# Patient Record
Sex: MALE | Race: WHITE | NOT HISPANIC OR LATINO | Employment: OTHER | URBAN - METROPOLITAN AREA
[De-identification: names, ages, dates, MRNs, and addresses within clinical notes are randomized per-mention and may not be internally consistent; named-entity substitution may affect disease eponyms.]

---

## 2017-01-24 ENCOUNTER — ALLSCRIPTS OFFICE VISIT (OUTPATIENT)
Dept: OTHER | Facility: OTHER | Age: 72
End: 2017-01-24

## 2017-08-21 ENCOUNTER — APPOINTMENT (INPATIENT)
Dept: RADIOLOGY | Facility: HOSPITAL | Age: 72
DRG: 310 | End: 2017-08-21
Payer: MEDICARE

## 2017-08-21 ENCOUNTER — HOSPITAL ENCOUNTER (INPATIENT)
Facility: HOSPITAL | Age: 72
LOS: 3 days | Discharge: HOME | DRG: 310 | End: 2017-08-24
Attending: EMERGENCY MEDICINE | Admitting: INTERNAL MEDICINE
Payer: MEDICARE

## 2017-08-21 ENCOUNTER — GENERIC CONVERSION - ENCOUNTER (OUTPATIENT)
Dept: OTHER | Facility: OTHER | Age: 72
End: 2017-08-21

## 2017-08-21 ENCOUNTER — APPOINTMENT (EMERGENCY)
Dept: RADIOLOGY | Facility: HOSPITAL | Age: 72
DRG: 310 | End: 2017-08-21
Payer: MEDICARE

## 2017-08-21 DIAGNOSIS — R07.9 CHEST PAIN: ICD-10-CM

## 2017-08-21 DIAGNOSIS — I48.91 ATRIAL FIBRILLATION WITH RAPID VENTRICULAR RESPONSE (HCC): Primary | ICD-10-CM

## 2017-08-21 LAB
ALBUMIN SERPL BCP-MCNC: 3.5 G/DL (ref 3.5–5)
ALP SERPL-CCNC: 79 U/L (ref 46–116)
ALT SERPL W P-5'-P-CCNC: 17 U/L (ref 12–78)
ANION GAP SERPL CALCULATED.3IONS-SCNC: 5 MMOL/L (ref 4–13)
AST SERPL W P-5'-P-CCNC: 14 U/L (ref 5–45)
ATRIAL RATE: 127 BPM
BASOPHILS # BLD AUTO: 0.03 THOUSANDS/ΜL (ref 0–0.1)
BASOPHILS NFR BLD AUTO: 1 % (ref 0–1)
BILIRUB SERPL-MCNC: 0.75 MG/DL (ref 0.2–1)
BUN SERPL-MCNC: 11 MG/DL (ref 5–25)
CALCIUM SERPL-MCNC: 8.8 MG/DL (ref 8.3–10.1)
CHLORIDE SERPL-SCNC: 111 MMOL/L (ref 100–108)
CO2 SERPL-SCNC: 27 MMOL/L (ref 21–32)
CREAT SERPL-MCNC: 0.74 MG/DL (ref 0.6–1.3)
EOSINOPHIL # BLD AUTO: 0.19 THOUSAND/ΜL (ref 0–0.61)
EOSINOPHIL NFR BLD AUTO: 4 % (ref 0–6)
ERYTHROCYTE [DISTWIDTH] IN BLOOD BY AUTOMATED COUNT: 13.4 % (ref 11.6–15.1)
GFR SERPL CREATININE-BSD FRML MDRD: 92 ML/MIN/1.73SQ M
GLUCOSE SERPL-MCNC: 101 MG/DL (ref 65–140)
HCT VFR BLD AUTO: 46.6 % (ref 36.5–49.3)
HGB BLD-MCNC: 16.7 G/DL (ref 12–17)
LYMPHOCYTES # BLD AUTO: 1.76 THOUSANDS/ΜL (ref 0.6–4.47)
LYMPHOCYTES NFR BLD AUTO: 34 % (ref 14–44)
MCH RBC QN AUTO: 33.2 PG (ref 26.8–34.3)
MCHC RBC AUTO-ENTMCNC: 35.8 G/DL (ref 31.4–37.4)
MCV RBC AUTO: 93 FL (ref 82–98)
MONOCYTES # BLD AUTO: 0.51 THOUSAND/ΜL (ref 0.17–1.22)
MONOCYTES NFR BLD AUTO: 10 % (ref 4–12)
NEUTROPHILS # BLD AUTO: 2.66 THOUSANDS/ΜL (ref 1.85–7.62)
NEUTS SEG NFR BLD AUTO: 51 % (ref 43–75)
NRBC BLD AUTO-RTO: 0 /100 WBCS
PLATELET # BLD AUTO: 183 THOUSANDS/UL (ref 149–390)
PMV BLD AUTO: 10 FL (ref 8.9–12.7)
POTASSIUM SERPL-SCNC: 3.8 MMOL/L (ref 3.5–5.3)
PROT SERPL-MCNC: 6.7 G/DL (ref 6.4–8.2)
QRS AXIS: -25 DEGREES
QRSD INTERVAL: 96 MS
QT INTERVAL: 278 MS
QTC INTERVAL: 389 MS
RBC # BLD AUTO: 5.03 MILLION/UL (ref 3.88–5.62)
SODIUM SERPL-SCNC: 143 MMOL/L (ref 136–145)
SPECIMEN SOURCE: NORMAL
T WAVE AXIS: 17 DEGREES
TROPONIN I BLD-MCNC: 0.01 NG/ML (ref 0–0.08)
VENTRICULAR RATE: 118 BPM
WBC # BLD AUTO: 5.16 THOUSAND/UL (ref 4.31–10.16)

## 2017-08-21 PROCEDURE — 99285 EMERGENCY DEPT VISIT HI MDM: CPT

## 2017-08-21 PROCEDURE — 84484 ASSAY OF TROPONIN QUANT: CPT

## 2017-08-21 PROCEDURE — 80053 COMPREHEN METABOLIC PANEL: CPT | Performed by: EMERGENCY MEDICINE

## 2017-08-21 PROCEDURE — 96374 THER/PROPH/DIAG INJ IV PUSH: CPT

## 2017-08-21 PROCEDURE — 93005 ELECTROCARDIOGRAM TRACING: CPT | Performed by: EMERGENCY MEDICINE

## 2017-08-21 PROCEDURE — 85025 COMPLETE CBC W/AUTO DIFF WBC: CPT | Performed by: EMERGENCY MEDICINE

## 2017-08-21 PROCEDURE — 36415 COLL VENOUS BLD VENIPUNCTURE: CPT | Performed by: EMERGENCY MEDICINE

## 2017-08-21 PROCEDURE — 76770 US EXAM ABDO BACK WALL COMP: CPT

## 2017-08-21 PROCEDURE — 71275 CT ANGIOGRAPHY CHEST: CPT

## 2017-08-21 PROCEDURE — 71020 HB CHEST X-RAY 2VW FRONTAL&LATL: CPT

## 2017-08-21 RX ORDER — METOPROLOL TARTRATE 50 MG/1
50 TABLET, FILM COATED ORAL EVERY 12 HOURS SCHEDULED
Status: DISCONTINUED | OUTPATIENT
Start: 2017-08-21 | End: 2017-08-24

## 2017-08-21 RX ORDER — METOPROLOL TARTRATE 5 MG/5ML
5 INJECTION INTRAVENOUS ONCE
Status: COMPLETED | OUTPATIENT
Start: 2017-08-21 | End: 2017-08-21

## 2017-08-21 RX ORDER — ACETAMINOPHEN 325 MG/1
650 TABLET ORAL EVERY 6 HOURS PRN
Status: DISCONTINUED | OUTPATIENT
Start: 2017-08-21 | End: 2017-08-24 | Stop reason: HOSPADM

## 2017-08-21 RX ORDER — ONDANSETRON 2 MG/ML
4 INJECTION INTRAMUSCULAR; INTRAVENOUS EVERY 6 HOURS PRN
Status: DISCONTINUED | OUTPATIENT
Start: 2017-08-21 | End: 2017-08-22

## 2017-08-21 RX ORDER — B-COMPLEX WITH VITAMIN C
1 TABLET ORAL
Status: DISCONTINUED | OUTPATIENT
Start: 2017-08-22 | End: 2017-08-24 | Stop reason: HOSPADM

## 2017-08-21 RX ORDER — CHOLECALCIFEROL (VITAMIN D3) 125 MCG
100 CAPSULE ORAL DAILY
Status: DISCONTINUED | OUTPATIENT
Start: 2017-08-22 | End: 2017-08-24 | Stop reason: HOSPADM

## 2017-08-21 RX ORDER — ASPIRIN 81 MG/1
81 TABLET, CHEWABLE ORAL DAILY
Status: DISCONTINUED | OUTPATIENT
Start: 2017-08-22 | End: 2017-08-24 | Stop reason: HOSPADM

## 2017-08-21 RX ORDER — METOPROLOL TARTRATE 5 MG/5ML
2.5 INJECTION INTRAVENOUS EVERY 6 HOURS PRN
Status: DISCONTINUED | OUTPATIENT
Start: 2017-08-21 | End: 2017-08-24 | Stop reason: HOSPADM

## 2017-08-21 RX ORDER — METOPROLOL SUCCINATE 25 MG/1
25 TABLET, EXTENDED RELEASE ORAL DAILY
Status: DISCONTINUED | OUTPATIENT
Start: 2017-08-22 | End: 2017-08-22

## 2017-08-21 RX ORDER — NIACIN 100 MG
50 TABLET ORAL
Status: DISCONTINUED | OUTPATIENT
Start: 2017-08-22 | End: 2017-08-24 | Stop reason: HOSPADM

## 2017-08-21 RX ADMIN — SODIUM CHLORIDE 1000 ML: 0.9 INJECTION, SOLUTION INTRAVENOUS at 10:41

## 2017-08-21 RX ADMIN — APIXABAN 5 MG: 5 TABLET, FILM COATED ORAL at 21:12

## 2017-08-21 RX ADMIN — APIXABAN 5 MG: 5 TABLET, FILM COATED ORAL at 14:28

## 2017-08-21 RX ADMIN — METOPROLOL TARTRATE 5 MG: 5 INJECTION INTRAVENOUS at 08:40

## 2017-08-21 RX ADMIN — METOPROLOL TARTRATE 50 MG: 50 TABLET ORAL at 21:12

## 2017-08-21 RX ADMIN — IOHEXOL 85 ML: 350 INJECTION, SOLUTION INTRAVENOUS at 10:35

## 2017-08-22 ENCOUNTER — GENERIC CONVERSION - ENCOUNTER (OUTPATIENT)
Dept: OTHER | Facility: OTHER | Age: 72
End: 2017-08-22

## 2017-08-22 ENCOUNTER — ANESTHESIA EVENT (INPATIENT)
Dept: NON INVASIVE DIAGNOSTICS | Facility: HOSPITAL | Age: 72
DRG: 310 | End: 2017-08-22
Payer: MEDICARE

## 2017-08-22 LAB
ATRIAL RATE: 79 BPM
P AXIS: 48 DEGREES
PR INTERVAL: 176 MS
QRS AXIS: -26 DEGREES
QRSD INTERVAL: 104 MS
QT INTERVAL: 376 MS
QTC INTERVAL: 431 MS
T WAVE AXIS: 17 DEGREES
TSH SERPL DL<=0.05 MIU/L-ACNC: 1.61 UIU/ML (ref 0.36–3.74)
VENTRICULAR RATE: 79 BPM

## 2017-08-22 PROCEDURE — 93005 ELECTROCARDIOGRAM TRACING: CPT

## 2017-08-22 PROCEDURE — 92960 CARDIOVERSION ELECTRIC EXT: CPT | Performed by: INTERNAL MEDICINE

## 2017-08-22 PROCEDURE — 93312 ECHO TRANSESOPHAGEAL: CPT

## 2017-08-22 PROCEDURE — 93005 ELECTROCARDIOGRAM TRACING: CPT | Performed by: PHYSICIAN ASSISTANT

## 2017-08-22 PROCEDURE — 5A2204Z RESTORATION OF CARDIAC RHYTHM, SINGLE: ICD-10-PCS | Performed by: INTERNAL MEDICINE

## 2017-08-22 PROCEDURE — 84443 ASSAY THYROID STIM HORMONE: CPT | Performed by: INTERNAL MEDICINE

## 2017-08-22 RX ORDER — PROPOFOL 10 MG/ML
INJECTION, EMULSION INTRAVENOUS CONTINUOUS PRN
Status: DISCONTINUED | OUTPATIENT
Start: 2017-08-22 | End: 2017-08-22 | Stop reason: SURG

## 2017-08-22 RX ORDER — SODIUM CHLORIDE 9 MG/ML
INJECTION, SOLUTION INTRAVENOUS CONTINUOUS PRN
Status: DISCONTINUED | OUTPATIENT
Start: 2017-08-22 | End: 2017-08-22 | Stop reason: SURG

## 2017-08-22 RX ORDER — POTASSIUM CHLORIDE 20 MEQ/1
40 TABLET, EXTENDED RELEASE ORAL ONCE
Status: COMPLETED | OUTPATIENT
Start: 2017-08-22 | End: 2017-08-22

## 2017-08-22 RX ORDER — PROPOFOL 10 MG/ML
INJECTION, EMULSION INTRAVENOUS AS NEEDED
Status: DISCONTINUED | OUTPATIENT
Start: 2017-08-22 | End: 2017-08-22 | Stop reason: SURG

## 2017-08-22 RX ORDER — DOFETILIDE 0.5 MG/1
500 CAPSULE ORAL EVERY 12 HOURS SCHEDULED
Status: DISCONTINUED | OUTPATIENT
Start: 2017-08-22 | End: 2017-08-24 | Stop reason: HOSPADM

## 2017-08-22 RX ADMIN — APIXABAN 5 MG: 5 TABLET, FILM COATED ORAL at 09:23

## 2017-08-22 RX ADMIN — METOPROLOL TARTRATE 50 MG: 50 TABLET ORAL at 21:22

## 2017-08-22 RX ADMIN — DOFETILIDE 500 MCG: 0.5 CAPSULE ORAL at 18:38

## 2017-08-22 RX ADMIN — Medication 100 MG: at 09:23

## 2017-08-22 RX ADMIN — APIXABAN 5 MG: 5 TABLET, FILM COATED ORAL at 21:22

## 2017-08-22 RX ADMIN — METOPROLOL SUCCINATE 25 MG: 25 TABLET, EXTENDED RELEASE ORAL at 09:23

## 2017-08-22 RX ADMIN — Medication 50 MG: at 09:22

## 2017-08-22 RX ADMIN — ASPIRIN 81 MG 81 MG: 81 TABLET ORAL at 09:23

## 2017-08-22 RX ADMIN — SODIUM CHLORIDE: 0.9 INJECTION, SOLUTION INTRAVENOUS at 13:01

## 2017-08-22 RX ADMIN — POTASSIUM CHLORIDE 40 MEQ: 1500 TABLET, EXTENDED RELEASE ORAL at 09:23

## 2017-08-22 RX ADMIN — CALCIUM CARBONATE 500 MG (1,250 MG)-VITAMIN D3 200 UNIT TABLET 1 TABLET: at 09:23

## 2017-08-22 RX ADMIN — PROPOFOL 75 MCG/KG/MIN: 10 INJECTION, EMULSION INTRAVENOUS at 13:15

## 2017-08-22 RX ADMIN — PROPOFOL 50 MG: 10 INJECTION, EMULSION INTRAVENOUS at 13:14

## 2017-08-23 LAB
ANION GAP SERPL CALCULATED.3IONS-SCNC: 6 MMOL/L (ref 4–13)
ATRIAL RATE: 60 BPM
ATRIAL RATE: 68 BPM
BUN SERPL-MCNC: 14 MG/DL (ref 5–25)
CALCIUM SERPL-MCNC: 9.1 MG/DL (ref 8.3–10.1)
CHLORIDE SERPL-SCNC: 109 MMOL/L (ref 100–108)
CO2 SERPL-SCNC: 28 MMOL/L (ref 21–32)
CREAT SERPL-MCNC: 0.82 MG/DL (ref 0.6–1.3)
GFR SERPL CREATININE-BSD FRML MDRD: 88 ML/MIN/1.73SQ M
GLUCOSE SERPL-MCNC: 96 MG/DL (ref 65–140)
MAGNESIUM SERPL-MCNC: 2.4 MG/DL (ref 1.6–2.6)
P AXIS: 36 DEGREES
P AXIS: 62 DEGREES
POTASSIUM SERPL-SCNC: 4.5 MMOL/L (ref 3.5–5.3)
PR INTERVAL: 168 MS
PR INTERVAL: 178 MS
QRS AXIS: -1 DEGREES
QRS AXIS: -19 DEGREES
QRSD INTERVAL: 112 MS
QRSD INTERVAL: 94 MS
QT INTERVAL: 384 MS
QT INTERVAL: 422 MS
QTC INTERVAL: 408 MS
QTC INTERVAL: 422 MS
SODIUM SERPL-SCNC: 143 MMOL/L (ref 136–145)
T WAVE AXIS: 2 DEGREES
T WAVE AXIS: 33 DEGREES
VENTRICULAR RATE: 60 BPM
VENTRICULAR RATE: 68 BPM

## 2017-08-23 PROCEDURE — 93005 ELECTROCARDIOGRAM TRACING: CPT | Performed by: PHYSICIAN ASSISTANT

## 2017-08-23 PROCEDURE — 80048 BASIC METABOLIC PNL TOTAL CA: CPT | Performed by: PHYSICIAN ASSISTANT

## 2017-08-23 PROCEDURE — 83735 ASSAY OF MAGNESIUM: CPT | Performed by: PHYSICIAN ASSISTANT

## 2017-08-23 RX ORDER — ATORVASTATIN CALCIUM 10 MG/1
10 TABLET, FILM COATED ORAL
Status: CANCELLED | OUTPATIENT
Start: 2017-08-23

## 2017-08-23 RX ADMIN — Medication 50 MG: at 08:41

## 2017-08-23 RX ADMIN — CALCIUM CARBONATE 500 MG (1,250 MG)-VITAMIN D3 200 UNIT TABLET 1 TABLET: at 08:41

## 2017-08-23 RX ADMIN — Medication 100 MG: at 08:41

## 2017-08-23 RX ADMIN — ASPIRIN 81 MG 81 MG: 81 TABLET ORAL at 08:41

## 2017-08-23 RX ADMIN — METOPROLOL TARTRATE 50 MG: 50 TABLET ORAL at 08:41

## 2017-08-23 RX ADMIN — APIXABAN 5 MG: 5 TABLET, FILM COATED ORAL at 21:21

## 2017-08-23 RX ADMIN — DOFETILIDE 500 MCG: 0.5 CAPSULE ORAL at 06:34

## 2017-08-23 RX ADMIN — METOPROLOL TARTRATE 50 MG: 50 TABLET ORAL at 21:21

## 2017-08-23 RX ADMIN — DOFETILIDE 500 MCG: 0.5 CAPSULE ORAL at 18:33

## 2017-08-23 RX ADMIN — APIXABAN 5 MG: 5 TABLET, FILM COATED ORAL at 08:41

## 2017-08-24 ENCOUNTER — GENERIC CONVERSION - ENCOUNTER (OUTPATIENT)
Dept: OTHER | Facility: OTHER | Age: 72
End: 2017-08-24

## 2017-08-24 VITALS
HEART RATE: 56 BPM | SYSTOLIC BLOOD PRESSURE: 113 MMHG | HEIGHT: 78 IN | RESPIRATION RATE: 18 BRPM | OXYGEN SATURATION: 96 % | TEMPERATURE: 98.3 F | DIASTOLIC BLOOD PRESSURE: 69 MMHG | WEIGHT: 252.65 LBS | BODY MASS INDEX: 29.23 KG/M2

## 2017-08-24 LAB
ATRIAL RATE: 53 BPM
ATRIAL RATE: 54 BPM
P AXIS: 50 DEGREES
P AXIS: 59 DEGREES
PR INTERVAL: 164 MS
PR INTERVAL: 178 MS
QRS AXIS: -25 DEGREES
QRS AXIS: -8 DEGREES
QRSD INTERVAL: 102 MS
QRSD INTERVAL: 120 MS
QT INTERVAL: 434 MS
QT INTERVAL: 468 MS
QTC INTERVAL: 407 MS
QTC INTERVAL: 443 MS
T WAVE AXIS: 27 DEGREES
T WAVE AXIS: 9 DEGREES
VENTRICULAR RATE: 53 BPM
VENTRICULAR RATE: 54 BPM

## 2017-08-24 PROCEDURE — 93005 ELECTROCARDIOGRAM TRACING: CPT | Performed by: PHYSICIAN ASSISTANT

## 2017-08-24 RX ORDER — METOPROLOL SUCCINATE 25 MG/1
25 TABLET, EXTENDED RELEASE ORAL DAILY
Status: DISCONTINUED | OUTPATIENT
Start: 2017-08-25 | End: 2017-08-24 | Stop reason: HOSPADM

## 2017-08-24 RX ORDER — DOFETILIDE 0.5 MG/1
500 CAPSULE ORAL EVERY 12 HOURS SCHEDULED
Qty: 60 CAPSULE | Refills: 0
Start: 2017-08-24 | End: 2018-02-23

## 2017-08-24 RX ADMIN — Medication 50 MG: at 06:40

## 2017-08-24 RX ADMIN — Medication 100 MG: at 09:00

## 2017-08-24 RX ADMIN — CALCIUM CARBONATE 500 MG (1,250 MG)-VITAMIN D3 200 UNIT TABLET 1 TABLET: at 06:40

## 2017-08-24 RX ADMIN — DOFETILIDE 500 MCG: 0.5 CAPSULE ORAL at 06:43

## 2017-08-24 RX ADMIN — APIXABAN 5 MG: 5 TABLET, FILM COATED ORAL at 09:01

## 2017-08-24 RX ADMIN — METOPROLOL TARTRATE 50 MG: 50 TABLET ORAL at 09:00

## 2017-08-24 RX ADMIN — ASPIRIN 81 MG 81 MG: 81 TABLET ORAL at 09:00

## 2017-08-31 ENCOUNTER — ALLSCRIPTS OFFICE VISIT (OUTPATIENT)
Dept: OTHER | Facility: OTHER | Age: 72
End: 2017-08-31

## 2017-10-31 ENCOUNTER — ALLSCRIPTS OFFICE VISIT (OUTPATIENT)
Dept: OTHER | Facility: OTHER | Age: 72
End: 2017-10-31

## 2017-10-31 ENCOUNTER — GENERIC CONVERSION - ENCOUNTER (OUTPATIENT)
Dept: OTHER | Facility: OTHER | Age: 72
End: 2017-10-31

## 2017-12-06 ENCOUNTER — ALLSCRIPTS OFFICE VISIT (OUTPATIENT)
Dept: OTHER | Facility: OTHER | Age: 72
End: 2017-12-06

## 2017-12-06 ENCOUNTER — TRANSCRIBE ORDERS (OUTPATIENT)
Dept: ADMINISTRATIVE | Facility: HOSPITAL | Age: 72
End: 2017-12-06

## 2017-12-06 DIAGNOSIS — I48.91 ATRIAL FIBRILLATION (HCC): ICD-10-CM

## 2017-12-06 DIAGNOSIS — I48.91 ATRIAL FIBRILLATION, UNSPECIFIED TYPE (HCC): Primary | ICD-10-CM

## 2017-12-07 ENCOUNTER — GENERIC CONVERSION - ENCOUNTER (OUTPATIENT)
Dept: OTHER | Facility: OTHER | Age: 72
End: 2017-12-07

## 2017-12-07 NOTE — PROGRESS NOTES
Assessment  Assessed    1  Afib (427 31) (I48 91)   2  Hypertension (401 9) (I10)    Plan  Afib    · EKG/ECG- POC; Status:Complete;   Done: 24VIM0755   Perform: In Office; 214 6488; Last Updated By:Ibis Camarena; 12/6/2017 9:54:30 AM;Ordered;Ordered By:Vero Valdes;    Discussion/Summary  Cardiology Discussion Summary Free Text Note Form [facility]:   66 yo maleParoxysmal afib (one longer episded 4 months requiring DCCV in August, 5 episodes lifelong that had required hospitalization but broke spontaneously)  On Tikoysn and actually very well controlled on Tikosyn, when stopped tikosyn last time he ended up back in afib  He believes strongly in holistic medication and is very weary of taking medications and is uncomfortable with risks of lifelong Tikosyn and therefore is requesting afib ablation to stop Tikosyn and anticoagualtion  He has TAUDL3Qbeb=4 (age, HTN) so relatively low risk, and HTN seems to very well controlled w low dose metoprolol  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  4 5cm aneusysm of aorta  He is well controlled on antiarrhythmics, I discussed the pros and cons of just continuing medication vs ablation  I was very clear that afib ablation is not a cure for afib, and I would estimate up to 40% chance of recurrence based on trial data, and he may likely still need medications for afib if he recurs  I also explained risks of ablation inlcuding 4% complication rate which includes bleeding, life threatening damage to esophagus, damage to heart, stroke, vascular injury  also explaiend he would need Tikosyn for at least the month post ablation to treat common post op afib, and would need anticoagualtion for 3 months post ablation   We did discuss the approximately 0 5% chance of proarrhythmia of Tikosyn and that it is associated w sudden death and I would estimate risk of death from ablation vs risk of death of Tikosyn to be similar, ablation is approximately as 1/1000 mortality in trial data  completely understands this and wishes to proceed with ablation and understands the realistic possibility he would still needs meds or repeat ablation if the ablation doesn't work  Since his [id number] is 1-2 if no afib were to recur he would be low risk for stroke and could have option of stopping anticoagualtion 3 months post ablation  get CT Angio preop  RANDALL, cryoablation planned  Chief Complaint  Chief Complaint Free Text Note Form: F/U appt to discuss A fib ablation  No complaints of chest pain, palpitations, SOB or swelling in LE  History of Present Illness  Cardiology HPI Free Text Note Form [facility]: 68 yo maleParoxysmal afib (one longer episded 4 months requiring DCCV in August, 5 episodes lifelong that had required hospitalization but broke spontaneously)  On Tikoysn and actually very well controlled on Tikosyn, when stopped tikosyn last time he ended up back in afib  He believes strongly in holistic medication and is very weary of taking medications and is uncomfortable with risks of lifelong Tikosyn and therefore is requesting afib ablation to stop Tikosyn and anticoagualtion  He has DTSXO6Fmig=7 (age, HTN) so relatively low risk, and HTN seems to very well controlled w low dose metoprolol  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  Review of Systems  ROS Reviewed:   ROS reviewed  Active Problems  Problems    1  Afib (427 31) (I48 91)   2  Cardiac disease (429 9) (I51 9)   3  Hypertension (401 9) (I10)   4  Mitral valve insufficiency, unspecified etiology (424 0) (I34 0)    Past Medical History  Problems    1  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)  Active Problems And Past Medical History Reviewed: The active problems and past medical history were reviewed and updated today  Surgical History  Problems    1  History of Hernia Repair   2  History of Knee Replacement   3  History of Surgery For Aneurysm   4   History of Tonsillectomy   5  History of Total Knee Replacement Left  Surgical History Reviewed: The surgical history was reviewed and updated today  Family History  Mother    1  Family history of    2  Family history of pancreatic cancer (V16 0) (Z80 0)  Father    3  Family history of    4  Family history of Heart problem  Brother    5  Family history of   Paternal Grandmother    10  Family history of    7  Family history of myocardial infarction (V17 3) (Z82 49)  Family History Reviewed: The family history was reviewed and updated today  Social History  Problems    · Never smoker  Social History Reviewed: The social history was reviewed and updated today  Current Meds   1  Aleve CAPS; TAKE 2 CAPSULE Every 6 hours PRN; Therapy: (Recorded:00Vwg0681) to Recorded   2  Chelated Magnesium TABS; Take 1 tablet daily; Therapy: (Recorded:2017) to Recorded   3  CoQ-10 100 MG Oral Capsule; take 1 capsule daily; Therapy: (Recorded:64Pur3545) to Recorded   4  Eliquis 5 MG Oral Tablet; TAKE 1 TABLET Twice daily for blood thinning  Requested for: 68NEO2418; Last Rx:2017 Ordered   5  Flax + DHA Oral Capsule; 1000mg daily; Therapy: (Recorded:96Scq4799) to Recorded   6  Green Tea TABS; Green Tea Phytosome  250mg 2-3 tablets daily; Therapy: (Recorded:32Lga4787) to Recorded   7  Liver CAPS; Liver Defatted Bovine  500mg daily; Therapy: (Recorded:75Vkj7846) to Recorded   8  Magnesium Citrate 125 MG Oral Capsule; take 1-3 tablets daily; Therapy: (Recorded:46Wnc2176) to Recorded   9  Metoprolol Succinate ER 25 MG Oral Tablet Extended Release 24 Hour; TAKE 1 TABLET DAILY; Therapy: (Recorded:12Cfs1617) to Recorded   10  Niacin-50 50 MG Oral Tablet; TAKE 1 TABLET DAILY AS DIRECTED; Therapy: (Recorded:75Vat4270) to Recorded   11  Pancreatin 325 MG Oral Tablet; TAKE 3 TABLET Daily; Therapy: (Recorded:91Flk1785) to Recorded   12  Potassium TABS; Potassium Compound   150mg 2 teaspoon daily; Therapy: (Recorded:71Jgp9324) to Recorded   13  Probiotic Daily CAPS; take 1 capsule daily; Therapy: (Recorded:57Kud7357) to Recorded   14  Thyroid TABS; Thyrosol  3 tablets weekly; Therapy: (Recorded:61Mbc0448) to Recorded   15  Tikosyn 500 MCG Oral Capsule; TAKE 1 CAPSULE TWICE DAILY; Last Rx:30Tap0599  Ordered   16  Vitamin C 500 MG Oral Capsule; TAKE 1 CAPSULE Daily PRN; Therapy: (Recorded:66Wom7092) to Recorded   17  Vitamin D3 2000 UNIT Oral Capsule; take 1 capsule daily; Therapy: (Recorded:46Tyb3872) to Recorded  Medication List Reviewed: The medication list was reviewed and updated today  Allergies  Medication    1  OxyCODONE HCl TABS    Vitals  Vital Signs    Recorded: 51HRI3782 09:52AM   Heart Rate 61   Systolic 536, RUE, Sitting   Diastolic 64, RUE, Sitting   BP CUFF SIZE Large   Height 6 ft 6 in   Weight 251 lb    BMI Calculated 29 01   BSA Calculated 2 49       Physical Exam   Constitutional - General appearance: No acute distress, well appearing and well nourished  Eyes - Conjunctiva and Sclera examination: Conjunctiva pink, sclera anicteric  Neck - Normal, no JVD   Pulmonary - Respiratory effort: No signs of respiratory distress  -- Auscultation of lungs: Clear to auscultation  Cardiovascular - Auscultation of heart: Normal rate and rhythm, normal S1 and S2, no murmurs  -- Pedal pulses: Normal, 2+ bilaterally  -- Examination of extremities for edema and/or varicosities: Normal    Abdomen - Soft  Musculoskeletal - Gait and station: Normal gait  Skin - Skin: Normal without rashes  Skin is warm and well perfused  Neurologic - Speech normal  No focal deficits  Psychiatric - Orientation to person, place, and time: Normal       Results/Data  Diagnostic Studies Reviewed Cardio: I personally reviewed the recording/images in the office today  My interpretation follows  Echocardiogram/RANDALL: mild MR, normal EF, 4 5cm aneurysm  ECG Report:  Today NSR normal QT interval      Signatures Electronically signed by : SHELLI Claros ; Dec  6 2017 10:44AM EST                       (Author)

## 2017-12-11 ENCOUNTER — HOSPITAL ENCOUNTER (OUTPATIENT)
Dept: RADIOLOGY | Facility: HOSPITAL | Age: 72
Discharge: HOME | End: 2017-12-11
Attending: INTERNAL MEDICINE
Payer: MEDICARE

## 2017-12-11 DIAGNOSIS — I48.91 ATRIAL FIBRILLATION (HCC): ICD-10-CM

## 2017-12-11 PROCEDURE — 75572 CT HRT W/3D IMAGE: CPT

## 2017-12-11 RX ADMIN — IODIXANOL 120 ML: 320 INJECTION, SOLUTION INTRAVASCULAR at 14:39

## 2018-01-11 NOTE — PROCEDURES
Procedures by Christo Tinoco MD at 8/15/2016 11:48 AM      Author:  Christo Tinoco MD Service:  Cardiology Author Type:  Fellow    Filed:  8/15/2016 11:54 AM Date of Service:  8/15/2016 11:48 AM Status:  Attested    :  Christo Tinoco MD (Fellow)  Cosigner:  Ifeoma Castaneda MD at 9/2/2016  3:10 PM      Procedures:       1  RANDALL ANESTHESIA [[id number] (Custom)]              Attestation signed by Ifeoma Castaneda MD at 9/2/2016  3:10 PM                  Teaching Physician Statement  I performed history and exam of patient  I discussed with the Resident  I agree with the resident's documented findings and plan of care  PRE-OP DIAGNOSIS:  Paroxysmal Atrial fibrillation    PROCEDURE: RANDALL/CV    CONSENT: The nature of the procedure, risks and alternatives were discussed with the patient who gave informed consent  :  Anish Gonzales MD    FELLOW: Christo Tinoco MD    ANESTHESIA:  Conscious sedation provided by anesthesiology  20% Benzocaine spray x 1 and propofol 250 mg IV  IMPRESSION: Difficult intubation, successful after 3 attempts  RANDALL probe would not advance in the esophagus past beyond 35cm  JENNY appendage was not completely imaged  Procedure was aborted  Recommend GI evaluation/clearance with  EGD  Cardioversion was not attempted  COMPLICATIONS:  None  Disposition: Cath lab recovery area                 Received Noble Velásquez MD  Sep  2 2016  3:10PM [facility] Standard Time

## 2018-01-12 DIAGNOSIS — I48.91 ATRIAL FIBRILLATION (HCC): ICD-10-CM

## 2018-01-14 VITALS
SYSTOLIC BLOOD PRESSURE: 132 MMHG | WEIGHT: 252.25 LBS | DIASTOLIC BLOOD PRESSURE: 84 MMHG | HEIGHT: 78 IN | BODY MASS INDEX: 29.19 KG/M2 | HEART RATE: 70 BPM

## 2018-01-15 NOTE — PROGRESS NOTES
Chief Complaint  Patient came in today for nurse visit, EKG as per Memo Bustos, Patient on Tikosyn 500mg Bid, patient had no complains  As per Dr Jackman Eye everything looks good and no changes were made in his medical regimen  Active Problems    1  Afib (427 31) (I48 91)   2  Cardiac disease (429 9) (I51 9)   3  Hypertension (401 9) (I10)    Current Meds   1  Eliquis 5 MG Oral Tablet; Take 1 tablet twice daily; Last Rx:07Sep2016 Ordered   2  Iodine (Kelp) TABS; 12 5mg daily; Therapy: (Recorded:24Jan2017) to Recorded   3  Metoprolol Succinate ER 25 MG Oral Tablet Extended Release 24 Hour; TAKE 1 TABLET   DAILY; Therapy: (Antoni Francois) to Recorded   4  Potassium Chloride 20 MEQ TBCR; TAKE 1 TABLET DAILY; Therapy: (Antoni Francois) to Recorded   5  Tikosyn 500 MCG Oral Capsule; TAKE 1 CAPSULE TWICE DAILY; Last Rx:07Sep2016   Ordered   6  Tums CHEW;   Therapy: (Recorded:26Aug2016) to Recorded    Allergies    1  No Known Drug Allergies    Vitals  Signs    Heart Rate: 60, Apical  Pulse Quality: Normal, Apical  Systolic: 998, LUE, Sitting  Diastolic: 70, LUE, Sitting  Height: 6 ft 6 in    Plan  Afib    · EKG/ECG- POC; Status:Complete;   Done: 69Jar1023    Discussion/Summary    On dofetilide 500 mcg bid  QTc 420 ms        Future Appointments    Date/Time Provider Specialty Site   10/31/2017 09:40 AM DO Lindsey Tapia [facility]     Signatures   Electronically signed by : Mel Charles, ; Aug 31 2017  9:57AM EST                       (Author)    Electronically signed by : Niels Barry [facility]; Sep  1 2017  4:04PM EST                       (Acknowledgement)    Electronically signed by : SHELLI Marin ; Sep  2 2017  3:53PM EST                       (Author)

## 2018-01-16 NOTE — PROGRESS NOTES
Chief Complaint  Pt here for a EKG per Dr Giselle Cadet  Pt was started on tikosyn 500mg BID while in the hospital  Pt has no cardiac complaints today and is feeling well  Dr Joan Rausch reviewed todays EKG  Pt will be returning to see Dr Willian Bamberger in the office on 9/21 for follow up  Active Problems    1  Afib (427 31) (I48 91)    Current Meds   1  Eliquis 5 MG Oral Tablet; Take 1 tablet twice daily; Therapy: (Recorded:58Itl1293) to Recorded   2  Metoprolol Succinate ER 25 MG Oral Tablet Extended Release 24 Hour; TAKE 1 TABLET   DAILY; Therapy: (Nayeli Captadn) to Recorded   3  Potassium Chloride 20 MEQ TBCR; TAKE 1 TABLET DAILY; Therapy: (Nayeli Capuchin) to Recorded   4  Tikosyn 500 MCG Oral Capsule; TAKE 1 CAPSULE TWICE DAILY; Therapy: (Nayeli Capuchin) to Recorded   5  Tums CHEW;   Therapy: (Recorded:87Pbt9594) to Recorded    Allergies    1   No Known Drug Allergies    Vitals  Signs    Systolic: 415, LUE, Sitting  Diastolic: 70, LUE, Sitting  Heart Rate: 55  Height: 6 ft 6 in  Weight: 241 lb 1 oz  BMI Calculated: 27 86  BSA Calculated: 2 44    Plan  Afib    · EKG/ECG- POC; Status:Complete;   Done: 63Nml5125    Future Appointments    Date/Time Provider Specialty Site   09/21/2016 03:40 PM Grace Mosquera DO Cardiology [facility]     Signatures   Electronically signed by : Alma Flaherty, ; Aug 26 2016 11:15AM EST                       (Author)    Electronically signed by : Becky Aviles DO; Sep 11 2016  8:11PM EST                       (Author)

## 2018-01-16 NOTE — PROCEDURES
Procedures by Jean Pierre Senior MD at 8/16/2016 12:13 PM      Author:  Jean Pierre Senior MD Service:  Cardiology Author Type:  Fellow    Filed:  8/16/2016 12:18 PM Date of Service:  8/16/2016 12:13 PM Status:  Attested    :  Jean Pierre Senior MD (Fellow)  Cosigner:  Aditya Hamilton MD at 8/16/2016 12:58 PM      Pre-procedure Diagnoses:       1  Atrial fibrillation [I48 91]                Procedures:       1  RANDALL ANESTHESIA [[id number] (Custom)]              Attestation signed by Aditya Hamilton MD at 8/16/2016 12:58 PM                  Teaching Physician Statement  I was present for the entire procedure and agree with the resident's findings except that the TR appears moderate  PRE-OP DIAGNOSIS:  Paroxysmal Atrial fibrillation    PROCEDURE:  RANDALL/CV    CONSENT: The nature of the procedure, risks and alternatives were discussed with the patient who gave informed consent  : Aditya Hamilton MD    FELLOW: Jean Pierre Senior MD    ANESTHESIA:  Conscious sedation provided by anesthesiology  ANTI-COAGULATION: Eliquis 5mg BID - last dose 8/16 9am    Electrical Pad Placement: Anteroposteriorly interscapular region and upper sternum  IMPRESSION: Arrie Rosi revealed no LA/JENNY thrombus  Mild MR, TR, AI was noted  Please see report for full details  Successful cardioversion following a single  synchronized biphasic shock at 150 J  COMPLICATIONS:  None  Post Procedure Findings: Sinus rhythm documented by 12 lead ECG  Disposition: Cath lab recovery area      Further management per admitting team            Received Genevie Denver MD  Aug 16 2016 12:56PM [facility] Standard Time

## 2018-01-22 VITALS — DIASTOLIC BLOOD PRESSURE: 70 MMHG | HEART RATE: 60 BPM | HEIGHT: 78 IN | SYSTOLIC BLOOD PRESSURE: 112 MMHG

## 2018-01-22 VITALS
BODY MASS INDEX: 28.93 KG/M2 | HEIGHT: 78 IN | HEART RATE: 58 BPM | SYSTOLIC BLOOD PRESSURE: 118 MMHG | DIASTOLIC BLOOD PRESSURE: 74 MMHG | WEIGHT: 250 LBS

## 2018-01-23 ENCOUNTER — TELEPHONE (OUTPATIENT)
Dept: SURGERY | Facility: HOSPITAL | Age: 73
End: 2018-01-23

## 2018-01-23 VITALS
WEIGHT: 251 LBS | DIASTOLIC BLOOD PRESSURE: 64 MMHG | HEIGHT: 78 IN | BODY MASS INDEX: 29.04 KG/M2 | SYSTOLIC BLOOD PRESSURE: 118 MMHG | HEART RATE: 61 BPM

## 2018-01-23 RX ORDER — PANTOPRAZOLE SODIUM 40 MG/1
40 INJECTION, POWDER, FOR SOLUTION INTRAVENOUS ONCE
Status: CANCELLED | OUTPATIENT
Start: 2018-01-23 | End: 2018-01-23

## 2018-01-23 NOTE — RESULT NOTES
Verified Results  CT CARDIAC W PULMONARY VEIN MAPPING [id number] 01:26PM Kath Martin Order Number: [id number]    - Patient Instructions: To schedule this appointment, please contact Central Scheduling at [phone]  Test Name Result Flag Reference   CT CARDIAC W PULMONARY VEIN MAPPING (Report)     CARDIAC CT ANGIOGRAPHY - PULMONARY VEIN MAPPING     INDICATION: Atrial fibrillation  COMPARISON: Chest CTA 8/21/2017  TECHNIQUE: Noncontrast axial localizing images of the heart were obtained  Thin-section cardiac gated CT angiography of the heart and coronary vasculature was performed  3D reformatted images and volume rendering were performed on an independent    workstation  Reformatted images were created in multiple planes  Radiation dose length product (DLP) for this visit: 1400 68 mGy-cm   This examination, like all CT scans performed in the [facility], was performed utilizing techniques to minimize radiation dose exposure, including the use of    iterative reconstruction and automated exposure control  IV Contrast: 120 mL of iodixanol (VISIPAQUE)        FINDINGS:      Pulmonary vein anatomy is typical and four pulmonary veins are identified  The right superior pulmonary vein ostium measures approximately 27 x 22 mm  The right inferior pulmonary vein ostium measures approximately 18 x 13 mm  The superior left pulmonary vein ostium measures approximately 25 x 17 mm  The left inferior pulmonary vein ostium measures approximately 22 x 16 mm  3D pulmonary vein mapping reconstructions were performed, and the anatomy is best depicted on series 350 image 1  The heart is normal in size  There is no evidence of pericardial effusion  Coronary artery calcifications noted  There is mild aneurysmal enlargement of the ascending aorta, measuring 4 4 x 4 4 cm, unchanged since the prior study  The visualized lung fields are clear   Visualized osseous structures appear within normal limits for the patient's age  Small hepatic cysts, and partially imaged right renal cyst again identified in the visualized upper abdomen  IMPRESSION:     Pulmonary venous anatomy as described above  Mild 4 4 cm ascending aortic aneurysm         Workstation performed: [id number]     Signed by:   Jay Tong MD   12/13/17

## 2018-01-24 ENCOUNTER — ANESTHESIA (OUTPATIENT)
Dept: NON INVASIVE DIAGNOSTICS | Facility: HOSPITAL | Age: 73
End: 2018-01-24
Payer: MEDICARE

## 2018-01-24 ENCOUNTER — ANESTHESIA EVENT (OUTPATIENT)
Dept: NON INVASIVE DIAGNOSTICS | Facility: HOSPITAL | Age: 73
End: 2018-01-24
Payer: MEDICARE

## 2018-01-24 ENCOUNTER — HOSPITAL ENCOUNTER (OUTPATIENT)
Dept: NON INVASIVE DIAGNOSTICS | Facility: HOSPITAL | Age: 73
Discharge: HOME | End: 2018-01-25
Attending: INTERNAL MEDICINE | Admitting: INTERNAL MEDICINE
Payer: MEDICARE

## 2018-01-24 ENCOUNTER — APPOINTMENT (OUTPATIENT)
Dept: NON INVASIVE DIAGNOSTICS | Facility: HOSPITAL | Age: 73
End: 2018-01-24
Attending: INTERNAL MEDICINE
Payer: MEDICARE

## 2018-01-24 DIAGNOSIS — I48.0 PAROXYSMAL A-FIB (HCC): Primary | ICD-10-CM

## 2018-01-24 DIAGNOSIS — I48.91 A-FIB (HCC): ICD-10-CM

## 2018-01-24 LAB
ANION GAP SERPL CALCULATED.3IONS-SCNC: 5 MMOL/L (ref 4–13)
ATRIAL RATE: 81 BPM
ATRIAL RATE: 84 BPM
BASOPHILS # BLD AUTO: 0.04 THOUSANDS/ΜL (ref 0–0.1)
BASOPHILS NFR BLD AUTO: 1 % (ref 0–1)
BUN SERPL-MCNC: 14 MG/DL (ref 5–25)
CALCIUM SERPL-MCNC: 9.6 MG/DL (ref 8.3–10.1)
CHLORIDE SERPL-SCNC: 108 MMOL/L (ref 100–108)
CO2 SERPL-SCNC: 28 MMOL/L (ref 21–32)
CREAT SERPL-MCNC: 0.69 MG/DL (ref 0.6–1.3)
EOSINOPHIL # BLD AUTO: 0.3 THOUSAND/ΜL (ref 0–0.61)
EOSINOPHIL NFR BLD AUTO: 6 % (ref 0–6)
ERYTHROCYTE [DISTWIDTH] IN BLOOD BY AUTOMATED COUNT: 13.3 % (ref 11.6–15.1)
GFR SERPL CREATININE-BSD FRML MDRD: 94 ML/MIN/1.73SQ M
GLUCOSE P FAST SERPL-MCNC: 97 MG/DL (ref 65–99)
GLUCOSE SERPL-MCNC: 97 MG/DL (ref 65–140)
HCT VFR BLD AUTO: 46.7 % (ref 36.5–49.3)
HGB BLD-MCNC: 16.9 G/DL (ref 12–17)
LYMPHOCYTES # BLD AUTO: 2.08 THOUSANDS/ΜL (ref 0.6–4.47)
LYMPHOCYTES NFR BLD AUTO: 42 % (ref 14–44)
MAGNESIUM SERPL-MCNC: 2.3 MG/DL (ref 1.6–2.6)
MCH RBC QN AUTO: 33.2 PG (ref 26.8–34.3)
MCHC RBC AUTO-ENTMCNC: 36.2 G/DL (ref 31.4–37.4)
MCV RBC AUTO: 92 FL (ref 82–98)
MONOCYTES # BLD AUTO: 0.55 THOUSAND/ΜL (ref 0.17–1.22)
MONOCYTES NFR BLD AUTO: 11 % (ref 4–12)
NEUTROPHILS # BLD AUTO: 1.98 THOUSANDS/ΜL (ref 1.85–7.62)
NEUTS SEG NFR BLD AUTO: 40 % (ref 43–75)
NRBC BLD AUTO-RTO: 0 /100 WBCS
P AXIS: 79 DEGREES
P AXIS: 80 DEGREES
PLATELET # BLD AUTO: 166 THOUSANDS/UL (ref 149–390)
PMV BLD AUTO: 9.6 FL (ref 8.9–12.7)
POTASSIUM SERPL-SCNC: 4 MMOL/L (ref 3.5–5.3)
PR INTERVAL: 192 MS
PR INTERVAL: 192 MS
QRS AXIS: -28 DEGREES
QRS AXIS: -33 DEGREES
QRSD INTERVAL: 104 MS
QRSD INTERVAL: 104 MS
QT INTERVAL: 404 MS
QT INTERVAL: 417 MS
QTC INTERVAL: 478 MS
QTC INTERVAL: 485 MS
RBC # BLD AUTO: 5.09 MILLION/UL (ref 3.88–5.62)
SODIUM SERPL-SCNC: 141 MMOL/L (ref 136–145)
T WAVE AXIS: 11 DEGREES
T WAVE AXIS: 13 DEGREES
VENTRICULAR RATE: 81 BPM
VENTRICULAR RATE: 84 BPM
WBC # BLD AUTO: 4.96 THOUSAND/UL (ref 4.31–10.16)

## 2018-01-24 PROCEDURE — 83735 ASSAY OF MAGNESIUM: CPT | Performed by: PHYSICIAN ASSISTANT

## 2018-01-24 PROCEDURE — C1730 CATH, EP, 19 OR FEW ELECT: HCPCS | Performed by: INTERNAL MEDICINE

## 2018-01-24 PROCEDURE — 93613 INTRACARDIAC EPHYS 3D MAPG: CPT | Performed by: INTERNAL MEDICINE

## 2018-01-24 PROCEDURE — C1733 CATH, EP, OTHR THAN COOL-TIP: HCPCS | Performed by: INTERNAL MEDICINE

## 2018-01-24 PROCEDURE — 93657 TX L/R ATRIAL FIB ADDL: CPT | Performed by: INTERNAL MEDICINE

## 2018-01-24 PROCEDURE — C2630 CATH, EP, COOL-TIP: HCPCS | Performed by: INTERNAL MEDICINE

## 2018-01-24 PROCEDURE — C1726 CATH, BAL DIL, NON-VASCULAR: HCPCS | Performed by: INTERNAL MEDICINE

## 2018-01-24 PROCEDURE — C1893 INTRO/SHEATH, FIXED,NON-PEEL: HCPCS | Performed by: INTERNAL MEDICINE

## 2018-01-24 PROCEDURE — 93623 PRGRMD STIMJ&PACG IV RX NFS: CPT | Performed by: INTERNAL MEDICINE

## 2018-01-24 PROCEDURE — 93655 ICAR CATH ABLTJ DSCRT ARRHYT: CPT | Performed by: INTERNAL MEDICINE

## 2018-01-24 PROCEDURE — 93662 INTRACARDIAC ECG (ICE): CPT | Performed by: INTERNAL MEDICINE

## 2018-01-24 PROCEDURE — 93320 DOPPLER ECHO COMPLETE: CPT | Performed by: INTERNAL MEDICINE

## 2018-01-24 PROCEDURE — C1894 INTRO/SHEATH, NON-LASER: HCPCS | Performed by: INTERNAL MEDICINE

## 2018-01-24 PROCEDURE — 85347 COAGULATION TIME ACTIVATED: CPT

## 2018-01-24 PROCEDURE — 93656 COMPRE EP EVAL ABLTJ ATR FIB: CPT | Performed by: INTERNAL MEDICINE

## 2018-01-24 PROCEDURE — 93312 ECHO TRANSESOPHAGEAL: CPT | Performed by: INTERNAL MEDICINE

## 2018-01-24 PROCEDURE — C1769 GUIDE WIRE: HCPCS | Performed by: INTERNAL MEDICINE

## 2018-01-24 PROCEDURE — 93010 ELECTROCARDIOGRAM REPORT: CPT | Performed by: INTERNAL MEDICINE

## 2018-01-24 PROCEDURE — 85025 COMPLETE CBC W/AUTO DIFF WBC: CPT | Performed by: PHYSICIAN ASSISTANT

## 2018-01-24 PROCEDURE — 93325 DOPPLER ECHO COLOR FLOW MAPG: CPT | Performed by: INTERNAL MEDICINE

## 2018-01-24 PROCEDURE — 80048 BASIC METABOLIC PNL TOTAL CA: CPT | Performed by: PHYSICIAN ASSISTANT

## 2018-01-24 PROCEDURE — C9113 INJ PANTOPRAZOLE SODIUM, VIA: HCPCS | Performed by: PHYSICIAN ASSISTANT

## 2018-01-24 PROCEDURE — 93312 ECHO TRANSESOPHAGEAL: CPT

## 2018-01-24 PROCEDURE — 93005 ELECTROCARDIOGRAM TRACING: CPT

## 2018-01-24 PROCEDURE — C1759 CATH, INTRA ECHOCARDIOGRAPHY: HCPCS | Performed by: INTERNAL MEDICINE

## 2018-01-24 RX ORDER — ECHINACEA 400 MG
1 CAPSULE ORAL DAILY
COMMUNITY

## 2018-01-24 RX ORDER — FAMOTIDINE 20 MG
2000 TABLET ORAL DAILY
COMMUNITY

## 2018-01-24 RX ORDER — ESMOLOL HYDROCHLORIDE 10 MG/ML
INJECTION INTRAVENOUS AS NEEDED
Status: DISCONTINUED | OUTPATIENT
Start: 2018-01-24 | End: 2018-01-24 | Stop reason: SURG

## 2018-01-24 RX ORDER — METOPROLOL SUCCINATE 25 MG/1
25 TABLET, EXTENDED RELEASE ORAL DAILY
Status: DISCONTINUED | OUTPATIENT
Start: 2018-01-24 | End: 2018-01-25 | Stop reason: HOSPADM

## 2018-01-24 RX ORDER — METOCLOPRAMIDE HYDROCHLORIDE 5 MG/ML
10 INJECTION INTRAMUSCULAR; INTRAVENOUS ONCE AS NEEDED
Status: DISCONTINUED | OUTPATIENT
Start: 2018-01-24 | End: 2018-01-24 | Stop reason: HOSPADM

## 2018-01-24 RX ORDER — ONDANSETRON 2 MG/ML
4 INJECTION INTRAMUSCULAR; INTRAVENOUS ONCE AS NEEDED
Status: DISCONTINUED | OUTPATIENT
Start: 2018-01-24 | End: 2018-01-24 | Stop reason: HOSPADM

## 2018-01-24 RX ORDER — HEPARIN SODIUM 10000 [USP'U]/100ML
INJECTION, SOLUTION INTRAVENOUS
Status: COMPLETED | OUTPATIENT
Start: 2018-01-24 | End: 2018-01-24

## 2018-01-24 RX ORDER — DIPHENHYDRAMINE HYDROCHLORIDE 50 MG/ML
12.5 INJECTION INTRAMUSCULAR; INTRAVENOUS ONCE AS NEEDED
Status: DISCONTINUED | OUTPATIENT
Start: 2018-01-24 | End: 2018-01-24 | Stop reason: HOSPADM

## 2018-01-24 RX ORDER — EPHEDRINE SULFATE 50 MG/ML
INJECTION, SOLUTION INTRAVENOUS AS NEEDED
Status: DISCONTINUED | OUTPATIENT
Start: 2018-01-24 | End: 2018-01-24 | Stop reason: SURG

## 2018-01-24 RX ORDER — ASCORBIC ACID 125 MG
1 TABLET,CHEWABLE ORAL DAILY
COMMUNITY

## 2018-01-24 RX ORDER — MAGNESIUM AMINO ACID CHELATE 100 MG
1 TABLET ORAL DAILY
COMMUNITY
End: 2018-06-29

## 2018-01-24 RX ORDER — LIDOCAINE HYDROCHLORIDE 10 MG/ML
INJECTION, SOLUTION INFILTRATION; PERINEURAL AS NEEDED
Status: DISCONTINUED | OUTPATIENT
Start: 2018-01-24 | End: 2018-01-24 | Stop reason: SURG

## 2018-01-24 RX ORDER — PANTOPRAZOLE SODIUM 40 MG/1
40 TABLET, DELAYED RELEASE ORAL
Status: DISCONTINUED | OUTPATIENT
Start: 2018-01-25 | End: 2018-01-25 | Stop reason: HOSPADM

## 2018-01-24 RX ORDER — PANTOPRAZOLE SODIUM 40 MG/1
40 INJECTION, POWDER, FOR SOLUTION INTRAVENOUS ONCE
Status: COMPLETED | OUTPATIENT
Start: 2018-01-24 | End: 2018-01-24

## 2018-01-24 RX ORDER — FENTANYL CITRATE/PF 50 MCG/ML
25 SYRINGE (ML) INJECTION
Status: DISCONTINUED | OUTPATIENT
Start: 2018-01-24 | End: 2018-01-24 | Stop reason: HOSPADM

## 2018-01-24 RX ORDER — ASPIRIN 81 MG/1
81 TABLET, CHEWABLE ORAL DAILY
Status: DISCONTINUED | OUTPATIENT
Start: 2018-01-24 | End: 2018-01-25 | Stop reason: HOSPADM

## 2018-01-24 RX ORDER — ROCURONIUM BROMIDE 10 MG/ML
INJECTION, SOLUTION INTRAVENOUS AS NEEDED
Status: DISCONTINUED | OUTPATIENT
Start: 2018-01-24 | End: 2018-01-24 | Stop reason: SURG

## 2018-01-24 RX ORDER — PROTAMINE SULFATE 10 MG/ML
INJECTION, SOLUTION INTRAVENOUS AS NEEDED
Status: DISCONTINUED | OUTPATIENT
Start: 2018-01-24 | End: 2018-01-24 | Stop reason: SURG

## 2018-01-24 RX ORDER — ACETAMINOPHEN 325 MG/1
650 TABLET ORAL EVERY 4 HOURS PRN
Status: DISCONTINUED | OUTPATIENT
Start: 2018-01-24 | End: 2018-01-25 | Stop reason: HOSPADM

## 2018-01-24 RX ORDER — DOFETILIDE 0.25 MG/1
500 CAPSULE ORAL EVERY 12 HOURS SCHEDULED
Status: DISCONTINUED | OUTPATIENT
Start: 2018-01-24 | End: 2018-01-25 | Stop reason: HOSPADM

## 2018-01-24 RX ORDER — PROPOFOL 10 MG/ML
INJECTION, EMULSION INTRAVENOUS AS NEEDED
Status: DISCONTINUED | OUTPATIENT
Start: 2018-01-24 | End: 2018-01-24 | Stop reason: SURG

## 2018-01-24 RX ORDER — CLINDAMYCIN HYDROCHLORIDE 300 MG/1
600 CAPSULE ORAL ONCE
COMMUNITY
End: 2020-08-21 | Stop reason: HOSPADM

## 2018-01-24 RX ORDER — NIACIN 100 MG
50 TABLET ORAL
Status: DISCONTINUED | OUTPATIENT
Start: 2018-01-25 | End: 2018-01-25 | Stop reason: HOSPADM

## 2018-01-24 RX ORDER — HEPARIN SODIUM 1000 [USP'U]/ML
INJECTION, SOLUTION INTRAVENOUS; SUBCUTANEOUS CODE/TRAUMA/SEDATION MEDICATION
Status: COMPLETED | OUTPATIENT
Start: 2018-01-24 | End: 2018-01-24

## 2018-01-24 RX ORDER — SODIUM CHLORIDE 9 MG/ML
INJECTION, SOLUTION INTRAVENOUS CONTINUOUS PRN
Status: DISCONTINUED | OUTPATIENT
Start: 2018-01-24 | End: 2018-01-24 | Stop reason: SURG

## 2018-01-24 RX ORDER — ONDANSETRON 2 MG/ML
INJECTION INTRAMUSCULAR; INTRAVENOUS AS NEEDED
Status: DISCONTINUED | OUTPATIENT
Start: 2018-01-24 | End: 2018-01-24 | Stop reason: SURG

## 2018-01-24 RX ORDER — PANCREATIN/LIPASE/PROTEASE/AMY 325 MG
1 TABLET ORAL DAILY
COMMUNITY

## 2018-01-24 RX ORDER — ASCORBIC ACID 500 MG
500 TABLET ORAL DAILY
COMMUNITY

## 2018-01-24 RX ORDER — SODIUM CHLORIDE 9 MG/ML
100 INJECTION, SOLUTION INTRAVENOUS CONTINUOUS
Status: DISCONTINUED | OUTPATIENT
Start: 2018-01-24 | End: 2018-01-25 | Stop reason: HOSPADM

## 2018-01-24 RX ADMIN — APIXABAN 5 MG: 5 TABLET, FILM COATED ORAL at 18:34

## 2018-01-24 RX ADMIN — ROCURONIUM BROMIDE 30 MG: 10 INJECTION INTRAVENOUS at 08:15

## 2018-01-24 RX ADMIN — DEXAMETHASONE SODIUM PHOSPHATE 10 MG: 10 INJECTION INTRAMUSCULAR; INTRAVENOUS at 08:22

## 2018-01-24 RX ADMIN — METOPROLOL SUCCINATE 25 MG: 25 TABLET, EXTENDED RELEASE ORAL at 15:11

## 2018-01-24 RX ADMIN — HEPARIN SODIUM 8000 UNITS: 1000 INJECTION INTRAVENOUS; SUBCUTANEOUS at 09:24

## 2018-01-24 RX ADMIN — PHENYLEPHRINE HYDROCHLORIDE 20 MCG/MIN: 10 INJECTION INTRAVENOUS at 09:09

## 2018-01-24 RX ADMIN — ISOPROTERENOL HYDROCHLORIDE 5 MCG/MIN: 0.2 INJECTION, SOLUTION INTRAMUSCULAR; INTRAVENOUS at 11:05

## 2018-01-24 RX ADMIN — PANTOPRAZOLE SODIUM 40 MG: 40 INJECTION, POWDER, FOR SOLUTION INTRAVENOUS at 08:29

## 2018-01-24 RX ADMIN — ONDANSETRON 4 MG: 2 INJECTION INTRAMUSCULAR; INTRAVENOUS at 12:14

## 2018-01-24 RX ADMIN — SODIUM CHLORIDE: 0.9 INJECTION, SOLUTION INTRAVENOUS at 08:08

## 2018-01-24 RX ADMIN — DOFETILIDE 500 MCG: 0.25 CAPSULE ORAL at 21:20

## 2018-01-24 RX ADMIN — EPHEDRINE SULFATE 10 MG: 50 INJECTION, SOLUTION INTRAMUSCULAR; INTRAVENOUS; SUBCUTANEOUS at 09:05

## 2018-01-24 RX ADMIN — PROPOFOL 200 MG: 10 INJECTION, EMULSION INTRAVENOUS at 08:15

## 2018-01-24 RX ADMIN — ESMOLOL HYDROCHLORIDE 10 MG: 100 INJECTION, SOLUTION INTRAVENOUS at 08:21

## 2018-01-24 RX ADMIN — ASPIRIN 81 MG 81 MG: 81 TABLET ORAL at 15:11

## 2018-01-24 RX ADMIN — IOHEXOL 40 ML: 350 INJECTION, SOLUTION INTRAVENOUS at 12:18

## 2018-01-24 RX ADMIN — EPHEDRINE SULFATE 10 MG: 50 INJECTION, SOLUTION INTRAMUSCULAR; INTRAVENOUS; SUBCUTANEOUS at 08:55

## 2018-01-24 RX ADMIN — PROTAMINE SULFATE 40 MG: 10 INJECTION, SOLUTION INTRAVENOUS at 12:17

## 2018-01-24 RX ADMIN — LIDOCAINE HYDROCHLORIDE 50 MG: 10 INJECTION, SOLUTION INFILTRATION; PERINEURAL at 08:15

## 2018-01-24 RX ADMIN — HEPARIN SODIUM 10000 UNITS: 1000 INJECTION INTRAVENOUS; SUBCUTANEOUS at 09:08

## 2018-01-24 RX ADMIN — HEPARIN SODIUM 2000 UNITS: 1000 INJECTION INTRAVENOUS; SUBCUTANEOUS at 09:50

## 2018-01-24 RX ADMIN — EPHEDRINE SULFATE 5 MG: 50 INJECTION, SOLUTION INTRAMUSCULAR; INTRAVENOUS; SUBCUTANEOUS at 08:53

## 2018-01-24 RX ADMIN — EPHEDRINE SULFATE 5 MG: 50 INJECTION, SOLUTION INTRAMUSCULAR; INTRAVENOUS; SUBCUTANEOUS at 11:07

## 2018-01-24 RX ADMIN — CEFAZOLIN SODIUM 2000 MG: 1 SOLUTION INTRAVENOUS at 08:43

## 2018-01-24 RX ADMIN — HEPARIN SODIUM 3000 UNITS/HR: 10000 INJECTION, SOLUTION INTRAVENOUS at 09:08

## 2018-01-24 NOTE — PROGRESS NOTES
Bilateral groins clean dry and intact, no evidence of bleeding   Adequate circulation to bilateral lower extremities

## 2018-01-24 NOTE — ANESTHESIA POSTPROCEDURE EVALUATION
Post-Op Assessment Note      CV Status:  Stable    Mental Status:  Awake    Hydration Status:  Stable    PONV Controlled:  None    Airway Patency:  Patent    Post Op Vitals Reviewed: Yes          Staff: Anesthesiologist, CRNA           BP      Temp      Pulse     Resp      SpO2

## 2018-01-24 NOTE — ANESTHESIA PREPROCEDURE EVALUATION
Review of Systems/Medical History  Patient summary reviewed        Cardiovascular  Negative cardio ROS Hypertension , CAD, , Dysrhythmias, atrial fibrillation, DVT  Comment: 4 5 cm ascending aortic aneursym    LEFT VENTRICLE:  Systolic function was normal  Ejection fraction was estimated to be 65 %  There were no regional wall motion abnormalities  Wall thickness was mildly increased      LEFT ATRIUM:  The atrium was mildly dilated      MITRAL VALVE:  There was mild regurgitation      AORTIC VALVE:  There was mild regurgitation      TRICUSPID VALVE:  There was mild regurgitation  ,  Pulmonary  Negative pulmonary ROS        GI/Hepatic  Negative GI/hepatic ROS          Negative  ROS        Endo/Other  Negative endo/other ROS      GYN  Negative gynecology ROS          Hematology  Negative hematology ROS      Musculoskeletal  Negative musculoskeletal ROS        Neurology  Negative neurology ROS      Psychology   Negative psychology ROS              Physical Exam    Airway    Mallampati score: II  TM Distance: >3 FB  Neck ROM: full     Dental       Cardiovascular  Comment: Negative ROS, Cardiovascular exam normal    Pulmonary  Pulmonary exam normal     Other Findings        Anesthesia Plan  ASA Score- 3     Anesthesia Type- general with ASA Monitors  Additional Monitors: arterial line  Airway Plan: ETT  Plan Factors-    Induction- intravenous  Postoperative Plan-     Informed Consent- Anesthetic plan and risks discussed with patient  I personally reviewed this patient with the CRNA  Discussed and agreed on the Anesthesia Plan with the CRNA  Lamar Shields

## 2018-01-24 NOTE — ANESTHESIA PROCEDURE NOTES
Arterial Line Insertion  Date/Time: 1/24/2018 8:20 AM  Performed by: Jenelle Hightower  Authorized by: Kirby Norman   Consent: Written consent obtained  Risks and benefits: risks, benefits and alternatives were discussed  Consent given by: patient and spouse  Patient understanding: patient states understanding of the procedure being performed  Patient consent: the patient's understanding of the procedure matches consent given  Procedure consent: procedure consent matches procedure scheduled  Relevant documents: relevant documents present and verified  Test results: test results available and properly labeled  Site marked: the operative site was marked  Imaging studies: imaging studies available  Required items: required blood products, implants, devices, and special equipment available  Patient identity confirmed: verbally with patient, arm band, provided demographic data and hospital-assigned identification number  Time out: Immediately prior to procedure a "time out" was called to verify the correct patient, procedure, equipment, support staff and site/side marked as required  Preparation: Patient was prepped and draped in the usual sterile fashion  Indications comments: MUTI ACT'S, PSR  Orientation:  RightLocation: radial artery    Sedation:  Patient sedated: GETA    Peter's test normal: yes  Needle gauge: 20  Seldinger technique: Seldinger technique used  Number of attempts: 1  Post-procedure: dressing applied  Post-procedure CNS: normal  Patient tolerance: Patient tolerated the procedure well with no immediate complications

## 2018-01-24 NOTE — H&P
Electrophysiology-Cardiology (EP)   Venita Liu  67 y o  male MRN: [id number]  Unit/Bed#: [facility] Encounter: [id number]        IMPRESSION:  1  Atrial fibrillation recurrent paroxysmal but has required dccv  On tikosyn  2  Antiocoagulation on eliquis [id number] 2, last dose last night        PLAN:  1  Afib ablation      INFORMED CONSENT: Risks, benefits, and alternatives to atrial fibrillation ablation with possibly a combination of cryoballoon and radiofrequency ablation, and associated procedures such as transesophageal echocardiogram and atrial flutter ablation discussed  Alternative treatment with medical management has been tried and/or discussed  Usual pre and post operative expectations were discussed  Estimated complication rate is <0%  Atrial fibrillation ablation was explained to be a treatment that reduces burden of atrial fibrillation but is not a cure and medications and repeat ablations are often required  Although most patients derive improvement in symptoms and burden in atrial fibrillation, there are some patients that have not improved following this procedure  Blood thinners will not be discontinued immediately after ablation and may be required long term regardless of result  The patient understood risks include but not limited to death, esophageal injury, phrenic nerve injury (diaphragm), stroke, bleeding and vascular complication, bleeding around the heart and open heart surgery  Referring Physian: Silvia Hodge MD    Chief Complain/Reason for Referal: Laverne Magana  is a 67 y o  Patient Active Problem List    Diagnosis Date Noted    A-fib [facility])      Priority: Low    Acute DVT (deep venous thrombosis) ([facility] ) 08/15/2016     Priority: Low    Hypertension      Priority: Low    Paroxysmal a-fib (HCC) 08/13/2016     Priority: Low    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[facility] ) 08/13/2016     Priority: Low       No afib since he saw me in December   Feels well overall      Past Medical History:   Diagnosis Date    Cardiac disease     Hypertension     PAF (paroxysmal atrial fibrillation) (HCC)     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HCC)     BILATERALLY       Prescriptions Prior to Admission   Medication    ascorbic acid (VITAMIN C) 500 mg tablet    Chelated Magnesium 100 MG TABS    clindamycin (CLEOCIN) 300 MG capsule    Flaxseed, Linseed, (FLAXSEED OIL) 1000 MG CAPS    Green Tea 250 MG CAPS    Liver Extract 500 MG CAPS    Magnesium Citrate 125 MG CAPS    Naproxen Sodium (ALEVE PO)    Pancreatin 325 MG TABS    Probiotic Product (PROBIOTIC-10 PO)    Vitamin D, Cholecalciferol, 1000 units CAPS    apixaban (ELIQUIS) 5 mg    aspirin 81 MG tablet    calcium carbonate-vitamin D (OSCAL-D) 500 mg-200 units per tablet    co-enzyme Q-10 30 MG capsule    dofetilide (TIKOSYN) 500 mcg capsule    metoprolol succinate (TOPROL-XL) 25 mg 24 hr tablet    niacin 50 mg tablet       Scheduled Meds:  pantoprazole 40 mg Intravenous Once     Continuous Infusions:   PRN Meds:  Allergies   Allergen Reactions    Oxycodone GI Intolerance     nausea     I reviewed the Home Medication list in the chart  No family history on file  Social History     Social History    Marital status: /Civil Union     Spouse name: N/A    Number of children: N/A    Years of education: N/A     Occupational History    Not on file  Social History Main Topics    Smoking status: Never Smoker    Smokeless tobacco: Not on file    Alcohol use No    Drug use: No    Sexual activity: Not on file     Other Topics Concern    Not on file     Social History Narrative    No narrative on file       Review of Systems -12 Point ROS reviewed and are negative or noted in chart except for Pertinent Positives Pertaining to Cardiovascular and Respiratory in HPI above  There were no vitals filed for this visit  There were no vitals filed for this visit  No intake or output data in the 24 hours ending 18 0828      GEN: No acute distress, Alert and oriented, well appearing  HEENT:Head, neck, ears, oral pharynx: Mucus membranes moist, oral pharynx clear, nares clear  External ears normal  EYES: Pupils equal, sclera anicteric, midline, normal conjuctiva  NECK: No JVD, supple, no obvious masses or thryomegaly or goiter  CARDIOVASCULAR: RRR, LUNGS: no wheezingABDOMEN: Soft, nondistended, nontender, without obvious organomegaly or ascites  EXTREMITIES/VASCULAR: No edema  Radial pulses intact, pedal pulses difficult to palpate, warm an well perfused  PSYCH: Normal Affect, no overt suicidal ideation, linear speech pattern without evidence of psychosis  NEURO: Grossly intact, moving all extremiteis equal, face symmetric, alert and responsive, no obvious focal defecits  HEME: No bleeding, bruising, petechia, purpura  SKIN: No significant rashes, warm, no diaphoresis or pallor  Lab Results:     CBC with diff:       Invalid input(s):  RBC, TOTALCELLSCOUNTED, SEGS%, GRANS%, LYMPHS%, EOS%, BASO%, ABNEUT, ABGRANS, ABLYMPHS, ABMOMOS, ABEOS, ABBASO      CMP:        BMP:      BNP:   Results Reviewed     None        No results for input(s): BNP in the last 72 hours               Magnesium:       Coags:       TSH:       Lipid Profile:         Cardiac testing:   Results for orders placed during the hospital encounter of 16   Echo complete with contrast if indicated    Narrative [facility] [address]  [address]  [phone]    Transthoracic Echocardiogram  2D, M-mode, Doppler, and Color Doppler    Study date:  13-Aug-2016    Patient: Alonzo Rivera  MR number: [id number]  Account number: [de-identified]  : 1945  Age: 66 years  Gender: Male  Status: Inpatient  Location: Bedside  Height: 78 in  Weight: 239 6 lb  BP: 101/ 70 mmHg    Indications: Afib    Diagnoses: I48 0 - Atrial fibrillation    Sonographer:  Prudencio Sun  Primary Physician:  Jose Reardon MD  Referring Physician:  Justin Gill MD  Group:  [id number]  Interpreting Physician:  Abdullahi Caceres MD    SUMMARY    LEFT VENTRICLE:  Systolic function was normal  Ejection fraction was estimated to be 65 %  There were no regional wall motion abnormalities  Wall thickness was mildly increased  LEFT ATRIUM:  The atrium was mildly dilated  MITRAL VALVE:  There was mild regurgitation  AORTIC VALVE:  There was mild regurgitation  TRICUSPID VALVE:  There was mild regurgitation  HISTORY: PRIOR HISTORY: HTN; PAF; Cardiac disease    PROCEDURE: The procedure was performed at the bedside  This was a routine  study  The transthoracic approach was used  The study included complete 2D  imaging, M-mode, complete spectral Doppler, and color Doppler  Echocardiographic views were limited due to decreased penetration and lung  interference  This was a technically difficult study  LEFT VENTRICLE: Size was normal  Systolic function was normal  Ejection  fraction was estimated to be 65 %  There were no regional wall motion  abnormalities  Wall thickness was mildly increased  DOPPLER: Transmitral flow  pattern: atrial fibrillation  RIGHT VENTRICLE: The size was normal  Systolic function was normal  Wall  thickness was normal     LEFT ATRIUM: The atrium was mildly dilated  RIGHT ATRIUM: Size was normal     MITRAL VALVE: Valve structure was normal  There was mild diffuse thickening  There was normal leaflet separation  DOPPLER: The transmitral velocity was  within the normal range  There was no evidence for stenosis  There was mild  regurgitation  AORTIC VALVE: The valve was trileaflet  Leaflets exhibited normal thickness and  normal cuspal separation  DOPPLER: Transaortic velocity was within the normal  range  There was no evidence for stenosis  There was mild regurgitation  TRICUSPID VALVE: The valve structure was normal  There was normal leaflet  separation   DOPPLER: The transtricuspid velocity was within the normal range  There was no evidence for stenosis  There was mild regurgitation  The tricuspid  jet envelope definition was inadequate for estimation of RV systolic pressure  PULMONIC VALVE: Leaflets exhibited normal thickness, no calcification, and  normal cuspal separation  DOPPLER: The transpulmonic velocity was within the  normal range  There was no significant regurgitation  PERICARDIUM: There was no pericardial effusion  AORTA: The root exhibited normal size  SYSTEMIC VEINS: IVC: The inferior vena cava was normal in size   Respirophasic  changes were normal     SYSTEM MEASUREMENT TABLES    2D  %FS: 25 38 %  Ao Diam: 3 87 cm  EDV(Teich): 85 26 ml  EF Biplane: 64 97 %  EF(Teich): 50 32 %  ESV(Teich): 42 36 ml  IVSd: 1 28 cm  LA Area: 28 49 cm2  LA Diam: 4 32 cm  LVEDV MOD A2C: 121 77 ml  LVEDV MOD A4C: 135 79 ml  LVEDV MOD BP: 133 03 ml  LVEF MOD A2C: 63 03 %  LVEF MOD A4C: 71 67 %  LVESV MOD A2C: 45 02 ml  LVESV MOD A4C: 38 47 ml  LVESV MOD BP: 46 59 ml  LVIDd: 4 35 cm  LVIDs: 3 24 cm  LVLd A2C: 10 13 cm  LVLd A4C: 9 2 cm  LVLs A2C: 8 15 cm  LVLs A4C: 6 16 cm  LVPWd: 1 31 cm  RA Area: 17 33 cm2  RVIDd: 2 91 cm  SV MOD A2C: 76 75 ml  SV MOD A4C: 97 31 ml  SV(Teich): 42 9 ml    CW  RAP: 10 mmHg  TR Vmax: 2 67 m/s  TR maxP 46 mmHg    MM  TAPSE: 2 59 cm    PW  RVSP: 38 46 mmHg    [facility] Accredited Echocardiography Laboratory    Prepared and electronically signed by    Danielle Acosta MD  Signed 13-Aug-2016 16:47:33       Results for orders placed during the hospital encounter of 17   RANDALL    N[facility]ative [facility] [address] [place] [id number] [phone]    Transesophageal Echocardiogram  2D, Doppler, and Color Doppler    Study date:  22-Aug-2017    Patient: Bee Sagastume  MR number: [id number]  Account number: [de-identified]  : 1945  Age: 67 years  Gender: Male  Status: Inpatient  Location: Cath lab  Height: 78 in  Weight: 282 lb  BP: 135/ 78 mmHg    Indications: Atrial Flutter    Diagnoses: I48 1 - Atrial flutter    Sonographer:  KRISTIN Louie  Interpreting Physician:  Evangelina Carter MD  Primary Physician:  Jeane Mcclelland DO  Referring Physician:  Dr David Ann:  [facility]    SUMMARY    LEFT VENTRICLE:  Systolic function was normal  Ejection fraction was estimated to be 55 %  Although no diagnostic regional wall motion abnormality was identified, this possibility cannot be completely excluded on the basis of this study  LEFT ATRIAL APPENDAGE:  No thrombus was identified  MITRAL VALVE:  There was mild to moderate regurgitation  AORTIC VALVE:  There was mild regurgitation  AORTA:  The root exhibited dilatation up to 4 5 cm  HISTORY: PRIOR HISTORY: AFIB, Hypertension, DVT    PROCEDURE: The procedure was performed in the catheterization laboratory  This was a routine study  The risks and alternatives of the procedure were explained to the patient and informed consent was obtained  The transesophageal approach  was used  The study included complete 2D imaging, limited spectral Doppler, and color Doppler  The heart rate was 96 bpm, at the start of the study  An adult omniplane probe was inserted by the attending cardiologist  Intubated with ease  Two intubation attempt(s)  There was no blood detected on the probe  Image quality was adequate  There were no complications during the procedure  MEDICATIONS: Anesthesia administered by anesthesia team     LEFT VENTRICLE: Size was normal  Systolic function was normal  Ejection fraction was estimated to be 55 %  Although no diagnostic regional wall motion abnormality was identified, this possibility cannot be completely excluded on the basis  of this study  DOPPLER: Normal sinus rhythm was absent      RIGHT VENTRICLE: The size was normal  Systolic function was normal  Not well visualized  LEFT ATRIUM: The atrium was dilated  No thrombus was identified  APPENDAGE: The appendage was dilated  No thrombus was identified  RIGHT ATRIUM: The atrium was dilated  MITRAL VALVE: Valve structure was normal  There was normal leaflet separation  There was no echocardiographic evidence of vegetation  DOPPLER: There was mild to moderate regurgitation  AORTIC VALVE: The valve was trileaflet  Leaflets exhibited mildly increased thickness, mild calcification, and sclerosis  DOPPLER: There was no evidence for stenosis  There was mild regurgitation  TRICUSPID VALVE: The valve structure was normal  There was normal leaflet separation  There was no echocardiographic evidence of vegetation  DOPPLER: There was mild regurgitation  Estimated peak PA pressure was 20 mmHg  PULMONIC VALVE: Leaflets exhibited normal thickness, no calcification, and normal cuspal separation  There was no echocardiographic evidence of vegetation  DOPPLER: There was mild regurgitation  PERICARDIUM: There was no pericardial effusion  The pericardium was normal in appearance  AORTA: The root exhibited dilatation up to 4 5 cm  There was no atheroma  There was no evidence for dissection  [address] Accredited Echocardiography Laboratory    Prepared and electronically signed by    Ela Bishop MD  Signed [id number] 14:57:49       No results found for this or any previous visit  No results found for this or any previous visit

## 2018-01-24 NOTE — PROGRESS NOTES
01/24/18 1520   Spiritual Beliefs/Perceptions   Concept of God Accepting   Coping Responses   Patient Coping Accepting;Open/discussion   Family Coping Accepting;Open/discussion   Plan of Care   Comments Cultivated a relationship of care and support, encouraged focus on present, explored hope with pt /family, explored relational needs and resources, explored spiritual needs and resources, facilitated story telling, listened empathically, pt /family expressed gratitude, pt /family expressed humor, pt /family expressed ultimate hope, family verbally processed emotions      Assessment Completed by: Unit visit

## 2018-01-25 VITALS
RESPIRATION RATE: 16 BRPM | OXYGEN SATURATION: 98 % | HEART RATE: 65 BPM | WEIGHT: 240 LBS | BODY MASS INDEX: 27.77 KG/M2 | TEMPERATURE: 97.9 F | SYSTOLIC BLOOD PRESSURE: 118 MMHG | DIASTOLIC BLOOD PRESSURE: 72 MMHG | HEIGHT: 78 IN

## 2018-01-25 LAB
ANION GAP SERPL CALCULATED.3IONS-SCNC: 6 MMOL/L (ref 4–13)
BUN SERPL-MCNC: 20 MG/DL (ref 5–25)
CALCIUM SERPL-MCNC: 9 MG/DL (ref 8.3–10.1)
CHLORIDE SERPL-SCNC: 108 MMOL/L (ref 100–108)
CO2 SERPL-SCNC: 29 MMOL/L (ref 21–32)
CREAT SERPL-MCNC: 0.78 MG/DL (ref 0.6–1.3)
ERYTHROCYTE [DISTWIDTH] IN BLOOD BY AUTOMATED COUNT: 13.6 % (ref 11.6–15.1)
GFR SERPL CREATININE-BSD FRML MDRD: 90 ML/MIN/1.73SQ M
GLUCOSE SERPL-MCNC: 140 MG/DL (ref 65–140)
HCT VFR BLD AUTO: 42.4 % (ref 36.5–49.3)
HGB BLD-MCNC: 14.8 G/DL (ref 12–17)
KCT BLD-ACNC: 212 SEC (ref 89–137)
KCT BLD-ACNC: 242 SEC (ref 89–137)
KCT BLD-ACNC: 303 SEC (ref 89–137)
KCT BLD-ACNC: 310 SEC (ref 89–137)
KCT BLD-ACNC: 316 SEC (ref 89–137)
KCT BLD-ACNC: 335 SEC (ref 89–137)
MCH RBC QN AUTO: 32.8 PG (ref 26.8–34.3)
MCHC RBC AUTO-ENTMCNC: 34.9 G/DL (ref 31.4–37.4)
MCV RBC AUTO: 94 FL (ref 82–98)
PLATELET # BLD AUTO: 169 THOUSANDS/UL (ref 149–390)
PMV BLD AUTO: 10.1 FL (ref 8.9–12.7)
POTASSIUM SERPL-SCNC: 4.5 MMOL/L (ref 3.5–5.3)
RBC # BLD AUTO: 4.51 MILLION/UL (ref 3.88–5.62)
SODIUM SERPL-SCNC: 143 MMOL/L (ref 136–145)
SPECIMEN SOURCE: ABNORMAL
WBC # BLD AUTO: 9.15 THOUSAND/UL (ref 4.31–10.16)

## 2018-01-25 PROCEDURE — 99217 PR OBSERVATION CARE DISCHARGE MANAGEMENT: CPT | Performed by: INTERNAL MEDICINE

## 2018-01-25 PROCEDURE — 85027 COMPLETE CBC AUTOMATED: CPT | Performed by: PHYSICIAN ASSISTANT

## 2018-01-25 PROCEDURE — 80048 BASIC METABOLIC PNL TOTAL CA: CPT | Performed by: PHYSICIAN ASSISTANT

## 2018-01-25 RX ORDER — PANTOPRAZOLE SODIUM 40 MG/1
40 TABLET, DELAYED RELEASE ORAL
Qty: 30 TABLET | Refills: 1 | Status: SHIPPED | OUTPATIENT
Start: 2018-01-26 | End: 2018-05-15 | Stop reason: ALTCHOICE

## 2018-01-25 RX ADMIN — PANTOPRAZOLE SODIUM 40 MG: 40 TABLET, DELAYED RELEASE ORAL at 05:44

## 2018-01-25 RX ADMIN — APIXABAN 5 MG: 5 TABLET, FILM COATED ORAL at 08:04

## 2018-01-25 RX ADMIN — Medication 50 MG: at 10:26

## 2018-01-25 RX ADMIN — DOFETILIDE 500 MCG: 0.25 CAPSULE ORAL at 08:03

## 2018-01-25 RX ADMIN — ASPIRIN 81 MG 81 MG: 81 TABLET ORAL at 08:04

## 2018-01-25 RX ADMIN — METOPROLOL SUCCINATE 25 MG: 25 TABLET, EXTENDED RELEASE ORAL at 08:04

## 2018-01-25 NOTE — CASE MANAGEMENT
Initial Clinical Review  SCHEDULED OUTPATIENT CARDIAC PROCEDURE / EPS + ATRIAL FIB ABLATION 18     Age/Sex: 67 y o  male        IMPRESSION / PLAN PER H+P -  IMPRESSION:  1  Atrial fibrillation recurrent paroxysmal but has required dccv  On tikosyn  2  Antiocoagulation on eliquis [id number] 2, last dose last night      PLAN:  1  Afib ablation    [facility]  [facility]  [address]      PT NAME: Pascual Jon  MRN: [id number]  : 1945     PERFORMING/ATTENDING PHY: Danika Mancera      REFERRING PHY: JACKI Brown  DATE OF PROCEDURE: 18     PREOPERATIVE DIAGNOSIS:  Atrial fibrillation-paroxysmal      PROCEDURES PERFORMED   1  Atrial fibrillation ablation using Cryoablation  2  Cavotricuspid Isthmus Right atrial flutter ablation  3  SVT ablation- AVNRT (Different mechanism arrhythmia)  4  3-D map with NAVX   5  Intracardiac Echocardiography ICE  6  Left atrial pacing and recording  7  EP testing w drug infusion Isoprterenol          ANESTHESIA General anesthesia          POSTOPERATIVE DIAGNOSIS:  Same as preop      Successful ablation of atrial fibrillation with Bidirectional Block in 4 pulmonary veins   Successful cryoablation of atrial fibrillation with all 4 pulmonary veins with bidirectional block achieved  Hemostasis was achieved with manual pressure  Successful Atrial Flutter ablation with bidirectional block achieved        The patient tolerated the procedure well and had no neurological deficits upon awaking  Intracardiac ICE confirmed no effusion at the end of the case          PLAN   Postoperative monitoring ovrnight  Resume oral anticoagulation in 4 hours  Follow-up in 2 weeks, adjustment of rhythm control medications   Recommend ant-acid treatment for one month               Admission Orders: Date/Time/Statement:     18 AT 1147 OUTPATIENT NO CHARGE BED    01/24/18 1145  Outpatient No Charge Bed Once     Transfer Service: Cardiology       Question: Admitting Physician Answer: Kirsty Mcmahon    01/24/18 1147       Vital Signs: BP 97/56 (BP Location: Right arm)   Pulse 56   Temp (!) 97 4 °F (36 3 °C) (Oral)   Resp 19   SpO2 97%     Diet:        Diet Orders            Start     Ordered    01/24/18 1145  Diet Cardiac; Cardiac TLC 2 3 GM NA  Diet effective now     Question Answer Comment   Diet Type Cardiac    Cardiac Cardiac TLC 2 3 GM NA    Liquid Modifier Thin Liquid    RD to adjust diet per protocol? Yes        01/24/18 1147        Continuous IV Infusions:  sodium chloride 100 mL/hr       Mobility: Post Procedure Restrictions    DVT Prophylaxis: ASA ;  Eliquis; SCD    Scheduled Meds:  apixaban 5 mg Oral BID   aspirin 81 mg Oral Daily   dofetilide 500 mcg Oral Q12H VANESSA   metoprolol succinate 25 mg Oral Daily   niacin 50 mg Oral Daily With Breakfast   pantoprazole 40 mg Oral Early Morning       PRN Meds:     acetaminophen

## 2018-01-25 NOTE — DISCHARGE INSTRUCTIONS
Please continue all medications as previously instructed, and also take protonix 40 mg daily for one month following your ablation  No heavy lifting or strenuous activity for one week  No soaking in a bath tub/hot tub/swimming pool for one week or until groin heals  If you notice ongoing bleeding, swelling, or firm lumps in groin near ablation incision, please contact Dr Rosita Aponte office - [phone]

## 2018-01-25 NOTE — DISCHARGE SUMMARY
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  4  Prior deep vein thrombosis in the popliteal vein and peroneal veins  5  Preserved LV systolic function with EF 65% per RANDALL prior to ablation  6  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    Procedures Performed:   Orders Placed This Encounter   Procedures    Cardiac eps/afib ablation       HPI: Please refer to the initial history and physical as well as procedure notes for full details  Briefly, Onelia Tinoco  is a 67y o  year old male with a history of symptomatic paroxysmal atrial fibrillation, hypertension, prior DVT, and preserved LV systolic function who presented for atrial fibrillation ablation  He was seen by Dr Nelson Robertson as an outpatient and it was decided that he would undergo EP study and catheter ablation of his arrhythmia  He presented this hospital admission to undergo this procedure  Hospital Course: He presented at his baseline state of health  After the procedure was explained in detail and consent was obtained, he underwent RANDALL which showed no evidence of left atrial or appendage thrombus  He then underwent cryoablation of atrial fibrillation with pulmonary vein isolation and typical flutter ablation  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, 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  He tolerated the procedure well without complications  He was then monitored overnight for further observation      There are no acute issues or events reported overnight  The following morning he denied all complaints including chest pain or pressure (no symptoms with deep inspiration or lying flat), shortness of breath, dizziness, lightheadedness, or palpitations  He ambulated without significant pain or limitations  He had no issues with Khan removal, and has successfully voided  His vital signs were reviewed, and labs are stable  Groin access sites were soft with no evidence of ongoing bleeding or hematoma  Telemetry showed normal sinus rhythm, occasional isolated PACs and PVCs however no sustained arrhythmias  Physical exam on the day of discharge is as follows:  GEN: NAD, alert and oriented, well appearing  SKIN: dry without significant lesions or rashes  HEENT: NCAT, PERRL, EOMs intact  NECK: No JVD appreciated  CARDIOVASCULAR: largely RRR with infrequent ectopy noted, normal S1, S2 without murmurs, rubs, or gallops appreciated  LUNGS: Clear to auscultation bilaterally without wheezes, rhonchi, or rales  ABDOMEN: Soft, nontender, nondistended  EXTREMITIES/VASCULAR: perfused without clubbing, cyanosis, or edema b/l  PSYCH: Normal mood and affect  NEURO: CN ll-Xll grossly intact    He is stable for discharge at this time with questions answered  He will continue his prior medical regimen, including Tikosyn and Eliquis  No changes were made, however we did ask him to take Protonix 40 mg daily for 1 month following his procedure  He was instructed not to stop any other medications unless instructed by Dr Chadwick Alvarado  LABS:  Results for Josiah Kay (MRN [id number]) as of 1/25/2018 15:43   Ref   Range 1/24/2018 10:47 1/24/2018 12:10 1/24/2018 12:57 1/24/2018 13:13 1/25/2018 05:29   Sodium Latest Ref Range: 136 - 145 mmol/L     143   Potassium Latest Ref Range: 3 5 - 5 3 mmol/L     4 5   Chloride Latest Ref Range: 100 - 108 mmol/L     108   CO2 Latest Ref Range: 21 - 32 mmol/L     29   Anion Gap Latest Ref Range: 4 - 13 mmol/L     6   BUN Latest Ref Range: 5 - 25 mg/dL     20   Creatinine Latest Ref Range: 0 60 - 1 30 mg/dL     0 78   Glucose Latest Ref Range: 65 - 140 mg/dL     140   Calcium Latest Ref Range: 8 3 - 10 1 mg/dL     9 0   eGFR Latest Units: ml/min/1 73sq m     90   WBC Latest Ref Range: 4 31 - 10 16 Thousand/uL     9 15   RBC Latest Ref Range: 3 88 - 5 62 Million/uL     4 51   Hemoglobin Latest Ref Range: 12 0 - 17 0 g/dL     14 8   Hematocrit Latest Ref Range: 36 5 - 49 3 %     42 4   MCV Latest Ref Range: 82 - 98 fL     94   MCH Latest Ref Range: 26 8 - 34 3 pg     32 8   MCHC Latest Ref Range: 31 4 - 37 4 g/dL     34 9   RDW Latest Ref Range: 11 6 - 15 1 %     13 6   Platelets Latest Ref Range: 149 - 390 Thousands/uL     169   MPV Latest Ref Range: 8 9 - 12 7 fL     28 5         Complications: None    Discharge Diagnosis: same    Condition at Discharge: good     Discharge instructions/Information to patient and family:   See after visit summary for information provided to patient and family  Provisions for Follow-Up Care:  See after visit summary for information related to follow-up care and any pertinent home health orders  Disposition: Home    Planned Readmission: No    Discharge Statement   I spent 35 minutes minutes discharging the patient  This time was spent on the day of discharge  I had direct contact with the patient on the day of discharge  Additional documentation is required if more than 30 minutes were spent on discharge  Discharge Medications:  See after visit summary for reconciled discharge medications provided to patient and family

## 2018-02-23 ENCOUNTER — OFFICE VISIT (OUTPATIENT)
Dept: CARDIOLOGY CLINIC | Facility: CLINIC | Age: 73
End: 2018-02-23
Payer: MEDICARE

## 2018-02-23 VITALS
SYSTOLIC BLOOD PRESSURE: 122 MMHG | HEART RATE: 64 BPM | DIASTOLIC BLOOD PRESSURE: 86 MMHG | HEIGHT: 78 IN | WEIGHT: 250 LBS | BODY MASS INDEX: 28.93 KG/M2

## 2018-02-23 DIAGNOSIS — I48.0 PAROXYSMAL ATRIAL FIBRILLATION (HCC): Primary | ICD-10-CM

## 2018-02-23 PROCEDURE — 99213 OFFICE O/P EST LOW 20 MIN: CPT | Performed by: INTERNAL MEDICINE

## 2018-02-23 PROCEDURE — 93000 ELECTROCARDIOGRAM COMPLETE: CPT | Performed by: INTERNAL MEDICINE

## 2018-02-23 NOTE — LETTER
February 23, 2018     Ramon Ashraf DO  [address] [address]    Patient: Nirmala Deleon  YOB: 1945   Date of Visit: 2/23/2018       Dear Dr Keyonna Ram: Thank you for referring Missy Hankins to me for evaluation  Below are my notes for this consultation  If you have questions, please do not hesitate to call me  I look forward to following your patient along with you  Sincerely,        Lorri Zavala MD        CC: No Recipients  Lorri Zavala MD  2/23/2018 11:37 AM  Sign at close encounter  [id number]    Follow-up  Today's Date: 02/23/18        Patient name: Nirmala Deleon  YOB: 1945  Sex: male         Chief Complaint: F/u        ASSESSMENT:  Problem List Items Addressed This Visit     A-fib [facility]) - Primary    Relevant Orders    POCT ECG        1  Symptomatic paroxysmal atrial fibrillation s/p cryoablation of atrial fibrillation with pulmonary vein isolation as well as typical atrial flutter ablation on 1/24/2018              A )  On Tikosyn antiarrhythmic therapy              B )  CHAD2 VASc = 2, on Eliquis              C )  Several prior cardioversions  2  Hypertension well controlled  3  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  4  Prior deep vein thrombosis in the popliteal vein and peroneal veins  5  Preserved LV systolic function with EF 65% per RANDALL prior to ablation  6  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 same time as afib ablation  Symtpoms o fSVT since childhood        PLAN:    1  D/c tikosyn since NSR since ablation and prefers to avoid meds  2  Cont eliquis for 2 months more    Follow up in: 3 months    Orders Placed This Encounter   Procedures    POCT ECG     There are no discontinued medications    HPI/Subjective:   1  Symptomatic paroxysmal atrial fibrillation s/p cryoablation of atrial fibrillation with pulmonary vein isolation as well as typical atrial flutter ablation on 1/24/2018              A )  On Tikosyn antiarrhythmic therapy              B )  CHAD2 VASc = 2, on Eliquis              C )  Several prior cardioversions  2  Hypertension well controlled  3  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  4  Prior deep vein thrombosis in the popliteal vein and peroneal veins  5  Preserved LV systolic function with EF 65% per RANDALL prior to ablation  6  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 same time as afib ablation  Symtpoms o fSVT since childhood    Feels great, no afib/palpitatins  He was wiped out after ablation about 2 weeks w fatigue but resolved      Please note HPI is listed by problem with with update following it, it is copied again in the assessment above and reflects medical decision making as well  Complete 12 point ROS reviewed and otherwise non pertinent or negative except as per HPI pertinent positives in Cardiovascular and Respiratory emphasized  Please see paper chart for outpatient clinic patients where the patient completed the 12 point ROS survey  Past Medical History:   Diagnosis Date    Cardiac disease     Hypertension     PAF (paroxysmal atrial fibrillation) (HCC)     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HCC)     BILATERALLY       Allergies   Allergen Reactions    Oxycodone GI Intolerance     nausea  nausea     I reviewed the Home Medication list and Allergies in the chart  Scheduled Meds:  Current Outpatient Prescriptions   Medication Sig Dispense Refill    apixaban (ELIQUIS) 5 mg Take 1 tablet by mouth 2 (two) times a day 60 tablet 0    ascorbic acid (VITAMIN C) 500 mg tablet Take 500 mg by mouth daily      aspirin 81 MG tablet Take 81 mg by mouth daily        calcium carbonate-vitamin D (OSCAL-D) 500 mg-200 units per tablet Take 1 tablet by mouth daily with breakfast       Chelated Magnesium 100 MG TABS Take 1 tablet by mouth daily      clindamycin (CLEOCIN) 300 MG capsule Take 600 mg by mouth once      co-enzyme Q-10 30 MG capsule Take 200 mg by mouth daily        dofetilide (TIKOSYN) 500 mcg capsule Take 1 capsule by mouth every 12 (twelve) hours 60 capsule 0    Flaxseed, Linseed, (FLAXSEED OIL) 1000 MG CAPS Take 1 capsule by mouth daily      Green Tea 250 MG CAPS Take 1 capsule by mouth daily      Liver Extract 500 MG CAPS Take 1 capsule by mouth daily      Magnesium Citrate 125 MG CAPS Take 1 capsule by mouth daily      metoprolol succinate (TOPROL-XL) 25 mg 24 hr tablet Take 25 mg by mouth   Naproxen Sodium (ALEVE PO) Take 1 tablet by mouth 2 (two) times a day as needed      niacin 50 mg tablet Take 50 mg by mouth daily with breakfast       Pancreatin 325 MG TABS Take 1 tablet by mouth daily      pantoprazole (PROTONIX) 40 mg tablet Take 1 tablet by mouth daily in the early morning 30 tablet 1    Probiotic Product (PROBIOTIC-10 PO) Take 1 capsule by mouth daily      Vitamin D, Cholecalciferol, 1000 units CAPS Take 2,000 Units by mouth daily       No current facility-administered medications for this visit  PRN Meds:  No family history on file  Social History     Social History    Marital status: /Civil Union     Spouse name: N/A    Number of children: N/A    Years of education: N/A     Occupational History    Not on file       Social History Main Topics    Smoking status: Never Smoker    Smokeless tobacco: Never Used    Alcohol use No    Drug use: No    Sexual activity: Not on file     Other Topics Concern    Not on file     Social History Narrative    No narrative on file         OBJECTIVE:    /86 (BP Location: Right arm, Patient Position: Sitting, Cuff Size: Large)   Pulse 64   Ht 6' 6" (1 981 m)   Wt 113 kg (250 lb)   BMI 28 89 kg/m²    Vitals:    02/23/18 1106   Weight: 113 kg (250 lb)     GEN: No acute distress, Alert and oriented, well appearing  HEENT:Head, neck, ears, oral pharynx: Mucus membranes moist, oral pharynx clear, nares clear  External ears normal  EYES: Pupils equal, sclera anicteric, midline, normal conjuctiva  NECK: No JVD, supple, no obvious masses or thryomegaly or goiter  CARDIOVASCULAR:  RRR, No murmur, rub, gallops S1,S2  LUNGS: Clear To auscultation bilaterally, normal effort, no rales, rhonchi, crackles  ABDOMEN:  nondistended,  without obvious organomegaly or ascites  EXTREMITIES/VASCULAR:  No edema  Radial pulses intact, pedal pulses difficult to palpate, warm an well perfused  PSYCH: Normal Affect, no overt suicidal ideation, linear speech pattern without evidence of psychosis  NEURO: Grossly intact, moving all extremiteis equal, face symmetric, alert and responsive, no obvious focal defecits  GAIT:  Ambulates normally without difficulty  HEME: No bleeding, bruising, petechia, purpura  SKIN: No significant rashes, warm, no diaphoresis or pallor       Lab Results:     @RESULTRCNT(1M])@    CBC with diff:     CMP:    TSH:       Lipid Profile:          Cardiac testing:   Results for orders placed during the hospital encounter of 16   Echo complete with contrast if indicated    Narrative [facility] [address]  [facility] [address] [facility]  [phone]    Transthoracic Echocardiogram  2D, M-mode, Doppler, and Color Doppler    Study date:  13-Aug-2016    Patient: Nancy Good  MR number: [id number]  Account number: [de-identified]  : 1945  Age: 66 years  Gender: Male  Status: Inpatient  Location: Bedside  Height: 78 in  Weight: 239 6 lb  BP: 101/ 70 mmHg    Indications: Afib    Diagnoses: I48 0 - Atrial fibrillation    Sonographer:  Gabriel Blanco  Primary Physician:  Varsha Black MD  Referring Physician:  Varsha Black MD  Group:  [facility]  Interpreting Physician:  Christina Neves MD    SUMMARY    LEFT VENTRICLE:  Systolic function was normal  Ejection fraction was estimated to be 65 %  There were no regional wall motion abnormalities  Wall thickness was mildly increased  LEFT ATRIUM:  The atrium was mildly dilated  MITRAL VALVE:  There was mild regurgitation  AORTIC VALVE:  There was mild regurgitation  TRICUSPID VALVE:  There was mild regurgitation  HISTORY: PRIOR HISTORY: HTN; PAF; Cardiac disease    PROCEDURE: The procedure was performed at the bedside  This was a routine  study  The transthoracic approach was used  The study included complete 2D  imaging, M-mode, complete spectral Doppler, and color Doppler  Echocardiographic views were limited due to decreased penetration and lung  interference  This was a technically difficult study  LEFT VENTRICLE: Size was normal  Systolic function was normal  Ejection  fraction was estimated to be 65 %  There were no regional wall motion  abnormalities  Wall thickness was mildly increased  DOPPLER: Transmitral flow  pattern: atrial fibrillation  RIGHT VENTRICLE: The size was normal  Systolic function was normal  Wall  thickness was normal     LEFT ATRIUM: The atrium was mildly dilated  RIGHT ATRIUM: Size was normal     MITRAL VALVE: Valve structure was normal  There was mild diffuse thickening  There was normal leaflet separation  DOPPLER: The transmitral velocity was  within the normal range  There was no evidence for stenosis  There was mild  regurgitation  AORTIC VALVE: The valve was trileaflet  Leaflets exhibited normal thickness and  normal cuspal separation  DOPPLER: Transaortic velocity was within the normal  range  There was no evidence for stenosis  There was mild regurgitation  TRICUSPID VALVE: The valve structure was normal  There was normal leaflet  separation  DOPPLER: The transtricuspid velocity was within the normal range  There was no evidence for stenosis  There was mild regurgitation   The tricuspid  jet envelope definition was inadequate for estimation of RV systolic pressure  PULMONIC VALVE: Leaflets exhibited normal thickness, no calcification, and  normal cuspal separation  DOPPLER: The transpulmonic velocity was within the  normal range  There was no significant regurgitation  PERICARDIUM: There was no pericardial effusion  AORTA: The root exhibited normal size  SYSTEMIC VEINS: IVC: The inferior vena cava was normal in size   Respirophasic  changes were normal     SYSTEM MEASUREMENT TABLES    2D  %FS: 25 38 %  Ao Diam: 3 87 cm  EDV(Teich): 85 26 ml  EF Biplane: 64 97 %  EF(Teich): 50 32 %  ESV(Teich): 42 36 ml  IVSd: 1 28 cm  LA Area: 28 49 cm2  LA Diam: 4 32 cm  LVEDV MOD A2C: 121 77 ml  LVEDV MOD A4C: 135 79 ml  LVEDV MOD BP: 133 03 ml  LVEF MOD A2C: 63 03 %  LVEF MOD A4C: 71 67 %  LVESV MOD A2C: 45 02 ml  LVESV MOD A4C: 38 47 ml  LVESV MOD BP: 46 59 ml  LVIDd: 4 35 cm  LVIDs: 3 24 cm  LVLd A2C: 10 13 cm  LVLd A4C: 9 2 cm  LVLs A2C: 8 15 cm  LVLs A4C: 6 16 cm  LVPWd: 1 31 cm  RA Area: 17 33 cm2  RVIDd: 2 91 cm  SV MOD A2C: 76 75 ml  SV MOD A4C: 97 31 ml  SV(Teich): 42 9 ml    CW  RAP: 10 mmHg  TR Vmax: 2 67 m/s  TR maxP 46 mmHg    MM  TAPSE: 2 59 cm    PW  RVSP: 38 46 mmHg    [facility]    Prepared and electronically signed by    Libby Hernandez MD  Signed 13-Aug-2016 16:47:33       Results for orders placed during the hospital encounter of 18   RANDALL    Narrative [address]  [address], [address] [facility]  [phone]    Transesophageal Echocardiogram  Limited 2D, Doppler, and Color Doppler    Study date:  2018    Patient: Shamar Conteh  MR number: [id number]  Account number: [de-identified]  : 1945  Age: 67 years  Gender: Male  Status: Outpatient  Location: Cath lab  Height: 78 in  Weight: 282 lb  BP: 157/ 59 mmHg    Indications: Pre-EP Procedure    Diagnoses: 427 31 - ATRIAL FIBRILLATION    Sonographer:  Slade Lowry RN, RCS  Interpreting Physician:  Hermilo Hammonds MD  Primary Physician:  Jer Peterson DO  Referring Physician:  Hermilo Hammonds MD  Group:  [facility]    SUMMARY    LEFT VENTRICLE:  Systolic function was normal  Ejection fraction was estimated to be 60 %  LEFT ATRIUM:  The atrium was mildly dilated  ATRIAL SEPTUM:  No defect or patent foramen ovale was identified  MITRAL VALVE:  There was mild regurgitation  AORTIC VALVE:  There was mild regurgitation  TRICUSPID VALVE:  There was mild regurgitation  PULMONIC VALVE:  There was mild regurgitation  HISTORY: PRIOR HISTORY: Hypertension, Atrial Fibrillation, DVT    PROCEDURE: The procedure was performed in the catheterization laboratory  This was a routine study  The risks and alternatives of the procedure were explained to the patient and informed consent was obtained  The transesophageal approach  was used  The study included limited 2D imaging, complete spectral Doppler, and color Doppler  The heart rate was 54 bpm, at the start of the study  Intubated with ease  One intubation attempt(s)  There was no blood detected on the probe  Image quality was excellent  MEDICATIONS: General anesthesia administered by anesthesia team     LEFT VENTRICLE: Size was normal  Systolic function was normal  Ejection fraction was estimated to be 60 %  LEFT ATRIUM: The atrium was mildly dilated  There was no spontaneous echo contrast ("smoke")  APPENDAGE: The size was normal  No thrombus was identified  DOPPLER: The function was normal (normal emptying velocity)  ATRIAL SEPTUM: No defect or patent foramen ovale was identified  RIGHT ATRIUM: Size was normal  No thrombus was identified  MITRAL VALVE: Valve structure was normal  There was normal leaflet separation  There was no echocardiographic evidence of vegetation  DOPPLER: There was mild regurgitation  AORTIC VALVE: The valve was trileaflet   Leaflets exhibited normal thickness and normal cuspal separation  There was no echocardiographic evidence of vegetation  DOPPLER: There was mild regurgitation  TRICUSPID VALVE: The valve structure was normal  There was normal leaflet separation  There was no echocardiographic evidence of vegetation  DOPPLER: There was mild regurgitation  PULMONIC VALVE: DOPPLER: There was mild regurgitation  PERICARDIUM: There was no pericardial effusion  The pericardium was normal in appearance  AORTA: The root exhibited normal size  There was no atheroma  There was no evidence for dissection  There was no evidence for aneurysm  [address] Accredited Echocardiography Laboratory    Prepared and electronically signed by    Cristopher Torres MD  Signed 24-Jan-2018 16:50:23       No results found for this or any previous visit  No results found for this or any previous visit          Current EKG and Rhythm Strip:NSR 55bpm normal QT

## 2018-02-23 NOTE — PROGRESS NOTES
HEART AND VASCULAR  CARDIAC [address]    Follow-up  Today's Date: 02/23/18        Patient name: Jelani Wells  YOB: 1945  Sex: male         Chief Complaint: F/u        ASSESSMENT:  Problem List Items Addressed This Visit     [facility]) - Primary    Relevant Orders    POCT ECG        1  Symptomatic paroxysmal atrial fibrillation s/p cryoablation of atrial fibrillation with pulmonary vein isolation as well as typical atrial flutter ablation on 1/24/2018              A )  On Tikosyn antiarrhythmic therapy              B )  CHAD2 VASc = 2, on Eliquis              C )  Several prior cardioversions  2  Hypertension well controlled  3  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  4  Prior deep vein thrombosis in the popliteal vein and peroneal veins  5  Preserved LV systolic function with EF 65% per RANDALL prior to ablation  6  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 same time as afib ablation  Symtpoms o fSVT since childhood        PLAN:    1  D/c tikosyn since NSR since ablation and prefers to avoid meds  2  Cont eliquis for 2 months more    Follow up in: 3 months    Orders Placed This Encounter   Procedures    POCT ECG     There are no discontinued medications    HPI/Subjective:   1  Symptomatic paroxysmal atrial fibrillation s/p cryoablation of atrial fibrillation with pulmonary vein isolation as well as typical atrial flutter ablation on 1/24/2018              A )  On Tikosyn antiarrhythmic therapy              B )  CHAD2 VASc = 2, on Eliquis              C )  Several prior cardioversions  2  Hypertension well controlled  3  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  4  Prior deep vein thrombosis in the popliteal vein and peroneal veins  5  Preserved LV systolic function with EF 65% per RANDALL prior to ablation  6   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 same time as afib ablation  Symtpoms o fSVT since childhood    Feels great, no afib/palpitatins  He was wiped out after ablation about 2 weeks w fatigue but resolved      Please note HPI is listed by problem with with update following it, it is copied again in the assessment above and reflects medical decision making as well  Complete 12 point ROS reviewed and otherwise non pertinent or negative except as per HPI pertinent positives in Cardiovascular and Respiratory emphasized  Please see paper chart for outpatient clinic patients where the patient completed the 12 point ROS survey  Past Medical History:   Diagnosis Date    Cardiac disease     Hypertension     PAF (paroxysmal atrial fibrillation) (HCC)     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HCC)     BILATERALLY       Allergies   Allergen Reactions    Oxycodone GI Intolerance     nausea  nausea     I reviewed the Home Medication list and Allergies in the chart  Scheduled Meds:  Current Outpatient Prescriptions   Medication Sig Dispense Refill    apixaban (ELIQUIS) 5 mg Take 1 tablet by mouth 2 (two) times a day 60 tablet 0    ascorbic acid (VITAMIN C) 500 mg tablet Take 500 mg by mouth daily      aspirin 81 MG tablet Take 81 mg by mouth daily        calcium carbonate-vitamin D (OSCAL-D) 500 mg-200 units per tablet Take 1 tablet by mouth daily with breakfast       Chelated Magnesium 100 MG TABS Take 1 tablet by mouth daily      clindamycin (CLEOCIN) 300 MG capsule Take 600 mg by mouth once      co-enzyme Q-10 30 MG capsule Take 200 mg by mouth daily        dofetilide (TIKOSYN) 500 mcg capsule Take 1 capsule by mouth every 12 (twelve) hours 60 capsule 0    Flaxseed, Linseed, (FLAXSEED OIL) 1000 MG CAPS Take 1 capsule by mouth daily      Green Tea 250 MG CAPS Take 1 capsule by mouth daily      Liver Extract 500 MG CAPS Take 1 capsule by mouth daily      Magnesium Citrate 125 MG CAPS Take 1 capsule by mouth daily      metoprolol succinate (TOPROL-XL) 25 mg 24 hr tablet Take 25 mg by mouth   Naproxen Sodium (ALEVE PO) Take 1 tablet by mouth 2 (two) times a day as needed      niacin 50 mg tablet Take 50 mg by mouth daily with breakfast       Pancreatin 325 MG TABS Take 1 tablet by mouth daily      pantoprazole (PROTONIX) 40 mg tablet Take 1 tablet by mouth daily in the early morning 30 tablet 1    Probiotic Product (PROBIOTIC-10 PO) Take 1 capsule by mouth daily      Vitamin D, Cholecalciferol, 1000 units CAPS Take 2,000 Units by mouth daily       No current facility-administered medications for this visit  PRN Meds:  No family history on file  Social History     Social History    Marital status: /Civil Union     Spouse name: N/A    Number of children: N/A    Years of education: N/A     Occupational History    Not on file  Social History Main Topics    Smoking status: Never Smoker    Smokeless tobacco: Never Used    Alcohol use No    Drug use: No    Sexual activity: Not on file     Other Topics Concern    Not on file     Social History Narrative    No narrative on file         OBJECTIVE:    /86 (BP Location: Right arm, Patient Position: Sitting, Cuff Size: Large)   Pulse 64   Ht 6' 6" (1 981 m)   Wt 113 kg (250 lb)   BMI 28 89 kg/m²   Vitals:    02/23/18 1106   Weight: 113 kg (250 lb)     GEN: No acute distress, Alert and oriented, well appearing  HEENT:Head, neck, ears, oral pharynx: Mucus membranes moist, oral pharynx clear, nares clear  External ears normal  EYES: Pupils equal, sclera anicteric, midline, normal conjuctiva  NECK: No JVD, supple, no obvious masses or thryomegaly or goiter  CARDIOVASCULAR:  RRR, No murmur, rub, gallops S1,S2  LUNGS: Clear To auscultation bilaterally, normal effort, no rales, rhonchi, crackles  ABDOMEN:  nondistended,  without obvious organomegaly or ascites  EXTREMITIES/VASCULAR:  No edema   Radial pulses intact, pedal pulses difficult to palpate, warm an well perfused  PSYCH: Normal Affect, no overt suicidal ideation, linear speech pattern without evidence of psychosis  NEURO: Grossly intact, moving all extremiteis equal, face symmetric, alert and responsive, no obvious focal defecits  GAIT:  Ambulates normally without difficulty  HEME: No bleeding, bruising, petechia, purpura  SKIN: No significant rashes, warm, no diaphoresis or pallor  Lab Results:     @RESULTRCNT(1M])@    CBC with diff:     CMP:    TSH:       Lipid Profile:          Cardiac testing:   Results for orders placed during the hospital encounter of 16   Echo complete with contrast if indicated    Narrative [address]  [facility] [place], [address] [facility]  [phone]    Transthoracic Echocardiogram  2D, M-mode, Doppler, and Color Doppler    Study date:  13-Aug-2016    Patient: Shubham Savage  MR number: [id number]  Account number: [de-identified]  : 1945  Age: 66 years  Gender: Male  Status: Inpatient  Location: Bedside  Height: 78 in  Weight: 239 6 lb  BP: 101/ 70 mmHg    Indications: Afib    Diagnoses: I48 0 - Atrial fibrillation    Sonographer:  Lena Franco  Primary Physician:  Yonatan Herrera MD  Referring Physician:  Yonatan Herrera MD  Group:  [facility]  Interpreting Physician:  Colton Hogan MD    SUMMARY    LEFT VENTRICLE:  Systolic function was normal  Ejection fraction was estimated to be 65 %  There were no regional wall motion abnormalities  Wall thickness was mildly increased  LEFT ATRIUM:  The atrium was mildly dilated  MITRAL VALVE:  There was mild regurgitation  AORTIC VALVE:  There was mild regurgitation  TRICUSPID VALVE:  There was mild regurgitation  HISTORY: PRIOR HISTORY: HTN; PAF; Cardiac disease    PROCEDURE: The procedure was performed at the bedside  This was a routine  study  The transthoracic approach was used   The study included complete 2D  imaging, M-mode, complete spectral Doppler, and color Doppler  Echocardiographic views were limited due to decreased penetration and lung  interference  This was a technically difficult study  LEFT VENTRICLE: Size was normal  Systolic function was normal  Ejection  fraction was estimated to be 65 %  There were no regional wall motion  abnormalities  Wall thickness was mildly increased  DOPPLER: Transmitral flow  pattern: atrial fibrillation  RIGHT VENTRICLE: The size was normal  Systolic function was normal  Wall  thickness was normal     LEFT ATRIUM: The atrium was mildly dilated  RIGHT ATRIUM: Size was normal     MITRAL VALVE: Valve structure was normal  There was mild diffuse thickening  There was normal leaflet separation  DOPPLER: The transmitral velocity was  within the normal range  There was no evidence for stenosis  There was mild  regurgitation  AORTIC VALVE: The valve was trileaflet  Leaflets exhibited normal thickness and  normal cuspal separation  DOPPLER: Transaortic velocity was within the normal  range  There was no evidence for stenosis  There was mild regurgitation  TRICUSPID VALVE: The valve structure was normal  There was normal leaflet  separation  DOPPLER: The transtricuspid velocity was within the normal range  There was no evidence for stenosis  There was mild regurgitation  The tricuspid  jet envelope definition was inadequate for estimation of RV systolic pressure  PULMONIC VALVE: Leaflets exhibited normal thickness, no calcification, and  normal cuspal separation  DOPPLER: The transpulmonic velocity was within the  normal range  There was no significant regurgitation  PERICARDIUM: There was no pericardial effusion  AORTA: The root exhibited normal size  SYSTEMIC VEINS: IVC: The inferior vena cava was normal in size   Respirophasic  changes were normal     SYSTEM MEASUREMENT TABLES    2D  %FS: 25 38 %  Ao Diam: 3 87 cm  EDV(Teich): 85 26 ml  EF Biplane: 64 97 %  EF(Teich): 50 32 %  ESV(Teich): 42 36 ml  IVSd: 1 28 cm  LA Area: 28 49 cm2  LA Diam: 4 32 cm  LVEDV MOD A2C: 121 77 ml  LVEDV MOD A4C: 135 79 ml  LVEDV MOD BP: 133 03 ml  LVEF MOD A2C: 63 03 %  LVEF MOD A4C: 71 67 %  LVESV MOD A2C: 45 02 ml  LVESV MOD A4C: 38 47 ml  LVESV MOD BP: 46 59 ml  LVIDd: 4 35 cm  LVIDs: 3 24 cm  LVLd A2C: 10 13 cm  LVLd A4C: 9 2 cm  LVLs A2C: 8 15 cm  LVLs A4C: 6 16 cm  LVPWd: 1 31 cm  RA Area: 17 33 cm2  RVIDd: 2 91 cm  SV MOD A2C: 76 75 ml  SV MOD A4C: 97 31 ml  SV(Teich): 42 9 ml    CW  RAP: 10 mmHg  TR Vmax: 2 67 m/s  TR maxP 46 mmHg    MM  TAPSE: 2 59 cm    PW  RVSP: 38 46 mmHg    [facility]    Prepared and electronically signed by    Kisha Garzon MD  Signed 13-Aug-2016 16:47:33       Results for orders placed during the hospital encounter of 18   RANDALL    Narrative [address]  [facility] [place], [address] [facility]  [phone]    Transesophageal Echocardiogram  Limited 2D, Doppler, and Color Doppler    Study date:  2018    Patient: Joanne Wagner  MR number: [id number]  Account number: [de-identified]  : 1945  Age: 67 years  Gender: Male  Status: Outpatient  Location: Cath lab  Height: 78 in  Weight: 282 lb  BP: 157/ 59 mmHg    Indications: Pre-EP Procedure    Diagnoses: 427 31 - ATRIAL FIBRILLATION    Sonographer:  Cori Anthony RN, RCS  Interpreting Physician:  Cristian Little MD  Primary Physician:  Marlon Pereira DO  Referring Physician:  Cristian Little MD  Group:  [facility]    SUMMARY    LEFT VENTRICLE:  Systolic function was normal  Ejection fraction was estimated to be 60 %  LEFT ATRIUM:  The atrium was mildly dilated  ATRIAL SEPTUM:  No defect or patent foramen ovale was identified  MITRAL VALVE:  There was mild regurgitation  AORTIC VALVE:  There was mild regurgitation  TRICUSPID VALVE:  There was mild regurgitation      PULMONIC VALVE:  There was mild regurgitation  HISTORY: PRIOR HISTORY: Hypertension, Atrial Fibrillation, DVT    PROCEDURE: The procedure was performed in the catheterization laboratory  This was a routine study  The risks and alternatives of the procedure were explained to the patient and informed consent was obtained  The transesophageal approach  was used  The study included limited 2D imaging, complete spectral Doppler, and color Doppler  The heart rate was 54 bpm, at the start of the study  Intubated with ease  One intubation attempt(s)  There was no blood detected on the probe  Image quality was excellent  MEDICATIONS: General anesthesia administered by anesthesia team     LEFT VENTRICLE: Size was normal  Systolic function was normal  Ejection fraction was estimated to be 60 %  LEFT ATRIUM: The atrium was mildly dilated  There was no spontaneous echo contrast ("smoke")  APPENDAGE: The size was normal  No thrombus was identified  DOPPLER: The function was normal (normal emptying velocity)  ATRIAL SEPTUM: No defect or patent foramen ovale was identified  RIGHT ATRIUM: Size was normal  No thrombus was identified  MITRAL VALVE: Valve structure was normal  There was normal leaflet separation  There was no echocardiographic evidence of vegetation  DOPPLER: There was mild regurgitation  AORTIC VALVE: The valve was trileaflet  Leaflets exhibited normal thickness and normal cuspal separation  There was no echocardiographic evidence of vegetation  DOPPLER: There was mild regurgitation  TRICUSPID VALVE: The valve structure was normal  There was normal leaflet separation  There was no echocardiographic evidence of vegetation  DOPPLER: There was mild regurgitation  PULMONIC VALVE: DOPPLER: There was mild regurgitation  PERICARDIUM: There was no pericardial effusion  The pericardium was normal in appearance  AORTA: The root exhibited normal size  There was no atheroma  There was no evidence for dissection   There was no evidence for aneurysm  [address] Accredited Echocardiography Laboratory    Prepared and electronically signed by    Jonathon Brand MD  Signed 24-Jan-2018 16:50:23       No results found for this or any previous visit  No results found for this or any previous visit          Current EKG and Rhythm Strip:NSR 55bpm normal QT

## 2018-04-10 ENCOUNTER — TELEPHONE (OUTPATIENT)
Dept: CARDIOLOGY CLINIC | Facility: CLINIC | Age: 73
End: 2018-04-10

## 2018-05-15 ENCOUNTER — OFFICE VISIT (OUTPATIENT)
Dept: CARDIOLOGY CLINIC | Facility: CLINIC | Age: 73
End: 2018-05-15
Payer: MEDICARE

## 2018-05-15 VITALS
WEIGHT: 252.2 LBS | HEIGHT: 78 IN | HEART RATE: 66 BPM | BODY MASS INDEX: 29.18 KG/M2 | DIASTOLIC BLOOD PRESSURE: 72 MMHG | SYSTOLIC BLOOD PRESSURE: 132 MMHG

## 2018-05-15 DIAGNOSIS — I48.0 PAF (PAROXYSMAL ATRIAL FIBRILLATION) (HCC): Primary | ICD-10-CM

## 2018-05-15 PROCEDURE — 93000 ELECTROCARDIOGRAM COMPLETE: CPT | Performed by: INTERNAL MEDICINE

## 2018-05-15 PROCEDURE — 99213 OFFICE O/P EST LOW 20 MIN: CPT | Performed by: INTERNAL MEDICINE

## 2018-05-15 NOTE — PROGRESS NOTES
[facility] [address]     Outpatient Follow-up  Today's Date: 05/15/18        Patient name: Tara Ferguson  YOB: 1945  Sex: male         Chief Complaint:  f/u afib      ASSESSMENT:  Problem List Items Addressed This Visit     None      Visit Diagnoses     PAF (paroxysmal atrial fibrillation) ([facility] [address] )    -  Primary          1  Symptomatic paroxysmal atrial fibrillation s/p cryoablation of atrial fibrillation with pulmonary vein isolation as well as typical atrial flutter ablation on 1/24/2018  We had stopped Tikosyn  He had 2 hours of afib back in April 10 and we said ok to restart tikosyn since had had been stable on it w 2 prior admissions to initiate w qt and tele monitoring  HE converted to afib after first dose not sure if related to tikosyn or coincidence since he had afib ablation may have become paroxysmal instead of persistent (never converted w tikosyn before)  He wanted to avoid staying on tiksoyn so we said ok to stop it in 7 days  [id number] and off eliquis, he had that only for 7 days after last 2 hour afib episode                 2  Hypertension well controlled  3  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  4  Prior deep vein thrombosis in the popliteal vein and peroneal veins  5  Preserved LV systolic function with EF 65% per RANDALL prior to ablation  6  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 same time as afib ablation  Symtpoms of SVT since childhood         PLAN:  1  Will continue to monitor off antiarrhythmics  2  I explained tikosyn not traditionally used as "pill in pocket" prn med and off guidelines for that  That being said if he goes back to afib it is reasonalbe to restart it but he should wait a few hours, see if afib just stops on its own  I did explain proarrhythmia risks of tikosyn including sudden death         Follow up in: 12 months    No orders of the defined types were placed in this encounter  Medications Discontinued During This Encounter   Medication Reason    apixaban (ELIQUIS) 5 mg Discontinued by another clinician    pantoprazole (PROTONIX) 40 mg tablet Discontinued by another clinician             HPI/Subjective:   1  Symptomatic paroxysmal atrial fibrillation s/p cryoablation of atrial fibrillation with pulmonary vein isolation as well as typical atrial flutter ablation on 1/24/2018  We had stopped Tikosyn  He had 2 hours of afib back in April 10 and we said ok to restart tikosyn since had had been stable on it w 2 prior admissions to initiate w qt and tele monitoring  HE converted to afib after first dose not sure if related to tikosyn or coincidence since he had afib ablation may have become paroxysmal instead of persistent (never converted w tikosyn before)  He wanted to avoid staying on tiksoyn so we said ok to stop it in 7 days  [id number] and off eliquis, he had that only for 7 days after last 2 hour afib episode                 2  Hypertension well controlled  3  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  4  Prior deep vein thrombosis in the popliteal vein and peroneal veins  5  Preserved LV systolic function with EF 65% per RANDALL prior to ablation  6  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 same time as afib ablation  Symtpoms of SVT since childhood     Please note HPI is listed by problem with with update following it, it is copied again in the assessment above and reflects medical decision making as well  Complete 12 point ROS reviewed and otherwise non pertinent or negative except as per HPI pertinent positives in Cardiovascular and Respiratory emphasized  Please see paper chart for outpatient clinic patients where the patient completed the 12 point ROS survey             Past Medical History:   Diagnosis Date    Cardiac disease     Hypertension     PAF [facility]paroxysmal atrial fibrillation) ([facility] [address] )     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HCC)     BILATERALLY       Allergies   Allergen Reactions    Oxycodone GI Intolerance     nausea  nausea     I reviewed the Home Medication list and Allergies in the chart  Scheduled Meds:  Current Outpatient Prescriptions   Medication Sig Dispense Refill    ascorbic acid (VITAMIN C) 500 mg tablet Take 500 mg by mouth daily      aspirin 81 MG tablet Take 81 mg by mouth daily   calcium carbonate-vitamin D (OSCAL-D) 500 mg-200 units per tablet Take 1 tablet by mouth daily with breakfast       Chelated Magnesium 100 MG TABS Take 1 tablet by mouth daily      co-enzyme Q-10 30 MG capsule Take 200 mg by mouth daily        Flaxseed, Linseed, (FLAXSEED OIL) 1000 MG CAPS Take 1 capsule by mouth daily      Green Tea 250 MG CAPS Take 1 capsule by mouth daily      Liver Extract 500 MG CAPS Take 1 capsule by mouth daily      Magnesium Citrate 125 MG CAPS Take 1 capsule by mouth daily      metoprolol succinate (TOPROL-XL) 25 mg 24 hr tablet Take 25 mg by mouth   Naproxen Sodium (ALEVE PO) Take 1 tablet by mouth 2 (two) times a day as needed      niacin 50 mg tablet Take 50 mg by mouth daily with breakfast       Pancreatin 325 MG TABS Take 1 tablet by mouth daily      Probiotic Product (PROBIOTIC-10 PO) Take 1 capsule by mouth daily      Vitamin D, Cholecalciferol, 1000 units CAPS Take 2,000 Units by mouth daily      clindamycin (CLEOCIN) 300 MG capsule Take 600 mg by mouth once       No current facility-administered medications for this visit  PRN Meds:  History reviewed  No pertinent family history  Social History     Social History    Marital status: /Civil Union     Spouse name: N/A    Number of children: N/A    Years of education: N/A     Occupational History    Not on file       Social History Main Topics    Smoking status: Never Smoker    Smokeless tobacco: Never Used    Alcohol use No    Drug use: No    Sexual activity: Not on file     Other Topics Concern    Not on file     Social History Narrative    No narrative on file         OBJECTIVE:    /72 (BP Location: Right arm, Patient Position: Sitting, Cuff Size: Large)   Pulse 66   Ht 6' 6" (1 981 m)   Wt 114 kg (252 lb 3 2 oz)   BMI 29 14 kg/m²   Vitals:    05/15/18 1054   Weight: 114 kg (252 lb 3 2 oz)     GEN: No acute distress, Alert and oriented, well appearing  HEENT:Head, neck, ears, oral pharynx: Mucus membranes moist, oral pharynx clear, nares clear  External ears normal  EYES: Pupils equal, sclera anicteric, midline, normal conjuctiva  NECK: No JVD, supple, no obvious masses or thryomegaly or goiter  CARDIOVASCULAR:  RRR, No murmur, rub, gallops S1,S2  LUNGS: Clear To auscultation bilaterally, normal effort, no rales, rhonchi, crackles  ABDOMEN:  nondistended,  without obvious organomegaly or ascites  EXTREMITIES/VASCULAR:  No edema  Radial pulses intact, pedal pulses difficult to palpate, warm an well perfused  PSYCH: Normal Affect, no overt suicidal ideation, linear speech pattern without evidence of psychosis  NEURO: Grossly intact, moving all extremiteis equal, face symmetric, alert and responsive, no obvious focal defecits  GAIT:  Ambulates normally without difficulty  HEME: No bleeding, bruising, petechia, purpura  SKIN: No significant rashes, warm, no diaphoresis or pallor       Lab Results:       LABS:      Chemistry        Component Value Date/Time     01/25/2018 0529    K 4 5 01/25/2018 0529     01/25/2018 0529    CO2 29 01/25/2018 0529    BUN 20 01/25/2018 0529    CREATININE 0 78 01/25/2018 0529        Component Value Date/Time    CALCIUM 9 0 01/25/2018 0529    ALKPHOS 79 08/21/2017 0805    AST 14 08/21/2017 0805    ALT 17 08/21/2017 0805    BILITOT 0 75 08/21/2017 0805            No results found for: CHOL  No results found for: HDL  No results found for: LDLCALC  No results found for: TRIG  No components found for: CHOLHDL    IMAGING: No results found  Cardiac testing:   Results for orders placed during the hospital encounter of 16   Echo complete with contrast if indicated    Narrative [address]  [facility], [address] [facility]  [phone]    Transthoracic Echocardiogram  2D, M-mode, Doppler, and Color Doppler    Study date:  13-Aug-2016    Patient: Aundrea Duque  MR number: [id number]  Account number: [de-identified]  : 1945  Age: 66 years  Gender: Male  Status: Inpatient  Location: Bedside  Height: 78 in  Weight: 239 6 lb  BP: 101/ 70 mmHg    Indications: Afib    Diagnoses: I48 0 - Atrial fibrillation    Sonographer:  Yessi Santana  Primary Physician:  Berlin Mascorro MD  Referring Physician:  Berlin Mascorro MD  Group:  [facility] [id number]  Interpreting Physician:  Dick Herrera MD    SUMMARY    LEFT VENTRICLE:  Systolic function was normal  Ejection fraction was estimated to be 65 %  There were no regional wall motion abnormalities  Wall thickness was mildly increased  LEFT ATRIUM:  The atrium was mildly dilated  MITRAL VALVE:  There was mild regurgitation  AORTIC VALVE:  There was mild regurgitation  TRICUSPID VALVE:  There was mild regurgitation  HISTORY: PRIOR HISTORY: HTN; PAF; Cardiac disease    PROCEDURE: The procedure was performed at the bedside  This was a routine  study  The transthoracic approach was used  The study included complete 2D  imaging, M-mode, complete spectral Doppler, and color Doppler  Echocardiographic views were limited due to decreased penetration and lung  interference  This was a technically difficult study  LEFT VENTRICLE: Size was normal  Systolic function was normal  Ejection  fraction was estimated to be 65 %  There were no regional wall motion  abnormalities  Wall thickness was mildly increased  DOPPLER: Transmitral flow  pattern: atrial fibrillation      RIGHT VENTRICLE: The size was normal  Systolic function was normal  Wall  thickness was normal     LEFT ATRIUM: The atrium was mildly dilated  RIGHT ATRIUM: Size was normal     MITRAL VALVE: Valve structure was normal  There was mild diffuse thickening  There was normal leaflet separation  DOPPLER: The transmitral velocity was  within the normal range  There was no evidence for stenosis  There was mild  regurgitation  AORTIC VALVE: The valve was trileaflet  Leaflets exhibited normal thickness and  normal cuspal separation  DOPPLER: Transaortic velocity was within the normal  range  There was no evidence for stenosis  There was mild regurgitation  TRICUSPID VALVE: The valve structure was normal  There was normal leaflet  separation  DOPPLER: The transtricuspid velocity was within the normal range  There was no evidence for stenosis  There was mild regurgitation  The tricuspid  jet envelope definition was inadequate for estimation of RV systolic pressure  PULMONIC VALVE: Leaflets exhibited normal thickness, no calcification, and  normal cuspal separation  DOPPLER: The transpulmonic velocity was within the  normal range  There was no significant regurgitation  PERICARDIUM: There was no pericardial effusion  AORTA: The root exhibited normal size  SYSTEMIC VEINS: IVC: The inferior vena cava was normal in size   Respirophasic  changes were normal     SYSTEM MEASUREMENT TABLES    2D  %FS: 25 38 %  Ao Diam: 3 87 cm  EDV(Teich): 85 26 ml  EF Biplane: 64 97 %  EF(Teich): 50 32 %  ESV(Teich): 42 36 ml  IVSd: 1 28 cm  LA Area: 28 49 cm2  LA Diam: 4 32 cm  LVEDV MOD A2C: 121 77 ml  LVEDV MOD A4C: 135 79 ml  LVEDV MOD BP: 133 03 ml  LVEF MOD A2C: 63 03 %  LVEF MOD A4C: 71 67 %  LVESV MOD A2C: 45 02 ml  LVESV MOD A4C: 38 47 ml  LVESV MOD BP: 46 59 ml  LVIDd: 4 35 cm  LVIDs: 3 24 cm  LVLd A2C: 10 13 cm  LVLd A4C: 9 2 cm  LVLs A2C: 8 15 cm  LVLs A4C: 6 16 cm  LVPWd: 1 31 cm  RA Area: 17 33 cm2  RVIDd: 2 91 cm  SV MOD A2C: 76 75 ml  SV MOD A4C: 97 31 ml  SV(Teich): 42 9 ml    CW  RAP: 10 mmHg  TR Vmax: 2 67 m/s  TR maxP 46 mmHg    MM  TAPSE: 2 59 cm    PW  RVSP: 38 46 mmHg    [facility] Accredited [facility]ch[facility] Laboratory    Prepared and electronically signed by    Dg Meeks MD  Signed 13-Aug-2016 16:47:33       Results for orders placed during the hospital encounter of 18   RANDALL    Narrative [address]  [facility], [address] [facility]  [phone]    Transesophageal Echocardiogram  Limited 2D, Doppler, and Color Doppler    Study date:  2018    Patient: Jim Benitez  MR number: [id number]  Account number: [de-identified]  : 1945  Age: 67 years  Gender: Male  Status: Outpatient  Location: Cath lab  Height: 78 in  Weight: 282 lb  BP: 157/ 59 mmHg    Indications: Pre-EP Procedure    Diagnoses: 427 31 - ATRIAL FIBRILLATION    Sonographer:  Kathi Paiz RN, RCS  Interpreting Physician:  Anitra Ye MD  Primary Physician:  Mini Ly DO  Referring Physician:  Anitra Ye MD  Group:  [facility]    SUMMARY    LEFT VENTRICLE:  Systolic function was normal  Ejection fraction was estimated to be 60 %  LEFT ATRIUM:  The atrium was mildly dilated  ATRIAL SEPTUM:  No defect or patent foramen ovale was identified  MITRAL VALVE:  There was mild regurgitation  AORTIC VALVE:  There was mild regurgitation  TRICUSPID VALVE:  There was mild regurgitation  PULMONIC VALVE:  There was mild regurgitation  HISTORY: PRIOR HISTORY: Hypertension, Atrial Fibrillation, DVT    PROCEDURE: The procedure was performed in the catheterization laboratory  This was a routine study  The risks and alternatives of the procedure were explained to the patient and informed consent was obtained  The transesophageal approach  was used  The study included limited 2D imaging, complete spectral Doppler, and color Doppler   The heart rate was 54 bpm, at the start of the study  Intubated with ease  One intubation attempt(s)  There was no blood detected on the probe  Image quality was excellent  MEDICATIONS: General anesthesia administered by anesthesia team     LEFT VENTRICLE: Size was normal  Systolic function was normal  Ejection fraction was estimated to be 60 %  LEFT ATRIUM: The atrium was mildly dilated  There was no spontaneous echo contrast ("smoke")  APPENDAGE: The size was normal  No thrombus was identified  DOPPLER: The function was normal (normal emptying velocity)  ATRIAL SEPTUM: No defect or patent foramen ovale was identified  RIGHT ATRIUM: Size was normal  No thrombus was identified  MITRAL VALVE: Valve structure was normal  There was normal leaflet separation  There was no echocardiographic evidence of vegetation  DOPPLER: There was mild regurgitation  AORTIC VALVE: The valve was trileaflet  Leaflets exhibited normal thickness and normal cuspal separation  There was no echocardiographic evidence of vegetation  DOPPLER: There was mild regurgitation  TRICUSPID VALVE: The valve structure was normal  There was normal leaflet separation  There was no echocardiographic evidence of vegetation  DOPPLER: There was mild regurgitation  PULMONIC VALVE: DOPPLER: There was mild regurgitation  PERICARDIUM: There was no pericardial effusion  The pericardium was normal in appearance  AORTA: The root exhibited normal size  There was no atheroma  There was no evidence for dissection  There was no evidence for aneurysm      [address] Accredited Echocardiography Laboratory    Prepared and electronically signed by    Norleen Crigler, MD  Signed 24-Jan-2018 16:50:23                 I reviewed and interpreted the following LABS/EKG/TELE/IMAGING and below is summary of my interpretation (if data available):    Current EKG and Rhythm Strip:NSR normal EKG

## 2018-06-29 ENCOUNTER — OFFICE VISIT (OUTPATIENT)
Dept: CARDIOLOGY CLINIC | Facility: CLINIC | Age: 73
End: 2018-06-29
Payer: MEDICARE

## 2018-06-29 VITALS
BODY MASS INDEX: 28.81 KG/M2 | WEIGHT: 249 LBS | SYSTOLIC BLOOD PRESSURE: 112 MMHG | DIASTOLIC BLOOD PRESSURE: 68 MMHG | HEIGHT: 78 IN | HEART RATE: 70 BPM

## 2018-06-29 DIAGNOSIS — I48.0 PAROXYSMAL ATRIAL FIBRILLATION (HCC): Primary | ICD-10-CM

## 2018-06-29 DIAGNOSIS — I72.4 BILATERAL POPLITEAL ARTERY ANEURYSM (HCC): ICD-10-CM

## 2018-06-29 DIAGNOSIS — I10 BENIGN ESSENTIAL HYPERTENSION: ICD-10-CM

## 2018-06-29 PROBLEM — I34.0 MITRAL VALVE INSUFFICIENCY: Status: ACTIVE | Noted: 2017-10-31

## 2018-06-29 PROBLEM — E78.2 MIXED HYPERLIPIDEMIA: Status: ACTIVE | Noted: 2018-06-29

## 2018-06-29 PROCEDURE — 93000 ELECTROCARDIOGRAM COMPLETE: CPT | Performed by: INTERNAL MEDICINE

## 2018-06-29 PROCEDURE — 99214 OFFICE O/P EST MOD 30 MIN: CPT | Performed by: INTERNAL MEDICINE

## 2018-06-29 NOTE — PROGRESS NOTES
Cardiology Follow Up    Dorcas Matos  1945  [id number]  [address] [facility]  [address] [place] [address] [address]    1  Paroxysmal atrial fibrillation (HCC)  POCT ECG    Lipid panel   2  Benign essential hypertension  Lipid panel   3  Bilateral popliteal artery aneurysm (HCC)  Lipid panel       Discussion/Summary:Overall he has been doing well from a cardiac standpoint  He recently had an AFib ablation and has been doing quite well  It 1 brief recurrence but this was only for a couple of hours  Overall he has been doing well  Blood pressures been well controlled  He is due for a fasting lipid profile  He has been told that he has elevated cholesterol in the past but has been hesitant to take medications  He tries to follow a more homeopathic approach  He does have a history of bilateral popliteal artery aneurysm repair and has been followed by vascular surgery  He also has a 4 4 cm ascending aortic aneurysm seen on CT scan which appears stable over the last year  He will be due for a CT of the chest at our next visit  Interval History:   18-year-old gentleman with a history of paroxysmal atrial fibrillation, hypertension, dyslipidemia, bilateral popliteal artery aneurysm status post repair  History of acute DVT  Overall he has been doing well  He underwent AFib ablation which was successful  He has had only 2 hours of atrial fibrillation since then  Functional capacity is quite good with no limitations  He denies any chest pain, shortness of breath, lightheadedness, dizziness, or syncope  He has been quite functional and exercises on a regular basis  He follows a strict diet      Problem List     PAF (paroxysmal atrial fibrillation) (HCC)    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HCC) (Chronic)    Hypertension    Acute DVT (deep venous thrombosis) (HCC)    A-fib (HCC)    Benign essential hypertension    Overview Signed 6/29/2018  8:02 AM by Monique Wilson DO     Overview:   dx 2002         Mitral valve insufficiency    Bilateral popliteal artery aneurysm (HCC)    Paroxysmal atrial fibrillation (HCC)    Mixed hyperlipidemia        Past Medical History:   Diagnosis Date    Cardiac disease     Hypertension     PAF (paroxysmal atrial fibrillation) (HCC)     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HCC)     BILATERALLY     Social History     Social History    Marital status: /Civil Union     Spouse name: N/A    Number of children: N/A    Years of education: N/A     Occupational History    Not on file  Social History Main Topics    Smoking status: Never Smoker    Smokeless tobacco: Never Used    Alcohol use No    Drug use: No    Sexual activity: Not on file     Other Topics Concern    Not on file     Social History Narrative    No narrative on file      No family history on file  Past Surgical History:   Procedure Laterality Date    ESOPHAGOGASTRODUODENOSCOPY N/A 8/16/2016    Procedure: ESOPHAGOGASTRODUODENOSCOPY (EGD); Surgeon: Gianna Price MD;  Location: [facility]; Service:     OTHER SURGICAL HISTORY      VASCULAR SURGERY Right     POPLITEAL BYPASS DUE TO ANEURYSM       Current Outpatient Prescriptions:     ascorbic acid (VITAMIN C) 500 mg tablet, Take 500 mg by mouth daily, Disp: , Rfl:     aspirin 81 MG tablet, Take 81 mg by mouth daily  , Disp: , Rfl:     calcium carbonate-vitamin D (OSCAL-D) 500 mg-200 units per tablet, Take 1 tablet by mouth daily with breakfast , Disp: , Rfl:     clindamycin (CLEOCIN) 300 MG capsule, Take 600 mg by mouth once, Disp: , Rfl:     Coenzyme Q10 200 MG capsule, Take 200 mg by mouth daily  , Disp: , Rfl:     Flaxseed, Linseed, (FLAXSEED OIL) 1000 MG CAPS, Take 1 capsule by mouth daily, Disp: , Rfl:     Green Tea 250 MG CAPS, Take 1 capsule by mouth daily, Disp: , Rfl:     Liver Extract 500 MG CAPS, Take 1 capsule by mouth daily, Disp: , Rfl:     Magnesium Citrate 125 MG CAPS, Take 1 capsule by mouth daily, Disp: , Rfl:     metoprolol succinate (TOPROL-XL) 25 mg 24 hr tablet, Take 25 mg by mouth , Disp: , Rfl:     Naproxen Sodium (ALEVE PO), Take 1 tablet by mouth 2 (two) times a day as needed, Disp: , Rfl:     niacin 50 mg tablet, Take 50 mg by mouth daily with breakfast , Disp: , Rfl:     Pancreatin 325 MG TABS, Take 1 tablet by mouth daily, Disp: , Rfl:     Probiotic Product (PROBIOTIC-10 PO), Take 1 capsule by mouth daily, Disp: , Rfl:     Vitamin D, Cholecalciferol, 1000 units CAPS, Take 2,000 Units by mouth daily, Disp: , Rfl:   Allergies   Allergen Reactions    Oxycodone GI Intolerance     nausea  nausea       Labs:     Chemistry        Component Value Date/Time     01/25/2018 0529    K 4 5 01/25/2018 0529     01/25/2018 0529    CO2 29 01/25/2018 0529    BUN 20 01/25/2018 0529    CREATININE 0 78 01/25/2018 0529        Component Value Date/Time    CALCIUM 9 0 01/25/2018 0529    ALKPHOS 79 08/21/2017 0805    AST 14 08/21/2017 0805    ALT 17 08/21/2017 0805    BILITOT 0 75 08/21/2017 0805            No results found for: CHOL  No results found for: HDL  No results found for: LDLCALC  No results found for: TRIG  No components found for: CHOLHDL    Imaging: No results found  ECG:    Normal sinus rhythm PVC      ROS    Vitals:    06/29/18 0812   BP: 112/68   Pulse: 70     Vitals:    06/29/18 0812   Weight: 113 kg (249 lb)     Height: 6' 6" (198 1 cm)   Body mass index is 28 77 kg/m²  Physical Exam:  Vital signs reviewed    General appearance:  Appears stated age, alert, well appearing and in no distress  HEENT:  PERRLA, EOMI, no scleral icterus, no conjunctival pallor  NECK:  Supple, No elevated JVP, no thyromegaly, no carotid bruits  HEART:  Regular rate and rhythm, normal S1/S2, no S3/S4, no murmur or rub  LUNGS:  Clear to auscultation bilaterally, no wheezes rales or rhonchi  ABDOMEN:  Soft, non-tender, positive bowel sounds, no rebound or guarding, no organomegaly   EXTREMITIES:  No edema, normal range of motion  VASCULAR:  Normal pedal pulses, good pulse volume   SKIN: No lesions or rashes on exposed skin  NEURO:  CN II-XII intact, no focal deficits

## 2019-02-21 ENCOUNTER — TELEPHONE (OUTPATIENT)
Dept: CARDIOLOGY CLINIC | Facility: CLINIC | Age: 74
End: 2019-02-21

## 2019-02-21 NOTE — TELEPHONE ENCOUNTER
Pt c/o chest pressure, SOB with exertion such as walking up the steps for the past week  Denies any CP  Stated symptoms new--"something feels different"   Denies any palpitations, fluttering  /85, 65  Pt asking for a sooner appt, (nothing avail), ED or EKG visit     Please advise

## 2019-02-25 ENCOUNTER — OFFICE VISIT (OUTPATIENT)
Dept: CARDIOLOGY CLINIC | Facility: CLINIC | Age: 74
End: 2019-02-25
Payer: MEDICARE

## 2019-02-25 VITALS
HEIGHT: 78 IN | SYSTOLIC BLOOD PRESSURE: 116 MMHG | DIASTOLIC BLOOD PRESSURE: 72 MMHG | HEART RATE: 84 BPM | BODY MASS INDEX: 29.56 KG/M2 | WEIGHT: 255.5 LBS

## 2019-02-25 DIAGNOSIS — I10 BENIGN ESSENTIAL HYPERTENSION: ICD-10-CM

## 2019-02-25 DIAGNOSIS — I48.0 PAROXYSMAL ATRIAL FIBRILLATION (HCC): ICD-10-CM

## 2019-02-25 DIAGNOSIS — R07.89 CHEST TIGHTNESS: Primary | ICD-10-CM

## 2019-02-25 PROCEDURE — 99215 OFFICE O/P EST HI 40 MIN: CPT | Performed by: NURSE PRACTITIONER

## 2019-02-25 PROCEDURE — 93000 ELECTROCARDIOGRAM COMPLETE: CPT | Performed by: NURSE PRACTITIONER

## 2019-03-01 ENCOUNTER — HOSPITAL ENCOUNTER (OUTPATIENT)
Dept: NON INVASIVE DIAGNOSTICS | Facility: CLINIC | Age: 74
Discharge: HOME | End: 2019-03-01
Payer: MEDICARE

## 2019-03-01 DIAGNOSIS — R07.89 CHEST TIGHTNESS: ICD-10-CM

## 2019-03-01 PROCEDURE — 78452 HT MUSCLE IMAGE SPECT MULT: CPT | Performed by: INTERNAL MEDICINE

## 2019-03-01 PROCEDURE — 93018 CV STRESS TEST I&R ONLY: CPT | Performed by: INTERNAL MEDICINE

## 2019-03-01 PROCEDURE — 93016 CV STRESS TEST SUPVJ ONLY: CPT | Performed by: INTERNAL MEDICINE

## 2019-03-01 PROCEDURE — 93017 CV STRESS TEST TRACING ONLY: CPT

## 2019-03-01 PROCEDURE — 78452 HT MUSCLE IMAGE SPECT MULT: CPT

## 2019-03-01 PROCEDURE — A9502 TC99M TETROFOSMIN: HCPCS

## 2019-03-01 RX ADMIN — REGADENOSON 0.4 MG: 0.08 INJECTION, SOLUTION INTRAVENOUS at 13:15

## 2019-03-04 ENCOUNTER — TELEPHONE (OUTPATIENT)
Dept: CARDIOLOGY CLINIC | Facility: CLINIC | Age: 74
End: 2019-03-04

## 2019-03-04 LAB
ARRHY DURING EX: NORMAL
CHEST PAIN STATEMENT: NORMAL
MAX DIASTOLIC BP: 90 MMHG
MAX HEART RATE: 111 BPM
MAX PREDICTED HEART RATE: 145 BPM
MAX. SYSTOLIC BP: 140 MMHG
PROTOCOL NAME: NORMAL
REASON FOR TERMINATION: NORMAL
TARGET HR FORMULA: NORMAL
TEST INDICATION: NORMAL
TIME IN EXERCISE PHASE: NORMAL

## 2019-04-11 ENCOUNTER — OFFICE VISIT (OUTPATIENT)
Dept: CARDIOLOGY CLINIC | Facility: CLINIC | Age: 74
End: 2019-04-11
Payer: MEDICARE

## 2019-04-11 VITALS
HEART RATE: 70 BPM | SYSTOLIC BLOOD PRESSURE: 114 MMHG | BODY MASS INDEX: 29.27 KG/M2 | HEIGHT: 78 IN | WEIGHT: 253 LBS | DIASTOLIC BLOOD PRESSURE: 66 MMHG

## 2019-04-11 DIAGNOSIS — I10 BENIGN ESSENTIAL HYPERTENSION: ICD-10-CM

## 2019-04-11 DIAGNOSIS — E78.2 MIXED HYPERLIPIDEMIA: ICD-10-CM

## 2019-04-11 DIAGNOSIS — I72.4 POPLITEAL ANEURYSM (HCC): Chronic | ICD-10-CM

## 2019-04-11 DIAGNOSIS — I48.0 PAROXYSMAL ATRIAL FIBRILLATION (HCC): Primary | ICD-10-CM

## 2019-04-11 DIAGNOSIS — I71.2 ANEURYSM OF ASCENDING AORTA (HCC): ICD-10-CM

## 2019-04-11 PROBLEM — I71.21 ANEURYSM OF ASCENDING AORTA: Status: ACTIVE | Noted: 2019-04-11

## 2019-04-11 PROCEDURE — 93000 ELECTROCARDIOGRAM COMPLETE: CPT | Performed by: INTERNAL MEDICINE

## 2019-04-11 PROCEDURE — 99214 OFFICE O/P EST MOD 30 MIN: CPT | Performed by: INTERNAL MEDICINE

## 2019-05-14 ENCOUNTER — TRANSCRIBE ORDERS (OUTPATIENT)
Dept: RADIOLOGY | Facility: HOSPITAL | Age: 74
End: 2019-05-14

## 2019-05-14 ENCOUNTER — HOSPITAL ENCOUNTER (OUTPATIENT)
Dept: RADIOLOGY | Facility: HOSPITAL | Age: 74
Discharge: HOME | End: 2019-05-14
Attending: INTERNAL MEDICINE
Payer: MEDICARE

## 2019-05-14 DIAGNOSIS — I71.2 ANEURYSM OF ASCENDING AORTA (HCC): ICD-10-CM

## 2019-05-14 PROCEDURE — 71250 CT THORAX DX C-: CPT

## 2019-05-16 NOTE — PROGRESS NOTES
Heart Failure Office Visit   Jelani Wells  75 y o  male MRN: [id number]    HPI  Mr Vamsi Torrez is a 10year old male with a known past medical history of   1/24/18 sp cryoablation of atrial fibrillation with pulmonary vein isolation as well at typical atrial flutter ablation with a brief re occurrence  Bilateral popliteal artery aneurysm repair   4 4 cm ascending aortic aneurysm   DVT  Patient Active Problem List   Diagnosis    PAF ([facility]oxysmal atrial fibrillation) ([facility] )    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[facility] )    Hypertension    Acute DVT (deep venous thrombosis) ([facility] )    A-fib ([facility] )    Benign essential hypertension    Mitral valve insufficiency    Bilateral popliteal artery aneurysm (HCC)    Paroxysmal atrial fibrillation ([place] [address] )    Mixed hyperlipidemia       Mr Ildefonso Alvarez presents to our office today due to complaints of left mid sternal to left breast chest pressure occurring 5 times last week  North cannot re call what he was doing at the time of some of his Chest pressure events  When he did experienced  CP it was a  4-5/10, non radiating, lasting a couple of minutes, associated with some dyspnea  Patrick recalls one evening experiencing Chest pressure with lying down in bed  Tevin aVng feels may have been related to GERD  He had eaten pizza that evening  He admits to dyspnea with walking up stairs  Tevin Vang denies lightheadedness or dizziness  Review of Systems   Cardiovascular: Positive for chest pain and dyspnea on exertion  Respiratory: Positive for shortness of breath  Musculoskeletal: Positive for arthritis  Right ankle         Objective:   Vitals: RUE sitting 110/76  Vitals:    02/25/19 0848   BP: 116/72   BP Location: Right arm   Patient Position: Sitting   Cuff Size: Large   Pulse: 84   Weight: 116 kg (255 lb 8 oz)   Height: 6' 6" (1 981 m)     Body mass index is 29 53 kg/m²      Wt Readings from Last 3 Encounters:   06/29/18 113 kg (249 lb)   05/15/18 114 kg (252 lb 3 2 oz) Problem: Physical Therapy Goal  Goal: Physical Therapy Goal  Goals to be met by: 2019     Patient will increase functional independence with mobility by performin. Supine to sit with Modified [place]  2. Sit to supine with Modified [place]  3. Sit to stand transfer with Modified [place]  4. Bed to chair transfer with Supervision using LRAD.  5. Gait  x 150 feet with Stand-by Assistance using LRAD without significant LOB and management O2.    6. Ascend/descend 1 stair with right handrail Stand-by Assistance in order to enter home.     Outcome: Ongoing (interventions implemented as appropriate)  Goals set.       02/23/18 113 kg (250 lb)         Physical Exam:  GEN: Venita Liu  appears well, alert and oriented x 3, pleasant and cooperative   HEENT: pupils equal, round, and reactive to light; extraocular muscles intact  NECK: supple, no carotid bruits   HEART: regular rhythm, normal S1 and S2,ectopic beats, no murmurs, clicks, gallops or rubs, JVP is  flat  LUNGS: clear to auscultation bilaterally; no wheezes, rales, or rhonchi   ABDOMEN: normal bowel sounds, soft, no tenderness, no distention  EXTREMITIES: peripheral pulses normal; no clubbing, cyanosis, trace federico LE edema  NEURO: no focal findings   SKIN: normal without suspicious lesions on exposed skin      Current Outpatient Medications:     ascorbic acid (VITAMIN C) 500 mg tablet, Take 500 mg by mouth daily, Disp: , Rfl:     aspirin 81 MG tablet, Take 81 mg by mouth daily  , Disp: , Rfl:     calcium carbonate-vitamin D (OSCAL-D) 500 mg-200 units per tablet, Take 1 tablet by mouth daily with breakfast , Disp: , Rfl:     clindamycin (CLEOCIN) 300 MG capsule, Take 600 mg by mouth once, Disp: , Rfl:     Coenzyme Q10 200 MG capsule, Take 200 mg by mouth daily  , Disp: , Rfl:     Flaxseed, Linseed, (FLAXSEED OIL) 1000 MG CAPS, Take 1 capsule by mouth daily, Disp: , Rfl:     Green Tea 250 MG CAPS, Take 1 capsule by mouth daily, Disp: , Rfl:     Liver Extract 500 MG CAPS, Take 1 capsule by mouth daily, Disp: , Rfl:     Magnesium Citrate 125 MG CAPS, Take 1 capsule by mouth daily, Disp: , Rfl:     metoprolol succinate (TOPROL-XL) 25 mg 24 hr tablet, Take 25 mg by mouth , Disp: , Rfl:     Naproxen Sodium (ALEVE PO), Take 1 tablet by mouth 2 (two) times a day as needed, Disp: , Rfl:     niacin 50 mg tablet, Take 50 mg by mouth daily with breakfast , Disp: , Rfl:     Pancreatin 325 MG TABS, Take 1 tablet by mouth daily, Disp: , Rfl:     Probiotic Product (PROBIOTIC-10 PO), Take 1 capsule by mouth daily, Disp: , Rfl:     Vitamin D, Cholecalciferol, 1000 units CAPS, Take 2,000 Units by mouth daily, Disp: , Rfl:       Labs & Results:                    Invalid input(s): LABALBU        EKG personally reviewed by DHEERAJ Ramos  Assessment/Plan:   1  Chest pain- BP and HR controlled, RX nuclear stress test evaluate for ischemia  Tevin Vang is unable to run on a treadmill due to right ankle arthritis and pain, continue on ASA 81mg daily, Metoprolol succinate 25mg daily, niacin 50mg daily  2  Paroxysmal atrial fibrillation 1/24/18 sp cryoablation of atrial fibrillation with pulmonary vein isolation as well at typical atrial flutter ablation with a brief re occurrence, continue on Metoprolol succinate 25mg daily   3  HTN- controlled on Metoprolol succinate 25mg daily , EKG NSR with PAC's  4  Hx of Bilateral popliteal artery aneurysm repair   5  4 4 cm ascending aortic aneurysm- Dr Bowen Lugo will order follow up CTA with next office visit    6   Hx of DVT  Follow up with Dr Bowen Lugo on 4/11/19      Terrance Ramso  2/25/2019  8:39 AM

## 2019-09-10 ENCOUNTER — OFFICE VISIT (OUTPATIENT)
Dept: CARDIOLOGY CLINIC | Facility: CLINIC | Age: 74
End: 2019-09-10
Payer: MEDICARE

## 2019-09-10 VITALS
BODY MASS INDEX: 27.98 KG/M2 | SYSTOLIC BLOOD PRESSURE: 112 MMHG | HEART RATE: 67 BPM | DIASTOLIC BLOOD PRESSURE: 74 MMHG | WEIGHT: 241.8 LBS | HEIGHT: 78 IN

## 2019-09-10 DIAGNOSIS — I48.0 PAF (PAROXYSMAL ATRIAL FIBRILLATION) (HCC): Primary | ICD-10-CM

## 2019-09-10 PROCEDURE — 99214 OFFICE O/P EST MOD 30 MIN: CPT | Performed by: PHYSICIAN ASSISTANT

## 2019-09-10 PROCEDURE — 93000 ELECTROCARDIOGRAM COMPLETE: CPT | Performed by: PHYSICIAN ASSISTANT

## 2019-09-10 NOTE — PROGRESS NOTES
Electrophysiology Office Note    Terrance Reveal  1945  [id number]  HEART & VASCULAR [facility]  [address]        Assessment/Plan     1  PAF (paroxysmal atrial fibrillation) ([facility])  POCT ECG     1  Paroxysmal atrial fibrillation, symptomatic    * patient presents today under direction of Dr Briana Byrne for routine 1 year follow up s/p cryo PVI and CTI RFA in 2018    * this AM patient is in NSR with a completely normal ECG    * he has had no symptoms of recurrent atrial fibrillation with extremely low risk factors outside of age for recurrence  He has no sleep apnea, no T2DM, no HTN, does not smoke cigarettes and never consumes alcohol    * last EF WNL    * did have stress test in 2019 if afib recurs likely can utilize flecainide for PRN or daily use    * no longer on ASA due to low risk of CVD and not on lopressor due to issues with symptomatic hypotension     At this time due to stable status of his rhythm patient would like to follow regularly with Dr Suad Bethea and EP on a PRN basis which is acceptable         Cardiac Testing:     ECHO:   Results for orders placed during the hospital encounter of 16   Echo complete with contrast if indicated    Narrative [facility]  [facility], [address] [facility]  [phone]    Transthoracic Echocardiogram  2D, M-mode, Doppler, and Color Doppler    Study date:  13-Aug-2016    Patient: Jerel Hanks  MR number: [id number]  Account number: [de-identified]  : 1945  Age: 66 years  Gender: Male  Status: Inpatient  Location: Bedside  Height: 78 in  Weight: 239 6 lb  BP: 101/ 70 mmHg    Indications: Afib    Diagnoses: I48 0 - Atrial fibrillation    Sonographer:  Farideh Matias  Primary Physician:  Idania Knutson MD  Referring Physician:  Idaina Knutson MD  [facility]  [facility]  Interpreting Physician:  Kayce Ramirez MD    SUMMARY    LEFT VENTRICLE:  Systolic function was normal  Ejection fraction was estimated to be 65 %  There were no regional wall motion abnormalities  Wall thickness was mildly increased  LEFT ATRIUM:  The atrium was mildly dilated  MITRAL VALVE:  There was mild regurgitation  AORTIC VALVE:  There was mild regurgitation  TRICUSPID VALVE:  There was mild regurgitation  HISTORY: PRIOR HISTORY: HTN; PAF; Cardiac disease    PROCEDURE: The procedure was performed at the bedside  This was a routine  study  The transthoracic approach was used  The study included complete 2D  imaging, M-mode, complete spectral Doppler, and color Doppler  Echocardiographic views were limited due to decreased penetration and lung  interference  This was a technically difficult study  LEFT VENTRICLE: Size was normal  Systolic function was normal  Ejection  fraction was estimated to be 65 %  There were no regional wall motion  abnormalities  Wall thickness was mildly increased  DOPPLER: Transmitral flow  pattern: atrial fibrillation  RIGHT VENTRICLE: The size was normal  Systolic function was normal  Wall  thickness was normal     LEFT ATRIUM: The atrium was mildly dilated  RIGHT ATRIUM: Size was normal     MITRAL VALVE: Valve structure was normal  There was mild diffuse thickening  There was normal leaflet separation  DOPPLER: The transmitral velocity was  within the normal range  There was no evidence for stenosis  There was mild  regurgitation  AORTIC VALVE: The valve was trileaflet  Leaflets exhibited normal thickness and  normal cuspal separation  DOPPLER: Transaortic velocity was within the normal  range  There was no evidence for stenosis  There was mild regurgitation  TRICUSPID VALVE: The valve structure was normal  There was normal leaflet  separation  DOPPLER: The transtricuspid velocity was within the normal range  There was no evidence for stenosis  There was mild regurgitation   The tricuspid  jet envelope definition was inadequate for estimation of RV systolic pressure  PULMONIC VALVE: Leaflets exhibited normal thickness, no calcification, and  normal cuspal separation  DOPPLER: The transpulmonic velocity was within the  normal range  There was no significant regurgitation  PERICARDIUM: There was no pericardial effusion  AORTA: The root exhibited normal size  SYSTEMIC VEINS: IVC: The inferior vena cava was normal in size  Respirophasic  changes were normal     SYSTEM MEASUREMENT TABLES    2D  %FS: 25 38 %  Ao Diam: 3 87 cm  EDV(Teich): 85 26 ml  EF Biplane: 64 97 %  EF(Teich): 50 32 %  ESV(Teich): 42 36 ml  IVSd: 1 28 cm  LA Area: 28 49 cm2  LA Diam: 4 32 cm  LVEDV MOD A2C: 121 77 ml  LVEDV MOD A4C: 135 79 ml  LVEDV MOD BP: 133 03 ml  LVEF MOD A2C: 63 03 %  LVEF MOD A4C: 71 67 %  LVESV MOD A2C: 45 02 ml  LVESV MOD A4C: 38 47 ml  LVESV MOD BP: 46 59 ml  LVIDd: 4 35 cm  LVIDs: 3 24 cm  LVLd A2C: 10 13 cm  LVLd A4C: 9 2 cm  LVLs A2C: 8 15 cm  LVLs A4C: 6 16 cm  LVPWd: 1 31 cm  RA Area: 17 33 cm2  RVIDd: 2 91 cm  SV MOD A2C: 76 75 ml  SV MOD A4C: 97 31 ml  SV(Teich): 42 9 ml    CW  RAP: 10 mmHg  TR Vmax: 2 67 m/s  TR maxP 46 mmHg    MM  TAPSE: 2 59 cm    PW  RVSP: 38 46 mmHg    [facility]    Prepared and electronically signed by    Effie Duane, MD  Signed 13-Aug-2016 16:47:33         CATH/STRESS TEST:   SUMMARY:  -  Stress results: There was no chest pain during stress  -  ECG conclusions: The stress ECG was negative for ischemia and normal   -  Perfusion imaging: There were no ischemic perfusion defects   -  Gated SPECT: The calculated left ventricular ejection fraction was 62 %  There was no left ventricular regional abnormality      IMPRESSIONS: Normal study after pharmacologic vasodilation without reproduction of symptoms  Myocardial perfusion imaging was normal at rest and with stress      EKG (9-10-19):   NSR  Normal QRS Normal QTc         History of Present Illness     HPI/INTERVAL HISTORY: Adalberto Licea  is a 75 y o  male with a history of paroxysmal atrial fibrillation s/p cryo PVI w/ CTIA RFA (1-2018) by Dr Betsy Morales who presents to EP office today under direction of Dr Betsy Morales for 1 year routine follow up for afib management  9-10-19:  Since his last visit patient has been doing extremely well without any concerns or complaints  No palpitations whatsoever  He is active by playing golf regularly with plans to golf today  On the golf course he walks the fairway and does not use a cart  He is able to walk without any LH, dizziness, TINSLEY or anginal chest discomfort  He is no longer taking ASA or lopressor; lopressor stopped due to symptomatic hypotension  Review of Systems  ROS as noted above, otherwise 12 point review of systems was performed and is negative         Historical Information   Social History     Socioeconomic History    Marital status: /Civil Union     Spouse name: Not on file    Number of children: Not on file    Years of education: Not on file    Highest education level: Not on file   Occupational History    Not on file   Social Needs    Financial resource strain: Not on file    Food insecurity:     Worry: Not on file     Inability: Not on file    Transportation needs:     Medical: Not on file     Non-medical: Not on file   Tobacco Use    Smoking status: Never Smoker    Smokeless tobacco: Never Used   Substance and Sexual Activity    Alcohol use: No    Drug use: No    Sexual activity: Not on file   Lifestyle    Physical activity:     Days per week: Not on file     Minutes per session: Not on file    Stress: Not on file   Relationships    Social connections:     Talks on phone: Not on file     Gets together: Not on file     Attends [employer] service: Not on file     Active member of club or organization: Not on file     Attends meetings of clubs or organizations: Not on file Relationship status: Not on file    Intimate partner violence:     Fear of current or ex partner: Not on file     Emotionally abused: Not on file     Physically abused: Not on file     Forced sexual activity: Not on file   Other Topics Concern    Not on file   Social History Narrative    Not on file     Past Medical History:   Diagnosis Date    Cardiac disease     Hypertension     [facility]     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     BILATERALLY     Past Surgical History:   Procedure Laterality Date    ESOPHAGOGASTRODUODENOSCOPY N/A 8/16/2016    Procedure: ESOPHAGOGASTRODUODENOSCOPY (EGD); Surgeon: Melanie Welch MD;  Location: [facility]; Service:     OTHER SURGICAL HISTORY      VASCULAR SURGERY Right     POPLITEAL BYPASS DUE TO ANEURYSM     Social History     Substance and Sexual Activity   Alcohol Use No     Social History     Substance and Sexual Activity   Drug Use No     Social History     Tobacco Use   Smoking Status Never Smoker   Smokeless Tobacco Never Used     No family history on file      Meds/Allergies       Current Outpatient Medications:     ascorbic acid (VITAMIN C) 500 mg tablet, Take 500 mg by mouth daily, Disp: , Rfl:     Coenzyme Q10 200 MG capsule, Take 200 mg by mouth daily  , Disp: , Rfl:     Flaxseed, Linseed, (FLAXSEED OIL) 1000 MG CAPS, Take 1 capsule by mouth daily, Disp: , Rfl:     Green Tea 250 MG CAPS, Take 1 capsule by mouth daily, Disp: , Rfl:     Liver Extract 500 MG CAPS, Take 1 capsule by mouth daily, Disp: , Rfl:     Magnesium Citrate 125 MG CAPS, Take 1 capsule by mouth daily, Disp: , Rfl:     Naproxen Sodium (ALEVE PO), Take 1 tablet by mouth 2 (two) times a day as needed, Disp: , Rfl:     niacin 50 mg tablet, Take 50 mg by mouth daily with breakfast , Disp: , Rfl:     Pancreatin 325 MG TABS, Take 1 tablet by mouth daily, Disp: , Rfl:     POTASSIUM ACETATE IV, Take by mouth, Disp: , Rfl:     Probiotic Product (PROBIOTIC-10 PO), Take 1 capsule by mouth daily, Disp: , Rfl:     Vitamin D, Cholecalciferol, 1000 units CAPS, Take 2,000 Units by mouth daily, Disp: , Rfl:     clindamycin (CLEOCIN) 300 MG capsule, Take 600 mg by mouth once, Disp: , Rfl:     Allergies   Allergen Reactions    Oxycodone GI Intolerance     nausea  nausea       Objective   Vitals: Blood pressure 112/74, pulse 67, height 6' 6" (1 981 m), weight 110 kg (241 lb 12 8 oz)  Physical Exam   Constitutional: He is oriented to person, place, and time  He appears well-developed and well-nourished  HENT:   Head: Normocephalic and atraumatic  Eyes: Pupils are equal, round, and reactive to light  EOM are normal    Neck: Normal range of motion  Neck supple  Cardiovascular: Normal rate and regular rhythm  Pulmonary/Chest: Effort normal and breath sounds normal    Abdominal: Soft  Bowel sounds are normal    Musculoskeletal: Normal range of motion  Neurological: He is alert and oriented to person, place, and time  Skin: Skin is warm and dry  Psychiatric: He has a normal mood and affect  Labs:not applicable    Imaging: I have personally reviewed pertinent reports

## 2020-08-15 ENCOUNTER — APPOINTMENT (EMERGENCY)
Dept: RADIOLOGY | Facility: HOSPITAL | Age: 75
DRG: 854 | End: 2020-08-15
Payer: MEDICARE

## 2020-08-15 ENCOUNTER — HOSPITAL ENCOUNTER (INPATIENT)
Facility: HOSPITAL | Age: 75
LOS: 5 days | Discharge: HOME HEALTH | DRG: 854 | End: 2020-08-21
Attending: EMERGENCY MEDICINE | Admitting: INTERNAL MEDICINE
Payer: MEDICARE

## 2020-08-15 DIAGNOSIS — L84 CALLUS OF FOOT: ICD-10-CM

## 2020-08-15 DIAGNOSIS — B95.5 STREPTOCOCCAL BACTEREMIA: ICD-10-CM

## 2020-08-15 DIAGNOSIS — R11.2 NAUSEA AND VOMITING: ICD-10-CM

## 2020-08-15 DIAGNOSIS — A41.9 SEPSIS (HCC): Primary | ICD-10-CM

## 2020-08-15 DIAGNOSIS — R78.81 STREPTOCOCCAL BACTEREMIA: ICD-10-CM

## 2020-08-15 DIAGNOSIS — R09.02 HYPOXIA: ICD-10-CM

## 2020-08-15 DIAGNOSIS — R78.81 GRAM-POSITIVE BACTEREMIA: ICD-10-CM

## 2020-08-15 PROBLEM — R65.10 SIRS (SYSTEMIC INFLAMMATORY RESPONSE SYNDROME) (HCC): Status: ACTIVE | Noted: 2020-08-15

## 2020-08-15 LAB
ALBUMIN SERPL BCP-MCNC: 4 G/DL (ref 3.5–5)
ALP SERPL-CCNC: 76 U/L (ref 46–116)
ALT SERPL W P-5'-P-CCNC: 23 U/L (ref 12–78)
ANION GAP SERPL CALCULATED.3IONS-SCNC: 6 MMOL/L (ref 4–13)
APTT PPP: 23 SECONDS (ref 23–37)
AST SERPL W P-5'-P-CCNC: 28 U/L (ref 5–45)
ATRIAL RATE: 103 BPM
BASOPHILS # BLD MANUAL: 0.11 THOUSAND/UL (ref 0–0.1)
BASOPHILS NFR MAR MANUAL: 1 % (ref 0–1)
BILIRUB SERPL-MCNC: 1.43 MG/DL (ref 0.2–1)
BILIRUB UR QL STRIP: ABNORMAL
BUN SERPL-MCNC: 20 MG/DL (ref 5–25)
CALCIUM SERPL-MCNC: 8.6 MG/DL (ref 8.3–10.1)
CHLORIDE SERPL-SCNC: 110 MMOL/L (ref 100–108)
CLARITY UR: CLEAR
CO2 SERPL-SCNC: 26 MMOL/L (ref 21–32)
COLOR UR: ABNORMAL
CREAT SERPL-MCNC: 0.72 MG/DL (ref 0.6–1.3)
EOSINOPHIL # BLD MANUAL: 0 THOUSAND/UL (ref 0–0.4)
EOSINOPHIL NFR BLD MANUAL: 0 % (ref 0–6)
ERYTHROCYTE [DISTWIDTH] IN BLOOD BY AUTOMATED COUNT: 13.2 % (ref 11.6–15.1)
GFR SERPL CREATININE-BSD FRML MDRD: 91 ML/MIN/1.73SQ M
GLUCOSE SERPL-MCNC: 114 MG/DL (ref 65–140)
GLUCOSE UR STRIP-MCNC: NEGATIVE MG/DL
HCT VFR BLD AUTO: 46.1 % (ref 36.5–49.3)
HGB BLD-MCNC: 16.1 G/DL (ref 12–17)
HGB UR QL STRIP.AUTO: NEGATIVE
INR PPP: 1.09 (ref 0.84–1.19)
KETONES UR STRIP-MCNC: ABNORMAL MG/DL
LACTATE SERPL-SCNC: 1.7 MMOL/L (ref 0.5–2)
LEUKOCYTE ESTERASE UR QL STRIP: NEGATIVE
LIPASE SERPL-CCNC: 201 U/L (ref 73–393)
LYMPHOCYTES # BLD AUTO: 0.11 THOUSAND/UL (ref 0.6–4.47)
LYMPHOCYTES # BLD AUTO: 1 % (ref 14–44)
MCH RBC QN AUTO: 32.7 PG (ref 26.8–34.3)
MCHC RBC AUTO-ENTMCNC: 34.9 G/DL (ref 31.4–37.4)
MCV RBC AUTO: 94 FL (ref 82–98)
METAMYELOCYTES NFR BLD MANUAL: 2 % (ref 0–1)
MONOCYTES # BLD AUTO: 0.55 THOUSAND/UL (ref 0–1.22)
MONOCYTES NFR BLD: 5 % (ref 4–12)
NEUTROPHILS # BLD MANUAL: 9.79 THOUSAND/UL (ref 1.85–7.62)
NEUTS BAND NFR BLD MANUAL: 10 % (ref 0–8)
NEUTS SEG NFR BLD AUTO: 79 % (ref 43–75)
NITRITE UR QL STRIP: NEGATIVE
NRBC BLD AUTO-RTO: 0 /100 WBCS
OVALOCYTES BLD QL SMEAR: PRESENT
P AXIS: 84 DEGREES
PH UR STRIP.AUTO: 6 [PH]
PLATELET # BLD AUTO: 166 THOUSANDS/UL (ref 149–390)
PLATELET BLD QL SMEAR: ADEQUATE
PMV BLD AUTO: 9.9 FL (ref 8.9–12.7)
POIKILOCYTOSIS BLD QL SMEAR: PRESENT
POTASSIUM SERPL-SCNC: 3.2 MMOL/L (ref 3.5–5.3)
PR INTERVAL: 184 MS
PROCALCITONIN SERPL-MCNC: 0.55 NG/ML
PROT SERPL-MCNC: 6.9 G/DL (ref 6.4–8.2)
PROT UR STRIP-MCNC: NEGATIVE MG/DL
PROTHROMBIN TIME: 14.1 SECONDS (ref 11.6–14.5)
QRS AXIS: -15 DEGREES
QRSD INTERVAL: 102 MS
QT INTERVAL: 310 MS
QTC INTERVAL: 406 MS
RBC # BLD AUTO: 4.93 MILLION/UL (ref 3.88–5.62)
RBC MORPH BLD: PRESENT
SARS-COV-2 RNA RESP QL NAA+PROBE: NEGATIVE
SODIUM SERPL-SCNC: 142 MMOL/L (ref 136–145)
SP GR UR STRIP.AUTO: 1.03 (ref 1–1.03)
T WAVE AXIS: 22 DEGREES
TROPONIN I SERPL-MCNC: <0.02 NG/ML
UROBILINOGEN UR QL STRIP.AUTO: 1 E.U./DL
VARIANT LYMPHS # BLD AUTO: 2 %
VENTRICULAR RATE: 103 BPM
WBC # BLD AUTO: 11 THOUSAND/UL (ref 4.31–10.16)

## 2020-08-15 PROCEDURE — U0003 INFECTIOUS AGENT DETECTION BY NUCLEIC ACID (DNA OR RNA); SEVERE ACUTE RESPIRATORY SYNDROME CORONAVIRUS 2 (SARS-COV-2) (CORONAVIRUS DISEASE [COVID-19]), AMPLIFIED PROBE TECHNIQUE, MAKING USE OF HIGH THROUGHPUT TECHNOLOGIES AS DESCRIBED BY CMS-2020-01-R: HCPCS | Performed by: EMERGENCY MEDICINE

## 2020-08-15 PROCEDURE — 84145 PROCALCITONIN (PCT): CPT | Performed by: EMERGENCY MEDICINE

## 2020-08-15 PROCEDURE — 99220 PR INITIAL OBSERVATION CARE/DAY 70 MINUTES: CPT | Performed by: INTERNAL MEDICINE

## 2020-08-15 PROCEDURE — 93010 ELECTROCARDIOGRAM REPORT: CPT | Performed by: INTERNAL MEDICINE

## 2020-08-15 PROCEDURE — 71275 CT ANGIOGRAPHY CHEST: CPT

## 2020-08-15 PROCEDURE — 99285 EMERGENCY DEPT VISIT HI MDM: CPT | Performed by: EMERGENCY MEDICINE

## 2020-08-15 PROCEDURE — 36415 COLL VENOUS BLD VENIPUNCTURE: CPT | Performed by: EMERGENCY MEDICINE

## 2020-08-15 PROCEDURE — G1004 CDSM NDSC: HCPCS

## 2020-08-15 PROCEDURE — 85007 BL SMEAR W/DIFF WBC COUNT: CPT | Performed by: EMERGENCY MEDICINE

## 2020-08-15 PROCEDURE — 96375 TX/PRO/DX INJ NEW DRUG ADDON: CPT

## 2020-08-15 PROCEDURE — 85730 THROMBOPLASTIN TIME PARTIAL: CPT | Performed by: EMERGENCY MEDICINE

## 2020-08-15 PROCEDURE — 85027 COMPLETE CBC AUTOMATED: CPT | Performed by: EMERGENCY MEDICINE

## 2020-08-15 PROCEDURE — 84484 ASSAY OF TROPONIN QUANT: CPT | Performed by: EMERGENCY MEDICINE

## 2020-08-15 PROCEDURE — 71045 X-RAY EXAM CHEST 1 VIEW: CPT

## 2020-08-15 PROCEDURE — 87181 SC STD AGAR DILUTION PER AGT: CPT | Performed by: EMERGENCY MEDICINE

## 2020-08-15 PROCEDURE — 96361 HYDRATE IV INFUSION ADD-ON: CPT

## 2020-08-15 PROCEDURE — 93005 ELECTROCARDIOGRAM TRACING: CPT

## 2020-08-15 PROCEDURE — 87147 CULTURE TYPE IMMUNOLOGIC: CPT | Performed by: EMERGENCY MEDICINE

## 2020-08-15 PROCEDURE — 83690 ASSAY OF LIPASE: CPT | Performed by: EMERGENCY MEDICINE

## 2020-08-15 PROCEDURE — 83605 ASSAY OF LACTIC ACID: CPT | Performed by: EMERGENCY MEDICINE

## 2020-08-15 PROCEDURE — 74177 CT ABD & PELVIS W/CONTRAST: CPT

## 2020-08-15 PROCEDURE — 99285 EMERGENCY DEPT VISIT HI MDM: CPT

## 2020-08-15 PROCEDURE — 81003 URINALYSIS AUTO W/O SCOPE: CPT | Performed by: EMERGENCY MEDICINE

## 2020-08-15 PROCEDURE — 80053 COMPREHEN METABOLIC PANEL: CPT | Performed by: EMERGENCY MEDICINE

## 2020-08-15 PROCEDURE — 87040 BLOOD CULTURE FOR BACTERIA: CPT | Performed by: EMERGENCY MEDICINE

## 2020-08-15 PROCEDURE — 85610 PROTHROMBIN TIME: CPT | Performed by: EMERGENCY MEDICINE

## 2020-08-15 PROCEDURE — 96365 THER/PROPH/DIAG IV INF INIT: CPT

## 2020-08-15 RX ORDER — ACETAMINOPHEN 325 MG/1
975 TABLET ORAL ONCE
Status: COMPLETED | OUTPATIENT
Start: 2020-08-15 | End: 2020-08-15

## 2020-08-15 RX ORDER — ONDANSETRON 2 MG/ML
4 INJECTION INTRAMUSCULAR; INTRAVENOUS ONCE
Status: DISCONTINUED | OUTPATIENT
Start: 2020-08-15 | End: 2020-08-15

## 2020-08-15 RX ORDER — POTASSIUM CHLORIDE 20 MEQ/1
40 TABLET, EXTENDED RELEASE ORAL ONCE
Status: COMPLETED | OUTPATIENT
Start: 2020-08-15 | End: 2020-08-15

## 2020-08-15 RX ORDER — CHOLECALCIFEROL (VITAMIN D3) 125 MCG
200 CAPSULE ORAL DAILY
Status: DISCONTINUED | OUTPATIENT
Start: 2020-08-16 | End: 2020-08-21 | Stop reason: HOSPADM

## 2020-08-15 RX ORDER — ONDANSETRON 2 MG/ML
4 INJECTION INTRAMUSCULAR; INTRAVENOUS EVERY 6 HOURS PRN
Status: DISCONTINUED | OUTPATIENT
Start: 2020-08-15 | End: 2020-08-21 | Stop reason: HOSPADM

## 2020-08-15 RX ORDER — ONDANSETRON 2 MG/ML
INJECTION INTRAMUSCULAR; INTRAVENOUS
Status: DISPENSED
Start: 2020-08-15 | End: 2020-08-16

## 2020-08-15 RX ORDER — ONDANSETRON 2 MG/ML
4 INJECTION INTRAMUSCULAR; INTRAVENOUS ONCE
Status: COMPLETED | OUTPATIENT
Start: 2020-08-15 | End: 2020-08-15

## 2020-08-15 RX ORDER — ACETAMINOPHEN 325 MG/1
650 TABLET ORAL EVERY 4 HOURS PRN
Status: DISCONTINUED | OUTPATIENT
Start: 2020-08-15 | End: 2020-08-21 | Stop reason: HOSPADM

## 2020-08-15 RX ORDER — ASCORBIC ACID 500 MG
500 TABLET ORAL DAILY
Status: DISCONTINUED | OUTPATIENT
Start: 2020-08-16 | End: 2020-08-21 | Stop reason: HOSPADM

## 2020-08-15 RX ORDER — MELATONIN
2000 DAILY
Status: DISCONTINUED | OUTPATIENT
Start: 2020-08-16 | End: 2020-08-21 | Stop reason: HOSPADM

## 2020-08-15 RX ORDER — SODIUM CHLORIDE, SODIUM GLUCONATE, SODIUM ACETATE, POTASSIUM CHLORIDE, MAGNESIUM CHLORIDE, SODIUM PHOSPHATE, DIBASIC, AND POTASSIUM PHOSPHATE .53; .5; .37; .037; .03; .012; .00082 G/100ML; G/100ML; G/100ML; G/100ML; G/100ML; G/100ML; G/100ML
100 INJECTION, SOLUTION INTRAVENOUS CONTINUOUS
Status: DISCONTINUED | OUTPATIENT
Start: 2020-08-15 | End: 2020-08-16

## 2020-08-15 RX ORDER — NIACIN 100 MG
50 TABLET ORAL
Status: DISCONTINUED | OUTPATIENT
Start: 2020-08-16 | End: 2020-08-21 | Stop reason: HOSPADM

## 2020-08-15 RX ADMIN — SODIUM CHLORIDE 1000 ML: 0.9 INJECTION, SOLUTION INTRAVENOUS at 20:23

## 2020-08-15 RX ADMIN — ONDANSETRON 4 MG: 2 INJECTION INTRAMUSCULAR; INTRAVENOUS at 17:29

## 2020-08-15 RX ADMIN — SODIUM CHLORIDE 1000 ML: 0.9 INJECTION, SOLUTION INTRAVENOUS at 16:58

## 2020-08-15 RX ADMIN — CEFTRIAXONE SODIUM 1000 MG: 10 INJECTION, POWDER, FOR SOLUTION INTRAVENOUS at 19:14

## 2020-08-15 RX ADMIN — SODIUM CHLORIDE, SODIUM GLUCONATE, SODIUM ACETATE, POTASSIUM CHLORIDE, MAGNESIUM CHLORIDE, SODIUM PHOSPHATE, DIBASIC, AND POTASSIUM PHOSPHATE 100 ML/HR: .53; .5; .37; .037; .03; .012; .00082 INJECTION, SOLUTION INTRAVENOUS at 22:28

## 2020-08-15 RX ADMIN — IOHEXOL 100 ML: 350 INJECTION, SOLUTION INTRAVENOUS at 19:25

## 2020-08-15 RX ADMIN — ACETAMINOPHEN 975 MG: 325 TABLET, FILM COATED ORAL at 16:59

## 2020-08-15 RX ADMIN — POTASSIUM CHLORIDE 40 MEQ: 1500 TABLET, EXTENDED RELEASE ORAL at 21:33

## 2020-08-15 NOTE — ED ATTENDING ATTESTATION
8/15/2020  I, Amarilis Nam MD, saw and evaluated the patient  I have discussed the patient with the resident/non-physician practitioner and agree with the resident's/non-physician practitioner's findings, Plan of Care, and MDM as documented in the resident's/non-physician practitioner's note, except where noted  All available labs and Radiology studies were reviewed  I was present for key portions of any procedure(s) performed by the resident/non-physician practitioner and I was immediately available to provide assistance  At this point I agree with the current assessment done in the Emergency Department  I have conducted an independent evaluation of this patient a history and physical is as follows:    ED Course         Critical Care Time  Procedures     66 yo male when he went to eat lunch today had nausea and multiple episodes of vomiting  No cp, no sob, no abdominal pain, no diarrhea  No fever, no cough, no sick contacts  pmh afib  Vss, febrile, tachy, lungs cta, rrr, abdomen soft nontender  Septic workup, lipase, ekg, trop, covid swab, urine  Ivf, tylenol

## 2020-08-15 NOTE — ED PROVIDER NOTES
History  Chief Complaint   Patient presents with    Vomiting     Pt  reports sudden onset of nausea and vomiting today  Reports only one episode of vomiting  Pt is a 75yo M who presents for nausea and vomiting  Patient states that he was feeling completely fine this morning but when he went to eat lunch he had a sudden onset of nausea and 5 bouts of nonbloody vomiting  Patient still feeling nauseous upon arrival but has not vomited  Denies any previous similar episodes  Patient reports no change in his diet  Patient reports no other complaints  Denies any recent illness including fevers, chills, shortness of breath  Denying any abdominal pain or changes in bladder or bowel habits  Denies any recent medication changes  Prior to Admission Medications   Prescriptions Last Dose Informant Patient Reported? Taking?    Coenzyme Q10 200 MG capsule 8/15/2020 at Unknown time Self Yes Yes   Sig: Take 200 mg by mouth daily     Flaxseed, Linseed, (FLAXSEED OIL) 1000 MG CAPS Not Taking at Unknown time Self Yes No   Sig: Take 1 capsule by mouth daily   Green Tea 250 MG CAPS 8/15/2020 at Unknown time Self Yes Yes   Sig: Take 1 capsule by mouth daily   Liver Extract 500 MG CAPS 8/15/2020 at Unknown time Self Yes Yes   Sig: Take 1 capsule by mouth daily   Magnesium Citrate 125 MG CAPS 8/15/2020 at Unknown time Self Yes Yes   Sig: Take 1 capsule by mouth daily   Naproxen Sodium (ALEVE PO) Past Month at Unknown time Self Yes Yes   Sig: Take 1 tablet by mouth 2 (two) times a day as needed   POTASSIUM ACETATE IV 8/15/2020 at Unknown time Self Yes Yes   Sig: Take by mouth   Pancreatin 325 MG TABS 8/15/2020 at Unknown time Self Yes Yes   Sig: Take 1 tablet by mouth daily   Probiotic Product (PROBIOTIC-10 PO) 8/15/2020 at Unknown time Self Yes Yes   Sig: Take 1 capsule by mouth daily   Vitamin D, Cholecalciferol, 1000 units CAPS 8/15/2020 at Unknown time Self Yes Yes   Sig: Take 2,000 Units by mouth daily   ascorbic acid (VITAMIN C) 500 mg tablet 8/15/2020 at Unknown time Self Yes Yes   Sig: Take 500 mg by mouth daily   clindamycin (CLEOCIN) 300 MG capsule Unknown at Unknown time Self Yes No   Sig: Take 600 mg by mouth once   niacin 50 mg tablet 8/15/2020 at Unknown time Self Yes Yes   Sig: Take 50 mg by mouth daily with breakfast       Facility-Administered Medications: None       Past Medical History:   Diagnosis Date    Cardiac disease     Hypertension     [facility]     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       Past Surgical History:   Procedure Laterality Date    ESOPHAGOGASTRODUODENOSCOPY N/A 8/16/2016    Procedure: ESOPHAGOGASTRODUODENOSCOPY (EGD); Surgeon: Lubna Monroe MD;  Location: [facility]; Service:     OTHER SURGICAL HISTORY      VASCULAR SURGERY Right     POPLITEAL BYPASS DUE TO ANEURYSM       History reviewed  No pertinent family history  I have reviewed and agree with the history as documented  E-Cigarette/Vaping    E-Cigarette Use Never User      E-Cigarette/Vaping Substances     Social History     Tobacco Use    Smoking status: Never Smoker    Smokeless tobacco: Never Used   Substance Use Topics    Alcohol use: Not Currently    Drug use: No        Review of Systems   Constitutional: Negative for activity change, chills, fatigue and fever  HENT: Negative for congestion, ear discharge, ear pain, sinus pressure, sinus pain and sore throat  Eyes: Negative for pain and redness  Respiratory: Negative for cough, chest tightness, shortness of breath and wheezing  Cardiovascular: Negative for chest pain, palpitations and leg swelling  Gastrointestinal: Positive for nausea and vomiting (non-bloody)  Negative for abdominal distention, abdominal pain, blood in stool, constipation and diarrhea  Genitourinary: Negative for dysuria, frequency and urgency  Musculoskeletal: Negative for arthralgias, back pain, gait problem, neck pain and neck stiffness  Skin: Negative for color change, pallor, rash and wound  Neurological: Negative for dizziness, syncope, weakness, numbness and headaches  Psychiatric/Behavioral: Negative for agitation, behavioral problems and confusion  The patient is not nervous/anxious  Physical Exam  ED Triage Vitals   Temperature Pulse Respirations Blood Pressure SpO2   08/15/20 1628 08/15/20 1628 08/15/20 1628 08/15/20 1628 08/15/20 1628   (!) 103 °F (39 4 °C) 102 20 131/65 91 %      Temp Source Heart Rate Source Patient Position - Orthostatic VS BP Location FiO2 (%)   08/15/20 1628 08/15/20 1628 08/15/20 1628 08/15/20 1628 --   Oral Monitor Lying Right arm       Pain Score       08/15/20 1659       Med Not Given for Pain - for MAR use only             Orthostatic Vital Signs  Vitals:    08/15/20 1900 08/15/20 2015 08/15/20 2100 08/15/20 2228   BP: 97/59 93/54 (!) 88/50 90/60   Pulse: 96 92 83 75   Patient Position - Orthostatic VS: Lying Lying Lying Lying       Physical Exam  Vitals signs reviewed  Constitutional:       General: He is not in acute distress  Appearance: He is well-developed  He is not diaphoretic  Comments: Pt appears mildly uncomfortable without looking toxic  HENT:      Head: Normocephalic and atraumatic  Right Ear: External ear normal       Left Ear: External ear normal       Nose: Nose normal       Mouth/Throat:      Mouth: Mucous membranes are moist    Eyes:      Extraocular Movements: Extraocular movements intact  Conjunctiva/sclera: Conjunctivae normal       Pupils: Pupils are equal, round, and reactive to light  Neck:      Musculoskeletal: Normal range of motion and neck supple  Cardiovascular:      Rate and Rhythm: Tachycardia present  Rhythm irregular  Pulses: Normal pulses  Heart sounds: Normal heart sounds  No murmur  Pulmonary:      Effort: Pulmonary effort is normal  No respiratory distress  Breath sounds: Normal breath sounds  No stridor  No wheezing  Abdominal:      General: Bowel sounds are normal  There is no distension  Palpations: Abdomen is soft  There is no mass  Tenderness: There is no abdominal tenderness  There is no guarding  Musculoskeletal: Normal range of motion  Right lower leg: No edema  Left lower leg: No edema  Skin:     General: Skin is warm and dry  Capillary Refill: Capillary refill takes less than 2 seconds  Coloration: Skin is not pale  Findings: No erythema or rash  Comments: Redness on inside of L left; Non-painful; Non-swollen   Neurological:      Mental Status: He is alert and oriented to person, place, and time  Sensory: No sensory deficit  Psychiatric:         Mood and Affect: Mood normal          Behavior: Behavior normal          Thought Content:  Thought content normal          Judgment: Judgment normal          ED Medications  Medications   ascorbic acid (VITAMIN C) tablet 500 mg (has no administration in time range)   co-enzyme Q-10 capsule 200 mg (has no administration in time range)   niacin tablet 50 mg (has no administration in time range)   cholecalciferol (VITAMIN D3) tablet 2,000 Units (has no administration in time range)   ondansetron (ZOFRAN) injection 4 mg (has no administration in time range)   enoxaparin (LOVENOX) subcutaneous injection 40 mg (has no administration in time range)   acetaminophen (TYLENOL) tablet 650 mg (has no administration in time range)   multi-electrolyte (ISOLYTE-S PH 7 4 equivalent) IV solution (100 mL/hr Intravenous New Bag 8/15/20 2228)   acetaminophen (TYLENOL) tablet 975 mg (975 mg Oral Given 8/15/20 1659)   sodium chloride 0 9 % bolus 1,000 mL (0 mL Intravenous Stopped 8/15/20 1914)   ondansetron (ZOFRAN) injection 4 mg (4 mg Intravenous Given 8/15/20 1729)   ceftriaxone (ROCEPHIN) 1 g/50 mL in dextrose IVPB (0 mg Intravenous Stopped 8/1944)   iohexol (OMNIPAQUE) 350 MG/ML injection (MULTI-DOSE) 100 mL (100 mL Intravenous Given 8/15/20 1925)   sodium chloride 0 9 % bolus 1,000 mL (0 mL Intravenous Stopped 8/15/20 2227)   potassium chloride (K-DUR,KLOR-CON) CR tablet 40 mEq (40 mEq Oral Given 8/15/20 2133)       Diagnostic Studies  Results Reviewed     Procedure Component Value Units Date/Time    UA w Reflex to Microscopic w Reflex to Culture [[id number]]  (Abnormal) Collected:  08/15/20 1946    Lab Status:  Final result Specimen:  Urine, Clean Catch Updated:  08/15/20 2010     Color, UA Dk Yellow     Clarity, UA Clear     Specific Gravity, UA 1 026     pH, UA 6 0     Leukocytes, UA Negative     Nitrite, UA Negative     Protein, UA Negative mg/dl      Glucose, UA Negative mg/dl      Ketones, UA Trace mg/dl      Urobilinogen, UA 1 0 E U /dl      Bilirubin, UA Interference- unable to analyze     Blood, UA Negative    Blood culture #1 [[id number]] Collected:  08/15/20 1705    Lab Status:  Preliminary result Specimen:  Blood from Arm, Right Updated:  08/15/20 2001     Blood Culture Received in Microbiology Lab  Culture in Progress  Blood culture #2 [[id number]] Collected:  08/15/20 1700    Lab Status:  Preliminary result Specimen:  Blood from Arm, Left Updated:  08/15/20 2001     Blood Culture Received in Microbiology Lab  Culture in Progress  Lipase [[id number]]  (Normal) Collected:  08/15/20 1700    Lab Status:  Final result Specimen:  Blood from Arm, Left Updated:  08/15/20 1848     Lipase 201 u/L     Novel Coronavirus (Covid-19),PCR [facility] [[id number]]  (Normal) Collected:  08/15/20 1718    Lab Status:  Final result Specimen:  Nares from Nose Updated:  08/15/20 1836     SARS-CoV-2 Negative    Narrative: The specimen collection materials, transport medium, and/or testing methodology utilized in the production of these test results have been proven to be reliable in a limited validation with an abbreviated program under the Emergency Utilization Authorization provided by the FDA    Testing reported as "Presumptive positive" will be confirmed with secondary testing with a reference laboratory to ensure result accuracy  Clinical caution and judgement should be used with the interpretation of these results with consideration of the clinical impression and other laboratory testing  Testing reported as "Positive" or "Negative" has been proven to be accurate according to standard laboratory validation requirements  All testing is performed with control materials showing appropriate reactivity at standard intervals  CBC and differential [[id number]]  (Abnormal) Collected:  08/15/20 1700    Lab Status:  Final result Specimen:  Blood from Arm, Left Updated:  08/15/20 1747     WBC 11 00 Thousand/uL      RBC 4 93 Million/uL      Hemoglobin 16 1 g/dL      Hematocrit 46 1 %      MCV 94 fL      MCH 32 7 pg      MCHC 34 9 g/dL      RDW 13 2 %      MPV 9 9 fL      Platelets 778 Thousands/uL      nRBC 0 /100 WBCs     Narrative: This is an appended report  These results have been appended to a previously verified report  Procalcitonin [[id number]]  (Abnormal) Collected:  08/15/20 1700    Lab Status:  Final result Specimen:  Blood from Arm, Left Updated:  08/15/20 1743     Procalcitonin 0 55 ng/ml     Procalcitonin Reflex [[id number]]     Lab Status:  No result Specimen:  Blood     Lactic acid [[id number]]  (Normal) Collected:  08/15/20 1700    Lab Status:  Final result Specimen:  Blood from Arm, Left Updated:  08/15/20 1732     LACTIC ACID 1 7 mmol/L     Narrative:       Result may be elevated if tourniquet was used during collection      Comprehensive metabolic panel [[id number]]  (Abnormal) Collected:  08/15/20 1700    Lab Status:  Final result Specimen:  Blood from Arm, Left Updated:  08/15/20 1731     Sodium 142 mmol/L      Potassium 3 2 mmol/L      Chloride 110 mmol/L      CO2 26 mmol/L      ANION GAP 6 mmol/L      BUN 20 mg/dL      Creatinine 0 72 mg/dL      Glucose 114 mg/dL      Calcium 8 6 mg/dL      AST 28 U/L      ALT 23 U/L      Alkaline Phosphatase 76 U/L      Total Protein 6 9 g/dL      Albumin 4 0 g/dL      Total Bilirubin 1 43 mg/dL      eGFR 91 ml/min/1 73sq m     Narrative:       [place] guidelines for Chronic Kidney Disease (CKD):     Stage 1 with normal or high GFR (GFR > 90 mL/min/1 73 square meters)    Stage 2 Mild CKD (GFR = 60-89 mL/min/1 73 square meters)    Stage 3A Moderate CKD (GFR = 45-59 mL/min/1 73 square meters)    Stage 3B Moderate CKD (GFR = 30-44 mL/min/1 73 square meters)    Stage 4 Severe CKD (GFR = 15-29 mL/min/1 73 square meters)    Stage 5 End Stage CKD (GFR <15 mL/min/1 73 square meters)  Note: GFR calculation is accurate only with a steady state creatinine    Troponin I [[id number]]  (Normal) Collected:  08/15/20 1700    Lab Status:  Final result Specimen:  Blood from Arm, Left Updated:  08/15/20 1730     Troponin I <0 02 ng/mL     Protime-INR [[id number]]  (Normal) Collected:  08/15/20 1700    Lab Status:  Final result Specimen:  Blood from Arm, Left Updated:  08/15/20 1723     Protime 14 1 seconds      INR 1 09    APTT [[id number]]  (Normal) Collected:  08/15/20 1700    Lab Status:  Final result Specimen:  Blood from Arm, Left Updated:  08/15/20 1723     PTT 23 seconds                  PE Study with CT Abdomen and Pelvis with contrast   Final Result by Roseanne Miranda MD (08/15 1953)      No evidence for aortic dissection  Stable aneurysmal dilatation of the ascending thoracic aorta measuring up to 4 6 cm the level of the main pulmonary artery  No focal airspace consolidation identified  Bibasilar dependent atelectasis is noted  Colonic diverticulosis without evidence for acute diverticulitis                 Workstation performed: [id number]         XR chest 1 view   Final Result by Darrel Velarde MD (08/15 1934)      No acute abnormality in the chest             Workstation performed: [id number]               Procedures  Procedures      ED Course  ED Course as of Aug 15 2240 Sat Aug 15, 2020   1648 Procedure Note: EKG  Date/Time: 08/15/20 4:49 PM   Interpreted by: May Barrientos MD  Indications / Diagnosis: Tachycardia  ECG reviewed by me, the ED Physician: yes   The EKG demonstrates:  Rhythm: normal sinus with occasional PACs  Intervals: normal intervals generally  Axis: normal axis  QRS/Blocks: normal QRS  ST Changes: No acute ST Changes, no STD/QASIM        ECG 12 lead   1719 Mild leukocytosis, not within SIRS criteria  Awaiting further work-up  WBC(!): 11 00   1732 Lactate <2 0  No need for repeat lactate  Lactic acid   1733 Trop WNL making cardiac cause less likely at this time  Troponin I   1733 Reviewed and without actionable derangement  Comprehensive metabolic panel(!)   1709 Procal elevated  Procalcitonin(!)   1823 Bandemia present  Rocephin started  Bands Relative(!): 10   1837 COVID negative  Novel Coronavirus (Covid-19),PCR [facility]   1852 Lipase WNL making pancreatitis unlikely as cause of vomiting  Lipase   1900 Discussed results with pt and reassessed  Pt states that his nausea has resolved and he has no current complaints  Discussed plan for CT based on increased oxygen requirements and pt is amenable  1937 No acute abnormality in the chest  Awaiting CT  XR chest 1 view   2005 CT shows no indication of source of infection  Plan to still admit pt for increased oxygen requirement  Pt amenable to plan  PE Study with CT Abdomen and Pelvis with contrast   2010 UA not indicative of infection  UA w Reflex to Microscopic w Reflex to Culture(!)   2028 Second liter of fluids ordered  Will continue to monitor  Blood Pressure: 93/54       US AUDIT      Most Recent Value   Initial Alcohol Screen: US AUDIT-C    1  How often do you have a drink containing alcohol?  0 Filed at: 08/15/2020 1621   2  How many drinks containing alcohol do you have on a typical day you are drinking? 0 Filed at: 08/15/2020 1621   3a  Male UNDER 65:  How often do you have five or more drinks on one occasion? 0 Filed at: 08/15/2020 1621   3b  FEMALE Any Age, or MALE 65+: How often do you have 4 or more drinks on one occassion? 0 Filed at: 08/15/2020 1621   Audit-C Score  0 Filed at: 08/15/2020 1621              Identification of Seniors at Risk      Most Recent Value   (ISAR) Identification of Seniors at Risk   Before the illness or injury that brought you to the Emergency, did you need someone to help you on a regular basis? 0 Filed at: 08/15/2020 1622   In the last 24 hours, have you needed more help than usual?  0 Filed at: 08/15/2020 1622   Have you been hospitalized for one or more nights during the past 6 months? 0 Filed at: 08/15/2020 1622   In general, do you see well?  0 Filed at: 08/15/2020 1622   In general, do you have serious problems with your memory? 0 Filed at: 08/15/2020 1622   Do you take more than three different medications every day?  0 Filed at: 08/15/2020 1622   ISAR Score  0 Filed at: 08/15/2020 1622          YOBANI/DAST-10      Most Recent Value   How many times in the past year have you    Used an illegal drug or used a prescription medication for non-medical reasons? Never Filed at: 08/15/2020 1621              Initial Sepsis Screening     Row Name 08/15/20 1824                Is the patient's history suggestive of a new or worsening infection? (!) Yes (Proceed)  -KW        Suspected source of infection  suspect infection, source unknown  -KW        Are two or more of the following signs & symptoms of infection both present and new to the patient? (!) Yes (Proceed)  -KW        Indicate SIRS criteria  Hyperthemia > 38 3C (100 9F); Tachycardia > 90 bpm  -KW        If the answer is yes to both questions, suspicion of sepsis is present          If severe sepsis is present AND tissue hypoperfusion perists in the hour after fluid resuscitation or lactate > 4, the patient meets criteria for SEPTIC SHOCK          Are any of the following organ dysfunction criteria present within 6 hours of suspected infection and SIRS criteria that are NOT considered to be chronic conditions? No  -KW        Organ dysfunction          Date of presentation of severe sepsis          Time of presentation of severe sepsis          Tissue hypoperfusion persists in the hour after crystalloid fluid administration, evidenced, by either:          Was hypotension present within one hour of the conclusion of crystalloid fluid administration?         Date of presentation of septic shock          Time of presentation of septic shock            User Key  (r) = Recorded By, (t) = Taken By, (c) = Cosigned By    [address] Name Provider Idalmis Cardoso MD Resident                      MDM  Number of Diagnoses or Management Options  Hypoxia:   Nausea and vomiting:   Sepsis [facility]):   Diagnosis management comments: Pt is a 73yo M who presents with nausea and vomiting  Exam pertinent for fever, tachycardia, new oxygen requirement  Differential diagnosis to include but not limited to gastroenteritis, pancreatitis, ACS, PE, sepsis  Pt began requiring supplemental oxygen to maintain sats  Rocephin was started  ACS less likely at this time due to negative troponin and no ST changes on EKG  Pancreatitis less likely following a normal lipase and no epigastric pain  Patient initially met SIRS criteria with his tachycardia and fever, but shows no sign of end-organ damage that would indicate severe sepsis  Mildly elevated white count on his bandemia present likely indicating some sort of infection  Source not found on labs or chest x-ray, so CT ordered  CT shows bibasilar atelectasis without evidence of source of infection  Stable aneurysmal dilation also noted  UA shows no indication of infection  Pt still qualifying for sepsis criteria with unknown source  Plan to admit pt for sepsis and hypoxia  Pt discussed with [facility] and they have accepted him for obs   They are abelardo to not add further antibiotic coverage at this point because we do not have a definitive source  Amount and/or Complexity of Data Reviewed  Clinical lab tests: ordered and reviewed  Tests in the radiology section of CPT®: ordered and reviewed  Discussion of test results with the performing providers: yes          Disposition  Final diagnoses:   Sepsis ([facility] )   Hypoxia   Nausea and vomiting     Time reflects when diagnosis was documented in both MDM as applicable and the Disposition within this note     Time User Action Codes Description Comment    8/15/2020  8:13 PM Yariel Ore Add [A41 9] Sepsis ([facility] [address] )     8/15/2020  8:13 PM Yariel Ore Add [R09 02] Hypoxia     8/15/2020  8:13 PM Yariel Ore Add [R11 2] Nausea and vomiting       ED Disposition     ED Disposition Condition Date/Time Comment    Admit Stable Sat Aug 15, 2020  8:26 PM Case was discussed with [facility] and the patient's admission status was agreed to be Admission Status: observation status to the service of Dr Sandi Jean          Follow-up Information    None         Current Discharge Medication List      CONTINUE these medications which have NOT CHANGED    Details   ascorbic acid (VITAMIN C) 500 mg tablet Take 500 mg by mouth daily      Coenzyme Q10 200 MG capsule Take 200 mg by mouth daily        Green Tea 250 MG CAPS Take 1 capsule by mouth daily      Liver Extract 500 MG CAPS Take 1 capsule by mouth daily      Magnesium Citrate 125 MG CAPS Take 1 capsule by mouth daily      Naproxen Sodium (ALEVE PO) Take 1 tablet by mouth 2 (two) times a day as needed      niacin 50 mg tablet Take 50 mg by mouth daily with breakfast       Pancreatin 325 MG TABS Take 1 tablet by mouth daily      POTASSIUM ACETATE IV Take by mouth      Probiotic Product (PROBIOTIC-10 PO) Take 1 capsule by mouth daily      Vitamin D, Cholecalciferol, 1000 units CAPS Take 2,000 Units by mouth daily      clindamycin (CLEOCIN) 300 MG capsule Take 600 mg by mouth once Flaxseed, Linseed, (FLAXSEED OIL) 1000 MG CAPS Take 1 capsule by mouth daily           No discharge procedures on file  PDMP Review     None           ED Provider  Attending physically available and evaluated Rui Madrigal    I managed the patient along with the ED Attending      Electronically Signed by         Francheska Guillermo MD  08/15/20 8638

## 2020-08-16 ENCOUNTER — APPOINTMENT (INPATIENT)
Dept: RADIOLOGY | Facility: HOSPITAL | Age: 75
DRG: 854 | End: 2020-08-16
Payer: MEDICARE

## 2020-08-16 ENCOUNTER — APPOINTMENT (OUTPATIENT)
Dept: NON INVASIVE DIAGNOSTICS | Facility: HOSPITAL | Age: 75
DRG: 854 | End: 2020-08-16
Payer: MEDICARE

## 2020-08-16 PROBLEM — L84 CALLUS OF FOOT: Status: ACTIVE | Noted: 2020-08-16

## 2020-08-16 PROBLEM — A41.9 SEPSIS (HCC): Status: ACTIVE | Noted: 2020-08-15

## 2020-08-16 LAB
ALBUMIN SERPL BCP-MCNC: 3.4 G/DL (ref 3.5–5)
ALP SERPL-CCNC: 50 U/L (ref 46–116)
ALT SERPL W P-5'-P-CCNC: 21 U/L (ref 12–78)
ANION GAP SERPL CALCULATED.3IONS-SCNC: 4 MMOL/L (ref 4–13)
AST SERPL W P-5'-P-CCNC: 21 U/L (ref 5–45)
BILIRUB SERPL-MCNC: 1.17 MG/DL (ref 0.2–1)
BUN SERPL-MCNC: 24 MG/DL (ref 5–25)
CALCIUM SERPL-MCNC: 8.5 MG/DL (ref 8.3–10.1)
CHLORIDE SERPL-SCNC: 108 MMOL/L (ref 100–108)
CO2 SERPL-SCNC: 29 MMOL/L (ref 21–32)
CREAT SERPL-MCNC: 1.11 MG/DL (ref 0.6–1.3)
ERYTHROCYTE [DISTWIDTH] IN BLOOD BY AUTOMATED COUNT: 13.3 % (ref 11.6–15.1)
GFR SERPL CREATININE-BSD FRML MDRD: 64 ML/MIN/1.73SQ M
GLUCOSE SERPL-MCNC: 149 MG/DL (ref 65–140)
HCT VFR BLD AUTO: 42.6 % (ref 36.5–49.3)
HGB BLD-MCNC: 14.1 G/DL (ref 12–17)
LACTATE SERPL-SCNC: 2 MMOL/L (ref 0.5–2)
LACTATE SERPL-SCNC: 2.1 MMOL/L (ref 0.5–2)
LACTATE SERPL-SCNC: 2.5 MMOL/L (ref 0.5–2)
MCH RBC QN AUTO: 32.4 PG (ref 26.8–34.3)
MCHC RBC AUTO-ENTMCNC: 33.1 G/DL (ref 31.4–37.4)
MCV RBC AUTO: 98 FL (ref 82–98)
PLATELET # BLD AUTO: 146 THOUSANDS/UL (ref 149–390)
PMV BLD AUTO: 10 FL (ref 8.9–12.7)
POTASSIUM SERPL-SCNC: 4.8 MMOL/L (ref 3.5–5.3)
PROCALCITONIN SERPL-MCNC: 14.46 NG/ML
PROCALCITONIN SERPL-MCNC: 15.82 NG/ML
PROT SERPL-MCNC: 6 G/DL (ref 6.4–8.2)
RBC # BLD AUTO: 4.35 MILLION/UL (ref 3.88–5.62)
SODIUM SERPL-SCNC: 141 MMOL/L (ref 136–145)
WBC # BLD AUTO: 20.85 THOUSAND/UL (ref 4.31–10.16)

## 2020-08-16 PROCEDURE — 83605 ASSAY OF LACTIC ACID: CPT | Performed by: PHYSICIAN ASSISTANT

## 2020-08-16 PROCEDURE — 80053 COMPREHEN METABOLIC PANEL: CPT | Performed by: PHYSICIAN ASSISTANT

## 2020-08-16 PROCEDURE — 11042 DBRDMT SUBQ TIS 1ST 20SQCM/<: CPT | Performed by: PODIATRIST

## 2020-08-16 PROCEDURE — 0JBR0ZZ EXCISION OF LEFT FOOT SUBCUTANEOUS TISSUE AND FASCIA, OPEN APPROACH: ICD-10-PCS | Performed by: PODIATRIST

## 2020-08-16 PROCEDURE — 93306 TTE W/DOPPLER COMPLETE: CPT | Performed by: INTERNAL MEDICINE

## 2020-08-16 PROCEDURE — 85027 COMPLETE CBC AUTOMATED: CPT | Performed by: PHYSICIAN ASSISTANT

## 2020-08-16 PROCEDURE — 93306 TTE W/DOPPLER COMPLETE: CPT

## 2020-08-16 PROCEDURE — 83605 ASSAY OF LACTIC ACID: CPT | Performed by: NURSE PRACTITIONER

## 2020-08-16 PROCEDURE — 99232 SBSQ HOSP IP/OBS MODERATE 35: CPT | Performed by: NURSE PRACTITIONER

## 2020-08-16 PROCEDURE — 84145 PROCALCITONIN (PCT): CPT | Performed by: PHYSICIAN ASSISTANT

## 2020-08-16 PROCEDURE — 73630 X-RAY EXAM OF FOOT: CPT

## 2020-08-16 PROCEDURE — 99222 1ST HOSP IP/OBS MODERATE 55: CPT | Performed by: PODIATRIST

## 2020-08-16 PROCEDURE — 99223 1ST HOSP IP/OBS HIGH 75: CPT | Performed by: INTERNAL MEDICINE

## 2020-08-16 RX ORDER — SODIUM CHLORIDE, SODIUM GLUCONATE, SODIUM ACETATE, POTASSIUM CHLORIDE, MAGNESIUM CHLORIDE, SODIUM PHOSPHATE, DIBASIC, AND POTASSIUM PHOSPHATE .53; .5; .37; .037; .03; .012; .00082 G/100ML; G/100ML; G/100ML; G/100ML; G/100ML; G/100ML; G/100ML
1000 INJECTION, SOLUTION INTRAVENOUS ONCE
Status: COMPLETED | OUTPATIENT
Start: 2020-08-16 | End: 2020-08-16

## 2020-08-16 RX ORDER — ALBUMIN (HUMAN) 12.5 G/50ML
12.5 SOLUTION INTRAVENOUS ONCE
Status: COMPLETED | OUTPATIENT
Start: 2020-08-16 | End: 2020-08-16

## 2020-08-16 RX ORDER — SODIUM CHLORIDE, SODIUM GLUCONATE, SODIUM ACETATE, POTASSIUM CHLORIDE, MAGNESIUM CHLORIDE, SODIUM PHOSPHATE, DIBASIC, AND POTASSIUM PHOSPHATE .53; .5; .37; .037; .03; .012; .00082 G/100ML; G/100ML; G/100ML; G/100ML; G/100ML; G/100ML; G/100ML
250 INJECTION, SOLUTION INTRAVENOUS CONTINUOUS
Status: DISCONTINUED | OUTPATIENT
Start: 2020-08-16 | End: 2020-08-16

## 2020-08-16 RX ORDER — CEFAZOLIN SODIUM 2 G/50ML
2000 SOLUTION INTRAVENOUS EVERY 8 HOURS
Status: DISCONTINUED | OUTPATIENT
Start: 2020-08-16 | End: 2020-08-21

## 2020-08-16 RX ORDER — SODIUM CHLORIDE, SODIUM GLUCONATE, SODIUM ACETATE, POTASSIUM CHLORIDE, MAGNESIUM CHLORIDE, SODIUM PHOSPHATE, DIBASIC, AND POTASSIUM PHOSPHATE .53; .5; .37; .037; .03; .012; .00082 G/100ML; G/100ML; G/100ML; G/100ML; G/100ML; G/100ML; G/100ML
125 INJECTION, SOLUTION INTRAVENOUS CONTINUOUS
Status: DISPENSED | OUTPATIENT
Start: 2020-08-16 | End: 2020-08-16

## 2020-08-16 RX ORDER — ALBUMIN, HUMAN INJ 5% 5 %
12.5 SOLUTION INTRAVENOUS ONCE
Status: COMPLETED | OUTPATIENT
Start: 2020-08-16 | End: 2020-08-16

## 2020-08-16 RX ADMIN — VANCOMYCIN HYDROCHLORIDE 1750 MG: 10 INJECTION, POWDER, LYOPHILIZED, FOR SOLUTION INTRAVENOUS at 22:33

## 2020-08-16 RX ADMIN — VANCOMYCIN HYDROCHLORIDE 2000 MG: 10 INJECTION, POWDER, LYOPHILIZED, FOR SOLUTION INTRAVENOUS at 11:54

## 2020-08-16 RX ADMIN — SODIUM CHLORIDE, SODIUM GLUCONATE, SODIUM ACETATE, POTASSIUM CHLORIDE, MAGNESIUM CHLORIDE, SODIUM PHOSPHATE, DIBASIC, AND POTASSIUM PHOSPHATE 125 ML/HR: .53; .5; .37; .037; .03; .012; .00082 INJECTION, SOLUTION INTRAVENOUS at 06:36

## 2020-08-16 RX ADMIN — Medication 200 MG: at 08:46

## 2020-08-16 RX ADMIN — OXYCODONE HYDROCHLORIDE AND ACETAMINOPHEN 500 MG: 500 TABLET ORAL at 08:46

## 2020-08-16 RX ADMIN — CEFAZOLIN SODIUM 2000 MG: 2 SOLUTION INTRAVENOUS at 08:47

## 2020-08-16 RX ADMIN — CEFAZOLIN SODIUM 2000 MG: 2 SOLUTION INTRAVENOUS at 16:44

## 2020-08-16 RX ADMIN — ONDANSETRON 4 MG: 2 INJECTION INTRAMUSCULAR; INTRAVENOUS at 05:27

## 2020-08-16 RX ADMIN — ALBUMIN (HUMAN) 12.5 G: 12.5 INJECTION, SOLUTION INTRAVENOUS at 03:12

## 2020-08-16 RX ADMIN — Medication 2000 UNITS: at 08:46

## 2020-08-16 RX ADMIN — ALBUMIN (HUMAN) 12.5 G: 0.25 INJECTION, SOLUTION INTRAVENOUS at 01:55

## 2020-08-16 RX ADMIN — ENOXAPARIN SODIUM 40 MG: 40 INJECTION SUBCUTANEOUS at 08:46

## 2020-08-16 RX ADMIN — SODIUM CHLORIDE, SODIUM GLUCONATE, SODIUM ACETATE, POTASSIUM CHLORIDE, MAGNESIUM CHLORIDE, SODIUM PHOSPHATE, DIBASIC, AND POTASSIUM PHOSPHATE 1000 ML: .53; .5; .37; .037; .03; .012; .00082 INJECTION, SOLUTION INTRAVENOUS at 04:23

## 2020-08-16 NOTE — PLAN OF CARE
Problem: Potential for Falls  Goal: Patient will remain free of falls  Description: INTERVENTIONS:  - Assess patient frequently for physical needs  -  Identify cognitive and physical deficits and behaviors that affect risk of falls    -  Lamar fall precautions as indicated by assessment   - Educate patient/family on patient safety including physical limitations  - Instruct patient to call for assistance with activity based on assessment  - Modify environment to reduce risk of injury  - Consider OT/PT consult to assist with strengthening/mobility  Outcome: Progressing

## 2020-08-16 NOTE — ASSESSMENT & PLAN NOTE
· Patient has been "shaving" on his own  Non-tender, but does become tender when on his feet for long periods of time  Small scabbed area noted but obvious s/s infection     · Podiatry consult

## 2020-08-16 NOTE — PROGRESS NOTES
[address] [facility]    Progress Note - Neto Olivera 1945, 67 y o  male MRN: [id number]    Unit/Bed#: [facility] 805-01 Encounter: [id number]    Primary Care Provider: Eder Tolentino DO   Date and time admitted to hospital: 8/15/2020  4:19 PM    * Sepsis [facility])  Assessment & Plan  · Patient presenting with sudden-onset nausea/vomiting, with fever, tachycardia, leukocytosis and lactic acidosis POA  · CTA chest/abdomen/pelvis negative for infectious etiology, UA negative, COVID-19 PCR negative  · BC 1 of 2 positive for gram positive cocci in chains, unclear source at this time  Now on IV Ancef  · Does have slight LLE erythema/warmth but intermittent over several months as well as left plantar callus however without clinical signs of infection  Patient has been "shaving" this on his own  · Echo pending  · Procalcitonin markedly elevated  · Was hypotensive however BP now improved  · ID consult pending  · Trend temps/WBC count  Callus of foot  Assessment & Plan  · Patient has been "shaving" on his own  Non-tender, but does become tender when on his feet for long periods of time  Small scabbed area noted but obvious s/s infection  · Podiatry consult    PAF (paroxysmal atrial fibrillation) ([facility])  Assessment & Plan  · In sinus rhythm not on rate controlling medications or anticoagulation  · S/p cryo PVI and CTI RFA 01/2018    Aneurysm of ascending aorta ([facility])  Assessment & Plan  · CT imaging demonstrating stable size of ascending thoracic aorta up to 4 6 cm  · Outpatient follow-up as indicated    Pharmacologic: Enoxaparin (Lovenox)  Mechanical VTE Prophylaxis in Place: Yes    Patient Centered Rounds: I have performed bedside rounds with nursing staff today  Discussions with Specialists or Other Care Team Provider: nursing  Podiatry and ID made aware of consults  Education and Discussions with Family / Patient: patient  Refused family update       Time Spent for Care: 30 minutes  More than 50% of total time spent on counseling and coordination of care as described above  Current Length of Stay: 0 day(s)    Current Patient Status: Inpatient   Certification Statement: The patient, admitted on an observation basis, will now require > 2 midnight hospital stay due to sepsis, IV abx, ID evaluation, localization of source     Discharge Plan / Estimated Discharge Date: Not medically stable  Code Status: Level 1 - Full Code      Subjective:   Patient offers no acute complaints  He does note some left lower extremity erythema which has been on and off over the past several months  He also has a left plantar callus which he has been shaving on his own  Not painful  No drainage  He does complain of some mild nausea however no further vomiting  No subjective fevers or rigors as prior  Objective:     Vitals:   Temp (24hrs), Av 3 °F (37 4 °C), Min:97 6 °F (36 4 °C), Max:103 °F (39 4 °C)    Temp:  [97 6 °F (36 4 °C)-103 °F (39 4 °C)] 98 5 °F (36 9 °C)  HR:  [] 73  Resp:  [16-22] 16  BP: ()/(42-65) 96/52  SpO2:  [91 %-100 %] 97 %  Body mass index is 27 97 kg/m²  Input and Output Summary (last 24 hours): Intake/Output Summary (Last 24 hours) at 2020 0954  Last data filed at 2020 0900  Gross per 24 hour   Intake 2768  33 ml   Output 375 ml   Net 2393 33 ml       Physical Exam:     Physical Exam  Vitals signs and nursing note reviewed  HENT:      Head: Normocephalic  Eyes:      Conjunctiva/sclera: Conjunctivae normal    Neck:      Musculoskeletal: Normal range of motion  No muscular tenderness  Cardiovascular:      Rate and Rhythm: Normal rate  Heart sounds: No murmur  Pulmonary:      Breath sounds: Normal breath sounds  Abdominal:      General: Bowel sounds are normal  There is no distension  Tenderness: There is no abdominal tenderness  There is no guarding  Musculoskeletal: Normal range of motion           General: No swelling  Skin:     General: Skin is warm  Findings: Erythema (mild LLE, warm to touch) present  Comments: Left plantar callus with no clinical signs of infection   Neurological:      Mental Status: He is alert and oriented to person, place, and time  Mental status is at baseline  Psychiatric:         Mood and Affect: Mood normal          Additional Data:     Labs:    Results from last 7 days   Lab Units 08/16/20  0309 08/15/20  1700   WBC Thousand/uL 20 85* 11 00*   HEMOGLOBIN g/dL 14 1 16 1   HEMATOCRIT % 42 6 46 1   PLATELETS Thousands/uL 146* 166   LYMPHO PCT %  --  1*   MONO PCT %  --  5   EOS PCT %  --  0     Results from last 7 days   Lab Units 08/16/20  0309   POTASSIUM mmol/L 4 8   CHLORIDE mmol/L 108   CO2 mmol/L 29   BUN mg/dL 24   CREATININE mg/dL 1 11   CALCIUM mg/dL 8 5   ALK PHOS U/L 50   ALT U/L 21   AST U/L 21     Results from last 7 days   Lab Units 08/15/20  1700   INR  1 09         Recent Cultures (last 7 days):     Results from last 7 days   Lab Units 08/15/20  1705 08/15/20  1700   BLOOD CULTURE  Received in Microbiology Lab  Culture in Progress    --    GRAM STAIN RESULT   --  Gram positive cocci in chains*       Last 24 Hours Medication List:   Current Facility-Administered Medications   Medication Dose Route Frequency Provider Last Rate    acetaminophen  650 mg Oral Q4H PRN Hoy Cramer, DO      ascorbic acid  500 mg Oral Daily Hoy Cramer, DO      cefazolin  2,000 mg Intravenous Q8H Hoy Cramer, DO 2,000 mg (08/16/20 0847)    cholecalciferol  2,000 Units Oral Daily Hoy Cramer, DO      co-enzyme Q-10  200 mg Oral Daily Hoy Cramer, DO      enoxaparin  40 mg Subcutaneous Daily Hoy Cramer, DO      niacin  50 mg Oral Daily With Breakfast Hoy Cramer, DO      ondansetron  4 mg Intravenous Q6H PRN Hoy Cramer, DO          Today, Patient Was Seen By: DHEERAJ Gordon    ** Please Note: Dragon 360 Dictation voice to text software may have been used in the creation of this document   **

## 2020-08-16 NOTE — QUICK NOTE
Noted to be hypotensive overnight  Initially transient but later was maintaining 32Z systolic  Given 2L NS in ED  Given 300cc albumin for BP and later additional 1L IVF  Blood work redrawn with lactate slightly elevated at 2 4, improved to 2 1 following bolus  Continued concern for sepsis without localization with worsening leukocytosis  Pt continued to be mildly symptomatic and appear well throughout the night  Will hold off on additional abx  1 positive BC gram positive cocci  Await further culture results

## 2020-08-16 NOTE — CONSULTS
Consultation - Infectious Disease   Nadeem Malaveet  67 y o  male MRN: [id number]  Unit/Bed#: [facility] 805-01 Encounter: [id number]      IMPRESSION & RECOMMENDATIONS:   1  Sepsis-POA  Fever and tachycardia  Appears to be secondary to Gram-positive bacteremia from a possible soft tissue source  Suspect streptococcal although enterococcal is possible  No other clear source appreciated  All the patient's blood pressure has remained soft it has remained relatively stable in the normal range and without other evidence of hemodynamic instability  He seems to be tolerating the antibiotics without difficulty  -continue cefazolin for now at current dose  -will add vancomycin at 1250 mg IV q 12 hours until rule out more resistant gram-positive organisms  -consult pharmacy for vancomycin trough management  -follow up identification of the organism and sensitivities and adjust antibiotics as needed  -recheck CBC with diff, CMP, procalcitonin  -supportive care    2  Gram-positive bacteremia-appears to be streptococcal based upon the morphology although enterococcal is possible  Possibly secondary to a left lower extremity cellulitis although the exam findings are quite modest   Consideration for the possibility of another source  Consideration for the possibility of endocarditis or endovascular infection although this is less likely   -antibiotics as above  -follow up sensitivities and adjust antibiotics as needed  -recheck blood cultures x2 sets tomorrow a m  To confirm clearance of the bacteremia  -follow-up echocardiogram  -additional workup as needed    3  Possible left lower extremity cellulitis-in the setting of the patient having a callus on the left foot that he has been debriding himself    This would most likely be a streptococcal infection although the physical exam findings are quite modest  -antibiotics as above for now  -elevation of the leg with addition edema control measures as needed  -serial exams    4  Ascending aortic aneurysm-stable radiographically    Have discussed the above management plan in detail with the primary service    Extensive review of the medical records in epic including review of the notes, radiographs, and laboratory results     HISTORY OF PRESENT ILLNESS:  Reason for Consult:  Sepsis, Gram-positive bacteremia  HPI: Morteza Jackson  is a 67y o  year old male with peripheral arterial disease, thoracic aortic aneurysm, and coronary artery disease admitted to [facility] in [place] with the sudden onset of shaking chills nausea and vomiting who I am asked to assist with antibiotic management  Patient had been feeling reasonably well until yesterday when he had abrupt onset of the a for mention symptoms  Because of the symptoms he was brought to the ER for further evaluation  Emergency department he was found to have a high fever, had blood cultures obtained, was given a dose of ceftriaxone  He underwent CT of the chest abdomen pelvis that was nondiagnostic for any source  He was noted on exam to have some faint erythema involving the left lower extremity  Of the patient's blood pressure has been a bit soft but he has remained otherwise stable  He denies any headache or stiff neck, denies any sore throat or rhinorrhea or nasal congestion, denies any cough or shortness of breath, denies any chest pain or abdominal pain, denies any dysuria or hematuria, denies any other rash or skin lesions, denies any other joint or muscle pains  Of note the patient has had a callus involving his left plantar foot that he has been debriding on his own  Patient denies any ill contacts  He has undergone extensive dental work in the last year but none since February  He denies any other recent medical procedures  REVIEW OF SYSTEMS:  A complete review of systems is negative other than that noted in the HPI      PAST MEDICAL HISTORY:  Past Medical History:   Diagnosis Date  Cardiac disease     Hypertension     PAF (paroxysmal atrial fibrillation) (HCC)     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HCC)     BILATERALLY     Past Surgical History:   Procedure Laterality Date    ESOPHAGOGASTRODUODENOSCOPY N/A 2016    Procedure: ESOPHAGOGASTRODUODENOSCOPY (EGD); Surgeon: Isaías Tirado MD;  Location:  GI LAB; Service:     OTHER SURGICAL HISTORY      VASCULAR SURGERY Right     POPLITEAL BYPASS DUE TO ANEURYSM       FAMILY HISTORY:  Non-contributory    SOCIAL HISTORY:  Social History   Social History     Substance and Sexual Activity   Alcohol Use Not Currently     Social History     Substance and Sexual Activity   Drug Use No     Social History     Tobacco Use   Smoking Status Never Smoker   Smokeless Tobacco Never Used       ALLERGIES:  Allergies   Allergen Reactions    Oxycodone GI Intolerance     nausea  nausea       MEDICATIONS:  All current active medications have been reviewed  Antibiotics:  Cefazolin 1, antibiotics 2    PHYSICAL EXAM:  Temp:  [97 6 °F (36 4 °C)-103 °F (39 4 °C)] 98 5 °F (36 9 °C)  HR:  [] 73  Resp:  [16-22] 16  BP: ()/(42-65) 96/52  SpO2:  [91 %-100 %] 97 %  Temp (24hrs), Av 3 °F (37 4 °C), Min:97 6 °F (36 4 °C), Max:103 °F (39 4 °C)  Current: Temperature: 98 5 °F (36 9 °C)    Intake/Output Summary (Last 24 hours) at 2020 1045  Last data filed at 2020 0900  Gross per 24 hour   Intake 2768  33 ml   Output 375 ml   Net 2393 33 ml       General Appearance:  Appearing well, nontoxic, and in no distress   Head:  Normocephalic, without obvious abnormality, atraumatic   Eyes:  Conjunctiva pink and sclera anicteric, both eyes   Nose: Nares normal, mucosa normal, no drainage   Throat: Oropharynx moist without lesions   Neck: Supple, symmetrical, no adenopathy, no tenderness/mass/nodules   Back:   Symmetric, no curvature, ROM normal, no CVA tenderness   Lungs:   Clear to auscultation bilaterally, respirations unlabored   Chest Wall:  No tenderness or deformity   Heart:  RRR; no murmur, rub or gallop   Abdomen:   Soft, non-tender, non-distended, positive bowel sounds    Extremities: No cyanosis, clubbing  Left calf with warmth and faint erythema  Left plantar 1st metatarsophalangeal region with callus   Skin: No rashes or lesions  No draining wounds noted  Lymph nodes: Cervical, supraclavicular nodes normal   Neurologic: Alert and oriented times 3, extremity strength 5/5 and symmetric       LABS, IMAGING, & OTHER STUDIES:  Lab Results:  I have personally reviewed pertinent labs  Results from last 7 days   Lab Units 08/16/20  0309 08/15/20  1700   WBC Thousand/uL 20 85* 11 00*   HEMOGLOBIN g/dL 14 1 16 1   PLATELETS Thousands/uL 146* 166     Results from last 7 days   Lab Units 08/16/20  0309 08/15/20  1700   SODIUM mmol/L 141 142   POTASSIUM mmol/L 4 8 3 2*   CHLORIDE mmol/L 108 110*   CO2 mmol/L 29 26   BUN mg/dL 24 20   CREATININE mg/dL 1 11 0 72   EGFR ml/min/1 73sq m 64 91   CALCIUM mg/dL 8 5 8 6   AST U/L 21 28   ALT U/L 21 23   ALK PHOS U/L 50 76     Results from last 7 days   Lab Units 08/15/20  1705 08/15/20  1700   BLOOD CULTURE  Received in Microbiology Lab  Culture in Progress  --    GRAM STAIN RESULT   --  Gram positive cocci in chains*     Results from last 7 days   Lab Units 08/16/20  0746 08/16/20  0554 08/15/20  1700   PROCALCITONIN ng/ml 15 82* 14 46* 0 55*                   Imaging Studies:     CT chest abdomen pelvis-stable ascending thoracic aneurysm    No focal airspace consolidation    Images personally reviewed by me in PACS

## 2020-08-16 NOTE — ASSESSMENT & PLAN NOTE
· Patient noted apparent hypoxia in ED requiring 2L NC  · Etiology uncertain at this time    CTA PE study significant only for bilateral dependent atelectasis, patient not in acute respiratory distress  · Provide supplemental oxygen as needed to maintain SaO2 greater than 88%

## 2020-08-16 NOTE — UTILIZATION REVIEW
Initial Clinical Review    OBS 8/15 @ 2026 UPGRADED TO INPATIENT 8/1 @  0938 FOR CONTINUED TX OF SEPSIS WITH NEED FOR CONTINUE IV ABX, ID CONSULT,  REQUIRING >2MN STAY    08/16/20 8856    Inpatient Admission  Once      Transfer Service: General Medicine       Question  Answer  Comment    Admitting Physician  Kimmy Wells     Level of Care  Med Surg     Estimated length of stay  More than 2 Midnights     Certification  I certify that inpatient services are medically necessary for this patient for a duration of greater than two midnights  See H&P and MD Progress Notes for additional information about the patient's course of treatment  08/16/20 6515     ED Arrival Information     Expected Arrival Acuity Means of Arrival Escorted By Service Admission Type    - 8/15/2020 16:15 Urgent Ambulance [facility]) Hospitalist Urgent    Arrival Complaint    fever/vomiting         Chief Complaint   Patient presents with    Vomiting     Pt  reports sudden onset of nausea and vomiting today  Reports only one episode of vomiting  Assessment/Plan:  67 y o  male who has a PMHx significant for atrial fibrillation s/p RFA ablation 1/2018 not currently on rate controlling medications or anticoagulation presents to ED after development sudden-onset nausea/vomiting ~1100H which patient states occurred while eating lunch and reported least 5 episodes of emesis along with associated chills  He denied any associated abdominal pain, diarrhea hematemesis, or bilious vomiting associated with this, however persistence of vomiting was noted by family prompting call to EMS  Subsequently was found febrile on route to hospital and confirmed during ED evaluation to temperature 103° F and also noted to be tachycardic  Was provided IV fluids and Zofran with improvement in nausea, however noted to be mildly hypoxic in ED requiring 2L NC    Subsequent ED sepsis workup was unrevealing for infectious etiology, with negative CTA PE study save for dependent bibasilar atelectasis, negative CT abdomen/pelvis, UA not consistent with UTI, and COVID-19 PCR negative  SIRS (systemic inflammatory response syndrome) ([facility])  Assessment & Plan  · Patient presenting with sudden-onset nausea/vomiting, with fever and tachycardia on presentation  No clear etiology of infection discovered at this time:  CTA PE with abdomen/pelvis negative for infectious etiology, UA negative, COVID-19 PCR negative  Elevated procalcitonin is noted  · Fortunately patient hemodynamically stable and nontoxic appearing at this time  · Patient received 1 dose ceftriaxone in ED, will defer further antibiotics unless patient should destabilize or source becomes clear  · Follow up results of blood cultures x2  · Trend WBC, temperature curve, hemodynamics     Hypoxia  Assessment & Plan  · Patient noted apparent hypoxia in ED requiring 2L NC  · Etiology uncertain at this time  CTA PE study significant only for bilateral dependent atelectasis, patient not in acute respiratory distress  · Provide supplemental oxygen as needed to maintain SaO2 greater than 88%    Anticipated Length of Stay:  Patient will be admitted on an Observation basis with an anticipated length of stay of  Less than 2 midnights  Justification for Hospital Stay: Please see detailed plans noted above  8/16 -- Sepsis ([facility] [address] )  Assessment & Plan  · Patient presenting with sudden-onset nausea/vomiting, with fever, tachycardia, leukocytosis and lactic acidosis POA  · CTA chest/abdomen/pelvis negative for infectious etiology, UA negative, COVID-19 PCR negative  · BC 1 of 2 positive for gram positive cocci in chains, unclear source at this time  Now on IV Ancef  · Does have slight LLE erythema/warmth but intermittent over several months as well as left plantar callus however without clinical signs of infection  Patient has been "shaving" this on his own     · Echo pending  · Procalcitonin markedly elevated  · Was hypotensive however BP now improved  · ID consult pending  · Trend temps/WBC count        Callus of foot  Assessment & Plan  · Patient has been "shaving" on his own  Non-tender, but does become tender when on his feet for long periods of time  Small scabbed area noted but obvious s/s infection     Podiatry consult    Certification Statement: The patient, admitted on an observation basis, will now require > 2 midnight hospital stay due to sepsis, IV abx, ID evaluation, localization of source        ED Triage Vitals   Temperature Pulse Respirations Blood Pressure SpO2   08/15/20 1628 08/15/20 1628 08/15/20 1628 08/15/20 1628 08/15/20 1628   (!) 103 °F (39 4 °C) 102 20 131/65 91 %      Temp Source Heart Rate Source Patient Position - Orthostatic VS BP Location FiO2 (%)   08/15/20 1628 08/15/20 1628 08/15/20 1628 08/15/20 1628 --   Oral Monitor Lying Right arm       Pain Score       08/15/20 1659       Med Not Given for Pain - for MAR use only          Wt Readings from Last 1 Encounters:   08/15/20 110 kg (242 lb)     Additional Vital Signs:   Date/Time   Temp   Pulse   Resp   BP   MAP (mmHg)   SpO2   Calculated FIO2 (%) - Nasal Cannula   Nasal Cannula O2 Flow Rate (L/min)   O2 Device   Patient Position - Orthostatic VS    08/16/20 0700   98 5 °F (36 9 °C)   73   16   96/52      97 %   24   1 L/min   Nasal cannula       08/16/20 05:22:54   97 6 °F (36 4 °C)   82   18   106/60   75   100 %   24   1 L/min   Nasal cannula       08/16/20 0434            92/62                  Lying    08/16/20 04:26:13   98 1 °F (36 7 °C)                               08/16/20 0250            86/52Abnormal                    Lying    08/16/20 02:49:26   98 7 °F (37 1 °C)   80   18   76/42Abnormal     53   99 %                08/16/20 0124      78      86/52Abnormal        99 %   24   1 L/min   Nasal cannula   Lying    08/16/20 0049      72      88/52Abnormal        96 %   24   1 L/min   Nasal cannula Lying    08/15/20 2332      75      102/58      100 %   24   1 L/min   Nasal cannula   Lying    08/15/20 2228      75      90/60      95 %   28   2 L/min   Nasal cannula   Lying    08/15/20 2100   99 8 °F (37 7 °C)   83   20   88/50Abnormal        95 %   28   2 L/min   Nasal cannula   Lying    08/15/20 2015      92   20   93/54   68   94 %         None (Room air)   Lying    08/15/20 1900      96   22   97/59   72   96 %         None (Room air)   Lying    08/15/20 1830      96   22   114/61   81   100 %         None (Room air)   Lying    08/15/20 1800      93      114/60      100 %            Lying    08/15/20 1628   103 °F (39 4 °C)Abnormal     102   20   131/65   89   91 %         None (Room air)   Lying        Pertinent Labs/Diagnostic Test Results:   CXR 8/15 -- No acute abnormality in the chest    CT PE study, a/p 8/15 -- No evidence for aortic dissection   Stable aneurysmal dilatation of the ascending thoracic aorta measuring up to 4 6 cm the level of the main pulmonary artery  No focal airspace consolidation identified   Bibasilar dependent atelectasis is noted  Colonic diverticulosis without evidence for acute diverticulitis       Results from last 7 days   Lab Units 08/15/20  1718   SARS-COV-2  Negative     Results from last 7 days   Lab Units 08/16/20  0309 08/15/20  1700   WBC Thousand/uL 20 85* 11 00*   HEMOGLOBIN g/dL 14 1 16 1   HEMATOCRIT % 42 6 46 1   PLATELETS Thousands/uL 146* 166   BANDS PCT %  --  10*     Results from last 7 days   Lab Units 08/16/20  0309 08/15/20  1700   SODIUM mmol/L 141 142   POTASSIUM mmol/L 4 8 3 2*   CHLORIDE mmol/L 108 110*   CO2 mmol/L 29 26   ANION GAP mmol/L 4 6   BUN mg/dL 24 20   CREATININE mg/dL 1 11 0 72   EGFR ml/min/1 73sq m 64 91   CALCIUM mg/dL 8 5 8 6     Results from last 7 days   Lab Units 08/16/20  0309 08/15/20  1700   AST U/L 21 28   ALT U/L 21 23   ALK PHOS U/L 50 76   TOTAL PROTEIN g/dL 6 0* 6 9   ALBUMIN g/dL 3 4* 4 0   TOTAL BILIRUBIN mg/dL 1 17* 1 43*     Results from last 7 days   Lab Units 08/16/20  0309 08/15/20  1700   GLUCOSE RANDOM mg/dL 149* 114     Results from last 7 days   Lab Units 08/15/20  1700   TROPONIN I ng/mL <0 02     Results from last 7 days   Lab Units 08/15/20  1700   PROTIME seconds 14 1   INR  1 09   PTT seconds 23     Results from last 7 days   Lab Units 08/16/20  0746 08/16/20  0554 08/15/20  1700   PROCALCITONIN ng/ml 15 82* 14 46* 0 55*     Results from last 7 days   Lab Units 08/16/20  0554 08/16/20  0309 08/15/20  1700   LACTIC ACID mmol/L 2 1* 2 5* 1 7     Results from last 7 days   Lab Units 08/15/20  1700   LIPASE u/L 201     Results from last 7 days   Lab Units 08/15/20  1946   CLARITY UA  Clear   COLOR UA  Dk Yellow   SPEC GRAV UA  1 026   PH UA  6 0   GLUCOSE UA mg/dl Negative   KETONES UA mg/dl Trace*   BLOOD UA  Negative   PROTEIN UA mg/dl Negative   NITRITE UA  Negative   BILIRUBIN UA  Interference- unable to analyze*   UROBILINOGEN UA E U /dl 1 0   LEUKOCYTES UA  Negative     Results from last 7 days   Lab Units 08/15/20  1705 08/15/20  1700   BLOOD CULTURE  Received in Microbiology Lab  Culture in Progress    --    GRAM STAIN RESULT   --  Gram positive cocci in chains*     ED Treatment:   Medication Administration from 08/15/2020 1615 to 08/15/2020 2047       Date/Time Order Dose Route Action     08/15/2020 1659 acetaminophen (TYLENOL) tablet 975 mg 975 mg Oral Given     08/15/2020 1658 sodium chloride 0 9 % bolus 1,000 mL 1,000 mL Intravenous New Bag     08/15/2020 1729 ondansetron (ZOFRAN) injection 4 mg 4 mg Intravenous Given     08/15/2020 1914 ceftriaxone (ROCEPHIN) 1 g/50 mL in dextrose IVPB 1,000 mg Intravenous New Bag     08/15/2020 2023 sodium chloride 0 9 % bolus 1,000 mL 1,000 mL Intravenous New Bag     Past Medical History:   Diagnosis Date    Cardiac disease     Hypertension     PAF (paroxysmal atrial fibrillation) ([facility] )     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     Present on Admission:   PAF (paroxysmal atrial fibrillation) (HCC)   SIRS (systemic inflammatory response syndrome) (HCC)   Hypoxia   Aneurysm of ascending aorta (HCC)      Admitting Diagnosis: Nausea and vomiting [R11 2]  Hypoxia [R09 02]  Fever [R50 9]  Sepsis ([address] ) [A41 9]  Age/Sex: 67 y o  male  Admission Orders:  Scheduled Medications:  ascorbic acid, 500 mg, Oral, Daily  cefazolin, 2,000 mg, Intravenous, Q8H  cholecalciferol, 2,000 Units, Oral, Daily  co-enzyme Q-10, 200 mg, Oral, Daily  enoxaparin, 40 mg, Subcutaneous, Daily  niacin, 50 mg, Oral, Daily With Breakfast      multi-electrolyte (ISOLYTE-S PH 7 4 equivalent) IV solution    Rate: 100 mL/hr Dose: 100 mL/hr  Freq: Continuous Route: IV  Indications of Use: IV Hydration  Last Dose: Stopped (08/16/20 0421)  Start: 08/15/20 2130 End: 08/16/20 0411       multi-electrolyte (PLASMALYTE-A/ISOLYTE-S PH 7 4) IV solution    Rate: 125 mL/hr Dose: 125 mL/hr  Freq: Continuous Route: IV  Last Dose: 125 mL/hr (08/16/20 0636)  Start: 08/16/20 0645 End: 08/16/20 0835            PRN Meds:  acetaminophen, 650 mg, Oral, Q4H PRN  ondansetron, 4 mg, Intravenous, Q6H PRN 8/16 x1        IP CONSULT TO INFECTIOUS DISEASES    Network Utilization Review Department  [email or web address]  ATTENTION: Please call with any questions or concerns to [phone] and carefully listen to the prompts so that you are directed to the right person  All voicemails are confidential   Kate Crawford all requests for admission clinical reviews, approved or denied determinations and any other requests to dedicated fax number below belonging to the campus where the patient is receiving treatment   List of dedicated fax numbers for the Facilities:  [address] [place] [address] DENIALS (Administrative/Medical Necessity) [phone]   [address] (Maternity/NICU/Pediatrics) [phone]   Cathlyn Raring [phone]   Skelton Delay [phone]     Mark Vaughan [phone]   Yolanda Bhagat [facility] [address] [facility] [phone]   [facility]  [phone]   [address] [facility]  [phone]   [address] [place] [address] [address] [facility] [phone]

## 2020-08-16 NOTE — H&P
H&P- Melanie Mera  1945, 67 y o  male MRN: [id number]    Unit/Bed#: [facility] 805-01 Encounter: [id number]    Primary Care Provider: Milton Blue DO   Date and time admitted to hospital: 8/15/2020  4:19 PM        * SIRS (systemic inflammatory response syndrome) ([facility] [address] )  Assessment & Plan  · Patient presenting with sudden-onset nausea/vomiting, with fever and tachycardia on presentation  No clear etiology of infection discovered at this time:  CTA PE with abdomen/pelvis negative for infectious etiology, UA negative, COVID-19 PCR negative  Elevated procalcitonin is noted  · Fortunately patient hemodynamically stable and nontoxic appearing at this time  · Patient received 1 dose ceftriaxone in ED, will defer further antibiotics unless patient should destabilize or source becomes clear  · Follow up results of blood cultures x2  · Trend WBC, temperature curve, hemodynamics    Hypoxia  Assessment & Plan  · Patient noted apparent hypoxia in ED requiring 2L NC  · Etiology uncertain at this time  CTA PE study significant only for bilateral dependent atelectasis, patient not in acute respiratory distress  · Provide supplemental oxygen as needed to maintain SaO2 greater than 88%    Aneurysm of ascending aorta (HCC)  Assessment & Plan  · CT imaging demonstrating stable size of ascending thoracic aorta up to 4 6 cm  · Outpatient follow-up as indicated    PAF (paroxysmal atrial fibrillation) (HCC)  Assessment & Plan  · In sinus rhythm not on rate controlling medications or anticoagulation  · S/p cryo PVI and CTI RFA 01/2018      VTE Prophylaxis: Enoxaparin (Lovenox)  / sequential compression device   Code Status: Level 1 - Full Code per patient  POLST: POLST form is not discussed and not completed at this time     Discussion with family: offered however patient declines at this time    Anticipated Length of Stay:  Patient will be admitted on an Observation basis with an anticipated length of stay of  Less than 2 midnights  Justification for Hospital Stay: Please see detailed plans noted above  Chief Complaint:     Nausea/vomiting, chills  History of Present Illness:  Mini Mccullough  is a 67 y o  male who has a past medical history significant for atrial fibrillation s/p RFA ablation 1/2018 not currently on rate controlling medications or anticoagulation  Presented after development sudden-onset nausea/vomiting approximately 1100H which patient states occurred while eating lunch and reported least 5 episodes of emesis along with associated chills  He denied any associated abdominal pain, diarrhea hematemesis, or bilious vomiting associated with this, however persistence of vomiting was noted by family prompting call to EMS  Subsequently was found febrile on route to hospital and confirmed during ED evaluation to temperature 103° F and also noted to be tachycardic  Was provided IV fluids and Zofran with improvement in nausea, however noted to be mildly hypoxic in ED requiring 2L NC  Subsequent ED sepsis workup was unrevealing for infectious etiology, with negative CTA PE study save for dependent bibasilar atelectasis, negative CT abdomen/pelvis, UA not consistent with UTI, and COVID-19 PCR negative  At the time of my evaluation, patient lying in bed in no acute distress, and continues to endorse improvement in nausea  Did not know of presence of fevers until ED arrival   He denies any known sick contacts, travel outside the area, or unusual foods (did report having strawberry pancakes at [employer] apparently)  Additionally denies any history of underlying lung disease or smoking history  Denies overt shortness of breath, cough/wheezing, abdominal pain/diarrhea, dizziness/lightheadedness, headache, or dysuria      Review of Systems:    Constitutional:  Denies fever but endorsed chills  Eyes:  Denies change in visual acuity   HENT:  Denies nasal congestion or sore throat   Respiratory:  Denies cough or shortness of breath   Cardiovascular:  Denies chest pain or edema   GI:  Denies abdominal pain, bloody stools or diarrhea but endorsed nausea and vomiting  :  Denies dysuria   Musculoskeletal:  Denies back pain or joint pain   Integument:  Denies rash   Neurologic:  Denies headache, focal weakness or sensory changes   Endocrine:  Denies polyuria or polydipsia   Lymphatic:  Denies swollen glands   Psychiatric:  Denies depression or anxiety     Past Medical and Surgical History:   Past Medical History:   Diagnosis Date    Cardiac disease     Hypertension     PAF (paroxysmal atrial fibrillation) ([facility] [address] )     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HCC)     BILATERALLY     Past Surgical History:   Procedure Laterality Date    ESOPHAGOGASTRODUODENOSCOPY N/A 8/16/2016    Procedure: ESOPHAGOGASTRODUODENOSCOPY (EGD); Surgeon: Jose C Rosenberg MD;  Location: [facility]; Service:     OTHER SURGICAL HISTORY      VASCULAR SURGERY Right     POPLITEAL BYPASS DUE TO ANEURYSM       Meds/Allergies:  Medications Prior to Admission   Medication    ascorbic acid (VITAMIN C) 500 mg tablet    Coenzyme Q10 200 MG capsule    Green Tea 250 MG CAPS    Liver Extract 500 MG CAPS    Magnesium Citrate 125 MG CAPS    Naproxen Sodium (ALEVE PO)    niacin 50 mg tablet    Pancreatin 325 MG TABS    POTASSIUM ACETATE IV    Probiotic Product (PROBIOTIC-10 PO)    Vitamin D, Cholecalciferol, 1000 units CAPS    clindamycin (CLEOCIN) 300 MG capsule    Flaxseed, Linseed, (FLAXSEED OIL) 1000 MG CAPS       Allergies:    Allergies   Allergen Reactions    Oxycodone GI Intolerance     nausea  nausea     History:  Marital Status: /Civil Union     Substance Use History:   Social History     Substance and Sexual Activity   Alcohol Use No     Social History     Tobacco Use   Smoking Status Never Smoker   Smokeless Tobacco Never Used     Social History     Substance and Sexual Activity   Drug Use No       Family History:  Non-contributory  Physical Exam: Vitals:   Blood Pressure: 93/54 (08/15/20 2015)  Pulse: 92 (08/15/20 2015)  Temperature: (!) 103 °F (39 4 °C) (08/15/20 1628)  Temp Source: Oral (08/15/20 1628)  Respirations: 20 (08/15/20 2015)  Height: 6' 6" (198 1 cm) (08/15/20 1628)  Weight - Scale: 110 kg (242 lb) (08/15/20 1628)  SpO2: 94 % (08/15/20 2015)    Constitutional:  Well developed, well nourished, no acute distress, non-toxic appearance   Eyes:  PERRL, conjunctiva normal   HENT:  Atraumatic, external ears normal, nose normal, oropharynx moist, no pharyngeal exudates  Neck- normal range of motion, no tenderness, supple   Respiratory:  No respiratory distress, normal breath sounds, no rales, no wheezing   Cardiovascular:  Normal rate, normal rhythm, no murmurs, no gallops, no rubs   GI:  Soft, nondistended, normal bowel sounds, nontender, no organomegaly, no mass, no rebound, no guarding   :  No costovertebral angle tenderness   Musculoskeletal:  No edema, no tenderness, no deformities  Back- no tenderness  Integument:  Well hydrated, bilateral TKR surgical scars and multiple surgical scars on legs   Lymphatic:  No lymphadenopathy noted   Neurologic:  Alert &awake, communicative, CN 2-12 normal, normal motor function, normal sensory function, no focal deficits noted   Psychiatric:  Speech and behavior appropriate       Lab Results: I have personally reviewed pertinent reports        Results from last 7 days   Lab Units 08/15/20  1700   WBC Thousand/uL 11 00*   HEMOGLOBIN g/dL 16 1   HEMATOCRIT % 46 1   PLATELETS Thousands/uL 166   LYMPHO PCT % 1*   MONO PCT % 5   EOS PCT % 0     Results from last 7 days   Lab Units 08/15/20  1700   POTASSIUM mmol/L 3 2*   CHLORIDE mmol/L 110*   CO2 mmol/L 26   BUN mg/dL 20   CREATININE mg/dL 0 72   CALCIUM mg/dL 8 6   ALK PHOS U/L 76   ALT U/L 23   AST U/L 28     Results from last 7 days   Lab Units 08/15/20  1700   INR  1 09       EKG: Sinus tachycardia with PSVCs    Imaging: I have personally reviewed pertinent reports  Xr Chest 1 View    Result Date: 8/15/2020  Narrative: CHEST INDICATION:   Possible sepsis  COMPARISON:  None EXAM PERFORMED/VIEWS:  XR CHEST 1 VIEW FINDINGS: Cardiomediastinal silhouette appears unremarkable  The lungs are clear  No pneumothorax or pleural effusion  Osseous structures appear within normal limits for patient age  Impression: No acute abnormality in the chest  Workstation performed: [id number]     Pe Study With Ct Abdomen And Pelvis With Contrast    Result Date: 8/15/2020  Narrative: CT PULMONARY ANGIOGRAM OF THE CHEST AND CT ABDOMEN AND PELVIS WITH INTRAVENOUS CONTRAST INDICATION:   Sepsis; Source unknown  COMPARISON:  5/14/2019 TECHNIQUE:  CT examination of the chest, abdomen and pelvis was performed  Thin section CT angiographic technique was used in the chest in order to evaluate for pulmonary embolus and coronal 3D MIP postprocessing was performed on the acquisition scanner  Axial, sagittal, and coronal 2D reformatted images were created from the source data and submitted for interpretation  Radiation dose length product (DLP) for this visit:  1620 17 mGy-cm   This examination, like all CT scans performed in the [place], was performed utilizing techniques to minimize radiation dose exposure, including the use of iterative reconstruction and automated exposure control  IV Contrast:  100 mL of iohexol (OMNIPAQUE) Enteric Contrast:  Enteric contrast was not administered  FINDINGS: CHEST PULMONARY ARTERIAL TREE:  No pulmonary embolus is seen  LUNGS:  Lungs are clear  There is no tracheal or endobronchial lesion  PLEURA:  Unremarkable  HEART/AORTA:  The ascending thoracic aorta measures up to 4 6 cm at the level of the main pulmonary artery, stable  Heart is normal in size  There is no pericardial effusion  MEDIASTINUM AND ALLISON:  Unremarkable  CHEST WALL AND LOWER NECK:   Unremarkable   ABDOMEN LIVER/BILIARY TREE:  There are one or more hepatic simple cyst(s) present  No CT evidence of suspicious solid hepatic mass  Normal hepatic contours  No biliary dilatation  GALLBLADDER:  Suggestion of trace cholelithiasis  SPLEEN:  Unremarkable  PANCREAS:  Unremarkable  ADRENAL GLANDS:  Unremarkable  KIDNEYS/URETERS:  One or more simple renal cyst(s) is noted  Otherwise unremarkable kidneys  No hydronephrosis  STOMACH AND BOWEL:  There is colonic diverticulosis without evidence of acute diverticulitis  APPENDIX:  No findings to suggest appendicitis  ABDOMINOPELVIC CAVITY:  No ascites  No pneumoperitoneum  No lymphadenopathy  VESSELS:  Atherosclerotic changes are present  No evidence of aneurysm  PELVIS REPRODUCTIVE ORGANS:  The prostate is enlarged  URINARY BLADDER:  Unremarkable  ABDOMINAL WALL/INGUINAL REGIONS:  Tiny fat-containing focal hernia  OSSEOUS STRUCTURES:  There are degenerative changes of the spine  Impression: No evidence for aortic dissection  Stable aneurysmal dilatation of the ascending thoracic aorta measuring up to 4 6 cm the level of the main pulmonary artery  No focal airspace consolidation identified  Bibasilar dependent atelectasis is noted  Colonic diverticulosis without evidence for acute diverticulitis  Workstation performed: [id number]         ** Please Note: Dragon 360 Dictation voice to text software was used in the creation of this document   **

## 2020-08-16 NOTE — ASSESSMENT & PLAN NOTE
· Patient presenting with sudden-onset nausea/vomiting, with fever and tachycardia on presentation  No clear etiology of infection discovered at this time:  CTA PE with abdomen/pelvis negative for infectious etiology, UA negative, COVID-19 PCR negative    Elevated procalcitonin is noted  · Fortunately patient hemodynamically stable and nontoxic appearing at this time  · Patient received 1 dose ceftriaxone in ED, will defer further antibiotics unless patient should destabilize or source becomes clear  · Follow up results of blood cultures x2  · Trend WBC, temperature curve, hemodynamics

## 2020-08-16 NOTE — ASSESSMENT & PLAN NOTE
· Patient presenting with sudden-onset nausea/vomiting, with fever, tachycardia, leukocytosis and lactic acidosis POA  · CTA chest/abdomen/pelvis negative for infectious etiology, UA negative, COVID-19 PCR negative  · BC 1 of 2 positive for gram positive cocci in chains, unclear source at this time  Now on IV Ancef  · Does have slight LLE erythema/warmth but intermittent over several months as well as left plantar callus however without clinical signs of infection  Patient has been "shaving" this on his own  · Echo pending  · Procalcitonin markedly elevated  · Was hypotensive however BP now improved  · ID consult pending  · Trend temps/WBC count

## 2020-08-16 NOTE — ASSESSMENT & PLAN NOTE
· In sinus rhythm not on rate controlling medications or anticoagulation  · S/p cryo PVI and CTI RFA 01/2018

## 2020-08-16 NOTE — PLAN OF CARE
Problem: Potential for Falls  Goal: Patient will remain free of falls  Description: INTERVENTIONS:  - Assess patient frequently for physical needs  -  Identify cognitive and physical deficits and behaviors that affect risk of falls    -  [place] fall precautions as indicated by assessment   - Educate patient/family on patient safety including physical limitations  - Instruct patient to call for assistance with activity based on assessment  - Modify environment to reduce risk of injury  - Consider OT/PT consult to assist with strengthening/mobility  Outcome: Progressing

## 2020-08-16 NOTE — ASSESSMENT & PLAN NOTE
· In sinus rhythm not on rate controlling medications or anticoagulation  · S/p cryo PVI and CTI RFA 01/2018 Statement Selected

## 2020-08-16 NOTE — NURSING NOTE
Second bolus completed  BP is 90/60 manually  Pt asymptomatic and resting  Notified Bell IGNACIO  Will recheck BP in a hour

## 2020-08-16 NOTE — NURSING NOTE
At [facility], BP 88/52 manually  Pt denies SOB, CP, lightheadedness  Pt has not voided  Notified Bell IGNACIO  Ordered albumin  At 0130, BP recheck after albumin was 86/52 manually  Notified Bell IGNACIO  Ordered labs and another albumin  At 0250,BP recheck after albumin is still 86/52, pt still asymptomatic  Notified Bell IGNACIO  Who came to see pt  1L bolus of isolyte ordered to run over 1hr   Will recheck lactic and procal after bolus finishes

## 2020-08-16 NOTE — PROGRESS NOTES
Vancomycin Assessment    Alissa Marshall  is a 67 y o  male who is currently ordered vancomycin 1750 mg q12h for bacteremia  Relevant clinical data and objective history reviewed:  Creatinine   Date Value Ref Range Status   08/16/2020 1 11 0 60 - 1 30 mg/dL Final     Comment:     Standardized to IDMS reference method   08/15/2020 0 72 0 60 - 1 30 mg/dL Final     Comment:     Standardized to IDMS reference method   01/25/2018 0 78 0 60 - 1 30 mg/dL Final     Comment:     Standardized to IDMS reference method     BP 96/52   Pulse 73   Temp 98 5 °F (36 9 °C) (Oral)   Resp 16   Ht 6' 6" (1 981 m)   Wt 110 kg (242 lb)   SpO2 97%   BMI 27 97 kg/m²   I/O last 3 completed shifts: In: 2588 3 [I V :1588 3; IV Piggyback:1000]  Out: 375 [Urine:375]  Lab Results   Component Value Date/Time    BUN 24 08/16/2020 03:09 AM    WBC 20 85 (H) 08/16/2020 03:09 AM    HGB 14 1 08/16/2020 03:09 AM    HCT 42 6 08/16/2020 03:09 AM    MCV 98 08/16/2020 03:09 AM     (L) 08/16/2020 03:09 AM     Temp Readings from Last 3 Encounters:   08/16/20 98 5 °F (36 9 °C) (Oral)   01/25/18 97 9 °F (36 6 °C) (Oral)   08/24/17 98 3 °F (36 8 °C) (Oral)     Vancomycin Days of Therapy: 1    Assessment/Plan  The patient is currently on vancomycin utilizing scheduled dosing based on actual body weight  Baseline risks associated with therapy include: advanced age  The patient is currently ordered 1750 mg q12h and after clinical evaluation will be changed to 2000 mg once, 1750 mg q12h  Pharmacy will also follow closely for s/sx of nephrotoxicity, infusion reactions, and appropriateness of therapy  BMP and CBC will be ordered per protocol  Plan for trough as patient approaches steady state, prior to the 5th  dose at approximately 1100 on 8/18  Due to infection severity, will target a trough of 15-20 (appropriate for most indications)   Pharmacy will continue to follow the patients culture results and clinical progress daily      Philomena Brownlee Savana Gresham

## 2020-08-16 NOTE — ASSESSMENT & PLAN NOTE
· CT imaging demonstrating stable size of ascending thoracic aorta up to 4 6 cm  · Outpatient follow-up as indicated

## 2020-08-17 PROBLEM — R78.81 STREPTOCOCCAL BACTEREMIA: Status: ACTIVE | Noted: 2020-08-17

## 2020-08-17 PROBLEM — B95.5 STREPTOCOCCAL BACTEREMIA: Status: ACTIVE | Noted: 2020-08-17

## 2020-08-17 LAB
ALBUMIN SERPL BCP-MCNC: 3.1 G/DL (ref 3.5–5)
ALP SERPL-CCNC: 61 U/L (ref 46–116)
ALT SERPL W P-5'-P-CCNC: 29 U/L (ref 12–78)
ANION GAP SERPL CALCULATED.3IONS-SCNC: 3 MMOL/L (ref 4–13)
AST SERPL W P-5'-P-CCNC: 37 U/L (ref 5–45)
BASOPHILS # BLD AUTO: 0.05 THOUSANDS/ΜL (ref 0–0.1)
BASOPHILS NFR BLD AUTO: 0 % (ref 0–1)
BILIRUB SERPL-MCNC: 0.78 MG/DL (ref 0.2–1)
BUN SERPL-MCNC: 20 MG/DL (ref 5–25)
CALCIUM SERPL-MCNC: 8.5 MG/DL (ref 8.3–10.1)
CHLORIDE SERPL-SCNC: 108 MMOL/L (ref 100–108)
CO2 SERPL-SCNC: 29 MMOL/L (ref 21–32)
CREAT SERPL-MCNC: 0.74 MG/DL (ref 0.6–1.3)
EOSINOPHIL # BLD AUTO: 0.06 THOUSAND/ΜL (ref 0–0.61)
EOSINOPHIL NFR BLD AUTO: 0 % (ref 0–6)
ERYTHROCYTE [DISTWIDTH] IN BLOOD BY AUTOMATED COUNT: 13.6 % (ref 11.6–15.1)
GFR SERPL CREATININE-BSD FRML MDRD: 90 ML/MIN/1.73SQ M
GLUCOSE SERPL-MCNC: 97 MG/DL (ref 65–140)
HCT VFR BLD AUTO: 39.8 % (ref 36.5–49.3)
HGB BLD-MCNC: 13.4 G/DL (ref 12–17)
IMM GRANULOCYTES # BLD AUTO: 0.27 THOUSAND/UL (ref 0–0.2)
IMM GRANULOCYTES NFR BLD AUTO: 2 % (ref 0–2)
LYMPHOCYTES # BLD AUTO: 0.83 THOUSANDS/ΜL (ref 0.6–4.47)
LYMPHOCYTES NFR BLD AUTO: 6 % (ref 14–44)
MCH RBC QN AUTO: 32.4 PG (ref 26.8–34.3)
MCHC RBC AUTO-ENTMCNC: 33.7 G/DL (ref 31.4–37.4)
MCV RBC AUTO: 96 FL (ref 82–98)
MONOCYTES # BLD AUTO: 0.49 THOUSAND/ΜL (ref 0.17–1.22)
MONOCYTES NFR BLD AUTO: 4 % (ref 4–12)
NEUTROPHILS # BLD AUTO: 11.97 THOUSANDS/ΜL (ref 1.85–7.62)
NEUTS SEG NFR BLD AUTO: 88 % (ref 43–75)
NRBC BLD AUTO-RTO: 0 /100 WBCS
PLATELET # BLD AUTO: 132 THOUSANDS/UL (ref 149–390)
PMV BLD AUTO: 10.4 FL (ref 8.9–12.7)
POTASSIUM SERPL-SCNC: 3.7 MMOL/L (ref 3.5–5.3)
PROCALCITONIN SERPL-MCNC: 9.1 NG/ML
PROT SERPL-MCNC: 5.8 G/DL (ref 6.4–8.2)
RBC # BLD AUTO: 4.13 MILLION/UL (ref 3.88–5.62)
SODIUM SERPL-SCNC: 140 MMOL/L (ref 136–145)
WBC # BLD AUTO: 13.67 THOUSAND/UL (ref 4.31–10.16)

## 2020-08-17 PROCEDURE — 99232 SBSQ HOSP IP/OBS MODERATE 35: CPT | Performed by: INTERNAL MEDICINE

## 2020-08-17 PROCEDURE — 99233 SBSQ HOSP IP/OBS HIGH 50: CPT | Performed by: INTERNAL MEDICINE

## 2020-08-17 PROCEDURE — 80053 COMPREHEN METABOLIC PANEL: CPT | Performed by: INTERNAL MEDICINE

## 2020-08-17 PROCEDURE — 85025 COMPLETE CBC W/AUTO DIFF WBC: CPT | Performed by: NURSE PRACTITIONER

## 2020-08-17 PROCEDURE — 84145 PROCALCITONIN (PCT): CPT | Performed by: NURSE PRACTITIONER

## 2020-08-17 PROCEDURE — 87040 BLOOD CULTURE FOR BACTERIA: CPT | Performed by: INTERNAL MEDICINE

## 2020-08-17 RX ADMIN — CEFAZOLIN SODIUM 2000 MG: 2 SOLUTION INTRAVENOUS at 08:37

## 2020-08-17 RX ADMIN — CEFAZOLIN SODIUM 2000 MG: 2 SOLUTION INTRAVENOUS at 16:34

## 2020-08-17 RX ADMIN — Medication 2000 UNITS: at 08:40

## 2020-08-17 RX ADMIN — OXYCODONE HYDROCHLORIDE AND ACETAMINOPHEN 500 MG: 500 TABLET ORAL at 08:40

## 2020-08-17 RX ADMIN — Medication 50 MG: at 08:35

## 2020-08-17 RX ADMIN — CEFAZOLIN SODIUM 2000 MG: 2 SOLUTION INTRAVENOUS at 01:32

## 2020-08-17 RX ADMIN — Medication 200 MG: at 08:40

## 2020-08-17 RX ADMIN — ENOXAPARIN SODIUM 40 MG: 40 INJECTION SUBCUTANEOUS at 08:40

## 2020-08-17 RX ADMIN — CEFAZOLIN SODIUM 2000 MG: 2 SOLUTION INTRAVENOUS at 23:45

## 2020-08-17 NOTE — PROGRESS NOTES
Progress Note - Sagar Roldan 1945, 67 y o  male MRN: [id number]    [facility] Encounter: [id number]    Primary Care Provider: Kathy Foreman DO   Date and time admitted to hospital: 8/15/2020  4:19 PM        * Sepsis [facility])  Assessment & Plan  · Sepsis present on admission improving  · Monitor      Streptococcal bacteremia  Assessment & Plan  Beta-hemolytic group C streptococcal bacteremia  Likely due to skin source from left foot ulceration  Lymphadenopathy noted  IV cefazolin 2 g q 8 hours  2D echo without vegetations  Follow-up on blood cultures  Infectious Disease following  Discussed with infectious disease    Callus of foot  Assessment & Plan  · Patient has been "shaving" on his own  Non-tender, but does become tender when on his feet for long periods of time  Small scabbed area noted but obvious s/s infection  · Podiatry input noted  · Wound care    Aneurysm of ascending aorta (HCC)  Assessment & Plan  · CT imaging demonstrating stable size of ascending thoracic aorta up to 4 6 cm  · Outpatient follow-up as indicated    PAF (paroxysmal atrial fibrillation) (HCC)  Assessment & Plan  · In sinus rhythm not on rate controlling medications or anticoagulation  · S/p cryo PVI and CTI RFA 01/2018        VTE Pharmacologic Prophylaxis:   Pharmacologic: Enoxaparin (Lovenox)  Mechanical VTE Prophylaxis in Place: Yes    Patient Centered Rounds: I have performed bedside rounds with nursing staff today  Discussions with Specialists or Other Care Team Provider:  Infectious disease Dr Mary Lei and Discussions with Family / Patient:  Discussed with the patient    Time Spent for Care: 30 minutes  More than 50% of total time spent on counseling and coordination of care as described above      Current Length of Stay: 1 day(s)    Current Patient Status: Inpatient   Certification Statement: The patient will continue to require additional inpatient hospital stay due to As outlined    Discharge Plan:  Awaiting clinical and symptomatic improvement    Code Status: Level 1 - Full Code      Subjective:     Reports left groin tender lymphadenopathy  Encouraged out of bed into a chair  History chart labs medications reviewed      Objective:     Vitals:   Temp (24hrs), Av 2 °F (36 8 °C), Min:98 1 °F (36 7 °C), Max:98 3 °F (36 8 °C)    Temp:  [98 1 °F (36 7 °C)-98 3 °F (36 8 °C)] 98 1 °F (36 7 °C)  HR:  [65-80] 80  Resp:  [18] 18  BP: ()/(60-77) 124/77  SpO2:  [87 %-98 %] 87 %  Body mass index is 27 97 kg/m²  Input and Output Summary (last 24 hours): Intake/Output Summary (Last 24 hours) at 2020 1550  Last data filed at 2020 1300  Gross per 24 hour   Intake 240 ml   Output 1650 ml   Net -1410 ml       Physical Exam:     Physical Exam    Comfortably sitting up in bed  Neck supple  Lungs clear to auscultation  Heart sounds S1-S2 noted  Abdomen soft nontender  Awake alert obeys simple commands  Left foot toe deformities, plantar ulceration noted  Pulses noted  No rash    Additional Data:     Labs:    Results from last 7 days   Lab Units 20  0451  08/15/20  1700   WBC Thousand/uL 13 67*   < > 11 00*   HEMOGLOBIN g/dL 13 4   < > 16 1   HEMATOCRIT % 39 8   < > 46 1   PLATELETS Thousands/uL 132*   < > 166   BANDS PCT %  --   --  10*   NEUTROS PCT % 88*  --   --    LYMPHS PCT % 6*  --   --    LYMPHO PCT %  --   --  1*   MONOS PCT % 4  --   --    MONO PCT %  --   --  5   EOS PCT % 0  --  0    < > = values in this interval not displayed       Results from last 7 days   Lab Units 20  0451   SODIUM mmol/L 140   POTASSIUM mmol/L 3 7   CHLORIDE mmol/L 108   CO2 mmol/L 29   BUN mg/dL 20   CREATININE mg/dL 0 74   ANION GAP mmol/L 3*   CALCIUM mg/dL 8 5   ALBUMIN g/dL 3 1*   TOTAL BILIRUBIN mg/dL 0 78   ALK PHOS U/L 61   ALT U/L 29   AST U/L 37   GLUCOSE RANDOM mg/dL 97     Results from last 7 days   Lab Units 08/15/20  1700   INR  1 09             Results from last 7 days   Lab Units 08/17/20  0451 08/16/20  0947 08/16/20  0746 08/16/20  0554 08/16/20  0309 08/15/20  1700   LACTIC ACID mmol/L  --  2 0  --  2 1* 2 5* 1 7   PROCALCITONIN ng/ml 9 10*  --  15 82* 14 46*  --  0 55*           * I Have Reviewed All Lab Data Listed Above  * Additional Pertinent Lab Tests Reviewed: All Labs Within Last 24 Hours Reviewed    Imaging:    Imaging Reports Reviewed Today Include:  Imaging studies noted  Imaging Personally Reviewed by Myself Includes:      Recent Cultures (last 7 days):     Results from last 7 days   Lab Units 08/17/20  0450 08/15/20  1705 08/15/20  1700   BLOOD CULTURE  Received in Microbiology Lab  Culture in Progress  Received in Microbiology Lab  Culture in Progress  No Growth at 24 hrs  Beta Hemolytic Streptococcus Group C*   GRAM STAIN RESULT   --   --  Gram positive cocci in chains*       Last 24 Hours Medication List:   Current Facility-Administered Medications   Medication Dose Route Frequency Provider Last Rate    acetaminophen  650 mg Oral Q4H PRN Love Spoon, DO      ascorbic acid  500 mg Oral Daily Love Spoon, DO      cefazolin  2,000 mg Intravenous Q8H Love Spoon, DO 2,000 mg (08/17/20 0837)    cholecalciferol  2,000 Units Oral Daily Love Spoon, DO      co-enzyme Q-10  200 mg Oral Daily Love Spoon, DO      enoxaparin  40 mg Subcutaneous Daily Love Spoon, DO      niacin  50 mg Oral Daily With Breakfast Love Spoon, DO      ondansetron  4 mg Intravenous Q6H PRN Love Spoon, DO          Today, Patient Was Seen By: Ricky Hernadez MD    ** Please Note: Dictation voice to text software may have been used in the creation of this document   **

## 2020-08-17 NOTE — ASSESSMENT & PLAN NOTE
Beta-hemolytic group C streptococcal bacteremia  Likely due to skin source from left foot ulceration  Lymphadenopathy noted  IV cefazolin 2 g q 8 hours  2D echo without vegetations  Follow-up on blood cultures  Infectious Disease following  Discussed with infectious disease

## 2020-08-17 NOTE — PLAN OF CARE
Problem: Potential for Falls  Goal: Patient will remain free of falls  Description: INTERVENTIONS:  - Assess patient frequently for physical needs  -  Identify cognitive and physical deficits and behaviors that affect risk of falls    -  [place] fall precautions as indicated by assessment   - Educate patient/family on patient safety including physical limitations  - Instruct patient to call for assistance with activity based on assessment  - Modify environment to reduce risk of injury  - Consider OT/PT consult to assist with strengthening/mobility  Outcome: Progressing

## 2020-08-17 NOTE — ASSESSMENT & PLAN NOTE
· Patient has been "shaving" on his own  Non-tender, but does become tender when on his feet for long periods of time  Small scabbed area noted but obvious s/s infection     · Podiatry input noted  · Wound care

## 2020-08-17 NOTE — CONSULTS
Podiatry - Consultation    Patient Information:   Neto Olivera  67 y o  male MRN: [id number]  [facility] Encounter: [id number]  PCP: Eder Tolentino DO  Date of Admission:  8/15/2020  Date of Consultation: 08/17/20  Requesting Physician: Shade Limon MD      ASSESSMENT:    Neto Olivera  is a 67 y o  male with:    1  Left plantar wound  2  Left lower extremity celulites  3  Hypertension    PLAN:    · Pre ulcerative lesion was debrided  After debridement the wound probed 0 5 cm  It did not probe to bone  No purulence was noted  · X-ray was ordered  · Local wound care with Dermagran and DSD  · Rest of Medical care per primary team   · Will discuss this plan with my attending and update as needed  Wound debridement note: After verbal consent was obtained, wound located at left submetatarsal 2/3 was excisionally debrided with 15 blade of all nonviable, devitalized hyperkeratotic to depth of subcutaneous tissue  Post debridement wound measurements 2x2x0 5cm, with appearance of wound  <5sq cm debrided  SUBJECTIVE:    History of Present Illness:    Neto Olivera  is a 67 y o  male who is originally admitted 8/15/2020 due to sudden onset of nausea and vomiting with fever and tachycardia  Patient has a past medical history of hypertension and cardiac disease  We are consulted for hyperkeratotic with lesion on the plantar left foot  Patient states that 3 weeks ago he cut his foot while debriding his callus under his left foot  A week later he noticed that his foot and specially his ankle was swollen  Yesterday he noticed that his leg was bright red and and hot to touch  Patient state that since he started  taking the antibiotics prescribed by primary team the redness on his left lower extremity has decreased significantly  Pts wife was in the room and also states that the redness is much better  Patient reports to be feeling better today        Review of Systems:    Constitutional: Negative  HENT: Negative  Eyes: Negative  Respiratory: Negative  Cardiovascular: Negative  Gastrointestinal: Negative  Musculoskeletal:  Digit contractions at the level of MTPJ bilaterally  Skin:left plantar wound  Neurological:  Decreased sensation  Psych: Negative  Past Medical and Surgical History:     Past Medical History:   Diagnosis Date    Cardiac disease     Hypertension     PAF (paroxysmal atrial fibrillation) ([address] )     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HCC)     BILATERALLY       Past Surgical History:   Procedure Laterality Date    ESOPHAGOGASTRODUODENOSCOPY N/A 8/16/2016    Procedure: ESOPHAGOGASTRODUODENOSCOPY (EGD); Surgeon: Dick Fay MD;  Location: [facility]; Service:     OTHER SURGICAL HISTORY      VASCULAR SURGERY Right     POPLITEAL BYPASS DUE TO ANEURYSM       Meds/Allergies:    Medications Prior to Admission   Medication    ascorbic acid (VITAMIN C) 500 mg tablet    Coenzyme Q10 200 MG capsule    Green Tea 250 MG CAPS    Liver Extract 500 MG CAPS    Magnesium Citrate 125 MG CAPS    Naproxen Sodium (ALEVE PO)    niacin 50 mg tablet    Pancreatin 325 MG TABS    POTASSIUM ACETATE IV    Probiotic Product (PROBIOTIC-10 PO)    Vitamin D, Cholecalciferol, 1000 units CAPS    clindamycin (CLEOCIN) 300 MG capsule    Flaxseed, Linseed, (FLAXSEED OIL) 1000 MG CAPS       Allergies   Allergen Reactions    Oxycodone GI Intolerance     nausea  nausea       Social History:     Marital Status: /Civil Union    Substance Use History:   Social History     Substance and Sexual Activity   Alcohol Use Not Currently     Social History     Tobacco Use   Smoking Status Never Smoker   Smokeless Tobacco Never Used     Social History     Substance and Sexual Activity   Drug Use No       Family History:    History reviewed  No pertinent family history        OBJECTIVE:    Vitals:   Blood Pressure: 99/60 (08/16/20 2200)  Pulse: 71 (08/16/20 2200)  Temperature: 98 3 °F (36 8 °C) (08/16/20 2200)  Temp Source: Oral (08/16/20 0700)  Respirations: 18 (08/16/20 2200)  Height: 6' 6" (198 1 cm) (08/15/20 1628)  Weight - Scale: 110 kg (242 lb) (08/15/20 1628)  SpO2: 93 % (08/16/20 2200)    Physical Exam:    General Appearance: Alert, cooperative, no distress  HEENT: Head normocephalic, atraumatic, without obvious abnormality  Heart: Normal rate and rhythm  Lungs: Non-labored breathing  No respiratory distress  Abdomen: Without distension  Psychiatric: AAOx3  Lower Extremity:  Vascular:   Right DP and PT pulses are present  Left DP and PT pulses are present  CRT < 3 seconds at the digits  +1/4 edema noted at left lower extremities  Pedal hair is absent  Skin temperature is mildly warm to touch on left lower leg     Musculoskeletal:  MMT is 4/5 in all muscle compartments bilaterally  ROM at the 1st MPJ and ankle joint are decreased bilaterally with the leg extended  Pain on palpation of wound after debridement as wound was being examined for drainage  Digital contractions noted L>R LE    Dermatological:   Lower extremity wound(s) as noted below:    Wound 1 located submet 2/3, measures 2 cm x 2 cm x 0 5 cm  with  undermining  Wound bed appears pink/red and fibrotic with no, light exudate  Deepest tissue noted Fat/adipose  Malodor is not noticed  Wound edge appears Attached  Bianka-wound skin appears intact and calloused  Probe to bone is,  negative   Signs of infection are not present at this time  Neurological:  Gross sensation is intact  diminished  Protective sensation is absent  Patient Denies numbness and/or paresthesias          Additional data:     Lab Results: I have personally reviewed pertinent labs including:    Results from last 7 days   Lab Units 08/16/20  0309 08/15/20  1700   WBC Thousand/uL 20 85* 11 00*   HEMOGLOBIN g/dL 14 1 16 1   HEMATOCRIT % 42 6 46 1   PLATELETS Thousands/uL 146* 166   LYMPHO PCT %  --  1*   MONO PCT %  --  5   EOS PCT %  --  0 Results from last 7 days   Lab Units 08/16/20  0309   POTASSIUM mmol/L 4 8   CHLORIDE mmol/L 108   CO2 mmol/L 29   BUN mg/dL 24   CREATININE mg/dL 1 11   CALCIUM mg/dL 8 5   ALK PHOS U/L 50   ALT U/L 21   AST U/L 21     Results from last 7 days   Lab Units 08/15/20  1700   INR  1 09       Cultures: I have personally reviewed pertinent cultures including:    Results from last 7 days   Lab Units 08/15/20  1705 08/15/20  1700   BLOOD CULTURE  No Growth at 24 hrs   --    GRAM STAIN RESULT   --  Gram positive cocci in chains*           Imaging: I have personally reviewed pertinent reports in PACS  EKG, Pathology, and Other Studies: I have personally reviewed pertinent reports  ** Please Note: Portions of the record may have been created with voice recognition software  Occasional wrong word or "sound a like" substitutions may have occurred due to the inherent limitations of voice recognition software  Read the chart carefully and recognize, using context, where substitutions have occurred   **

## 2020-08-17 NOTE — PROGRESS NOTES
Progress Note - Infectious Disease   Nirmala Deleon  67 y o  male MRN: [id number]  Unit/Bed#: [facility] 805-01 Encounter: [id number]      Impression/Plan:  1  Sepsis-POA  Fever and tachycardia  Appears to be streptococcal soft tissue infection with secondary bacteremia  No other clear source appreciated  Patient has clinically improved and seems to be tolerating the antibiotics without difficulty  No resistant organisms isolated  -continue cefazolin for now at current dose  -discontinue vancomycin  -recheck CBC with diff, CMP, procalcitonin   -supportive care     2  Group C Streptococcus bacteremia-Secondary to a left lower extremity cellulitis/lymphadenitis  No other clear source appreciated  Doubt endocarditis or endovascular infection  Transthoracic echocardiogram without valvular vegetation reported     -antibiotics as above  -follow up sensitivities and adjust antibiotics as needed  -recheck blood cultures x2 sets to confirm clearance of the bacteremia  -additional workup as needed     3  Left lower extremity cellulitis/lymphadenitis-in the setting of the patient having a callus on the left foot that he has been debriding himself  This appears to be the source of the patient's bacteremia  He has had some progression of pain and erythema as expected  -antibiotics as above for now  -elevation of the leg with addition edema control measures as needed  -serial exams  -local wound care  -podiatry follow-up     4  Ascending aortic aneurysm-stable radiographically  Discussed the above management plan with the primary service    Antibiotics:  Vancomycin 2  Cefazolin 2  Antibiotics 3    Subjective:  Patient has no fever, chills, sweats; no nausea, vomiting, diarrhea; no cough, shortness of breath; he has developed some left groin pain  No other new symptoms  Overall he feels better      Objective:  Vitals:  Temp:  [98 2 °F (36 8 °C)-98 8 °F (37 1 °C)] 98 2 °F (36 8 °C)  HR:  [65-79] 65  Resp:  [18-20] 18  BP: ()/(60-61) 99/60  SpO2:  [92 %-98 %] 98 %  Temp (24hrs), Av 4 °F (36 9 °C), Min:98 2 °F (36 8 °C), Max:98 8 °F (37 1 °C)  Current: Temperature: 98 2 °F (36 8 °C)    Physical Exam:   General Appearance:  Alert, interactive, nontoxic, no acute distress  Throat: Oropharynx moist without lesions  Lungs:   Clear to auscultation bilaterally; no wheezes, rhonchi or rales; respirations unlabored   Heart:  RRR; no murmur, rub or gallop   Abdomen:   Soft, non-tender, non-distended, positive bowel sounds  Extremities: No clubbing, cyanosis  Left calf with increased erythema  Left groin erythema and palpable lymphadenopathy  Left foot with ulceration without drainage   Skin: No new rashes or lesions  No draining wounds noted  Labs, Imaging, & Other studies:   All pertinent labs and imaging studies were personally reviewed  Results from last 7 days   Lab Units 20  0451 20  0309 08/15/20  1700   WBC Thousand/uL 13 67* 20 85* 11 00*   HEMOGLOBIN g/dL 13 4 14 1 16 1   PLATELETS Thousands/uL 132* 146* 166     Results from last 7 days   Lab Units 20  0451 20  0309 08/15/20  1700   SODIUM mmol/L 140 141 142   POTASSIUM mmol/L 3 7 4 8 3 2*   CHLORIDE mmol/L 108 108 110*   CO2 mmol/L 29 29 26   BUN mg/dL 20 24 20   CREATININE mg/dL 0 74 1 11 0 72   EGFR ml/min/1 73sq m 90 64 91   CALCIUM mg/dL 8 5 8 5 8 6   AST U/L 37 21 28   ALT U/L 29 21 23   ALK PHOS U/L 61 50 76     Results from last 7 days   Lab Units 08/15/20  1705 08/15/20  1700   BLOOD CULTURE  No Growth at 24 hrs   Beta Hemolytic Streptococcus Group C*   GRAM STAIN RESULT   --  Gram positive cocci in chains*     Results from last 7 days   Lab Units 20  0451 20  0746 20  0554 08/15/20  1700   PROCALCITONIN ng/ml 9 10* 15 82* 14 46* 0 55*        x-ray left foot-no osteomyelitis    Images personally reviewed by me in PACS

## 2020-08-17 NOTE — CONSULTS
Vancomycin IV Pharmacy-to-Dose Consultation    Vega Choi  is a 67 y o  male who was receiving Vancomycin IV with management by the Pharmacy Consult service for treatment of bacteremia  The patient's Vancomycin therapy has been discontinued  Thank you for allowing us to take part in this patient's care  Pharmacy will sign-off now; please call or re-consult if there are any questions          Elda Ji PharmD  Pharmacist

## 2020-08-18 LAB — PROCALCITONIN SERPL-MCNC: 4.63 NG/ML

## 2020-08-18 PROCEDURE — 99232 SBSQ HOSP IP/OBS MODERATE 35: CPT | Performed by: INTERNAL MEDICINE

## 2020-08-18 PROCEDURE — 84145 PROCALCITONIN (PCT): CPT | Performed by: NURSE PRACTITIONER

## 2020-08-18 RX ADMIN — CEFAZOLIN SODIUM 2000 MG: 2 SOLUTION INTRAVENOUS at 23:40

## 2020-08-18 RX ADMIN — Medication 200 MG: at 09:28

## 2020-08-18 RX ADMIN — Medication 50 MG: at 09:28

## 2020-08-18 RX ADMIN — Medication 2000 UNITS: at 09:28

## 2020-08-18 RX ADMIN — CEFAZOLIN SODIUM 2000 MG: 2 SOLUTION INTRAVENOUS at 16:25

## 2020-08-18 RX ADMIN — CEFAZOLIN SODIUM 2000 MG: 2 SOLUTION INTRAVENOUS at 09:27

## 2020-08-18 RX ADMIN — ENOXAPARIN SODIUM 40 MG: 40 INJECTION SUBCUTANEOUS at 09:28

## 2020-08-18 RX ADMIN — OXYCODONE HYDROCHLORIDE AND ACETAMINOPHEN 500 MG: 500 TABLET ORAL at 09:28

## 2020-08-18 NOTE — PROGRESS NOTES
Progress Note - Infectious Disease   Dennys Krishna  67 y o  male MRN: [id number]  Unit/Bed#: [facility] 805-01 Encounter: [id number]      Impression/Plan:  1  Sepsis-POA   Fever and tachycardia   Appears to be streptococcal soft tissue infection with secondary bacteremia   No other clear source appreciated  Patient has clinically improved and seems to be tolerating the antibiotics without difficulty  No resistant organisms isolated  Overall improving   -continue cefazolin for now at current dose  -if follow-up blood cultures remain negative over the next 48 hours, trip anticipate transition to oral antibiotics  -recheck CBC with diff, CMP, procalcitonin   -supportive care     2  Group C Streptococcus bacteremia-Secondary to a left lower extremity cellulitis/lymphadenitis  No other clear source appreciated  Doubt endocarditis or endovascular infection  Transthoracic echocardiogram without valvular vegetation reported  Follow-up blood cultures negative thus far  -antibiotics as above  -follow up sensitivities and adjust antibiotics as needed  -up repeat blood cultures  -additional workup as needed     3  Left lower extremity cellulitis/lymphadenitis-in the setting of the patient having a callus on the left foot that he has been debriding himself  This appears to be the source of the patient's bacteremia  He has had initially had some progression of pain and erythema as expected  Now improving  -antibiotics as above for now  -elevation of the leg with addition edema control measures as needed  -serial exams  -local wound care  -podiatry follow-up     4  Ascending aortic aneurysm-stable radiographically  Discussed the above management plan in detail with the primary service    Antibiotics:  Cefazolin 3  Antibiotics 4  Subjective:  Patient has no fever, chills, sweats; no nausea, vomiting, diarrhea; no cough, shortness of breath; decreased left leg pain  No new symptoms      Objective:  Vitals:  Temp: [98 1 °F (36 7 °C)-99 5 °F (37 5 °C)] 99 2 °F (37 3 °C)  HR:  [64-90] 64  Resp:  [16-18] 16  BP: (110-128)/(63-77) 110/63  SpO2:  [87 %-95 %] 95 %  Temp (24hrs), Av °F (37 2 °C), Min:98 1 °F (36 7 °C), Max:99 5 °F (37 5 °C)  Current: Temperature: 99 2 °F (37 3 °C)    Physical Exam:   General Appearance:  Alert, interactive, nontoxic, no acute distress  Throat: Oropharynx moist without lesions  Lungs:   Clear to auscultation bilaterally; no wheezes, rhonchi or rales; respirations unlabored   Heart:  RRR; no murmur, rub or gallop   Abdomen:   Soft, non-tender, non-distended, positive bowel sounds  Extremities: No clubbing, cyanosis  Left leg with decreased edema and erythema  Skin: No new rashes or lesions  No draining wounds noted  Labs, Imaging, & Other studies:   All pertinent labs and imaging studies were personally reviewed  Results from last 7 days   Lab Units 20  0451 20  0309 08/15/20  1700   WBC Thousand/uL 13 67* 20 85* 11 00*   HEMOGLOBIN g/dL 13 4 14 1 16 1   PLATELETS Thousands/uL 132* 146* 166     Results from last 7 days   Lab Units 20  0451 20  0309 08/15/20  1700   SODIUM mmol/L 140 141 142   POTASSIUM mmol/L 3 7 4 8 3 2*   CHLORIDE mmol/L 108 108 110*   CO2 mmol/L 29 29 26   BUN mg/dL 20 24 20   CREATININE mg/dL 0 74 1 11 0 72   EGFR ml/min/1 73sq m 90 64 91   CALCIUM mg/dL 8 5 8 5 8 6   AST U/L 37 21 28   ALT U/L 29 21 23   ALK PHOS U/L 61 50 76     Results from last 7 days   Lab Units 20  0450 08/15/20  1705 08/15/20  1700   BLOOD CULTURE  Received in Microbiology Lab  Culture in Progress  Received in Microbiology Lab  Culture in Progress  No Growth at 48 hrs   Beta Hemolytic Streptococcus Group C*   GRAM STAIN RESULT   --   --  Gram positive cocci in chains*     Results from last 7 days   Lab Units 20  0600 20  0451 20  0746 20  0554 08/15/20  1700   PROCALCITONIN ng/ml 4 63* 9 10* 15 82* 14 46* 0 55*

## 2020-08-18 NOTE — PROGRESS NOTES
Cultivated a relationship of care and support  Pt declined  support       08/18/20 1000   Clinical Encounter Type   Visited With Patient   Routine Visit Introduction

## 2020-08-18 NOTE — PROGRESS NOTES
Progress Note - Chantal Dash 1945, 67 y o  male MRN: [id number]    Unit/Bed#: [facility] 805-01 Encounter: [id number]    Primary Care Provider: Agatha Holden DO   Date and time admitted to hospital: 8/15/2020  4:19 PM        * Sepsis [facility])  Assessment & Plan  · Sepsis present on admission improving  · Monitor      Streptococcal bacteremia  Assessment & Plan  Beta-hemolytic group C streptococcal bacteremia  Likely due to skin source from left foot ulceration  Lymphadenopathy noted  IV cefazolin 2 g q 8 hours  2D echo without vegetations  Follow-up on blood cultures  Infectious Disease following  Discussed with infectious disease    Callus of foot  Assessment & Plan  · Patient has been "shaving" on his own  Non-tender, but does become tender when on his feet for long periods of time  Small scabbed area noted but obvious s/s infection  · Podiatry input noted  · Wound care    Aneurysm of ascending aorta (HCC)  Assessment & Plan  · CT imaging demonstrating stable size of ascending thoracic aorta up to 4 6 cm  · Outpatient follow-up as indicated    PAF (paroxysmal atrial fibrillation) (HCC)  Assessment & Plan  · In sinus rhythm not on rate controlling medications or anticoagulation  · S/p cryo PVI and CTI RFA 01/2018        VTE Pharmacologic Prophylaxis:   Pharmacologic: Enoxaparin (Lovenox)  Mechanical VTE Prophylaxis in Place: Yes    Patient Centered Rounds: I have performed bedside rounds with nursing staff today  Discussions with Specialists or Other Care Team Provider:  Discussed with Dr Beverly Jones    Education and Discussions with Family / Patient:  Discussed with the patient, reports he will keep his family informed    Time Spent for Care: 30 minutes  More than 50% of total time spent on counseling and coordination of care as described above      Current Length of Stay: 2 day(s)    Current Patient Status: Inpatient   Certification Statement: The patient will continue to require additional inpatient hospital stay due to As outlined    Discharge Plan:  Awaiting clinical and symptomatic improvement    Code Status: Level 1 - Full Code      Subjective:     Reports feeling okay  Encouraged out of bed and ambulation as able  Encouraged out of bed into chair      Objective:     Vitals:   Temp (24hrs), Av °F (37 2 °C), Min:98 1 °F (36 7 °C), Max:99 5 °F (37 5 °C)    Temp:  [98 1 °F (36 7 °C)-99 5 °F (37 5 °C)] 99 2 °F (37 3 °C)  HR:  [64-90] 64  Resp:  [16-18] 16  BP: (110-128)/(63-77) 110/63  SpO2:  [87 %-95 %] 95 %  Body mass index is 27 97 kg/m²  Input and Output Summary (last 24 hours): Intake/Output Summary (Last 24 hours) at 2020 1332  Last data filed at 2020 0900  Gross per 24 hour   Intake 720 ml   Output 750 ml   Net -30 ml       Physical Exam:     Physical Exam    Comfortably sitting up in bed  Neck supple  Lungs diminished breath sounds bases no additional sounds  Heart sounds S1-S2 noted no murmurs appreciable  Abdomen soft nontender  Awake obeys simple commands  Pulses noted  Left foot toe deformities, ulceration noted  No rash      Additional Data:     Labs:    Results from last 7 days   Lab Units 20  0451  08/15/20  1700   WBC Thousand/uL 13 67*   < > 11 00*   HEMOGLOBIN g/dL 13 4   < > 16 1   HEMATOCRIT % 39 8   < > 46 1   PLATELETS Thousands/uL 132*   < > 166   BANDS PCT %  --   --  10*   NEUTROS PCT % 88*  --   --    LYMPHS PCT % 6*  --   --    LYMPHO PCT %  --   --  1*   MONOS PCT % 4  --   --    MONO PCT %  --   --  5   EOS PCT % 0  --  0    < > = values in this interval not displayed       Results from last 7 days   Lab Units 20  0451   SODIUM mmol/L 140   POTASSIUM mmol/L 3 7   CHLORIDE mmol/L 108   CO2 mmol/L 29   BUN mg/dL 20   CREATININE mg/dL 0 74   ANION GAP mmol/L 3*   CALCIUM mg/dL 8 5   ALBUMIN g/dL 3 1*   TOTAL BILIRUBIN mg/dL 0 78   ALK PHOS U/L 61   ALT U/L 29   AST U/L 37   GLUCOSE RANDOM mg/dL 97     Results from last 7 days   Lab Units 08/15/20  1700   INR  1 09             Results from last 7 days   Lab Units 08/18/20  0600 08/17/20  0451 08/16/20  0947 08/16/20  0746 08/16/20  0554 08/16/20  0309 08/15/20  1700   LACTIC ACID mmol/L  --   --  2 0  --  2 1* 2 5* 1 7   PROCALCITONIN ng/ml 4 63* 9 10*  --  15 82* 14 46*  --  0 55*           * I Have Reviewed All Lab Data Listed Above  * Additional Pertinent Lab Tests Reviewed: All Labs Within Last 24 Hours Reviewed    Imaging:    Imaging Reports Reviewed Today Include:   Imaging Personally Reviewed by Myself Includes:     Recent Cultures (last 7 days):     Results from last 7 days   Lab Units 08/17/20  0450 08/15/20  1705 08/15/20  1700   BLOOD CULTURE  No Growth at 24 hrs  No Growth at 24 hrs  No Growth at 48 hrs  Beta Hemolytic Streptococcus Group C*   GRAM STAIN RESULT   --   --  Gram positive cocci in chains*       Last 24 Hours Medication List:   Current Facility-Administered Medications   Medication Dose Route Frequency Provider Last Rate    acetaminophen  650 mg Oral Q4H PRN Raul Luke, DO      ascorbic acid  500 mg Oral Daily Raul Luke, DO      cefazolin  2,000 mg Intravenous Q8H Raul Luke, DO 2,000 mg (08/18/20 0927)    cholecalciferol  2,000 Units Oral Daily Raul Luke, DO      co-enzyme Q-10  200 mg Oral Daily Raul Luke, DO      enoxaparin  40 mg Subcutaneous Daily Raul Luke, DO      niacin  50 mg Oral Daily With Breakfast Raul Luke, DO      ondansetron  4 mg Intravenous Q6H PRN Raul Luke, DO          Today, Patient Was Seen By: Arlyn Koyanagi, MD    ** Please Note: Dictation voice to text software may have been used in the creation of this document   **

## 2020-08-18 NOTE — SOCIAL WORK
LOS3  Not a bundle  Green readmission score  Met with patient at bedside to discuss CM role and dcp  Pt lives with disabled wife in 2 story home with first floor bed/bath  Pt independent pta  Ambulates independently  Drives  DME: walker & cane, built in shower chair and multiple grab bars  Pt has had homecare service in past-thinks from [address] [place] [address] Ave  Discussed need for VN f/u for wound care instruction  Offered foc and pt would like to use [facility]  Referral placed in [facility]  No prior inpt rehab  PCP: Dr Sharif Nam is [facility]  Denies h /o mental health and drug/ETOH treatment in past  Daughter, Pema Forman is primary contact and [phone]  Has LW  CM reviewed d/c planning process including the following: identifying help at home, patient preference for d/c planning needs, Discharge Lounge, [facility] Meds to Bed program, availability of treatment team to discuss questions or concerns patient and/or family may have regarding understanding medications and recognizing signs and symptoms once discharged  CM also encouraged patient to follow up with all recommended appointments after discharge  Patient advised of importance for patient and family to participate in managing patients medical well being

## 2020-08-19 LAB
BACTERIA BLD CULT: ABNORMAL
BASOPHILS # BLD AUTO: 0.05 THOUSANDS/ΜL (ref 0–0.1)
BASOPHILS NFR BLD AUTO: 1 % (ref 0–1)
EOSINOPHIL # BLD AUTO: 0.06 THOUSAND/ΜL (ref 0–0.61)
EOSINOPHIL NFR BLD AUTO: 1 % (ref 0–6)
ERYTHROCYTE [DISTWIDTH] IN BLOOD BY AUTOMATED COUNT: 13.2 % (ref 11.6–15.1)
GRAM STN SPEC: ABNORMAL
HCT VFR BLD AUTO: 39.6 % (ref 36.5–49.3)
HGB BLD-MCNC: 13.8 G/DL (ref 12–17)
IMM GRANULOCYTES # BLD AUTO: 0.04 THOUSAND/UL (ref 0–0.2)
IMM GRANULOCYTES NFR BLD AUTO: 1 % (ref 0–2)
LYMPHOCYTES # BLD AUTO: 1.34 THOUSANDS/ΜL (ref 0.6–4.47)
LYMPHOCYTES NFR BLD AUTO: 16 % (ref 14–44)
MCH RBC QN AUTO: 32.7 PG (ref 26.8–34.3)
MCHC RBC AUTO-ENTMCNC: 34.8 G/DL (ref 31.4–37.4)
MCV RBC AUTO: 94 FL (ref 82–98)
MONOCYTES # BLD AUTO: 0.65 THOUSAND/ΜL (ref 0.17–1.22)
MONOCYTES NFR BLD AUTO: 8 % (ref 4–12)
NEUTROPHILS # BLD AUTO: 6.15 THOUSANDS/ΜL (ref 1.85–7.62)
NEUTS SEG NFR BLD AUTO: 73 % (ref 43–75)
NRBC BLD AUTO-RTO: 0 /100 WBCS
PLATELET # BLD AUTO: 142 THOUSANDS/UL (ref 149–390)
PMV BLD AUTO: 10.8 FL (ref 8.9–12.7)
PROCALCITONIN SERPL-MCNC: 2.05 NG/ML
RBC # BLD AUTO: 4.22 MILLION/UL (ref 3.88–5.62)
WBC # BLD AUTO: 8.29 THOUSAND/UL (ref 4.31–10.16)

## 2020-08-19 PROCEDURE — 99232 SBSQ HOSP IP/OBS MODERATE 35: CPT | Performed by: INTERNAL MEDICINE

## 2020-08-19 PROCEDURE — 85025 COMPLETE CBC W/AUTO DIFF WBC: CPT | Performed by: INTERNAL MEDICINE

## 2020-08-19 PROCEDURE — 84145 PROCALCITONIN (PCT): CPT | Performed by: INTERNAL MEDICINE

## 2020-08-19 RX ADMIN — Medication 2000 UNITS: at 08:10

## 2020-08-19 RX ADMIN — Medication 50 MG: at 08:11

## 2020-08-19 RX ADMIN — CEFAZOLIN SODIUM 2000 MG: 2 SOLUTION INTRAVENOUS at 16:53

## 2020-08-19 RX ADMIN — CEFAZOLIN SODIUM 2000 MG: 2 SOLUTION INTRAVENOUS at 08:11

## 2020-08-19 RX ADMIN — OXYCODONE HYDROCHLORIDE AND ACETAMINOPHEN 500 MG: 500 TABLET ORAL at 08:10

## 2020-08-19 RX ADMIN — Medication 200 MG: at 08:10

## 2020-08-19 RX ADMIN — ENOXAPARIN SODIUM 40 MG: 40 INJECTION SUBCUTANEOUS at 08:10

## 2020-08-19 NOTE — PLAN OF CARE
Problem: Potential for Falls  Goal: Patient will remain free of falls  Description: INTERVENTIONS:  - Assess patient frequently for physical needs  -  Identify cognitive and physical deficits and behaviors that affect risk of falls    -  [place] fall precautions as indicated by assessment   - Educate patient/family on patient safety including physical limitations  - Instruct patient to call for assistance with activity based on assessment  - Modify environment to reduce risk of injury  - Consider OT/PT consult to assist with strengthening/mobility  Outcome: Progressing

## 2020-08-19 NOTE — PROGRESS NOTES
Progress Note - Infectious Disease   Vega Choi  67 y o  male MRN: [id number]  Unit/Bed#: [facility] 805-01 Encounter: [id number]      Impression/Plan:  1  Sepsis-POA   Fever and tachycardia   Appears to be streptococcal soft tissue infection with secondary bacteremia   No other clear source appreciated   Patient has clinically improved and seems to be tolerating the antibiotics without difficulty   No resistant organisms isolated  Overall improving   -continue cefazolin for now at current dose  -possibly transition to oral antibiotics late tomorrow or on Friday once blood cultures negative times 72 hours  -plan 14 days total treatment  -recheck CBC with diff, CMP, procalcitonin   -supportive care     2  Group C Streptococcus bacteremia-Secondary to a left lower extremity cellulitis/lymphadenitis   No other clear source appreciated   Doubt endocarditis or endovascular infection   Transthoracic echocardiogram without valvular vegetation reported  Follow-up blood cultures negative thus far  -antibiotics as above  -up repeat blood cultures  -additional workup as needed     3  Left lower extremity cellulitis/lymphadenitis-in the setting of the patient having a callus on the left foot that he has been debriding himself   This appears to be the source of the patient's bacteremia  [facility] has had initially had some progression of pain and erythema as expected  Continues to improve  -antibiotics as above for now  -elevation of the leg with addition edema control measures as needed  -serial exams  -local wound care  -podiatry follow-up     4  Ascending aortic aneurysm-stable radiographically  Discussed the above management plan with the primary service    Antibiotics:  Cefazolin 4  Antibiotics 5     Subjective:  Patient has no fever, chills, sweats; no nausea, vomiting, diarrhea; no cough, shortness of breath; no pain  No new symptoms  He is feeling better overall      Objective:  Vitals:  Temp:  [98 5 °F (36 9 °C)-99 1 °F (37 3 °C)] 98 5 °F (36 9 °C)  HR:  [70-72] 72  Resp:  [18-19] 18  BP: (113-133)/(62-83) 132/62  SpO2:  [96 %-98 %] 98 %  Temp (24hrs), Av 7 °F (37 1 °C), Min:98 5 °F (36 9 °C), Max:99 1 °F (37 3 °C)  Current: Temperature: 98 5 °F (36 9 °C)    Physical Exam:   General Appearance:  Alert, interactive, nontoxic, no acute distress  Throat: Oropharynx moist without lesions  Lungs:   Clear to auscultation bilaterally; no wheezes, rhonchi or rales; respirations unlabored   Heart:  RRR; no murmur, rub or gallop   Abdomen:   Soft, non-tender, non-distended, positive bowel sounds  Extremities: No clubbing, cyanosis  Decreased left leg edema and erythema  Skin: No new rashes or lesions  No draining wounds noted  Labs, Imaging, & Other studies:   All pertinent labs and imaging studies were personally reviewed  Results from last 7 days   Lab Units 20  0452 20  0451 20  0309   WBC Thousand/uL 8 29 13 67* 20 85*   HEMOGLOBIN g/dL 13 8 13 4 14 1   PLATELETS Thousands/uL 142* 132* 146*     Results from last 7 days   Lab Units 20  0451 20  0309 08/15/20  1700   SODIUM mmol/L 140 141 142   POTASSIUM mmol/L 3 7 4 8 3 2*   CHLORIDE mmol/L 108 108 110*   CO2 mmol/L 29 29 26   BUN mg/dL 20 24 20   CREATININE mg/dL 0 74 1 11 0 72   EGFR ml/min/1 73sq m 90 64 91   CALCIUM mg/dL 8 5 8 5 8 6   AST U/L 37 21 28   ALT U/L 29 21 23   ALK PHOS U/L 61 50 76     Results from last 7 days   Lab Units 20  0450 08/15/20  1705 08/15/20  1700   BLOOD CULTURE  No Growth at 24 hrs  No Growth at 24 hrs  No Growth at 72 hrs   Beta Hemolytic Streptococcus Group C*   GRAM STAIN RESULT   --   --  Gram positive cocci in chains*     Results from last 7 days   Lab Units 20  0452 20  0600 20  0451 20  0746 20  0554 08/15/20  1700   PROCALCITONIN ng/ml 2 05* 4 63* 9 10* 15 82* 14 46* 0 55*

## 2020-08-19 NOTE — PROGRESS NOTES
Progress Note - Edith Damian 1945, 67 y o  male MRN: [id number]    Unit/Bed#: [facility] 805-01 Encounter: [id number]    Primary Care Provider: Amita Harry DO   Date and time admitted to hospital: 8/15/2020  4:19 PM        * Sepsis [facility])  Assessment & Plan  · Sepsis present on admission improving  · Monitor      Streptococcal bacteremia  Assessment & Plan  Beta-hemolytic group C streptococcal bacteremia  Likely due to skin source from left foot ulceration  Lymphadenopathy noted  IV cefazolin 2 g q 8 hours  2D echo without vegetations  Follow-up on blood cultures  Infectious Disease following  Discussed with infectious disease    Callus of foot  Assessment & Plan  · Patient has been "shaving" on his own  Non-tender, but does become tender when on his feet for long periods of time  Small scabbed area noted but obvious s/s infection  · Podiatry input noted  · Wound care    Aneurysm of ascending aorta (HCC)  Assessment & Plan  · CT imaging demonstrating stable size of ascending thoracic aorta up to 4 6 cm  · Outpatient follow-up as indicated    PAF (paroxysmal atrial fibrillation) (HCC)  Assessment & Plan  · In sinus rhythm not on rate controlling medications or anticoagulation  · S/p cryo PVI and CTI RFA 01/2018        VTE Pharmacologic Prophylaxis:   Pharmacologic: Enoxaparin (Lovenox)  Mechanical VTE Prophylaxis in Place: Yes    Patient Centered Rounds: I have performed bedside rounds with nursing staff today  Discussions with Specialists or Other Care Team Provider:  Infectious disease Dr Gabe Diamond and Discussions with Family / Patient:  Discussed with the patient, reports he will keep his family updated    Time Spent for Care: 30 minutes  More than 50% of total time spent on counseling and coordination of care as described above      Current Length of Stay: 3 day(s)    Current Patient Status: Inpatient   Certification Statement: The patient will continue to require additional inpatient hospital stay due to As outlined    Discharge Plan:  Awaiting clinical and symptomatic improvement    Code Status: Level 1 - Full Code      Subjective:     Comfortably in bed  Reports feeling okay  Encouraged out of bed into a chair and ambulation as able    Objective:     Vitals:   Temp (24hrs), Av 7 °F (37 1 °C), Min:98 5 °F (36 9 °C), Max:99 1 °F (37 3 °C)    Temp:  [98 5 °F (36 9 °C)-99 1 °F (37 3 °C)] 98 5 °F (36 9 °C)  HR:  [70-72] 72  Resp:  [18-19] 18  BP: (113-133)/(62-83) 132/62  SpO2:  [96 %-98 %] 98 %  Body mass index is 27 97 kg/m²  Input and Output Summary (last 24 hours): Intake/Output Summary (Last 24 hours) at 2020 1310  Last data filed at 2020 0800  Gross per 24 hour   Intake 240 ml   Output 700 ml   Net -460 ml       Physical Exam:     Physical Exam    Comfortably in bed  Neck supple  Lungs diminished breath sounds bases no additional sounds  Heart sounds S1-S2 noted no murmurs appreciable  Abdomen soft nontender  Awake alert obeys simple commands  Pulses noted  No rash  No pedal edema  Left foot bandage noted    Additional Data:     Labs:    Results from last 7 days   Lab Units 20  0452  08/15/20  1700   WBC Thousand/uL 8 29   < > 11 00*   HEMOGLOBIN g/dL 13 8   < > 16 1   HEMATOCRIT % 39 6   < > 46 1   PLATELETS Thousands/uL 142*   < > 166   BANDS PCT %  --   --  10*   NEUTROS PCT % 73   < >  --    LYMPHS PCT % 16   < >  --    LYMPHO PCT %  --   --  1*   MONOS PCT % 8   < >  --    MONO PCT %  --   --  5   EOS PCT % 1   < > 0    < > = values in this interval not displayed       Results from last 7 days   Lab Units 20  0451   SODIUM mmol/L 140   POTASSIUM mmol/L 3 7   CHLORIDE mmol/L 108   CO2 mmol/L 29   BUN mg/dL 20   CREATININE mg/dL 0 74   ANION GAP mmol/L 3*   CALCIUM mg/dL 8 5   ALBUMIN g/dL 3 1*   TOTAL BILIRUBIN mg/dL 0 78   ALK PHOS U/L 61   ALT U/L 29   AST U/L 37   GLUCOSE RANDOM mg/dL 97     Results from last 7 days   Lab Units 08/15/20  1700   INR  1 09             Results from last 7 days   Lab Units 08/19/20  0452 08/18/20  0600 08/17/20  0451 08/16/20  0947 08/16/20  0746 08/16/20  0554 08/16/20  0309 08/15/20  1700   LACTIC ACID mmol/L  --   --   --  2 0  --  2 1* 2 5* 1 7   PROCALCITONIN ng/ml 2 05* 4 63* 9 10*  --  15 82* 14 46*  --  0 55*           * I Have Reviewed All Lab Data Listed Above  * Additional Pertinent Lab Tests Reviewed: All Labs Within Last 24 Hours Reviewed    Imaging:    Imaging Reports Reviewed Today Include:   Imaging Personally Reviewed by Myself Includes:     Recent Cultures (last 7 days):     Results from last 7 days   Lab Units 08/17/20  0450 08/15/20  1705 08/15/20  1700   BLOOD CULTURE  No Growth at 48 hrs  No Growth at 48 hrs  No Growth at 72 hrs  Beta Hemolytic Streptococcus Group C*   GRAM STAIN RESULT   --   --  Gram positive cocci in chains*       Last 24 Hours Medication List:   Current Facility-Administered Medications   Medication Dose Route Frequency Provider Last Rate    acetaminophen  650 mg Oral Q4H PRN Patricia Walls, DO      ascorbic acid  500 mg Oral Daily Patricia Walls DO      cefazolin  2,000 mg Intravenous Q8H Patricia Walls, DO 2,000 mg (08/19/20 0811)    cholecalciferol  2,000 Units Oral Daily Patricia Walls, DO      co-enzyme Q-10  200 mg Oral Daily Patricia Walls, DO      enoxaparin  40 mg Subcutaneous Daily Patricia Walls,       niacin  50 mg Oral Daily With Breakfast Patricia Walls, DO      ondansetron  4 mg Intravenous Q6H PRN Patricia Walls DO          Today, Patient Was Seen By: Alexandra Shah MD    ** Please Note: Dictation voice to text software may have been used in the creation of this document   **

## 2020-08-20 LAB
BACTERIA BLD CULT: NORMAL
PROCALCITONIN SERPL-MCNC: 1.07 NG/ML

## 2020-08-20 PROCEDURE — 99232 SBSQ HOSP IP/OBS MODERATE 35: CPT | Performed by: INTERNAL MEDICINE

## 2020-08-20 PROCEDURE — 84145 PROCALCITONIN (PCT): CPT | Performed by: INTERNAL MEDICINE

## 2020-08-20 RX ADMIN — CEFAZOLIN SODIUM 2000 MG: 2 SOLUTION INTRAVENOUS at 00:02

## 2020-08-20 RX ADMIN — CEFAZOLIN SODIUM 2000 MG: 2 SOLUTION INTRAVENOUS at 23:35

## 2020-08-20 RX ADMIN — CEFAZOLIN SODIUM 2000 MG: 2 SOLUTION INTRAVENOUS at 17:23

## 2020-08-20 RX ADMIN — Medication 200 MG: at 08:43

## 2020-08-20 RX ADMIN — ENOXAPARIN SODIUM 40 MG: 40 INJECTION SUBCUTANEOUS at 08:44

## 2020-08-20 RX ADMIN — OXYCODONE HYDROCHLORIDE AND ACETAMINOPHEN 500 MG: 500 TABLET ORAL at 08:44

## 2020-08-20 RX ADMIN — Medication 2000 UNITS: at 08:44

## 2020-08-20 RX ADMIN — CEFAZOLIN SODIUM 2000 MG: 2 SOLUTION INTRAVENOUS at 08:43

## 2020-08-20 RX ADMIN — Medication 50 MG: at 08:43

## 2020-08-20 NOTE — PLAN OF CARE
Problem: Potential for Falls  Goal: Patient will remain free of falls  Description: INTERVENTIONS:  - Assess patient frequently for physical needs  -  Identify cognitive and physical deficits and behaviors that affect risk of falls    -  [place] fall precautions as indicated by assessment   - Educate patient/family on patient safety including physical limitations  - Instruct patient to call for assistance with activity based on assessment  - Modify environment to reduce risk of injury  - Consider OT/PT consult to assist with strengthening/mobility  Outcome: Progressing

## 2020-08-20 NOTE — PROGRESS NOTES
Progress Note - Infectious Disease   Arie Tabor  67 y o  male MRN: [id number]  Unit/Bed#: [facility] 805-01 Encounter: [id number]      Impression/Plan:  1  Sepsis-POA   Fever and tachycardia   Appears to be streptococcal soft tissue infection with secondary bacteremia   No other clear source appreciated   Patient has clinically improved and seems to be tolerating the antibiotics without difficulty   No resistant organisms isolated  Overall improving   -continue cefazolin for now at current dose  -if follow-up blood cultures remain negative tomorrow a m  Will transition to Keflex with a plan to continue the Keflex through 8/29/2020 to complete 14 days total treatment  -recheck CBC with diff, and BMP  -supportive care     2  Group C Streptococcus bacteremia-Secondary to a left lower extremity cellulitis/lymphadenitis   No other clear source appreciated   Doubt endocarditis or endovascular infection   Transthoracic echocardiogram without valvular vegetation reported  Follow-up blood cultures negative thus far  -antibiotics as above  -follow-up repeat blood cultures  -additional workup as needed     3  Left lower extremity cellulitis/lymphadenitis-in the setting of the patient having a callus on the left foot that he has been debriding himself   This appears to be the source of the patient's bacteremia  Elfego Juan has had initially had some progression of pain and erythema as expected  Continues to improve  -antibiotics as above for now  -elevation of the leg with addition edema control measures as needed  -serial exams  -local wound care  -podiatry follow-up     4  Ascending aortic aneurysm-stable radiographically  Discussed the above management plan with the primary service    Antibiotics:  Cefazolin 4  Antibiotics 5     Subjective:  Patient has no fever, chills, sweats; no nausea, vomiting, diarrhea; no cough, shortness of breath; no pain  No new symptoms    He is in good spirits and anxious to get home     Objective:  Vitals:  Temp:  [98 7 °F (37 1 °C)-99 7 °F (37 6 °C)] 99 °F (37 2 °C)  Resp:  [18] 18  BP: (114-131)/(71-78) 116/71  Temp (24hrs), Av 1 °F (37 3 °C), Min:98 7 °F (37 1 °C), Max:99 7 °F (37 6 °C)  Current: Temperature: 99 °F (37 2 °C)    Physical Exam:   General Appearance:  Alert, interactive, nontoxic, no acute distress  Throat: Oropharynx moist without lesions  Lungs:   Clear to auscultation bilaterally; no wheezes, rhonchi or rales; respirations unlabored   Heart:  RRR; no murmur, rub or gallop   Abdomen:   Soft, non-tender, non-distended, positive bowel sounds  Extremities: No clubbing, cyanosis  Decreased left leg edema and erythema   Skin: No new rashes or lesions  No draining wounds noted  Labs, Imaging, & Other studies:   All pertinent labs and imaging studies were personally reviewed  Results from last 7 days   Lab Units 20  04520  0451 20  0309   WBC Thousand/uL 8 29 13 67* 20 85*   HEMOGLOBIN g/dL 13 8 13 4 14 1   PLATELETS Thousands/uL 142* 132* 146*     Results from last 7 days   Lab Units 20  0451 20  0309 08/15/20  1700   SODIUM mmol/L 140 141 142   POTASSIUM mmol/L 3 7 4 8 3 2*   CHLORIDE mmol/L 108 108 110*   CO2 mmol/L 29 29 26   BUN mg/dL 20 24 20   CREATININE mg/dL 0 74 1 11 0 72   EGFR ml/min/1 73sq m 90 64 91   CALCIUM mg/dL 8 5 8 5 8 6   AST U/L 37 21 28   ALT U/L 29 21 23   ALK PHOS U/L 61 50 76     Results from last 7 days   Lab Units 20  0450 08/15/20  1705 08/15/20  1700   BLOOD CULTURE  No Growth at 48 hrs  No Growth at 48 hrs  No Growth After 4 Days   Beta Hemolytic Streptococcus Group C*   GRAM STAIN RESULT   --   --  Gram positive cocci in chains*     Results from last 7 days   Lab Units 20  0454 20  0452 20  0600 20  0451 20  0746 20  0554 08/15/20  1700   PROCALCITONIN ng/ml 1 07* 2 05* 4 63* 9 10* 15 82* 14 46* 0 55*

## 2020-08-20 NOTE — PROGRESS NOTES
Progress Note - Kenny Pelaez 1945, 67 y o  male MRN: [id number]    Unit/Bed#: [facility] 805-01 Encounter: [id number]    Primary Care Provider: Codi Ceron DO   Date and time admitted to hospital: 8/15/2020  4:19 PM        * Sepsis [facility])  Assessment & Plan  · Sepsis present on admission improving  · Monitor    Streptococcal bacteremia  Assessment & Plan  Beta-hemolytic group C streptococcal bacteremia  Likely due to skin source from left foot ulceration  Lymphadenopathy noted  IV cefazolin 2 g q 8 hours  2D echo without vegetations  Follow-up on blood cultures  Infectious Disease following  Discussed with infectious disease    Callus of foot  Assessment & Plan  · Patient has been "shaving" on his own  Non-tender, but does become tender when on his feet for long periods of time  Small scabbed area noted but obvious s/s infection  · Podiatry input noted  · Wound care    Aneurysm of ascending aorta (HCC)  Assessment & Plan  · CT imaging demonstrating stable size of ascending thoracic aorta up to 4 6 cm  · Outpatient follow-up as indicated    PAF (paroxysmal atrial fibrillation) (HCC)  Assessment & Plan  · In sinus rhythm not on rate controlling medications or anticoagulation  · S/p cryo PVI and CTI RFA 01/2018        VTE Pharmacologic Prophylaxis:   Pharmacologic: Enoxaparin (Lovenox)  Mechanical VTE Prophylaxis in Place: Yes    Patient Centered Rounds: I have performed bedside rounds with nursing staff today  Discussions with Specialists or Other Care Team Provider:  Infectious Disease Dr Mary Dodge and Discussions with Family / Patient:  Discussed the patient, reports he will keep his family updated    Time Spent for Care: 30 minutes  More than 50% of total time spent on counseling and coordination of care as described above      Current Length of Stay: 4 day(s)    Current Patient Status: Inpatient   Certification Statement: The patient will continue to require additional inpatient hospital stay due to As outlined    Discharge Plan:  Awaiting clinical symptomatic improvement, awaiting blood cultures to be negative Infectious Disease input noted possible discharge tomorrow    Code Status: Level 1 - Full Code      Subjective:     Comfortably in bed  Reports feeling better  Encouraged out of bed into a chair and ambulation as able      Objective:     Vitals:   Temp (24hrs), Av 7 °F (37 1 °C), Min:98 4 °F (36 9 °C), Max:99 °F (37 2 °C)    Temp:  [98 4 °F (36 9 °C)-99 °F (37 2 °C)] 98 4 °F (36 9 °C)  HR:  [78] 78  Resp:  [18] 18  BP: (114-120)/(70-71) 120/70  SpO2:  [95 %] 95 %  Body mass index is 27 97 kg/m²  Input and Output Summary (last 24 hours): Intake/Output Summary (Last 24 hours) at 2020 1534  Last data filed at 2020 1529  Gross per 24 hour   Intake 240 ml   Output 1975 ml   Net -1735 ml       Physical Exam:     Physical Exam    Comfortably in bed  Neck supple  Lungs diminished breath sounds bases no additional sounds  Heart sounds S1-S2 noted  Abdomen soft nontender  Awake alert obeys simple commands  Pulses noted  Rash  Left foot bandage noted      Additional Data:     Labs:    Results from last 7 days   Lab Units 20  0452  08/15/20  1700   WBC Thousand/uL 8 29   < > 11 00*   HEMOGLOBIN g/dL 13 8   < > 16 1   HEMATOCRIT % 39 6   < > 46 1   PLATELETS Thousands/uL 142*   < > 166   BANDS PCT %  --   --  10*   NEUTROS PCT % 73   < >  --    LYMPHS PCT % 16   < >  --    LYMPHO PCT %  --   --  1*   MONOS PCT % 8   < >  --    MONO PCT %  --   --  5   EOS PCT % 1   < > 0    < > = values in this interval not displayed       Results from last 7 days   Lab Units 20  0451   SODIUM mmol/L 140   POTASSIUM mmol/L 3 7   CHLORIDE mmol/L 108   CO2 mmol/L 29   BUN mg/dL 20   CREATININE mg/dL 0 74   ANION GAP mmol/L 3*   CALCIUM mg/dL 8 5   ALBUMIN g/dL 3 1*   TOTAL BILIRUBIN mg/dL 0 78   ALK PHOS U/L 61   ALT U/L 29   AST U/L 37   GLUCOSE RANDOM mg/dL 97 Results from last 7 days   Lab Units 08/15/20  1700   INR  1 09             Results from last 7 days   Lab Units 08/20/20  0454 08/19/20  0452 08/18/20  0600 08/17/20  0451 08/16/20  0947 08/16/20  0746 08/16/20  0554 08/16/20  0309 08/15/20  1700   LACTIC ACID mmol/L  --   --   --   --  2 0  --  2 1* 2 5* 1 7   PROCALCITONIN ng/ml 1 07* 2 05* 4 63* 9 10*  --  15 82* 14 46*  --  0 55*           * I Have Reviewed All Lab Data Listed Above  * Additional Pertinent Lab Tests Reviewed: All Labs Within Last 24 Hours Reviewed    Imaging:    Imaging Reports Reviewed Today Include:   Imaging Personally Reviewed by Myself Includes:      Recent Cultures (last 7 days):     Results from last 7 days   Lab Units 08/17/20  0450 08/15/20  1705 08/15/20  1700   BLOOD CULTURE  No Growth at 72 hrs  No Growth at 72 hrs  No Growth After 4 Days  Beta Hemolytic Streptococcus Group C*   GRAM STAIN RESULT   --   --  Gram positive cocci in chains*       Last 24 Hours Medication List:   Current Facility-Administered Medications   Medication Dose Route Frequency Provider Last Rate    acetaminophen  650 mg Oral Q4H PRN Dayanara Mace, DO      ascorbic acid  500 mg Oral Daily Dayanara Mace, DO      cefazolin  2,000 mg Intravenous Q8H Dayanara Mace, DO 2,000 mg (08/20/20 0843)    cholecalciferol  2,000 Units Oral Daily Dayanara Mace, DO      co-enzyme Q-10  200 mg Oral Daily Dayanara Mace, DO      enoxaparin  40 mg Subcutaneous Daily Dayanara Mace, DO      niacin  50 mg Oral Daily With Breakfast Dayanara Mace, DO      ondansetron  4 mg Intravenous Q6H PRN Dayanara Mace, DO          Today, Patient Was Seen By: Fly Kunz MD    ** Please Note: Dictation voice to text software may have been used in the creation of this document   **

## 2020-08-21 VITALS
SYSTOLIC BLOOD PRESSURE: 134 MMHG | HEART RATE: 80 BPM | BODY MASS INDEX: 28 KG/M2 | TEMPERATURE: 97.3 F | RESPIRATION RATE: 18 BRPM | HEIGHT: 78 IN | DIASTOLIC BLOOD PRESSURE: 76 MMHG | WEIGHT: 242 LBS | OXYGEN SATURATION: 96 %

## 2020-08-21 PROCEDURE — 99239 HOSP IP/OBS DSCHRG MGMT >30: CPT | Performed by: INTERNAL MEDICINE

## 2020-08-21 PROCEDURE — NC001 PR NO CHARGE: Performed by: INTERNAL MEDICINE

## 2020-08-21 PROCEDURE — 99232 SBSQ HOSP IP/OBS MODERATE 35: CPT | Performed by: INTERNAL MEDICINE

## 2020-08-21 PROCEDURE — 99232 SBSQ HOSP IP/OBS MODERATE 35: CPT | Performed by: PODIATRIST

## 2020-08-21 RX ORDER — CEPHALEXIN 500 MG/1
500 CAPSULE ORAL EVERY 6 HOURS SCHEDULED
Status: DISCONTINUED | OUTPATIENT
Start: 2020-08-21 | End: 2020-08-21 | Stop reason: HOSPADM

## 2020-08-21 RX ORDER — CEPHALEXIN 500 MG/1
500 CAPSULE ORAL EVERY 6 HOURS SCHEDULED
Qty: 32 CAPSULE | Refills: 0 | Status: SHIPPED | OUTPATIENT
Start: 2020-08-21 | End: 2020-08-29

## 2020-08-21 RX ADMIN — CEFAZOLIN SODIUM 2000 MG: 2 SOLUTION INTRAVENOUS at 08:12

## 2020-08-21 RX ADMIN — Medication 2000 UNITS: at 08:12

## 2020-08-21 RX ADMIN — Medication 50 MG: at 08:08

## 2020-08-21 RX ADMIN — OXYCODONE HYDROCHLORIDE AND ACETAMINOPHEN 500 MG: 500 TABLET ORAL at 08:12

## 2020-08-21 RX ADMIN — ENOXAPARIN SODIUM 40 MG: 40 INJECTION SUBCUTANEOUS at 08:12

## 2020-08-21 RX ADMIN — Medication 200 MG: at 08:12

## 2020-08-21 NOTE — PROGRESS NOTES
Podiatry - Progress Note  Patient: Jose Hernandez  67 y o  male   MRN: [id number]  PCP: Luis Felipe Ram DO  Unit/Bed#: [facility] 805-01 Encounter: [id number]  Date Of Visit: 20    ASSESSMENT:    Jose Hernandez  is a 67 y o  male with:    1  Left plantar wound 2/2 foot deformity - POA  2  Left lower extremity cellulitis/lymphadenitis  3  Hypertension      PLAN:    · Continue local wound care, no podiatric intervention needed at this time  Nursing instructions placed for q o d  dressing changes  · Left foot x-ray reviewed:  Negative for any acute soft tissue or bony pathology  · Keflex upon d/c for lower extremity cellulitis per ID  · Rest of medical care per primary team        SUBJECTIVE:     The patient was seen, evaluated, and assessed at bedside today  The patient was awake, alert, and in no acute distress  No acute events overnight  The patient reports no pedal complaints at this time  Patient denies N/V/F/chills/SOB/CP  OBJECTIVE:     Vitals:   /76   Pulse 85   Temp (!) 97 3 °F (36 3 °C)   Resp 18   Ht 6' 6" (1 981 m)   Wt 110 kg (242 lb)   SpO2 96%   BMI 27 97 kg/m²     Temp (24hrs), Av 1 °F (36 7 °C), Min:97 3 °F (36 3 °C), Max:98 7 °F (37 1 °C)      Physical Exam:     General:  Alert, cooperative, and in no distress  Lower extremity exam:  Cardiovascular status at baseline  Neurological status at baseline  Musculoskeletal status at baseline  No calf tenderness noted       Wound #:  1  Location:  Sub met 2/3  Length 2 cm: Width 2 cm: Depth 0 5 cm:   Deepest Tissue Noted in Base:  Subcutaneous  Probe to Bone: No  Peripheral Skin Description: Attached  Granulation: 90% Fibrotic Tissue: 10% Necrotic Tissue: 0%   Drainage Amount: None  Signs of Infection: No    Additional Data:     Labs:    Results from last 7 days   Lab Units 20  0452   WBC Thousand/uL 8 29   HEMOGLOBIN g/dL 13 8   HEMATOCRIT % 39 6   PLATELETS Thousands/uL 142*   NEUTROS PCT % 73   LYMPHS PCT % 16   MONOS PCT % 8   EOS PCT % 1     Results from last 7 days   Lab Units 08/17/20  0451   POTASSIUM mmol/L 3 7   CHLORIDE mmol/L 108   CO2 mmol/L 29   BUN mg/dL 20   CREATININE mg/dL 0 74   CALCIUM mg/dL 8 5   ALK PHOS U/L 61   ALT U/L 29   AST U/L 37     Results from last 7 days   Lab Units 08/15/20  1700   INR  1 09       * I Have Reviewed All Lab Data Listed Above  Recent Cultures (last 7 days):     Results from last 7 days   Lab Units 08/17/20  0450 08/15/20  1705 08/15/20  1700   BLOOD CULTURE  No Growth at 72 hrs  No Growth at 72 hrs  No Growth After 5 Days  Beta Hemolytic Streptococcus Group C*   GRAM STAIN RESULT   --   --  Gram positive cocci in chains*           Imaging: I have personally reviewed pertinent films in PACS  EKG, Pathology, and Other Studies: I have personally reviewed pertinent reports  ** Please Note: Portions of the record may have been created with voice recognition software  Occasional wrong word or "sound a like" substitutions may have occurred due to the inherent limitations of voice recognition software  Read the chart carefully and recognize, using context, where substitutions have occurred   **

## 2020-08-21 NOTE — ASSESSMENT & PLAN NOTE
Beta-hemolytic group C streptococcal bacteremia  Likely due to skin source from left foot ulceration  Lymphadenopathy noted  IV cefazolin 2 g q 8 hours  2D echo without vegetations    Repeat blood cultures negative  Discussed with infectious disease transition to p o   Keflex through 08/29/2020 to complete 14 days antibiotics

## 2020-08-21 NOTE — DISCHARGE SUMMARY
Primary Care Provider: Carla Nickerson DO   Date and time admitted to hospital: 8/15/2020  4:19 PM           * Sepsis ([facility] [address] )  Assessment & Plan  · Sepsis present on admission  · Improved     Streptococcal bacteremia  Assessment & Plan  Beta-hemolytic group C streptococcal bacteremia  Likely due to skin source from left foot ulceration  Lymphadenopathy noted  IV cefazolin 2 g q 8 hours  2D echo without vegetations     Repeat blood cultures negative  Discussed with infectious disease transition to p o  Keflex through 08/29/2020 to complete 14 days antibiotics        Callus of foot  Assessment & Plan  · Patient has been "shaving" on his own  Non-tender, but does become tender when on his feet for long periods of time  Small scabbed area noted but obvious s/s infection  · Podiatry input noted  · Wound care     Aneurysm of ascending aorta (HCC)  Assessment & Plan  · CT imaging demonstrating stable size of ascending thoracic aorta up to 4 6 cm  · Outpatient follow-up as indicated     PAF (paroxysmal atrial fibrillation) ([facility])  Assessment & Plan  · In sinus rhythm not on rate controlling medications or anticoagulation  · S/p cryo PVI and CTI RFA 01/2018       Discharge Summary - [facility] Medicine     Patient Information: Hai Briggs  67 y o  male MRN: [id number]  Unit/Bed#: [facility] 805-01 Encounter: [id number]     Discharging Physician / Practitioner: Bernie Stover MD  PCP: Carla Nickerson DO  Admission Date: 8/15/2020  Discharge Date: 08/21/20     Disposition:      Home     Reason for Admission:  Nausea vomiting chills     Discharge Diagnoses:      Principal Problem:    Sepsis ([facility] [address] )  Active Problems:    Streptococcal bacteremia    PAF (paroxysmal atrial fibrillation) ([facility] )    Aneurysm of ascending aorta ([facility] )    Hypoxia    Callus of foot  Resolved Problems:    * No resolved hospital problems   *       Consultations During Hospital Stay:  · Infectious disease  · Podiatry     Procedures Performed:      · Chest x-ray no active lung disease  · CT - no evidence of aortic dissection stable aneurysmal dilatation ascending thoracic aorta, no focal airspace consolidation, bibasilar atelectasis, colonic diverticulosis  · Left foot x-ray no evidence of osteomyelitis  · 2D echo EF 55% no regional wall motion abnormality        Hospital Course:      Aubrey Deluca  is a 67 y o  male patient who originally presented to the hospital on 8/15/2020 due to nausea vomiting chills  Patient was noted to have sepsis he had a left foot ulcer, he was noted to have bacteremia beta-hemolytic group C streptococcal he received IV antibiotics he was seen in consultation Infectious Disease  2D echo negative for vegetations  Repeat blood cultures negative at 72 hours has been transitioned to p o  Keflex to complete total 14 days of antibiotics through 08/29/2020  He is hemodynamically stable symptomatically improved and is deemed ready for discharge today  Kindly review the chart for details      Condition at Discharge: fair      Discharge Day Visit / Exam:      Subjective:       Sitting up in bed  Reports feeling better  Agreeable to discharge        Vitals: Blood Pressure: 134/76 (08/21/20 0818)  Pulse: 80 (08/21/20 0818)  Temperature: (!) 97 3 °F (36 3 °C) (08/21/20 0818)  Temp Source: Oral (08/19/20 0700)  Respirations: 18 (08/20/20 2218)  Height: 6' 6" (198 1 cm) (08/15/20 1628)  Weight - Scale: 110 kg (242 lb) (08/15/20 1628)  SpO2: 96 % (08/21/20 0818)  Exam:   Physical Exam     Comfortably sitting up in bed  Neck supple  Lungs diminished breath sounds bases no additional sounds  Heart sounds S1-S2 noted no murmurs appreciable  Abdomen soft nontender  Awake obeys simple commands  Pulses noted  No rash  Left foot bandage noted     Discharge instructions/Information to patient and family:   See after visit summary for information provided to patient and family     Discharge plan discussed with infectious disease Dr Alfonso Franklin  Discharge plan discussed the patient, reports he will keep his family updated  Outpatient follow-up with primary care physician podiatry     Provisions for Follow-Up Care:  See after visit summary for information related to follow-up care and any pertinent home health orders        Planned Readmission: no     Discharge Statement:  I spent 45 minutes discharging the patient  This time was spent on the day of discharge  I had direct contact with the patient on the day of discharge  Greater than 50% of the total time was spent examining patient, answering all patient questions, arranging and discussing plan of care with patient as well as directly providing post-discharge instructions    Additional time then spent on discharge activities      Discharge Medications:  See after visit summary for reconciled discharge medications provided to patient and family        ** Please Note: This note has been constructed using a voice recognition system **

## 2020-08-21 NOTE — PROGRESS NOTES
Progress Note - Infectious Disease   Verena Choi  67 y o  male MRN: [id number]  Unit/Bed#: [facility] 805-01 Encounter: [id number]      Impression/Plan:  1  Sepsis-POACoralie Pandy and tachycardia   Appears to be streptococcal soft tissue infection with secondary bacteremia   No other clear source appreciated   Patient has clinically improved and seems to be tolerating the antibiotics without difficulty   No resistant organisms isolated   Overall improving   -discontinue cefazolin  -begin Keflex 500 mg p o  Q i d  through 8/29/2020 to complete 14 days total treatment  -supportive care     2  Group C Streptococcus bacteremia-Secondary to a left lower extremity cellulitis/lymphadenitis   No other clear source appreciated   Doubt endocarditis or endovascular infection   Transthoracic echocardiogram without valvular vegetation reported  The bacteremia has cleared  -antibiotics as above  -follow-up repeat blood cultures  -additional workup as needed     3  Left lower extremity cellulitis/lymphadenitis-in the setting of the patient having a callus on the left foot that he has been debriding himself   This appears to be the source of the patient's bacteremia  Richard Oliver has had initially had some progression of pain and erythema as expected  Continues to improve  -antibiotics as above for now  -elevation of the leg with addition edema control measures as needed  -serial exams  -local wound care  -podiatry follow-up     4  Ascending aortic aneurysm-stable radiographically  Discussed the above management plan in detail with the primary service    Okay to discharge from infectious disease standpoint    Antibiotics:  Cefazolin 5  Antibiotics 6    Subjective:  Patient has no fever, chills, sweats; no nausea, vomiting, diarrhea; no cough, shortness of breath; no pain  No new symptoms  He is feeling much better overall      Objective:  Vitals:  Temp:  [97 3 °F (36 3 °C)-98 7 °F (37 1 °C)] 97 3 °F (36 3 °C)  HR:  [78-85] 85  Resp: [18] 18  BP: (119-134)/(70-76) 134/76  SpO2:  [95 %-96 %] 96 %  Temp (24hrs), Av 1 °F (36 7 °C), Min:97 3 °F (36 3 °C), Max:98 7 °F (37 1 °C)  Current: Temperature: (!) 97 3 °F (36 3 °C)    Physical Exam:   General Appearance:  Alert, interactive, nontoxic, no acute distress  Throat: Oropharynx moist without lesions  Lungs:   Clear to auscultation bilaterally; no wheezes, rhonchi or rales; respirations unlabored   Heart:  RRR; no murmur, rub or gallop   Abdomen:   Soft, non-tender, non-distended, positive bowel sounds  Extremities: No clubbing, cyanosis  Left leg erythema essentially resolved   Skin: No new rashes or lesions  No draining wounds noted  Labs, Imaging, & Other studies:   All pertinent labs and imaging studies were personally reviewed  Results from last 7 days   Lab Units 20  0452 20  0451 20  0309   WBC Thousand/uL 8 29 13 67* 20 85*   HEMOGLOBIN g/dL 13 8 13 4 14 1   PLATELETS Thousands/uL 142* 132* 146*     Results from last 7 days   Lab Units 20  0451 20  0309 08/15/20  1700   SODIUM mmol/L 140 141 142   POTASSIUM mmol/L 3 7 4 8 3 2*   CHLORIDE mmol/L 108 108 110*   CO2 mmol/L 29 29 26   BUN mg/dL 20 24 20   CREATININE mg/dL 0 74 1 11 0 72   EGFR ml/min/1 73sq m 90 64 91   CALCIUM mg/dL 8 5 8 5 8 6   AST U/L 37 21 28   ALT U/L 29 21 23   ALK PHOS U/L 61 50 76     Results from last 7 days   Lab Units 20  0450 08/15/20  1705 08/15/20  1700   BLOOD CULTURE  No Growth at 72 hrs  No Growth at 72 hrs  No Growth After 5 Days   Beta Hemolytic Streptococcus Group C*   GRAM STAIN RESULT   --   --  Gram positive cocci in chains*     Results from last 7 days   Lab Units 20  0454 20  0452 20  0600 20  0451 20  0746 20  0554 08/15/20  1700   PROCALCITONIN ng/ml 1 07* 2 05* 4 63* 9 10* 15 82* 14 46* 0 55*

## 2020-08-21 NOTE — PROGRESS NOTES
Progress Note - Sallie Palmer 1945, 67 y o  male MRN: [id number]    Unit/Bed#: [facility] 805-01 Encounter: [id number]    Primary Care Provider: Jenifer Metzger DO   Date and time admitted to hospital: 8/15/2020  4:19 PM        * Sepsis [facility])  Assessment & Plan  · Sepsis present on admission  · Improved    Streptococcal bacteremia  Assessment & Plan  Beta-hemolytic group C streptococcal bacteremia  Likely due to skin source from left foot ulceration  Lymphadenopathy noted  IV cefazolin 2 g q 8 hours  2D echo without vegetations    Repeat blood cultures negative  Discussed with infectious disease transition to p o  Keflex through 08/29/2020 to complete 14 days antibiotics      Callus of foot  Assessment & Plan  · Patient has been "shaving" on his own  Non-tender, but does become tender when on his feet for long periods of time  Small scabbed area noted but obvious s/s infection     · Podiatry input noted  · Wound care    Aneurysm of ascending aorta (HCC)  Assessment & Plan  · CT imaging demonstrating stable size of ascending thoracic aorta up to 4 6 cm  · Outpatient follow-up as indicated    PAF (paroxysmal atrial fibrillation) (HCC)  Assessment & Plan  · In sinus rhythm not on rate controlling medications or anticoagulation  · S/p cryo PVI and CTI RFA 01/2018      Discharge Summary - [facility]    Patient Information: Sallie Palmer  67 y o  male MRN: [id number]  Unit/Bed#: [facility] 805-01 Encounter: [id number]    Discharging Physician / Practitioner: Ilda Bowles MD  PCP: Jenifer Metzger DO  Admission Date: 8/15/2020  Discharge Date: 08/21/20    Disposition:     Home    Reason for Admission:  Nausea vomiting chills    Discharge Diagnoses:     Principal Problem:    Sepsis ([address] )  Active Problems:    Streptococcal bacteremia    PAF (paroxysmal atrial fibrillation) ([facility] [address] )    Aneurysm of ascending aorta (HCC)    Hypoxia    Callus of foot  Resolved Problems:    * No resolved hospital problems  *      Consultations During Hospital Stay:  · Infectious disease  · Podiatry    Procedures Performed:     · Chest x-ray no active lung disease  · CT - no evidence of aortic dissection stable aneurysmal dilatation ascending thoracic aorta, no focal airspace consolidation, bibasilar atelectasis, colonic diverticulosis  · Left foot x-ray no evidence of osteomyelitis  · 2D echo EF 55% no regional wall motion abnormality      Hospital Course:     Kalyn Lopez  is a 67 y o  male patient who originally presented to the hospital on 8/15/2020 due to nausea vomiting chills  Patient was noted to have sepsis he had a left foot ulcer, he was noted to have bacteremia beta-hemolytic group C streptococcal he received IV antibiotics he was seen in consultation Infectious Disease  2D echo negative for vegetations  Repeat blood cultures negative at 72 hours has been transitioned to p o  Keflex to complete total 14 days of antibiotics through 08/29/2020  He is hemodynamically stable symptomatically improved and is deemed ready for discharge today  Kindly review the chart for details      Condition at Discharge: fair     Discharge Day Visit / Exam:     Subjective:      Sitting up in bed  Reports feeling better  Agreeable to discharge      Vitals: Blood Pressure: 134/76 (08/21/20 0818)  Pulse: 80 (08/21/20 0818)  Temperature: (!) 97 3 °F (36 3 °C) (08/21/20 0818)  Temp Source: Oral (08/19/20 0700)  Respirations: 18 (08/20/20 2218)  Height: 6' 6" (198 1 cm) (08/15/20 1628)  Weight - Scale: 110 kg (242 lb) (08/15/20 1628)  SpO2: 96 % (08/21/20 0818)  Exam:   Physical Exam    Comfortably sitting up in bed  Neck supple  Lungs diminished breath sounds bases no additional sounds  Heart sounds S1-S2 noted no murmurs appreciable  Abdomen soft nontender  Awake obeys simple commands  Pulses noted  No rash  Left foot bandage noted    Discharge instructions/Information to patient and family:   See after visit summary for information provided to patient and family  Discharge plan discussed with infectious disease Dr Christiana Ferrer  Discharge plan discussed the patient, reports he will keep his family updated  Outpatient follow-up with primary care physician podiatry    Provisions for Follow-Up Care:  See after visit summary for information related to follow-up care and any pertinent home health orders  Planned Readmission: no     Discharge Statement:  I spent 45 minutes discharging the patient  This time was spent on the day of discharge  I had direct contact with the patient on the day of discharge  Greater than 50% of the total time was spent examining patient, answering all patient questions, arranging and discussing plan of care with patient as well as directly providing post-discharge instructions  Additional time then spent on discharge activities  Discharge Medications:  See after visit summary for reconciled discharge medications provided to patient and family        ** Please Note: This note has been constructed using a voice recognition system **

## 2020-08-21 NOTE — DISCHARGE INSTRUCTIONS
Discharge Instructions - Podiatry    Weight Bearing Status: Weight bearing as tolerated                     Pain: Continue analgesics as directed    Follow-up appointment instructions: Please make an appointment within one week of discharge with Dr Austin Menezes  Contact sooner if any increase in pain, or signs of infection occur    Wound Care: Please apply Inna De Leon  Then cover with Gauze and secure with Kerlix and tape  Please change dressing every Monday, Wednesday, and Friday

## 2020-08-22 LAB
BACTERIA BLD CULT: NORMAL
BACTERIA BLD CULT: NORMAL

## 2020-08-23 ENCOUNTER — TELEPHONE (OUTPATIENT)
Dept: OTHER | Facility: HOSPITAL | Age: 75
End: 2020-08-23

## 2020-08-23 NOTE — TELEPHONE ENCOUNTER
I spoke with the patient over the phone today  Patient complaining of headache and is concerned that it secondary to Keflex use  Patient denies other associated symptoms such as dizziness, weakness, unsteadiness  Denies fever or chills  I encouraged patient to discuss his headache with his primary care doctor and to follow his PCP recommendations regarding pain medications that he can use  I asked patient to go to the ER if the headache is severe or if he has any dizziness, weakness, unsteadiness associated with a headache  One informed that alternative for the current antibiotic exist and antibiotic can be switched to an alternative regimen, patient declines, and reports that the headache is mild, and he prefers not to try other antibiotic at this time    Patient agrees with above-mentioned recommendation regarding discussing his headache with PCP and going to ER if the headache gets worse

## 2021-02-22 ENCOUNTER — APPOINTMENT (EMERGENCY)
Dept: RADIOLOGY | Facility: HOSPITAL | Age: 76
DRG: 310 | End: 2021-02-22
Payer: MEDICARE

## 2021-02-22 ENCOUNTER — HOSPITAL ENCOUNTER (INPATIENT)
Facility: HOSPITAL | Age: 76
LOS: 1 days | Discharge: HOME | DRG: 310 | End: 2021-02-23
Attending: EMERGENCY MEDICINE | Admitting: INTERNAL MEDICINE
Payer: MEDICARE

## 2021-02-22 DIAGNOSIS — R07.89 CHEST PRESSURE: Primary | ICD-10-CM

## 2021-02-22 DIAGNOSIS — I71.2 ANEURYSM OF ASCENDING AORTA (HCC): ICD-10-CM

## 2021-02-22 DIAGNOSIS — I48.91 ATRIAL FIBRILLATION WITH RVR (HCC): ICD-10-CM

## 2021-02-22 DIAGNOSIS — I48.91 ATRIAL FIBRILLATION (HCC): ICD-10-CM

## 2021-02-22 PROBLEM — D75.1 ERYTHROCYTOSIS: Status: ACTIVE | Noted: 2021-02-22

## 2021-02-22 PROBLEM — R07.9 CHEST PAIN: Status: ACTIVE | Noted: 2021-02-22

## 2021-02-22 PROBLEM — I27.20 PULMONARY HYPERTENSION (HCC): Status: ACTIVE | Noted: 2021-02-22

## 2021-02-22 LAB
ALBUMIN SERPL BCP-MCNC: 4.1 G/DL (ref 3.5–5)
ALP SERPL-CCNC: 84 U/L (ref 46–116)
ALT SERPL W P-5'-P-CCNC: 21 U/L (ref 12–78)
ANION GAP SERPL CALCULATED.3IONS-SCNC: 3 MMOL/L (ref 4–13)
AST SERPL W P-5'-P-CCNC: 16 U/L (ref 5–45)
ATRIAL RATE: 241 BPM
BASOPHILS # BLD AUTO: 0.05 THOUSANDS/ΜL (ref 0–0.1)
BASOPHILS NFR BLD AUTO: 1 % (ref 0–1)
BILIRUB SERPL-MCNC: 1.19 MG/DL (ref 0.2–1)
BUN SERPL-MCNC: 14 MG/DL (ref 5–25)
CALCIUM SERPL-MCNC: 9.8 MG/DL (ref 8.3–10.1)
CHLORIDE SERPL-SCNC: 110 MMOL/L (ref 100–108)
CO2 SERPL-SCNC: 30 MMOL/L (ref 21–32)
CREAT SERPL-MCNC: 0.8 MG/DL (ref 0.6–1.3)
D DIMER PPP FEU-MCNC: 1.3 UG/ML FEU
EOSINOPHIL # BLD AUTO: 0.18 THOUSAND/ΜL (ref 0–0.61)
EOSINOPHIL NFR BLD AUTO: 3 % (ref 0–6)
ERYTHROCYTE [DISTWIDTH] IN BLOOD BY AUTOMATED COUNT: 13.3 % (ref 11.6–15.1)
GFR SERPL CREATININE-BSD FRML MDRD: 86 ML/MIN/1.73SQ M
GLUCOSE SERPL-MCNC: 86 MG/DL (ref 65–140)
HCT VFR BLD AUTO: 51.4 % (ref 36.5–49.3)
HGB BLD-MCNC: 17.4 G/DL (ref 12–17)
IMM GRANULOCYTES # BLD AUTO: 0.02 THOUSAND/UL (ref 0–0.2)
IMM GRANULOCYTES NFR BLD AUTO: 0 % (ref 0–2)
LYMPHOCYTES # BLD AUTO: 1.91 THOUSANDS/ΜL (ref 0.6–4.47)
LYMPHOCYTES NFR BLD AUTO: 28 % (ref 14–44)
MCH RBC QN AUTO: 32.2 PG (ref 26.8–34.3)
MCHC RBC AUTO-ENTMCNC: 33.9 G/DL (ref 31.4–37.4)
MCV RBC AUTO: 95 FL (ref 82–98)
MONOCYTES # BLD AUTO: 0.57 THOUSAND/ΜL (ref 0.17–1.22)
MONOCYTES NFR BLD AUTO: 8 % (ref 4–12)
NEUTROPHILS # BLD AUTO: 4.12 THOUSANDS/ΜL (ref 1.85–7.62)
NEUTS SEG NFR BLD AUTO: 60 % (ref 43–75)
NRBC BLD AUTO-RTO: 0 /100 WBCS
NT-PROBNP SERPL-MCNC: 452 PG/ML
PLATELET # BLD AUTO: 178 THOUSANDS/UL (ref 149–390)
PLATELET # BLD AUTO: 179 THOUSANDS/UL (ref 149–390)
PMV BLD AUTO: 9.6 FL (ref 8.9–12.7)
PMV BLD AUTO: 9.9 FL (ref 8.9–12.7)
POTASSIUM SERPL-SCNC: 4.2 MMOL/L (ref 3.5–5.3)
PROT SERPL-MCNC: 7.2 G/DL (ref 6.4–8.2)
QRS AXIS: -31 DEGREES
QRSD INTERVAL: 90 MS
QT INTERVAL: 282 MS
QTC INTERVAL: 405 MS
RBC # BLD AUTO: 5.41 MILLION/UL (ref 3.88–5.62)
SODIUM SERPL-SCNC: 143 MMOL/L (ref 136–145)
T WAVE AXIS: 46 DEGREES
TROPONIN I SERPL-MCNC: 0.04 NG/ML
TROPONIN I SERPL-MCNC: <0.02 NG/ML
TSH SERPL DL<=0.05 MIU/L-ACNC: 1.11 UIU/ML (ref 0.36–3.74)
VENTRICULAR RATE: 124 BPM
WBC # BLD AUTO: 6.85 THOUSAND/UL (ref 4.31–10.16)

## 2021-02-22 PROCEDURE — 99285 EMERGENCY DEPT VISIT HI MDM: CPT | Performed by: EMERGENCY MEDICINE

## 2021-02-22 PROCEDURE — 96374 THER/PROPH/DIAG INJ IV PUSH: CPT

## 2021-02-22 PROCEDURE — G1004 CDSM NDSC: HCPCS

## 2021-02-22 PROCEDURE — 84484 ASSAY OF TROPONIN QUANT: CPT | Performed by: EMERGENCY MEDICINE

## 2021-02-22 PROCEDURE — 83880 ASSAY OF NATRIURETIC PEPTIDE: CPT | Performed by: INTERNAL MEDICINE

## 2021-02-22 PROCEDURE — 80053 COMPREHEN METABOLIC PANEL: CPT | Performed by: EMERGENCY MEDICINE

## 2021-02-22 PROCEDURE — 71046 X-RAY EXAM CHEST 2 VIEWS: CPT

## 2021-02-22 PROCEDURE — 99285 EMERGENCY DEPT VISIT HI MDM: CPT

## 2021-02-22 PROCEDURE — 1123F ACP DISCUSS/DSCN MKR DOCD: CPT | Performed by: EMERGENCY MEDICINE

## 2021-02-22 PROCEDURE — 93010 ELECTROCARDIOGRAM REPORT: CPT | Performed by: INTERNAL MEDICINE

## 2021-02-22 PROCEDURE — 85025 COMPLETE CBC W/AUTO DIFF WBC: CPT | Performed by: EMERGENCY MEDICINE

## 2021-02-22 PROCEDURE — 85379 FIBRIN DEGRADATION QUANT: CPT | Performed by: EMERGENCY MEDICINE

## 2021-02-22 PROCEDURE — 93005 ELECTROCARDIOGRAM TRACING: CPT

## 2021-02-22 PROCEDURE — 36415 COLL VENOUS BLD VENIPUNCTURE: CPT | Performed by: EMERGENCY MEDICINE

## 2021-02-22 PROCEDURE — 99220 PR INITIAL OBSERVATION CARE/DAY 70 MINUTES: CPT | Performed by: INTERNAL MEDICINE

## 2021-02-22 PROCEDURE — 84484 ASSAY OF TROPONIN QUANT: CPT | Performed by: INTERNAL MEDICINE

## 2021-02-22 PROCEDURE — 85049 AUTOMATED PLATELET COUNT: CPT | Performed by: INTERNAL MEDICINE

## 2021-02-22 PROCEDURE — 71275 CT ANGIOGRAPHY CHEST: CPT

## 2021-02-22 PROCEDURE — 84443 ASSAY THYROID STIM HORMONE: CPT | Performed by: INTERNAL MEDICINE

## 2021-02-22 RX ORDER — DOCUSATE SODIUM 100 MG/1
100 CAPSULE, LIQUID FILLED ORAL 2 TIMES DAILY PRN
Status: DISCONTINUED | OUTPATIENT
Start: 2021-02-22 | End: 2021-02-23 | Stop reason: HOSPADM

## 2021-02-22 RX ORDER — SENNOSIDES 8.6 MG
1 TABLET ORAL DAILY PRN
Status: DISCONTINUED | OUTPATIENT
Start: 2021-02-22 | End: 2021-02-23

## 2021-02-22 RX ORDER — METOPROLOL TARTRATE 5 MG/5ML
5 INJECTION INTRAVENOUS ONCE
Status: COMPLETED | OUTPATIENT
Start: 2021-02-22 | End: 2021-02-22

## 2021-02-22 RX ORDER — ONDANSETRON 2 MG/ML
4 INJECTION INTRAMUSCULAR; INTRAVENOUS EVERY 6 HOURS PRN
Status: DISCONTINUED | OUTPATIENT
Start: 2021-02-22 | End: 2021-02-23 | Stop reason: HOSPADM

## 2021-02-22 RX ADMIN — IOHEXOL 85 ML: 350 INJECTION, SOLUTION INTRAVENOUS at 15:56

## 2021-02-22 RX ADMIN — METOROPROLOL TARTRATE 5 MG: 5 INJECTION, SOLUTION INTRAVENOUS at 12:15

## 2021-02-22 NOTE — ASSESSMENT & PLAN NOTE
· Unclear etiology the patient describes substernal chest pressure which is concerning for cardiac etiology  · Presented with AFib with RVR that responded to Lopressor  · Currently rate controlled and not on any home rate controlling medications  · D-dimer was elevated but CT does not show any PE and lungs are clear  · Initial troponins negative and EKG shows AFib  · Last echocardiogram was on August 16, 2020 with an ejection fraction of 55%, moderately dilated right ventricle and moderate tricuspid regurg  Plan:  Trend another troponin  Check NT proBNP  Consult cardiology appreciate recommendation  Monitor electrolytes  Patient reports taking an herbal approach and does not take medications daily    He will take medications if it is for short duration

## 2021-02-22 NOTE — ASSESSMENT & PLAN NOTE
· Hb of 17 4 and previously elevated in the past as well  · Hemoconcentrated vs DARIO/nocturnal hypoxia vs vascular shunt although his O2 sat is 95-99% on room air vs PCV  Plan:  - Continue to monitor  - Outpatient PSG and EPO level

## 2021-02-22 NOTE — ED PROVIDER NOTES
History  Chief Complaint   Patient presents with    Chest Pain     pt states tightness started this morning  took 324mg of aspirin but still having tightness pt states has a history of A-fib     HPI  66 yo male with PMH HTN, CAD, ascending aortic anuerysm, Afib s[facility]p [facility] presents to the ED with concern for chest tightness and pressure onset at approximately 0700 this morning  Pt reports he was already awake and was getting ready for his day when discomfort began  It has since been constant  Took 324mg aspirin at home without relief  Pt does not take aspirin every day  Denies SOB, nausea, vomiting, lightheadedness, dizziness, abdominal pain, numbness, weakness, fever, cough, congestion  Pt has never had these symptoms before  Pt appears to be in Afib with rates varying from 110s-140s, but he denies feeling palpitations at this time  Reports prior to his ablation he was able to tell when he was in Afib but he has not felt palpitations since then  Pt does not take any medications, only supplements  He does follow with cardiology  Hx bilateral popliteal artery aneurysms that required vascular intervention several years ago, he was told by one of his doctors that he possibly had a clot in one of them  Denies leg pain/swelling, recent surgery, recent travel  Pt's daughter notes he was also admitted in August 2020 for cellulitis of the left lower extremity that developed into sepsis  Pt has been doing well since discharge  Prior to Admission Medications   Prescriptions Last Dose Informant Patient Reported? Taking?    COLLAGEN PO 2/22/2021 at Unknown time  Yes Yes   Sig: Take 1 tablet by mouth   Coenzyme Q10 200 MG capsule 2/22/2021 at Unknown time Self Yes Yes   Sig: Take 200 mg by mouth daily     Flaxseed, Linseed, (FLAXSEED OIL) 1000 MG CAPS More than a month at Unknown time Self Yes No   Sig: Take 1 capsule by mouth daily   Green Tea 250 MG CAPS 2/22/2021 at Unknown time Self Yes Yes   Sig: Take 1 capsule by mouth daily   Liver Extract 500 MG CAPS 2/22/2021 at Unknown time Self Yes Yes   Sig: Take 1 capsule by mouth daily   Magnesium Citrate 125 MG CAPS 2/22/2021 at Unknown time Self Yes Yes   Sig: Take 1 capsule by mouth daily   Pancreatin 325 MG TABS 2/22/2021 at Unknown time Self Yes Yes   Sig: Take 1 tablet by mouth daily   Probiotic Product (PROBIOTIC-10 PO) 2/22/2021 at Unknown time Self Yes Yes   Sig: Take 1 capsule by mouth daily   Vitamin D, Cholecalciferol, 1000 units CAPS 2/22/2021 at Unknown time Self Yes Yes   Sig: Take 2,000 Units by mouth daily   ascorbic acid (VITAMIN C) 500 mg tablet 2/22/2021 at Unknown time Self Yes Yes   Sig: Take 500 mg by mouth daily   niacin 50 mg tablet 2/22/2021 at Unknown time Self Yes Yes   Sig: Take 50 mg by mouth daily with breakfast       Facility-Administered Medications: None       Past Medical History:   Diagnosis Date    Cardiac disease     Hypertension     PAF (paroxysmal atrial fibrillation) (HCC)     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HCC)     BILATERALLY       Past Surgical History:   Procedure Laterality Date    ESOPHAGOGASTRODUODENOSCOPY N/A 8/16/2016    Procedure: ESOPHAGOGASTRODUODENOSCOPY (EGD); Surgeon: Kris Lopez MD;  Location: [facility]; Service:     OTHER SURGICAL HISTORY      VASCULAR SURGERY Right     POPLITEAL BYPASS DUE TO ANEURYSM       History reviewed  No pertinent family history  I have reviewed and agree with the history as documented  E-Cigarette/Vaping    E-Cigarette Use Never User      E-Cigarette/Vaping Substances     Social History     Tobacco Use    Smoking status: Never Smoker    Smokeless tobacco: Never Used   Substance Use Topics    Alcohol use: Not Currently    Drug use: No        Review of Systems   Constitutional: Negative for chills and fever  HENT: Negative for congestion, rhinorrhea and sore throat  Respiratory: Positive for chest tightness  Negative for cough and shortness of breath      Cardiovascular: Positive for chest pain  Negative for palpitations  Gastrointestinal: Negative for abdominal pain, nausea and vomiting  Genitourinary: Negative for dysuria and hematuria  Musculoskeletal: Negative for arthralgias and myalgias  Skin: Negative for rash  Neurological: Negative for dizziness, weakness, light-headedness, numbness and headaches  All other systems reviewed and are negative  Physical Exam  ED Triage Vitals   Temperature Pulse Respirations Blood Pressure SpO2   02/22/21 1111 02/22/21 1111 02/22/21 1111 02/22/21 1111 02/22/21 1111   98 3 °F (36 8 °C) (!) 117 20 119/83 99 %      Temp Source Heart Rate Source Patient Position - Orthostatic VS BP Location FiO2 (%)   02/22/21 1108 02/22/21 1111 02/22/21 1111 02/22/21 1111 --   Oral Monitor Lying Right arm       Pain Score       02/22/21 1111       No Pain             Orthostatic Vital Signs  Vitals:    02/22/21 1218 02/22/21 1300 02/22/21 1400 02/22/21 1500   BP: 100/51 100/80 99/55 102/57   Pulse: 82 80 89 78   Patient Position - Orthostatic VS: Lying Lying  Lying       Physical Exam  Constitutional:       General: He is not in acute distress  Appearance: He is well-developed  He is not diaphoretic  HENT:      Head: Normocephalic and atraumatic  Right Ear: External ear normal       Left Ear: External ear normal    Eyes:      Conjunctiva/sclera: Conjunctivae normal       Pupils: Pupils are equal, round, and reactive to light  Neck:      Musculoskeletal: Normal range of motion and neck supple  Cardiovascular:      Rate and Rhythm: Tachycardia present  Rhythm regularly irregular  Heart sounds: Normal heart sounds  No murmur  No friction rub  No gallop  Pulmonary:      Effort: Pulmonary effort is normal  No respiratory distress  Breath sounds: Normal breath sounds  No wheezing, rhonchi or rales  Abdominal:      General: Bowel sounds are normal  There is no distension  Palpations: Abdomen is soft  Tenderness:  There is no abdominal tenderness  Musculoskeletal: Normal range of motion  Right lower leg: He exhibits no tenderness and no swelling  Left lower leg: He exhibits no tenderness and no swelling  Lymphadenopathy:      Cervical: No cervical adenopathy  Skin:     General: Skin is warm and dry  Neurological:      Mental Status: He is alert and oriented to person, place, and time  Cranial Nerves: No cranial nerve deficit  Sensory: No sensory deficit           ED Medications  Medications   metoprolol (LOPRESSOR) injection 5 mg (5 mg Intravenous Given 2/22/21 1215)   iohexol (OMNIPAQUE) 350 MG/ML injection (SINGLE-DOSE) 85 mL (85 mL Intravenous Given 2/22/21 1556)       Diagnostic Studies  Results Reviewed     Procedure Component Value Units Date/Time    D-dimer, quantitative [[id number]]  (Abnormal) Collected: 02/22/21 1123    Lab Status: Final result Specimen: Blood from Arm, Left Updated: 02/22/21 1253     D-Dimer, Quant 1 30 ug/ml FEU     Comprehensive metabolic panel [[id number]]  (Abnormal) Collected: 02/22/21 1123    Lab Status: Final result Specimen: Blood from Arm, Left Updated: 02/22/21 1206     Sodium 143 mmol/L      Potassium 4 2 mmol/L      Chloride 110 mmol/L      CO2 30 mmol/L      ANION GAP 3 mmol/L      BUN 14 mg/dL      Creatinine 0 80 mg/dL      Glucose 86 mg/dL      Calcium 9 8 mg/dL      AST 16 U/L      ALT 21 U/L      Alkaline Phosphatase 84 U/L      Total Protein 7 2 g/dL      Albumin 4 1 g/dL      Total Bilirubin 1 19 mg/dL      eGFR 86 ml/min/1 73sq m     Narrative:      [place] guidelines for Chronic Kidney Disease (CKD):     Stage 1 with normal or high GFR (GFR > 90 mL/min/1 73 square meters)    Stage 2 Mild CKD (GFR = 60-89 mL/min/1 73 square meters)    Stage 3A Moderate CKD (GFR = 45-59 mL/min/1 73 square meters)    Stage 3B Moderate CKD (GFR = 30-44 mL/min/1 73 square meters)    Stage 4 Severe CKD (GFR = 15-29 mL/min/1 73 square meters)    Stage 5 End Stage CKD (GFR <15 mL/min/1 73 square meters)  Note: GFR calculation is accurate only with a steady state creatinine    Troponin I [[id number]]  (Normal) Collected: 02/22/21 1123    Lab Status: Final result Specimen: Blood from Arm, Left Updated: 02/22/21 1158     Troponin I <0 02 ng/mL     CBC and differential [[id number]]  (Abnormal) Collected: 02/22/21 1123    Lab Status: Final result Specimen: Blood from Arm, Left Updated: 02/22/21 1139     WBC 6 85 Thousand/uL      RBC 5 41 Million/uL      Hemoglobin 17 4 g/dL      Hematocrit 51 4 %      MCV 95 fL      MCH 32 2 pg      MCHC 33 9 g/dL      RDW 13 3 %      MPV 9 6 fL      Platelets 082 Thousands/uL      nRBC 0 /100 WBCs      Neutrophils Relative 60 %      Immat GRANS % 0 %      Lymphocytes Relative 28 %      Monocytes Relative 8 %      Eosinophils Relative 3 %      Basophils Relative 1 %      Neutrophils Absolute 4 12 Thousands/µL      Immature Grans Absolute 0 02 Thousand/uL      Lymphocytes Absolute 1 91 Thousands/µL      Monocytes Absolute 0 57 Thousand/µL      Eosinophils Absolute 0 18 Thousand/µL      Basophils Absolute 0 05 Thousands/µL                  CTA ED chest PE Study   Final Result by Juan Antonio Short MD (02/22 1637)      1  No evidence of pulmonary embolus or other acute abnormality in the chest    2   4 9 cm ascending aortic aneurysm, measuring 4 5 cm on a CT from 8/15/2020  Workstation performed: [id number]         XR chest 2 views   ED Interpretation by Lana Hernandez MD (02/22 1228)   No focal consolidation, effusion, or pneumothorax      Final Result by Nanette Armenta MD (02/22 1300)      No acute cardiopulmonary disease  Workstation performed: [id number]               Procedures  Procedures      ED Course  ED Course as of Feb 22 1737   Mon Feb 22, 2021   1115 EKG Afib rate 124, left axis deviation, No significant ST elevations or depressions  Nonspecific T wave inversion aVL, V1, V2      Not significantly changed from previous 8/15/20      1241 Improved after lopressor   Pulse: 82   1310 Will obtain PE study   D-Dimer, Quant(!): 1 30   1639 No PE   CTA ED chest PE Study   1705 Admitted to [address] Score      Most Recent Value   Heart Score Risk Calculator   History  1 Filed at: 02/22/2021 1446   ECG  1 Filed at: 02/22/2021 1446   Age  2 Filed at: 02/22/2021 1446   Risk Factors  2 Filed at: 02/22/2021 1446   Troponin  0 Filed at: 02/22/2021 1446   HEART Score  6 Filed at: 02/22/2021 1446              PERC Rule for PE      Most Recent Value   PERC Rule for PE   Age >=50  1 Filed at: 02/22/2021 1446   HR >=100  1 Filed at: 02/22/2021 1446   O2 Sat on room air < 95%  0 Filed at: 02/22/2021 1446   History of PE or DVT  1 Filed at: 02/22/2021 1446   Recent trauma or surgery  0 Filed at: 02/22/2021 1446   Hemoptysis  0 Filed at: 02/22/2021 1446   Exogenous estrogen  0 Filed at: 02/22/2021 1446   Unilateral leg swelling  0 Filed at: 02/22/2021 1446   PERC Rule for PE Results  3 Filed at: 02/22/2021 80                        MDM  68 yo male with PMH HTN, CAD, ascending aortic anuerysm, Afib s/p ablation in 2019 not on AC, possible DVT, presenting with chest pressure and tightness, found to be in atrial fibrillation with rapid rate  Will obtain CBC, CMP, Troponin, D-Dimer, EKG, CXR to evaluate for leukocytosis, anemia, electrolyte abnormality, renal dysfunction, hepatic dysfunction, acute cardiac abnormality, pulmonary embolism, pneumonia, other abnormality  Given significant cardiac hx and Afib RVR not on rate control pt will likely require admission  Will give 5mg lopressor and reassess      Disposition  Final diagnoses:   Chest pressure   Atrial fibrillation ([facility] [address] )     Time reflects when diagnosis was documented in both MDM as applicable and the Disposition within this note     Time User Action Codes Description Comment    2/22/2021  5:37 PM Jackelin Baird Add [R07 89] Chest pressure 2/22/2021  5:37 PM Yokasta Tovar Add [I48 91] Atrial fibrillation [facility])       ED Disposition     ED Disposition Condition Date/Time Comment    Admit Stable Mon Feb 22, 2021  5:37 PM Case was discussed with Dr Cassia Garcia and the patient's admission status was agreed to be Admission Status: observation status to the service of Dr Cassia Garcia   Follow-up Information    None         Patient's Medications   Discharge Prescriptions    No medications on file     No discharge procedures on file  PDMP Review     None           ED Provider  Attending physically available and evaluated Joseph Cruz I managed the patient along with the ED Attending      Electronically Signed by         Zohreh Charles MD  02/22/21 8515

## 2021-02-22 NOTE — ASSESSMENT & PLAN NOTE
· PASP of 51mmHg indicating severe pulm HTN  · Has moderate tricuspid regurg and moderately dilated RV  · Does not have significant LV HF and has an EF of 55%  · May have undiagnosed DARIO or CTEPH given hx of DVT  · Ddimer is 1 30 but CTA is negative for PE  Plan:  - Recommend outpatient PSG and V/Q scan

## 2021-02-22 NOTE — ASSESSMENT & PLAN NOTE
· LLE popliteal DVT in 08/14/2016   Coincidentally noted arterial popliteal aneurysms at that time  · Unclear if patient was on anticoagulation at that time  · CTA negative for PE

## 2021-02-22 NOTE — ASSESSMENT & PLAN NOTE
· Chronic and currently measures 4 9 cm however the CT from August of 2020 mention is the aneurysm measuring at 4 5 cm  · The to be monitored closely given the growth rate

## 2021-02-22 NOTE — ED ATTENDING ATTESTATION
2/22/2021  ICatalina MD, saw and evaluated the patient  I have discussed the patient with the resident/non-physician practitioner and agree with the resident's/non-physician practitioner's findings, Plan of Care, and MDM as documented in the resident's/non-physician practitioner's note, except where noted  All available labs and Radiology studies were reviewed  I was present for key portions of any procedure(s) performed by the resident/non-physician practitioner and I was immediately available to provide assistance  At this point I agree with the current assessment done in the Emergency Department  I have conducted an independent evaluation of this patient a history and physical is as follows: This is evaluation of a 58-year-old male with past medical history noted will for atrial fibrillation status post ablation 2 years ago who presents to the emergency department complaining of chest pain described as pressure across his anterior chest shortly after awakening this morning  He denies any associated shortness of breath nausea vomiting or diaphoresis  No lightheadedness or dizziness  No abdominal or back pain  No associated numbness weakness or tingling  On arrival to the emergency department patient was found to be in rapid atrial fibrillation with heart rate oscillating between 120s to 140s  Patient states that he did not realize that he was again in atrial fibrillation  He states previously when in AFib he could feel the palpitations and denies feeling any palpitations at this time or this morning  Denies any recent illnesses  No falls or trauma  No fevers chills cough  On exam patient is currently nontoxic  Vital signs are currently stable after receiving metoprolol  Heart rate in the 80s to 90s  Chest pressure currently resolved  Patient did take aspirin today  On exam patient nontoxic  Vital signs currently stable    HEENT is normocephalic and atraumatic with moist mucous membranes clear sclera chin  Neck supple no JVD  Heart irregular but not tachycardic at this time  No appreciable murmurs  Lungs clear  Abdomen soft positive bowel sounds  No rebound no guarding or abdomen  No lower extremity edema or calf tenderness to palpation  Patient with missing 2nd toe on left foot from previous surgery no subsequent issues skin is clean dry and intact  Intact distal pulses  Capillary refill less than 2 seconds  Awake alert oriented appropriate  Assessment and plan palpitations and chest pain  Patient found to be in atrial fibrillation  Will do cardiac evaluation treat accordingly and admit  ED Course   Pt given 5 mg lopressor with improvement in HR         Critical Care Time  Procedures

## 2021-02-22 NOTE — H&P
H&P- Hamida Bird  1945, 67 y o  male MRN: [id number]    Unit/Bed#: ED 24 Encounter: [id number]    Primary Care Provider: Ratna Adams DO   Date and time admitted to hospital: 2/22/2021 11:04 AM        * Chest pain  Assessment & Plan  · Unclear etiology the patient describes substernal chest pressure which is concerning for cardiac etiology  · Presented with AFib with RVR that responded to Lopressor  · Currently rate controlled and not on any home rate controlling medications  · D-dimer was elevated but CT does not show any PE and lungs are clear  · Initial troponins negative and EKG shows AFib  · Last echocardiogram was on August 16, 2020 with an ejection fraction of 55%, moderately dilated right ventricle and moderate tricuspid regurg  Plan:  Trend another troponin  Check NT proBNP  Consult cardiology appreciate recommendation  Monitor electrolytes  Patient reports taking an herbal approach and does not take medications daily  He will take medications if it is for short duration    PAF (paroxysmal atrial fibrillation) ([facility] )  Assessment & Plan  · Status post ablation 2 years ago  · Presented with substernal chest pressure was found to be in AFib with RVR and responded to metoprolol  · Currently rate controlled  · Not on any anticoagulation  Plan:  Monitor on telemetry  Consult cardiology appreciate recommendation  Monitor electrolytes    Aneurysm of ascending aorta (HCC)  Assessment & Plan  · Chronic and currently measures 4 9 cm however the CT from August of 2020 mention is the aneurysm measuring at 4 5 cm  · The to be monitored closely given the growth rate    Popliteal aneurysm [facility])  Assessment & Plan  · 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    Acute DVT (deep venous thrombosis) ([facility] )  Assessment & Plan  · LLE popliteal DVT in 08/14/2016   Coincidentally noted arterial popliteal aneurysms at that time  · Unclear if patient was on anticoagulation at that time  · CTA negative for PE    Pulmonary hypertension (HCC)  Assessment & Plan  · PASP of 51mmHg indicating severe pulm HTN  · Has moderate tricuspid regurg and moderately dilated RV  · Does not have significant LV HF and has an EF of 55%  · May have undiagnosed DARIO or CTEPH given hx of DVT  · Ddimer is 1 30 but CTA is negative for PE  Plan:  - Recommend outpatient PSG and V/Q scan    Erythrocytosis  Assessment & Plan  · Hb of 17 4 and previously elevated in the past as well  · Hemoconcentrated vs DARIO/nocturnal hypoxia vs vascular shunt although his O2 sat is 95-99% on room air vs PCV  Plan:  - Continue to monitor  - Outpatient PSG and EPO level      VTE Prophylaxis: Enoxaparin (Lovenox)  / sequential compression device   Code Status: Full code  POLST: POLST is not applicable to this patient  Discussion with family: offered but patient declined  Anticipated Length of Stay:  Patient will be admitted on an Observation basis   Total Time for Visit, including Counseling / Coordination of Care: 45 minutes  Greater than 50% of this total time spent on direct patient counseling and coordination of care  Chief Complaint:   Chest pressure    History of Present Illness:    Elise Rosas  is a 67 y o  male with a PMH of afib s/p ablation who presents with complaint of chest pressure  The patient reports that today he woke up in the morning and after getting up to get breakfast he felt substernal chest pressure/heaviness  He denies SOB, cough, palpitations  The sensation did not radiate and there was no numbness or tingling  He took aspirin at that time and then presented to the hospital for further evaluation  Upon my discussion with him, the sensation had resolved  On arrival however he was in afib w/ RVR and was given metoprolol which rate controlled him but he was still in afib  He is not on any blood thinners and does not take any medications and only takes natural herbal supplements   He is a lifelong nonsmoker, no drugs or alcohol use  Review of Systems:    Review of Systems   Constitutional: Negative for chills and fever  HENT: Negative for congestion, postnasal drip and rhinorrhea  Eyes: Negative for itching  Respiratory: Negative for cough, shortness of breath, wheezing and stridor  Cardiovascular: Positive for chest pain  Negative for palpitations and leg swelling  Substernal chest pressure   Gastrointestinal: Negative for abdominal distention, abdominal pain, nausea and vomiting  Genitourinary: Negative for dysuria and flank pain  Musculoskeletal: Negative for arthralgias and myalgias  Skin: Negative for color change  Neurological: Negative for dizziness, light-headedness and headaches  Psychiatric/Behavioral: Negative  Past Medical and Surgical History:     Past Medical History:   Diagnosis Date    Cardiac disease     Hypertension     PAF [facility]paroxysmal [facility] fibrillation) ([facility] )     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HCC)     BILATERALLY       Past Surgical History:   Procedure Laterality Date    ESOPHAGOGASTRODUODENOSCOPY N/A 8/16/2016    Procedure: ESOPHAGOGASTRODUODENOSCOPY (EGD); Surgeon: Vidal Sanchez MD;  Location: [facility]; Service:     OTHER SURGICAL HISTORY      VASCULAR SURGERY Right     POPLITEAL BYPASS DUE TO ANEURYSM       Meds/Allergies:    Prior to Admission medications    Medication Sig Start Date End Date Taking?  Authorizing Provider   ascorbic acid (VITAMIN C) 500 mg tablet Take 500 mg by mouth daily   Yes Historical Provider, MD   Coenzyme Q10 200 MG capsule Take 200 mg by mouth daily     Yes Historical Provider, MD   COLLAGEN PO Take 1 tablet by mouth   Yes Historical Provider, MD [place] Tea 250 MG CAPS Take 1 capsule by mouth daily   Yes Historical Provider, MD   Liver Extract 500 MG CAPS Take 1 capsule by mouth daily   Yes Historical Provider, MD   Magnesium Citrate 125 MG CAPS Take 1 capsule by mouth daily   Yes Historical Provider, MD   niacin 50 mg tablet Take 50 mg by mouth daily with breakfast    Yes Historical Provider, MD   Pancreatin 325 MG TABS Take 1 tablet by mouth daily   Yes Historical Provider, MD   Probiotic Product (PROBIOTIC-10 PO) Take 1 capsule by mouth daily   Yes Historical Provider, MD   Vitamin D, Cholecalciferol, 1000 units CAPS Take 2,000 Units by mouth daily   Yes Historical Provider, MD   Flaxseed, Linseed, (FLAXSEED OIL) 1000 MG CAPS Take 1 capsule by mouth daily    Historical Provider, MD     I have reviewed home medications with patient personally  Allergies: Allergies   Allergen Reactions    Oxycodone GI Intolerance     nausea  nausea       Social History:     Marital Status: /Civil Union   Occupation: works in construction  Patient Pre-hospital Living Situation: with wife and daughter  Patient Pre-hospital Level of Mobility: independent  Patient Pre-hospital Diet Restrictions: none  Substance Use History:   Social History     Substance and Sexual Activity   Alcohol Use Not Currently     Social History     Tobacco Use   Smoking Status Never Smoker   Smokeless Tobacco Never Used     Social History     Substance and Sexual Activity   Drug Use No       Family History:    non-contributory    Physical Exam:     Vitals:   Blood Pressure: 102/57 (02/22/21 1500)  Pulse: 78 (02/22/21 1500)  Temperature: 98 3 °F (36 8 °C) (02/22/21 1111)  Temp Source: Oral (02/22/21 1111)  Respirations: 12 (02/22/21 1500)  Height: 6' 6" (198 1 cm) (02/22/21 1111)  Weight - Scale: 109 kg (240 lb) (02/22/21 1111)  SpO2: 95 % (02/22/21 1500)    Physical Exam  Constitutional:       General: He is not in acute distress  Appearance: He is not diaphoretic  HENT:      Head: Normocephalic and atraumatic  Nose: Nose normal       Mouth/Throat:      Pharynx: No oropharyngeal exudate  Eyes:      General: No scleral icterus  Conjunctiva/sclera: Conjunctivae normal       Pupils: Pupils are equal, round, and reactive to light     Neck: Musculoskeletal: Normal range of motion and neck supple  Thyroid: No thyromegaly  Vascular: No JVD  Trachea: No tracheal deviation  Cardiovascular:      Rate and Rhythm: Normal rate  Rhythm irregular  Heart sounds: Normal heart sounds  No murmur  No friction rub  No gallop  Pulmonary:      Effort: Pulmonary effort is normal  No respiratory distress  Breath sounds: Normal breath sounds  No stridor  No wheezing or rales  Abdominal:      General: Bowel sounds are normal  There is no distension  Palpations: Abdomen is soft  Tenderness: There is no abdominal tenderness  There is no guarding or rebound  Musculoskeletal: Normal range of motion  General: No deformity  Lymphadenopathy:      Cervical: No cervical adenopathy  Skin:     General: Skin is warm  Findings: No erythema or rash  Neurological:      Mental Status: He is alert and oriented to person, place, and time  Cranial Nerves: No cranial nerve deficit  Sensory: No sensory deficit  Additional Data:     Lab Results: I have personally reviewed pertinent reports  Results from last 7 days   Lab Units 02/22/21  1123   WBC Thousand/uL 6 85   HEMOGLOBIN g/dL 17 4*   HEMATOCRIT % 51 4*   PLATELETS Thousands/uL 179   NEUTROS PCT % 60   LYMPHS PCT % 28   MONOS PCT % 8   EOS PCT % 3     Results from last 7 days   Lab Units 02/22/21  1123   SODIUM mmol/L 143   POTASSIUM mmol/L 4 2   CHLORIDE mmol/L 110*   CO2 mmol/L 30   BUN mg/dL 14   CREATININE mg/dL 0 80   ANION GAP mmol/L 3*   CALCIUM mg/dL 9 8   ALBUMIN g/dL 4 1   TOTAL BILIRUBIN mg/dL 1 19*   ALK PHOS U/L 84   ALT U/L 21   AST U/L 16   GLUCOSE RANDOM mg/dL 86                       Imaging: I have personally reviewed pertinent reports  and I have personally reviewed pertinent films in PACS    CTA ED chest PE Study   Final Result by Harpreet Almanzar MD (02/22 0127)      1    No evidence of pulmonary embolus or other acute abnormality in the chest    2   4 9 cm ascending aortic aneurysm, measuring 4 5 cm on a CT from 8/15/2020  Workstation performed: [id number]         XR chest 2 views   ED Interpretation by Liborio Puga MD (02/22 1228)   No focal consolidation, effusion, or pneumothorax      Final Result by Anali Richardson MD (02/22 1300)      No acute cardiopulmonary disease  Workstation performed: [id number]             EKG, Pathology, and Other Studies Reviewed on Admission:   · EKG: afib    Allscripts / Epic Records Reviewed: Yes     ** Please Note: This note has been constructed using a voice recognition system   **    Whit Hartley MD

## 2021-02-22 NOTE — ASSESSMENT & PLAN NOTE
· Status post ablation 2 years ago  · Presented with substernal chest pressure was found to be in AFib with RVR and responded to metoprolol  · Currently rate controlled  · Not on any anticoagulation  Plan:  Monitor on telemetry  Consult cardiology appreciate recommendation  Monitor electrolytes

## 2021-02-23 ENCOUNTER — APPOINTMENT (INPATIENT)
Dept: NON INVASIVE DIAGNOSTICS | Facility: HOSPITAL | Age: 76
DRG: 310 | End: 2021-02-23
Payer: MEDICARE

## 2021-02-23 VITALS
HEART RATE: 78 BPM | BODY MASS INDEX: 28 KG/M2 | DIASTOLIC BLOOD PRESSURE: 82 MMHG | HEIGHT: 78 IN | SYSTOLIC BLOOD PRESSURE: 122 MMHG | TEMPERATURE: 98 F | OXYGEN SATURATION: 94 % | RESPIRATION RATE: 18 BRPM | WEIGHT: 242 LBS

## 2021-02-23 PROBLEM — R07.9 CHEST PAIN: Status: RESOLVED | Noted: 2021-02-22 | Resolved: 2021-02-23

## 2021-02-23 LAB
ALBUMIN SERPL BCP-MCNC: 3.5 G/DL (ref 3.5–5)
ALP SERPL-CCNC: 64 U/L (ref 46–116)
ALT SERPL W P-5'-P-CCNC: 18 U/L (ref 12–78)
ANION GAP SERPL CALCULATED.3IONS-SCNC: 5 MMOL/L (ref 4–13)
AST SERPL W P-5'-P-CCNC: 14 U/L (ref 5–45)
BASOPHILS # BLD AUTO: 0.06 THOUSANDS/ΜL (ref 0–0.1)
BASOPHILS NFR BLD AUTO: 1 % (ref 0–1)
BILIRUB SERPL-MCNC: 1.18 MG/DL (ref 0.2–1)
BUN SERPL-MCNC: 13 MG/DL (ref 5–25)
CALCIUM SERPL-MCNC: 9.3 MG/DL (ref 8.3–10.1)
CHLORIDE SERPL-SCNC: 112 MMOL/L (ref 100–108)
CO2 SERPL-SCNC: 28 MMOL/L (ref 21–32)
CREAT SERPL-MCNC: 0.68 MG/DL (ref 0.6–1.3)
EOSINOPHIL # BLD AUTO: 0.25 THOUSAND/ΜL (ref 0–0.61)
EOSINOPHIL NFR BLD AUTO: 4 % (ref 0–6)
ERYTHROCYTE [DISTWIDTH] IN BLOOD BY AUTOMATED COUNT: 13.3 % (ref 11.6–15.1)
GFR SERPL CREATININE-BSD FRML MDRD: 92 ML/MIN/1.73SQ M
GLUCOSE P FAST SERPL-MCNC: 84 MG/DL (ref 65–99)
GLUCOSE SERPL-MCNC: 84 MG/DL (ref 65–140)
HCT VFR BLD AUTO: 48.6 % (ref 36.5–49.3)
HGB BLD-MCNC: 16.5 G/DL (ref 12–17)
IMM GRANULOCYTES # BLD AUTO: 0.01 THOUSAND/UL (ref 0–0.2)
IMM GRANULOCYTES NFR BLD AUTO: 0 % (ref 0–2)
LYMPHOCYTES # BLD AUTO: 2.82 THOUSANDS/ΜL (ref 0.6–4.47)
LYMPHOCYTES NFR BLD AUTO: 48 % (ref 14–44)
MCH RBC QN AUTO: 31.9 PG (ref 26.8–34.3)
MCHC RBC AUTO-ENTMCNC: 34 G/DL (ref 31.4–37.4)
MCV RBC AUTO: 94 FL (ref 82–98)
MONOCYTES # BLD AUTO: 0.64 THOUSAND/ΜL (ref 0.17–1.22)
MONOCYTES NFR BLD AUTO: 11 % (ref 4–12)
NEUTROPHILS # BLD AUTO: 2.16 THOUSANDS/ΜL (ref 1.85–7.62)
NEUTS SEG NFR BLD AUTO: 36 % (ref 43–75)
NRBC BLD AUTO-RTO: 0 /100 WBCS
PLATELET # BLD AUTO: 169 THOUSANDS/UL (ref 149–390)
PMV BLD AUTO: 9.9 FL (ref 8.9–12.7)
POTASSIUM SERPL-SCNC: 3.9 MMOL/L (ref 3.5–5.3)
PROT SERPL-MCNC: 6.2 G/DL (ref 6.4–8.2)
RBC # BLD AUTO: 5.18 MILLION/UL (ref 3.88–5.62)
SODIUM SERPL-SCNC: 145 MMOL/L (ref 136–145)
TROPONIN I SERPL-MCNC: 0.04 NG/ML
TROPONIN I SERPL-MCNC: 0.05 NG/ML
WBC # BLD AUTO: 5.94 THOUSAND/UL (ref 4.31–10.16)

## 2021-02-23 PROCEDURE — 99239 HOSP IP/OBS DSCHRG MGMT >30: CPT | Performed by: PHYSICIAN ASSISTANT

## 2021-02-23 PROCEDURE — 84484 ASSAY OF TROPONIN QUANT: CPT | Performed by: INTERNAL MEDICINE

## 2021-02-23 PROCEDURE — 93306 TTE W/DOPPLER COMPLETE: CPT

## 2021-02-23 PROCEDURE — 85025 COMPLETE CBC W/AUTO DIFF WBC: CPT | Performed by: INTERNAL MEDICINE

## 2021-02-23 PROCEDURE — 99223 1ST HOSP IP/OBS HIGH 75: CPT | Performed by: INTERNAL MEDICINE

## 2021-02-23 PROCEDURE — 80053 COMPREHEN METABOLIC PANEL: CPT | Performed by: INTERNAL MEDICINE

## 2021-02-23 PROCEDURE — 93306 TTE W/DOPPLER COMPLETE: CPT | Performed by: INTERNAL MEDICINE

## 2021-02-23 RX ORDER — SENNOSIDES 8.6 MG
1 TABLET ORAL DAILY PRN
Status: DISCONTINUED | OUTPATIENT
Start: 2021-02-23 | End: 2021-02-23 | Stop reason: HOSPADM

## 2021-02-23 RX ORDER — SENNOSIDES 8.6 MG
1 TABLET ORAL DAILY PRN
Status: DISCONTINUED | OUTPATIENT
Start: 2021-02-23 | End: 2021-02-23

## 2021-02-23 RX ADMIN — METOPROLOL TARTRATE 25 MG: 25 TABLET, FILM COATED ORAL at 12:26

## 2021-02-23 RX ADMIN — APIXABAN 5 MG: 5 TABLET, FILM COATED ORAL at 17:07

## 2021-02-23 RX ADMIN — ENOXAPARIN SODIUM 40 MG: 40 INJECTION SUBCUTANEOUS at 08:07

## 2021-02-23 RX ADMIN — APIXABAN 5 MG: 5 TABLET, FILM COATED ORAL at 12:26

## 2021-02-23 NOTE — CONSULTS
Consultation - Cardiology Team One  Gilbert Conklin  67 y o  male MRN: [id number]  Unit/Bed#: VW8 215-01 Encounter: [id number]    Inpatient consult to Cardiology  Consult performed by: DHEERAJ Erazo  Consult ordered by: Darleen Srivastava MD      Physician Requesting Consult: Nick Pavon MD  Reason for Consult / Principal Problem: Chest pain and AAA      Assessment/ Plan    1  Chest pain in the setting of #2   Troponin x 3 negative   ECG reviewed showing atrial fibrillation with RVR     2  Atrial fibrillation with RVR  Hx of PAF s/p cryoablation of atrial fibrillation with pulmonary vein isolation and typical atrial flutter ablation on 1/24/2018  Not on [address]   [id number] VASc score: 2  Not on AV monica blocking agents   Will start metoprolol 25 mg PO BID  Consider RANDALL/CV tomorrow if remains in atrial fibrillation  Start eliquis 5 mg PO BID   NPO after MN     3  Ascending aortic aneurysm   CTA of chest showed: 4 9 cm ascending aortic aneurysm, measuring 4 5 cm on a CT from 8/15/2020    History of Present Illness   HPI: Gilbert Conklin  is a 67y o  year old male who has paroxysmal atrial fibrillation s/p cryoablation with pulmonary vein isolation and typical atrial flutter ablation on 1/24/2018, hypertension, hyperlipidemia, DVT hx and 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  He follows with cardiologist Dr Angela Hanson  He presents to [facility] 2/22/2021 with complaint of chest pain  ECG showed atrial fibrillation with RVR  He reports yesterday he woke up in his normal state of health and shortly after waking up he started having chest pain described as pressure without radiation  He notes mild SOB with chest pain  No diaphoresis, palpitations or nausea  He ate breakfast and later took his wife to eye doctor appointment  When he came home, chest pain persisted and he came to the ER  As noted above, ECG showed atrial fibrillation with RVR   He was given metoprolol 5 mg IV x 1 dose and HR improved to 70-90s  He reports chest pain has resolved since receiving metoprolol and no further episodes of chest pain  He is active at home and continues with work as a   He denies exertional chest pain or SOB  No recent illness  No fever or chills  In the past, he notes he would know exactly when is was in atrial fibrillation but this time is different because he is currently in atrial fibrillation and asymptomatic with rates controlled  He does not take [address], AV monica blockers or antiarrhythmics at home  Echocardiogram 8/16/2020 showed EF 55%, no regional wall motion abnormalities, RV moderately dilated, LA and RA dilated, mild MR, mild to moderate AI and moderate TR  Nuclear stress test 3/1/2019 showed normal study after pharmacologic vasodilation without reproduction of symptoms  Myocardial perfusion imaging was normal at rest and with stress  EKG reviewed personally:  Atrial fibrillation   Ventricular rate 124 bpm  QRSD interval 90 ms  QT interval 282 ms  QTc interval 405 ms     Telemetry reviewed personally:   Atrial fibrillation HR 70-90s with episodes of RVR when ambulating     Review of Systems   Constitution: Negative for chills, decreased appetite, fever and malaise/fatigue  HENT: Negative for congestion  Cardiovascular: Negative for chest pain, dyspnea on exertion, leg swelling, orthopnea and palpitations  Respiratory: Negative for cough and shortness of breath  Musculoskeletal: Negative for falls  Gastrointestinal: Negative for bloating, nausea and vomiting  Neurological: Negative for dizziness and light-headedness  Psychiatric/Behavioral: Negative for altered mental status  All other systems reviewed and are negative      Historical Information   Past Medical History:   Diagnosis Date    Cardiac disease     Hypertension     [facility] [address] [facility]     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     BILATERALLY     Past Surgical History:   Procedure Laterality Date    ESOPHAGOGASTRODUODENOSCOPY N/A 8/16/2016    Procedure: ESOPHAGOGASTRODUODENOSCOPY (EGD); Surgeon: Sylvie Ramos MD;  Location: [facility]; Service:     OTHER SURGICAL HISTORY      VASCULAR SURGERY Right     POPLITEAL BYPASS DUE TO ANEURYSM     Social History     Substance and Sexual Activity   Alcohol Use Not Currently     Social History     Substance and Sexual Activity   Drug Use No     Social History     Tobacco Use   Smoking Status Never Smoker   Smokeless Tobacco Never Used     Family History: History reviewed  No pertinent family history  Meds/Allergies   all current active meds have been reviewed and current meds:   Current Facility-Administered Medications   Medication Dose Route Frequency    docusate sodium (COLACE) capsule 100 mg  100 mg Oral BID PRN    enoxaparin (LOVENOX) subcutaneous injection 40 mg  40 mg Subcutaneous Daily    ondansetron (ZOFRAN) injection 4 mg  4 mg Intravenous Q6H PRN    senna (SENOKOT) tablet 8 6 mg  1 tablet Oral Daily PRN          Allergies   Allergen Reactions    Oxycodone GI Intolerance     nausea  nausea       Objective   Vitals: Blood pressure 114/73, pulse 73, temperature 97 9 °F (36 6 °C), resp  rate 18, height 6' 6" (1 981 m), weight 110 kg (242 lb), SpO2 96 %  ,     Body mass index is 27 97 kg/m²  ,     Systolic (70RDB), FWD:488 , Min:99 , KRK:056     Diastolic (30ECK), FLN:46, Min:51, Max:83            Intake/Output Summary (Last 24 hours) at 2/23/2021 0828  Last data filed at 2/23/2021 0700  Gross per 24 hour   Intake --   Output 1150 ml   Net -1150 ml     Weight (last 2 days)     Date/Time   Weight    02/23/21 0551   110 (242)    02/22/21 2012   110 (242)    02/22/21 19:41:45   110 (242)    02/22/21 1111   109 (240)            Invasive Devices     Peripheral Intravenous Line            Peripheral IV 02/22/21 Left Antecubital less than 1 day                  Physical Exam  Constitutional:       General: He is not in acute distress    HENT: Head: Normocephalic  Mouth/Throat:      Mouth: Mucous membranes are moist    Neck:      Musculoskeletal: Neck supple  Cardiovascular:      Rate and Rhythm: Normal rate  Rhythm irregular  Pulses: Normal pulses  Pulmonary:      Effort: Pulmonary effort is normal  No respiratory distress  Breath sounds: Normal breath sounds  Abdominal:      General: Bowel sounds are normal       Palpations: Abdomen is soft  Musculoskeletal: Normal range of motion  General: No swelling  Skin:     General: Skin is warm and dry  Capillary Refill: Capillary refill takes less than 2 seconds  Neurological:      General: No focal deficit present  Mental Status: He is alert and oriented to person, place, and time     Psychiatric:         Mood and Affect: Mood normal            LABORATORY RESULTS:  Results from last 7 days   Lab Units 02/23/21  0430 02/22/21 2348 02/22/21 2029   TROPONIN I ng/mL 0 04 0 05* 0 04     CBC with diff:   Results from last 7 days   Lab Units 02/23/21 0431 02/22/21 2029 02/22/21  1123   WBC Thousand/uL 5 94  --  6 85   HEMOGLOBIN g/dL 16 5  --  17 4*   HEMATOCRIT % 48 6  --  51 4*   MCV fL 94  --  95   PLATELETS Thousands/uL 169 178 179   MCH pg 31 9  --  32 2   MCHC g/dL 34 0  --  33 9   RDW % 13 3  --  13 3   MPV fL 9 9 9 9 9 6   NRBC AUTO /100 WBCs 0  --  0       CMP:  Results from last 7 days   Lab Units 02/23/21  0430 02/22/21  1123   POTASSIUM mmol/L 3 9 4 2   CHLORIDE mmol/L 112* 110*   CO2 mmol/L 28 30   BUN mg/dL 13 14   CREATININE mg/dL 0 68 0 80   CALCIUM mg/dL 9 3 9 8   AST U/L 14 16   ALT U/L 18 21   ALK PHOS U/L 64 84   EGFR ml/min/1 73sq m 92 86       BMP:  Results from last 7 days   Lab Units 02/23/21  0430 02/22/21  1123   POTASSIUM mmol/L 3 9 4 2   CHLORIDE mmol/L 112* 110*   CO2 mmol/L 28 30   BUN mg/dL 13 14   CREATININE mg/dL 0 68 0 80   CALCIUM mg/dL 9 3 9 8          Lab Results   Component Value Date    NTBNP 452 (H) 02/22/2021        Results from last 7 days   Lab Units 21   TSH 3RD GENERATON uIU/mL 1 110           Lipid Profile:   No results found for: CHOL  No results found for: HDL  No results found for: LDLCALC  No results found for: TRIG      Cardiac testing:   Results for orders placed during the hospital encounter of 08/15/20   Echo complete with contrast if indicated    Narrative [address]  [address]  [phone]    Transthoracic Echocardiogram  2D, M-mode, Doppler, and Color Doppler    Study date:  16-Aug-2020    Patient: Micky Hennessy  MR number: [id number]  Account number: [de-identified]  : 1945  Age: 67 years  Gender: Male  Status: Inpatient  Location: Bedside  Height: 78 in  Weight: 241 6 lb  BP: 96/ 52 mmHg    Indications: Bacteremia    Diagnoses: R78 81 - Bacteremia    Sonographer:  KRISTIN Cortez  Primary Physician:  Jeremías Devine DO  Referring Physician:  Jerry Brizuela DO  Group:  [facility]  Interpreting Physician:  Vivi Go DO    SUMMARY    LEFT VENTRICLE:  Systolic function was normal  Ejection fraction was estimated to be 55 %  There were no regional wall motion abnormalities  RIGHT VENTRICLE:  The ventricle was moderately dilated  Systolic function was normal     LEFT ATRIUM:  The atrium was mildly to moderately dilated  RIGHT ATRIUM:  The atrium was mildly dilated  MITRAL VALVE:  There was mild regurgitation  AORTIC VALVE:  There was mild to moderate regurgitation  TRICUSPID VALVE:  There was moderate regurgitation  IVC, HEPATIC VEINS:  The inferior vena cava was dilated, with poor inspiratory collapse, consistent with elevated right atrial pressure  HISTORY: PRIOR HISTORY: Sepsis,Ascending Aorta Aneurysm, PAF,HTN,R,HLD,DVT,AFIB    PROCEDURE: The procedure was performed at the bedside  This was a routine study  The transthoracic approach was used   The study included complete 2D imaging, M-mode, complete spectral Doppler, and color Doppler  Images were obtained from  the parasternal, apical, subcostal, and suprasternal notch acoustic windows  Image quality was adequate  LEFT VENTRICLE: Size was normal  Systolic function was normal  Ejection fraction was estimated to be 55 %  There were no regional wall motion abnormalities  Wall thickness was normal  DOPPLER: Left ventricular diastolic function parameters  were normal for the patient's age  RIGHT VENTRICLE: The ventricle was moderately dilated  Systolic function was normal  Wall thickness was normal     LEFT ATRIUM: The atrium was mildly to moderately dilated  RIGHT ATRIUM: The atrium was mildly dilated  MITRAL VALVE: Valve structure was normal  There was normal leaflet separation  DOPPLER: The transmitral velocity was within the normal range  There was no evidence for stenosis  There was mild regurgitation  AORTIC VALVE: The valve was trileaflet  Leaflets exhibited normal thickness and normal cuspal separation  DOPPLER: Transaortic velocity was within the normal range  There was no evidence for stenosis  There was mild to moderate  regurgitation  TRICUSPID VALVE: The valve structure was normal  There was normal leaflet separation  DOPPLER: The transtricuspid velocity was within the normal range  There was no evidence for stenosis  There was moderate regurgitation  Estimated peak PA  pressure was 51 mmHg  PULMONIC VALVE: Leaflets exhibited normal thickness, no calcification, and normal cuspal separation  DOPPLER: The transpulmonic velocity was within the normal range  There was no regurgitation  PERICARDIUM: There was no pericardial effusion  The pericardium was normal in appearance  AORTA: The root exhibited normal size  SYSTEMIC VEINS: IVC: The inferior vena cava was dilated, with poor inspiratory collapse, consistent with elevated right atrial pressure      SYSTEM MEASUREMENT TABLES    2D  %FS: 29 98 %  Ao Diam: 3 74 cm  EDV(Teich): 108 32 ml  EF(Teich): 57 09 %  ESV(Teich): 46 48 ml  IVSd: 1 09 cm  LA Area: 23 08 cm2  LA Diam: 4 33 cm  LAAs A2C: 27 3 cm2  LAAs A4C: 22 53 cm2  LAESV A-L A2C: 108 18 ml  LAESV A-L A4C: 70 49 ml  LAESV Index (A-L): 36 45 ml/m2  LAESV MOD A2C: 105 94 ml  LAESV MOD A4C: 64 07 ml  LAESV(A-L): 89 3 ml  LALs A2C: 5 85 cm  LALs A4C: 6 11 cm  LVIDd: 4 82 cm  LVIDs: 3 37 cm  LVPWd: 1 08 cm  RA Area: 28 7 cm2  RVIDd: 4 89 cm  SV(Teich): 61 84 ml    CW  AR Dec [place]: 1 14 m/s2  AR Dec Time: 2591 29 ms  AR PHT: 751 47 ms  AR Vmax: 2 96 m/s  AR maxP 95 mmHg  AV Vmax: 1 14 m/s  AV maxP 2 mmHg  RAP: 5 mmHg  TR Vmax: 3 03 m/s  TR maxP 67 mmHg    MM  TAPSE: 2 65 cm    PW  E': 0 1 m/s  E/E': 6 74  LVOT Vmax: 0 77 m/s  LVOT maxP 34 mmHg  Lateral E': 0 13 m/s  MV A Dur: 119 95 ms  MV A Luis A: 0 22 m/s  MV Dec [place]: 5 11 m/s2  MV DecT: 136 88 ms  MV E Luis A: 0 7 m/s  MV E/A Ratio: 3 17  MV PHT: 39 7 ms  MVA By PHT: 5 54 cm2  RVSP: 41 67 mmHg    [employer] Accredited Echocardiography Laboratory    Prepared and electronically signed by    Queenie Polk DO  Signed 16-Aug-2020 14:12:41       Results for orders placed during the hospital encounter of 18   RANDALL    Narrative [address] [place] [address], [facility] [address] [address] [phone]    Transesophageal Echocardiogram  Limited 2D, Doppler, and Color Doppler    Study date:  2018    Patient: Eboni Mathew  MR number: [id number]  Account number: [de-identified]  : 1945  Age: 67 years  Gender: Male  Status: Outpatient  Location: Cath lab  Height: 78 in  Weight: 282 lb  BP: 157/ 59 mmHg    Indications: Pre-EP Procedure    Diagnoses: 427 31 - ATRIAL FIBRILLATION    Sonographer:  Xochilt Fairchild RN, RCS  Interpreting Physician:  Alexsander Giraldo MD  Primary Physician:  Ratna Adams DO  Referring Physician:  Alexsander Giraldo MD  Group:  [facility]    SUMMARY    LEFT VENTRICLE:  Systolic function was normal  Ejection fraction was estimated to be 60 %  LEFT ATRIUM:  The atrium was mildly dilated  ATRIAL SEPTUM:  No defect or patent foramen ovale was identified  MITRAL VALVE:  There was mild regurgitation  AORTIC VALVE:  There was mild regurgitation  TRICUSPID VALVE:  There was mild regurgitation  PULMONIC VALVE:  There was mild regurgitation  HISTORY: PRIOR HISTORY: Hypertension, Atrial Fibrillation, DVT    PROCEDURE: The procedure was performed in the catheterization laboratory  This was a routine study  The risks and alternatives of the procedure were explained to the patient and informed consent was obtained  The transesophageal approach  was used  The study included limited 2D imaging, complete spectral Doppler, and color Doppler  The heart rate was 54 bpm, at the start of the study  Intubated with ease  One intubation attempt(s)  There was no blood detected on the probe  Image quality was excellent  MEDICATIONS: General anesthesia administered by anesthesia team     LEFT VENTRICLE: Size was normal  Systolic function was normal  Ejection fraction was estimated to be 60 %  LEFT ATRIUM: The atrium was mildly dilated  There was no spontaneous echo contrast ("smoke")  APPENDAGE: The size was normal  No thrombus was identified  DOPPLER: The function was normal (normal emptying velocity)  ATRIAL SEPTUM: No defect or patent foramen ovale was identified  RIGHT ATRIUM: Size was normal  No thrombus was identified  MITRAL VALVE: Valve structure was normal  There was normal leaflet separation  There was no echocardiographic evidence of vegetation  DOPPLER: There was mild regurgitation  AORTIC VALVE: The valve was trileaflet  Leaflets exhibited normal thickness and normal cuspal separation  There was no echocardiographic evidence of vegetation  DOPPLER: There was mild regurgitation      TRICUSPID VALVE: The valve structure was normal  There was normal leaflet separation  There was no echocardiographic evidence of vegetation  DOPPLER: There was mild regurgitation  PULMONIC VALVE: DOPPLER: There was mild regurgitation  PERICARDIUM: There was no pericardial effusion  The pericardium was normal in appearance  AORTA: The root exhibited normal size  There was no atheroma  There was no evidence for dissection  There was no evidence for aneurysm  [address] Accredited Echocardiography Laboratory    Prepared and electronically signed by    Andrew Ron MD  Signed 24-Jan-2018 16:50:23       No procedure found  No results found for this or any previous visit  Imaging:   Xr Chest 2 Views    Result Date: 2/22/2021  Narrative: CHEST INDICATION:   chest tightness  COMPARISON:  August 15, 2020 EXAM PERFORMED/VIEWS:  XR CHEST PA & LATERAL  The frontal view was performed utilizing dual energy radiographic technique  FINDINGS: Cardiomediastinal silhouette appears unremarkable  The lungs are clear  No pneumothorax or pleural effusion  Osseous structures appear within normal limits for patient age  Impression: No acute cardiopulmonary disease  Workstation performed: [id number]     [facility] Chest Pe Study    Result Date: 2/22/2021  Narrative: CTA - CHEST WITH IV CONTRAST - PULMONARY ANGIOGRAM INDICATION:   positive dimer chest pain tachycardia  COMPARISON: CTA of the chest from August 15, 2020 TECHNIQUE: CTA examination of the chest was performed using angiographic technique according to a protocol specifically tailored to evaluate for pulmonary embolism  Axial, sagittal, and coronal 2D reformatted images were created from the source data and  submitted for interpretation  In addition, coronal 3D MIP postprocessing was performed on the acquisition scanner  Radiation dose length product (DLP) for this visit:  620 9 mGy-cm     This examination, like all CT scans performed in the [facility], was performed utilizing techniques to minimize radiation dose exposure, including the use of iterative reconstruction and automated exposure control  IV Contrast:  85 mL of iohexol (OMNIPAQUE)  FINDINGS: PULMONARY ARTERIAL TREE:  No pulmonary embolus is seen  LUNGS:  Lungs are clear  There is no tracheal or endobronchial lesion  PLEURA:  Unremarkable  HEART/GREAT VESSELS:  4 9 cm ascending aortic aneurysm, measuring 4 5 cm (by my measurements) on the CT from 8/15/2020  MEDIASTINUM AND ALLISON: No lymphadenopathy or mass  Small hiatal hernia  Esophagus unremarkable  Trachea and main stem bronchi normal  CHEST WALL AND LOWER NECK:   Unremarkable  VISUALIZED STRUCTURES IN THE UPPER ABDOMEN:  Several liver cysts  Upper pole right renal cyst   Otherwise unremarkable  OSSEOUS STRUCTURES:  No acute fracture or destructive osseous lesion  Impression: 1  No evidence of pulmonary embolus or other acute abnormality in the chest  2   4 9 cm ascending aortic aneurysm, measuring 4 5 cm on a CT from 8/15/2020  Workstation performed: [id number]       Thank you for allowing us to participate in this patient's care  This pt will follow up with Dr Tricia Pandey once discharged  Counseling / Coordination of Care  Total floor / unit time spent today 45 minutes  Greater than 50% of total time was spent with the patient and / or family counseling and / or coordination of care  A description of the counseling / coordination of care: Review of history, current assessment, development of a plan  Code Status: Level 1 - Full Code    ** Please Note: Dragon 360 Dictation voice to text software may have been used in the creation of this document   **

## 2021-02-23 NOTE — ASSESSMENT & PLAN NOTE
· Status post cryoablation 2 years ago  · Presented with substernal chest pressure was found to be in AFib with RVR  · Seen by cardiology  Started on metoprolol 25 mg BID and Eliquis 5 mg BID  · Anticipate RANDALL/CV as an out-patient

## 2021-02-23 NOTE — ASSESSMENT & PLAN NOTE
· Suspect secondary to rapid afib  · D-dimer was elevated but CT does not show any PE and lungs are clear  · Initial troponins negative and EKG shows AFib  · Last echocardiogram was on August 16, 2020 with an ejection fraction of 55%, moderately dilated right ventricle and moderate tricuspid regurg

## 2021-02-23 NOTE — DISCHARGE SUMMARY
Discharge- Jimmy Ma 1945, 67 y o  male MRN: [id number]  Unit/Bed#: CW2 215-01 Encounter: [id number]  Primary Care Provider: Margaret Rome,    Date and time admitted to hospital: 2/22/2021 11:04 AM    * Atrial fibrillation with RVR ([address] )  Assessment & Plan  · Status post cryoablation 2 years ago  · Presented with substernal chest pressure was found to be in AFib with RVR  · Seen by cardiology  Started on metoprolol 25 mg BID and Eliquis 5 mg BID  · Anticipate RANDALL/CV as an out-patient  Erythrocytosis  Assessment & Plan  · Hb of 17 4 and previously elevated in the past as well  · Hemoconcentrated vs DARIO/nocturnal hypoxia vs vascular shunt although his O2 sat is 95-99% on room air vs PCV  Plan:  - Continue to monitor  - Outpatient PSG and EPO level    Pulmonary hypertension (HCC)  Assessment & Plan  · PASP of 51mmHg indicating severe pulm HTN  · Has moderate tricuspid regurg and moderately dilated RV  · Does not have significant LV HF and has an EF of 55%  · May have undiagnosed DARIO or CTEPH given hx of DVT  · Ddimer is 1 30 but CTA is negative for PE  Plan:  - Recommend outpatient PSG and V/Q scan    Aneurysm of ascending aorta (HCC)  Assessment & Plan  · Chronic and currently measures 4 9 cm however the CT from August of 2020 mention is the aneurysm measuring at 4 5 cm  · The to be monitored closely given the growth rate    History of DVT (deep vein thrombosis)  Assessment & Plan  · LLE popliteal DVT in 08/14/2016  Coincidentally noted arterial popliteal aneurysms at that time  · Unclear if patient was on anticoagulation at that time  · CTA negative for PE    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    Chest pain-resolved as of 2/23/2021  Assessment & Plan  · Suspect secondary to rapid afib    · D-dimer was elevated but CT does not show any PE and lungs are clear  · Initial troponins negative and EKG shows AFib  · Last echocardiogram was on August 16, 2020 with an ejection fraction of 55%, moderately dilated right ventricle and moderate tricuspid regurg  · Repeat ECHO (02/23/2021): Stable from previous with EF 60%  Moderate MR, mild-moderate AR    Discharging Physician / Practitioner: Misbah Alonso PA-C  PCP: Clarence Cook DO  Admission Date:   Admission Orders (From admission, onward)     Ordered        02/23/21 1017  Inpatient Admission  Once         02/22/21 1706  Place in Observation  Once                   Discharge Date: 02/23/21    Resolved Problems  Date Reviewed: 2/23/2021          Resolved    Chest pain 2/23/2021     Resolved by  Misbah Alonso PA-C        Consultations During Hospital Stay:  · Cardiology    Procedures Performed:   · Chest x-ray (02/22/2021):  No acute cardiopulmonary disease  · CTA chest (02/22/2021): No evidence of PE or acute abnormality in the chest   4 9 cm ascending aortic aneurysm measuring 4 5 cm on prior CT   · Echocardiogram (02/23/2021):  EF 60%  Moderate mitral regurgitation  Mild to moderate aortic regurgitation  Significant Findings / Test Results:   · None    Incidental Findings:   · None     Test Results Pending at Discharge (will require follow up): · None     Outpatient Tests Requested:  · Sleep study  · RANDALL/cardioversion  · V/Q scan    Complications:  None    Reason for Admission: Chest pain    Hospital Course:     Toribio Peguero  is a 67 y o  male patient who originally presented to the hospital on 2/22/2021 due to chest pressure  Patient presented to the emergency department and was found to be in rapid atrial fibrillation  He was given metoprolol but remained in atrial fibrillation  He was seen in consultation by Cardiology and was started on oral metoprolol regimen with Eliquis for anticoagulation after heparin drip was discontinued  It was determined he will undergo RANDALL with possible cardioversion as an outpatient if necessary    He will be discharged home with prescriptions for both metoprolol and Eliquis, pressure checks were obtained for both medications on are portable  He will follow up with Cardiology outpatient  Please see above list of diagnoses and related plan for additional information  Condition at Discharge: stable     Discharge Day Visit / Exam:     * Please refer to separate progress note for these details *    Discussion with Family: None    Discharge instructions/Information to patient and family:   See after visit summary for information provided to patient and family  Provisions for Follow-Up Care:  See after visit summary for information related to follow-up care and any pertinent home health orders  Disposition:     Home    For Discharges to [facility]:   · Not Applicable to this Patient - Not Applicable to this Patient    Planned Readmission: No     Discharge Statement:  I spent 45 minutes discharging the patient  This time was spent on the day of discharge  I had direct contact with the patient on the day of discharge  Greater than 50% of the total time was spent examining patient, answering all patient questions, arranging and discussing plan of care with patient as well as directly providing post-discharge instructions  Additional time then spent on discharge activities  Discharge Medications:  See after visit summary for reconciled discharge medications provided to patient and family        ** Please Note: This note has been constructed using a voice recognition system **

## 2021-02-23 NOTE — UTILIZATION REVIEW
Initial Clinical Review    WAS OBSERVATION 02/22/2021 @ 1706, CONVERTED TO INPATIENT ADMISSION 02/23/2021 @ 1017, DUE TO CONTINUED STAY REQUIRED TO CARE FOR PATIENT WITH  Caterina Stanford Remains in A Fib  Need for RANDALL / Cardioversion    Admission: Date/Time/Statement:   02/23/21 1017  Inpatient Admission Once     Question Answer Comment   Level of Care Med Surg    Estimated length of stay More than 2 Midnights    Certification I certify that inpatient services are medically necessary for this patient for a duration of greater than two midnights  See H&P and MD Progress Notes for additional information about the patient's course of treatment  ED Arrival Information     Expected Arrival Acuity Means of Arrival Escorted By Service Admission Type    - 2/22/2021 10:57 Emergent Walk-In Self General Medicine Emergency    Arrival Complaint    chest pain/tightness        Chief Complaint   Patient presents with    Chest Pain     pt states tightness started this morning  took 324mg of aspirin but still having tightness pt states has a history of A-fib     Assessment/Plan: 67year old male, presented to the ED @ [facility] from home via walk in  Admitted as Observation due to  Chest Pain  Woke this AM, ate breakfast then developed chest pain  Initial trop WNL, trend trops  ECG: A fib w RVR  Monitor on telemetry  Consult Cardio  Monitor electrolytes  VTE Prophylaxis: Enoxaparin (Lovenox)  / sequential compression device    02/23/2021  Consult Cardio:  Atrial fibrillation with RVR:  Hx of PAF s/p cryoablation of atrial fibrillation with pulmonary vein isolation and typical atrial flutter ablation on 1/24/2018  Not on [address]  [id number] VASc score: 2  Not on AV monica blocking agents  Will start metoprolol 25 mg PO BID  Consider RANDALL/CV tomorrow if remains in atrial fibrillation  Start eliquis 5 mg PO BID  NPO after MN        ED Triage Vitals   Temperature Pulse Respirations Blood Pressure SpO2   02/22/21 1111 21 1111 21 1111 21 1111 21 1111   98 3 °F (36 8 °C) (!) 117 20 119/83 99 %      Temp Source Heart Rate Source Patient Position - Orthostatic VS BP Location FiO2 (%)   21 1108 21 1111 21 1111 21 1111 --   Oral Monitor Lying Right arm       Pain Score       21 1111       No Pain          Wt Readings from Last 1 Encounters:   21 110 kg (242 lb)     Additional Vital Signs:   Date/Time  Temp  Pulse  Resp  BP  MAP (mmHg)  SpO2  O2 Device  Patient Position - Orthostatic VS   21 06:58:56  97 9 °F (36 6 °C)  73  18  114/73  87  96 %  --  --   21 03:35:03  97 6 °F (36 4 °C)  68  18  110/72  85  93 %  --  --   21 23:25:52  97 7 °F (36 5 °C)  74  18  110/72  85  96 %  --  --   21  --  --  --  --  --  --  None (Room air)  --   21 19:41:45  98 3 °F (36 8 °C)  83  --  129/53  78  95 %  --  --   21 19:39:40  98 3 °F (36 8 °C)  --  --  129/53  78  --  --  --   21 1500  --  78  12  102/57  76  95 %  None (Room air)  Lying   21 1400  --  89  16  99/55  --  95 %  --  --   21 1300  --  80  16  100/80  86  95 %  None (Room air)  Lying   21 1218  --  82  17  100/51  --  97 %  None (Room air)  Lying     2021 @ 1619  CTa chest:  1   No evidence of pulmonary embolus or other acute abnormality in the chest    2   4 9 cm ascending aortic aneurysm, measuring 4 5 cm on a CT from 8/15/2020      2021 @ 1259  Chest X:   No acute cardiopulmonary disease  2021 @ 1115  EC, A  Fib with RVR    Pertinent Labs/Diagnostic Test Results:     Results from last 7 days   Lab Units 21  0431 21  1123   WBC Thousand/uL 5 94  --  6 85   HEMOGLOBIN g/dL 16 5  --  17 4*   HEMATOCRIT % 48 6  --  51 4*   PLATELETS Thousands/uL 169 178 179   NEUTROS ABS Thousands/µL 2 16  --  4 12     Results from last 7 days   Lab Units 21  0430 21  1123   SODIUM mmol/L 145 143   POTASSIUM mmol/L 3 9 4 2   CHLORIDE mmol/L 112* 110*   CO2 mmol/L 28 30   ANION GAP mmol/L 5 3*   BUN mg/dL 13 14   CREATININE mg/dL 0 68 0 80   EGFR ml/min/1 73sq m 92 86   CALCIUM mg/dL 9 3 9 8     Results from last 7 days   Lab Units 02/23/21  0430 02/22/21  1123   AST U/L 14 16   ALT U/L 18 21   ALK PHOS U/L 64 84   TOTAL PROTEIN g/dL 6 2* 7 2   ALBUMIN g/dL 3 5 4 1   TOTAL BILIRUBIN mg/dL 1 18* 1 19*     Results from last 7 days   Lab Units 02/23/21  0430 02/22/21  1123   GLUCOSE RANDOM mg/dL 84 86     Results from last 7 days   Lab Units 02/23/21  0430 02/22/21  2348 02/22/21 2029 02/22/21  1123   TROPONIN I ng/mL 0 04 0 05* 0 04 <0 02     Results from last 7 days   Lab Units 02/22/21  1123   D-DIMER QUANTITATIVE ug/ml FEU 1 30*     Results from last 7 days   Lab Units 02/22/21 2029   TSH 3RD GENERATON uIU/mL 1 110     Results from last 7 days   Lab Units 02/22/21  1123   NT-PRO BNP pg/mL 452*     ED Treatment:   Medication Administration from 02/22/2021 1056 to 02/22/2021 1937       Date/Time Order Dose Route Action     02/22/2021 1215 metoprolol (LOPRESSOR) injection 5 mg 5 mg Intravenous Given     02/22/2021 1556 iohexol (OMNIPAQUE) 350 MG/ML injection (SINGLE-DOSE) 85 mL 85 mL Intravenous Given        Past Medical History:   Diagnosis Date    Cardiac disease     Hypertension     PAF (paroxysmal atrial fibrillation) (HCC)     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HCC)     BILATERALLY     Present on Admission:   Chest pain   PAF (paroxysmal atrial fibrillation) (HCC)   Aneurysm of ascending aorta (HCC)   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HCC)   Acute DVT (deep venous thrombosis) ([facility])      Admitting Diagnosis: Atrial fibrillation ([facility] [address] ) [I48 91]  Chest pain [R07 9]  Chest pressure [R07 89]  Aneurysm of ascending aorta (HCC) [I71 2]  Age/Sex: 67 y o  male  Admission Orders:  Scheduled Medications:  enoxaparin, 40 mg, Subcutaneous, Daily      Continuous IV Infusions:     PRN Meds:  docusate sodium, 100 mg, Oral, BID PRN  ondansetron, 4 mg, Intravenous, Q6H PRN  senna, 1 tablet, Oral, Daily PRN      Daily weight / I&O  Telemetry  Faustino SCDs  IP CONSULT TO CARDIOLOGY    Network Utilization Review Department  ATTENTION: Please call with any questions or concerns to [phone] and carefully listen to the prompts so that you are directed to the right person  All voicemails are confidential   Chrystine Can all requests for admission clinical reviews, approved or denied determinations and any other requests to dedicated fax number below belonging to the campus where the patient is receiving treatment   List of dedicated fax numbers for the Facilities:  [address] DENIALS (Administrative/Medical Necessity) [phone]   [address] (Maternity/NICU/Pediatrics) [phone]   [address] Ida N[address] (  [address] [place] [address]) [address] [place] [address] [address]in[address] [place] [address] [phone]

## 2021-02-23 NOTE — CASE MANAGEMENT
LOS: Day 1  Bundle: pt is not a bundle  Readmission risk: pt is not a 30 day readmit    CM reviewed role with pt  Pt reported his dtr, Katharina Coker has his emergency contact [phone]  Pt reported that he lives wit his wife and dtr in a 2 level home with 3 QASIM to enter; pt reported that he and his wife live on the ground floor and his dtr resides in the basement area  Pt reported that he was [employer]PT[employer] with ADLs, pt drives and works  Pt reported that he has the following DME in the home: walker, crutches, grab bars, BSC and SC  Pt confirmed hx of VNA with who he believes is [facility], Pt denied hx of STR and confirmed hx of OPPT with [facility]  PCP is Dr Donn Chavez; pharmacy of choice is [facility] in [place]  Pt denied hx of MH and substance abuse tx  Pt reported that his dtr Katharina Coker brought him in for this visit and will p/u once dc ready  OBS notice reviewed, pt is in agreement  CM reviewed d/c planning process including the following: identifying help at home, patient preference for d/c planning needs, Discharge Lounge, [facility] Meds to Bed program, availability of treatment team to discuss questions or concerns patient and/or family may have regarding understanding medications and recognizing signs and symptoms once discharged  CM also encouraged patient to follow up with all recommended appointments after discharge  Patient advised of importance for patient and family to participate in managing patients medical well being  Patient/caregiver received discharge checklist  Content reviewed  Patient/caregiver encouraged to participate in discharge plan of care prior to discharge home

## 2021-02-23 NOTE — ASSESSMENT & PLAN NOTE
· Suspect secondary to rapid afib  · D-dimer was elevated but CT does not show any PE and lungs are clear  · Initial troponins negative and EKG shows AFib  · Last echocardiogram was on August 16, 2020 with an ejection fraction of 55%, moderately dilated right ventricle and moderate tricuspid regurg  · Repeat ECHO (02/23/2021): Stable from previous with EF 60%    Moderate MR, mild-moderate AR

## 2021-02-23 NOTE — H&P (VIEW-ONLY)
Consultation - Cardiology Team One  Katheryn Kat  67 y o  male MRN: [id number]  Unit/Bed#: RI8 215-01 Encounter: [id number]    Inpatient consult to Cardiology  Consult performed by: DHEERAJ Mayo  Consult ordered by: Whit Hartley MD      Physician Requesting Consult: Maco Ochoa MD  Reason for Consult / Principal Problem: Chest pain and AAA      Assessment/ Plan    1  Chest pain in the setting of #2   Troponin x 3 negative   ECG reviewed showing atrial fibrillation with RVR     2  Atrial fibrillation with RVR  Hx of PAF s/p cryoablation of atrial fibrillation with pulmonary vein isolation and typical atrial flutter ablation on 1/24/2018  Not on [address]   [id number] VASc score: 2  Not on AV monica blocking agents   Will start metoprolol 25 mg PO BID  Consider RANDALL/CV tomorrow if remains in atrial fibrillation  Start eliquis 5 mg PO BID   NPO after MN     3  Ascending aortic aneurysm   CTA of chest showed: 4 9 cm ascending aortic aneurysm, measuring 4 5 cm on a CT from 8/15/2020    History of Present Illness   HPI: Katheryn Kat  is a 67y o  year old male who has paroxysmal atrial fibrillation s/p cryoablation with pulmonary vein isolation and typical atrial flutter ablation on 1/24/2018, hypertension, hyperlipidemia, DVT hx and 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  He follows with cardiologist Dr Tricia Pandey  He presents to [facility]/22/2021 with complaint of chest pain  ECG showed atrial fibrillation with RVR  He reports yesterday he woke up in his normal state of health and shortly after waking up he started having chest pain described as pressure without radiation  He notes mild SOB with chest pain  No diaphoresis, palpitations or nausea  He ate breakfast and later took his wife to eye doctor appointment  When he came home, chest pain persisted and he came to the ER  As noted above, ECG showed atrial fibrillation with RVR   He was given metoprolol 5 mg IV x 1 dose and HR improved to 70-90s  He reports chest pain has resolved since receiving metoprolol and no further episodes of chest pain  He is active at home and continues with work as a   He denies exertional chest pain or SOB  No recent illness  No fever or chills  In the past, he notes he would know exactly when is was in atrial fibrillation but this time is different because he is currently in atrial fibrillation and asymptomatic with rates controlled  He does not take [address], AV monica blockers or antiarrhythmics at home  Echocardiogram 8/16/2020 showed EF 55%, no regional wall motion abnormalities, RV moderately dilated, LA and RA dilated, mild MR, mild to moderate AI and moderate TR  Nuclear stress test 3/1/2019 showed normal study after pharmacologic vasodilation without reproduction of symptoms  Myocardial perfusion imaging was normal at rest and with stress  EKG reviewed personally:  Atrial fibrillation   Ventricular rate 124 bpm  QRSD interval 90 ms  QT interval 282 ms  QTc interval 405 ms     Telemetry reviewed personally:   Atrial fibrillation HR 70-90s with episodes of RVR when ambulating     Review of Systems   Constitution: Negative for chills, decreased appetite, fever and malaise/fatigue  HENT: Negative for congestion  Cardiovascular: Negative for chest pain, dyspnea on exertion, leg swelling, orthopnea and palpitations  Respiratory: Negative for cough and shortness of breath  Musculoskeletal: Negative for falls  Gastrointestinal: Negative for bloating, nausea and vomiting  Neurological: Negative for dizziness and light-headedness  Psychiatric/Behavioral: Negative for altered mental status  All other systems reviewed and are negative      Historical Information   Past Medical History:   Diagnosis Date    Cardiac disease     Hypertension     [facility] [address] [facility]     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     BILATERALLY     Past Surgical History:   Procedure Laterality Date    ESOPHAGOGASTRODUODENOSCOPY N/A 2016    Procedure: ESOPHAGOGASTRODUODENOSCOPY (EGD); Surgeon: Meera Raphael MD;  Location: [facility]; Service:     OTHER SURGICAL HISTORY      VASCULAR SURGERY Right     POPLITEAL BYPASS DUE TO ANEURYSM     Social History     Substance and Sexual Activity   Alcohol Use Not Currently     Social History     Substance and Sexual Activity   Drug Use No     Social History     Tobacco Use   Smoking Status Never Smoker   Smokeless Tobacco Never Used     Family History: History reviewed  No pertinent family history  Meds/Allergies   all current active meds have been reviewed and current meds:   Current Facility-Administered Medications   Medication Dose Route Frequency    docusate sodium (COLACE) capsule 100 mg  100 mg Oral BID PRN    enoxaparin (LOVENOX) subcutaneous injection 40 mg  40 mg Subcutaneous Daily    ondansetron (ZOFRAN) injection 4 mg  4 mg Intravenous Q6H PRN    senna (SENOKOT) tablet 8 6 mg  1 tablet Oral Daily PRN          Allergies   Allergen Reactions    Oxycodone GI Intolerance     nausea  nausea       Objective   Vitals: Blood pressure 114/73, pulse 73, temperature 97 9 °F (36 6 °C), resp  rate 18, height 6' 6" (1 981 m), weight 110 kg (242 lb), SpO2 96 %  ,     Body mass index is 27 97 kg/m²  ,     Systolic (31OWY), FX , Min:99 , KJT:786     Diastolic (71SVT), TBM:16, Min:51, Max:83            Intake/Output Summary (Last 24 hours) at 2021 0828  Last data filed at 2021 0700  Gross per 24 hour   Intake --   Output 1150 ml   Net -1150 ml     Weight (last 2 days)     Date/Time   Weight    21 0551   110 (242)    21   110 (242)    21 19:41:45   110 (242)    21 1111   109 (240)            Invasive Devices     Peripheral Intravenous Line            Peripheral IV 21 Left Antecubital less than 1 day                  Physical Exam  Constitutional:       General: He is not in acute distress    HENT: Head: Normocephalic  Mouth/Throat:      Mouth: Mucous membranes are moist    Neck:      Musculoskeletal: Neck supple  Cardiovascular:      Rate and Rhythm: Normal rate  Rhythm irregular  Pulses: Normal pulses  Pulmonary:      Effort: Pulmonary effort is normal  No respiratory distress  Breath sounds: Normal breath sounds  Abdominal:      General: Bowel sounds are normal       Palpations: Abdomen is soft  Musculoskeletal: Normal range of motion  General: No swelling  Skin:     General: Skin is warm and dry  Capillary Refill: Capillary refill takes less than 2 seconds  Neurological:      General: No focal deficit present  Mental Status: He is alert and oriented to person, place, and time     Psychiatric:         Mood and Affect: Mood normal            LABORATORY RESULTS:  Results from last 7 days   Lab Units 02/23/21  0430 02/22/21 2348 02/22/21 2029   TROPONIN I ng/mL 0 04 0 05* 0 04     CBC with diff:   Results from last 7 days   Lab Units 02/23/21 0431 02/22/21 2029 02/22/21  1123   WBC Thousand/uL 5 94  --  6 85   HEMOGLOBIN g/dL 16 5  --  17 4*   HEMATOCRIT % 48 6  --  51 4*   MCV fL 94  --  95   PLATELETS Thousands/uL 169 178 179   MCH pg 31 9  --  32 2   MCHC g/dL 34 0  --  33 9   RDW % 13 3  --  13 3   MPV fL 9 9 9 9 9 6   NRBC AUTO /100 WBCs 0  --  0       CMP:  Results from last 7 days   Lab Units 02/23/21  0430 02/22/21  1123   POTASSIUM mmol/L 3 9 4 2   CHLORIDE mmol/L 112* 110*   CO2 mmol/L 28 30   BUN mg/dL 13 14   CREATININE mg/dL 0 68 0 80   CALCIUM mg/dL 9 3 9 8   AST U/L 14 16   ALT U/L 18 21   ALK PHOS U/L 64 84   EGFR ml/min/1 73sq m 92 86       BMP:  Results from last 7 days   Lab Units 02/23/21  0430 02/22/21  1123   POTASSIUM mmol/L 3 9 4 2   CHLORIDE mmol/L 112* 110*   CO2 mmol/L 28 30   BUN mg/dL 13 14   CREATININE mg/dL 0 68 0 80   CALCIUM mg/dL 9 3 9 8          Lab Results   Component Value Date    NTBNP 452 (H) 02/22/2021        Results from last 7 days   Lab Units 21   TSH 3RD GENERATON uIU/mL 1 110           Lipid Profile:   No results found for: CHOL  No results found for: HDL  No results found for: LDLCALC  No results found for: TRIG      Cardiac testing:   Results for orders placed during the hospital encounter of 08/15/20   Echo complete with contrast if indicated    Narrative [address] [place], [address] [place] [address]  [phone]    Transthoracic Echocardiogram  2D, M-mode, Doppler, and Color Doppler    Study date:  16-Aug-2020    Patient: Zeinab Pardo  MR number: [id number]  Account number: [de-identified]  : 1945  Age: 67 years  Gender: Male  Status: Inpatient  Location: Bedside  Height: 78 in  Weight: 241 6 lb  BP: 96/ 52 mmHg    Indications: Bacteremia    Diagnoses: R78 81 - Bacteremia    Sonographer:  KRISTIN Bennett  Primary Physician:  Sajan Blandon DO  Referring Physician:  Savannah Peterson DO  Group:  [facility]  Interpreting Physician:  Gerardo Hui DO    SUMMARY    LEFT VENTRICLE:  Systolic function was normal  Ejection fraction was estimated to be 55 %  There were no regional wall motion abnormalities  RIGHT VENTRICLE:  The ventricle was moderately dilated  Systolic function was normal     LEFT ATRIUM:  The atrium was mildly to moderately dilated  RIGHT ATRIUM:  The atrium was mildly dilated  MITRAL VALVE:  There was mild regurgitation  AORTIC VALVE:  There was mild to moderate regurgitation  TRICUSPID VALVE:  There was moderate regurgitation  IVC, HEPATIC VEINS:  The inferior vena cava was dilated, with poor inspiratory collapse, consistent with elevated right atrial pressure  HISTORY: PRIOR HISTORY: Sepsis,Ascending Aorta Aneurysm, PAF,HTN,R,HLD,DVT,AFIB    PROCEDURE: The procedure was performed at the bedside  This was a routine study  The transthoracic approach was used   The study included complete 2D imaging, M-mode, complete spectral Doppler, and color Doppler  Images were obtained from  the parasternal, apical, subcostal, and suprasternal notch acoustic windows  Image quality was adequate  LEFT VENTRICLE: Size was normal  Systolic function was normal  Ejection fraction was estimated to be 55 %  There were no regional wall motion abnormalities  Wall thickness was normal  DOPPLER: Left ventricular diastolic function parameters  were normal for the patient's age  RIGHT VENTRICLE: The ventricle was moderately dilated  Systolic function was normal  Wall thickness was normal     LEFT ATRIUM: The atrium was mildly to moderately dilated  RIGHT ATRIUM: The atrium was mildly dilated  MITRAL VALVE: Valve structure was normal  There was normal leaflet separation  DOPPLER: The transmitral velocity was within the normal range  There was no evidence for stenosis  There was mild regurgitation  AORTIC VALVE: The valve was trileaflet  Leaflets exhibited normal thickness and normal cuspal separation  DOPPLER: Transaortic velocity was within the normal range  There was no evidence for stenosis  There was mild to moderate  regurgitation  TRICUSPID VALVE: The valve structure was normal  There was normal leaflet separation  DOPPLER: The transtricuspid velocity was within the normal range  There was no evidence for stenosis  There was moderate regurgitation  Estimated peak PA  pressure was 51 mmHg  PULMONIC VALVE: Leaflets exhibited normal thickness, no calcification, and normal cuspal separation  DOPPLER: The transpulmonic velocity was within the normal range  There was no regurgitation  PERICARDIUM: There was no pericardial effusion  The pericardium was normal in appearance  AORTA: The root exhibited normal size  SYSTEMIC VEINS: IVC: The inferior vena cava was dilated, with poor inspiratory collapse, consistent with elevated right atrial pressure      SYSTEM MEASUREMENT TABLES    2D  %FS: 29 98 %  Ao Diam: 3 74 cm  EDV(Teich): 108 32 ml  EF(Teich): 57 09 %  ESV(Teich): 46 48 ml  IVSd: 1 09 cm  LA Area: 23 08 cm2  LA Diam: 4 33 cm  LAAs A2C: 27 3 cm2  LAAs A4C: 22 53 cm2  LAESV A-L A2C: 108 18 ml  LAESV A-L A4C: 70 49 ml  LAESV Index (A-L): 36 45 ml/m2  LAESV MOD A2C: 105 94 ml  LAESV MOD A4C: 64 07 ml  LAESV(A-L): 89 3 ml  LALs A2C: 5 85 cm  LALs A4C: 6 11 cm  LVIDd: 4 82 cm  LVIDs: 3 37 cm  LVPWd: 1 08 cm  RA Area: 28 7 cm2  RVIDd: 4 89 cm  SV(Teich): 61 84 ml    CW  AR Dec Eddy: 1 14 m/s2  AR Dec Time: 2591 29 ms  AR PHT: 751 47 ms  AR Vmax: 2 96 m/s  AR maxP 95 mmHg  AV Vmax: 1 14 m/s  AV maxP 2 mmHg  RAP: 5 mmHg  TR Vmax: 3 03 m/s  TR maxP 67 mmHg    MM  TAPSE: 2 65 cm    PW  E': 0 1 m/s  E/E': 6 74  LVOT Vmax: 0 77 m/s  LVOT maxP 34 mmHg  Lateral E': 0 13 m/s  MV A Dur: 119 95 ms  MV A Luis A: 0 22 m/s  MV Dec Eddy: 5 11 m/s2  MV DecT: 136 88 ms  MV E Luis A: 0 7 m/s  MV E/A Ratio: 3 17  MV PHT: 39 7 ms  MVA By PHT: 5 54 cm2  RVSP: 41 67 mmHg    [employer] Accredited Echocardiography Laboratory    Prepared and electronically signed by    DO Maddi Hutchinson 16-Aug-2020 14:12:41       Results for orders placed during the hospital encounter of 18   RANDALL    Narrative [place] [address] [place] [id number]  [phone]    Transesophageal Echocardiogram  Limited 2D, Doppler, and Color Doppler    Study date:  2018    Patient: Lisa Valdovinos  MR number: [id number]  Account number: [de-identified]  : 1945  Age: 67 years  Gender: Male  Status: Outpatient  Location: Cath lab  Height: 78 in  Weight: 282 lb  BP: 157/ 59 mmHg    Indications: Pre-EP Procedure    Diagnoses: 427 31 - ATRIAL FIBRILLATION    Sonographer:  Altaf Caballero RN, RCS  Interpreting Physician:  Kimber Magana MD  Primary Physician:  Star Louis DO  Referring Physician:  Kimber Magana MD  Group:  [facility] [id number] [facility]    SUMMARY    LEFT VENTRICLE:  Systolic function was normal  Ejection fraction was estimated to be 60 %  LEFT ATRIUM:  The atrium was mildly dilated  ATRIAL SEPTUM:  No defect or patent foramen ovale was identified  MITRAL VALVE:  There was mild regurgitation  AORTIC VALVE:  There was mild regurgitation  TRICUSPID VALVE:  There was mild regurgitation  PULMONIC VALVE:  There was mild regurgitation  HISTORY: PRIOR HISTORY: Hypertension, Atrial Fibrillation, DVT    PROCEDURE: The procedure was performed in the catheterization laboratory  This was a routine study  The risks and alternatives of the procedure were explained to the patient and informed consent was obtained  The transesophageal approach  was used  The study included limited 2D imaging, complete spectral Doppler, and color Doppler  The heart rate was 54 bpm, at the start of the study  Intubated with ease  One intubation attempt(s)  There was no blood detected on the probe  Image quality was excellent  MEDICATIONS: General anesthesia administered by anesthesia team     LEFT VENTRICLE: Size was normal  Systolic function was normal  Ejection fraction was estimated to be 60 %  LEFT ATRIUM: The atrium was mildly dilated  There was no spontaneous echo contrast ("smoke")  APPENDAGE: The size was normal  No thrombus was identified  DOPPLER: The function was normal (normal emptying velocity)  ATRIAL SEPTUM: No defect or patent foramen ovale was identified  RIGHT ATRIUM: Size was normal  No thrombus was identified  MITRAL VALVE: Valve structure was normal  There was normal leaflet separation  There was no echocardiographic evidence of vegetation  DOPPLER: There was mild regurgitation  AORTIC VALVE: The valve was trileaflet  Leaflets exhibited normal thickness and normal cuspal separation  There was no echocardiographic evidence of vegetation  DOPPLER: There was mild regurgitation      TRICUSPID VALVE: The valve structure was normal  There was normal leaflet separation  There was no echocardiographic evidence of vegetation  DOPPLER: There was mild regurgitation  PULMONIC VALVE: DOPPLER: There was mild regurgitation  PERICARDIUM: There was no pericardial effusion  The pericardium was normal in appearance  AORTA: The root exhibited normal size  There was no atheroma  There was no evidence for dissection  There was no evidence for aneurysm  [address] Accredited Echocardiography Laboratory    Prepared and electronically signed by    Lelia Herr MD  Signed 24-Jan-2018 16:50:23       No procedure found  No results found for this or any previous visit  Imaging:   Xr Chest 2 Views    Result Date: 2/22/2021  Narrative: CHEST INDICATION:   chest tightness  COMPARISON:  August 15, 2020 EXAM PERFORMED/VIEWS:  XR CHEST PA & LATERAL  The frontal view was performed utilizing dual energy radiographic technique  FINDINGS: Cardiomediastinal silhouette appears unremarkable  The lungs are clear  No pneumothorax or pleural effusion  Osseous structures appear within normal limits for patient age  Impression: No acute cardiopulmonary disease  Workstation performed: [id number]     [facility] Ed Chest Pe Study    Result Date: 2/22/2021  Narrative: CTA - CHEST WITH IV CONTRAST - PULMONARY ANGIOGRAM INDICATION:   positive dimer chest pain tachycardia  COMPARISON: CTA of the chest from August 15, 2020 TECHNIQUE: CTA examination of the chest was performed using angiographic technique according to a protocol specifically tailored to evaluate for pulmonary embolism  Axial, sagittal, and coronal 2D reformatted images were created from the source data and  submitted for interpretation  In addition, coronal 3D MIP postprocessing was performed on the acquisition scanner  Radiation dose length product (DLP) for this visit:  620 9 mGy-cm     This examination, like all CT scans performed in the [facility], was performed utilizing techniques to minimize radiation dose exposure, including the use of iterative reconstruction and automated exposure control  IV Contrast:  85 mL of iohexol (OMNIPAQUE)  FINDINGS: PULMONARY ARTERIAL TREE:  No pulmonary embolus is seen  LUNGS:  Lungs are clear  There is no tracheal or endobronchial lesion  PLEURA:  Unremarkable  HEART/GREAT VESSELS:  4 9 cm ascending aortic aneurysm, measuring 4 5 cm (by my measurements) on the CT from 8/15/2020  MEDIASTINUM AND ALLISON: No lymphadenopathy or mass  Small hiatal hernia  Esophagus unremarkable  Trachea and main stem bronchi normal  CHEST WALL AND LOWER NECK:   Unremarkable  VISUALIZED STRUCTURES IN THE UPPER ABDOMEN:  Several liver cysts  Upper pole right renal cyst   Otherwise unremarkable  OSSEOUS STRUCTURES:  No acute fracture or destructive osseous lesion  Impression: 1  No evidence of pulmonary embolus or other acute abnormality in the chest  2   4 9 cm ascending aortic aneurysm, measuring 4 5 cm on a CT from 8/15/2020  Workstation performed: [id number]       Thank you for allowing us to participate in this patient's care  This pt will follow up with Dr Dyana Linton once discharged  Counseling / Coordination of Care  Total floor / unit time spent today 45 minutes  Greater than 50% of total time was spent with the patient and / or family counseling and / or coordination of care  A description of the counseling / coordination of care: Review of history, current assessment, development of a plan  Code Status: Level 1 - Full Code    ** Please Note: Dragon 360 Dictation voice to text software may have been used in the creation of this document   **

## 2021-02-23 NOTE — PROGRESS NOTES
Progress Note - Franklyn Boateng 1945, 67 y o  male MRN: [id number]  Unit/Bed#: CW2 215-01 Encounter: [id number]  Primary Care Provider: Kisha Stroud DO   Date and time admitted to hospital: 2/22/2021 11:04 AM    * Atrial fibrillation with RVR ([address] )  Assessment & Plan  · Status post cryoablation 2 years ago  · Presented with substernal chest pressure was found to be in AFib with RVR  · Seen by cardiology  Started on metoprolol 25 mg BID and Eliquis 5 mg BID  · Anticipate RANDALL/CV tomorrow if patient remains in atrial fibrillation  · Maintain potassium >4 0 and magnesium >2 0    Erythrocytosis  Assessment & Plan  · Hb of 17 4 and previously elevated in the past as well  · Hemoconcentrated vs DARIO/nocturnal hypoxia vs vascular shunt although his O2 sat is 95-99% on room air vs PCV  Plan:  - Continue to monitor  - Outpatient PSG and EPO level    Pulmonary hypertension (HCC)  Assessment & Plan  · PASP of 51mmHg indicating severe pulm HTN  · Has moderate tricuspid regurg and moderately dilated RV  · Does not have significant LV HF and has an EF of 55%  · May have undiagnosed DARIO or CTEPH given hx of DVT  · Ddimer is 1 30 but CTA is negative for PE  Plan:  - Recommend outpatient PSG and V/Q scan    Chest pain  Assessment & Plan  · Suspect secondary to rapid afib  · D-dimer was elevated but CT does not show any PE and lungs are clear  · Initial troponins negative and EKG shows AFib  · Last echocardiogram was on August 16, 2020 with an ejection fraction of 55%, moderately dilated right ventricle and moderate tricuspid regurg  Aneurysm of ascending aorta (HCC)  Assessment & Plan  · Chronic and currently measures 4 9 cm however the CT from August of 2020 mention is the aneurysm measuring at 4 5 cm  · The to be monitored closely given the growth rate    History of DVT (deep vein thrombosis)  Assessment & Plan  · LLE popliteal DVT in 08/14/2016   Coincidentally noted arterial popliteal aneurysms at that time  · Unclear if patient was on anticoagulation at that time  · CTA negative for PE    Popliteal aneurysm [facility])  Assessment & Plan  · 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      VTE Pharmacologic Prophylaxis:   Pharmacologic: Apixaban (Eliquis)  Mechanical: Mechanical VTE prophylaxis in place  Patient Centered Rounds: I have performed bedside rounds with nursing staff today  Discussions with Specialists or Other Care Team Provider: Cardiology  Education and Discussions with Family / Patient: All patient questions answered to the best of my ability  Time Spent for Care: 30 minutes  More than 50% of total time spent on counseling and coordination of care as described above  Current Length of Stay: 0 day(s)  Current Patient Status: Inpatient   Certification Statement: The patient, admitted on an observation basis, will now require > 2 midnight hospital stay due to need for RANDALL and cardioversion  Will change to IN-PATIENT    Discharge Plan: Patient is not medically stable for discharge given need for RANDALL and cardioversion tomorrow if he remains in A-fib  Code Status: Level 1 - Full Code    Subjective:   Patient reports occasional SOB especially when ambulating  Patient also noted to have periods of rapid A-fib while ambulating to/from the bathroom this AM   Currently, he denies any chest pain  Objective:   Vitals:   Temp (24hrs), Av °F (36 7 °C), Min:97 6 °F (36 4 °C), Max:98 3 °F (36 8 °C)    Temp:  [97 6 °F (36 4 °C)-98 3 °F (36 8 °C)] 98 °F (36 7 °C)  HR:  [] 85  Resp:  [12-20] 20  BP: ()/(51-84) 122/84  SpO2:  [93 %-99 %] 97 %  Body mass index is 27 97 kg/m²  Input and Output Summary (last 24 hours): Intake/Output Summary (Last 24 hours) at 2021 1023  Last data filed at 2021 0700  Gross per 24 hour   Intake --   Output 1150 ml   Net -1150 ml       Physical Exam:     Physical Exam  Vitals signs reviewed     HENT:      Head: Normocephalic and atraumatic  Eyes:      General: No scleral icterus  Cardiovascular:      Rate and Rhythm: Normal rate  Rhythm irregularly irregular  Heart sounds: No murmur  Pulmonary:      Breath sounds: Normal breath sounds  No wheezing or rales  Chest:      Chest wall: No tenderness  Abdominal:      General: Bowel sounds are normal  There is no distension  Palpations: Abdomen is soft  Tenderness: There is no abdominal tenderness  Musculoskeletal: Normal range of motion  Skin:     General: Skin is warm and dry  Findings: No rash  Additional Data:   Labs:  Results from last 7 days   Lab Units 02/23/21  0431   WBC Thousand/uL 5 94   HEMOGLOBIN g/dL 16 5   HEMATOCRIT % 48 6   PLATELETS Thousands/uL 169   NEUTROS PCT % 36*   LYMPHS PCT % 48*   MONOS PCT % 11   EOS PCT % 4     Results from last 7 days   Lab Units 02/23/21  0430   POTASSIUM mmol/L 3 9   CHLORIDE mmol/L 112*   CO2 mmol/L 28   BUN mg/dL 13   CREATININE mg/dL 0 68   CALCIUM mg/dL 9 3   ALK PHOS U/L 64   ALT U/L 18   AST U/L 14           * I Have Reviewed All Lab Data Listed Above  * Additional Pertinent Lab Tests Reviewed: All Labs Within Last 24 Hours Reviewed    Imaging:    Imaging Reports Reviewed Today Include: None new    Cultures:   Blood Culture:   Lab Results   Component Value Date    BLOODCX No Growth After 5 Days  08/17/2020    BLOODCX No Growth After 5 Days  08/17/2020    BLOODCX No Growth After 5 Days   08/15/2020    BLOODCX Beta Hemolytic Streptococcus Group C (A) 08/15/2020     Urine Culture: No results found for: URINECX  Sputum Culture: No components found for: SPUTUMCX  Wound Culture: No results found for: WOUNDCULT    Last 24 Hours Medication List:   Current Facility-Administered Medications   Medication Dose Route Frequency Provider Last Rate    apixaban  5 mg Oral BID Tiera Knee, CRNP      docusate sodium  100 mg Oral BID PRN Héctor Mendiola MD      metoprolol tartrate  25 mg Oral Q12H [facility] DHEERAJ Yin      ondansetron  4 mg Intravenous Q6H PRN Miky White MD      senna  1 tablet Oral Daily PRN Miky White MD          Today, Patient Was Seen By: Yazan Mauricio PA-C    ** Please Note: Dragon 360 Dictation voice to text software may have been used in the creation of this document   **

## 2021-02-23 NOTE — ASSESSMENT & PLAN NOTE
· Status post cryoablation 2 years ago  · Presented with substernal chest pressure was found to be in AFib with RVR  · Seen by cardiology  Started on metoprolol 25 mg BID and Eliquis 5 mg BID  · Anticipate RANDALL/CV tomorrow if patient remains in atrial fibrillation    · Maintain potassium >4 0 and magnesium >2 0

## 2021-02-23 NOTE — CASE MANAGEMENT
Cm called to [facility] pharmacy to price check pt's eliquis and metapropol medication  Pt's eliquis will cost $45/month, however, cm was able to apply a one-time 30day coupon  Coupon was applied and cost of eliquis was covered for this month only, as pt uses the medication regularly the eliquis will be $45/month, which pt was made aware of and is agreeable to that cost  A script for metapropol was also sent to [facility] pharm, the medication is $5, pt is agreeable  BRYAN Keiry made aware

## 2021-02-24 ENCOUNTER — TELEPHONE (OUTPATIENT)
Dept: CARDIOLOGY CLINIC | Facility: CLINIC | Age: 76
End: 2021-02-24

## 2021-02-24 DIAGNOSIS — I48.0 PAROXYSMAL A-FIB (HCC): Primary | ICD-10-CM

## 2021-02-24 NOTE — TELEPHONE ENCOUNTER
Patient called, back in afib since Monday when presented to ER  Not feeling well, states cannot exert himself physically at all  Has SOB, sweating  BP today 139/82 with HR 93 on the left  Last evening /84 with HR 77       Patient is on Eliquis  S/P afib ablation 2018 with Dr Romana Mueller  Patient denies any increase in caffeine, alcohol or other stimulants  Per Ever Mauricio and Dr Romana Mueller, schedule RANDALL/DCCV/Flecainide for tomorrow if possible

## 2021-02-24 NOTE — TELEPHONE ENCOUNTER
----- Message from Robson Pina MD sent at 2/23/2021  2:13 PM EST -----  Regarding: needs outpt RANDALL/DCCV  Patient of Kevin with current atrial fibrillation after ablation in 2018  Started metoprolol tartrate, Eliquis, heart rates are very well controlled, echo looked okay other than valve disease which needs to be followed, but discussed with him discharge and outpatient return for RANDALL/cardioversion to give him time to be on Eliquis, he was agreeable to that plan rather than staying in patient    I will be ordering a RANDALL/cardioversion, can you please call him to get things scheduled for next week, I am in [facility] Friday March 5th and can do it then, but if they are space other days that works for him better, that is fine too

## 2021-02-24 NOTE — TELEPHONE ENCOUNTER
PT is scheduled on 02/26/21 for a RANDALL/CV at [facility] with Dr Roxanna Zhogn  PT aware of instructions  Spoke with the pt over the phone  PT has Medicare as the primary insurance

## 2021-02-24 NOTE — PROGRESS NOTES
Patient seen on ED on 2-22-21 due to symptomatic atrial fibrillation, he was evaluated by general cardiology team  He was placed on eliquis and metoprolol tartrate and discharged home  Since returning home he has remained in rate controlled AF but feels awful he "cant move" due to symptoms  We will plan for outpatient RANDALL with cardioversion this week and as long as he does not have any atrial thrombus will plan for flecainide 100 mg b i d  Use until we can discussed long term plan going forward  Discussed with patient and attending both are okay with the plan  Will have the office call patient to discuss outpatient follow-up with me next week  Informed him to remain NPO tomorrow AM until he hears from our office

## 2021-02-26 ENCOUNTER — ANESTHESIA EVENT (OUTPATIENT)
Dept: NON INVASIVE DIAGNOSTICS | Facility: HOSPITAL | Age: 76
End: 2021-02-26
Payer: MEDICARE

## 2021-02-26 ENCOUNTER — ANESTHESIA (OUTPATIENT)
Dept: NON INVASIVE DIAGNOSTICS | Facility: HOSPITAL | Age: 76
End: 2021-02-26
Payer: MEDICARE

## 2021-02-26 ENCOUNTER — HOSPITAL ENCOUNTER (OUTPATIENT)
Dept: NON INVASIVE DIAGNOSTICS | Facility: HOSPITAL | Age: 76
Discharge: HOME | End: 2021-02-26
Attending: INTERNAL MEDICINE | Admitting: INTERNAL MEDICINE
Payer: MEDICARE

## 2021-02-26 ENCOUNTER — HOSPITAL ENCOUNTER (OUTPATIENT)
Dept: NON INVASIVE DIAGNOSTICS | Facility: HOSPITAL | Age: 76
Discharge: HOME | End: 2021-02-26
Payer: MEDICARE

## 2021-02-26 VITALS
OXYGEN SATURATION: 95 % | HEART RATE: 85 BPM | RESPIRATION RATE: 17 BRPM | HEIGHT: 78 IN | DIASTOLIC BLOOD PRESSURE: 52 MMHG | TEMPERATURE: 98.1 F | SYSTOLIC BLOOD PRESSURE: 96 MMHG | WEIGHT: 240 LBS | BODY MASS INDEX: 27.77 KG/M2

## 2021-02-26 DIAGNOSIS — I48.0 PAROXYSMAL A-FIB (HCC): ICD-10-CM

## 2021-02-26 DIAGNOSIS — I48.0 PAROXYSMAL ATRIAL FIBRILLATION (HCC): Primary | ICD-10-CM

## 2021-02-26 LAB
ATRIAL RATE: 381 BPM
ATRIAL RATE: 61 BPM
ATRIAL RATE: 66 BPM
P AXIS: 77 DEGREES
P AXIS: 80 DEGREES
PR INTERVAL: 174 MS
PR INTERVAL: 194 MS
QRS AXIS: -31 DEGREES
QRS AXIS: -34 DEGREES
QRS AXIS: -43 DEGREES
QRSD INTERVAL: 106 MS
QRSD INTERVAL: 108 MS
QRSD INTERVAL: 112 MS
QT INTERVAL: 358 MS
QT INTERVAL: 384 MS
QT INTERVAL: 386 MS
QTC INTERVAL: 388 MS
QTC INTERVAL: 402 MS
QTC INTERVAL: 410 MS
T WAVE AXIS: 11 DEGREES
T WAVE AXIS: 40 DEGREES
T WAVE AXIS: 48 DEGREES
VENTRICULAR RATE: 61 BPM
VENTRICULAR RATE: 66 BPM
VENTRICULAR RATE: 79 BPM

## 2021-02-26 PROCEDURE — 93325 DOPPLER ECHO COLOR FLOW MAPG: CPT | Performed by: INTERNAL MEDICINE

## 2021-02-26 PROCEDURE — 93005 ELECTROCARDIOGRAM TRACING: CPT

## 2021-02-26 PROCEDURE — 93320 DOPPLER ECHO COMPLETE: CPT | Performed by: INTERNAL MEDICINE

## 2021-02-26 PROCEDURE — 92960 CARDIOVERSION ELECTRIC EXT: CPT | Performed by: INTERNAL MEDICINE

## 2021-02-26 PROCEDURE — 92960 CARDIOVERSION ELECTRIC EXT: CPT

## 2021-02-26 PROCEDURE — 93312 ECHO TRANSESOPHAGEAL: CPT

## 2021-02-26 PROCEDURE — 93312 ECHO TRANSESOPHAGEAL: CPT | Performed by: INTERNAL MEDICINE

## 2021-02-26 PROCEDURE — NC001 PR NO CHARGE: Performed by: INTERNAL MEDICINE

## 2021-02-26 PROCEDURE — 93010 ELECTROCARDIOGRAM REPORT: CPT | Performed by: INTERNAL MEDICINE

## 2021-02-26 RX ORDER — ACETAMINOPHEN 325 MG/1
650 TABLET ORAL EVERY 4 HOURS PRN
Status: DISCONTINUED | OUTPATIENT
Start: 2021-02-26 | End: 2021-02-26 | Stop reason: HOSPADM

## 2021-02-26 RX ORDER — SODIUM CHLORIDE 9 MG/ML
INJECTION, SOLUTION INTRAVENOUS CONTINUOUS PRN
Status: DISCONTINUED | OUTPATIENT
Start: 2021-02-26 | End: 2021-02-26

## 2021-02-26 RX ORDER — PROPOFOL 10 MG/ML
INJECTION, EMULSION INTRAVENOUS AS NEEDED
Status: DISCONTINUED | OUTPATIENT
Start: 2021-02-26 | End: 2021-02-26

## 2021-02-26 RX ORDER — ACETAMINOPHEN 325 MG/1
650 TABLET ORAL EVERY 4 HOURS PRN
Status: DISCONTINUED | OUTPATIENT
Start: 2021-02-26 | End: 2021-02-27 | Stop reason: HOSPADM

## 2021-02-26 RX ADMIN — PROPOFOL 150 MG: 10 INJECTION, EMULSION INTRAVENOUS at 09:54

## 2021-02-26 RX ADMIN — PROPOFOL 30 MG: 10 INJECTION, EMULSION INTRAVENOUS at 10:11

## 2021-02-26 RX ADMIN — PHENYLEPHRINE HYDROCHLORIDE 200 MCG: 10 INJECTION INTRAVENOUS at 09:54

## 2021-02-26 RX ADMIN — PHENYLEPHRINE HYDROCHLORIDE 200 MCG: 10 INJECTION INTRAVENOUS at 09:59

## 2021-02-26 RX ADMIN — SODIUM CHLORIDE: 9 INJECTION, SOLUTION INTRAVENOUS at 09:43

## 2021-02-26 RX ADMIN — PHENYLEPHRINE HYDROCHLORIDE 400 MCG: 10 INJECTION INTRAVENOUS at 10:02

## 2021-02-26 NOTE — DISCHARGE INSTRUCTIONS
Moderate Sedation   WHAT YOU NEED TO KNOW:   Moderate sedation, or conscious sedation, is medicine used during procedures to help you feel relaxed and calm  You will be awake and able to follow directions without anxiety or pain  You will remember little to none of the procedure  You may feel tired, weak, or unsteady on your feet after you get sedation  You may also have trouble concentrating or short-term memory loss  These symptoms should go away in 24 hours or less  DISCHARGE INSTRUCTIONS:   Call 911 or have someone else call for any of the following:   · You have sudden trouble breathing  · You cannot be woken  Return to the emergency department if:   · You have a severe headache or dizziness  · Your heart is beating faster than usual     Contact your healthcare provider if:   · You have a fever  · You have nausea or are vomiting for more than 8 hours after the procedure  · Your skin is itchy, swollen, or you have a rash  · You have questions or concerns about your condition or care  Self-care:   · Have someone stay with you for 24 hours  This person can drive you to errands and help you do things around the house  This person can also watch for problems  · Rest and do quiet activities for 24 hours  Do not exercise, ride a bike, or play sports  Stand up slowly to prevent dizziness and falls  Take short walks around the house with another person  Slowly return to your usual activities the next day  · Do not drive or use dangerous machines or tools for 24 hours  You may injure yourself or others  Examples include a lawnmower, saw, or drill  Do not return to work for 24 hours if you use dangerous machines or tools for work  · Do not make important decisions for 24 hours  For example, do not sign important papers or invest money  · Drink liquids as directed  Liquids help flush the sedation medicine out of your body   Ask how much liquid to drink each day and which liquids are best for you  · Eat small, frequent meals to prevent nausea and vomiting  Start with clear liquids such as juice or broth  If you do not vomit after clear liquids, you can eat your usual foods  · Do not drink alcohol or take medicines that make you drowsy  This includes medicines that help you sleep and anxiety medicines  Ask your healthcare provider if it is safe for you to take pain medicine  Follow up with your healthcare provider as directed:  Write down your questions so you remember to ask them during your visits  © Copyright [address] Information is for End User's use only and may not be sold, redistributed or otherwise used for commercial purposes  All illustrations and images included in CareNotes® are the copyrighted property of A D A M , Inc  or [facility] [employer]   The above information is an  only  It is not intended as medical advice for individual conditions or treatments  Talk to your doctor, nurse or pharmacist before following any medical regimen to see if it is safe and effective for you  Transesophageal Echocardiogram   WHAT YOU NEED TO KNOW:   A transesophageal echocardiogram (RANDALL) is a procedure used to check for problems with your heart  It will also show any problems in the blood vessels near your heart  Sound waves are sent to the heart through a tube inserted into your throat  The sound waves show the structure and function of your heart through pictures on a monitor  DISCHARGE INSTRUCTIONS:   Rest:  Rest until you are fully awake  You may be sleepy for a while after your procedure  Do not eat or drink until you are able to swallow normally:  Start with soft foods, such as oatmeal, yogurt, or applesauce  Once you can eat soft foods easily, you may begin to eat solid foods  Follow up with your healthcare provider as directed:  Write down your questions so you remember to ask them during your visits    Contact your healthcare provider if:   · You have a fever or chills  · You taste blood in your mouth  · You have a severe sore throat or difficulty swallowing  · You have questions or concerns about your condition or care  Seek care immediately or call 911 if:   · You have any of the following signs of a heart attack:      ? Squeezing, pressure, or pain in your chest     ? and  any of the following:     § Discomfort or pain in your back, neck, jaw, stomach, or arm     § Shortness of breath    § Nausea or vomiting    § Lightheadedness or a sudden cold sweat      © Copyright [employer] 2018 Information is for End User's use only and may not be sold, redistributed or otherwise used for commercial purposes  All illustrations and images included in CareNotes® are the copyrighted property of [employer]  or Marcos Parikh   The above information is an  only  It is not intended as medical advice for individual conditions or treatments  Talk to your doctor, nurse or pharmacist before following any medical regimen to see if it is safe and effective for you  Cardioversion   WHAT YOU SHOULD KNOW:   Cardioversion is a procedure to correct arrhythmias, which is when your heart beats too fast or irregularly  Arrhythmias may prevent your body from getting the blood and oxygen it needs  Cardioversion delivers a shock of electricity to your heart to help it return to its normal rhythm  INSTRUCTIONS:   Medicines:   · Anticoagulants    are a type of blood thinner medicine that helps prevent clots  Clots can cause strokes, heart attacks, and death  These medicines may cause you to bleed or bruise more easily  ¨ Watch for bleeding from your gums or nose  Watch for blood in your urine and bowel movements  Use a soft washcloth and a soft toothbrush  If you shave, use an electric razor  Avoid activities that can cause bruising or bleeding      ¨ Tell your healthcare provider about all medicines you take because many medicines cannot be used with anticoagulants  Do not start or stop any medicines unless your healthcare provider tells you to  Tell your dentist and other healthcare providers that you take anticoagulants  Wear a bracelet or necklace that says you take this medicine  ¨ You will need regular blood tests so your healthcare provider can decide how much medicine you need  Take anticoagulants exactly as directed  Tell your healthcare provider right away if you forget to take the medicine, or if you take too much  ¨ If you take warfarin, some foods can change how your blood clots  Do not make major changes to your diet while you take warfarin  Warfarin works best when you eat about the same amount of vitamin K every day  Vitamin K is found in green leafy vegetables, broccoli, grapes, and other foods  Ask for more information about what to eat when you take warfarin  · Heart medicine: This medicine is given to strengthen or regulate your heartbeat  · Take your medicine as directed  Call your healthcare provider if you think your medicine is not helping or if you have side effects  Tell him if you are allergic to any medicine  Keep a list of the medicines, vitamins, and herbs you take  Include the amounts, and when and why you take them  Bring the list or the pill bottles to follow-up visits  Carry your medicine list with you in case of an emergency  Follow up with your cardiologist as directed:  Write down your questions so you remember to ask them during your visits  Contact your cardiologist if:   · You have a fever  · You have new or worsening weakness or tiredness  · You have questions or concerns about your condition or care  Return to the emergency department if:   · You feel like your heart is fluttering or jumping in your chest     · You feel lightheaded or you have fainted  · You have chest pain when you take a deep breath or cough  You may cough up blood      · You have discomfort in your chest that feels like squeezing, pressure, fullness, or pain  · You have pain or discomfort in your back, neck, jaw, stomach, or arm  · You have weakness or numbness in part of your body  · You have sudden trouble breathing  · You become confused or have difficulty speaking  · You have dizziness, a severe headache, or vision loss  © 2014 [address] is for End User's use only and may not be sold, redistributed or otherwise used for commercial purposes  All illustrations and images included in CareNotes® are the copyrighted property of [employer]  or Memo Vallejo  The above information is an  only  It is not intended as medical advice for individual conditions or treatments  Talk to your doctor, nurse or pharmacist before following any medical regimen to see if it is safe and effective for you

## 2021-02-26 NOTE — ANESTHESIA PREPROCEDURE EVALUATION
Procedure:  RANDALL    Relevant Problems   CARDIO   (+) Aneurysm of ascending aorta (HCC)   (+) Atrial fibrillation with RVR (HCC)   (+) Benign essential hypertension   (+) Hypertension   (+) Mitral valve insufficiency   (+) Mixed hyperlipidemia   (+) Paroxysmal atrial fibrillation (HCC)      NEURO/PSYCH   (+) History of DVT (deep vein thrombosis)        Physical Exam    Airway    Mallampati score: II  TM Distance: >3 FB  Neck ROM: full     Dental   No notable dental hx     Cardiovascular  Rhythm: irregular,     Pulmonary  Pulmonary exam normal     Other Findings        Anesthesia Plan  ASA Score- 3     Anesthesia Type- IV sedation with anesthesia with ASA Monitors  Additional Monitors:   Airway Plan:     Comment: Atrial fibrillation with rapid ventricular response  Left axis deviation  Low voltage QRS  Septal infarct (cited on or before 22-FEB-2021)  Abnormal ECG  When compared with ECG of 15-AUG-2020 16:27,  Atrial fibrillation has replaced Sinus rhythm  Questionable change in initial forces of Septal leads  Confirmed by Diomedes Robb ([id number]) on 2/22/2021 5:03:26 PM        Plan Factors-Exercise tolerance (METS): <4 METS  Chart reviewed  EKG reviewed  Existing labs reviewed  Patient is not a current smoker  Patient not instructed to abstain from smoking on day of procedure  Patient did not smoke on day of surgery  Induction- intravenous  Postoperative Plan-     Informed Consent- Anesthetic plan and risks discussed with patient  I personally reviewed this patient with the CRNA  Discussed and agreed on the Anesthesia Plan with the CRNA  Georgi Rueda

## 2021-02-26 NOTE — INTERVAL H&P NOTE
H&P reviewed  After examining the patient, other than what is noted below, I find no changes in the patients condition since the H&P had been written  Patient has been feeling run down, fatigued due to AF  He contacted EP office and was set up for RANDALL/DCCV for symptomatic AF  He has been on uninterrupted Eliquis since discharge from last admission  There were no vitals filed for this visit

## 2021-02-26 NOTE — PROGRESS NOTES
Per Dr Lillie Cooley flecainide will be sent to patient's OP pharmacy by Marylene Sizer, PA-C   This was dicussed with patient although it is not listed on patient's AVS

## 2021-02-26 NOTE — ANESTHESIA POSTPROCEDURE EVALUATION
Post-Op Assessment Note    CV Status:  Stable  Pain Score: 0    Pain management: adequate     Mental Status:  Alert and awake   Hydration Status:  Euvolemic   PONV Controlled:  Controlled   Airway Patency:  Patent      Post Op Vitals Reviewed: Yes      Staff: CRNA, Anesthesiologist         No complications documented      BP   94/60   Temp      Pulse  79   Resp   18   SpO2   97

## 2021-02-26 NOTE — DISCHARGE INSTRUCTIONS
Moderate Sedation   WHAT YOU NEED TO KNOW:   Moderate sedation, or conscious sedation, is medicine used during procedures to help you feel relaxed and calm  You will be awake and able to follow directions without anxiety or pain  You will remember little to none of the procedure  You may feel tired, weak, or unsteady on your feet after you get sedation  You may also have trouble concentrating or short-term memory loss  These symptoms should go away in 24 hours or less  DISCHARGE INSTRUCTIONS:   Call 911 or have someone else call for any of the following:   · You have sudden trouble breathing  · You cannot be woken  Return to the emergency department if:   · You have a severe headache or dizziness  · Your heart is beating faster than usual     Contact your healthcare provider if:   · You have a fever  · You have nausea or are vomiting for more than 8 hours after the procedure  · Your skin is itchy, swollen, or you have a rash  · You have questions or concerns about your condition or care  Self-care:   · Have someone stay with you for 24 hours  This person can drive you to errands and help you do things around the house  This person can also watch for problems  · Rest and do quiet activities for 24 hours  Do not exercise, ride a bike, or play sports  Stand up slowly to prevent dizziness and falls  Take short walks around the house with another person  Slowly return to your usual activities the next day  · Do not drive or use dangerous machines or tools for 24 hours  You may injure yourself or others  Examples include a lawnmower, saw, or drill  Do not return to work for 24 hours if you use dangerous machines or tools for work  · Do not make important decisions for 24 hours  For example, do not sign important papers or invest money  · Drink liquids as directed  Liquids help flush the sedation medicine out of your body   Ask how much liquid to drink each day and which liquids are best for you  · Eat small, frequent meals to prevent nausea and vomiting  Start with clear liquids such as juice or broth  If you do not vomit after clear liquids, you can eat your usual foods  · Do not drink alcohol or take medicines that make you drowsy  This includes medicines that help you sleep and anxiety medicines  Ask your healthcare provider if it is safe for you to take pain medicine  Follow up with your healthcare provider as directed:  Write down your questions so you remember to ask them during your visits  © Copyright [address] Information is for End User's use only and may not be sold, redistributed or otherwise used for commercial purposes  All illustrations and images included in CareNotes® are the copyrighted property of A D A M , Inc  or [facility] [employer]   The above information is an  only  It is not intended as medical advice for individual conditions or treatments  Talk to your doctor, nurse or pharmacist before following any medical regimen to see if it is safe and effective for you  Transesophageal Echocardiogram   WHAT YOU NEED TO KNOW:   A transesophageal echocardiogram (RANDALL) is a procedure used to check for problems with your heart  It will also show any problems in the blood vessels near your heart  Sound waves are sent to the heart through a tube inserted into your throat  The sound waves show the structure and function of your heart through pictures on a monitor  DISCHARGE INSTRUCTIONS:   Rest:  Rest until you are fully awake  You may be sleepy for a while after your procedure  Do not eat or drink until you are able to swallow normally:  Start with soft foods, such as oatmeal, yogurt, or applesauce  Once you can eat soft foods easily, you may begin to eat solid foods  Follow up with your healthcare provider as directed:  Write down your questions so you remember to ask them during your visits    Contact your healthcare provider if:   · You have a fever or chills  · You taste blood in your mouth  · You have a severe sore throat or difficulty swallowing  · You have questions or concerns about your condition or care  Seek care immediately or call 911 if:   · You have any of the following signs of a heart attack:      ? Squeezing, pressure, or pain in your chest     ? and  any of the following:     § Discomfort or pain in your back, neck, jaw, stomach, or arm     § Shortness of breath    § Nausea or vomiting    § Lightheadedness or a sudden cold sweat      © Copyright [employer] 2018 Information is for End User's use only and may not be sold, redistributed or otherwise used for commercial purposes  All illustrations and images included in CareNotes® are the copyrighted property of [employer]  or Marcos Parikh   The above information is an  only  It is not intended as medical advice for individual conditions or treatments  Talk to your doctor, nurse or pharmacist before following any medical regimen to see if it is safe and effective for you  Cardioversion   WHAT YOU SHOULD KNOW:   Cardioversion is a procedure to correct arrhythmias, which is when your heart beats too fast or irregularly  Arrhythmias may prevent your body from getting the blood and oxygen it needs  Cardioversion delivers a shock of electricity to your heart to help it return to its normal rhythm  INSTRUCTIONS:   Medicines:   · Anticoagulants    are a type of blood thinner medicine that helps prevent clots  Clots can cause strokes, heart attacks, and death  These medicines may cause you to bleed or bruise more easily  ¨ Watch for bleeding from your gums or nose  Watch for blood in your urine and bowel movements  Use a soft washcloth and a soft toothbrush  If you shave, use an electric razor  Avoid activities that can cause bruising or bleeding      ¨ Tell your healthcare provider about all medicines you take because many medicines cannot be used with anticoagulants  Do not start or stop any medicines unless your healthcare provider tells you to  Tell your dentist and other healthcare providers that you take anticoagulants  Wear a bracelet or necklace that says you take this medicine  ¨ You will need regular blood tests so your healthcare provider can decide how much medicine you need  Take anticoagulants exactly as directed  Tell your healthcare provider right away if you forget to take the medicine, or if you take too much  ¨ If you take warfarin, some foods can change how your blood clots  Do not make major changes to your diet while you take warfarin  Warfarin works best when you eat about the same amount of vitamin K every day  Vitamin K is found in green leafy vegetables, broccoli, grapes, and other foods  Ask for more information about what to eat when you take warfarin  · Heart medicine: This medicine is given to strengthen or regulate your heartbeat  · Take your medicine as directed  Call your healthcare provider if you think your medicine is not helping or if you have side effects  Tell him if you are allergic to any medicine  Keep a list of the medicines, vitamins, and herbs you take  Include the amounts, and when and why you take them  Bring the list or the pill bottles to follow-up visits  Carry your medicine list with you in case of an emergency  Follow up with your cardiologist as directed:  Write down your questions so you remember to ask them during your visits  Contact your cardiologist if:   · You have a fever  · You have new or worsening weakness or tiredness  · You have questions or concerns about your condition or care  Return to the emergency department if:   · You feel like your heart is fluttering or jumping in your chest     · You feel lightheaded or you have fainted  · You have chest pain when you take a deep breath or cough  You may cough up blood      · You have discomfort in your chest that feels like squeezing, pressure, fullness, or pain  · You have pain or discomfort in your back, neck, jaw, stomach, or arm  · You have weakness or numbness in part of your body  · You have sudden trouble breathing  · You become confused or have difficulty speaking  · You have dizziness, a severe headache, or vision loss  © 2014 [address] is for End User's use only and may not be sold, redistributed or otherwise used for commercial purposes  All illustrations and images included in CareNotes® are the copyrighted property of [employer]  or Memo Vallejo  The above information is an  only  It is not intended as medical advice for individual conditions or treatments  Talk to your doctor, nurse or pharmacist before following any medical regimen to see if it is safe and effective for you

## 2021-02-26 NOTE — PROCEDURES
Indication: Symptomatic atrial flutter/fibrillation   Anti-coagulation: Apixaban  Anesthesia: Conscious sedation provided by anesthesiology  See MAR  RANDALL findings:  No intracardiac thrombus  Preserved LV systolic function  Moderate AI/TR/PI  Electrical Pad Placement: Anteroposteriorly interscapular region and upper sternum  Successful cardioversion following a single synchronized biphasic shock at 250 J  Complications: None  Sinus rhythm documented by 12 lead ECG  Disposition: Cath lab recovery area  Discharge home after bed rest  EP to prescribe flecainide today  Cardiology Fellow:  Paris Gleason MD  Cardiology Attending: Nikolai Lin MD

## 2021-03-01 ENCOUNTER — PATIENT OUTREACH (OUTPATIENT)
Dept: CASE MANAGEMENT | Facility: OTHER | Age: 76
End: 2021-03-01

## 2021-03-01 NOTE — PROGRESS NOTES
I spoke with patient who reports he has had no palpitations, chest pain or shortness of breath since discharge  He is taking Lopressor and Eliquis as directed  He has a follow up appointment scheduled with cardiology office tomorrow which he will attend  He has no questions or concerns at this time  He did consent to another call

## 2021-03-02 ENCOUNTER — OFFICE VISIT (OUTPATIENT)
Dept: CARDIOLOGY CLINIC | Facility: CLINIC | Age: 76
End: 2021-03-02
Payer: MEDICARE

## 2021-03-02 VITALS
DIASTOLIC BLOOD PRESSURE: 72 MMHG | SYSTOLIC BLOOD PRESSURE: 116 MMHG | HEIGHT: 78 IN | HEART RATE: 59 BPM | BODY MASS INDEX: 29.16 KG/M2 | WEIGHT: 252 LBS

## 2021-03-02 DIAGNOSIS — I71.2 ASCENDING AORTIC ANEURYSM (HCC): Primary | ICD-10-CM

## 2021-03-02 DIAGNOSIS — I48.91 ATRIAL FIBRILLATION WITH RVR (HCC): Primary | ICD-10-CM

## 2021-03-02 PROCEDURE — 99214 OFFICE O/P EST MOD 30 MIN: CPT | Performed by: PHYSICIAN ASSISTANT

## 2021-03-02 PROCEDURE — 93000 ELECTROCARDIOGRAM COMPLETE: CPT | Performed by: PHYSICIAN ASSISTANT

## 2021-03-02 NOTE — PROGRESS NOTES
Electrophysiology Office Note    Omar Rehman  1945  [id number]  HEART & VASCULAR [place]  [facility]  [address]        Assessment/Plan     1  Atrial fibrillation with RVR (HCC)  POCT ECG     1  Paroxysmal atrial fibrillation, symptomatic    * patient presents to [facility] EP under direction of Dr Jaime Fitzgerald for post hospitalization f/u    * recently hospitalized due to recurrent symptomatic atrial fibrillation in feb 2021 requiring re addition of eliquis, metoprolol 25mg BID and RANDALL w/ DCCV, today ECG showing NSR     *labs on day of admission stable   * RANDALL showing EF of 55% w/ dilated LA    * [id number] is 4 (Age >75, HTN, PAD), agree with [facility] use, no bleeding issues or recurrent falls on eliquis    * given his recurrent AF we discussed multiple tx options, I explained his AF will likely recur as this is the nature of the disease     1  Continue eliquis and metoprolol, await for AF to recur then discuss re-do PVI w/ posterior wall isolation     2  Repeat NM stress as this was last done in 2019 and utilize flecainide 100mg BID in addition to his metoprolol, assess HR for bradycardia, if symptomatically violetta/violetta limiting adequate therapy for AF implant DC PPM     3  Repeat NM stress and utilize flecainide as PRN therapy    * after discussion with patient he does NOT want to take medications long term and would like to continue metoprolol as prescribed with plans for repeat ablation when AF occurs, discussed with dr Jaime Fitzgerald who agrees    * patient does not have any major modifiable risk factors to reduce AF burden at this time     2  Ascending aortic aneurysm   * measuring 4 9cm on recent CTA, will refer to CTS     3  Asymptomatic sinus bradycardia    * HR 59bpm     4  Hx PAD s/p repair of right popliteal aneurysm   5  Hx DVT   6  Hx of strep bacteremia from foot infx             Rhythm History:   Atrial fibrillation:   1   Started on [place] for rhythm control - 2016   2  Recurrent AF due to non compliance s/p RANDALL/DCCV and tikosyn reinitiation -   3  S/p cryo PVI by Dr Disha Garcia -   4  Tikosyn, lopressor, eliquis discontinued -    5  Recurrent AF s/p re addition of eliquis and lopressor w/ RANDALL/DCV - 2021     Atrial flutter:   1  S/p CTI RFA by Dr Disha Garcia -     SVT:   1  S/p AVNRT RFA -  by Dr Disha Garcia     VT/VF:     Device history:   Pacemaker:    Defibrillator:    BIV PPM:    BIV ICD:    ILR:      Cardiac Testing:     ECHO:   Results for orders placed during the hospital encounter of 21   Echo complete with contrast if indicated    Narrative [facility]  [address]  [phone]    Transthoracic Echocardiogram  2D, M-mode, Doppler, and Color Doppler    Study date:  2021    Patient: Magali Green  MR number: [id number]  Account number: [de-identified]  : 1945  Age: 67 years  Gender: Male  Status: Inpatient  Location: Bedside  Height: 78 in  Weight: 242 lb  BP: 122/ 84 mmHg    Indications: Atrial fibrillation  Diagnoses: I48 0 - Atrial fibrillation    Sonographer:  Madison Colbert RDCS  Primary Physician:  Claudette Bonilla DO  Referring Physician:  Lynda Conti MD  Group:  [facility]  Cardiology Fellow: Monica Dotson MD  Interpreting Physician:  Sharonda Pineda MD    SUMMARY    LEFT VENTRICLE:  Size was normal   Systolic function was normal  Ejection fraction was estimated to be 60 %  There were no regional wall motion abnormalities  Wall thickness was mildly increased  LEFT ATRIUM:  The atrium was mildly to moderately dilated  MITRAL VALVE:  There was moderate regurgitation  AORTIC VALVE:  There was mild to moderate regurgitation  TRICUSPID VALVE:  There was mild regurgitation  Pulmonary artery systolic pressure was within the normal range  HISTORY: PRIOR HISTORY: Atrial fibrillation  DVT   Aneurysm of ascending aorta  PROCEDURE: The procedure was performed at the bedside  This was a routine study  The transthoracic approach was used  The study included complete 2D imaging, M-mode, complete spectral Doppler, and color Doppler  The heart rate was 86 bpm,  at the start of the study  Images were obtained from the parasternal, apical, subcostal, and suprasternal notch acoustic windows  Image quality was adequate  LEFT VENTRICLE: Size was normal  Systolic function was normal  Ejection fraction was estimated to be 60 %  There were no regional wall motion abnormalities  Wall thickness was mildly increased  The changes were consistent with concentric  remodeling (increased wall thickness with normal wall mass)  DOPPLER: Transmitral flow pattern: atrial fibrillation  RIGHT VENTRICLE: The size was normal  Systolic function was normal     LEFT ATRIUM: The atrium was mildly to moderately dilated  RIGHT ATRIUM: The atrium was mildly dilated  MITRAL VALVE: Valve structure was normal  There was normal leaflet separation  DOPPLER: The transmitral velocity was within the normal range  There was no evidence for stenosis  There was moderate regurgitation  AORTIC VALVE: The valve was trileaflet  Leaflets exhibited mild to moderate calcification, normal cuspal separation, and sclerosis  DOPPLER: Transaortic velocity was within the normal range  There was no evidence for stenosis  There was  mild to moderate regurgitation  TRICUSPID VALVE: The valve structure was normal  There was normal leaflet separation  DOPPLER: The transtricuspid velocity was within the normal range  There was no evidence for stenosis  There was mild regurgitation  Pulmonary artery  systolic pressure was within the normal range  Estimated peak PA pressure was 27 mmHg  PULMONIC VALVE: DOPPLER: The transpulmonic velocity was within the normal range  There was mild regurgitation  PERICARDIUM: There was no pericardial effusion      AORTA: The root exhibited normal size for BSA at 38 mm  SYSTEMIC VEINS: IVC: The inferior vena cava was normal in size and course  Respirophasic changes were normal     SYSTEM MEASUREMENT TABLES    2D  %FS: 28 01 %  Ao Diam: 3 58 cm  EDV(Teich): 122 74 ml  EF(Teich): 53 93 %  ESV(Teich): 56 54 ml  IVSd: 1 25 cm  LA Area: 31 62 cm2  LA Diam: 4 26 cm  LVEDV MOD A4C: 156 26 ml  LVEF MOD A4C: 60 45 %  LVESV MOD A4C: 61 8 ml  LVIDd: 5 08 cm  LVIDs: 3 66 cm  LVLd A4C: 8 59 cm  LVLs A4C: 7 52 cm  LVPWd: 1 33 cm  RA Area: 31 07 cm2  RVIDd: 4 15 cm  SV MOD A4C: 94 47 ml  SV(Teich): 66 2 ml    CW  AR Dec [place]: 3 14 m/s2  AR Dec Time: 1574 47 ms  AR PHT: 456 6 ms  AR Vmax: 4 92 m/s  AR maxP 95 mmHg  AV Env  Ti: 251 19 ms  AV VTI: 20 76 cm  AV Vmax: 1 24 m/s  AV Vmean: 0 83 m/s  AV maxP 2 mmHg  AV meanPG: 3 14 mmHg  TR Vmax: 2 38 m/s  TR maxP 7 mmHg    MM  TAPSE: 2 34 cm    PW  LVOT Env  Ti: 251 19 ms  LVOT VTI: 14 87 cm  LVOT Vmax: 0 85 m/s  LVOT Vmean: 0 59 m/s  LVOT maxP 9 mmHg  LVOT meanP 62 mmHg  MV A Luis A: 0 65 m/s  MV Dec [place]: 4 08 m/s2  MV DecT: 171 95 ms  MV E Luis A: 0 7 m/s  MV E/A Ratio: 1 08    [employer] Accredited Echocardiography Laboratory    Prepared and electronically signed by    Sharonda Pineda MD  Signed [id number] 14:53:21         CATH/STRESS TEST ():   SUMMARY:  -  Stress results: There was no chest pain during stress  -  ECG conclusions: The stress ECG was negative for ischemia and normal   -  Perfusion imaging: There were no ischemic perfusion defects   -  Gated SPECT: The calculated left ventricular ejection fraction was 62 %  There was no left ventricular regional abnormality      IMPRESSIONS: Normal study after pharmacologic vasodilation without reproduction of symptoms  Myocardial perfusion imaging was normal at rest and with stress  EKG:   Sinus bradycardia HR 59bpm         History of Present Illness     HPI/INTERVAL HISTORY: Darwin Aldrich  is a 67 y o  male with history as above who presents to [facility] EP office today under direction of Dr Donna Celestin for post hospital follow up      3-2-21:  On 2-22 patient presented to [facility] ED due to concerns of chest discomfort and SOB  ECG's revealed atrial fibrillation with RVR  He was initiated on IV lopressor with evaluation by Cardiology team on 2-23  Patient was initiated on lopressor 25mg BID as well as eliquis as these medications have been previously discontinued due to no recurrent AF  He was discharged in rate controlled AF w/ successful RANDALL/DCCV being performed on 2-26  When he was in rate controlled AF prior to DCCV he felt "awful" and "extremely lowsy" but feels great in NSR  He would prefer to not have to take any medications if not needed  Of note during stay did have CT of chest showing ascending aortic aneurysm had increased to 4 9cm  Review of Systems  ROS as noted above, otherwise 12 point review of systems was performed and is negative         Historical Information   Social History     Socioeconomic History    Marital status: /Civil Union     Spouse name: Not on file    Number of children: Not on file    Years of education: Not on file    Highest education level: Not on file   Occupational History    Not on file   Social Needs    Financial resource strain: Not on file    Food insecurity     Worry: Not on file     Inability: Not on file    Transportation needs     Medical: Not on file     Non-medical: Not on file   Tobacco Use    Smoking status: Never Smoker    Smokeless tobacco: Never Used   Substance and Sexual Activity    Alcohol use: Not Currently    Drug use: No    Sexual activity: Not on file   Lifestyle    Physical activity     Days per week: Not on file     Minutes per session: Not on file    Stress: Not on file   Relationships    Social connections     Talks on phone: Not on file     Gets together: Not on file     Attends [employer] service: Not on file     Active member of club or organization: Not on file     Attends meetings of clubs or organizations: Not on file     Relationship status: Not on file    Intimate partner violence     Fear of current or ex partner: Not on file     Emotionally abused: Not on file     Physically abused: Not on file     Forced sexual activity: Not on file   Other Topics Concern    Not on file   Social History Narrative    Not on file     Past Medical History:   Diagnosis Date    Cardiac disease     Hypertension     [facility]     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     Past Surgical History:   Procedure Laterality Date    ESOPHAGOGASTRODUODENOSCOPY N/A 8/16/2016    Procedure: ESOPHAGOGASTRODUODENOSCOPY (EGD); Surgeon: Chris Mendez MD;  Location: [facility]; Service:     OTHER SURGICAL HISTORY      VASCULAR SURGERY Right     POPLITEAL BYPASS DUE TO ANEURYSM     Social History     Substance and Sexual Activity   Alcohol Use Not Currently     Social History     Substance and Sexual Activity   Drug Use No     Social History     Tobacco Use   Smoking Status Never Smoker   Smokeless Tobacco Never Used     History reviewed  No pertinent family history      Meds/Allergies       Current Outpatient Medications:     apixaban (ELIQUIS) 5 mg, Take 1 tablet (5 mg total) by mouth 2 (two) times a day, Disp: 60 tablet, Rfl: 0    ascorbic acid (VITAMIN C) 500 mg tablet, Take 500 mg by mouth daily, Disp: , Rfl:     Coenzyme Q10 200 MG capsule, Take 200 mg by mouth daily  , Disp: , Rfl:     COLLAGEN PO, Take 1 tablet by mouth, Disp: , Rfl:     Green Tea 250 MG CAPS, Take 1 capsule by mouth daily, Disp: , Rfl:     Liver Extract 500 MG CAPS, Take 1 capsule by mouth daily, Disp: , Rfl:     Magnesium Citrate 125 MG CAPS, Take 1 capsule by mouth daily, Disp: , Rfl:     metoprolol tartrate (LOPRESSOR) 25 mg tablet, Take 1 tablet (25 mg total) by mouth every 12 (twelve) hours, Disp: 60 tablet, Rfl: 0    niacin 50 mg tablet, Take 50 mg by mouth daily with breakfast , Disp: , Rfl:     Pancreatin 325 MG TABS, Take 1 tablet by mouth daily, Disp: , Rfl:     Probiotic Product (PROBIOTIC-10 PO), Take 1 capsule by mouth daily, Disp: , Rfl:     Vitamin D, Cholecalciferol, 1000 units CAPS, Take 2,000 Units by mouth daily, Disp: , Rfl:     Flaxseed, Linseed, (FLAXSEED OIL) 1000 MG CAPS, Take 1 capsule by mouth daily, Disp: , Rfl:     Allergies   Allergen Reactions    Oxycodone GI Intolerance     nausea  nausea       Objective   Vitals: Blood pressure 116/72, pulse 59, height 6' 6" (1 981 m), weight 114 kg (252 lb)  Physical Exam  Constitutional:       Appearance: He is well-developed  HENT:      Head: Normocephalic and atraumatic  Eyes:      Pupils: Pupils are equal, round, and reactive to light  Neck:      Musculoskeletal: Normal range of motion and neck supple  Cardiovascular:      Rate and Rhythm: Normal rate and regular rhythm  Heart sounds: Murmur present  Diastolic murmur present with a grade of 1/4  Pulmonary:      Effort: Pulmonary effort is normal       Breath sounds: Normal breath sounds  Abdominal:      General: Bowel sounds are normal       Palpations: Abdomen is soft  Musculoskeletal: Normal range of motion  Skin:     General: Skin is warm and dry  Neurological:      Mental Status: He is alert and oriented to person, place, and time  Labs:not applicable    Imaging: I have personally reviewed pertinent reports

## 2021-03-03 NOTE — PHYSICIAN ADVISOR
Current patient class: Inpatient  The patient is currently on Hospital Day: 2 at [facility]      The patient was admitted to the hospital  on 2/23/21 at 1017 for the following diagnosis:  Atrial fibrillation [facility]) [I48 91]  Chest pain [R07 9]  Chest pressure [R07 89]  Aneurysm of ascending aorta ([address] ) [I71 2]     After review of the relevant documentation, labs, vital signs and test results, this is a PROVIDER LIABLE case  In this particular case the patient was admitted to the hospital as an inpatient  The patient however failed to satisfy the 2 midnight benchmark and closer scrutiny of the case was warranted  After review of the patient presentation and relevant labs the patient was most appropriate for observation or outpatient class at the time of admission  Given that this patient has already been discharged prior to this review they become a provider liable case  Rationale is as follows: The patient is a 67 yrs   Male who presented to the ED at 2/22/2021 11:04 AM with a chief complaint of Chest Pain (pt states tightness started this morning  took 324mg of aspirin but still having tightness pt states has a history of A-fib)    Patient originally presented to the hospital on 2/22/2021 due to chest pressure  Patient presented to the emergency department and was found to be in rapid atrial fibrillation  CTA negative for PE  He was given metoprolol but remained in atrial fibrillation  He was seen in consultation by Cardiology and was started on oral metoprolol regimen with Eliquis for anticoagulation after heparin drip was discontinued    It was determined he will undergo RANDALL with possible cardioversion as an outpatient if necessary         The patients vitals on arrival were   ED Triage Vitals   Temperature Pulse Respirations Blood Pressure SpO2   02/22/21 1111 02/22/21 1111 02/22/21 1111 02/22/21 1111 02/22/21 1111   98 3 °F (36 8 °C) (!) 117 20 119/83 99 % Temp Source Heart Rate Source Patient Position - Orthostatic VS BP Location FiO2 (%)   02/22/21 1108 02/22/21 1111 02/22/21 1111 02/22/21 1111 --   Oral Monitor Lying Right arm       Pain Score       02/22/21 1111       No Pain           Past Medical History:   Diagnosis Date    Cardiac disease     Hypertension     PAF (paroxysmal atrial fibrillation) (HCC)     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HCC)     BILATERALLY     Past Surgical History:   Procedure Laterality Date    ESOPHAGOGASTRODUODENOSCOPY N/A 8/16/2016    Procedure: ESOPHAGOGASTRODUODENOSCOPY (EGD); Surgeon: Sarah Adams MD;  [facility]; Service:     OTHER SURGICAL HISTORY      VASCULAR SURGERY Right     POPLITEAL BYPASS DUE TO ANEURYSM           Consults have been placed to:   IP CONSULT TO CARDIOLOGY    Vitals:    02/23/21 0658 02/23/21 1022 02/23/21 1147 02/23/21 1510   BP: 114/73 122/84 121/83 122/82   Pulse: 73 85 89 78   Resp: 18 20 18    Temp: 97 9 °F (36 6 °C) 98 °F (36 7 °C) 98 2 °F (36 8 °C) 98 °F (36 7 °C)   TempSrc:   Oral    SpO2: 96% 97% 93% 94%   Weight:       Height:           Most recent labs:    No results for input(s): WBC, HGB, HCT, PLT, K, NA, CALCIUM, BUN, CREATININE, LIPASE, AMYLASE, INR, TROPONINI, CKTOTAL, AST, ALT, ALKPHOS, BILITOT in the last 72 hours  Scheduled Meds:  Continuous Infusions:No current facility-administered medications for this encounter  PRN Meds:      Surgical procedures (if appropriate):

## 2021-03-04 ENCOUNTER — TELEPHONE (OUTPATIENT)
Dept: CARDIOLOGY CLINIC | Facility: CLINIC | Age: 76
End: 2021-03-04

## 2021-03-04 NOTE — TELEPHONE ENCOUNTER
Patient states that he saw you this week and that you were to get back to him after speaking with Dr Fidel Toure about what the plan was  He told Aaron Nathaniel he was having more irregular heartbeats  Have you spoken to Dr Fidel Toure?

## 2021-03-04 NOTE — TELEPHONE ENCOUNTER
Called patient back, he called office today stating he has been having fatigue, SOB and feeling "out of rhythm " HR 66bpm on home BP cuff but the "irregular heart beat sign" is lighting up  Plan to bring into office for ECG tomorrow and re-discuss options of therapy

## 2021-03-05 ENCOUNTER — CLINICAL SUPPORT (OUTPATIENT)
Dept: CARDIOLOGY CLINIC | Facility: CLINIC | Age: 76
End: 2021-03-05
Payer: MEDICARE

## 2021-03-05 VITALS
WEIGHT: 248 LBS | HEIGHT: 78 IN | BODY MASS INDEX: 28.69 KG/M2 | HEART RATE: 69 BPM | SYSTOLIC BLOOD PRESSURE: 130 MMHG | DIASTOLIC BLOOD PRESSURE: 84 MMHG

## 2021-03-05 DIAGNOSIS — I48.0 PAROXYSMAL ATRIAL FIBRILLATION (HCC): Primary | ICD-10-CM

## 2021-03-05 PROCEDURE — 93000 ELECTROCARDIOGRAM COMPLETE: CPT

## 2021-03-05 NOTE — PROGRESS NOTES
Patient presented for EKG per Marylene Sizer, PA-C palpitations  Patient c/o SOB with exertion, irregular heartbeat  EKG performed which showed NSR with frequent PAC's  Currently on metoprolol tartrate 25mg bid and Eliquis 5mg bid    Naoma Shown did stop in to see patient to discuss plan

## 2021-03-22 ENCOUNTER — PATIENT OUTREACH (OUTPATIENT)
Dept: CASE MANAGEMENT | Facility: OTHER | Age: 76
End: 2021-03-22

## 2021-03-22 NOTE — PROGRESS NOTES
Patient was admitted to [facility] on 3/12  Chart reviewed  I will follow up with patient after discharge

## 2021-03-29 ENCOUNTER — PATIENT OUTREACH (OUTPATIENT)
Dept: CASE MANAGEMENT | Facility: OTHER | Age: 76
End: 2021-03-29

## 2021-03-29 NOTE — PROGRESS NOTES
Patient was discharged 3/25 after having a right popliteal aneurysm resection and drainage  He was sent home with [facility] [place]  He has a PICC for 4 weeks of antibiotic infusions  I spoke with his spouse  They received a call from [facility] during which they received patient education and medication reconciliation  She reports all arrangements are made with home health skilled nurse  I asked if she had any questions or concerns She had questions concerning patient's condition at discharge on 2/26/2021and she would like a call  I advised her I would make her request known  I gave her my contact information and she repeated it to me

## 2021-04-05 DIAGNOSIS — I48.91 ATRIAL FIBRILLATION WITH RVR (HCC): ICD-10-CM

## 2021-04-09 DIAGNOSIS — I48.91 ATRIAL FIBRILLATION WITH RVR (HCC): ICD-10-CM

## 2021-04-26 ENCOUNTER — OFFICE VISIT (OUTPATIENT)
Dept: CARDIOLOGY CLINIC | Facility: CLINIC | Age: 76
End: 2021-04-26
Payer: MEDICARE

## 2021-04-26 VITALS
BODY MASS INDEX: 28.23 KG/M2 | SYSTOLIC BLOOD PRESSURE: 124 MMHG | OXYGEN SATURATION: 97 % | WEIGHT: 244 LBS | HEIGHT: 78 IN | DIASTOLIC BLOOD PRESSURE: 76 MMHG

## 2021-04-26 DIAGNOSIS — I10 BENIGN ESSENTIAL HYPERTENSION: ICD-10-CM

## 2021-04-26 DIAGNOSIS — E78.2 MIXED HYPERLIPIDEMIA: ICD-10-CM

## 2021-04-26 DIAGNOSIS — I72.4 BILATERAL POPLITEAL ARTERY ANEURYSM (HCC): ICD-10-CM

## 2021-04-26 DIAGNOSIS — I48.91 ATRIAL FIBRILLATION WITH RVR (HCC): ICD-10-CM

## 2021-04-26 DIAGNOSIS — I71.2 ANEURYSM OF ASCENDING AORTA (HCC): Primary | ICD-10-CM

## 2021-04-26 DIAGNOSIS — I48.0 PAROXYSMAL ATRIAL FIBRILLATION (HCC): ICD-10-CM

## 2021-04-26 DIAGNOSIS — I10 ESSENTIAL HYPERTENSION: ICD-10-CM

## 2021-04-26 PROCEDURE — 93000 ELECTROCARDIOGRAM COMPLETE: CPT | Performed by: INTERNAL MEDICINE

## 2021-04-26 PROCEDURE — 99214 OFFICE O/P EST MOD 30 MIN: CPT | Performed by: INTERNAL MEDICINE

## 2021-04-26 NOTE — PROGRESS NOTES
Cardiology Follow Up    Sachin Smith  1945  [id number]  [address] [facility]  [address]    1  Aneurysm of ascending aorta ([facility] [address] )  POCT ECG    Ambulatory referral to Cardiovascular Surgery   2  Atrial fibrillation with RVR ([address] )     3  Benign essential hypertension     4  Bilateral popliteal artery aneurysm ([facility] [address] )     5  Essential hypertension     6  Paroxysmal atrial fibrillation (HCC)     7  Mixed hyperlipidemia         Discussion/Summary:   Overall he has been well from a cardiac standpoint  Unfortunately he developed a mycotic aneurysm and required surgical resection of a popliteal artery aneurysm  Presumed dental source  He is currently followed by infectious disease and has a PICC line for long-term antibiotics  Recent CT scan showed enlarging ascending aortic aneurysm now up to 4 9 cm from 4 6 last year  Blood pressure well controlled  Will make referral to CT surgery to establish care  Currently remaining in sinus rhythm continue full anticoagulation  Interval History:   70-year-old gentleman with a history of paroxysmal atrial fibrillation, hypertension, dyslipidemia, bilateral popliteal artery aneurysm status post repair  History of acute DVT  He has been doing quite well since her last office appointment  There was some atypical chest pain a couple of months ago nuclear stress test showed no ischemia  He recently was admitted to the hospital for mycotic aneurysm had surgical resection  Presumed dental source  Now on long-term antibiotics through PICC line  He had an episode of atrial fibrillation requiring cardioversion a few months ago  Denies any chest pain, shortness of breath, palpitations, lightheadedness, dizziness, or syncope    Problem List     PAF (paroxysmal atrial fibrillation) (HCC)    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HCC) (Chronic)    Hypertension    Acute DVT (deep venous thrombosis) (HCC)    A-fib ([facility] [address] )    Benign essential hypertension    Overview Signed 6/29/2018  8:02 AM by Maria Antonia George DO     Overview:   dx 2002         Mitral valve insufficiency    Bilateral popliteal artery aneurysm (HCC)    Paroxysmal atrial fibrillation (HCC)    Mixed hyperlipidemia        Past Medical History:   Diagnosis Date    Cardiac disease     Hypertension     PAF (paroxysmal atrial fibrillation) (HCC)     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HCC)     BILATERALLY     Social History     Socioeconomic History    Marital status: /Civil Union     Spouse name: Not on file    Number of children: Not on file    Years of education: Not on file    Highest education level: Not on file   Occupational History    Not on file   Social Needs    Financial resource strain: Not on file    Food insecurity     Worry: Not on file     Inability: Not on file   [employer] needs     Medical: Not on file     Non-medical: Not on file   Tobacco Use    Smoking status: Never Smoker    Smokeless tobacco: Never Used   Substance and Sexual Activity    Alcohol use: Not Currently    Drug use: No    Sexual activity: Not on file   Lifestyle    Physical activity     Days per week: Not on file     Minutes per session: Not on file    Stress: Not on file   Relationships    Social connections     Talks on phone: Not on file     Gets together: Not on file     Attends Holiness service: Not on file     Active member of club or organization: Not on file     Attends meetings of clubs or organizations: Not on file     Relationship status: Not on file    Intimate partner violence     Fear of current or ex partner: Not on file     Emotionally abused: Not on file     Physically abused: Not on file     Forced sexual activity: Not on file   Other Topics Concern    Not on file   Social History Narrative    Not on file      History reviewed  No pertinent family history    Past Surgical History:   Procedure Laterality Date    ESOPHAGOGASTRODUODENOSCOPY N/A 8/16/2016    Procedure: ESOPHAGOGASTRODUODENOSCOPY (EGD); Surgeon: Estella Malcolm MD;  [facility];   Service:     OTHER SURGICAL HISTORY      VASCULAR SURGERY Right     POPLITEAL BYPASS DUE TO ANEURYSM       Current Outpatient Medications:     apixaban (ELIQUIS) 5 mg, Take 1 tablet (5 mg total) by mouth 2 (two) times a day, Disp: 180 tablet, Rfl: 2    ascorbic acid (VITAMIN C) 500 mg tablet, Take 500 mg by mouth daily, Disp: , Rfl:     Coenzyme Q10 200 MG capsule, Take 200 mg by mouth daily  , Disp: , Rfl:     COLLAGEN PO, Take 1 tablet by mouth, Disp: , Rfl:     Green Tea 250 MG CAPS, Take 1 capsule by mouth daily, Disp: , Rfl:     Liver Extract 500 MG CAPS, Take 1 capsule by mouth daily, Disp: , Rfl:     Magnesium Citrate 125 MG CAPS, Take 1 capsule by mouth daily, Disp: , Rfl:     metoprolol tartrate (LOPRESSOR) 25 mg tablet, Take 1 tablet (25 mg total) by mouth every 12 (twelve) hours, Disp: 60 tablet, Rfl: 0    niacin 50 mg tablet, Take 50 mg by mouth daily with breakfast , Disp: , Rfl:     Pancreatin 325 MG TABS, Take 1 tablet by mouth daily, Disp: , Rfl:     Probiotic Product (PROBIOTIC-10 PO), Take 1 capsule by mouth daily, Disp: , Rfl:     Vitamin D, Cholecalciferol, 1000 units CAPS, Take 2,000 Units by mouth daily, Disp: , Rfl:     Flaxseed, Linseed, (FLAXSEED OIL) 1000 MG CAPS, Take 1 capsule by mouth daily, Disp: , Rfl:   Allergies   Allergen Reactions    Oxycodone GI Intolerance     nausea  nausea       Labs:     Chemistry        Component Value Date/Time    K 3 9 02/23/2021 0430     (H) 02/23/2021 0430    CO2 28 02/23/2021 0430    BUN 13 02/23/2021 0430    CREATININE 0 68 02/23/2021 0430        Component Value Date/Time    CALCIUM 9 3 02/23/2021 0430    ALKPHOS 64 02/23/2021 0430    AST 14 02/23/2021 0430    ALT 18 02/23/2021 0430            No results found for: CHOL  No results found for: HDL  No results found for: LDLCALC  No results found for: TRIG  No results found for: CHOLHDL    Imaging: No results found  ECG:    Normal sinus rhythm       ROS    Vitals:    04/26/21 1316   BP: 124/76   SpO2: 97%     Vitals:    04/26/21 1316   Weight: 111 kg (244 lb)     Height: 6' 6" (198 1 cm)   Body mass index is 28 2 kg/m²  Physical Exam:  Vital signs reviewed  General:  Alert and cooperative, appears stated age, no acute distress  HEENT:  PERRLA, EOMI, no scleral icterus, no conjunctival pallor  Neck:  No lymphadenopathy, no thyromegaly, no carotid bruits, no elevated JVP  Heart:  Regular rate and rhythm, normal S1/S2, no S3/S4, no murmur, rubs or gallops  PMI nondisplaced  Lungs:  Clear to auscultation bilaterally, no wheezes rales or rhonchi  Abdomen:  Soft, non-tender, positive bowel sounds, no rebound or guarding,   no organomegaly   Extremities:  Normal range of motion    No clubbing, cyanosis or edema   Vascular:  2+ pedal pulses  Skin:  No rashes or lesions on exposed skin  Neurologic:  Cranial nerves II-XII grossly intact without focal deficits  Psych:  Normal mood and affect

## 2021-05-12 ENCOUNTER — TELEPHONE (OUTPATIENT)
Dept: CARDIAC SURGERY | Facility: CLINIC | Age: 76
End: 2021-05-12

## 2021-05-14 ENCOUNTER — OFFICE VISIT (OUTPATIENT)
Dept: CARDIAC SURGERY | Facility: CLINIC | Age: 76
End: 2021-05-14
Payer: MEDICARE

## 2021-05-14 VITALS
RESPIRATION RATE: 18 BRPM | WEIGHT: 250.1 LBS | OXYGEN SATURATION: 98 % | DIASTOLIC BLOOD PRESSURE: 64 MMHG | HEIGHT: 78 IN | BODY MASS INDEX: 28.94 KG/M2 | HEART RATE: 62 BPM | SYSTOLIC BLOOD PRESSURE: 102 MMHG | TEMPERATURE: 97.7 F

## 2021-05-14 DIAGNOSIS — Z00.00 HEALTH MAINTENANCE EXAMINATION: ICD-10-CM

## 2021-05-14 DIAGNOSIS — I71.2 ANEURYSM OF ASCENDING AORTA (HCC): Primary | ICD-10-CM

## 2021-05-14 PROCEDURE — 99204 OFFICE O/P NEW MOD 45 MIN: CPT | Performed by: PHYSICIAN ASSISTANT

## 2021-05-14 RX ORDER — ASPIRIN 81 MG/1
81 TABLET, CHEWABLE ORAL DAILY
COMMUNITY
End: 2022-03-10

## 2021-05-14 NOTE — PATIENT INSTRUCTIONS
Education was provided in regards to the followin )  Maintaining good blood pressure control and taking antihypertensive medications as prescribed  2 )  No strenuous activity involving lifting more than 50lbs  3 )  Awareness of the warning symptoms of aortic dissection such as chest pain, back pain, shortness of breath, lightheadedness or dizziness and to seek immediate medical attention at the nearest ER   4 )  The importance of continued surveillance with visits in the Aortic Disease clinic and obtaining CT Scans as recommended

## 2021-05-14 NOTE — PROGRESS NOTES
Consultation - Cardiothoracic Surgery   Angeline Flynn  67 y o  male MRN: [id number]    Physician Requesting Consult: Foreign Dewitt    Reason for Consult / Principal Problem: Ascending aortic aneurysm    History of Present Illness: Angeline Flynn  is a 67y o  year old male who presents today for surgical consultation for ascending aortic aneurysm  Vazquez Lopez has a complex cardiovascular past medical history significant for bilateral popliteal mycotic aneurysms in 2016  This was treated with bilateral femoral popliteal bypasses performed [facility]   At this time echocardiogram was also performed  Dilation of the ascending aorta and aortic root were noted  He had follow-up CT imaging of the chest which did demonstrate aortic ectasias measuring approximately 4 4-4 6 cm  His current clinical course began in the preceding months  At that time he did developed bacteremia from a dental source  This led to seeding of his right popliteal bypass requiring surgical revision  He has subsequently undergone PICC catheter insertion with long-term antibiotics  These have now been discontinued  More recent CT imaging of the chest did demonstrate enlargement of his AA ascending aorta from 4 6-4 9 cm  He has not subsequently been referred to our office for surgical discussion  His most recent echocardiogram demonstrated preserved ventricular systolic function with moderate aortic insufficiency and moderate mitral regurgitation  During interview today and he does complain of some fatigability and intermittent dyspnea  He also notes bilateral lower extremity edema  He specifically denies any history of past or current angina  Portions of this history have been obtained from office visit with Dr Foreign Dewitt on April 26th of this year      Past Medical History:  Past Medical History:   Diagnosis Date    Cardiac disease     Hypertension     PAF (paroxysmal atrial fibrillation) ([facility] [address] )     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HCC)     BILATERALLY       Past Surgical History:   Past Surgical History:   Procedure Laterality Date    ESOPHAGOGASTRODUODENOSCOPY N/A 8/16/2016    Procedure: ESOPHAGOGASTRODUODENOSCOPY (EGD); Surgeon: Mikhail Martino MD;  [facility]; Service:     HERNIA REPAIR Left     groin    HERNIA REPAIR Right 1995    OTHER SURGICAL HISTORY      REPLACEMENT TOTAL KNEE Left 04/01/2013    VASCULAR SURGERY Right     POPLITEAL BYPASS DUE TO ANEURYSM       Family History:  Family History   Problem Relation Age of Onset    Pancreatic cancer Mother     Stroke Father     Heart attack Paternal Grandmother        Social History:    Social History     Substance and Sexual Activity   Alcohol Use Not Currently     Social History     Substance and Sexual Activity   Drug Use No     Social History     Tobacco Use   Smoking Status Never Smoker   Smokeless Tobacco Never Used       Marital Status: [unfilled]    Home Medications:   Prior to Admission medications    Medication Sig Start Date End Date Taking?  Authorizing Provider   apixaban (ELIQUIS) 5 mg Take 1 tablet (5 mg total) by mouth 2 (two) times a day 4/9/21  Yes Anirudh Brown DO   ascorbic acid (VITAMIN C) 500 mg tablet Take 500 mg by mouth daily   Yes Historical Provider, MD   aspirin 81 mg chewable tablet Chew 81 mg daily   Yes Historical Provider, MD   Coenzyme Q10 200 MG capsule Take 200 mg by mouth daily     Yes Historical Provider, MD   COLLAGEN PO Take 1 tablet by mouth   Yes Historical Provider, MD   Flaxseed, Linseed, (FLAXSEED OIL) 1000 MG CAPS Take 1 capsule by mouth daily   Yes Historical Provider, MD [employer] Tea 250 MG CAPS Take 1 capsule by mouth daily   Yes Historical Provider, MD   Liver Extract 500 MG CAPS Take 1 capsule by mouth daily   Yes Historical Provider, MD   Magnesium Citrate 125 MG CAPS Take 1 capsule by mouth daily   Yes Historical Provider, MD   metoprolol tartrate (LOPRESSOR) 25 mg tablet Take 1 tablet (25 mg total) by mouth every 12 (twelve) hours 2/23/21  Yes Maynor Tariq PA-C   niacin 50 mg tablet Take 50 mg by mouth daily with breakfast    Yes Historical Provider, MD   Pancreatin 325 MG TABS Take 1 tablet by mouth daily   Yes Historical Provider, MD   Probiotic Product (PROBIOTIC-10 PO) Take 1 capsule by mouth daily   Yes Historical Provider, MD   Vitamin D, Cholecalciferol, 1000 units CAPS Take 2,000 Units by mouth daily   Yes Historical Provider, MD       Allergies: Allergies   Allergen Reactions    Oxycodone GI Intolerance     nausea  nausea       Review of Systems:     Review of Systems   Constitutional: Positive for fatigue  HENT: Negative  Eyes: Negative  Respiratory: Negative for chest tightness and shortness of breath  Cardiovascular: Positive for leg swelling  Negative for chest pain  Endocrine: Negative  Genitourinary: Negative  Musculoskeletal: Negative  Skin: Negative  Neurological: Negative  Psychiatric/Behavioral: Negative  Vital Signs:     Vitals:    05/14/21 0929 05/14/21 0932   BP: 104/70 102/64   BP Location: Left arm Right arm   Patient Position: Sitting    Cuff Size: Standard    Pulse: 62    Resp: 18    Temp: 97 7 °F (36 5 °C)    TempSrc: Tympanic    SpO2: 98%    Weight: 113 kg (250 lb 1 6 oz)    Height: 6' 6" (1 981 m)        Physical Exam:     Physical Exam  Constitutional:       Appearance: Normal appearance  He is well-developed  HENT:      Head: Normocephalic and atraumatic  Eyes:      Conjunctiva/sclera: Conjunctivae normal    Neck:      Musculoskeletal: Full passive range of motion without pain and normal range of motion  Thyroid: No thyromegaly  Vascular: No carotid bruit or JVD  Trachea: No tracheal deviation  Cardiovascular:      Rate and Rhythm: Normal rate  Rhythm irregular  Pulses: Normal pulses  Carotid pulses are 2+ on the right side and 2+ on the left side         Dorsalis pedis pulses are 2+ on the right side and 2+ on the left side  Posterior tibial pulses are 2+ on the right side and 2+ on the left side  Heart sounds: S1 normal and S2 normal    Pulmonary:      Effort: No accessory muscle usage or respiratory distress  Breath sounds: No wheezing or rales  Chest:      Chest wall: No tenderness  Abdominal:      General: Bowel sounds are normal       Palpations: Abdomen is soft  Tenderness: There is no abdominal tenderness  Musculoskeletal: Normal range of motion  General: Swelling present  Right lower leg: Edema present  Left lower leg: Edema present  Skin:     General: Skin is warm and dry  Neurological:      Mental Status: He is alert and oriented to person, place, and time  Cranial Nerves: No cranial nerve deficit  Sensory: No sensory deficit  Psychiatric:         Speech: Speech normal          Behavior: Behavior normal          Lab Results:               Invalid input(s): LABGLOM      No results found for: HGBA1C  Lab Results   Component Value Date    TROPONINI 0 04 02/23/2021       Imaging Studies:     CT Chest:     1  No evidence of pulmonary embolus or other acute abnormality in the chest   2   4 9 cm ascending aortic aneurysm, measuring 4 5 cm on a CT from 8/15/2020  Echocardiogram:     Ejection fraction 55%  RV mildly dilated  Smoking the appendage  Mild-to-moderate mitral regurgitation  Moderate aortic insufficiency  Moderate tricuspid regurgitation  Aortic root measuring 4 1 cm at its greatest diameter  Ascending aorta measuring 4 4 cm at its greatest diameter  I have personally reviewed pertinent reports  and I have personally reviewed pertinent films in PACS    Assessment:  Ascending aortic aneurysm; Ongoing ascending aortic replacement workup    Plan:    Marissa Mcelroy  has an ascending aorta measuring 49 mm in size at its greatest diameter   As they are asymptomatic, with no family history follow-up monitoring is the treatment plan  Arrangements have been made for future surveillance to be completed with CTA chest in 6 Months  Ibis Burt  was comfortable with our recommendations, and their questions were answered to their satisfaction  Thank you for allowing us to participate in the care of this patient  Aortic Aneurysm Instructions were provided to the patient as follows:    1  No lifting more than 50 pounds  2  Maintain a controlled blood pressure with a goal of 120/80  3  Follow up in Aortic Clinic as recommended with radiology follow up as instructed  4  Report to the ER or call 911 immediately with the following signs / symptoms: sudden onset of back pain, chest pain or shortness of breath  The patient was referred to gastroenterology for consideration of routine colonoscopy screening of all patients aged 54-65  Gastroenterology evaluation will not be necessary prior to any open heart procedures, and can be completed following surgical recovery  SIGNATURE: Kelley Montanez PA-C  DATE: May 14, 2021  TIME: 10:00 AM    * This note was completed in part utilizing [employer] direct voice recognition software  Grammatical errors, random word insertion, spelling mistakes, and incomplete sentences may be an occasional consequence of the system secondary to software limitations, ambient noise and hardware issues  At the time of dictation, efforts were made to edit, clarify and /or correct errors  Please read the chart carefully and recognize, using context, where substitutions have occurred  If you have any questions or concerns about the context, text or information contained within the body of this dictation, please contact myself, the provider, for further clarification

## 2021-05-24 ENCOUNTER — EPISODE CHANGES (OUTPATIENT)
Dept: CASE MANAGEMENT | Facility: OTHER | Age: 76
End: 2021-05-24

## 2021-10-12 ENCOUNTER — 6 MONTH FOLLOW UP (OUTPATIENT)
Dept: URBAN - METROPOLITAN AREA CLINIC 6 | Facility: CLINIC | Age: 76
End: 2021-10-12

## 2021-10-12 DIAGNOSIS — H04.123: ICD-10-CM

## 2021-10-12 DIAGNOSIS — H43.393: ICD-10-CM

## 2021-10-12 PROCEDURE — 1036F TOBACCO NON-USER: CPT

## 2021-10-12 PROCEDURE — 92012 INTRM OPH EXAM EST PATIENT: CPT

## 2021-10-12 PROCEDURE — G8428 CUR MEDS NOT DOCUMENT: HCPCS

## 2021-10-12 ASSESSMENT — TONOMETRY
OD_IOP_MMHG: 12
OS_IOP_MMHG: 14

## 2021-10-12 ASSESSMENT — VISUAL ACUITY
OS_SC: 20/40
OD_SC: 20/25

## 2021-10-21 ENCOUNTER — TELEPHONE (OUTPATIENT)
Dept: CARDIOLOGY CLINIC | Facility: CLINIC | Age: 76
End: 2021-10-21

## 2021-11-08 ENCOUNTER — HOSPITAL ENCOUNTER (OUTPATIENT)
Dept: NON INVASIVE DIAGNOSTICS | Facility: HOSPITAL | Age: 76
Discharge: HOME | End: 2021-11-08
Payer: MEDICARE

## 2021-11-08 ENCOUNTER — HOSPITAL ENCOUNTER (OUTPATIENT)
Dept: RADIOLOGY | Facility: HOSPITAL | Age: 76
Discharge: HOME | End: 2021-11-08
Payer: MEDICARE

## 2021-11-08 VITALS
HEIGHT: 78 IN | HEART RATE: 70 BPM | BODY MASS INDEX: 28.93 KG/M2 | DIASTOLIC BLOOD PRESSURE: 64 MMHG | WEIGHT: 250 LBS | SYSTOLIC BLOOD PRESSURE: 102 MMHG

## 2021-11-08 DIAGNOSIS — I71.2 ANEURYSM OF ASCENDING AORTA (HCC): ICD-10-CM

## 2021-11-08 LAB
AORTIC ROOT: 3.8 CM
APICAL FOUR CHAMBER EJECTION FRACTION: 70 %
ASCENDING AORTA: 4.5 CM
AV REGURGITATION PRESSURE HALF TIME: 0.52 MS
E WAVE DECELERATION TIME: 160 MS
FRACTIONAL SHORTENING: 34 % (ref 28–44)
INTERVENTRICULAR SEPTUM IN DIASTOLE (PARASTERNAL SHORT AXIS VIEW): 1.5 CM
IVC: 16 MM
LEFT INTERNAL DIMENSION IN SYSTOLE: 3.8 CM (ref 2.1–4)
LEFT VENTRICULAR INTERNAL DIMENSION IN DIASTOLE: 5.8 CM (ref 11.71–17.46)
LEFT VENTRICULAR POSTERIOR WALL IN END DIASTOLE: 1 CM
LEFT VENTRICULAR STROKE VOLUME: 104 ML
MITRAL REGURGITATION PEAK VELOCITY: 5.05 M/S
MITRAL VALVE MEAN INFLOW VELOCITY: 3.94 M/S
MITRAL VALVE REGURGITANT PEAK GRADIENT: 102 MMHG
MV E'TISSUE VEL-SEP: 9 CM/S
MV PEAK A VEL: 0.26 M/S
MV PEAK E VEL: 92 CM/S
MV STENOSIS PRESSURE HALF TIME: 0 MS
RA PRESSURE ESTIMATED: 3 MMHG
RIGHT VENTRICLE ID DIMENSION: 4.6 CM
RV PSP: 55 MMHG
SL CV AV DECELERATION TIME RETROGRADE: 1783 MS
SL CV AV PEAK GRADIENT RETROGRADE: 99 MMHG
SL CV DOP CALC MV VTI RETROGRADE: 166.5 CM
SL CV LV EF: 60
SL CV MV MEAN GRADIENT RETROGRADE: 71 MMHG
SL CV PED ECHO LEFT VENTRICLE DIASTOLIC VOLUME (MOD BIPLANE) 2D: 166 ML
SL CV PED ECHO LEFT VENTRICLE SYSTOLIC VOLUME (MOD BIPLANE) 2D: 63 ML
TR MAX PG: 52 MMHG
TR PEAK VELOCITY: 3.6 M/S
TRICUSPID VALVE PEAK REGURGITATION VELOCITY: 3.53 M/S
TRICUSPID VALVE S': 0.9 CM/S
TV PEAK GRADIENT: 50 MMHG
Z-SCORE OF LEFT VENTRICULAR DIMENSION IN END SYSTOLE: -9.02

## 2021-11-08 PROCEDURE — G1004 CDSM NDSC: HCPCS

## 2021-11-08 PROCEDURE — 93306 TTE W/DOPPLER COMPLETE: CPT

## 2021-11-08 PROCEDURE — 71275 CT ANGIOGRAPHY CHEST: CPT

## 2021-11-08 PROCEDURE — 93306 TTE W/DOPPLER COMPLETE: CPT | Performed by: INTERNAL MEDICINE

## 2021-11-08 RX ADMIN — IOHEXOL 85 ML: 350 INJECTION, SOLUTION INTRAVENOUS at 09:02

## 2021-11-19 ENCOUNTER — OFFICE VISIT (OUTPATIENT)
Dept: CARDIAC SURGERY | Facility: CLINIC | Age: 76
End: 2021-11-19
Payer: MEDICARE

## 2021-11-19 VITALS
DIASTOLIC BLOOD PRESSURE: 76 MMHG | BODY MASS INDEX: 28.68 KG/M2 | WEIGHT: 247.9 LBS | SYSTOLIC BLOOD PRESSURE: 126 MMHG | RESPIRATION RATE: 18 BRPM | OXYGEN SATURATION: 100 % | TEMPERATURE: 96.4 F | HEIGHT: 78 IN | HEART RATE: 69 BPM

## 2021-11-19 DIAGNOSIS — I72.4 POPLITEAL ANEURYSM (HCC): ICD-10-CM

## 2021-11-19 DIAGNOSIS — I48.91 ATRIAL FIBRILLATION WITH RVR (HCC): ICD-10-CM

## 2021-11-19 DIAGNOSIS — Z01.810 PRE-OPERATIVE CARDIOVASCULAR EXAMINATION: ICD-10-CM

## 2021-11-19 DIAGNOSIS — I72.4 BILATERAL POPLITEAL ARTERY ANEURYSM (HCC): ICD-10-CM

## 2021-11-19 DIAGNOSIS — I71.2 ANEURYSM OF ASCENDING AORTA (HCC): Primary | ICD-10-CM

## 2021-11-19 DIAGNOSIS — I34.0 NONRHEUMATIC MITRAL VALVE REGURGITATION: ICD-10-CM

## 2021-11-19 DIAGNOSIS — I10 PRIMARY HYPERTENSION: ICD-10-CM

## 2021-11-19 PROCEDURE — 99214 OFFICE O/P EST MOD 30 MIN: CPT | Performed by: PHYSICIAN ASSISTANT

## 2021-12-08 ENCOUNTER — OFFICE VISIT (OUTPATIENT)
Dept: CARDIOLOGY CLINIC | Facility: CLINIC | Age: 76
End: 2021-12-08
Payer: MEDICARE

## 2021-12-08 VITALS
DIASTOLIC BLOOD PRESSURE: 70 MMHG | WEIGHT: 242.4 LBS | HEIGHT: 78 IN | OXYGEN SATURATION: 97 % | SYSTOLIC BLOOD PRESSURE: 108 MMHG | BODY MASS INDEX: 28.05 KG/M2 | HEART RATE: 85 BPM

## 2021-12-08 DIAGNOSIS — I48.91 ATRIAL FIBRILLATION WITH RVR (HCC): ICD-10-CM

## 2021-12-08 DIAGNOSIS — I71.2 ANEURYSM OF ASCENDING AORTA (HCC): ICD-10-CM

## 2021-12-08 DIAGNOSIS — I48.0 PAROXYSMAL ATRIAL FIBRILLATION (HCC): Primary | ICD-10-CM

## 2021-12-08 DIAGNOSIS — I27.20 PULMONARY HYPERTENSION (HCC): ICD-10-CM

## 2021-12-08 DIAGNOSIS — E78.2 MIXED HYPERLIPIDEMIA: ICD-10-CM

## 2021-12-08 DIAGNOSIS — I72.4 POPLITEAL ANEURYSM (HCC): Chronic | ICD-10-CM

## 2021-12-08 DIAGNOSIS — I10 BENIGN ESSENTIAL HYPERTENSION: ICD-10-CM

## 2021-12-08 PROCEDURE — 93000 ELECTROCARDIOGRAM COMPLETE: CPT | Performed by: INTERNAL MEDICINE

## 2021-12-08 PROCEDURE — 99214 OFFICE O/P EST MOD 30 MIN: CPT | Performed by: INTERNAL MEDICINE

## 2022-01-25 ENCOUNTER — TELEPHONE (OUTPATIENT)
Dept: CARDIOLOGY CLINIC | Facility: CLINIC | Age: 77
End: 2022-01-25

## 2022-01-25 NOTE — TELEPHONE ENCOUNTER
Called and spoke to Louis at office, and advised, also advised follow up with vascular as well  Routed message

## 2022-01-25 NOTE — TELEPHONE ENCOUNTER
Received clearance letter but are requesting how long can he hold Eliquis prior to procedure?     Please advise

## 2022-01-28 ENCOUNTER — TELEPHONE (OUTPATIENT)
Dept: CARDIOLOGY CLINIC | Facility: CLINIC | Age: 77
End: 2022-01-28

## 2022-01-28 NOTE — TELEPHONE ENCOUNTER
Received a call from [address] [place] [address] office stating pt has told them he has stopped Eliquis on his own and replaced with flaxseed oil  The surgeon was concerned that he is not taking Eliquis as directed and will not extract his teeth today  I will contact pt to confirm the report

## 2022-01-28 NOTE — TELEPHONE ENCOUNTER
I called pt at home, he stated he is still taking Eliquis as directed and only held for the extraction     Marquita Multani at [place] [address] made aware  C/b# [phone]

## 2022-02-14 DIAGNOSIS — I48.91 ATRIAL FIBRILLATION WITH RVR (HCC): ICD-10-CM

## 2022-03-10 ENCOUNTER — OFFICE VISIT (OUTPATIENT)
Dept: CARDIOLOGY CLINIC | Facility: CLINIC | Age: 77
End: 2022-03-10
Payer: MEDICARE

## 2022-03-10 VITALS
HEART RATE: 91 BPM | BODY MASS INDEX: 28.93 KG/M2 | HEIGHT: 78 IN | WEIGHT: 250 LBS | DIASTOLIC BLOOD PRESSURE: 80 MMHG | SYSTOLIC BLOOD PRESSURE: 116 MMHG

## 2022-03-10 DIAGNOSIS — I48.0 PAROXYSMAL ATRIAL FIBRILLATION (HCC): Primary | ICD-10-CM

## 2022-03-10 DIAGNOSIS — I48.91 ATRIAL FIBRILLATION WITH RVR (HCC): ICD-10-CM

## 2022-03-10 PROCEDURE — 93000 ELECTROCARDIOGRAM COMPLETE: CPT | Performed by: PHYSICIAN ASSISTANT

## 2022-03-10 PROCEDURE — 99214 OFFICE O/P EST MOD 30 MIN: CPT | Performed by: PHYSICIAN ASSISTANT

## 2022-03-10 PROCEDURE — 1123F ACP DISCUSS/DSCN MKR DOCD: CPT | Performed by: PHYSICIAN ASSISTANT

## 2022-03-10 NOTE — H&P (VIEW-ONLY)
Electrophysiology Office Note    Sagar Roldan  1945  [id number]  HEART & VASCULAR [facility]  [address] [place] [address] [address]        Assessment/Plan     Primary diagnosis:   1   early persistent atrial fibrillation, symptomatic    * patient presents to B EP office today due to concerns of being back in AF, he is a patient of Dr Pari Quinones    * today ECG showing rate controlled AF  When in AF he has TINSLEY which he has been experiencing for the past 1 5-2 weeks  His BP cuff notifies him when he is in AF  HR's at home have been between 70-90 bpm     * has not missed a dose of eliquis in last 30 days or BB    * rhythm history as below  * patient would prefer to not take any additional medications unless absolutely necessary  In 2021 we talked about repeat ablation and he is agreeable to proceed with this  I also discussed with Dr Pari Quinones who agreed  * plan for procedure on March 25th  Discussed with patient risks vs benefits, + need to stay overnight  Patient understands and is agreeable  * EF on RANDALL in 2021 was 55% with mild to mod MR and dilated LA  * no med changes today due to rate controlled status  * patient has no modifiable risk factors to modify, congratulated him on his health care efforts  Secondary diagnosis:   1  Mild to mod MR   2  Mod AR   3  Mod CT   4  Mild to mod CT   * for all the above planning for periop RANDALL no matter if in sinus or afib   5  Hx DVT   6  Hx right popliteal mycotic aneurysm    * hx partial excision of thrombosed right popliteal artery aneurysm and evacuation of intramural thrombus right pop artery aneurysm    * OR cultures of the above positive for alpha step s/p IV ceftriaxone 6 weeks   7  Hx group C strep bacteremia (2020)  8  Hx ascending aortic aneurysm following with CTS             Rhythm History:   Atrial fibrillation:   1  Started on tikosyn for rhythm control - 2016   2   Recurrent AF due to non compliance s/p RANDALL/DCCV and tikosyn reinitiation -   3  S/p cryo PVI by Dr Shane Nielsen -   4  Tikosyn, lopressor, eliquis discontinued -    5  Recurrent AF s/p re addition of eliquis and lopressor w/ RANDALL/DCV - 2021   6  Return of AF - rate controlled - second PVI planned - 3/2022     Atrial flutter:     SVT:     VT/VF:     Device history:   Pacemaker:    Defibrillator:    BIV PPM:    BIV ICD:    ILR:      Cardiac Testing:     ECHO: Results for orders placed during the hospital encounter of 21    Echo complete with contrast if indicated    Narrative  [facility]  [facility], [address] [facility]  [phone]    Transthoracic Echocardiogram  2D, M-mode, Doppler, and Color Doppler    Study date:  2021    Patient: Nancy Good  MR number: [id number]  Account number: [de-identified]  : 1945  Age: 67 years  Gender: Male  Status: Inpatient  Location: Bedside  Height: 78 in  Weight: 242 lb  BP: 122/ 84 mmHg    Indications: Atrial fibrillation  Diagnoses: I48 0 - Atrial fibrillation    Sonographer:  Chu Cain RDCS  Primary Physician:  Emerita Coyne DO  Referring Physician:  Ashlee Arreguin MD  Group:  [facility]  Cardiology Fellow: Jenna Lopez MD  Interpreting Physician:  Osvaldo Linares MD    SUMMARY    LEFT VENTRICLE:  Size was normal   Systolic function was normal  Ejection fraction was estimated to be 60 %  There were no regional wall motion abnormalities  Wall thickness was mildly increased  LEFT ATRIUM:  The atrium was mildly to moderately dilated  MITRAL VALVE:  There was moderate regurgitation  AORTIC VALVE:  There was mild to moderate regurgitation  TRICUSPID VALVE:  There was mild regurgitation  Pulmonary artery systolic pressure was within the normal range  HISTORY: PRIOR HISTORY: Atrial fibrillation  DVT  Aneurysm of ascending aorta      PROCEDURE: The procedure was performed at the bedside  This was a routine study  The transthoracic approach was used  The study included complete 2D imaging, M-mode, complete spectral Doppler, and color Doppler  The heart rate was 86 bpm,  at the start of the study  Images were obtained from the parasternal, apical, subcostal, and suprasternal notch acoustic windows  Image quality was adequate  LEFT VENTRICLE: Size was normal  Systolic function was normal  Ejection fraction was estimated to be 60 %  There were no regional wall motion abnormalities  Wall thickness was mildly increased  The changes were consistent with concentric  remodeling (increased wall thickness with normal wall mass)  DOPPLER: Transmitral flow pattern: atrial fibrillation  RIGHT VENTRICLE: The size was normal  Systolic function was normal     LEFT ATRIUM: The atrium was mildly to moderately dilated  RIGHT ATRIUM: The atrium was mildly dilated  MITRAL VALVE: Valve structure was normal  There was normal leaflet separation  DOPPLER: The transmitral velocity was within the normal range  There was no evidence for stenosis  There was moderate regurgitation  AORTIC VALVE: The valve was trileaflet  Leaflets exhibited mild to moderate calcification, normal cuspal separation, and sclerosis  DOPPLER: Transaortic velocity was within the normal range  There was no evidence for stenosis  There was  mild to moderate regurgitation  TRICUSPID VALVE: The valve structure was normal  There was normal leaflet separation  DOPPLER: The transtricuspid velocity was within the normal range  There was no evidence for stenosis  There was mild regurgitation  Pulmonary artery  systolic pressure was within the normal range  Estimated peak PA pressure was 27 mmHg  PULMONIC VALVE: DOPPLER: The transpulmonic velocity was within the normal range  There was mild regurgitation  PERICARDIUM: There was no pericardial effusion  AORTA: The root exhibited normal size for BSA at 38 mm      SYSTEMIC VEINS: IVC: The inferior vena cava was normal in size and course  Respirophasic changes were normal     SYSTEM MEASUREMENT TABLES    2D  %FS: 28 01 %  Ao Diam: 3 58 cm  EDV(Teich): 122 74 ml  EF(Teich): 53 93 %  ESV(Teich): 56 54 ml  IVSd: 1 25 cm  LA Area: 31 62 cm2  LA Diam: 4 26 cm  LVEDV MOD A4C: 156 26 ml  LVEF MOD A4C: 60 45 %  LVESV MOD A4C: 61 8 ml  LVIDd: 5 08 cm  LVIDs: 3 66 cm  LVLd A4C: 8 59 cm  LVLs A4C: 7 52 cm  LVPWd: 1 33 cm  RA Area: 31 07 cm2  RVIDd: 4 15 cm  SV MOD A4C: 94 47 ml  SV(Teich): 66 2 ml    CW  AR Dec Flagler: 3 14 m/s2  AR Dec Time: 1574 47 ms  AR PHT: 456 6 ms  AR Vmax: 4 92 m/s  AR maxP 95 mmHg  AV Env  Ti: 251 19 ms  AV VTI: 20 76 cm  AV Vmax: 1 24 m/s  AV Vmean: 0 83 m/s  AV maxP 2 mmHg  AV meanPG: 3 14 mmHg  TR Vmax: 2 38 m/s  TR maxP 7 mmHg    MM  TAPSE: 2 34 cm    PW  LVOT Env  Ti: 251 19 ms  LVOT VTI: 14 87 cm  LVOT Vmax: 0 85 m/s  LVOT Vmean: 0 59 m/s  LVOT maxP 9 mmHg  LVOT meanP 62 mmHg  MV A Luis A: 0 65 m/s  MV Dec Flagler: 4 08 m/s2  MV DecT: 171 95 ms  MV E Luis A: 0 7 m/s  MV E/A Ratio: 1 08    [facility] Accredited Echocardiography Laboratory    Prepared and electronically signed by    Casey Jordan MD  Signed [id number] 14:53:21      CATH/STRESS TEST:       EKG:   Atrial fibrillation   Normal HR         History of Present Illness     HPI/INTERVAL HISTORY: Isidro Mckeon  is a 65 y o  male with history as above who presents to B EP office today due to concerns of SOB  For the past 2-3 weeks patient has noticed TINSLEY/SOB when climbing the stairs from his basement to the 1st floor  This is abnormal for him and typical when he is in atrial fibrillation  He checked his BP cuff which informs him when he is in AF and the cuff did identify he was in AF with a normal HR  He tried to continue monitoring his symptoms in hopes his rhythm would return to normal but it has not  Due to this he sought further evaluation today   He has not missed a dose of his medications including eliquis in the past 30 days  He does not want to take any more medications if he absolutely does not need them  He is interested in pursuing a second ablation  Review of Systems  ROS as noted above, otherwise 12 point review of systems was performed and is negative  Historical Information   Social History     Socioeconomic History    Marital status: /Civil Union     Spouse name: Not on file    Number of children: Not on file    Years of education: Not on file    Highest education level: Not on file   Occupational History    Not on file   Tobacco Use    Smoking status: Never Smoker    Smokeless tobacco: Never Used   Vaping Use    Vaping Use: Never used   Substance and Sexual Activity    Alcohol use: Not Currently    Drug use: No    Sexual activity: Not on file   Other Topics Concern    Not on file   Social History Narrative    Not on file     Social Determinants of Health     Financial Resource Strain: Not on file   Food Insecurity: Not on file   Transportation Needs: Not on file   Physical Activity: Not on file   Stress: Not on file   Social Connections: Not on file   Intimate Partner Violence: Not on file   Housing Stability: Not on file     Past Medical History:   Diagnosis Date    Cardiac disease     Hypertension     PA[facility] ([facility]oxysmal [facility] fibrillation) ([facility] )     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     BILATERALLY     Past Surgical History:   Procedure Laterality Date    ESOPHAGOGASTRODUODENOSCOPY N/A 8/16/2016    Procedure: ESOPHAGOGASTRODUODENOSCOPY (EGD); Surgeon: Cristian Valentin MD;  Location: [facility];   Service:     HERNIA REPAIR Left     groin    HERNIA REPAIR Right 1995    OTHER SURGICAL HISTORY      REPLACEMENT TOTAL KNEE Left 04/01/2013    VASCULAR SURGERY Right     POPLITEAL BYPASS DUE TO ANEURYSM     Social History     Substance and Sexual Activity   Alcohol Use Not Currently     Social History     Substance and Sexual Activity   Drug Use No     Social History     Tobacco Use   Smoking Status Never Smoker   Smokeless Tobacco Never Used     Family History   Problem Relation Age of Onset    Pancreatic cancer Mother     Stroke Father     Heart attack Paternal Grandmother        Meds/Allergies       Current Outpatient Medications:     apixaban (ELIQUIS) 5 mg, Take 1 tablet (5 mg total) by mouth 2 (two) times a day, Disp: 180 tablet, Rfl: 3    ascorbic acid (VITAMIN C) 500 mg tablet, Take 500 mg by mouth daily, Disp: , Rfl:     Coenzyme Q10 200 MG capsule, Take 200 mg by mouth daily  , Disp: , Rfl:     COLLAGEN PO, Take 1 tablet by mouth, Disp: , Rfl:     Flaxseed, Linseed, (FLAXSEED OIL) 1000 MG CAPS, Take 1 capsule by mouth daily  , Disp: , Rfl:     Green Tea 250 MG CAPS, Take 1 capsule by mouth daily, Disp: , Rfl:     Liver Extract 500 MG CAPS, Take 1 capsule by mouth daily, Disp: , Rfl:     Magnesium Citrate 125 MG CAPS, Take 1 capsule by mouth daily, Disp: , Rfl:     metoprolol tartrate (LOPRESSOR) 25 mg tablet, Take 1 tablet (25 mg total) by mouth every 12 (twelve) hours, Disp: 60 tablet, Rfl: 0    Pancreatin 325 MG TABS, Take 1 tablet by mouth daily, Disp: , Rfl:     Probiotic Product (PROBIOTIC-10 PO), Take 1 capsule by mouth daily, Disp: , Rfl:     Vitamin D, Cholecalciferol, 1000 units CAPS, Take 2,000 Units by mouth daily, Disp: , Rfl:     Allergies   Allergen Reactions    Oxycodone GI Intolerance     nausea  nausea       Objective   Vitals: Blood pressure 116/80, pulse 91, height 6' 6" (1 981 m), weight 113 kg (250 lb)  Physical Exam  Constitutional:       Appearance: He is well-developed  HENT:      Head: Normocephalic and atraumatic  Eyes:      Pupils: Pupils are equal, round, and reactive to light  Cardiovascular:      Rate and Rhythm: Normal rate and regular rhythm  Pulmonary:      Effort: Pulmonary effort is normal       Breath sounds: Normal breath sounds     Abdominal:      General: Bowel sounds are normal  Palpations: Abdomen is soft  Musculoskeletal:         General: Normal range of motion  Cervical back: Normal range of motion and neck supple  Skin:     General: Skin is warm and dry  Neurological:      Mental Status: He is alert and oriented to person, place, and time  Labs:  No visits with results within 2 Month(s) from this visit  Latest known visit with results is:   Hospital Outpatient Visit on 11/08/2021   Component Date Value    TV S' 11/08/2021 0 9     MV mean gradient retrogr* 11/08/2021 71     AV peak gradient 11/08/2021 99     Mitral regurgitation pea* 11/08/2021 5 05     Mitral valve mean inflow* 11/08/2021 3 94     TV peak gradient 11/08/2021 50     Tricuspid valve peak reg* 11/08/2021 3 53     LVPWd 11/08/2021 1 00     MV E' Tissue Velocity Se* 11/08/2021 9     TR Peak Luis A 11/08/2021 3 6     IVSd 11/08/2021 5 45     LV DIASTOLIC VOLUME (MOD* 94/55/2857 166     LEFT VENTRICLE SYSTOLIC * 33/97/8743 63     Left ventricular stroke * 11/08/2021 104 00     AV Deceleration Time 11/08/2021 1,783     MV VTI RETROGRADE 11/08/2021 166 5     A4C EF 11/08/2021 70     LVIDd 11/08/2021 5 80     LVIDS 11/08/2021 3 80     FS 11/08/2021 34     Asc Ao 11/08/2021 4 5     Ao root 11/08/2021 3 80     RVID d 11/08/2021 4 6     AV regurgitation pressur* 11/08/2021 0 517     E wave deceleration time 11/08/2021 160     MV Peak E Luis A 11/08/2021 92     MV Peak A Luis A 11/08/2021 0 26     MR PG 11/08/2021 102     MV stenosis pressure 1/2* 11/08/2021 0     ZLVIDS 11/08/2021 -9 02     LV EF 11/08/2021 60     Triscuspid Valve Regurgi* 11/08/2021 52 0     Est  RA pres 11/08/2021 3 0     Right Ventricular Peak S* 11/08/2021 55 0     IVC 11/08/2021 16 0        Imaging: I have personally reviewed pertinent reports

## 2022-03-10 NOTE — PROGRESS NOTES
Electrophysiology Office Note    Aurora Ferrell  1945  [id number]  HEART & VASCULAR [facility]  [address] [place] [address] [address]        Assessment/Plan     Primary diagnosis:   1   early persistent atrial fibrillation, symptomatic    * patient presents to B EP office today due to concerns of being back in AF, he is a patient of Dr Abigail James    * today ECG showing rate controlled AF  When in AF he has TINSLEY which he has been experiencing for the past 1 5-2 weeks  His BP cuff notifies him when he is in AF  HR's at home have been between 70-90 bpm     * has not missed a dose of eliquis in last 30 days or BB    * rhythm history as below  * patient would prefer to not take any additional medications unless absolutely necessary  In 2021 we talked about repeat ablation and he is agreeable to proceed with this  I also discussed with Dr Abigail James who agreed  * plan for procedure on March 25th  Discussed with patient risks vs benefits, + need to stay overnight  Patient understands and is agreeable  * EF on RANDALL in 2021 was 55% with mild to mod MR and dilated LA  * no med changes today due to rate controlled status  * patient has no modifiable risk factors to modify, congratulated him on his health care efforts  Secondary diagnosis:   1  Mild to mod MR   2  Mod AR   3  Mod NM   4  Mild to mod NM   * for all the above planning for periop RANDALL no matter if in sinus or afib   5  Hx DVT   6  Hx right popliteal mycotic aneurysm    * hx partial excision of thrombosed right popliteal artery aneurysm and evacuation of intramural thrombus right pop artery aneurysm    * OR cultures of the above positive for alpha step s/p IV ceftriaxone 6 weeks   7  Hx group C strep bacteremia (2020)  8  Hx ascending aortic aneurysm following with CTS             Rhythm History:   Atrial fibrillation:   1  Started on tikosyn for rhythm control - 2016   2   Recurrent AF due to non compliance s/p RANDALL/DCCV and tikosyn reinitiation -   3  S/p cryo PVI by Dr Ramiro gonzalez -   4  Tikosyn, lopressor, eliquis discontinued -    5  Recurrent AF s/p re addition of eliquis and lopressor w/ RANDALL/DCV - 2021   6  Return of AF - rate controlled - second PVI planned - 3/2022     Atrial flutter:     SVT:     VT/VF:     Device history:   Pacemaker:    Defibrillator:    BIV PPM:    BIV ICD:    ILR:      Cardiac Testing:     ECHO: Results for orders placed during the hospital encounter of 21    Echo complete with contrast if indicated    Narrative  [facility]  [facility], [address] [facility]  [phone]    Transthoracic Echocardiogram  2D, M-mode, Doppler, and Color Doppler    Study date:  2021    Patient: Alonzo Rivera  MR number: [id number]  Account number: [de-identified]  : 1945  Age: 67 years  Gender: Male  Status: Inpatient  Location: Bedside  Height: 78 in  Weight: 242 lb  BP: 122/ 84 mmHg    Indications: Atrial fibrillation  Diagnoses: I48 0 - Atrial fibrillation    Sonographer:  Ger Almazan RDCS  Primary Physician:  Misael Almanzar DO  Referring Physician:  Brendon Fernandez MD  Group:  [facility]  Cardiology Fellow: Eva Bowman MD  Interpreting Physician:  Brice Thompson MD    SUMMARY    LEFT VENTRICLE:  Size was normal   Systolic function was normal  Ejection fraction was estimated to be 60 %  There were no regional wall motion abnormalities  Wall thickness was mildly increased  LEFT ATRIUM:  The atrium was mildly to moderately dilated  MITRAL VALVE:  There was moderate regurgitation  AORTIC VALVE:  There was mild to moderate regurgitation  TRICUSPID VALVE:  There was mild regurgitation  Pulmonary artery systolic pressure was within the normal range  HISTORY: PRIOR HISTORY: Atrial fibrillation  DVT  Aneurysm of ascending aorta      PROCEDURE: The procedure was performed at the bedside  This was a routine study  The transthoracic approach was used  The study included complete 2D imaging, M-mode, complete spectral Doppler, and color Doppler  The heart rate was 86 bpm,  at the start of the study  Images were obtained from the parasternal, apical, subcostal, and suprasternal notch acoustic windows  Image quality was adequate  LEFT VENTRICLE: Size was normal  Systolic function was normal  Ejection fraction was estimated to be 60 %  There were no regional wall motion abnormalities  Wall thickness was mildly increased  The changes were consistent with concentric  remodeling (increased wall thickness with normal wall mass)  DOPPLER: Transmitral flow pattern: atrial fibrillation  RIGHT VENTRICLE: The size was normal  Systolic function was normal     LEFT ATRIUM: The atrium was mildly to moderately dilated  RIGHT ATRIUM: The atrium was mildly dilated  MITRAL VALVE: Valve structure was normal  There was normal leaflet separation  DOPPLER: The transmitral velocity was within the normal range  There was no evidence for stenosis  There was moderate regurgitation  AORTIC VALVE: The valve was trileaflet  Leaflets exhibited mild to moderate calcification, normal cuspal separation, and sclerosis  DOPPLER: Transaortic velocity was within the normal range  There was no evidence for stenosis  There was  mild to moderate regurgitation  TRICUSPID VALVE: The valve structure was normal  There was normal leaflet separation  DOPPLER: The transtricuspid velocity was within the normal range  There was no evidence for stenosis  There was mild regurgitation  Pulmonary artery  systolic pressure was within the normal range  Estimated peak PA pressure was 27 mmHg  PULMONIC VALVE: DOPPLER: The transpulmonic velocity was within the normal range  There was mild regurgitation  PERICARDIUM: There was no pericardial effusion  AORTA: The root exhibited normal size for BSA at 38 mm      SYSTEMIC VEINS: IVC: The inferior vena cava was normal in size and course  Respirophasic changes were normal     SYSTEM MEASUREMENT TABLES    2D  %FS: 28 01 %  Ao Diam: 3 58 cm  EDV(Teich): 122 74 ml  EF(Teich): 53 93 %  ESV(Teich): 56 54 ml  IVSd: 1 25 cm  LA Area: 31 62 cm2  LA Diam: 4 26 cm  LVEDV MOD A4C: 156 26 ml  LVEF MOD A4C: 60 45 %  LVESV MOD A4C: 61 8 ml  LVIDd: 5 08 cm  LVIDs: 3 66 cm  LVLd A4C: 8 59 cm  LVLs A4C: 7 52 cm  LVPWd: 1 33 cm  RA Area: 31 07 cm2  RVIDd: 4 15 cm  SV MOD A4C: 94 47 ml  SV(Teich): 66 2 ml    CW  AR Dec [place]: 3 14 m/s2  AR Dec Time: 1574 47 ms  AR PHT: 456 6 ms  AR Vmax: 4 92 m/s  AR maxP 95 mmHg  AV Env  Ti: 251 19 ms  AV VTI: 20 76 cm  AV Vmax: 1 24 m/s  AV Vmean: 0 83 m/s  AV maxP 2 mmHg  AV meanPG: 3 14 mmHg  TR Vmax: 2 38 m/s  TR maxP 7 mmHg    MM  TAPSE: 2 34 cm    PW  LVOT Env  Ti: 251 19 ms  LVOT VTI: 14 87 cm  LVOT Vmax: 0 85 m/s  LVOT Vmean: 0 59 m/s  LVOT maxP 9 mmHg  LVOT meanP 62 mmHg  MV A Luis A: 0 65 m/s  MV Dec [place]: 4 08 m/s2  MV DecT: 171 95 ms  MV E Luis A: 0 7 m/s  MV E/A Ratio: 1 08    [facility] Accredited Echocardi[facility]    Prepared and electronically signed by    Janett Lopez MD  Signed [id number] 14:53:21      CATH/STRESS TEST:       EKG:   Atrial fibrillation   Normal HR         History of Present Illness     HPI/INTERVAL HISTORY: Alisha Martino  is a 65 y o  male with history as above who presents to B EP office today due to concerns of SOB  For the past 2-3 weeks patient has noticed TINSLEY/SOB when climbing the stairs from his basement to the 1st floor  This is abnormal for him and typical when he is in atrial fibrillation  He checked his BP cuff which informs him when he is in AF and the cuff did identify he was in AF with a normal HR  He tried to continue monitoring his symptoms in hopes his rhythm would return to normal but it has not  Due to this he sought further evaluation today   He has not missed a dose of his medications including eliquis in the past 30 days  He does not want to take any more medications if he absolutely does not need them  He is interested in pursuing a second ablation  Review of Systems  ROS as noted above, otherwise 12 point review of systems was performed and is negative  Historical Information   Social History     Socioeconomic History    Marital status: /Civil Union     Spouse name: Not on file    Number of children: Not on file    Years of education: Not on file    Highest education level: Not on file   Occupational History    Not on file   Tobacco Use    Smoking status: Never Smoker    Smokeless tobacco: Never Used   Vaping Use    Vaping Use: Never used   Substance and Sexual Activity    Alcohol use: Not Currently    Drug use: No    Sexual activity: Not on file   Other Topics Concern    Not on file   Social History Narrative    Not on file     Social Determinants of Health     Financial Resource Strain: Not on file   Food Insecurity: Not on file   Transportation Needs: Not on file   Physical Activity: Not on file   Stress: Not on file   Social Connections: Not on file   Intimate Partner Violence: Not on file   Housing Stability: Not on file     Past Medical History:   Diagnosis Date    Cardiac disease     Hypertension     [facility] fibrillation) [facility]     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[facility] )     BILATERALLY     Past Surgical History:   Procedure Laterality Date    ESOPHAGOGASTRODUODENOSCOPY N/A 8/16/2016    Procedure: ESOPHAGOGASTRODUODENOSCOPY (EGD); Surgeon: Simran Devlin MD;  Location: [facility];   Service:     HERNIA REPAIR Left     groin    HERNIA REPAIR Right 1995    OTHER SURGICAL HISTORY      REPLACEMENT TOTAL KNEE Left 04/01/2013    VASCULAR SURGERY Right     POPLITEAL BYPASS DUE TO ANEURYSM     Social History     Substance and Sexual Activity   Alcohol Use Not Currently     Social History     Substance and Sexual Activity   Drug Use No     Social History     Tobacco Use   Smoking Status Never Smoker   Smokeless Tobacco Never Used     Family History   Problem Relation Age of Onset    Pancreatic cancer Mother     Stroke Father     Heart attack Paternal Grandmother        Meds/Allergies       Current Outpatient Medications:     apixaban (ELIQUIS) 5 mg, Take 1 tablet (5 mg total) by mouth 2 (two) times a day, Disp: 180 tablet, Rfl: 3    ascorbic acid (VITAMIN C) 500 mg tablet, Take 500 mg by mouth daily, Disp: , Rfl:     Coenzyme Q10 200 MG capsule, Take 200 mg by mouth daily  , Disp: , Rfl:     COLLAGEN PO, Take 1 tablet by mouth, Disp: , Rfl:     Flaxseed, Linseed, (FLAXSEED OIL) 1000 MG CAPS, Take 1 capsule by mouth daily  , Disp: , Rfl:     Green Tea 250 MG CAPS, Take 1 capsule by mouth daily, Disp: , Rfl:     Liver Extract 500 MG CAPS, Take 1 capsule by mouth daily, Disp: , Rfl:     Magnesium Citrate 125 MG CAPS, Take 1 capsule by mouth daily, Disp: , Rfl:     metoprolol tartrate (LOPRESSOR) 25 mg tablet, Take 1 tablet (25 mg total) by mouth every 12 (twelve) hours, Disp: 60 tablet, Rfl: 0    Pancreatin 325 MG TABS, Take 1 tablet by mouth daily, Disp: , Rfl:     Probiotic Product (PROBIOTIC-10 PO), Take 1 capsule by mouth daily, Disp: , Rfl:     Vitamin D, Cholecalciferol, 1000 units CAPS, Take 2,000 Units by mouth daily, Disp: , Rfl:     Allergies   Allergen Reactions    Oxycodone GI Intolerance     nausea  nausea       Objective   Vitals: Blood pressure 116/80, pulse 91, height 6' 6" (1 981 m), weight 113 kg (250 lb)  Physical Exam  Constitutional:       Appearance: He is well-developed  HENT:      Head: Normocephalic and atraumatic  Eyes:      Pupils: Pupils are equal, round, and reactive to light  Cardiovascular:      Rate and Rhythm: Normal rate and regular rhythm  Pulmonary:      Effort: Pulmonary effort is normal       Breath sounds: Normal breath sounds     Abdominal:      General: Bowel sounds are normal  Palpations: Abdomen is soft  Musculoskeletal:         General: Normal range of motion  Cervical back: Normal range of motion and neck supple  Skin:     General: Skin is warm and dry  Neurological:      Mental Status: He is alert and oriented to person, place, and time  Labs:  No visits with results within 2 Month(s) from this visit  Latest known visit with results is:   Hospital Outpatient Visit on 11/08/2021   Component Date Value    TV S' 11/08/2021 0 9     MV mean gradient retrogr* 11/08/2021 71     AV peak gradient 11/08/2021 99     Mitral regurgitation pea* 11/08/2021 5 05     Mitral valve mean inflow* 11/08/2021 3 94     TV peak gradient 11/08/2021 50     Tricuspid valve peak reg* 11/08/2021 3 53     LVPWd 11/08/2021 1 00     MV E' Tissue Velocity Se* 11/08/2021 9     TR Peak Luis A 11/08/2021 3 6     IVSd 11/08/2021 3 80     LV DIASTOLIC VOLUME (MOD* 08/90/3653 166     LEFT VENTRICLE SYSTOLIC * 48/79/7869 63     Left ventricular stroke * 11/08/2021 104 00     AV Deceleration Time 11/08/2021 1,783     MV VTI RETROGRADE 11/08/2021 166 5     A4C EF 11/08/2021 70     LVIDd 11/08/2021 5 80     LVIDS 11/08/2021 3 80     FS 11/08/2021 34     Asc Ao 11/08/2021 4 5     Ao root 11/08/2021 3 80     RVID d 11/08/2021 4 6     AV regurgitation pressur* 11/08/2021 0 517     E wave deceleration time 11/08/2021 160     MV Peak E Luis A 11/08/2021 92     MV Peak A Luis A 11/08/2021 0 26     MR PG 11/08/2021 102     MV stenosis pressure 1/2* 11/08/2021 0     ZLVIDS 11/08/2021 -9 02     LV EF 11/08/2021 60     Triscuspid Valve Regurgi* 11/08/2021 52 0     Est  RA pres 11/08/2021 3 0     Right Ventricular Peak S* 11/08/2021 55 0     IVC 11/08/2021 16 0        Imaging: I have personally reviewed pertinent reports

## 2022-03-11 ENCOUNTER — PREP FOR PROCEDURE (OUTPATIENT)
Dept: CARDIOLOGY CLINIC | Facility: CLINIC | Age: 77
End: 2022-03-11

## 2022-03-11 ENCOUNTER — TELEPHONE (OUTPATIENT)
Dept: CARDIOLOGY CLINIC | Facility: CLINIC | Age: 77
End: 2022-03-11

## 2022-03-11 DIAGNOSIS — I48.0 PAROXYSMAL ATRIAL FIBRILLATION (HCC): ICD-10-CM

## 2022-03-11 DIAGNOSIS — I48.91 ATRIAL FIBRILLATION WITH RVR (HCC): Primary | ICD-10-CM

## 2022-03-11 NOTE — TELEPHONE ENCOUNTER
----- Message from Felicita Jacobo [place] sent at 3/10/2022  3:54 PM EST -----  Delonte Starr,     This is the patient for lawrence for RANDALL/AFIB ablation on March 25th, orders in

## 2022-03-11 NOTE — TELEPHONE ENCOUNTER
Patient scheduled for RANDALL/A fib ablation at [facility] on 03/25/22with dr Avani Olmos  Patient aware of general instructions, meds holds (Eliquis) and labs  Patient cover under medicare  Dr Avani Olmos can you please advise Eliquis medication holds  Can we please have procedure case place  Thank you

## 2022-03-22 ENCOUNTER — APPOINTMENT (OUTPATIENT)
Dept: LAB | Facility: HOSPITAL | Age: 77
End: 2022-03-22
Attending: THORACIC SURGERY (CARDIOTHORACIC VASCULAR SURGERY)
Payer: MEDICARE

## 2022-03-22 DIAGNOSIS — I48.91 ATRIAL FIBRILLATION WITH RVR (HCC): ICD-10-CM

## 2022-03-22 DIAGNOSIS — I71.2 ANEURYSM OF ASCENDING AORTA (HCC): ICD-10-CM

## 2022-03-22 DIAGNOSIS — I48.0 PAROXYSMAL ATRIAL FIBRILLATION (HCC): ICD-10-CM

## 2022-03-22 LAB
ALBUMIN SERPL BCP-MCNC: 4.1 G/DL (ref 3.5–5)
ALP SERPL-CCNC: 90 U/L (ref 46–116)
ALT SERPL W P-5'-P-CCNC: 30 U/L (ref 12–78)
ANION GAP SERPL CALCULATED.3IONS-SCNC: 1 MMOL/L (ref 4–13)
AST SERPL W P-5'-P-CCNC: 23 U/L (ref 5–45)
BASOPHILS # BLD AUTO: 0.07 THOUSANDS/ΜL (ref 0–0.1)
BASOPHILS NFR BLD AUTO: 1 % (ref 0–1)
BILIRUB SERPL-MCNC: 1.15 MG/DL (ref 0.2–1)
BUN SERPL-MCNC: 21 MG/DL (ref 5–25)
CALCIUM SERPL-MCNC: 9.4 MG/DL (ref 8.3–10.1)
CHLORIDE SERPL-SCNC: 111 MMOL/L (ref 100–108)
CO2 SERPL-SCNC: 28 MMOL/L (ref 21–32)
CREAT SERPL-MCNC: 0.82 MG/DL (ref 0.6–1.3)
EOSINOPHIL # BLD AUTO: 0.19 THOUSAND/ΜL (ref 0–0.61)
EOSINOPHIL NFR BLD AUTO: 3 % (ref 0–6)
ERYTHROCYTE [DISTWIDTH] IN BLOOD BY AUTOMATED COUNT: 15.4 % (ref 11.6–15.1)
GFR SERPL CREATININE-BSD FRML MDRD: 84 ML/MIN/1.73SQ M
GLUCOSE P FAST SERPL-MCNC: 98 MG/DL (ref 65–99)
HCT VFR BLD AUTO: 47.3 % (ref 36.5–49.3)
HGB BLD-MCNC: 15.4 G/DL (ref 12–17)
IMM GRANULOCYTES # BLD AUTO: 0.01 THOUSAND/UL (ref 0–0.2)
IMM GRANULOCYTES NFR BLD AUTO: 0 % (ref 0–2)
LYMPHOCYTES # BLD AUTO: 1.64 THOUSANDS/ΜL (ref 0.6–4.47)
LYMPHOCYTES NFR BLD AUTO: 23 % (ref 14–44)
MCH RBC QN AUTO: 30.6 PG (ref 26.8–34.3)
MCHC RBC AUTO-ENTMCNC: 32.6 G/DL (ref 31.4–37.4)
MCV RBC AUTO: 94 FL (ref 82–98)
MONOCYTES # BLD AUTO: 0.61 THOUSAND/ΜL (ref 0.17–1.22)
MONOCYTES NFR BLD AUTO: 9 % (ref 4–12)
NEUTROPHILS # BLD AUTO: 4.53 THOUSANDS/ΜL (ref 1.85–7.62)
NEUTS SEG NFR BLD AUTO: 64 % (ref 43–75)
NRBC BLD AUTO-RTO: 0 /100 WBCS
PLATELET # BLD AUTO: 168 THOUSANDS/UL (ref 149–390)
PMV BLD AUTO: 10.8 FL (ref 8.9–12.7)
POTASSIUM SERPL-SCNC: 4.1 MMOL/L (ref 3.5–5.3)
PROT SERPL-MCNC: 6.8 G/DL (ref 6.4–8.2)
RBC # BLD AUTO: 5.04 MILLION/UL (ref 3.88–5.62)
SODIUM SERPL-SCNC: 140 MMOL/L (ref 136–145)
WBC # BLD AUTO: 7.05 THOUSAND/UL (ref 4.31–10.16)

## 2022-03-22 PROCEDURE — 36415 COLL VENOUS BLD VENIPUNCTURE: CPT

## 2022-03-22 PROCEDURE — 80053 COMPREHEN METABOLIC PANEL: CPT

## 2022-03-22 PROCEDURE — 85025 COMPLETE CBC W/AUTO DIFF WBC: CPT

## 2022-03-25 ENCOUNTER — HOSPITAL ENCOUNTER (OUTPATIENT)
Facility: HOSPITAL | Age: 77
Setting detail: OUTPATIENT SURGERY
Discharge: HOME | End: 2022-03-26
Attending: INTERNAL MEDICINE | Admitting: INTERNAL MEDICINE
Payer: MEDICARE

## 2022-03-25 ENCOUNTER — APPOINTMENT (OUTPATIENT)
Dept: NON INVASIVE DIAGNOSTICS | Facility: HOSPITAL | Age: 77
End: 2022-03-25
Attending: INTERNAL MEDICINE
Payer: MEDICARE

## 2022-03-25 ENCOUNTER — ANESTHESIA (OUTPATIENT)
Dept: NON INVASIVE DIAGNOSTICS | Facility: HOSPITAL | Age: 77
End: 2022-03-25
Payer: MEDICARE

## 2022-03-25 ENCOUNTER — ANESTHESIA EVENT (OUTPATIENT)
Dept: NON INVASIVE DIAGNOSTICS | Facility: HOSPITAL | Age: 77
End: 2022-03-25
Payer: MEDICARE

## 2022-03-25 DIAGNOSIS — I48.91 ATRIAL FIBRILLATION WITH RVR (HCC): ICD-10-CM

## 2022-03-25 DIAGNOSIS — I48.0 PAROXYSMAL ATRIAL FIBRILLATION (HCC): ICD-10-CM

## 2022-03-25 LAB
ALBUMIN SERPL BCP-MCNC: 3.8 G/DL (ref 3.5–5)
ALP SERPL-CCNC: 86 U/L (ref 46–116)
ALT SERPL W P-5'-P-CCNC: 28 U/L (ref 12–78)
ANION GAP SERPL CALCULATED.3IONS-SCNC: 1 MMOL/L (ref 4–13)
APTT PPP: 29 SECONDS (ref 23–37)
AST SERPL W P-5'-P-CCNC: 21 U/L (ref 5–45)
BASOPHILS # BLD AUTO: 0.05 THOUSANDS/ΜL (ref 0–0.1)
BASOPHILS NFR BLD AUTO: 1 % (ref 0–1)
BILIRUB SERPL-MCNC: 1.47 MG/DL (ref 0.2–1)
BUN SERPL-MCNC: 19 MG/DL (ref 5–25)
CALCIUM SERPL-MCNC: 9.5 MG/DL (ref 8.3–10.1)
CHLORIDE SERPL-SCNC: 112 MMOL/L (ref 100–108)
CO2 SERPL-SCNC: 30 MMOL/L (ref 21–32)
CREAT SERPL-MCNC: 0.79 MG/DL (ref 0.6–1.3)
EOSINOPHIL # BLD AUTO: 0.17 THOUSAND/ΜL (ref 0–0.61)
EOSINOPHIL NFR BLD AUTO: 3 % (ref 0–6)
ERYTHROCYTE [DISTWIDTH] IN BLOOD BY AUTOMATED COUNT: 15.6 % (ref 11.6–15.1)
FLUAV RNA RESP QL NAA+PROBE: NEGATIVE
FLUBV RNA RESP QL NAA+PROBE: NEGATIVE
GFR SERPL CREATININE-BSD FRML MDRD: 86 ML/MIN/1.73SQ M
GLUCOSE P FAST SERPL-MCNC: 100 MG/DL (ref 65–99)
GLUCOSE SERPL-MCNC: 100 MG/DL (ref 65–140)
HCT VFR BLD AUTO: 49.2 % (ref 36.5–49.3)
HGB BLD-MCNC: 16.1 G/DL (ref 12–17)
IMM GRANULOCYTES # BLD AUTO: 0.01 THOUSAND/UL (ref 0–0.2)
IMM GRANULOCYTES NFR BLD AUTO: 0 % (ref 0–2)
INR PPP: 1.21 (ref 0.84–1.19)
KCT BLD-ACNC: 258 SEC (ref 89–137)
KCT BLD-ACNC: 299 SEC (ref 89–137)
KCT BLD-ACNC: 341 SEC (ref 89–137)
KCT BLD-ACNC: 348 SEC (ref 89–137)
KCT BLD-ACNC: 348 SEC (ref 89–137)
LYMPHOCYTES # BLD AUTO: 1.72 THOUSANDS/ΜL (ref 0.6–4.47)
LYMPHOCYTES NFR BLD AUTO: 29 % (ref 14–44)
MCH RBC QN AUTO: 30.9 PG (ref 26.8–34.3)
MCHC RBC AUTO-ENTMCNC: 32.7 G/DL (ref 31.4–37.4)
MCV RBC AUTO: 94 FL (ref 82–98)
MONOCYTES # BLD AUTO: 0.51 THOUSAND/ΜL (ref 0.17–1.22)
MONOCYTES NFR BLD AUTO: 9 % (ref 4–12)
NEUTROPHILS # BLD AUTO: 3.53 THOUSANDS/ΜL (ref 1.85–7.62)
NEUTS SEG NFR BLD AUTO: 58 % (ref 43–75)
NRBC BLD AUTO-RTO: 0 /100 WBCS
PLATELET # BLD AUTO: 159 THOUSANDS/UL (ref 149–390)
PMV BLD AUTO: 10.6 FL (ref 8.9–12.7)
POTASSIUM SERPL-SCNC: 4.7 MMOL/L (ref 3.5–5.3)
PROT SERPL-MCNC: 6.9 G/DL (ref 6.4–8.2)
PROTHROMBIN TIME: 14.8 SECONDS (ref 11.6–14.5)
RBC # BLD AUTO: 5.21 MILLION/UL (ref 3.88–5.62)
RSV RNA RESP QL NAA+PROBE: NEGATIVE
SARS-COV-2 RNA RESP QL NAA+PROBE: NEGATIVE
SODIUM SERPL-SCNC: 143 MMOL/L (ref 136–145)
SPECIMEN SOURCE: ABNORMAL
WBC # BLD AUTO: 5.99 THOUSAND/UL (ref 4.31–10.16)

## 2022-03-25 PROCEDURE — C1732 CATH, EP, DIAG/ABL, 3D/VECT: HCPCS | Performed by: INTERNAL MEDICINE

## 2022-03-25 PROCEDURE — C9113 INJ PANTOPRAZOLE SODIUM, VIA: HCPCS | Performed by: PHYSICIAN ASSISTANT

## 2022-03-25 PROCEDURE — C1893 INTRO/SHEATH, FIXED,NON-PEEL: HCPCS | Performed by: INTERNAL MEDICINE

## 2022-03-25 PROCEDURE — 93312 ECHO TRANSESOPHAGEAL: CPT | Performed by: INTERNAL MEDICINE

## 2022-03-25 PROCEDURE — 85347 COAGULATION TIME ACTIVATED: CPT

## 2022-03-25 PROCEDURE — 0241U HB NFCT DS VIR RESP RNA 4 TRGT: CPT | Performed by: PHYSICIAN ASSISTANT

## 2022-03-25 PROCEDURE — 93657 TX L/R ATRIAL FIB ADDL: CPT | Performed by: INTERNAL MEDICINE

## 2022-03-25 PROCEDURE — 85025 COMPLETE CBC W/AUTO DIFF WBC: CPT | Performed by: PHYSICIAN ASSISTANT

## 2022-03-25 PROCEDURE — 93325 DOPPLER ECHO COLOR FLOW MAPG: CPT | Performed by: INTERNAL MEDICINE

## 2022-03-25 PROCEDURE — C1769 GUIDE WIRE: HCPCS | Performed by: INTERNAL MEDICINE

## 2022-03-25 PROCEDURE — C1894 INTRO/SHEATH, NON-LASER: HCPCS | Performed by: INTERNAL MEDICINE

## 2022-03-25 PROCEDURE — 93312 ECHO TRANSESOPHAGEAL: CPT

## 2022-03-25 PROCEDURE — 93005 ELECTROCARDIOGRAM TRACING: CPT

## 2022-03-25 PROCEDURE — 85730 THROMBOPLASTIN TIME PARTIAL: CPT | Performed by: PHYSICIAN ASSISTANT

## 2022-03-25 PROCEDURE — 76937 US GUIDE VASCULAR ACCESS: CPT | Performed by: INTERNAL MEDICINE

## 2022-03-25 PROCEDURE — 80053 COMPREHEN METABOLIC PANEL: CPT | Performed by: INTERNAL MEDICINE

## 2022-03-25 PROCEDURE — C1730 CATH, EP, 19 OR FEW ELECT: HCPCS | Performed by: INTERNAL MEDICINE

## 2022-03-25 PROCEDURE — 85610 PROTHROMBIN TIME: CPT | Performed by: PHYSICIAN ASSISTANT

## 2022-03-25 PROCEDURE — 93321 DOPPLER ECHO F-UP/LMTD STD: CPT | Performed by: INTERNAL MEDICINE

## 2022-03-25 PROCEDURE — 93656 COMPRE EP EVAL ABLTJ ATR FIB: CPT | Performed by: INTERNAL MEDICINE

## 2022-03-25 PROCEDURE — C1733 CATH, EP, OTHR THAN COOL-TIP: HCPCS | Performed by: INTERNAL MEDICINE

## 2022-03-25 PROCEDURE — C1759 CATH, INTRA ECHOCARDIOGRAPHY: HCPCS | Performed by: INTERNAL MEDICINE

## 2022-03-25 RX ORDER — SODIUM CHLORIDE 9 MG/ML
INJECTION, SOLUTION INTRAVENOUS CONTINUOUS PRN
Status: DISCONTINUED | OUTPATIENT
Start: 2022-03-25 | End: 2022-03-25

## 2022-03-25 RX ORDER — LANOLIN ALCOHOL/MO/W.PET/CERES
3 CREAM (GRAM) TOPICAL
Status: DISCONTINUED | OUTPATIENT
Start: 2022-03-25 | End: 2022-03-26 | Stop reason: HOSPADM

## 2022-03-25 RX ORDER — HEPARIN SODIUM 1000 [USP'U]/ML
4000 INJECTION, SOLUTION INTRAVENOUS; SUBCUTANEOUS
Status: DISCONTINUED | OUTPATIENT
Start: 2022-03-25 | End: 2022-03-26 | Stop reason: HOSPADM

## 2022-03-25 RX ORDER — DOCUSATE SODIUM 100 MG/1
100 CAPSULE, LIQUID FILLED ORAL 2 TIMES DAILY
Status: DISCONTINUED | OUTPATIENT
Start: 2022-03-25 | End: 2022-03-26 | Stop reason: HOSPADM

## 2022-03-25 RX ORDER — PANTOPRAZOLE SODIUM 40 MG/1
40 INJECTION, POWDER, FOR SOLUTION INTRAVENOUS ONCE
Status: COMPLETED | OUTPATIENT
Start: 2022-03-25 | End: 2022-03-25

## 2022-03-25 RX ORDER — HEPARIN SODIUM 1000 [USP'U]/ML
4000 INJECTION, SOLUTION INTRAVENOUS; SUBCUTANEOUS ONCE
Status: DISCONTINUED | OUTPATIENT
Start: 2022-03-25 | End: 2022-03-26 | Stop reason: HOSPADM

## 2022-03-25 RX ORDER — EPHEDRINE SULFATE 50 MG/ML
INJECTION INTRAVENOUS AS NEEDED
Status: DISCONTINUED | OUTPATIENT
Start: 2022-03-25 | End: 2022-03-25

## 2022-03-25 RX ORDER — GLYCOPYRROLATE 0.2 MG/ML
INJECTION INTRAMUSCULAR; INTRAVENOUS AS NEEDED
Status: DISCONTINUED | OUTPATIENT
Start: 2022-03-25 | End: 2022-03-25

## 2022-03-25 RX ORDER — PROPOFOL 10 MG/ML
INJECTION, EMULSION INTRAVENOUS AS NEEDED
Status: DISCONTINUED | OUTPATIENT
Start: 2022-03-25 | End: 2022-03-25

## 2022-03-25 RX ORDER — ACETAMINOPHEN 325 MG/1
650 TABLET ORAL EVERY 4 HOURS PRN
Status: DISCONTINUED | OUTPATIENT
Start: 2022-03-25 | End: 2022-03-26 | Stop reason: HOSPADM

## 2022-03-25 RX ORDER — PANTOPRAZOLE SODIUM 40 MG/1
40 TABLET, DELAYED RELEASE ORAL
Status: DISCONTINUED | OUTPATIENT
Start: 2022-03-26 | End: 2022-03-26 | Stop reason: HOSPADM

## 2022-03-25 RX ORDER — ROCURONIUM BROMIDE 10 MG/ML
INJECTION, SOLUTION INTRAVENOUS AS NEEDED
Status: DISCONTINUED | OUTPATIENT
Start: 2022-03-25 | End: 2022-03-25

## 2022-03-25 RX ORDER — HEPARIN SODIUM 1000 [USP'U]/ML
INJECTION, SOLUTION INTRAVENOUS; SUBCUTANEOUS AS NEEDED
Status: DISCONTINUED | OUTPATIENT
Start: 2022-03-25 | End: 2022-03-25 | Stop reason: HOSPADM

## 2022-03-25 RX ORDER — HEPARIN SODIUM 1000 [USP'U]/ML
2000 INJECTION, SOLUTION INTRAVENOUS; SUBCUTANEOUS
Status: DISCONTINUED | OUTPATIENT
Start: 2022-03-25 | End: 2022-03-26 | Stop reason: HOSPADM

## 2022-03-25 RX ORDER — METOCLOPRAMIDE HYDROCHLORIDE 5 MG/ML
10 INJECTION INTRAMUSCULAR; INTRAVENOUS ONCE AS NEEDED
Status: DISCONTINUED | OUTPATIENT
Start: 2022-03-25 | End: 2022-03-25 | Stop reason: HOSPADM

## 2022-03-25 RX ORDER — FENTANYL CITRATE 50 UG/ML
INJECTION, SOLUTION INTRAMUSCULAR; INTRAVENOUS AS NEEDED
Status: DISCONTINUED | OUTPATIENT
Start: 2022-03-25 | End: 2022-03-25

## 2022-03-25 RX ORDER — HEPARIN SODIUM 10000 [USP'U]/100ML
3-20 INJECTION, SOLUTION INTRAVENOUS
Status: DISCONTINUED | OUTPATIENT
Start: 2022-03-25 | End: 2022-03-25

## 2022-03-25 RX ORDER — LIDOCAINE HYDROCHLORIDE 10 MG/ML
INJECTION, SOLUTION EPIDURAL; INFILTRATION; INTRACAUDAL; PERINEURAL AS NEEDED
Status: DISCONTINUED | OUTPATIENT
Start: 2022-03-25 | End: 2022-03-25

## 2022-03-25 RX ORDER — FENTANYL CITRATE/PF 50 MCG/ML
50 SYRINGE (ML) INJECTION
Status: DISCONTINUED | OUTPATIENT
Start: 2022-03-25 | End: 2022-03-25 | Stop reason: HOSPADM

## 2022-03-25 RX ORDER — HYDROMORPHONE HCL/PF 1 MG/ML
0.5 SYRINGE (ML) INJECTION
Status: DISCONTINUED | OUTPATIENT
Start: 2022-03-25 | End: 2022-03-25 | Stop reason: HOSPADM

## 2022-03-25 RX ORDER — HEPARIN SODIUM 10000 [USP'U]/100ML
INJECTION, SOLUTION INTRAVENOUS
Status: COMPLETED | OUTPATIENT
Start: 2022-03-25 | End: 2022-03-25

## 2022-03-25 RX ORDER — NEOSTIGMINE METHYLSULFATE 1 MG/ML
INJECTION INTRAVENOUS AS NEEDED
Status: DISCONTINUED | OUTPATIENT
Start: 2022-03-25 | End: 2022-03-25

## 2022-03-25 RX ORDER — PROTAMINE SULFATE 10 MG/ML
INJECTION, SOLUTION INTRAVENOUS AS NEEDED
Status: DISCONTINUED | OUTPATIENT
Start: 2022-03-25 | End: 2022-03-25

## 2022-03-25 RX ORDER — ONDANSETRON 2 MG/ML
INJECTION INTRAMUSCULAR; INTRAVENOUS AS NEEDED
Status: DISCONTINUED | OUTPATIENT
Start: 2022-03-25 | End: 2022-03-25

## 2022-03-25 RX ORDER — PANTOPRAZOLE SODIUM 40 MG/1
40 TABLET, DELAYED RELEASE ORAL
Qty: 30 TABLET | Refills: 0 | Status: CANCELLED | OUTPATIENT
Start: 2022-03-26

## 2022-03-25 RX ADMIN — EPHEDRINE SULFATE 10 MG: 50 INJECTION INTRAVENOUS at 14:07

## 2022-03-25 RX ADMIN — NEOSTIGMINE METHYLSULFATE 3 MG: 1 INJECTION INTRAVENOUS at 16:35

## 2022-03-25 RX ADMIN — Medication 2000 MG: at 14:14

## 2022-03-25 RX ADMIN — SODIUM CHLORIDE: 9 INJECTION, SOLUTION INTRAVENOUS at 12:37

## 2022-03-25 RX ADMIN — PANTOPRAZOLE SODIUM 40 MG: 40 INJECTION, POWDER, FOR SOLUTION INTRAVENOUS at 13:55

## 2022-03-25 RX ADMIN — ONDANSETRON 4 MG: 2 INJECTION INTRAMUSCULAR; INTRAVENOUS at 16:34

## 2022-03-25 RX ADMIN — METOPROLOL TARTRATE 25 MG: 25 TABLET, FILM COATED ORAL at 21:33

## 2022-03-25 RX ADMIN — FENTANYL CITRATE 50 MCG: 50 INJECTION INTRAMUSCULAR; INTRAVENOUS at 14:34

## 2022-03-25 RX ADMIN — LIDOCAINE HYDROCHLORIDE 100 MG: 10 INJECTION, SOLUTION EPIDURAL; INFILTRATION; INTRACAUDAL; PERINEURAL at 13:46

## 2022-03-25 RX ADMIN — PROPOFOL 100 MG: 10 INJECTION, EMULSION INTRAVENOUS at 13:47

## 2022-03-25 RX ADMIN — PHENYLEPHRINE HYDROCHLORIDE 50 MCG/MIN: 10 INJECTION INTRAVENOUS at 13:52

## 2022-03-25 RX ADMIN — GLYCOPYRROLATE 0.6 MG: 0.2 INJECTION, SOLUTION INTRAMUSCULAR; INTRAVENOUS at 16:35

## 2022-03-25 RX ADMIN — FENTANYL CITRATE 50 MCG: 50 INJECTION INTRAMUSCULAR; INTRAVENOUS at 13:46

## 2022-03-25 RX ADMIN — SODIUM CHLORIDE: 0.9 INJECTION, SOLUTION INTRAVENOUS at 13:48

## 2022-03-25 RX ADMIN — PROPOFOL 200 MG: 10 INJECTION, EMULSION INTRAVENOUS at 13:46

## 2022-03-25 RX ADMIN — PROTAMINE SULFATE 50 MG: 10 INJECTION, SOLUTION INTRAVENOUS at 16:31

## 2022-03-25 RX ADMIN — ROCURONIUM BROMIDE 35 MG: 50 INJECTION, SOLUTION INTRAVENOUS at 13:47

## 2022-03-25 RX ADMIN — EPHEDRINE SULFATE 10 MG: 50 INJECTION INTRAVENOUS at 14:58

## 2022-03-25 RX ADMIN — HEPARIN SODIUM 11.1 UNITS/KG/HR: 10000 INJECTION, SOLUTION INTRAVENOUS; SUBCUTANEOUS at 21:32

## 2022-03-25 NOTE — ANESTHESIA POSTPROCEDURE EVALUATION
Post-Op Assessment Note    CV Status:  Stable    Pain management: adequate     Mental Status:  Alert   Hydration Status:  Stable   PONV Controlled:  None   Airway Patency:  Patent      Post Op Vitals Reviewed: Yes      Staff: Anesthesiologist, CRNA         There were no known complications for this encounter      BP   107/67   Temp   97 1   Pulse 68   Resp 14   SpO2 97% on 6L facemask

## 2022-03-25 NOTE — INTERVAL H&P NOTE
HB is a 65year old male w/ early persistent atrial fibrillation AC w/ eliquis hx of cryo PVI, mod AR, mod UT, hx DVT, hx right popliteal micotic aneurysm, hx group C strep bacteremia, hx ascending aortic aneurysm who presents to B today for his second atrial fibrillation ablation  For further details leading to procedure please see my office note from 3-  Briefly, this is a patient who has been concerned with TINSLEY with limited exertion such as climbing the stairs  Usually stairs are not limiting for him  This TINSLEY symptom is usually how he feels in atrial fibrillation  His home BP cuff does have an AF monitor within it which did report him to be back in AF when checking at home  He sought further evaluation at the EP office where ECG's confirmed atrial fibrillation  We discussed multiple treatment options such as resuming tikosyn since had tolerated this in the past but his goals are to be on as little medications as possible  Thus, a second afib ablation with posterior wall isolation was recommended  Today he has no concerns or complaints  There were no vitals taken for this visit    Labs, covid test and ECG pending       Avani Banuelos PA-C/March 25, 2022/12:10 PM

## 2022-03-25 NOTE — ANESTHESIA PREPROCEDURE EVALUATION
Procedure:  Cardiac eps/afib ablation (N/A Chest)    Relevant Problems   CARDIO   (+) Aneurysm of ascending aorta (HCC)   (+) Atrial fibrillation with RVR (HCC)   (+) Benign essential hypertension   (+) Hypertension   (+) Mitral valve insufficiency   (+) Mixed hyperlipidemia   (+) Paroxysmal atrial fibrillation (HCC)      NEURO/PSYCH   (+) History of DVT (deep vein thrombosis)        Physical Exam    Airway    Mallampati score: I  TM Distance: >3 FB  Neck ROM: full     Dental   No notable dental hx     Cardiovascular      Pulmonary      Other Findings        Anesthesia Plan  ASA Score- 3     Anesthesia Type- general with ASA Monitors  Additional Monitors: arterial line  Airway Plan: ETT  Plan Factors-Exercise tolerance (METS): >4 METS  Chart reviewed  EKG reviewed  Existing labs reviewed  Patient summary reviewed  Patient is not a current smoker  Induction- intravenous  Postoperative Plan-     Informed Consent- Anesthetic plan and risks discussed with patient  I personally reviewed this patient with the CRNA  Discussed and agreed on the Anesthesia Plan with the CRNA  Surjit Ramirez

## 2022-03-25 NOTE — ANESTHESIA PROCEDURE NOTES
Arterial Line Insertion  Performed by: Ji Villegas CRNA  Authorized by: Tarik Bo MD   Consent: Written consent obtained  Consent given by: patient  Patient understanding: patient states understanding of the procedure being performed  Patient consent: the patient's understanding of the procedure matches consent given  Procedure consent: procedure consent matches procedure scheduled  Patient identity confirmed: arm band and hospital-assigned identification number  Time out: Immediately prior to procedure a "time out" was called to verify the correct patient, procedure, equipment, support staff and site/side marked as required    Indications: hemodynamic monitoring  Orientation:  Right  Location: radial artery  Procedure Details:  Peter's test normal: yes  Needle gauge: 20  Number of attempts: 2    Post-procedure:  Post-procedure: dressing applied  Waveform: good waveform  Post-procedure CNS: normal

## 2022-03-26 VITALS
BODY MASS INDEX: 27.77 KG/M2 | RESPIRATION RATE: 18 BRPM | HEIGHT: 78 IN | HEART RATE: 73 BPM | OXYGEN SATURATION: 92 % | SYSTOLIC BLOOD PRESSURE: 134 MMHG | WEIGHT: 240 LBS | TEMPERATURE: 97.5 F | DIASTOLIC BLOOD PRESSURE: 87 MMHG

## 2022-03-26 LAB
ANION GAP SERPL CALCULATED.3IONS-SCNC: 2 MMOL/L (ref 4–13)
APTT PPP: 47 SECONDS (ref 23–37)
ATRIAL RATE: 69 BPM
ATRIAL RATE: 69 BPM
BUN SERPL-MCNC: 18 MG/DL (ref 5–25)
CALCIUM SERPL-MCNC: 8.5 MG/DL (ref 8.3–10.1)
CHLORIDE SERPL-SCNC: 112 MMOL/L (ref 100–108)
CO2 SERPL-SCNC: 28 MMOL/L (ref 21–32)
CREAT SERPL-MCNC: 0.78 MG/DL (ref 0.6–1.3)
ERYTHROCYTE [DISTWIDTH] IN BLOOD BY AUTOMATED COUNT: 15.7 % (ref 11.6–15.1)
GFR SERPL CREATININE-BSD FRML MDRD: 86 ML/MIN/1.73SQ M
GLUCOSE SERPL-MCNC: 97 MG/DL (ref 65–140)
HCT VFR BLD AUTO: 43.1 % (ref 36.5–49.3)
HGB BLD-MCNC: 13.9 G/DL (ref 12–17)
MCH RBC QN AUTO: 31 PG (ref 26.8–34.3)
MCHC RBC AUTO-ENTMCNC: 32.3 G/DL (ref 31.4–37.4)
MCV RBC AUTO: 96 FL (ref 82–98)
P AXIS: 84 DEGREES
P AXIS: 86 DEGREES
PLATELET # BLD AUTO: 131 THOUSANDS/UL (ref 149–390)
PMV BLD AUTO: 10 FL (ref 8.9–12.7)
POTASSIUM SERPL-SCNC: 4.5 MMOL/L (ref 3.5–5.3)
PR INTERVAL: 175 MS
PR INTERVAL: 179 MS
QRS AXIS: -15 DEGREES
QRS AXIS: -20 DEGREES
QRSD INTERVAL: 108 MS
QRSD INTERVAL: 113 MS
QT INTERVAL: 425 MS
QT INTERVAL: 442 MS
QTC INTERVAL: 456 MS
QTC INTERVAL: 474 MS
RBC # BLD AUTO: 4.48 MILLION/UL (ref 3.88–5.62)
SODIUM SERPL-SCNC: 142 MMOL/L (ref 136–145)
T WAVE AXIS: 67 DEGREES
T WAVE AXIS: 69 DEGREES
VENTRICULAR RATE: 69 BPM
VENTRICULAR RATE: 69 BPM
WBC # BLD AUTO: 5.7 THOUSAND/UL (ref 4.31–10.16)

## 2022-03-26 PROCEDURE — 85730 THROMBOPLASTIN TIME PARTIAL: CPT | Performed by: INTERNAL MEDICINE

## 2022-03-26 PROCEDURE — 80048 BASIC METABOLIC PNL TOTAL CA: CPT | Performed by: INTERNAL MEDICINE

## 2022-03-26 PROCEDURE — 85027 COMPLETE CBC AUTOMATED: CPT | Performed by: INTERNAL MEDICINE

## 2022-03-26 PROCEDURE — NC001 PR NO CHARGE: Performed by: PHYSICIAN ASSISTANT

## 2022-03-26 PROCEDURE — 93010 ELECTROCARDIOGRAM REPORT: CPT | Performed by: INTERNAL MEDICINE

## 2022-03-26 PROCEDURE — 92960 CARDIOVERSION ELECTRIC EXT: CPT | Performed by: INTERNAL MEDICINE

## 2022-03-26 RX ORDER — PANTOPRAZOLE SODIUM 40 MG/1
40 TABLET, DELAYED RELEASE ORAL
Qty: 30 TABLET | Refills: 0 | Status: SHIPPED | OUTPATIENT
Start: 2022-03-27 | End: 2022-04-25

## 2022-03-26 RX ADMIN — APIXABAN 5 MG: 5 TABLET, FILM COATED ORAL at 09:05

## 2022-03-26 RX ADMIN — HEPARIN SODIUM 2000 UNITS: 1000 INJECTION INTRAVENOUS; SUBCUTANEOUS at 05:58

## 2022-03-26 RX ADMIN — PANTOPRAZOLE SODIUM 40 MG: 40 TABLET, DELAYED RELEASE ORAL at 06:04

## 2022-03-26 RX ADMIN — METOPROLOL TARTRATE 25 MG: 25 TABLET, FILM COATED ORAL at 09:05

## 2022-03-26 NOTE — PLAN OF CARE
Problem: MOBILITY - ADULT  Goal: Maintain or return to baseline ADL function  Description: INTERVENTIONS:  -  Assess patient's ability to carry out ADLs; assess patient's baseline for ADL function and identify physical deficits which impact ability to perform ADLs (bathing, care of mouth/teeth, toileting, grooming, dressing, etc )  - Assess/evaluate cause of self-care deficits   - Assess range of motion  - Assess patient's mobility; develop plan if impaired  - Assess patient's need for assistive devices and provide as appropriate  - Encourage maximum independence but intervene and supervise when necessary  - Involve family in performance of ADLs  - Assess for home care needs following discharge   - Consider OT consult to assist with ADL evaluation and planning for discharge  - Provide patient education as appropriate  Outcome: Progressing  Goal: Maintains/Returns to pre admission functional level  Description: INTERVENTIONS:  - Perform BMAT or MOVE assessment daily    - Set and communicate daily mobility goal to care team and patient/family/caregiver  - Collaborate with rehabilitation services on mobility goals if consulted  - Perform Range of Motion  times a day  - Reposition patient every  hours    - Dangle patient  times a day  - Stand patient  times a day  - Ambulate patient  times a day  - Out of bed to chair  times a day   - Out of bed for meals  times a day  - Out of bed for toileting  - Record patient progress and toleration of activity level   Outcome: Progressing     Problem: PAIN - ADULT  Goal: Verbalizes/displays adequate comfort level or baseline comfort level  Description: Interventions:  - Encourage patient to monitor pain and request assistance  - Assess pain using appropriate pain scale  - Administer analgesics based on type and severity of pain and evaluate response  - Implement non-pharmacological measures as appropriate and evaluate response  - Consider cultural and social influences on pain and pain management  - Notify physician/advanced practitioner if interventions unsuccessful or patient reports new pain  Outcome: Progressing     Problem: INFECTION - ADULT  Goal: Absence or prevention of progression during hospitalization  Description: INTERVENTIONS:  - Assess and monitor for signs and symptoms of infection  - Monitor lab/diagnostic results  - Monitor all insertion sites, i e  indwelling lines, tubes, and drains  - Monitor endotracheal if appropriate and nasal secretions for changes in amount and color  - Vesta appropriate cooling/warming therapies per order  - Administer medications as ordered  - Instruct and encourage patient and family to use good hand hygiene technique  - Identify and instruct in appropriate isolation precautions for identified infection/condition  Outcome: Progressing  Goal: Absence of fever/infection during neutropenic period  Description: INTERVENTIONS:  - Monitor WBC    Outcome: Progressing     Problem: SAFETY ADULT  Goal: Maintain or return to baseline ADL function  Description: INTERVENTIONS:  -  Assess patient's ability to carry out ADLs; assess patient's baseline for ADL function and identify physical deficits which impact ability to perform ADLs (bathing, care of mouth/teeth, toileting, grooming, dressing, etc )  - Assess/evaluate cause of self-care deficits   - Assess range of motion  - Assess patient's mobility; develop plan if impaired  - Assess patient's need for assistive devices and provide as appropriate  - Encourage maximum independence but intervene and supervise when necessary  - Involve family in performance of ADLs  - Assess for home care needs following discharge   - Consider OT consult to assist with ADL evaluation and planning for discharge  - Provide patient education as appropriate  Outcome: Progressing  Goal: Maintains/Returns to pre admission functional level  Description: INTERVENTIONS:  - Perform BMAT or MOVE assessment daily    - Set and communicate daily mobility goal to care team and patient/family/caregiver  - Collaborate with rehabilitation services on mobility goals if consulted  - Perform Range of Motion times a day  - Reposition patient every hours    - Dangle patient  times a day  - Stand patient  times a day  - Ambulate patient  times a day  - Out of bed to chair times a day   - Out of bed for meals  times a day  - Out of bed for toileting  - Record patient progress and toleration of activity level   Outcome: Progressing  Goal: Patient will remain free of falls  Description: INTERVENTIONS:  - Educate patient/family on patient safety including physical limitations  - Instruct patient to call for assistance with activity   - Consult OT/PT to assist with strengthening/mobility   - Keep Call bell within reach  - Keep bed low and locked with side rails adjusted as appropriate  - Keep care items and personal belongings within reach  - Initiate and maintain comfort rounds  - Make Fall Risk Sign visible to staff  - Offer Toileting every Hours, in advance of need  - Initiate/Maintain alarm  - Obtain necessary fall risk management equipment:   - Apply yellow socks and bracelet for high fall risk patients  - Consider moving patient to room near nurses station  Outcome: Progressing     Problem: DISCHARGE PLANNING  Goal: Discharge to home or other facility with appropriate resources  Description: INTERVENTIONS:  - Identify barriers to discharge w/patient and caregiver  - Arrange for needed discharge resources and transportation as appropriate  - Identify discharge learning needs (meds, wound care, etc )  - Arrange for interpretive services to assist at discharge as needed  - Refer to Case Management Department for coordinating discharge planning if the patient needs post-hospital services based on physician/advanced practitioner order or complex needs related to functional status, cognitive ability, or social support system  Outcome: Progressing Problem: Knowledge Deficit  Goal: Patient/family/caregiver demonstrates understanding of disease process, treatment plan, medications, and discharge instructions  Description: Complete learning assessment and assess knowledge base    Interventions:  - Provide teaching at level of understanding  - Provide teaching via preferred learning methods  Outcome: Progressing

## 2022-03-26 NOTE — DISCHARGE INSTRUCTIONS
NEW MEDICATIONS:  Please start taking Protonix 40mg once daily for 1 month in the post ablation setting  RESTRICTIONS:   No heavy lifting or strenuous activity for one week  No soaking in a bath tub/hot tub/swimming pool for one week or until groin heals  You may shower - please let soap and water run over the groins, no scrubbing, and pat the area dry  Please place band-aid on groins daily for up to five days, but you may remove sooner if no issues are noted  If you notice ongoing bleeding, swelling, or firm lumps in groin near ablation incision, please contact Dr Naun Blanco' office - [phone]

## 2022-04-23 DIAGNOSIS — I48.0 PAROXYSMAL ATRIAL FIBRILLATION (HCC): ICD-10-CM

## 2022-04-25 RX ORDER — PANTOPRAZOLE SODIUM 40 MG/1
40 TABLET, DELAYED RELEASE ORAL
Qty: 30 TABLET | Refills: 0 | Status: SHIPPED | OUTPATIENT
Start: 2022-04-25

## 2022-05-02 ENCOUNTER — OFFICE VISIT (OUTPATIENT)
Dept: CARDIOLOGY CLINIC | Facility: CLINIC | Age: 77
End: 2022-05-02
Payer: MEDICARE

## 2022-05-02 VITALS
HEIGHT: 78 IN | BODY MASS INDEX: 28.82 KG/M2 | DIASTOLIC BLOOD PRESSURE: 52 MMHG | SYSTOLIC BLOOD PRESSURE: 120 MMHG | HEART RATE: 71 BPM | WEIGHT: 249.1 LBS

## 2022-05-02 DIAGNOSIS — I48.0 PAROXYSMAL ATRIAL FIBRILLATION (HCC): Primary | ICD-10-CM

## 2022-05-02 PROCEDURE — 93000 ELECTROCARDIOGRAM COMPLETE: CPT | Performed by: PHYSICIAN ASSISTANT

## 2022-05-02 PROCEDURE — 99214 OFFICE O/P EST MOD 30 MIN: CPT | Performed by: PHYSICIAN ASSISTANT

## 2022-05-02 NOTE — PATIENT INSTRUCTIONS
1  I think your shortness of breath with activity is related to deconditioning due to the length of being in atrial fibrillation before the ablation and also not having exercised like normal for the past 6 weeks since the ablation  I want you to increase your activity level as I think this will help your symptoms  2  Beryl Castellano about your Mitral Valve being a little leaky  This can sometimes cause shortness of breath as well  I suspect he will look at your echocardiogram images of the valve and schedule you for a follow up to further assess this     3  I want you to increase your metoprolol to 25mg twice daily  If you are experiencing worsening shortness of breath with this change please call me and we can make it once a day dosing  4  I am going to call you in 1 month to check in on how you are doing, please let me know if your condition worsens before then and we will run some tests

## 2022-05-02 NOTE — PROGRESS NOTES
Electrophysiology Office Note    Sulma Valadez  1945  [id number]  HEART & VASCULAR [facility]  [address] [place] [address] [address]        Assessment/Plan     Primary diagnosis:   1   early persistent atrial fibrillation, symptomatic    * patient presents to [facility] EP office today for post afib ablation follow up  * today ECG showing NSR w/ PAC's    * his main concern today is with SOB  Feels his SOB has not improved post ablation  However, he has not been exercising as he had been doing for 7 weeks pre ablation and now 6 weeks post ablation as he was not sure if this was "good for my heart " On exam he has no signs of acute HF, heart sounds crisp/strong, BP normal  I suspect his SOB is from deconditioning, I explained to him it is OK to restart exercising  However, his RANDALL did show mod to severe MR, question if this is contributing  Will discuss with his general cardiologist     * EF is normal on periop RANDALL with MV showing mod to severe MR w/ mod LA dilation    * remains on AC w/ eliquis without any major or minor bleeding issues    * Today we discussed non cardiac modifiable AF risk factors to prevent further substrate formation    1  HTN - we recalibrated his home BP cuff to our cuff today for more accurate home BP measurements      2  T2DM - does not have     3  DARIO - does not have      4  Alcohol intake- does not consume     5  Obesity - BMI is under 30     6  Tobacco use - does not consume    * will f/u with him in 1 month via phone call to assess his symptoms  He is going to let me know if symptoms worsen before I speak to him  Secondary diagnosis:   1  Mild to severe MR - followed by Dr Demarcus Hernandez   2  Mod AR   3  Mod ND   4  Mild to mod ND              * for all the above planning for periop RANDALL no matter if in sinus or afib   5  Hx DVT   6   Hx right popliteal mycotic aneurysm               * hx partial excision of thrombosed right popliteal artery aneurysm and evacuation of intramural thrombus right pop artery aneurysm               * OR cultures of the above positive for alpha step s/p IV ceftriaxone 6 weeks   7  Hx group C strep bacteremia ()  8  Hx ascending aortic aneurysm following with CTS               Rhythm History:   Atrial fibrillation:   1  Started on tikosyn for rhythm control -    2  Recurrent AF due to non compliance s/p RANDALL/DCCV and tikosyn reinitiation -   3  S/p cryo PVI by Dr Griselda Settle -   4  Tikosyn, lopressor, eliquis discontinued -    5  Recurrent AF s/p re addition of eliquis and lopressor w/ RANDALL/DCV - 2021   6  Return of AF - rate controlled - second PVI planned - 3/2022   7  S/p left PVI w/ posterior wall isolation using cryo and RF - 3/2022    Atrial flutter:     SVT:     VT/VF:     Device history:   Pacemaker:    Defibrillator:    BIV PPM:    BIV ICD:    ILR:      Cardiac Testing:     ECHO: Results for orders placed during the hospital encounter of 21    Echo complete with contrast if indicated    Narrative  [facility]  [facility], [address] [facility]  [phone]    Transthoracic Echocardiogram  2D, M-mode, Doppler, and Color Doppler    Study date:  2021    Patient: Alexys Maria  MR number: [id number]  Account number: [de-identified]  : 1945  Age: 67 years  Gender: Male  Status: Inpatient  Location: Bedside  Height: 78 in  Weight: 242 lb  BP: 122/ 84 mmHg    Indications: Atrial fibrillation  Diagnoses: I48 0 - Atrial fibrillation    Sonographer:  Carina Rich RDCS  Primary Physician:  Nara Verdin DO  Referring Physician:  Finn Anderson MD  Group:  [facility]  Cardiology Fellow: Aldo Wadsworth MD  Interpreting Physician:  Bobby Zepeda MD    SUMMARY    LEFT VENTRICLE:  Size was normal   Systolic function was normal  Ejection fraction was estimated to be 60 %    There were no regional wall motion abnormalities  Wall thickness was mildly increased  LEFT ATRIUM:  The atrium was mildly to moderately dilated  MITRAL VALVE:  There was moderate regurgitation  AORTIC VALVE:  There was mild to moderate regurgitation  TRICUSPID VALVE:  There was mild regurgitation  Pulmonary artery systolic pressure was within the normal range  HISTORY: PRIOR HISTORY: Atrial fibrillation  DVT  Aneurysm of ascending aorta  PROCEDURE: The procedure was performed at the bedside  This was a routine study  The transthoracic approach was used  The study included complete 2D imaging, M-mode, complete spectral Doppler, and color Doppler  The heart rate was 86 bpm,  at the start of the study  Images were obtained from the parasternal, apical, subcostal, and suprasternal notch acoustic windows  Image quality was adequate  LEFT VENTRICLE: Size was normal  Systolic function was normal  Ejection fraction was estimated to be 60 %  There were no regional wall motion abnormalities  Wall thickness was mildly increased  The changes were consistent with concentric  remodeling (increased wall thickness with normal wall mass)  DOPPLER: Transmitral flow pattern: atrial fibrillation  RIGHT VENTRICLE: The size was normal  Systolic function was normal     LEFT ATRIUM: The atrium was mildly to moderately dilated  RIGHT ATRIUM: The atrium was mildly dilated  MITRAL VALVE: Valve structure was normal  There was normal leaflet separation  DOPPLER: The transmitral velocity was within the normal range  There was no evidence for stenosis  There was moderate regurgitation  AORTIC VALVE: The valve was trileaflet  Leaflets exhibited mild to moderate calcification, normal cuspal separation, and sclerosis  DOPPLER: Transaortic velocity was within the normal range  There was no evidence for stenosis  There was  mild to moderate regurgitation  TRICUSPID VALVE: The valve structure was normal  There was normal leaflet separation  DOPPLER: The transtricuspid velocity was within the normal range  There was no evidence for stenosis  There was mild regurgitation  Pulmonary artery  systolic pressure was within the normal range  Estimated peak PA pressure was 27 mmHg  PULMONIC VALVE: DOPPLER: The transpulmonic velocity was within the normal range  There was mild regurgitation  PERICARDIUM: There was no pericardial effusion  AORTA: The root exhibited normal size for BSA at 38 mm  SYSTEMIC VEINS: IVC: The inferior vena cava was normal in size and course  Respirophasic changes were normal     SYSTEM MEASUREMENT TABLES    2D  %FS: 28 01 %  Ao Diam: 3 58 cm  EDV(Teich): 122 74 ml  EF(Teich): 53 93 %  ESV(Teich): 56 54 ml  IVSd: 1 25 cm  LA Area: 31 62 cm2  LA Diam: 4 26 cm  LVEDV MOD A4C: 156 26 ml  LVEF MOD A4C: 60 45 %  LVESV MOD A4C: 61 8 ml  LVIDd: 5 08 cm  LVIDs: 3 66 cm  LVLd A4C: 8 59 cm  LVLs A4C: 7 52 cm  LVPWd: 1 33 cm  RA Area: 31 07 cm2  RVIDd: 4 15 cm  SV MOD A4C: 94 47 ml  SV(Teich): 66 2 ml    CW  AR Dec Costilla: 3 14 m/s2  AR Dec Time: 1574 47 ms  AR PHT: 456 6 ms  AR Vmax: 4 92 m/s  AR maxP 95 mmHg  AV Env  Ti: 251 19 ms  AV VTI: 20 76 cm  AV Vmax: 1 24 m/s  AV Vmean: 0 83 m/s  AV maxP 2 mmHg  AV meanPG: 3 14 mmHg  TR Vmax: 2 38 m/s  TR maxP 7 mmHg    MM  TAPSE: 2 34 cm    PW  LVOT Env  Ti: 251 19 ms  LVOT VTI: 14 87 cm  LVOT Vmax: 0 85 m/s  LVOT Vmean: 0 59 m/s  LVOT maxP 9 mmHg  LVOT meanP 62 mmHg  MV A Luis A: 0 65 m/s  MV Dec Costilla: 4 08 m/s2  MV DecT: 171 95 ms  MV E Luis A: 0 7 m/s  MV E/A Ratio: 1 08    [employer] Accredited Echocardiography Laboratory    Prepared and electronically signed by    Iza Cleary MD  Signed [id number] 14:53:21      EKG:   NSR w/ PAC's         History of Present Illness     HPI/INTERVAL HISTORY: Franky Turcios  is a 65 y o  male with history as above who presents to [facility] EP office today under direction of Dr Sreedhar De Souza for post afib ablation       Since returning home from his ablation he has been concerned with SOB  This is his main symptom when in atrial fibrillation so he has not been sure if has been in NSR or not  Prior to the ablation he did not exercise and has not been exercising post ablation as he was not sure if it was good for his heart to exercise with this symptom  He does not notice any weight gain or orthopnea  He has only been taking metoprolol QDay and did not know this should be twice daily  Review of Systems  ROS as noted above, otherwise 12 point review of systems was performed and is negative  Historical Information   Social History     Socioeconomic History    Marital status: /Civil Union     Spouse name: Not on file    Number of children: Not on file    Years of education: Not on file    Highest education level: Not on file   Occupational History    Not on file   Tobacco Use    Smoking status: Never Smoker    Smokeless tobacco: Never Used   Vaping Use    Vaping Use: Never used   Substance and Sexual Activity    Alcohol use: Not Currently    Drug use: No    Sexual activity: Not on file   Other Topics Concern    Not on file   Social History Narrative    Not on file     Social Determinants of Health     Financial Resource Strain: Not on file   Food Insecurity: Not on file   Transportation Needs: Not on file   Physical Activity: Not on file   Stress: Not on file   Social Connections: Not on file   Intimate Partner Violence: Not on file   Housing Stability: Not on file     Past Medical History:   Diagnosis Date    Cardiac disease     Hypertension     [facility] [address] [facility]     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[facility] [address] )     BILATERALLY     Past Surgical History:   Procedure Laterality Date    CARDIAC ELECTROPHYSIOLOGY PROCEDURE N/A 3/25/2022    Procedure: Cardiac eps/afib ablation;  Surgeon: Angelika Pinon MD;  [facility];   Service: Cardiology    ESOPHAGOGASTRODUODENOSCOPY N/A 8/16/2016 Procedure: ESOPHAGOGASTRODUODENOSCOPY (EGD); Surgeon: Heriberto Louie MD;  Location: [facility];   Service:     HERNIA REPAIR Left     groin    HERNIA REPAIR Right 1995    OTHER SURGICAL HISTORY      REPLACEMENT TOTAL KNEE Left 04/01/2013    VASCULAR SURGERY Right     POPLITEAL BYPASS DUE TO ANEURYSM     Social History     Substance and Sexual Activity   Alcohol Use Not Currently     Social History     Substance and Sexual Activity   Drug Use No     Social History     Tobacco Use   Smoking Status Never Smoker   Smokeless Tobacco Never Used     Family History   Problem Relation Age of Onset    Pancreatic cancer Mother     Stroke Father     Heart attack Paternal Grandmother        Meds/Allergies       Current Outpatient Medications:     apixaban (ELIQUIS) 5 mg, Take 1 tablet (5 mg total) by mouth 2 (two) times a day, Disp: 180 tablet, Rfl: 3    ascorbic acid (VITAMIN C) 500 mg tablet, Take 500 mg by mouth daily, Disp: , Rfl:     Coenzyme Q10 200 MG capsule, Take 200 mg by mouth daily  , Disp: , Rfl:     COLLAGEN PO, Take 1 tablet by mouth, Disp: , Rfl:     Flaxseed, Linseed, (FLAXSEED OIL) 1000 MG CAPS, Take 1 capsule by mouth daily  , Disp: , Rfl:     Green Tea 250 MG CAPS, Take 1 capsule by mouth daily, Disp: , Rfl:     Liver Extract 500 MG CAPS, Take 1 capsule by mouth daily, Disp: , Rfl:     Magnesium Citrate 125 MG CAPS, Take 1 capsule by mouth daily, Disp: , Rfl:     metoprolol tartrate (LOPRESSOR) 25 mg tablet, Take 1 tablet (25 mg total) by mouth every 12 (twelve) hours (Patient taking differently: Take 25 mg by mouth every 12 (twelve) hours Patient only taking 1 time a day ), Disp: 60 tablet, Rfl: 0    Pancreatin 325 MG TABS, Take 1 tablet by mouth daily, Disp: , Rfl:     pantoprazole (PROTONIX) 40 mg tablet, TAKE 1 TABLET (40 MG TOTAL) BY MOUTH DAILY IN THE EARLY MORNING, Disp: 30 tablet, Rfl: 0    Probiotic Product (PROBIOTIC-10 PO), Take 1 capsule by mouth daily, Disp: , Rfl:     Vitamin D, Cholecalciferol, 1000 units CAPS, Take 2,000 Units by mouth daily, Disp: , Rfl:     Allergies   Allergen Reactions    Oxycodone GI Intolerance     nausea  nausea       Objective   Vitals: Pulse 71, height 6' 6" (1 981 m), weight 113 kg (249 lb 1 6 oz)  Physical Exam  Constitutional:       Appearance: He is well-developed  HENT:      Head: Normocephalic and atraumatic  Eyes:      Pupils: Pupils are equal, round, and reactive to light  Neck:      Comments: EJV is prominent  No JVD or HJR  Cardiovascular:      Rate and Rhythm: Normal rate and regular rhythm  Frequent extrasystoles are present  Pulmonary:      Effort: Pulmonary effort is normal       Breath sounds: Normal breath sounds  Abdominal:      General: Bowel sounds are normal       Palpations: Abdomen is soft  Musculoskeletal:         General: Normal range of motion  Cervical back: Normal range of motion and neck supple  Right lower leg: No edema  Left lower leg: No edema  Skin:     General: Skin is warm and dry  Neurological:      Mental Status: He is alert and oriented to person, place, and time             Labs:  Admission on 03/25/2022, Discharged on 03/26/2022   Component Date Value    WBC 03/25/2022 5 99     RBC 03/25/2022 5 21     Hemoglobin 03/25/2022 16 1     Hematocrit 03/25/2022 49 2     MCV 03/25/2022 94     MCH 03/25/2022 30 9     MCHC 03/25/2022 32 7     RDW 03/25/2022 15 6*    MPV 03/25/2022 10 6     Platelets 11/10/1483 159     nRBC 03/25/2022 0     Neutrophils Relative 03/25/2022 58     Immat GRANS % 03/25/2022 0     Lymphocytes Relative 03/25/2022 29     Monocytes Relative 03/25/2022 9     Eosinophils Relative 03/25/2022 3     Basophils Relative 03/25/2022 1     Neutrophils Absolute 03/25/2022 3 53     Immature Grans Absolute 03/25/2022 0 01     Lymphocytes Absolute 03/25/2022 1 72     Monocytes Absolute 03/25/2022 0 51     Eosinophils Absolute 03/25/2022 0 17     Basophils Absolute 03/25/2022 0 05     Protime 03/25/2022 14 8*    INR 03/25/2022 1 21*    PTT 03/25/2022 29     Sodium 03/25/2022 143     Potassium 03/25/2022 4 7     Chloride 03/25/2022 112*    CO2 03/25/2022 30     ANION GAP 03/25/2022 1*    BUN 03/25/2022 19     Creatinine 03/25/2022 0 79     Glucose 03/25/2022 100     Glucose, Fasting 03/25/2022 100*    Calcium 03/25/2022 9 5     AST 03/25/2022 21     ALT 03/25/2022 28     Alkaline Phosphatase 03/25/2022 86     Total Protein 03/25/2022 6 9     Albumin 03/25/2022 3 8     Total Bilirubin 03/25/2022 1 47*    eGFR 03/25/2022 86     SARS-CoV-2 03/25/2022 Negative     INFLUENZA A PCR 03/25/2022 Negative     INFLUENZA B PCR 03/25/2022 Negative     RSV PCR 03/25/2022 Negative     Activated Clotting Time,* 03/25/2022 258*    Specimen Type 03/25/2022 VENOUS     Activated Clotting Time,* 03/25/2022 299*    Specimen Type 03/25/2022 VENOUS     Activated Clotting Time,* 03/25/2022 348*    Specimen Type 03/25/2022 VENOUS     Activated Clotting Time,* 03/25/2022 348*    Specimen Type 03/25/2022 VENOUS     Activated Clotting Time,* 03/25/2022 341*    Specimen Type 03/25/2022 VENOUS     PTT 03/26/2022 47*    Sodium 03/26/2022 142     Potassium 03/26/2022 4 5     Chloride 03/26/2022 112*    CO2 03/26/2022 28     ANION GAP 03/26/2022 2*    BUN 03/26/2022 18     Creatinine 03/26/2022 0 78     Glucose 03/26/2022 97     Calcium 03/26/2022 8 5     eGFR 03/26/2022 86     WBC 03/26/2022 5 70     RBC 03/26/2022 4 48     Hemoglobin 03/26/2022 13 9     Hematocrit 03/26/2022 43 1     MCV 03/26/2022 96     MCH 03/26/2022 31 0     MCHC 03/26/2022 32 3     RDW 03/26/2022 15 7*    Platelets 78/33/1775 131*    MPV 03/26/2022 10 0     Ventricular Rate 03/25/2022 69     Atrial Rate 03/25/2022 69     CO Interval 03/25/2022 179     QRSD Interval 03/25/2022 113     QT Interval 03/25/2022 442     QTC Interval 03/25/2022 474     P Axis 03/25/2022 86  QRS Axis 03/25/2022 -20     T Wave Axis 03/25/2022 69     Ventricular Rate 03/25/2022 69     Atrial Rate 03/25/2022 69     NJ Interval 03/25/2022 175     QRSD Interval 03/25/2022 108     QT Interval 03/25/2022 425     QTC Interval 03/25/2022 456     P Axis 03/25/2022 84     QRS Axis 03/25/2022 -15     T Wave [place] 03/25/2022 67    Appointment on 03/22/2022   Component Date Value    Sodium 03/22/2022 140     Potassium 03/22/2022 4 1     Chloride 03/22/2022 111*    CO2 03/22/2022 28     ANION GAP 03/22/2022 1*    BUN 03/22/2022 21     Creatinine 03/22/2022 0 82     Glucose, Fasting 03/22/2022 98     Calcium 03/22/2022 9 4     AST 03/22/2022 23     ALT 03/22/2022 30     Alkaline Phosphatase 03/22/2022 90     Total Protein 03/22/2022 6 8     Albumin 03/22/2022 4 1     Total Bilirubin 03/22/2022 1 15*    eGFR 03/22/2022 84     WBC 03/22/2022 7 05     RBC 03/22/2022 5 04     Hemoglobin 03/22/2022 15 4     Hematocrit 03/22/2022 47 3     MCV 03/22/2022 94     MCH 03/22/2022 30 6     MCHC 03/22/2022 32 6     RDW 03/22/2022 15 4*    MPV 03/22/2022 10 8     Platelets 51/24/4549 168     nRBC 03/22/2022 0     Neutrophils Relative 03/22/2022 64     Immat GRANS % 03/22/2022 0     Lymphocytes Relative 03/22/2022 23     Monocytes Relative 03/22/2022 9     Eosinophils Relative 03/22/2022 3     Basophils Relative 03/22/2022 1     Neutrophils Absolute 03/22/2022 4 53     Immature Grans Absolute 03/22/2022 0 01     Lymphocytes Absolute 03/22/2022 1 64     Monocytes Absolute 03/22/2022 0 61     Eosinophils Absolute 03/22/2022 0 19     Basophils Absolute 03/22/2022 0 07        Imaging: I have personally reviewed pertinent reports

## 2022-06-06 ENCOUNTER — TELEPHONE (OUTPATIENT)
Dept: CARDIOLOGY CLINIC | Facility: CLINIC | Age: 77
End: 2022-06-06

## 2022-06-06 NOTE — TELEPHONE ENCOUNTER
Called patient, he is feeling MUCH improved in the past several weeks SOB wise  No change in management  F/u as scheduled

## 2022-06-15 ENCOUNTER — OFFICE VISIT (OUTPATIENT)
Dept: CARDIOLOGY CLINIC | Facility: CLINIC | Age: 77
End: 2022-06-15
Payer: MEDICARE

## 2022-06-15 VITALS
HEART RATE: 70 BPM | HEIGHT: 78 IN | DIASTOLIC BLOOD PRESSURE: 58 MMHG | WEIGHT: 241 LBS | SYSTOLIC BLOOD PRESSURE: 104 MMHG | BODY MASS INDEX: 27.88 KG/M2 | OXYGEN SATURATION: 97 %

## 2022-06-15 DIAGNOSIS — I72.4 BILATERAL POPLITEAL ARTERY ANEURYSM (HCC): ICD-10-CM

## 2022-06-15 DIAGNOSIS — I27.20 PULMONARY HYPERTENSION (HCC): ICD-10-CM

## 2022-06-15 DIAGNOSIS — I10 BENIGN ESSENTIAL HYPERTENSION: ICD-10-CM

## 2022-06-15 DIAGNOSIS — I71.2 ANEURYSM OF ASCENDING AORTA (HCC): ICD-10-CM

## 2022-06-15 DIAGNOSIS — I10 PRIMARY HYPERTENSION: ICD-10-CM

## 2022-06-15 DIAGNOSIS — I72.4 POPLITEAL ANEURYSM (HCC): Chronic | ICD-10-CM

## 2022-06-15 DIAGNOSIS — I34.0 NONRHEUMATIC MITRAL VALVE REGURGITATION: Primary | ICD-10-CM

## 2022-06-15 DIAGNOSIS — I48.0 PAROXYSMAL ATRIAL FIBRILLATION (HCC): ICD-10-CM

## 2022-06-15 DIAGNOSIS — I48.91 ATRIAL FIBRILLATION WITH RVR (HCC): ICD-10-CM

## 2022-06-15 PROCEDURE — 99214 OFFICE O/P EST MOD 30 MIN: CPT | Performed by: INTERNAL MEDICINE

## 2022-06-15 PROCEDURE — 93000 ELECTROCARDIOGRAM COMPLETE: CPT | Performed by: INTERNAL MEDICINE

## 2022-06-15 NOTE — PROGRESS NOTES
Cardiology Follow Up    Liza Montelongo  1945  [id number]  [address] [facility]  6 [address] [place] [address] [address]    1  Nonrheumatic mitral valve regurgitation  POCT ECG    Echo follow up/limited w/ contrast if indicated   2  Aneurysm of ascending aorta (HCC)     3  Atrial fibrillation with RVR ([address] )     4  Benign essential hypertension     5  Bilateral popliteal artery aneurysm ([address] )     6  Primary hypertension     7  Paroxysmal atrial fibrillation (HCC)     8  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[address] )     9  Pulmonary hypertension ([address] )         Discussion/Summary:   He has been maintaining sinus rhythm since the last ablation  He does have some generalized fatigue but no signs decompensated heart failure  Last echocardiogram and transesophageal echocardiogram suggested moderate mitral regurgitation will plan a limited follow-up in 6 months  Blood pressure is well controlled  The patient has come off of anticoagulation on his own I counseled him against this he does not like to take many medications  He has a history of paroxysmal atrial fibrillation as well as lower extremity DVT  He understands the risks  Ascending aortic aneurysm followed by CT surgery recent dimensions 4 6 cm  Interval History:   41-year-old gentleman with a history of paroxysmal atrial fibrillation, hypertension, dyslipidemia, bilateral popliteal artery aneurysm status post repair  History of acute DVT  He has been doing quite well since her last office appointment  There was some atypical chest pain a couple of months ago nuclear stress test showed no ischemia  Overall he has been doing well since his last visit  He underwent AFib ablation with good success he is now maintaining sinus rhythm  Functionally he is doing well he has some mild generalized fatigue but he attributes this to the recent procedure    He did have some bruising and he came off of the anticoagulation he would rather take fish oil and tends to have more of a homeopathic approach to medicine  He understands the risk of doing so  Denies any exertional chest pain or discomfort  There has been no anginal sounding pain denies any lower extremity claudication  There has been no lower extremity edema, PND, orthopnea    Problem List     PAF (paroxysmal atrial fibrillation) (HCC)    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HCC) (Chronic)    Hypertension    Acute DVT (deep venous thrombosis) (HCC)    A-fib ([address] )    Benign essential hypertension    Overview Signed 6/29/2018  8:02 AM by Dean Andrade DO     Overview:   dx 2002         Mitral valve insufficiency    Bilateral popliteal artery aneurysm (HCC)    Paroxysmal atrial fibrillation (HCC)    Mixed hyperlipidemia        Past Medical History:   Diagnosis Date    Cardiac disease     Hypertension     PAF (paroxysmal atrial fibrillation) (HCC)     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HCC)     BILATERALLY     Social History     Socioeconomic History    Marital status: /Civil Union     Spouse name: Not on file    Number of children: Not on file    Years of education: Not on file    Highest education level: Not on file   Occupational History    Not on file   Tobacco Use    Smoking status: Never Smoker    Smokeless tobacco: Never Used   Vaping Use    Vaping Use: Never used   Substance and Sexual Activity    Alcohol use: Not Currently    Drug use: No    Sexual activity: Not on file   Other Topics Concern    Not on file   Social History Narrative    Not on file     Social Determinants of Health     Financial Resource Strain: Not on file   Food Insecurity: Not on file   Transportation Needs: Not on file   Physical Activity: Not on file   Stress: Not on file   Social Connections: Not on file   Intimate Partner Violence: Not on file   Housing Stability: Not on file      Family History   Problem Relation Age of Onset    Pancreatic cancer Mother    Bob Bones Stroke Father     Heart attack Paternal Grandmother      Past Surgical History:   Procedure Laterality Date    CARDIAC ELECTROPHYSIOLOGY PROCEDURE N/A 3/25/2022    Procedure: Cardiac eps/afib ablation;  Surgeon: Neema Nair MD;  [facility]; Service: Cardiology    ESOPHAGOGASTRODUODENOSCOPY N/A 8/16/2016    Procedure: ESOPHAGOGASTRODUODENOSCOPY (EGD); Surgeon: Sarah Adams MD;  [facility];   Service:     HERNIA REPAIR Left     groin    HERNIA REPAIR Right 1995    OTHER SURGICAL HISTORY      REPLACEMENT TOTAL KNEE Left 04/01/2013    VASCULAR SURGERY Right     POPLITEAL BYPASS DUE TO ANEURYSM       Current Outpatient Medications:     ascorbic acid (VITAMIN C) 500 mg tablet, Take 500 mg by mouth daily, Disp: , Rfl:     Coenzyme Q10 200 MG capsule, Take 200 mg by mouth daily  , Disp: , Rfl:     COLLAGEN PO, Take 1 tablet by mouth, Disp: , Rfl:     Flaxseed, Linseed, (FLAXSEED OIL) 1000 MG CAPS, Take 1 capsule by mouth daily  , Disp: , Rfl:     Liver Extract 500 MG CAPS, Take 1 capsule by mouth daily, Disp: , Rfl:     Magnesium Citrate 125 MG CAPS, Take 1 capsule by mouth daily, Disp: , Rfl:     metoprolol tartrate (LOPRESSOR) 25 mg tablet, Take 1 tablet (25 mg total) by mouth every 12 (twelve) hours (Patient taking differently: Take 25 mg by mouth every 12 (twelve) hours Patient only taking 1 time a day), Disp: 60 tablet, Rfl: 0    Pancreatin 325 MG TABS, Take 1 tablet by mouth daily, Disp: , Rfl:     pantoprazole (PROTONIX) 40 mg tablet, TAKE 1 TABLET (40 MG TOTAL) BY MOUTH DAILY IN THE EARLY MORNING, Disp: 30 tablet, Rfl: 0    Probiotic Product (PROBIOTIC-10 PO), Take 1 capsule by mouth daily, Disp: , Rfl:     Vitamin D, Cholecalciferol, 1000 units CAPS, Take 2,000 Units by mouth daily, Disp: , Rfl:     apixaban (ELIQUIS) 5 mg, Take 1 tablet (5 mg total) by mouth 2 (two) times a day (Patient not taking: Reported on 6/15/2022), Disp: 180 tablet, Rfl: 3    Green Tea 250 MG CAPS, Take 1 capsule by mouth daily (Patient not taking: Reported on 6/15/2022), Disp: , Rfl:   Allergies   Allergen Reactions    Oxycodone GI Intolerance     nausea  nausea       Labs:     Chemistry        Component Value Date/Time    K 4 5 03/26/2022 0355     (H) 03/26/2022 0355    CO2 28 03/26/2022 0355    BUN 18 03/26/2022 0355    CREATININE 0 78 03/26/2022 0355        Component Value Date/Time    CALCIUM 8 5 03/26/2022 0355    ALKPHOS 86 03/25/2022 1201    AST 21 03/25/2022 1201    ALT 28 03/25/2022 1201            No results found for: CHOL  No results found for: HDL  No results found for: LDLCALC  No results found for: TRIG  No results found for: CHOLHDL    Imaging: No results found  ECG:    Normal sinus rhythm       ROS    Vitals:    06/15/22 1529   BP: 104/58   Pulse: 70   SpO2: 97%     Vitals:    06/15/22 1529   Weight: 109 kg (241 lb)     Height: 6' 6" (198 1 cm)   Body mass index is 27 85 kg/m²  Physical Exam:  Vital signs reviewed  General:  Alert and cooperative, appears stated age, no acute distress  HEENT:  PERRLA, EOMI, no scleral icterus, no conjunctival pallor  Neck:  No lymphadenopathy, no thyromegaly, no carotid bruits, no elevated JVP  Heart:  Regular rate and rhythm, normal S1/S2, no S3/S4, no murmur, rubs or gallops  PMI nondisplaced  Lungs:  Clear to auscultation bilaterally, no wheezes rales or rhonchi  Abdomen:  Soft, non-tender, positive bowel sounds, no rebound or guarding,   no organomegaly   Extremities:  Normal range of motion    No clubbing, cyanosis or edema   Vascular:  2+ pedal pulses  Skin:  No rashes or lesions on exposed skin  Neurologic:  Cranial nerves II-XII grossly intact without focal deficits  Psych:  Normal mood and affect

## 2022-08-01 ENCOUNTER — ESTABLISHED COMPREHENSIVE EXAM (OUTPATIENT)
Dept: URBAN - METROPOLITAN AREA CLINIC 6 | Facility: CLINIC | Age: 77
End: 2022-08-01

## 2022-08-01 DIAGNOSIS — H43.393: ICD-10-CM

## 2022-08-01 DIAGNOSIS — Z96.1: ICD-10-CM

## 2022-08-01 DIAGNOSIS — H04.123: ICD-10-CM

## 2022-08-01 PROCEDURE — 92014 COMPRE OPH EXAM EST PT 1/>: CPT

## 2022-08-01 ASSESSMENT — TONOMETRY
OD_IOP_MMHG: 15
OS_IOP_MMHG: 13

## 2022-08-01 ASSESSMENT — VISUAL ACUITY
OU_SC: J1
OS_SC: 20/25
OD_SC: 20/20+1

## 2022-09-19 ENCOUNTER — HOSPITAL ENCOUNTER (OUTPATIENT)
Dept: NON INVASIVE DIAGNOSTICS | Facility: CLINIC | Age: 77
Discharge: HOME | End: 2022-09-19
Payer: MEDICARE

## 2022-09-19 VITALS
BODY MASS INDEX: 27.88 KG/M2 | WEIGHT: 241 LBS | HEIGHT: 78 IN | SYSTOLIC BLOOD PRESSURE: 104 MMHG | DIASTOLIC BLOOD PRESSURE: 58 MMHG | HEART RATE: 70 BPM

## 2022-09-19 DIAGNOSIS — I34.0 NONRHEUMATIC MITRAL VALVE REGURGITATION: ICD-10-CM

## 2022-09-19 LAB
APICAL FOUR CHAMBER EJECTION FRACTION: 55 %
MITRAL REGURGITATION PEAK VELOCITY: 5.18 M/S
MITRAL VALVE MEAN INFLOW VELOCITY: 4.16 M/S
MITRAL VALVE REGURGITANT PEAK GRADIENT: 107 MMHG
MV EROA: 0.1 CM2
PA SYSTOLIC PRESSURE: 38 MMHG
PISA MRMAX VEL: 0.3 M/S
PISA RADIUS: 0.6 CM
SL CV DOP CALC MV VTI RETROGRADE: 159.2 CM
SL CV LV EF: 60
SL CV MV MEAN GRADIENT RETROGRADE: 76 MMHG
TR MAX PG: 37 MMHG
TR PEAK VELOCITY: 3 M/S
TRICUSPID VALVE PEAK REGURGITATION VELOCITY: 3.03 M/S

## 2022-09-19 PROCEDURE — 93325 DOPPLER ECHO COLOR FLOW MAPG: CPT | Performed by: INTERNAL MEDICINE

## 2022-09-19 PROCEDURE — 93308 TTE F-UP OR LMTD: CPT | Performed by: INTERNAL MEDICINE

## 2022-09-19 PROCEDURE — 93308 TTE F-UP OR LMTD: CPT

## 2022-09-19 PROCEDURE — 93321 DOPPLER ECHO F-UP/LMTD STD: CPT

## 2022-09-19 PROCEDURE — 93325 DOPPLER ECHO COLOR FLOW MAPG: CPT

## 2022-09-19 PROCEDURE — 93321 DOPPLER ECHO F-UP/LMTD STD: CPT | Performed by: INTERNAL MEDICINE

## 2022-11-03 ENCOUNTER — OFFICE VISIT (OUTPATIENT)
Dept: CARDIOLOGY CLINIC | Facility: CLINIC | Age: 77
End: 2022-11-03

## 2022-11-03 ENCOUNTER — TELEPHONE (OUTPATIENT)
Dept: NON INVASIVE DIAGNOSTICS | Facility: HOSPITAL | Age: 77
End: 2022-11-03

## 2022-11-03 VITALS
SYSTOLIC BLOOD PRESSURE: 138 MMHG | HEIGHT: 78 IN | DIASTOLIC BLOOD PRESSURE: 72 MMHG | WEIGHT: 246 LBS | BODY MASS INDEX: 28.46 KG/M2 | HEART RATE: 97 BPM

## 2022-11-03 DIAGNOSIS — I71.21 ANEURYSM OF ASCENDING AORTA WITHOUT RUPTURE: ICD-10-CM

## 2022-11-03 DIAGNOSIS — I48.0 PAROXYSMAL ATRIAL FIBRILLATION (HCC): ICD-10-CM

## 2022-11-03 DIAGNOSIS — I10 PRIMARY HYPERTENSION: ICD-10-CM

## 2022-11-03 DIAGNOSIS — I72.4 POPLITEAL ANEURYSM (HCC): Chronic | ICD-10-CM

## 2022-11-03 DIAGNOSIS — E78.2 MIXED HYPERLIPIDEMIA: ICD-10-CM

## 2022-11-03 DIAGNOSIS — I34.0 NONRHEUMATIC MITRAL VALVE REGURGITATION: ICD-10-CM

## 2022-11-03 DIAGNOSIS — I48.91 ATRIAL FIBRILLATION WITH RVR (HCC): Primary | ICD-10-CM

## 2022-11-03 RX ORDER — CLINDAMYCIN HYDROCHLORIDE 300 MG/1
CAPSULE ORAL
COMMUNITY
Start: 2022-10-18

## 2022-11-03 NOTE — PROGRESS NOTES
Cardiology Follow Up    Kelsey Husain  1945  [id number]  [address] [facility]  [address] [facility] [address]    1  Atrial fibrillation with RVR (HCC)  POCT ECG    RANDALL    Cardioversion   2  Primary hypertension     3  Nonrheumatic mitral valve regurgitation  POCT ECG    RANDALL    Cardioversion   4  Paroxysmal atrial fibrillation (HCC)     5  Aneurysm of ascending aorta without rupture     6  Mixed hyperlipidemia     7  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HCC)         Discussion/Summary:    Unfortunately he is back into atrial fibrillation  I stressed the importance of taking his Eliquis he is going to restarted tonight  I feel mitral valve disease may have progressed it was moderate to severe earlier this year a going to perform a transesophageal echocardiogram to assess mitral valve pathology and severity of regurgitation we can attempt a cardioversion at that point time as well  His mitral valve has progressed will consider referral for surgical repair, Maze, ascending aneurysm repair as well  Will need to follow-up with CT surgery after their visit in the coming weeks  For now resume anticoagulation increase beta-blocker  Patient does not want to take many medications  Further recommendations will be made following the RANDALL    Interval History:   59-year-old gentleman with a history of paroxysmal atrial fibrillation, hypertension, dyslipidemia, bilateral popliteal artery aneurysm status post repair  History of acute DVT  He has been doing quite well since her last office appointment  There was some atypical chest pain a couple of months ago nuclear stress test showed no ischemia  Overall he has been doing well since his last visit  He underwent AFib ablation with good success he is now maintaining sinus rhythm    Functionally he is doing well he has some mild generalized fatigue but he attributes this to the recent procedure  He did have some bruising and he came off of the anticoagulation he would rather take fish oil and tends to have more of a homeopathic approach to medicine  He understands the risk of doing so  unfortunately he is back in atrial fibrillation he has a decreased functional capacity and dyspnea for the last couple of months  He is not taking anticoagulation  Denies any PND orthopnea  There has been some trace lower extremity edema  We talked about progression of mitral valve disease as well as his underlying aneurysm plans for future treatment    Problem List     PAF (paroxysmal atrial fibrillation) (HCC)    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HCC) (Chronic)    Hypertension    Acute DVT (deep venous thrombosis) (HCC)    A-fib ([facility] )    Benign essential hypertension    Overview Signed 6/29/2018  8:02 AM by Debbie To DO     Overview:   dx 2002         Mitral valve insufficiency    Bilateral popliteal artery aneurysm (HCC)    Paroxysmal atrial fibrillation (HCC)    Mixed hyperlipidemia        Past Medical History:   Diagnosis Date   • Cardiac disease    • Hypertension    • PAF (paroxysmal atrial fibrillation) (HCC)    • 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HCC)     BILATERALLY     Social History     Socioeconomic History   • Marital status: /Civil Union     Spouse name: Not on file   • Number of children: Not on file   • Years of education: Not on file   • Highest education level: Not on file   Occupational History   • Not on file   Tobacco Use   • Smoking status: Never Smoker   • Smokeless tobacco: Never Used   Vaping Use   • Vaping Use: Never used   Substance and Sexual Activity   • Alcohol use: Not Currently   • Drug use: No   • Sexual activity: Not on file   Other Topics Concern   • Not on file   Social History Narrative   • Not on file     Social Determinants of Health     Financial Resource Strain: Not on file   Food Insecurity: Not on file   Transportation Needs: Not on file   Physical Activity: Not on file   Stress: Not on file   Social Connections: Not on file   Intimate Partner Violence: Not on file   Housing Stability: Not on file      Family History   Problem Relation Age of Onset   • Pancreatic cancer Mother    • Stroke Father    • Heart attack Paternal Grandmother      Past Surgical History:   Procedure Laterality Date   • CARDIAC ELECTROPHYSIOLOGY PROCEDURE N/A 3/25/2022    Procedure: Cardiac eps/afib ablation;  Surgeon: Krupa Dave MD;  [facility]; Service: Cardiology   • ESOPHAGOGASTRODUODENOSCOPY N/A 8/16/2016    Procedure: ESOPHAGOGASTRODUODENOSCOPY (EGD); Surgeon: Jaime Toney MD;  [facility];   Service:    • HERNIA REPAIR Left     groin   • HERNIA REPAIR Right 1995   • OTHER SURGICAL HISTORY     • REPLACEMENT TOTAL KNEE Left 04/01/2013   • VASCULAR SURGERY Right     POPLITEAL BYPASS DUE TO ANEURYSM       Current Outpatient Medications:   •  ascorbic acid (VITAMIN C) 500 mg tablet, Take 500 mg by mouth daily, Disp: , Rfl:   •  clindamycin (CLEOCIN) 300 MG capsule, TAKE 2 CAPSULES BY MOUTH 1 HOUR PRIOR TO DENTAL APPOINTMENT, Disp: , Rfl:   •  Coenzyme Q10 200 MG capsule, Take 200 mg by mouth daily  , Disp: , Rfl:   •  COLLAGEN PO, Take 1 tablet by mouth, Disp: , Rfl:   •  Flaxseed, Linseed, (FLAXSEED OIL) 1000 MG CAPS, Take 1 capsule by mouth daily  , Disp: , Rfl:   •  Liver Extract 500 MG CAPS, Take 1 capsule by mouth daily, Disp: , Rfl:   •  Magnesium Citrate 125 MG CAPS, Take 1 capsule by mouth daily, Disp: , Rfl:   •  metoprolol tartrate (LOPRESSOR) 25 mg tablet, Take 1 tablet (25 mg total) by mouth every 12 (twelve) hours (Patient taking differently: Take 25 mg by mouth every 12 (twelve) hours Patient only taking 1 time a day), Disp: 60 tablet, Rfl: 0  •  Pancreatin 325 MG TABS, Take 1 tablet by mouth daily, Disp: , Rfl:   •  pantoprazole (PROTONIX) 40 mg tablet, TAKE 1 TABLET (40 MG TOTAL) BY MOUTH DAILY IN THE EARLY MORNING (Patient taking differently: Take 40 mg by mouth as needed), Disp: 30 tablet, Rfl: 0  •  Probiotic Product (PROBIOTIC-10 PO), Take 1 capsule by mouth daily, Disp: , Rfl:   •  Vitamin D, Cholecalciferol, 1000 units CAPS, Take 2,000 Units by mouth daily, Disp: , Rfl:   •  apixaban (ELIQUIS) 5 mg, Take 1 tablet (5 mg total) by mouth 2 (two) times a day (Patient not taking: No sig reported), Disp: 180 tablet, Rfl: 3  •  Green Tea 250 MG CAPS, Take 1 capsule by mouth daily (Patient not taking: No sig reported), Disp: , Rfl:   Allergies   Allergen Reactions   • Oxycodone GI Intolerance     nausea  nausea       Labs:     Chemistry        Component Value Date/Time    K 4 5 03/26/2022 0355     (H) 03/26/2022 0355    CO2 28 03/26/2022 0355    BUN 18 03/26/2022 0355    CREATININE 0 78 03/26/2022 0355        Component Value Date/Time    CALCIUM 8 5 03/26/2022 0355    ALKPHOS 86 03/25/2022 1201    AST 21 03/25/2022 1201    ALT 28 03/25/2022 1201            No results found for: CHOL  No results found for: HDL  No results found for: LDLCALC  No results found for: TRIG  No results found for: CHOLHDL    Imaging: No results found  ECG:    Atrial fibrillation nonspecific ST and T changes      ROS    Vitals:    11/03/22 0830   BP: 138/72   Pulse: 97     Vitals:    11/03/22 0830   Weight: 112 kg (246 lb)     Height: 6' 6" (198 1 cm)   Body mass index is 28 43 kg/m²  Physical Exam:  Vital signs reviewed  General:  Alert and cooperative, appears stated age, no acute distress  HEENT:  PERRLA, EOMI, no scleral icterus, no conjunctival pallor  Neck:  No lymphadenopathy, no thyromegaly, no carotid bruits, no elevated JVP  Heart:  irregular rate and rhythm, normal S1/S2, no S3/S4, no murmur, rubs or gallops  PMI nondisplaced  Lungs:  Clear to auscultation bilaterally, no wheezes rales or rhonchi  Abdomen:  Soft, non-tender, positive bowel sounds, no rebound or guarding,   no organomegaly   Extremities:  Normal range of motion    No clubbing, cyanosis or edema   Vascular:  2+ pedal pulses  Skin:  No rashes or lesions on exposed skin  Neurologic:  Cranial nerves II-XII grossly intact without focal deficits  Psych:  Normal mood and affect

## 2022-11-03 NOTE — H&P (VIEW-ONLY)
Cardiology Follow Up    Tony Mcleod  1945  [id number]  [address] [facility]  [address] [facility] [address]    1  Atrial fibrillation with RVR (HCC)  POCT ECG    RANDALL    Cardioversion   2  Primary hypertension     3  Nonrheumatic mitral valve regurgitation  POCT ECG    RANDALL    Cardioversion   4  Paroxysmal atrial fibrillation (HCC)     5  Aneurysm of ascending aorta without rupture     6  Mixed hyperlipidemia     7  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HCC)         Discussion/Summary:    Unfortunately he is back into atrial fibrillation  I stressed the importance of taking his Eliquis he is going to restarted tonight  I feel mitral valve disease may have progressed it was moderate to severe earlier this year a going to perform a transesophageal echocardiogram to assess mitral valve pathology and severity of regurgitation we can attempt a cardioversion at that point time as well  His mitral valve has progressed will consider referral for surgical repair, Maze, ascending aneurysm repair as well  Will need to follow-up with CT surgery after their visit in the coming weeks  For now resume anticoagulation increase beta-blocker  Patient does not want to take many medications  Further recommendations will be made following the RANDALL    Interval History:   74-year-old gentleman with a history of paroxysmal atrial fibrillation, hypertension, dyslipidemia, bilateral popliteal artery aneurysm status post repair  History of acute DVT  He has been doing quite well since her last office appointment  There was some atypical chest pain a couple of months ago nuclear stress test showed no ischemia  Overall he has been doing well since his last visit  He underwent AFib ablation with good success he is now maintaining sinus rhythm    Functionally he is doing well he has some mild generalized fatigue but he attributes this to the recent procedure  He did have some bruising and he came off of the anticoagulation he would rather take fish oil and tends to have more of a homeopathic approach to medicine  He understands the risk of doing so  unfortunately he is back in atrial fibrillation he has a decreased functional capacity and dyspnea for the last couple of months  He is not taking anticoagulation  Denies any PND orthopnea  There has been some trace lower extremity edema  We talked about progression of mitral valve disease as well as his underlying aneurysm plans for future treatment    Problem List     PAF (paroxysmal atrial fibrillation) (HCC)    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HCC) (Chronic)    Hypertension    Acute DVT (deep venous thrombosis) (HCC)    A-fib ([facility] )    Benign essential hypertension    Overview Signed 6/29/2018  8:02 AM by Sandra Flores DO     Overview:   dx 2002         Mitral valve insufficiency    Bilateral popliteal artery aneurysm (HCC)    Paroxysmal atrial fibrillation (HCC)    Mixed hyperlipidemia        Past Medical History:   Diagnosis Date   • Cardiac disease    • Hypertension    • PAF (paroxysmal atrial fibrillation) (HCC)    • 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HCC)     BILATERALLY     Social History     Socioeconomic History   • Marital status: /Civil Union     Spouse name: Not on file   • Number of children: Not on file   • Years of education: Not on file   • Highest education level: Not on file   Occupational History   • Not on file   Tobacco Use   • Smoking status: Never Smoker   • Smokeless tobacco: Never Used   Vaping Use   • Vaping Use: Never used   Substance and Sexual Activity   • Alcohol use: Not Currently   • Drug use: No   • Sexual activity: Not on file   Other Topics Concern   • Not on file   Social History Narrative   • Not on file     Social Determinants of Health     Financial Resource Strain: Not on file   Food Insecurity: Not on file   Transportation Needs: Not on file   Physical Activity: Not on file   Stress: Not on file   Social Connections: Not on file   Intimate Partner Violence: Not on file   Housing Stability: Not on file      Family History   Problem Relation Age of Onset   • Pancreatic cancer Mother    • Stroke Father    • Heart attack Paternal Grandmother      Past Surgical History:   Procedure Laterality Date   • CARDIAC ELECTROPHYSIOLOGY PROCEDURE N/A 3/25/2022    Procedure: Cardiac eps/afib ablation;  Surgeon: Jean Carlos Basilio MD;  [facility]; Service: Cardiology   • ESOPHAGOGASTRODUODENOSCOPY N/A 8/16/2016    Procedure: ESOPHAGOGASTRODUODENOSCOPY (EGD); Surgeon: Estella Malcolm MD;  [facility];   Service:    • HERNIA REPAIR Left     groin   • HERNIA REPAIR Right 1995   • OTHER SURGICAL HISTORY     • REPLACEMENT TOTAL KNEE Left 04/01/2013   • VASCULAR SURGERY Right     POPLITEAL BYPASS DUE TO ANEURYSM       Current Outpatient Medications:   •  ascorbic acid (VITAMIN C) 500 mg tablet, Take 500 mg by mouth daily, Disp: , Rfl:   •  clindamycin (CLEOCIN) 300 MG capsule, TAKE 2 CAPSULES BY MOUTH 1 HOUR PRIOR TO DENTAL APPOINTMENT, Disp: , Rfl:   •  Coenzyme Q10 200 MG capsule, Take 200 mg by mouth daily  , Disp: , Rfl:   •  COLLAGEN PO, Take 1 tablet by mouth, Disp: , Rfl:   •  Flaxseed, Linseed, (FLAXSEED OIL) 1000 MG CAPS, Take 1 capsule by mouth daily  , Disp: , Rfl:   •  Liver Extract 500 MG CAPS, Take 1 capsule by mouth daily, Disp: , Rfl:   •  Magnesium Citrate 125 MG CAPS, Take 1 capsule by mouth daily, Disp: , Rfl:   •  metoprolol tartrate (LOPRESSOR) 25 mg tablet, Take 1 tablet (25 mg total) by mouth every 12 (twelve) hours (Patient taking differently: Take 25 mg by mouth every 12 (twelve) hours Patient only taking 1 time a day), Disp: 60 tablet, Rfl: 0  •  Pancreatin 325 MG TABS, Take 1 tablet by mouth daily, Disp: , Rfl:   •  pantoprazole (PROTONIX) 40 mg tablet, TAKE 1 TABLET (40 MG TOTAL) BY MOUTH DAILY IN THE EARLY MORNING (Patient taking differently: Take 40 mg by mouth as needed), Disp: 30 tablet, Rfl: 0  •  Probiotic Product (PROBIOTIC-10 PO), Take 1 capsule by mouth daily, Disp: , Rfl:   •  Vitamin D, Cholecalciferol, 1000 units CAPS, Take 2,000 Units by mouth daily, Disp: , Rfl:   •  apixaban (ELIQUIS) 5 mg, Take 1 tablet (5 mg total) by mouth 2 (two) times a day (Patient not taking: No sig reported), Disp: 180 tablet, Rfl: 3  •  Green Tea 250 MG CAPS, Take 1 capsule by mouth daily (Patient not taking: No sig reported), Disp: , Rfl:   Allergies   Allergen Reactions   • Oxycodone GI Intolerance     nausea  nausea       Labs:     Chemistry        Component Value Date/Time    K 4 5 03/26/2022 0355     (H) 03/26/2022 0355    CO2 28 03/26/2022 0355    BUN 18 03/26/2022 0355    CREATININE 0 78 03/26/2022 0355        Component Value Date/Time    CALCIUM 8 5 03/26/2022 0355    ALKPHOS 86 03/25/2022 1201    AST 21 03/25/2022 1201    ALT 28 03/25/2022 1201            No results found for: CHOL  No results found for: HDL  No results found for: LDLCALC  No results found for: TRIG  No results found for: CHOLHDL    Imaging: No results found  ECG:    Atrial fibrillation nonspecific ST and T changes      ROS    Vitals:    11/03/22 0830   BP: 138/72   Pulse: 97     Vitals:    11/03/22 0830   Weight: 112 kg (246 lb)     Height: 6' 6" (198 1 cm)   Body mass index is 28 43 kg/m²  Physical Exam:  Vital signs reviewed  General:  Alert and cooperative, appears stated age, no acute distress  HEENT:  PERRLA, EOMI, no scleral icterus, no conjunctival pallor  Neck:  No lymphadenopathy, no thyromegaly, no carotid bruits, no elevated JVP  Heart:  irregular rate and rhythm, normal S1/S2, no S3/S4, no murmur, rubs or gallops  PMI nondisplaced  Lungs:  Clear to auscultation bilaterally, no wheezes rales or rhonchi  Abdomen:  Soft, non-tender, positive bowel sounds, no rebound or guarding,   no organomegaly   Extremities:  Normal range of motion    No clubbing, cyanosis or edema   Vascular:  2+ pedal pulses  Skin:  No rashes or lesions on exposed skin  Neurologic:  Cranial nerves II-XII grossly intact without focal deficits  Psych:  Normal mood and affect

## 2022-11-03 NOTE — TELEPHONE ENCOUNTER
Left VM for PT to call back to schedule his RANDALL/CV at [facility] AND [facility]

## 2022-11-09 ENCOUNTER — APPOINTMENT (OUTPATIENT)
Dept: LAB | Facility: MEDICAL CENTER | Age: 77
End: 2022-11-09

## 2022-11-09 DIAGNOSIS — I71.21 ANEURYSM OF ASCENDING AORTA WITHOUT RUPTURE: ICD-10-CM

## 2022-11-09 DIAGNOSIS — I10 PRIMARY HYPERTENSION: ICD-10-CM

## 2022-11-09 DIAGNOSIS — I71.21 ANEURYSM OF ASCENDING AORTA: ICD-10-CM

## 2022-11-09 DIAGNOSIS — I71.21 ANEURYSM OF ASCENDING AORTA WITHOUT RUPTURE (HCC): Primary | ICD-10-CM

## 2022-11-09 LAB
ANION GAP SERPL CALCULATED.3IONS-SCNC: 2 MMOL/L (ref 4–13)
BUN SERPL-MCNC: 21 MG/DL (ref 5–25)
CALCIUM SERPL-MCNC: 9.8 MG/DL (ref 8.3–10.1)
CHLORIDE SERPL-SCNC: 108 MMOL/L (ref 96–108)
CO2 SERPL-SCNC: 30 MMOL/L (ref 21–32)
CREAT SERPL-MCNC: 0.85 MG/DL (ref 0.6–1.3)
GFR SERPL CREATININE-BSD FRML MDRD: 83 ML/MIN/1.73SQ M
GLUCOSE SERPL-MCNC: 82 MG/DL (ref 65–140)
POTASSIUM SERPL-SCNC: 4.1 MMOL/L (ref 3.5–5.3)
SODIUM SERPL-SCNC: 140 MMOL/L (ref 135–147)

## 2022-11-11 ENCOUNTER — HOSPITAL ENCOUNTER (OUTPATIENT)
Dept: NON INVASIVE DIAGNOSTICS | Facility: HOSPITAL | Age: 77
Discharge: HOME | End: 2022-11-11

## 2022-11-11 ENCOUNTER — HOSPITAL ENCOUNTER (OUTPATIENT)
Dept: RADIOLOGY | Facility: HOSPITAL | Age: 77
Discharge: HOME | End: 2022-11-11

## 2022-11-11 VITALS
WEIGHT: 246 LBS | SYSTOLIC BLOOD PRESSURE: 138 MMHG | BODY MASS INDEX: 28.46 KG/M2 | DIASTOLIC BLOOD PRESSURE: 72 MMHG | HEIGHT: 78 IN | HEART RATE: 80 BPM

## 2022-11-11 DIAGNOSIS — I34.0 NONRHEUMATIC MITRAL VALVE REGURGITATION: ICD-10-CM

## 2022-11-11 DIAGNOSIS — Z01.810 PRE-OPERATIVE CARDIOVASCULAR EXAMINATION: ICD-10-CM

## 2022-11-11 DIAGNOSIS — I48.91 ATRIAL FIBRILLATION WITH RVR (HCC): ICD-10-CM

## 2022-11-11 DIAGNOSIS — I71.21 ANEURYSM OF ASCENDING AORTA: ICD-10-CM

## 2022-11-11 DIAGNOSIS — I72.4 BILATERAL POPLITEAL ARTERY ANEURYSM (HCC): ICD-10-CM

## 2022-11-11 DIAGNOSIS — I10 PRIMARY HYPERTENSION: ICD-10-CM

## 2022-11-11 LAB
AORTIC ROOT: 3.6 CM
APICAL FOUR CHAMBER EJECTION FRACTION: 52 %
AV PEAK GRADIENT: 81.6 MMHG
AV REGURGITATION PRESSURE HALF TIME: 475 MS
DOP CALC AO PEAK VEL: 4.5 M/S
FRACTIONAL SHORTENING: 30 % (ref 28–44)
INTERVENTRICULAR SEPTUM IN DIASTOLE (PARASTERNAL SHORT AXIS VIEW): 1.3 CM
INTERVENTRICULAR SEPTUM: 1.3 CM (ref 0.6–1.1)
LAAS-AP2: 22.7 CM2
LAAS-AP4: 33.2 CM2
LEFT ATRIUM AREA SYSTOLE SINGLE PLANE A4C: 32.9 CM2
LEFT ATRIUM SIZE: 4.6 CM
LEFT INTERNAL DIMENSION IN SYSTOLE: 3.7 CM (ref 2.1–4)
LEFT VENTRICULAR INTERNAL DIMENSION IN DIASTOLE: 5.3 CM (ref 3.5–6)
LEFT VENTRICULAR POSTERIOR WALL IN END DIASTOLE: 1.3 CM
LEFT VENTRICULAR STROKE VOLUME: 79 ML
LVSV (TEICH): 79 ML
MITRAL REGURGITATION PEAK VELOCITY: 5.45 M/S
MITRAL VALVE MEAN INFLOW VELOCITY: 4.07 M/S
MITRAL VALVE REGURGITANT PEAK GRADIENT: 119 MMHG
MV E'TISSUE VEL-SEP: 7 CM/S
RA PRESSURE ESTIMATED: 15 MMHG
RIGHT ATRIUM AREA SYSTOLE A4C: 34.2 CM2
RIGHT VENTRICLE ID DIMENSION: 4.2 CM
RV PSP: 65 MMHG
SL CV AV DECELERATION TIME RETROGRADE: 1637 MS
SL CV AV PEAK GRADIENT RETROGRADE: 84 MMHG
SL CV DOP CALC MV VTI RETROGRADE: 158.1 CM
SL CV LEFT ATRIUM LENGTH A2C: 5.1 CM
SL CV LV EF: 60
SL CV MV MEAN GRADIENT RETROGRADE: 77 MMHG
SL CV PED ECHO LEFT VENTRICLE DIASTOLIC VOLUME (MOD BIPLANE) 2D: 136 ML
SL CV PED ECHO LEFT VENTRICLE SYSTOLIC VOLUME (MOD BIPLANE) 2D: 57 ML
TR MAX PG: 50 MMHG
TR PEAK VELOCITY: 3.5 M/S
TRICUSPID VALVE PEAK REGURGITATION VELOCITY: 3.54 M/S

## 2022-11-11 RX ADMIN — IOHEXOL 100 ML: 350 INJECTION, SOLUTION INTRAVENOUS at 08:11

## 2022-11-17 ENCOUNTER — ANESTHESIA EVENT (OUTPATIENT)
Dept: NON INVASIVE DIAGNOSTICS | Facility: HOSPITAL | Age: 77
End: 2022-11-17

## 2022-11-18 ENCOUNTER — OFFICE VISIT (OUTPATIENT)
Dept: CARDIAC SURGERY | Facility: CLINIC | Age: 77
End: 2022-11-18

## 2022-11-18 ENCOUNTER — HOSPITAL ENCOUNTER (OUTPATIENT)
Dept: NON INVASIVE DIAGNOSTICS | Facility: HOSPITAL | Age: 77
Discharge: HOME | End: 2022-11-18

## 2022-11-18 ENCOUNTER — ANESTHESIA (OUTPATIENT)
Dept: NON INVASIVE DIAGNOSTICS | Facility: HOSPITAL | Age: 77
End: 2022-11-18

## 2022-11-18 VITALS
DIASTOLIC BLOOD PRESSURE: 85 MMHG | RESPIRATION RATE: 16 BRPM | BODY MASS INDEX: 28.6 KG/M2 | HEART RATE: 86 BPM | WEIGHT: 247.2 LBS | SYSTOLIC BLOOD PRESSURE: 139 MMHG | HEIGHT: 78 IN | OXYGEN SATURATION: 100 % | TEMPERATURE: 97.8 F

## 2022-11-18 VITALS
BODY MASS INDEX: 28.58 KG/M2 | RESPIRATION RATE: 18 BRPM | DIASTOLIC BLOOD PRESSURE: 71 MMHG | WEIGHT: 247 LBS | OXYGEN SATURATION: 94 % | SYSTOLIC BLOOD PRESSURE: 109 MMHG | HEIGHT: 78 IN | TEMPERATURE: 97.9 F | HEART RATE: 73 BPM

## 2022-11-18 VITALS
DIASTOLIC BLOOD PRESSURE: 80 MMHG | WEIGHT: 247 LBS | SYSTOLIC BLOOD PRESSURE: 142 MMHG | HEIGHT: 78 IN | HEART RATE: 99 BPM | BODY MASS INDEX: 28.58 KG/M2

## 2022-11-18 DIAGNOSIS — I48.91 ATRIAL FIBRILLATION WITH RVR (HCC): ICD-10-CM

## 2022-11-18 DIAGNOSIS — I34.0 NONRHEUMATIC MITRAL VALVE REGURGITATION: ICD-10-CM

## 2022-11-18 DIAGNOSIS — I71.21 ANEURYSM OF ASCENDING AORTA WITHOUT RUPTURE: Primary | ICD-10-CM

## 2022-11-18 DIAGNOSIS — I48.0 PAROXYSMAL ATRIAL FIBRILLATION (HCC): ICD-10-CM

## 2022-11-18 LAB
AORTIC ROOT: 4.1 CM
ASCENDING AORTA: 4.5 CM
ATRIAL RATE: 105 BPM
ATRIAL RATE: 54 BPM
ATRIAL RATE: 93 BPM
AV REGURGITATION PRESSURE HALF TIME: 667 MS
MITRAL VALVE PEAK REGURGITANT INSTANTANEOUS FLOW RATE PISA: 2.1 ML/S
MV EROA: 0.4 CM2
P AXIS: 67 DEGREES
P AXIS: 70 DEGREES
PR INTERVAL: 188 MS
PR INTERVAL: 212 MS
QRS AXIS: -42 DEGREES
QRS AXIS: -46 DEGREES
QRS AXIS: -48 DEGREES
QRSD INTERVAL: 102 MS
QRSD INTERVAL: 106 MS
QRSD INTERVAL: 112 MS
QT INTERVAL: 356 MS
QT INTERVAL: 358 MS
QT INTERVAL: 432 MS
QTC INTERVAL: 409 MS
QTC INTERVAL: 445 MS
QTC INTERVAL: 514 MS
SL CV AV DECELERATION TIME RETROGRADE: 2297 MS
SL CV AV PEAK GRADIENT RETROGRADE: 71 MMHG
SL CV LV EF: 60
T WAVE AXIS: 24 DEGREES
T WAVE AXIS: 47 DEGREES
T WAVE AXIS: 62 DEGREES
TR MAX PG: 29 MMHG
TR PEAK VELOCITY: 2.7 M/S
VENTRICULAR RATE: 124 BPM
VENTRICULAR RATE: 54 BPM
VENTRICULAR RATE: 94 BPM

## 2022-11-18 RX ORDER — LIDOCAINE HYDROCHLORIDE 10 MG/ML
INJECTION, SOLUTION EPIDURAL; INFILTRATION; INTRACAUDAL; PERINEURAL AS NEEDED
Status: DISCONTINUED | OUTPATIENT
Start: 2022-11-18 | End: 2022-11-18

## 2022-11-18 RX ORDER — PROPOFOL 10 MG/ML
INJECTION, EMULSION INTRAVENOUS CONTINUOUS PRN
Status: DISCONTINUED | OUTPATIENT
Start: 2022-11-18 | End: 2022-11-18

## 2022-11-18 RX ORDER — FENTANYL CITRATE 50 UG/ML
INJECTION, SOLUTION INTRAMUSCULAR; INTRAVENOUS AS NEEDED
Status: DISCONTINUED | OUTPATIENT
Start: 2022-11-18 | End: 2022-11-18

## 2022-11-18 RX ORDER — PROPOFOL 10 MG/ML
INJECTION, EMULSION INTRAVENOUS AS NEEDED
Status: DISCONTINUED | OUTPATIENT
Start: 2022-11-18 | End: 2022-11-18

## 2022-11-18 RX ORDER — SODIUM CHLORIDE 9 MG/ML
INJECTION, SOLUTION INTRAVENOUS CONTINUOUS PRN
Status: DISCONTINUED | OUTPATIENT
Start: 2022-11-18 | End: 2022-11-18

## 2022-11-18 RX ORDER — EPHEDRINE SULFATE 50 MG/ML
INJECTION INTRAVENOUS AS NEEDED
Status: DISCONTINUED | OUTPATIENT
Start: 2022-11-18 | End: 2022-11-18

## 2022-11-18 RX ADMIN — EPHEDRINE SULFATE 5 MG: 50 INJECTION INTRAVENOUS at 12:04

## 2022-11-18 RX ADMIN — PROPOFOL 80 MG: 10 INJECTION, EMULSION INTRAVENOUS at 11:46

## 2022-11-18 RX ADMIN — FENTANYL CITRATE 50 MCG: 50 INJECTION INTRAMUSCULAR; INTRAVENOUS at 11:46

## 2022-11-18 RX ADMIN — PROPOFOL 100 MCG/KG/MIN: 10 INJECTION, EMULSION INTRAVENOUS at 11:46

## 2022-11-18 RX ADMIN — LIDOCAINE HYDROCHLORIDE 100 MG: 10 INJECTION, SOLUTION EPIDURAL; INFILTRATION; INTRACAUDAL; PERINEURAL at 11:46

## 2022-11-18 RX ADMIN — SODIUM CHLORIDE: 0.9 INJECTION, SOLUTION INTRAVENOUS at 11:46

## 2022-11-18 NOTE — ANESTHESIA PREPROCEDURE EVALUATION
Procedure:  RANDALL    Relevant Problems   CARDIO  Echo:  Left Ventricle: Left ventricular cavity size is mildly dilated  Wall thickness is normal  The left ventricular ejection fraction is 60%  Systolic function is normal  Wall motion is normal   •  Left Atrium: The atrium is severely dilated  •  Right Atrium: The atrium is dilated  •  Aortic Valve: The aortic valve is trileaflet  The leaflets are mildly thickened  The leaflets are mildly calcified  There is moderately reduced mobility  There is mild regurgitation  There is mild to moderate stenosis  •  Mitral Valve: There is moderate to severe regurgitation with an eccentrically directed jet  •  Tricuspid Valve: There is moderate to severe regurgitation  The right ventricular systolic pressure is severely elevated  The estimated right ventricular systolic pressure is 50 99 mmHg  •  Aorta: The aortic root is normal in size  The aortic root is 3 60 cm      (+) Aneurysm of ascending aorta   (+) Atrial fibrillation with RVR (HCC)   (+) Benign essential hypertension   (+) Hypertension   (+) Mitral valve insufficiency   (+) Mixed hyperlipidemia   (+) Paroxysmal atrial fibrillation (HCC)   (+) Pulmonary hypertension (HCC)      NEURO/PSYCH   (+) History of DVT (deep vein thrombosis)      Other   (+) Streptococcal bacteremia        Physical Exam    Airway    Mallampati score: II  TM Distance: >3 FB  Neck ROM: full     Dental       Cardiovascular      Pulmonary      Other Findings  Some TMJ sx      Anesthesia Plan  ASA Score- 3     Anesthesia Type- IV sedation with anesthesia with ASA Monitors  Additional Monitors:   Airway Plan:           Plan Factors-    Chart reviewed  EKG reviewed  Existing labs reviewed  Patient summary reviewed  Induction-     Postoperative Plan-     Informed Consent- Anesthetic plan and risks discussed with patient

## 2022-11-18 NOTE — PROGRESS NOTES
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 (2016-[facility]) and revision for bacteremia w/ seeding from a dental source and DJD s/p b/l joint replacements with non-helaing wound right knee  Upon interview today patient reports his has been SOB since August  He was recently seen for his cardiology f/u and was noted to be back in Afib  [facility] He had not been compliant with his Eliquis and he was instructed to resume taking it  He is scheduled for RANDALL DCCV later this AM at [address]  [facility]  Recent ECHO demonstrates mild to mod AS, mild AI, moderate to severe MR and moderate to severe TR  Patient denies chest pain, lightheadedness, palpitations  He has chronic LE edema that has been present since his b/l fem-op bypass procedures  He currently has a non-healing wound distal right knee  Patient state she had been having pain and mobility issues with his right knee, was scheduled for right knee revision surgery but cancelled after the knee improved  Shortly thereafter is when this wound appeared  He states it was draining purulent material and he was treated with antibiotics  He states the lesion has been biopsied, he does not know the results  He is being followed by the wound center and his seeing dermatology       Past Medical History:  Past Medical History:   Diagnosis Date   • Cardiac disease    • Hypertension    • PAF (paroxysmal atrial fibrillation) ([facility] [address] )    • 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[facility] [address] )     BILATERALLY         Past Surgical History:   Past Surgical History:   Procedure Laterality Date   • CARDIAC ELECTROPHYSIOLOGY PROCEDURE N/A 3/25/2022    Procedure: Cardiac eps/afib ablation;  Surgeon: Bradley Jay MD;  Location: [facility]; Service: Cardiology   • ESOPHAGOGASTRODUODENOSCOPY N/A 8/16/2016    Procedure: ESOPHAGOGASTRODUODENOSCOPY (EGD); Surgeon: Cesar Smith MD;  Location: [facility] GI LAB; Service:    • HERNIA REPAIR Left     groin   • HERNIA REPAIR Right 1995   • OTHER SURGICAL HISTORY     • REPLACEMENT TOTAL KNEE Left 04/01/2013   • VASCULAR SURGERY Right     POPLITEAL BYPASS DUE TO ANEURYSM         Family History:  Family History   Problem Relation Age of Onset   • Pancreatic cancer Mother    • Stroke Father    • Heart attack Paternal Grandmother          Social History:    Social History     Substance and Sexual Activity   Alcohol Use Not Currently     Social History     Substance and Sexual Activity   Drug Use No     Social History     Tobacco Use   Smoking Status Never   Smokeless Tobacco Never         Home Medications:   Prior to Admission medications    Medication Sig Start Date End Date Taking?  Authorizing Provider   apixaban (ELIQUIS) 5 mg Take 1 tablet (5 mg total) by mouth 2 (two) times a day 2/15/22  Yes Anirudh Brown DO   ascorbic acid (VITAMIN C) 500 mg tablet Take 500 mg by mouth daily   Yes Historical Provider, MD   Coenzyme Q10 200 MG capsule Take 200 mg by mouth daily     Yes Historical Provider, MD   COLLAGEN PO Take 1 tablet by mouth   Yes Historical Provider, MD Mar Loots Tea 250 MG CAPS Take 1 capsule by mouth daily   Yes Historical Provider, MD   Liver Extract 500 MG CAPS Take 1 capsule by mouth daily   Yes Historical Provider, MD   Magnesium Citrate 125 MG CAPS Take 1 capsule by mouth daily   Yes Historical Provider, MD   metoprolol tartrate (LOPRESSOR) 25 mg tablet Take 1 tablet (25 mg total) by mouth every 12 (twelve) hours  Patient taking differently: Take 25 mg by mouth every 12 (twelve) hours Patient only taking 1 time a day 2/23/21  Yes Bettina Bray PA-C   Pancreatin 325 MG TABS Take 1 tablet by mouth daily   Yes Historical Provider, MD   Probiotic Product (PROBIOTIC-10 PO) Take 1 capsule by mouth daily   Yes Historical Provider, MD   Vitamin D, Cholecalciferol, 1000 units CAPS Take 2,000 Units by mouth daily   Yes Historical Provider, MD   clindamycin (CLEOCIN) 300 MG capsule TAKE 2 CAPSULES BY MOUTH 1 HOUR PRIOR TO DENTAL APPOINTMENT  Patient not taking: Reported on 11/18/2022 10/18/22   Historical Provider, MD   Flaxseed, Linseed, (FLAXSEED OIL) 1000 MG CAPS Take 1 capsule by mouth daily    Patient not taking: Reported on 11/18/2022    Historical Provider, MD   pantoprazole (PROTONIX) 40 mg tablet TAKE 1 TABLET (40 MG TOTAL) BY MOUTH DAILY IN THE EARLY MORNING  Patient not taking: Reported on 11/18/2022 4/25/22   Laura William DO       Allergies:   Allergies   Allergen Reactions   • Oxycodone GI Intolerance     nausea  nausea       Review of Systems:   Review of Systems - History obtained from chart review and the patient  General ROS: negative  Psychological ROS: negative  Ophthalmic ROS: negative  ENT ROS: negative  Allergy and Immunology ROS: negative  Hematological and Lymphatic ROS: negative  Endocrine ROS: negative  Breast ROS: negative  Respiratory ROS: positive for - shortness of breath  negative for - cough, hemoptysis, orthopnea or pleuritic pain  Cardiovascular ROS: positive for - dyspnea on exertion, edema and irregular heartbeat  negative for - chest pain, loss of consciousness, orthopnea, palpitations, paroxysmal nocturnal dyspnea or rapid heart rate  Gastrointestinal ROS: no abdominal pain, change in bowel habits, or black or bloody stools  Genito-Urinary ROS: no dysuria, trouble voiding, or hematuria  Musculoskeletal ROS: positive for - joint pain  Neurological ROS: no TIA or stroke symptoms  Dermatological ROS: positive for right knee wound, non-healing    Vital Signs:   Vitals:    11/18/22 0848 11/18/22 0851   BP: 117/83 139/85   BP Location: Left arm Right arm   Patient Position: Sitting Sitting   Pulse: 86    Resp: 16    Temp: 97 8 °F (36 6 °C)    TempSrc: Tympanic    SpO2:  100%   Weight: 112 kg (247 lb 3 2 oz)    Height: 6' 6" (1 981 m)        Physical Exam:  General: Alert, oriented, well developed, no acute idstress  HEENT/NECK:  PERRLA  No jugular venous distention  Cardiac:Irregular rhythm, III/VI holosystolic murmur at apeax  Carotid arteries: 2+ pulses, no bruits  Pulmonary:  Breath sounds clear bilaterally  Abdomen:  Non-tender, Non-distended  Positive bowel sounds  Upper extremities: 2+ radial pulses; brisk capillary refill  Lower extremities: Extremities warm/dry  PT/DP pulses 1+ bilaterally  1+ edema B/L; small skin lesion distal right knee  Neuro: Alert and oriented X 3  Sensation is grossly intact  No focal deficits  Musculoskeletal: MAEE, stable gait  Skin: Warm/Dry, without rashes or lesions  Lab Results:   Lab Results   Component Value Date    WBC 5 70 03/26/2022    HGB 13 9 03/26/2022    HCT 43 1 03/26/2022    MCV 96 03/26/2022     (L) 03/26/2022     Lab Results   Component Value Date    SODIUM 140 11/09/2022    K 4 1 11/09/2022     11/09/2022    CO2 30 11/09/2022    BUN 21 11/09/2022    CREATININE 0 85 11/09/2022    GLUC 82 11/09/2022    CALCIUM 9 8 11/09/2022     Lab Results   Component Value Date    TROPONINI 0 04 02/23/2021       Imaging Studies:     CTA Chest: 11/11/22    Stable fusiform ascending aortic dilation measuring 4 5 cm    Echocardiogram: 11/11/22  •  Left Ventricle: Left ventricular cavity size is mildly dilated  Wall thickness is normal  The left ventricular ejection fraction is 60%  Systolic function is normal  Wall motion is normal   •  Left Atrium: The atrium is severely dilated  •  Right Atrium: The atrium is dilated  •  Aortic Valve: The aortic valve is trileaflet   The leaflets are mildly thickened  The leaflets are mildly calcified  There is moderately reduced mobility  There is mild regurgitation  There is mild to moderate stenosis  •  Mitral Valve: There is moderate to severe regurgitation with an eccentrically directed jet  •  Tricuspid Valve: There is moderate to severe regurgitation  The right ventricular systolic pressure is severely elevated  The estimated right ventricular systolic pressure is 80 72 mmHg  •  Aorta: The aortic root is normal in size  The aortic root is 3 60 cm      Findings    Left Ventricle Left ventricular cavity size is mildly dilated  Wall thickness is normal  The left ventricular ejection fraction is 60%  Systolic function is normal   Wall motion is normal  Unable to assess diastolic function due to atrial fibrillation  Right Ventricle Right ventricular cavity size is normal  Systolic function is normal  Normal tricuspid annular plane systolic excursion (TAPSE) > 1 7 cm  Wall thickness is normal    Left Atrium The atrium is severely dilated  Right Atrium The atrium is dilated  Aortic Valve The aortic valve is trileaflet  The leaflets are mildly thickened  The leaflets are mildly calcified  There is moderately reduced mobility  There is mild regurgitation  There is mild to moderate stenosis  Mitral Valve Mitral valve structure is normal  There is moderate to severe regurgitation with an eccentrically directed jet  There is no evidence of stenosis  Tricuspid Valve Tricuspid valve structure is normal  There is moderate to severe regurgitation  There is no evidence of stenosis  The right ventricular systolic pressure is severely elevated  The estimated right ventricular systolic pressure is 10 39 mmHg  Pulmonic Valve Pulmonic valve structure is normal  There is trace regurgitation  There is no evidence of stenosis  Ascending Aorta The aortic root is normal in size  The aortic root is 3 60 cm     IVC/SVC The right atrial pressure is estimated at 15 0 mmHg  The inferior vena cava is dilated  Respirophasic changes were blunted (less than 50% variation)  Pericardium There is no pericardial effusion  The pericardium is normal in appearance  A fat pad is present       Left Ventricle Measurements    Function/Volumes   A4C EF 52 %         Dimensions   LVIDd 5 3 cm         LVIDS 3 7 cm         IVSd 1 3 cm         LVPWd 1 3 cm         FS 30 %         Diastolic Filling   MV E' Tissue Velocity Septal 7 cm/s               Right Ventricle Measurements    Dimensions   RVID d 4 2 cm               Left Atrium Measurements    Dimensions   LA size 4 6 cm         LA length (A2C) 5 1 cm         JENNY A4C 32 9 cm2               Right Atrium Measurements    Dimensions   RAA A4C 34 2 cm2               Aortic Valve Measurements    Stenosis   Aortic valve peak velocity 4 5 m/s Important          AV peak gradient 81 6 mmHg Important          Regurgitation   AV peak gradient 84 mmHg         AV Deceleration Time 1,637 ms         AV regurgitation pressure 1/2 time 475 ms               Mitral Valve Measurements    Stenosis   MV VTI RETROGRADE 158 1 cm         MV mean gradient retrograde 77 mmHg         MR  mmHg               Tricuspid Valve Measurements    RVSP Parameters   TR Peak Luis A 3 5 m/s         Est  RA pres 15 mmHg         Triscuspid Valve Regurgitation Peak Gradient 50 mmHg         Right Ventricular Peak Systolic Pressure 65 mmHg               Aorta Measurements    Aortic Dimensions   Ao root 3 6 cm               IVC/SVC Measurements    IVC/SVC   Est  RA pres 15 mmHg             I have personally reviewed pertinent films in PACS     PCP and Cardiology notes reviewed    Assessment:  Patient Active Problem List    Diagnosis Date Noted   • Pulmonary hypertension ([facility] ) 02/22/2021   • Erythrocytosis 02/22/2021   • Streptococcal bacteremia 08/17/2020   • Callus of foot 08/16/2020   • Sepsis ([facility] [address] ) 08/15/2020   • Hypoxia 08/15/2020   • Aneurysm of ascending aorta 04/11/2019   • Mixed hyperlipidemia 06/29/2018   • Mitral valve insufficiency 10/31/2017   • Paroxysmal atrial fibrillation ([facility] ) 11/18/2016   • History of DVT (deep vein thrombosis) 08/15/2016   • Hypertension    • Atrial fibrillation with RVR ([place] [address] ) 08/13/2016   • 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[place] [address] ) 08/13/2016   • Bilateral popliteal artery aneurysm ([place] [address] ) 07/12/2016   • Benign essential hypertension 05/11/2009       Impression/Plan:    Ascending aortic aneurysm  AS/AI, MR, TR  Non-healing right knee wound    CT imaging performed prior to this visit demonstrates the ascending aorta measuring 48 mm in size at its greatest diameter  This is stable and unchanged  These findings were confirmed and shared with the patient today  Patient recent echo demonstrates moderate to severe MR and moderate to severe TR  He has recurrent Afib and is actually scheduled for RANDALL DCCV today  We will await the completion of his RANDALL today and pending the results he may need to return to our office to discuss valvular surgery  He also has a non-healing wound right knee that needs to be addressed prior to any type of heart surgery  In regards to his aneurysm, continued surveillance is the plan at this point with  f/u non-contrast chest CT scan in 1 year  Angeline Flynn  was comfortable with our recommendations, and his questions were answered to his satisfaction  Aortic Aneurysm Instructions were provided to the patient as follows:    1  No lifting more than 50 pounds  Regular aerobic exercise permitted and recommended  2  Maintain a controlled blood pressure with a goal of less than 120/80  3  Follow up in Aortic Clinic as recommended with radiology follow up as instructed  4  Report to the ER or call 911 immediately with the following signs / symptoms: sudden onset of back pain, chest pain or shortness of breath      Routine referral to gastroenterology for colonoscopy screening was not indicated, as the patient is over 75 years old    SIGNATURE: DHEERAJ Marcos  DATE: November 18, 2022  TIME: 9:22 AM

## 2022-11-18 NOTE — ANESTHESIA POSTPROCEDURE EVALUATION
Post-Op Assessment Note    CV Status:  Stable  Pain Score: 0    Pain management: adequate     Mental Status:  Arousable   Hydration Status:  Stable   PONV Controlled:  None   Airway Patency:  Patent      Post Op Vitals Reviewed: Yes      Staff: CRNA         No notable events documented      BP   116/62   Temp   36C   Pulse  65   Resp   16   SpO2 96% RA

## 2022-11-18 NOTE — INTERVAL H&P NOTE
H&P reviewed  After examining the patient I find no changes in the patients condition since the H&P had been written       11/18/22 0848 11/18/22 0851   BP: 117/83 139/85   BP Location: Left arm Right arm   Patient Position: Sitting Sitting   Pulse: 86     Resp: 16     Temp: 97 8 °F (36 6 °C)     TempSrc: Tympanic     SpO2:   100%   Weight: 112 kg (247 lb 3 2 oz)     Height: 6' 6" (1 981 m)

## 2023-01-24 ENCOUNTER — OFFICE VISIT (OUTPATIENT)
Dept: WOUND CARE | Facility: CLINIC | Age: 78
End: 2023-01-24

## 2023-01-24 VITALS
RESPIRATION RATE: 16 BRPM | WEIGHT: 246.69 LBS | SYSTOLIC BLOOD PRESSURE: 126 MMHG | HEART RATE: 60 BPM | BODY MASS INDEX: 28.51 KG/M2 | DIASTOLIC BLOOD PRESSURE: 68 MMHG | TEMPERATURE: 97 F

## 2023-01-24 DIAGNOSIS — M86.461 CHRONIC OSTEOMYELITIS OF RIGHT TIBIA WITH DRAINING SINUS (HCC): Primary | ICD-10-CM

## 2023-01-24 NOTE — PATIENT INSTRUCTIONS
Orders Placed This Encounter   Procedures    Wound cleansing and dressings     Patient has small fluid fill blister below right knee  No open wound at this time  Patient referred to orthopedic for follow up regarding osteomyelitis, as per Dr Maine Isaacs       Standing Status:   Future     Standing Expiration Date:   1/24/2024

## 2023-01-24 NOTE — PROGRESS NOTES
Patient ID: Duane Pina  is a 65 y o  male Date of Birth 1945     My role is Foot, Ankle and Wound Specialist    Chief Complaint   Patient presents with   • No Wound Consult     Patient developed a fluid filled blister below right knee in July 2022, which opens and closes but has not healed completely  Current fluid filled blister present today  Patient has osteomyelitis as per Dr Ashley Rubi records)  Patient has no open wound at this time  Patient referred to orthopedic  Chronic draining "blister" Right lower leg    Subjective:   iRgoberto Wade has a history of TKR Right by an orthopedic surgeon in [facility]  He developed this chronic draining blister just below the implant in July 2022  He went back to that surgeon who told him he has a bone infection that needs to get cleaned out and referred him to a surgeon in [place] who subsequently referred him to find someone at [address] [place] [address]  He ended up at the [facility] wound center and local wound care didn't close the sinus         The following portions of the patient's history were reviewed and updated as appropriate:   Patient Active Problem List   Diagnosis   • Atrial fibrillation with RVR ([facility] )   • 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[facility] )   • Hypertension   • History of DVT (deep vein thrombosis)   • Benign essential hypertension   • Mitral valve insufficiency   • Bilateral popliteal artery aneurysm (HCC)   • Paroxysmal atrial fibrillation (HCC)   • Mixed hyperlipidemia   • Aneurysm of ascending aorta   • Sepsis (HCC)   • Hypoxia   • Callus of foot   • Streptococcal bacteremia   • Pulmonary hypertension (HCC)   • Erythrocytosis   • Chronic osteomyelitis of right tibia with draining sinus (HCC)     Past Medical History:   Diagnosis Date   • Cardiac disease    • Hypertension    • PAF (paroxysmal atrial fibrillation) ([facility] )    • 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[facility] )     BILATERALLY     Past Surgical History:   Procedure Laterality Date   • CARDIAC ELECTROPHYSIOLOGY PROCEDURE N/A 3/25/2022 Procedure: Cardiac eps/afib ablation;  Surgeon: Maggy Hassan MD;  [facility]; Service: Cardiology   • ESOPHAGOGASTRODUODENOSCOPY N/A 8/16/2016    Procedure: ESOPHAGOGASTRODUODENOSCOPY (EGD); Surgeon: Dema Councilman, MD;  Location: [facility];   Service:    • HERNIA REPAIR Left     groin   • HERNIA REPAIR Right 1995   • OTHER SURGICAL HISTORY     • REPLACEMENT TOTAL KNEE Left 04/01/2013   • VASCULAR SURGERY Right     POPLITEAL BYPASS DUE TO ANEURYSM     Social History     Socioeconomic History   • Marital status: /Civil Union     Spouse name: None   • Number of children: None   • Years of education: None   • Highest education level: None   Occupational History   • None   Tobacco Use   • Smoking status: Never   • Smokeless tobacco: Never   Vaping Use   • Vaping Use: Never used   Substance and Sexual Activity   • Alcohol use: Not Currently   • Drug use: No   • Sexual activity: None   Other Topics Concern   • None   Social History Narrative   • None     Social Determinants of Health     Financial Resource Strain: Not on file   Food Insecurity: Not on file   Transportation Needs: Not on file   Physical Activity: Not on file   Stress: Not on file   Social Connections: Not on file   Intimate Partner Violence: Not on file   Housing Stability: Not on file        Current Outpatient Medications:   •  apixaban (ELIQUIS) 5 mg, Take 1 tablet (5 mg total) by mouth 2 (two) times a day, Disp: 180 tablet, Rfl: 3  •  ascorbic acid (VITAMIN C) 500 mg tablet, Take 500 mg by mouth daily, Disp: , Rfl:   •  clindamycin (CLEOCIN) 300 MG capsule, , Disp: , Rfl:   •  Coenzyme Q10 200 MG capsule, Take 200 mg by mouth daily  , Disp: , Rfl:   •  COLLAGEN PO, Take 1 tablet by mouth, Disp: , Rfl:   •  Flaxseed, Linseed, (FLAXSEED OIL) 1000 MG CAPS, Take 1 capsule by mouth daily, Disp: , Rfl:   •  Green Tea 250 MG CAPS, Take 1 capsule by mouth daily, Disp: , Rfl:   •  Liver Extract 500 MG CAPS, Take 1 capsule by mouth daily, Disp: , Rfl:   •  Magnesium Citrate 125 MG CAPS, Take 1 capsule by mouth daily, Disp: , Rfl:   •  metoprolol tartrate (LOPRESSOR) 25 mg tablet, Take 1 tablet (25 mg total) by mouth every 12 (twelve) hours (Patient taking differently: Take 25 mg by mouth every 12 (twelve) hours Patient only taking 1 time a day), Disp: 60 tablet, Rfl: 0  •  Pancreatin 325 MG TABS, Take 1 tablet by mouth daily, Disp: , Rfl:   •  Probiotic Product (PROBIOTIC-10 PO), Take 1 capsule by mouth daily, Disp: , Rfl:   •  Vitamin D, Cholecalciferol, 1000 units CAPS, Take 2,000 Units by mouth daily, Disp: , Rfl:   Family History   Problem Relation Age of Onset   • Pancreatic cancer Mother    • Stroke Father    • Heart attack Paternal Grandmother       Review of Systems   Constitutional: Negative  Skin: Positive for wound  Allergies  Oxycodone    Objective:  /68   Pulse 60   Temp (!) 97 °F (36 1 °C)   Resp 16   Wt 112 kg (246 lb 11 1 oz)   BMI 28 51 kg/m²     Physical Exam  Vitals and nursing note reviewed  Constitutional:       General: He is not in acute distress  Appearance: Normal appearance  He is not ill-appearing, toxic-appearing or diaphoretic  Musculoskeletal:         General: Deformity present  Comments: I note his Right tibial tuberosity is triple in size  Skin:     Capillary Refill: Capillary refill takes less than 2 seconds  Comments: I note a small purple blister at the tibial tuberosity  Neurological:      General: No focal deficit present  Mental Status: He is alert     Psychiatric:         Mood and Affect: Mood normal            Incision 08/13/16 Leg Inner (Active)       Incision 01/24/18 Groin Left;Right (Active)       Wound 08/16/20 Other (Comment) Foot Medial (Active)       Wound 03/25/22 Catheter entry/exit site Groin Left (Active)       Wound 03/25/22 Catheter entry/exit site Groin Right (Active)           Incision 08/13/16 Leg Inner (Active)       Incision 01/24/18 Groin Left;Right (Active)       Wound 08/16/20 Other (Comment) Foot Medial (Active)       Wound 03/25/22 Catheter entry/exit site Groin Left (Active)       Wound 03/25/22 Catheter entry/exit site Groin Right (Active)                         Diagnosis:  1  Chronic osteomyelitis of right tibia with draining sinus (HCC)        Diagnosis ICD-10-CM Associated Orders   1  Chronic osteomyelitis of right tibia with draining sinus (HCC)  M86 461            ASSESSMENT    Problems:    Chronic illness / Problem not at goal with exacerbation, progression or side effects of treatment  1  Chronic osteomyelitis Right tibia    PLAN    I reviewed the MRI report dated 12/8/22 which was positive for osteomyelitis Right tibial tuberosity  I reviewed the Xray Right knee dated 7/15/2022 which showed a patellar joint effusion  I reviewed the knee joint aspiration dated 12/2/22 which was negative for growth  I reviewed the wound biopsy result dated 8/16/22 which was positive for "ecthyma"  This patient needs definitive bone debridement, bone cultures; and I said as much to him  I recommended he make an appointment with his original orthopedic surgeon and ask for a definitive surgical plan (or) a referral to a surgeon who can do it with a doctor-to-doctor phone call for continuity of care  This is not a "chronic wound" and no local wound care will resolve it

## 2023-02-14 ENCOUNTER — TELEPHONE (OUTPATIENT)
Dept: CARDIOLOGY CLINIC | Facility: CLINIC | Age: 78
End: 2023-02-14

## 2023-02-14 NOTE — TELEPHONE ENCOUNTER
Hi, my name is Puenet Arroyo  My birth date is [birth year] and I'm going down to the [facility] to have a wound on my leg that's not healing taken care of and the doctors want an OK from doctor Charles Rodríguez to go ahead with the operation  My surgery is scheduled for February 21st  They gave Me 2 fax numbers here  One is [phone] and the other one is [phone]  And the doctor that's doing the procedure is Gavin  Thank you  My number here is [phone]  clara Jj      Put cc in chart and sent to Dr Marion Bentley

## 2023-04-21 PROBLEM — I35.1 AORTIC INSUFFICIENCY: Status: ACTIVE | Noted: 2023-04-21

## 2023-04-21 PROBLEM — I48.21 PERMANENT ATRIAL FIBRILLATION (HCC): Status: ACTIVE | Noted: 2023-04-21

## 2023-04-25 ENCOUNTER — TELEPHONE (OUTPATIENT)
Dept: CARDIAC SURGERY | Facility: CLINIC | Age: 78
End: 2023-04-25

## 2023-04-25 NOTE — TELEPHONE ENCOUNTER
Patient was seen by Dr Joseph Willis and will need a right and left Cath done within the month of May  Please contact the patient to schedule

## 2023-04-25 NOTE — TELEPHONE ENCOUNTER
Patient scheduled for R+LHC at [facility] on 05/08/2023   with Dr Thad Barragan  Patient aware of general instructions, labs test required  Meds holds: Eliquis hold the night prior and the morning of the procedure  Patient cover under medicare  Dr Lona Boo, can you please place the case for this procedure  Thank you

## 2023-04-25 NOTE — TELEPHONE ENCOUNTER
Bam Espinoza is not required  Verified that patient has Medicare primary with a supplement secondary

## 2023-04-28 ENCOUNTER — HOSPITAL ENCOUNTER (OUTPATIENT)
Dept: NON INVASIVE DIAGNOSTICS | Facility: HOSPITAL | Age: 78
Discharge: HOME | End: 2023-04-28

## 2023-04-28 DIAGNOSIS — I71.21 ANEURYSM OF ASCENDING AORTA WITHOUT RUPTURE (HCC): ICD-10-CM

## 2023-04-28 DIAGNOSIS — M86.461 CHRONIC OSTEOMYELITIS OF RIGHT TIBIA WITH DRAINING SINUS (HCC): ICD-10-CM

## 2023-04-28 DIAGNOSIS — Z13.6 ENCOUNTER FOR SCREENING FOR STENOSIS OF CAROTID ARTERY: ICD-10-CM

## 2023-04-28 DIAGNOSIS — I48.21 PERMANENT ATRIAL FIBRILLATION (HCC): ICD-10-CM

## 2023-04-28 DIAGNOSIS — I34.0 NONRHEUMATIC MITRAL VALVE REGURGITATION: ICD-10-CM

## 2023-04-28 DIAGNOSIS — I35.1 NONRHEUMATIC AORTIC VALVE INSUFFICIENCY: ICD-10-CM

## 2023-05-08 ENCOUNTER — HOSPITAL ENCOUNTER (OUTPATIENT)
Facility: HOSPITAL | Age: 78
Setting detail: OUTPATIENT SURGERY
Discharge: HOME | End: 2023-05-08
Attending: INTERNAL MEDICINE | Admitting: INTERNAL MEDICINE

## 2023-05-08 VITALS
SYSTOLIC BLOOD PRESSURE: 116 MMHG | HEART RATE: 81 BPM | DIASTOLIC BLOOD PRESSURE: 60 MMHG | TEMPERATURE: 97.9 F | WEIGHT: 250 LBS | HEIGHT: 78 IN | OXYGEN SATURATION: 93 % | BODY MASS INDEX: 28.93 KG/M2 | RESPIRATION RATE: 17 BRPM

## 2023-05-08 DIAGNOSIS — I34.0 NONRHEUMATIC MITRAL VALVE REGURGITATION: ICD-10-CM

## 2023-05-08 DIAGNOSIS — I25.10 CORONARY ARTERY DISEASE INVOLVING NATIVE CORONARY ARTERY: ICD-10-CM

## 2023-05-08 DIAGNOSIS — E78.5 HYPERLIPIDEMIA: Primary | ICD-10-CM

## 2023-05-08 DIAGNOSIS — I48.91 ATRIAL FIBRILLATION WITH RVR (HCC): ICD-10-CM

## 2023-05-08 LAB
ANION GAP SERPL CALCULATED.3IONS-SCNC: 2 MMOL/L (ref 4–13)
ATRIAL RATE: 105 BPM
BUN SERPL-MCNC: 15 MG/DL (ref 5–25)
CALCIUM SERPL-MCNC: 9.6 MG/DL (ref 8.3–10.1)
CHLORIDE SERPL-SCNC: 112 MMOL/L (ref 96–108)
CHOLEST SERPL-MCNC: 177 MG/DL
CO2 SERPL-SCNC: 27 MMOL/L (ref 21–32)
CREAT SERPL-MCNC: 0.88 MG/DL (ref 0.6–1.3)
GFR SERPL CREATININE-BSD FRML MDRD: 81 ML/MIN/1.73SQ M
GLUCOSE P FAST SERPL-MCNC: 101 MG/DL (ref 65–99)
GLUCOSE SERPL-MCNC: 101 MG/DL (ref 65–140)
HDLC SERPL-MCNC: 48 MG/DL
LDLC SERPL CALC-MCNC: 110 MG/DL (ref 0–100)
LDLC SERPL DIRECT ASSAY-MCNC: 108 MG/DL (ref 0–100)
NONHDLC SERPL-MCNC: 129 MG/DL
POTASSIUM SERPL-SCNC: 3.9 MMOL/L (ref 3.5–5.3)
QRS AXIS: -46 DEGREES
QRSD INTERVAL: 102 MS
QT INTERVAL: 358 MS
QTC INTERVAL: 459 MS
SODIUM SERPL-SCNC: 141 MMOL/L (ref 135–147)
T WAVE AXIS: 36 DEGREES
TRIGL SERPL-MCNC: 93 MG/DL
VENTRICULAR RATE: 99 BPM

## 2023-05-08 RX ORDER — DIPHENHYDRAMINE HCL 25 MG
25 CAPSULE ORAL EVERY 6 HOURS PRN
COMMUNITY

## 2023-05-08 RX ORDER — ASPIRIN 325 MG
TABLET, DELAYED RELEASE (ENTERIC COATED) ORAL CODE/TRAUMA/SEDATION MEDICATION
Status: DISCONTINUED | OUTPATIENT
Start: 2023-05-08 | End: 2023-05-08 | Stop reason: HOSPADM

## 2023-05-08 RX ORDER — SODIUM CHLORIDE 9 MG/ML
100 INJECTION, SOLUTION INTRAVENOUS CONTINUOUS
Status: DISPENSED | OUTPATIENT
Start: 2023-05-08 | End: 2023-05-08

## 2023-05-08 RX ORDER — FENTANYL CITRATE 50 UG/ML
INJECTION, SOLUTION INTRAMUSCULAR; INTRAVENOUS CODE/TRAUMA/SEDATION MEDICATION
Status: DISCONTINUED | OUTPATIENT
Start: 2023-05-08 | End: 2023-05-08 | Stop reason: HOSPADM

## 2023-05-08 RX ORDER — VERAPAMIL HYDROCHLORIDE 2.5 MG/ML
INJECTION, SOLUTION INTRAVENOUS CODE/TRAUMA/SEDATION MEDICATION
Status: DISCONTINUED | OUTPATIENT
Start: 2023-05-08 | End: 2023-05-08 | Stop reason: HOSPADM

## 2023-05-08 RX ORDER — ONDANSETRON 2 MG/ML
4 INJECTION INTRAMUSCULAR; INTRAVENOUS EVERY 8 HOURS PRN
Status: DISCONTINUED | OUTPATIENT
Start: 2023-05-08 | End: 2023-05-08 | Stop reason: HOSPADM

## 2023-05-08 RX ORDER — HEPARIN SODIUM 1000 [USP'U]/ML
INJECTION, SOLUTION INTRAVENOUS; SUBCUTANEOUS CODE/TRAUMA/SEDATION MEDICATION
Status: DISCONTINUED | OUTPATIENT
Start: 2023-05-08 | End: 2023-05-08 | Stop reason: HOSPADM

## 2023-05-08 RX ORDER — ROSUVASTATIN CALCIUM 20 MG/1
20 TABLET, COATED ORAL DAILY
Qty: 90 TABLET | Refills: 3 | Status: SHIPPED | OUTPATIENT
Start: 2023-05-08

## 2023-05-08 RX ORDER — ACETAMINOPHEN 325 MG/1
650 TABLET ORAL EVERY 6 HOURS PRN
Status: DISCONTINUED | OUTPATIENT
Start: 2023-05-08 | End: 2023-05-08 | Stop reason: HOSPADM

## 2023-05-08 RX ORDER — NITROGLYCERIN 20 MG/100ML
INJECTION INTRAVENOUS CODE/TRAUMA/SEDATION MEDICATION
Status: DISCONTINUED | OUTPATIENT
Start: 2023-05-08 | End: 2023-05-08 | Stop reason: HOSPADM

## 2023-05-08 RX ORDER — SODIUM CHLORIDE 9 MG/ML
125 INJECTION, SOLUTION INTRAVENOUS CONTINUOUS
Status: DISCONTINUED | OUTPATIENT
Start: 2023-05-08 | End: 2023-05-08

## 2023-05-08 RX ORDER — ASPIRIN 81 MG/1
81 TABLET ORAL DAILY
Refills: 0
Start: 2023-05-08

## 2023-05-08 RX ORDER — MIDAZOLAM HYDROCHLORIDE 2 MG/2ML
INJECTION, SOLUTION INTRAMUSCULAR; INTRAVENOUS CODE/TRAUMA/SEDATION MEDICATION
Status: DISCONTINUED | OUTPATIENT
Start: 2023-05-08 | End: 2023-05-08 | Stop reason: HOSPADM

## 2023-05-08 RX ADMIN — SODIUM CHLORIDE 125 ML/HR: 0.9 INJECTION, SOLUTION INTRAVENOUS at 08:10

## 2023-05-08 NOTE — DISCHARGE INSTR - AVS FIRST PAGE
1  Please see the post cardiac catheterization dishcarge instructions  No heavy lifting, greater than 10 lbs  or strenuous  activity for 48 hrs  2 Remove band aid tomorrow  Shower and wash area- wrist gently with soap and water- beginning tomorrow  Rinse and pat dry  Apply new water seal band aid  Repeat this process for 5 days  No powders, creams lotions or antibiotic ointments  for 5 days  No tub baths, hot tubs or swimming for 5 days  3  Please call our office ([phone]) if you have any fever, redness, swelling, discharge from your wrist  access site      4 No driving for 1 day

## 2023-05-17 ENCOUNTER — OFFICE VISIT (OUTPATIENT)
Dept: CARDIOLOGY CLINIC | Facility: CLINIC | Age: 78
End: 2023-05-17

## 2023-05-17 VITALS
BODY MASS INDEX: 29.16 KG/M2 | DIASTOLIC BLOOD PRESSURE: 78 MMHG | HEIGHT: 78 IN | WEIGHT: 252 LBS | SYSTOLIC BLOOD PRESSURE: 142 MMHG | OXYGEN SATURATION: 97 % | HEART RATE: 110 BPM

## 2023-05-17 DIAGNOSIS — I27.20 PULMONARY HYPERTENSION (HCC): ICD-10-CM

## 2023-05-17 DIAGNOSIS — I72.4 BILATERAL POPLITEAL ARTERY ANEURYSM (HCC): ICD-10-CM

## 2023-05-17 DIAGNOSIS — I71.21 ANEURYSM OF ASCENDING AORTA WITHOUT RUPTURE (HCC): ICD-10-CM

## 2023-05-17 DIAGNOSIS — I72.4 POPLITEAL ANEURYSM (HCC): Chronic | ICD-10-CM

## 2023-05-17 DIAGNOSIS — I10 BENIGN ESSENTIAL HYPERTENSION: ICD-10-CM

## 2023-05-17 DIAGNOSIS — I10 PRIMARY HYPERTENSION: ICD-10-CM

## 2023-05-17 DIAGNOSIS — I25.10 CORONARY ARTERY DISEASE INVOLVING NATIVE CORONARY ARTERY OF NATIVE HEART WITHOUT ANGINA PECTORIS: ICD-10-CM

## 2023-05-17 DIAGNOSIS — I34.0 NONRHEUMATIC MITRAL VALVE REGURGITATION: ICD-10-CM

## 2023-05-17 DIAGNOSIS — I48.91 ATRIAL FIBRILLATION WITH RVR (HCC): ICD-10-CM

## 2023-05-17 DIAGNOSIS — I48.91 ATRIAL FIBRILLATION, UNSPECIFIED TYPE (HCC): Primary | ICD-10-CM

## 2023-05-17 DIAGNOSIS — I48.0 PAROXYSMAL ATRIAL FIBRILLATION (HCC): ICD-10-CM

## 2023-05-17 NOTE — PROGRESS NOTES
Cardiology Follow Up    [facility]  1945  [id number]  [address] [facility]  [address] [place] [address] [address]    1  Atrial fibrillation, unspecified type ([facility] )  POCT ECG      2  Aneurysm of ascending aorta without rupture ([facility] )        3  Atrial fibrillation with RVR (HCC)  metoprolol tartrate (LOPRESSOR) 25 mg tablet    apixaban (ELIQUIS) 5 mg      4  Benign essential hypertension        5  Bilateral popliteal artery aneurysm ([facility] )        6  Coronary artery disease involving native coronary artery of native heart without angina pectoris        7  Paroxysmal atrial fibrillation (HCC)        8  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[facility] )        9  Pulmonary hypertension ([facility] )        10  Nonrheumatic mitral valve regurgitation        11  Primary hypertension            Discussion/Summary:    He just met with his infection doctor at [facility] he is going to try to get clearance so we can move forward with open heart surgery  Case was discussed with Dr Cyndy Saravia he needs an aortic and mitral valve replacement along with saphenous vein graft to OM1 and a maze  A sending aneurysm is not big enough requiring repair and surgery would be much longer  He is in rate controlled atrial fibrillation he did run out of his beta-blocker I renewed this today  Continue full anticoagulation  Blood pressure has been stable  I have asked him to resume Eliquis on twice daily dosing and provided him with samples  He told me he ran out was taking his wife's pills  Interval History:   74-year-old gentleman with a history of paroxysmal atrial fibrillation, hypertension, dyslipidemia, bilateral popliteal artery aneurysm status post repair  History of acute DVT  He has been doing quite well since her last office appointment  There was some atypical chest pain a couple of months ago nuclear stress test showed no ischemia            Overall he has been doing well since his last visit  He underwent AFib ablation with good success he is now maintaining sinus rhythm  Functionally he is doing well he has some mild generalized fatigue but he attributes this to the recent procedure  He did have some bruising and he came off of the anticoagulation he would rather take fish oil and tends to have more of a homeopathic approach to medicine  He understands the risk of doing so  Overall he has been feeling about the same, shortness of breath with moderate activity  He is awaiting valve surgery he has a lesion on the right lower extremity and finished a course of IV antibiotics  He needs clearance from ID and the dentist before surgery can be scheduled  Denies any anginal sounding chest pain left heart catheterization showed single-vessel coronary disease with an 80% OM1  Denies any palpitations, lightness, dizziness, or syncope    Problem List     PAF (paroxysmal atrial fibrillation) (HCC)    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HCC) (Chronic)    Hypertension    Acute DVT (deep venous thrombosis) (HCC)    A-fib ([address] )    Benign essential hypertension    Overview Signed 6/29/2018  8:02 AM by Jaya Bonilla DO     Overview:   dx 2002         Mitral valve insufficiency    Bilateral popliteal artery aneurysm (HCC)    Paroxysmal atrial fibrillation (HCC)    Mixed hyperlipidemia        Past Medical History:   Diagnosis Date   • Cardiac disease    • Hypertension    • PAF (paroxysmal atrial fibrillation) (HCC)    • 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HCC)     BILATERALLY     Social History     Socioeconomic History   • Marital status: /Civil Union     Spouse name: Not on file   • Number of children: Not on file   • Years of education: Not on file   • Highest education level: Not on file   Occupational History   • Not on file   Tobacco Use   • Smoking status: Never   • Smokeless tobacco: Never   Vaping Use   • Vaping Use: Never used   Substance and Sexual Activity   • Alcohol use: Not Currently   • Drug use: No   • Sexual activity: Not on file   Other Topics Concern   • Not on file   Social History Narrative   • Not on file     Social Determinants of Health     Financial Resource Strain: Not on file   Food Insecurity: Not on file   Transportation Needs: Not on file   Physical Activity: Not on file   Stress: Not on file   Social Connections: Not on file   Intimate Partner Violence: Not on file   Housing Stability: Not on file      Family History   Problem Relation Age of Onset   • Pancreatic cancer Mother    • Stroke Father    • Heart attack Paternal Grandmother      Past Surgical History:   Procedure Laterality Date   • CARDIAC CATHETERIZATION N/A 5/8/2023    Procedure: Cardiac Coronary Angiogram;  Surgeon: Rony Batista DO;  [facility]; Service: Cardiology   • CARDIAC ELECTROPHYSIOLOGY PROCEDURE N/A 3/25/2022    Procedure: Cardiac eps/afib ablation;  Surgeon: Dustin Briscoe MD;  [facility]; Service: Cardiology   • ESOPHAGOGASTRODUODENOSCOPY N/A 8/16/2016    Procedure: ESOPHAGOGASTRODUODENOSCOPY (EGD); Surgeon: Vernon Hill MD;  [facility];   Service:    • HERNIA REPAIR Left     groin   • HERNIA REPAIR Right 1995   • OTHER SURGICAL HISTORY     • REPLACEMENT TOTAL KNEE Left 04/01/2013   • VASCULAR SURGERY Right     POPLITEAL BYPASS DUE TO ANEURYSM       Current Outpatient Medications:   •  apixaban (ELIQUIS) 5 mg, Take 1 tablet (5 mg total) by mouth 2 (two) times a day, Disp: 180 tablet, Rfl: 3  •  ascorbic acid (VITAMIN C) 500 mg tablet, Take 500 mg by mouth daily, Disp: , Rfl:   •  Coenzyme Q10 200 MG capsule, Take 200 mg by mouth daily  , Disp: , Rfl:   •  COLLAGEN PO, Take 1 tablet by mouth, Disp: , Rfl:   •  diphenhydrAMINE (BENADRYL) 25 mg capsule, Take 25 mg by mouth every 6 (six) hours as needed for itching Patient not sure of the doseage, Disp: , Rfl:   •  Flaxseed, Linseed, (FLAXSEED OIL) 1000 MG CAPS, Take 1 capsule by mouth daily, Disp: , Rfl:   •  fluorouracil (EFUDEX) 5 % cream, APPLY TWICE DAILY TO AREAS OF PRE-CANCERS FOR A MINIMUM OF 14 DAYS, MAXIMUM OF 28 DAYS, Disp: , Rfl:   •  Green Tea 250 MG CAPS, Take 1 capsule by mouth daily, Disp: , Rfl:   •  Liver Extract 500 MG CAPS, Take 1 capsule by mouth daily, Disp: , Rfl:   •  Magnesium Citrate 125 MG CAPS, Take 1 capsule by mouth daily, Disp: , Rfl:   •  metoprolol tartrate (LOPRESSOR) 25 mg tablet, Take 1 tablet (25 mg total) by mouth every 12 (twelve) hours Patient only taking 1 time a day, Disp: 180 tablet, Rfl: 3  •  Pancreatin 325 MG TABS, Take 1 tablet by mouth daily, Disp: , Rfl:   •  Potassium Acetate LUCY, Take by mouth, Disp: , Rfl:   •  Probiotic Product (PROBIOTIC-10 PO), Take 1 capsule by mouth daily, Disp: , Rfl:   •  Vitamin D, Cholecalciferol, 1000 units CAPS, Take 2,000 Units by mouth daily, Disp: , Rfl:   •  aspirin (ECOTRIN LOW STRENGTH) 81 mg EC tablet, Take 1 tablet (81 mg total) by mouth daily (Patient not taking: Reported on 5/17/2023), Disp: , Rfl: 0  •  clindamycin (CLEOCIN) 300 MG capsule, Prior to dental work   (Patient not taking: Reported on 5/17/2023), Disp: , Rfl:   •  rosuvastatin (CRESTOR) 20 MG tablet, Take 1 tablet (20 mg total) by mouth daily (Patient not taking: Reported on 5/17/2023), Disp: 90 tablet, Rfl: 3  Allergies   Allergen Reactions   • Oxycodone GI Intolerance     nausea  nausea       Labs:     Chemistry        Component Value Date/Time    K 3 9 05/08/2023 0808     (H) 05/08/2023 0808    CO2 27 05/08/2023 0808    BUN 15 05/08/2023 0808    CREATININE 0 88 05/08/2023 0808        Component Value Date/Time    CALCIUM 9 6 05/08/2023 0808    ALKPHOS 86 03/25/2022 1201    AST 21 03/25/2022 1201    ALT 28 03/25/2022 1201            No results found for: CHOL  Lab Results   Component Value Date    HDL 48 05/08/2023     Lab Results   Component Value Date    LDLCALC 110 (H) 05/08/2023     Lab Results   Component Value Date    TRIG 93 05/08/2023 "    No results found for: CHOLHDL    Imaging: No results found  ECG:    Atrial fibrillation nonspecific ST and T changes      ROS    Vitals:    05/17/23 0811   BP: 142/78   Pulse: (!) 110   SpO2: 97%     Vitals:    05/17/23 0811   Weight: 114 kg (252 lb)     Height: 6' 6\" (198 1 cm)   Body mass index is 29 12 kg/m²  Physical Exam:  Vital signs reviewed  General:  Alert and cooperative, appears stated age, no acute distress  HEENT:  PERRLA, EOMI, no scleral icterus, no conjunctival pallor  Neck:  No lymphadenopathy, no thyromegaly, no carotid bruits, no elevated JVP  Heart:  Regular rate and rhythm, normal S1/S2, no S3/S4, no murmur, rubs or gallops  PMI nondisplaced  Lungs:  Clear to auscultation bilaterally, no wheezes rales or rhonchi  Abdomen:  Soft, non-tender, positive bowel sounds, no rebound or guarding,   no organomegaly   Extremities:  Normal range of motion    No clubbing, cyanosis or edema   Vascular:  2+ pedal pulses  Skin:  No rashes or lesions on exposed skin  Neurologic:  Cranial nerves II-XII grossly intact without focal deficits  Psych:  Normal mood and affect          "

## 2023-06-23 ENCOUNTER — OFFICE VISIT (OUTPATIENT)
Dept: CARDIAC SURGERY | Facility: CLINIC | Age: 78
End: 2023-06-23
Payer: MEDICARE

## 2023-06-23 VITALS
WEIGHT: 256.8 LBS | BODY MASS INDEX: 29.71 KG/M2 | DIASTOLIC BLOOD PRESSURE: 82 MMHG | HEIGHT: 78 IN | TEMPERATURE: 98.2 F | SYSTOLIC BLOOD PRESSURE: 147 MMHG | OXYGEN SATURATION: 98 % | HEART RATE: 65 BPM

## 2023-06-23 DIAGNOSIS — Z86.39 HX OF ELEVATED LIPIDS: ICD-10-CM

## 2023-06-23 DIAGNOSIS — Z01.89 ENCOUNTER FOR IMAGING OF BILATERAL GREATER SAPHENOUS VEINS: ICD-10-CM

## 2023-06-23 DIAGNOSIS — R06.02 SHORTNESS OF BREATH: ICD-10-CM

## 2023-06-23 DIAGNOSIS — I35.1 NONRHEUMATIC AORTIC VALVE INSUFFICIENCY: ICD-10-CM

## 2023-06-23 DIAGNOSIS — Z95.828 S/P FEMORAL-POPLITEAL BYPASS SURGERY: ICD-10-CM

## 2023-06-23 DIAGNOSIS — I48.21 PERMANENT ATRIAL FIBRILLATION (HCC): ICD-10-CM

## 2023-06-23 DIAGNOSIS — I71.21 ANEURYSM OF ASCENDING AORTA WITHOUT RUPTURE (HCC): ICD-10-CM

## 2023-06-23 DIAGNOSIS — I25.10 CORONARY ARTERY DISEASE INVOLVING NATIVE CORONARY ARTERY OF NATIVE HEART WITHOUT ANGINA PECTORIS: ICD-10-CM

## 2023-06-23 DIAGNOSIS — I34.0 NONRHEUMATIC MITRAL VALVE REGURGITATION: Primary | ICD-10-CM

## 2023-06-23 PROCEDURE — 99214 OFFICE O/P EST MOD 30 MIN: CPT | Performed by: THORACIC SURGERY (CARDIOTHORACIC VASCULAR SURGERY)

## 2023-06-23 RX ORDER — CEFAZOLIN SODIUM 2 G/50ML
2000 SOLUTION INTRAVENOUS ONCE
OUTPATIENT
Start: 2023-06-23 | End: 2023-06-23

## 2023-06-23 RX ORDER — CHLORHEXIDINE GLUCONATE 0.12 MG/ML
15 RINSE ORAL ONCE
OUTPATIENT
Start: 2023-06-23 | End: 2023-06-23

## 2023-06-23 NOTE — H&P
Pre-Op History & Physical - Cardiothoracic Surgery   Shanelle Gonzalez  77 y o  male MRN: [id number]    PCP: Waqar Knott DO  Cardiologist: Cirilo Guerrier DO  Vascular Surgeon: June Tracey)  Orthopedic Surgeon: Carlo Bland MD [facility])    Reason for Consult / Principal Problem: Ascending Aortic Aneurysm, Aortic Valve Insufficiency, Mitral Valve Regurgitation, Tricuspid Regurgitation, Persistent Atrial Fibrillation, Coronary Artery Disease    History of Present Illness: Shanelle Gonzalez  is a 77y o  year old male known to our aortic clinic, followed since 2016/2017 for a stable ascending aortic aneurysm  Patient's PMHx is notable for HTN, HLD, Atrial Fibrillation s/p ablation (2018 & 2021) on Eliquis, PAH, AI, MR, TR, h/o DVT, h/o mycotic b/l popliteal aneurysms s/p [facility] & I&D of infected R popliteal aneurysm with IR embolization (3/2022), DJD s/p b/l TKR's (2013) with R LE wound (pseudomas aeruginosa) s/p debridement & skin grafting (4/4/23-[place]), chronic R tibia osteomyelitis, b/l LE venous insufficiency and h/o RLE melanoma (2010/2011)     Patient was seen in our office November 2022 for routine follow up for his ascending aortic aneurysm, found to be stable on CT scan and measuring 4 5 cm  Echo in November 2022 demonstrated progression of his valvular disease with mod-severe MR, mod-severe TR, mild AI and mild-mod AS  He was noted to have recurrent Afib around this time and reported worsening LE edema and SOB  He also reported a chronically draining wound in his right lower leg, just distal to his knee  Patient was scheduled that same day for RANDALL/DCCV  We recommended waiting for RANDALL evaluation of his valvular disease and recommended he have the chronic wound addressed      Patient to our office on 4/21/23 for follow up  He had been evaluated by his vascular surgeon at [facility] in December 2022  He has been evlauated by orthopedic surgeon at [facility]    He had an MRI of his right leg (Dec 2022) that indicated a suspicion for osteomyelitis of his R Tibia  He had a biopsy (2/2023) of the right lower leg wound and sinus tract with cultures positive for pseudomonas  He had a PICC line placed and underwent RLE wound debridement and split thickness skin grafting (donor R upper thigh) and started on 6 weeks of IV antibiotics  He is followed by ID specialists ([place]) and Dr Kimmie Frazier ([place])  He completed his IV antibiotic on 5/4/22 and PICC line has been removed  He has been seen again by Vascular surgery on 5/19/23 and deemed stable  He had follow-up with Orthopaedics ([facility]) on 5/22/23 and cleared to proceed with cardiac surgery  He has undergone cardiac cath (5/8/23) that demonstrates D1 40%, OM1 80% and dRCA 30%  Upon interview today patient reports SOB/ TINSLEY with minimal activity such as ADL's in the AM or walking around the house or climbing stairs  He reports his LE edema is stable  He reports his right leg wounds are completely healed and he denies fever, chills or malaise  He denies chest pain, lightheadedness, palpitations or claudication      Patient denies tobacco, alcohol or substance abuse      Up to date with dental care  Past Medical History:  Past Medical History:   Diagnosis Date   • Cardiac disease    • Hypertension    • PAF (paroxysmal atrial fibrillation) ([facility] [address] )    • 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[facility] [address] )     BILATERALLY         Past Surgical History:   Past Surgical History:   Procedure Laterality Date   • CARDIAC CATHETERIZATION N/A 5/8/2023    Procedure: Cardiac Coronary Angiogram;  Surgeon: Travis Ngo DO;  Location: [facility]; Service: Cardiology   • CARDIAC ELECTROPHYSIOLOGY PROCEDURE N/A 3/25/2022    Procedure: Cardiac eps/afib ablation;  Surgeon: Phoenix Lindsey MD;  Location: [facility]; Service: Cardiology   • ESOPHAGOGASTRODUODENOSCOPY N/A 8/16/2016    Procedure: ESOPHAGOGASTRODUODENOSCOPY (EGD);   Surgeon: Kevin Mi MD;  Location: [facility] GI LAB; Service:    • HERNIA REPAIR Left     groin   • HERNIA REPAIR Right 1995   • OTHER SURGICAL HISTORY     • REPLACEMENT TOTAL KNEE Left 04/01/2013   • VASCULAR SURGERY Right     POPLITEAL BYPASS DUE TO ANEURYSM         Family History:  Family History   Problem Relation Age of Onset   • Pancreatic cancer Mother    • Stroke Father    • Heart attack Paternal Grandmother          Social History:    Social History     Substance and Sexual Activity   Alcohol Use Not Currently     Social History     Substance and Sexual Activity   Drug Use No     Social History     Tobacco Use   Smoking Status Never   Smokeless Tobacco Never       Home Medications:   Prior to Admission medications    Medication Sig Start Date End Date Taking? Authorizing Provider   apixaban (ELIQUIS) 5 mg Take 1 tablet (5 mg total) by mouth 2 (two) times a day 5/17/23  Yes Anirudh Brown DO   ascorbic acid (VITAMIN C) 500 mg tablet Take 500 mg by mouth daily   Yes Historical Provider, MD   clindamycin (CLEOCIN) 300 MG capsule Prior to dental work   10/18/22  Yes Historical Provider, MD   Coenzyme Q10 200 MG capsule Take 200 mg by mouth daily     Yes Historical Provider, MD   COLLAGEN PO Take 1 tablet by mouth   Yes Historical Provider, MD   diphenhydrAMINE (BENADRYL) 25 mg capsule Take 25 mg by mouth every 6 (six) hours as needed for itching Patient not sure of the doseage   Yes Historical Provider, MD   Flaxseed, Linseed, (FLAXSEED OIL) 1000 MG CAPS Take 1 capsule by mouth daily   Yes Historical Provider, MD [employer] Tea 250 MG CAPS Take 1 capsule by mouth daily   Yes Historical Provider, MD   Liver Extract 500 MG CAPS Take 1 capsule by mouth daily   Yes Historical Provider, MD   Magnesium Citrate 125 MG CAPS Take 1 capsule by mouth daily   Yes Historical Provider, MD   metoprolol tartrate (LOPRESSOR) 25 mg tablet Take 1 tablet (25 mg total) by mouth every 12 (twelve) hours 5/17/23  Yes Georges Leal DO Pancreatin 325 MG TABS Take 1 tablet by mouth daily   Yes Historical Provider, MD   Potassium Acetate LUCY Take by mouth   Yes Historical Provider, MD   Probiotic Product (PROBIOTIC-10 PO) Take 1 capsule by mouth daily   Yes Historical Provider, MD   rosuvastatin (CRESTOR) 20 MG tablet Take 1 tablet (20 mg total) by mouth daily 5/8/23  Yes DHEERAJ De Leon   Vitamin D, Cholecalciferol, 1000 units CAPS Take 2,000 Units by mouth daily   Yes Historical Provider, MD   aspirin (ECOTRIN LOW STRENGTH) 81 mg EC tablet Take 1 tablet (81 mg total) by mouth daily  Patient not taking: Reported on 5/17/2023 5/8/23   DHEERAJ De Leon   fluorouracil (EFUDEX) 5 % cream APPLY TWICE DAILY TO AREAS OF PRE-CANCERS FOR A MINIMUM OF 14 DAYS, MAXIMUM OF 28 DAYS  Patient not taking: Reported on 6/23/2023 2/23/23   Historical Provider, MD       Allergies:   Allergies   Allergen Reactions   • Oxycodone GI Intolerance     nausea  nausea       Review of Systems:  Review of Systems - History obtained from chart review and the patient  General ROS: positive for  - change in activity tolerance  negative for - chills, fever, malaise, sleep disturbance or weight gain  Psychological ROS: negative  Ophthalmic ROS: negative  ENT ROS: negative  Allergy and Immunology ROS: negative  Hematological and Lymphatic ROS: negative  Endocrine ROS: negative  Breast ROS: negative  Respiratory ROS: positive for - shortness of breath  negative for - cough, hemoptysis, orthopnea, pleuritic pain or wheezing  Cardiovascular ROS: positive for - dyspnea on exertion, edema and irregular heartbeat  negative for - chest pain, loss of consciousness, orthopnea, palpitations, paroxysmal nocturnal dyspnea or rapid heart rate  Gastrointestinal ROS: no abdominal pain, change in bowel habits, or black or bloody stools  Genito-Urinary ROS: no dysuria, trouble voiding, or hematuria  Musculoskeletal ROS: positive for - arthralgias  Neurological ROS: no TIA or stroke "symptoms  Dermatological ROS: negative    Vital Signs:     Vitals:    06/23/23 1046 06/23/23 1052   BP: 121/87 147/82   BP Location: Left arm Right arm   Patient Position: Sitting Sitting   Cuff Size: Standard Standard   Pulse: 65    Temp: 98 2 °F (36 8 °C)    TempSrc: Tympanic    SpO2: 98%    Weight: 116 kg (256 lb 12 8 oz)    Height: 6' 6\" (1 981 m)        Physical Exam:    General:  Alert, oriented, well developed, no acute distress  HEENT/NECK:  PERRLA  No jugular venous distention  Cardiac:Irregular rhythm, soft holosystolic murmur at apex  Carotid arteries: 2+ pulses, no bruits  Pulmonary:  Breath sounds clear bilaterally  Abdomen:  Non-tender, Non-distended  Positive bowel sounds  Upper extremities: 2+ radial pulses; brisk capillary refill; RIGHT hand dominant  Lower extremities: Extremities warm/dry  PT/DP pulses 1+ bilaterally  1+ edema B/L  Musculoskeletal: MAEE,s tbale gait  Neuro: Alert and oriented X 3  Sensation is grossly intact  No focal deficits  Skin: Warm/Dry, without rashes or lesions  Right leg wounds healed     Lab Results:     Lab Results   Component Value Date    SODIUM 141 05/08/2023    K 3 9 05/08/2023     (H) 05/08/2023    CO2 27 05/08/2023    BUN 15 05/08/2023    CREATININE 0 88 05/08/2023    GLUC 101 05/08/2023    CALCIUM 9 6 05/08/2023     Lab Results   Component Value Date    CHOLESTEROL 177 05/08/2023     Lab Results   Component Value Date    HDL 48 05/08/2023     Lab Results   Component Value Date    TRIG 93 05/08/2023     Lab Results   Component Value Date    [place] 129 05/08/2023     Lab Results   Component Value Date    TROPONINI 0 04 02/23/2021       Imaging Studies:     Cardiac Catheterization: 5/8/23  Left Main   The vessel is large  The vessel exhibits minimal luminal irregularities  Left Anterior Descending   The vessel is large  There is mild diffuse disease throughout the vessel  First Diagonal Branch   1st Diag lesion is 40% stenosed   RADHA flow " is 3  The lesion is focal  The stenosis was measured using QCA  Left Circumflex   The vessel is moderate in size  The vessel exhibits minimal luminal irregularities  First Obtuse Marginal Branch   The vessel is large  The vessel exhibits minimal luminal irregularities  1st Mrg lesion is 80% stenosed  RADHA flow is 3  The lesion is focal  The stenosis was measured using QCA  Right Coronary Artery   The vessel is large  There is mild diffuse disease throughout the vessel  Dist RCA lesion is 30% stenosed  RADHA flow is 3  EC23    Atrial fibrillation  Left anterior fascicular block  Incomplete right bundle branch block    3D  RANDALL: 22    •  Left Ventricle: Left ventricular cavity size is dilated  Wall thickness is normal  The left ventricular ejection fraction is 60%  Systolic function is normal  Wall motion is normal   •  Left Atrium: The atrium is dilated  •  Right Atrium: The atrium is dilated  •  Atrial Septum: No patent foramen ovale detected using color flow Doppler at rest   •  Left Atrial Appendage: There is reduced function  There is no thrombus  •  Aortic Valve: There is mild to moderate regurgitation with a centrally directed jet secondary to annular dilation  The AV PHT is 667 m/s  •  Mitral Valve: There is severe regurgitation with a centrally directed jet  There is malcoaptation of the mitral valve secondary to annular dilation  The mitral valve ERO is 0 40cm2  The regurgitant volume is 68 ml/beat  •  Tricuspid Valve: There is moderate regurgitation  •  Aorta: The aortic root is mildly dilated  The ascending aorta is moderately dilated  The aortic root is 4 10 cm  The ascending aorta is 4 5 cm        Echocardiogram: 22    •  Left Ventricle: Left ventricular cavity size is mildly dilated  Wall thickness is normal  The left ventricular ejection fraction is 60%   Systolic function is normal  Wall motion is normal   •  Left Atrium: The atrium is severely dilated  •  Right Atrium: The atrium is dilated  •  Aortic Valve: The aortic valve is trileaflet  The leaflets are mildly thickened  The leaflets are mildly calcified  There is moderately reduced mobility  There is mild regurgitation  There is mild to moderate stenosis  •  Mitral Valve: There is moderate to severe regurgitation with an eccentrically directed jet  •  Tricuspid Valve: There is moderate to severe regurgitation  The right ventricular systolic pressure is severely elevated  The estimated right ventricular systolic pressure is 15 46 mmHg  •  Aorta: The aortic root is normal in size  The aortic root is 3 60 cm      Findings    Left Ventricle Left ventricular cavity size is mildly dilated  Wall thickness is normal  The left ventricular ejection fraction is 60%  Systolic function is normal   Wall motion is normal  Unable to assess diastolic function due to atrial fibrillation  Right Ventricle Right ventricular cavity size is normal  Systolic function is normal  Normal tricuspid annular plane systolic excursion (TAPSE) > 1 7 cm  Wall thickness is normal       Left Atrium The atrium is severely dilated  Right Atrium The atrium is dilated  Aortic Valve The aortic valve is trileaflet  The leaflets are mildly thickened  The leaflets are mildly calcified  There is moderately reduced mobility  There is mild regurgitation  There is mild to moderate stenosis  Mitral Valve Mitral valve structure is normal  There is moderate to severe regurgitation with an eccentrically directed jet  There is no evidence of stenosis  Tricuspid Valve Tricuspid valve structure is normal  There is moderate to severe regurgitation  There is no evidence of stenosis  The right ventricular systolic pressure is severely elevated  The estimated right ventricular systolic pressure is 03 32 mmHg  Pulmonic Valve Pulmonic valve structure is normal  There is trace regurgitation  There is no evidence of stenosis  Ascending Aorta The aortic root is normal in size  The aortic root is 3 60 cm  IVC/SVC The right atrial pressure is estimated at 15 0 mmHg  The inferior vena cava is dilated  Respirophasic changes were blunted (less than 50% variation)  Pericardium There is no pericardial effusion  The pericardium is normal in appearance  A fat pad is present          Left Ventricle Measurements    Function/Volumes   A4C EF 52 %         Dimensions   LVIDd 5 3 cm         LVIDS 3 7 cm         IVSd 1 3 cm         LVPWd 1 3 cm         FS 30 %         Diastolic Filling   MV E' Tissue Velocity Septal 7 cm/s               Right Ventricle Measurements    Dimensions   RVID d 4 2 cm               Left Atrium Measurements    Dimensions   LA size 4 6 cm         LA length (A2C) 5 1 cm         JENNY A4C 32 9 cm2               Right Atrium Measurements    Dimensions   RAA A4C 34 2 cm2               Aortic Valve Measurements    Stenosis   Aortic valve peak velocity 4 5 m/s Abnormal          AV peak gradient 81 6 mmHg Abnormal          Regurgitation   AV peak gradient 84 mmHg         AV Deceleration Time 1,637 ms         AV regurgitation pressure 1/2 time 475 ms               Mitral Valve Measurements    Stenosis   MV VTI RETROGRADE 158 1 cm         MV mean gradient retrograde 77 mmHg         MR  mmHg               Tricuspid Valve Measurements    RVSP Parameters   TR Peak Luis A 3 5 m/s         Est  RA pres 15 mmHg         Triscuspid Valve Regurgitation Peak Gradient 50 mmHg         Right Ventricular Peak Systolic Pressure 65 mmHg               Aorta Measurements    Aortic Dimensions   Ao root 3 6 cm               IVC/SVC Measurements    IVC/SVC   Est  RA pres 15 mmHg                CTA chest: 11/11/22    AORTA/ARTERIAL VASCULATURE:       The ascending aorta measures 4 5 cm in diameter, measurement taken at the level of the crossing of the right main pulmonary artery, unchanged size compared to the comparison study in November 2021   There is no aortic dissection or intramural hematoma  Great vessels are patent and exhibit a classic branching pattern  The remainder the visualized thoracoabdominal aorta is of normal caliber without focal abnormality  Visualized mesenteric and renal vessels are patent        LE Arterial Duplex 5/19/23 [facility])    The femoral arteries appear patent without stenosis  The femoral-popliteal   bypass likewise appears patent without stenosis  Flow velocities in the bypass   range between 37 and 70 cm/s  Anterior tibial, posterior tibial, and peroneal   arteries appear patent with multiphasic Doppler signals within their lumens, and   flow velocities ranging between 46 and 61 cm/s  Left lower extremity:     The femoral arteries appear patent without stenosis  The femoral-popliteal   bypass likewise appears patent without stenosis  Flow velocities in the bypass   range between 38 and 72 cm/s  Anterior tibial and posterior tibial arteries   appear patent with multiphasic Doppler signals within their lumens, and flow   velocities ranging between 65 and 66 cm/s  Clinical correlation is suggested  Carotid Duplex:4/28/23    RIGHT:  There is <50% stenosis noted in the internal carotid artery  Plaque is  homogenous and smooth  Vertebral artery flow is antegrade  There is no significant subclavian artery  disease  LEFT:  There is <50% stenosis noted in the internal carotid artery  Plaque is  homogenous and smooth  Vertebral artery flow is antegrade  There is no significant subclavian artery  disease      Vein Mapping: ordered      I have personally reviewed pertinent films in PACS    Assessment:  Patient Active Problem List    Diagnosis Date Noted   • Coronary artery disease involving native coronary artery 05/08/2023   • Aortic insufficiency 04/21/2023   • Permanent atrial fibrillation ([address] ) 04/21/2023   • Chronic osteomyelitis of right tibia with draining sinus ([address] ) 01/24/2023   • Pulmonary hypertension ([place] [address] ) 02/22/2021   • Erythrocytosis 02/22/2021   • Streptococcal bacteremia 08/17/2020   • Callus of foot 08/16/2020   • Sepsis ([place] [address] ) 08/15/2020   • Hypoxia 08/15/2020   • Aneurysm of ascending aorta ([place] [address] ) 04/11/2019   • Mixed hyperlipidemia 06/29/2018   • Mitral valve insufficiency 10/31/2017   • Paroxysmal atrial fibrillation ([place] [address] ) 11/18/2016   • History of DVT (deep vein thrombosis) 08/15/2016   • Hypertension    • Atrial fibrillation with RVR ([place] [address] ) 08/13/2016   • 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[place] [address] ) 08/13/2016   • Bilateral popliteal artery aneurysm ([place] [address] ) 07/12/2016   • Benign essential hypertension 05/11/2009         Impression/Plan:    Ascending Aortic Aneurysm- 4 5 cm, stable  Aortic Insufficiency  Mitral Insufficiency  Coronary Artery Disease  Chronic Atrial Fibrillation    Risks and benefits of aortic valve replacement, mitral valve repair vs replacement, ligation of left atrial appendage and possible coronary artery bypass grafting x 1 (SVG to OM1) [f vein mapping reveals available GSV for use as conduit] were discussed in detail today with the patient  We have reviewed results all preoperative testing  He have ordered vein mapping, CXR and pre-op laboratory studies  Patient understands and wish to proceed with surgical intervention on 7/12/23 with SHELLI Velasco  Pre-op instructions reviewed with patient and he was instructed to stop now: CoQ 10, Flaxseed oil, Green Tea capsules and Liver extract supplements and stop Eliquis 5 days before surgery  Lexii Millan  was comfortable with our recommendations, and his questions were answered to his satisfaction  Thank you for allowing us to participate in the care of this patient       Routine referral to gastroenterology for colonoscopy screening was not indicated, as the patient is over 75years old    SIGNATURE: Kirby Ball, [address] [place] [address] Avenue: June 23, 2023  TIME: 11:18 AM

## 2023-06-23 NOTE — PROGRESS NOTES
Pre-Op History & Physical - Cardiothoracic Surgery   Mike Lockett  77 y o  male MRN: [id number]    PCP: Jennifer Schwarz DO  Cardiologist: Gume Casas DO  Vascular Surgeon: Divya Foster)  Orthopedic Surgeon: Priya Glass MD [facility])    Reason for Consult / Principal Problem: Ascending Aortic Aneurysm, Aortic Valve Insufficiency, Mitral Valve Regurgitation, Tricuspid Regurgitation, Persistent Atrial Fibrillation, Coronary Artery Disease    History of Present Illness: Mike Lockett  is a 77y o  year old male known to our aortic clinic, followed since 2016/2017 for a stable ascending aortic aneurysm  Patient's PMHx is notable for HTN, HLD, Atrial Fibrillation s/p ablation (2018 & 2021) on Eliquis, PAH, AI, MR, TR, h/o DVT, h/o mycotic b/l popliteal aneurysms s/p fem-pop [facility]es [facility]/ [facility] & I&D of infected R popliteal aneurysm with IR embolization (3/2022), DJD s/p b/l TKR's (2013) with R LE wound (pseudomas aeruginosa) s/p debridement & skin grafting (4/4/23-[facility]), chronic R tibia osteomyelitis, b/l LE venous insufficiency and h/o RLE melanoma (2010/2011)     Patient was seen in our office November 2022 for routine follow up for his ascending aortic aneurysm, found to be stable on CT scan and measuring 4 5 cm  Echo in November 2022 demonstrated progression of his valvular disease with mod-severe MR, mod-severe TR, mild AI and mild-mod AS  He was noted to have recurrent Afib around this time and reported worsening LE edema and SOB  He also reported a chronically draining wound in his right lower leg, just distal to his knee  Patient was scheduled that same day for RANDALL/DCCV  We recommended waiting for RANDALL evaluation of his valvular disease and recommended he have the chronic wound addressed      Patient to our office on 4/21/23 for follow up  He had been evaluated by his vascular surgeon at [facility] in December 2022  He has been evlauated by orthopedic surgeon at [facility]    He had an MRI of his right leg (Dec 2022) that indicated a suspicion for osteomyelitis of his R Tibia  He had a biopsy (2/2023) of the right lower leg wound and sinus tract with cultures positive for pseudomonas  He had a PICC line placed and underwent RLE wound debridement and split thickness skin grafting (donor R upper thigh) and started on 6 weeks of IV antibiotics  He is followed by ID specialists ([place]) and Dr Christopher Stanford ([place])  He completed his IV antibiotic on 5/4/22 and PICC line has been removed  He has been seen again by Vascular surgery on 5/19/23 and deemed stable  He had follow-up with Orthopaedics ([facility]) on 5/22/23 and cleared to proceed with cardiac surgery  He has undergone cardiac cath (5/8/23) that demonstrates D1 40%, OM1 80% and dRCA 30%  Upon interview today patient reports SOB/ TINSLEY with minimal activity such as ADL's in the AM or walking around the house or climbing stairs  He reports his LE edema is stable  He reports his right leg wounds are completely healed and he denies fever, chills or malaise  He denies chest pain, lightheadedness, palpitations or claudication      Patient denies tobacco, alcohol or substance abuse      Up to date with dental care  Past Medical History:  Past Medical History:   Diagnosis Date   • Cardiac disease    • Hypertension    • PAF (paroxysmal atrial fibrillation) ([facility] [address] )    • 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[facility] [address] )     BILATERALLY         Past Surgical History:   Past Surgical History:   Procedure Laterality Date   • CARDIAC CATHETERIZATION N/A 5/8/2023    Procedure: Cardiac Coronary Angiogram;  Surgeon: Ramo Onofre DO;  Location: [facility]; Service: Cardiology   • CARDIAC ELECTROPHYSIOLOGY PROCEDURE N/A 3/25/2022    Procedure: Cardiac eps/afib ablation;  Surgeon: Radha Nam MD;  Location: [facility]; Service: Cardiology   • ESOPHAGOGASTRODUODENOSCOPY N/A 8/16/2016    Procedure: ESOPHAGOGASTRODUODENOSCOPY (EGD);   Surgeon: Mignon Angel MD;  Location: [facility] GI LAB; Service:    • HERNIA REPAIR Left     groin   • HERNIA REPAIR Right 1995   • OTHER SURGICAL HISTORY     • REPLACEMENT TOTAL KNEE Left 04/01/2013   • VASCULAR SURGERY Right     POPLITEAL BYPASS DUE TO ANEURYSM         Family History:  Family History   Problem Relation Age of Onset   • Pancreatic cancer Mother    • Stroke Father    • Heart attack Paternal Grandmother          Social History:    Social History     Substance and Sexual Activity   Alcohol Use Not Currently     Social History     Substance and Sexual Activity   Drug Use No     Social History     Tobacco Use   Smoking Status Never   Smokeless Tobacco Never       Home Medications:   Prior to Admission medications    Medication Sig Start Date End Date Taking? Authorizing Provider   apixaban (ELIQUIS) 5 mg Take 1 tablet (5 mg total) by mouth 2 (two) times a day 5/17/23  Yes Anirudh Brown DO   ascorbic acid (VITAMIN C) 500 mg tablet Take 500 mg by mouth daily   Yes Historical Provider, MD   clindamycin (CLEOCIN) 300 MG capsule Prior to dental work   10/18/22  Yes Historical Provider, MD   Coenzyme Q10 200 MG capsule Take 200 mg by mouth daily     Yes Historical Provider, MD   COLLAGEN PO Take 1 tablet by mouth   Yes Historical Provider, MD   diphenhydrAMINE (BENADRYL) 25 mg capsule Take 25 mg by mouth every 6 (six) hours as needed for itching Patient not sure of the doseage   Yes Historical Provider, MD   Flaxseed, Linseed, (FLAXSEED OIL) 1000 MG CAPS Take 1 capsule by mouth daily   Yes Historical Provider, MD Price Paris Tea 250 MG CAPS Take 1 capsule by mouth daily   Yes Historical Provider, MD   Liver Extract 500 MG CAPS Take 1 capsule by mouth daily   Yes Historical Provider, MD   Magnesium Citrate 125 MG CAPS Take 1 capsule by mouth daily   Yes Historical Provider, MD   metoprolol tartrate (LOPRESSOR) 25 mg tablet Take 1 tablet (25 mg total) by mouth every 12 (twelve) hours 5/17/23  Yes Erik Braga DO Pancreatin 325 MG TABS Take 1 tablet by mouth daily   Yes Historical Provider, MD   Potassium Acetate LUCY Take by mouth   Yes Historical Provider, MD   Probiotic Product (PROBIOTIC-10 PO) Take 1 capsule by mouth daily   Yes Historical Provider, MD   rosuvastatin (CRESTOR) 20 MG tablet Take 1 tablet (20 mg total) by mouth daily 5/8/23  Yes DHEERAJ Pizano   Vitamin D, Cholecalciferol, 1000 units CAPS Take 2,000 Units by mouth daily   Yes Historical Provider, MD   aspirin (ECOTRIN LOW STRENGTH) 81 mg EC tablet Take 1 tablet (81 mg total) by mouth daily  Patient not taking: Reported on 5/17/2023 5/8/23   DHEERAJ Pizano   fluorouracil (EFUDEX) 5 % cream APPLY TWICE DAILY TO AREAS OF PRE-CANCERS FOR A MINIMUM OF 14 DAYS, MAXIMUM OF 28 DAYS  Patient not taking: Reported on 6/23/2023 2/23/23   Historical Provider, MD       Allergies:   Allergies   Allergen Reactions   • Oxycodone GI Intolerance     nausea  nausea       Review of Systems:  Review of Systems - History obtained from chart review and the patient  General ROS: positive for  - change in activity tolerance  negative for - chills, fever, malaise, sleep disturbance or weight gain  Psychological ROS: negative  Ophthalmic ROS: negative  ENT ROS: negative  Allergy and Immunology ROS: negative  Hematological and Lymphatic ROS: negative  Endocrine ROS: negative  Breast ROS: negative  Respiratory ROS: positive for - shortness of breath  negative for - cough, hemoptysis, orthopnea, pleuritic pain or wheezing  Cardiovascular ROS: positive for - dyspnea on exertion, edema and irregular heartbeat  negative for - chest pain, loss of consciousness, orthopnea, palpitations, paroxysmal nocturnal dyspnea or rapid heart rate  Gastrointestinal ROS: no abdominal pain, change in bowel habits, or black or bloody stools  Genito-Urinary ROS: no dysuria, trouble voiding, or hematuria  Musculoskeletal ROS: positive for - arthralgias  Neurological ROS: no TIA or stroke "symptoms  Dermatological ROS: negative    Vital Signs:     Vitals:    06/23/23 1046 06/23/23 1052   BP: 121/87 147/82   BP Location: Left arm Right arm   Patient Position: Sitting Sitting   Cuff Size: Standard Standard   Pulse: 65    Temp: 98 2 °F (36 8 °C)    TempSrc: Tympanic    SpO2: 98%    Weight: 116 kg (256 lb 12 8 oz)    Height: 6' 6\" (1 981 m)        Physical Exam:    General:  Alert, oriented, well developed, no acute distress  HEENT/NECK:  PERRLA  No jugular venous distention  Cardiac:Irregular rhythm, soft holosystolic murmur at apex  Carotid arteries: 2+ pulses, no bruits  Pulmonary:  Breath sounds clear bilaterally  Abdomen:  Non-tender, Non-distended  Positive bowel sounds  Upper extremities: 2+ radial pulses; brisk capillary refill; RIGHT hand dominant  Lower extremities: Extremities warm/dry  PT/DP pulses 1+ bilaterally  1+ edema B/L  Musculoskeletal: MAEE,s tbale gait  Neuro: Alert and oriented X 3  Sensation is grossly intact  No focal deficits  Skin: Warm/Dry, without rashes or lesions  Right leg wounds healed     Lab Results:     Lab Results   Component Value Date    SODIUM 141 05/08/2023    K 3 9 05/08/2023     (H) 05/08/2023    CO2 27 05/08/2023    BUN 15 05/08/2023    CREATININE 0 88 05/08/2023    GLUC 101 05/08/2023    CALCIUM 9 6 05/08/2023     Lab Results   Component Value Date    CHOLESTEROL 177 05/08/2023     Lab Results   Component Value Date    HDL 48 05/08/2023     Lab Results   Component Value Date    TRIG 93 05/08/2023     Lab Results   Component Value Date    [place] 129 05/08/2023     Lab Results   Component Value Date    TROPONINI 0 04 02/23/2021       Imaging Studies:     Cardiac Catheterization: 5/8/23  Left Main   The vessel is large  The vessel exhibits minimal luminal irregularities  Left Anterior Descending   The vessel is large  There is mild diffuse disease throughout the vessel  First Diagonal Branch   1st Diag lesion is 40% stenosed   RADHA flow " is 3  The lesion is focal  The stenosis was measured using QCA  Left Circumflex   The vessel is moderate in size  The vessel exhibits minimal luminal irregularities  First Obtuse Marginal Branch   The vessel is large  The vessel exhibits minimal luminal irregularities  1st Mrg lesion is 80% stenosed  RADHA flow is 3  The lesion is focal  The stenosis was measured using QCA  Right Coronary Artery   The vessel is large  There is mild diffuse disease throughout the vessel  Dist RCA lesion is 30% stenosed  RADHA flow is 3  EC23    Atrial fibrillation  Left anterior fascicular block  Incomplete right bundle branch block    3D  RANDALL: 22    •  Left Ventricle: Left ventricular cavity size is dilated  Wall thickness is normal  The left ventricular ejection fraction is 60%  Systolic function is normal  Wall motion is normal   •  Left Atrium: The atrium is dilated  •  Right Atrium: The atrium is dilated  •  Atrial Septum: No patent foramen ovale detected using color flow Doppler at rest   •  Left Atrial Appendage: There is reduced function  There is no thrombus  •  Aortic Valve: There is mild to moderate regurgitation with a centrally directed jet secondary to annular dilation  The AV PHT is 667 m/s  •  Mitral Valve: There is severe regurgitation with a centrally directed jet  There is malcoaptation of the mitral valve secondary to annular dilation  The mitral valve ERO is 0 40cm2  The regurgitant volume is 68 ml/beat  •  Tricuspid Valve: There is moderate regurgitation  •  Aorta: The aortic root is mildly dilated  The ascending aorta is moderately dilated  The aortic root is 4 10 cm  The ascending aorta is 4 5 cm        Echocardiogram: 22    •  Left Ventricle: Left ventricular cavity size is mildly dilated  Wall thickness is normal  The left ventricular ejection fraction is 60%   Systolic function is normal  Wall motion is normal   •  Left Atrium: The atrium is severely dilated  •  Right Atrium: The atrium is dilated  •  Aortic Valve: The aortic valve is trileaflet  The leaflets are mildly thickened  The leaflets are mildly calcified  There is moderately reduced mobility  There is mild regurgitation  There is mild to moderate stenosis  •  Mitral Valve: There is moderate to severe regurgitation with an eccentrically directed jet  •  Tricuspid Valve: There is moderate to severe regurgitation  The right ventricular systolic pressure is severely elevated  The estimated right ventricular systolic pressure is 32 55 mmHg  •  Aorta: The aortic root is normal in size  The aortic root is 3 60 cm      Findings    Left Ventricle Left ventricular cavity size is mildly dilated  Wall thickness is normal  The left ventricular ejection fraction is 60%  Systolic function is normal   Wall motion is normal  Unable to assess diastolic function due to atrial fibrillation  Right Ventricle Right ventricular cavity size is normal  Systolic function is normal  Normal tricuspid annular plane systolic excursion (TAPSE) > 1 7 cm  Wall thickness is normal       Left Atrium The atrium is severely dilated  Right Atrium The atrium is dilated  Aortic Valve The aortic valve is trileaflet  The leaflets are mildly thickened  The leaflets are mildly calcified  There is moderately reduced mobility  There is mild regurgitation  There is mild to moderate stenosis  Mitral Valve Mitral valve structure is normal  There is moderate to severe regurgitation with an eccentrically directed jet  There is no evidence of stenosis  Tricuspid Valve Tricuspid valve structure is normal  There is moderate to severe regurgitation  There is no evidence of stenosis  The right ventricular systolic pressure is severely elevated  The estimated right ventricular systolic pressure is 46 73 mmHg  Pulmonic Valve Pulmonic valve structure is normal  There is trace regurgitation  There is no evidence of stenosis  Ascending Aorta The aortic root is normal in size  The aortic root is 3 60 cm  IVC/SVC The right atrial pressure is estimated at 15 0 mmHg  The inferior vena cava is dilated  Respirophasic changes were blunted (less than 50% variation)  Pericardium There is no pericardial effusion  The pericardium is normal in appearance  A fat pad is present          Left Ventricle Measurements    Function/Volumes   A4C EF 52 %         Dimensions   LVIDd 5 3 cm         LVIDS 3 7 cm         IVSd 1 3 cm         LVPWd 1 3 cm         FS 30 %         Diastolic Filling   MV E' Tissue Velocity Septal 7 cm/s               Right Ventricle Measurements    Dimensions   RVID d 4 2 cm               Left Atrium Measurements    Dimensions   LA size 4 6 cm         LA length (A2C) 5 1 cm         JENNY A4C 32 9 cm2               Right Atrium Measurements    Dimensions   RAA A4C 34 2 cm2               Aortic Valve Measurements    Stenosis   Aortic valve peak velocity 4 5 m/s Abnormal          AV peak gradient 81 6 mmHg Abnormal          Regurgitation   AV peak gradient 84 mmHg         AV Deceleration Time 1,637 ms         AV regurgitation pressure 1/2 time 475 ms               Mitral Valve Measurements    Stenosis   MV VTI RETROGRADE 158 1 cm         MV mean gradient retrograde 77 mmHg         MR  mmHg               Tricuspid Valve Measurements    RVSP Parameters   TR Peak Luis A 3 5 m/s         Est  RA pres 15 mmHg         Triscuspid Valve Regurgitation Peak Gradient 50 mmHg         Right Ventricular Peak Systolic Pressure 65 mmHg               Aorta Measurements    Aortic Dimensions   Ao root 3 6 cm               IVC/SVC Measurements    IVC/SVC   Est  RA pres 15 mmHg                CTA chest: 11/11/22    AORTA/ARTERIAL VASCULATURE:       The ascending aorta measures 4 5 cm in diameter, measurement taken at the level of the crossing of the right main pulmonary artery, unchanged size compared to the comparison study in November 2021   There is no aortic dissection or intramural hematoma  Great vessels are patent and exhibit a classic branching pattern  The remainder the visualized thoracoabdominal aorta is of normal caliber without focal abnormality  Visualized mesenteric and renal vessels are patent        LE Arterial Duplex 5/19/23 [facility])    The femoral arteries appear patent without stenosis  The femoral-popliteal   bypass likewise appears patent without stenosis  Flow velocities in the bypass   range between 37 and 70 cm/s  Anterior tibial, posterior tibial, and peroneal   arteries appear patent with multiphasic Doppler signals within their lumens, and   flow velocities ranging between 46 and 61 cm/s  Left lower extremity:     The femoral arteries appear patent without stenosis  The femoral-popliteal   bypass likewise appears patent without stenosis  Flow velocities in the bypass   range between 38 and 72 cm/s  Anterior tibial and posterior tibial arteries   appear patent with multiphasic Doppler signals within their lumens, and flow   velocities ranging between 65 and 66 cm/s  Clinical correlation is suggested  Carotid Duplex:4/28/23    RIGHT:  There is <50% stenosis noted in the internal carotid artery  Plaque is  homogenous and smooth  Vertebral artery flow is antegrade  There is no significant subclavian artery  disease  LEFT:  There is <50% stenosis noted in the internal carotid artery  Plaque is  homogenous and smooth  Vertebral artery flow is antegrade  There is no significant subclavian artery  disease      Vein Mapping: ordered      I have personally reviewed pertinent films in PACS    Assessment:  Patient Active Problem List    Diagnosis Date Noted   • Coronary artery disease involving native coronary artery 05/08/2023   • Aortic insufficiency 04/21/2023   • Permanent atrial fibrillation ([address] ) 04/21/2023   • Chronic osteomyelitis of right tibia with draining sinus ([address] ) 01/24/2023   • Pulmonary hypertension ([place] [address] ) 02/22/2021   • Erythrocytosis 02/22/2021   • Streptococcal bacteremia 08/17/2020   • Callus of foot 08/16/2020   • Sepsis ([place] [address] ) 08/15/2020   • Hypoxia 08/15/2020   • Aneurysm of ascending aorta ([place] [address] ) 04/11/2019   • Mixed hyperlipidemia 06/29/2018   • Mitral valve insufficiency 10/31/2017   • Paroxysmal atrial fibrillation ([place] [address] ) 11/18/2016   • History of DVT (deep vein thrombosis) 08/15/2016   • Hypertension    • Atrial fibrillation with RVR ([place] [address] ) 08/13/2016   • 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[place] [address] ) 08/13/2016   • Bilateral popliteal artery aneurysm ([place] [address] ) 07/12/2016   • Benign essential hypertension 05/11/2009         Impression/Plan:    Ascending Aortic Aneurysm- 4 5 cm, stable  Aortic Insufficiency  Mitral Insufficiency  Coronary Artery Disease  Chronic Atrial Fibrillation    Risks and benefits of aortic valve replacement, mitral valve repair vs replacement, ligation of left atrial appendage and possible coronary artery bypass grafting x 1 (SVG to OM1) [f vein mapping reveals available GSV for use as conduit] were discussed in detail today with the patient  We have reviewed results all preoperative testing  He have ordered vein mapping, CXR and pre-op laboratory studies  Patient understands and wish to proceed with surgical intervention on 7/12/23 with SHELLI Roldan  Pre-op instructions reviewed with patient and he was instructed to stop now: CoQ 10, Flaxseed oil, Green Tea capsules and Liver extract supplements and stop Eliquis 5 days before surgery  Lia Curry  was comfortable with our recommendations, and his questions were answered to his satisfaction  Thank you for allowing us to participate in the care of this patient       Routine referral to gastroenterology for colonoscopy screening was not indicated, as the patient is over 75years old    SIGNATURE: Nikhil Dotson, [address] [place] [address] Avenue: June 23, 2023  TIME: 11:18 AM

## 2023-06-23 NOTE — H&P (VIEW-ONLY)
Pre-Op History & Physical - Cardiothoracic Surgery   Wenceslao Carrion. 77 y.o. male MRN: [id number]    PCP: Josie Márquez DO  Cardiologist: Lashonda Lara DO  Vascular Surgeon: Lindsey Sue)  Orthopedic Surgeon: Alexander Lewis MD [facility])    Reason for Consult / Principal Problem: Ascending Aortic Aneurysm, Aortic Valve Insufficiency, Mitral Valve Regurgitation, Tricuspid Regurgitation, Persistent Atrial Fibrillation, Coronary Artery Disease    History of Present Illness: Wenceslao Tellez is a 77y.o. year old male known to our aortic clinic, followed since 2016/2017 for a stable ascending aortic aneurysm. 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    Patient was seen in our office November 2022 for routine follow up for his ascending aortic aneurysm, found to be stable on CT scan and measuring 4.5 cm. Echo in November 2022 demonstrated progression of his valvular disease with mod-severe MR, mod-severe TR, mild AI and mild-mod AS. He was noted to have recurrent Afib around this time and reported worsening LE edema and SOB. He also reported a chronically draining wound in his right lower leg, just distal to his knee. Patient was scheduled that same day for RANDALL/DCCV. We recommended waiting for RANDALL evaluation of his valvular disease and recommended he have the chronic wound addressed.     Patient to our office on 4/21/23 for follow up. He had been evaluated by his vascular surgeon at [facility] in December 2022. He has been evlauated by orthopedic surgeon at [facility].   He had an MRI of his right leg (Dec 2022) that indicated a suspicion for osteomyelitis of his R Tibia. He had a biopsy (2/2023) of the right lower leg wound and sinus tract with cultures positive for pseudomonas. He had a PICC line placed and underwent RLE wound debridement and split thickness skin grafting (donor R upper thigh) and started on 6 weeks of IV antibiotics. He is followed by ID specialists ([place]) and Dr. Gabriel Pearson ([place]). He completed his IV antibiotic on 5/4/22 and PICC line has been removed. He has been seen again by Vascular surgery on 5/19/23 and deemed stable. He had follow-up with Orthopaedics ([facility]) on 5/22/23 and cleared to proceed with cardiac surgery. He has undergone cardiac cath (5/8/23) that demonstrates D1 40%, OM1 80% and dRCA 30%. Upon interview today patient reports SOB/ TINSLEY with minimal activity such as ADL's in the AM or walking around the house or climbing stairs. He reports his LE edema is stable. He reports his right leg wounds are completely healed and he denies fever, chills or malaise. He denies chest pain, lightheadedness, palpitations or claudication.     Patient denies tobacco, alcohol or substance abuse.     Up to date with dental care. Past Medical History:  Past Medical History:   Diagnosis Date   • Cardiac disease    • Hypertension    • PAF (paroxysmal atrial fibrillation) ([address])    • 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[address])     BILATERALLY         Past Surgical History:   Past Surgical History:   Procedure Laterality Date   • CARDIAC CATHETERIZATION N/A 5/8/2023    Procedure: Cardiac Coronary Angiogram;  Surgeon: Jolynn Kenney DO;  Location: [facility]; Service: Cardiology   • CARDIAC ELECTROPHYSIOLOGY PROCEDURE N/A 3/25/2022    Procedure: Cardiac eps/afib ablation;  Surgeon: Tamie Rizo MD;  Location: [facility] CARD[facility]; Service: Cardiology   • ESOPHAGOGASTRODUODENOSCOPY N/A 8/16/2016    Procedure: ESOPHAGOGASTRODUODENOSCOPY (EGD);   Surgeon: Araseli Rizzo MD;  Location: [facility] GI LAB; Service:    • HERNIA REPAIR Left     groin   • HERNIA REPAIR Right 1995   • OTHER SURGICAL HISTORY     • REPLACEMENT TOTAL KNEE Left 04/01/2013   • VASCULAR SURGERY Right     POPLITEAL BYPASS DUE TO ANEURYSM         Family History:  Family History   Problem Relation Age of Onset   • Pancreatic cancer Mother    • Stroke Father    • Heart attack Paternal Grandmother          Social History:    Social History     Substance and Sexual Activity   Alcohol Use Not Currently     Social History     Substance and Sexual Activity   Drug Use No     Social History     Tobacco Use   Smoking Status Never   Smokeless Tobacco Never       Home Medications:   Prior to Admission medications    Medication Sig Start Date End Date Taking? Authorizing Provider   apixaban (ELIQUIS) 5 mg Take 1 tablet (5 mg total) by mouth 2 (two) times a day 5/17/23  Yes Anirudh Brown DO   ascorbic acid (VITAMIN C) 500 mg tablet Take 500 mg by mouth daily   Yes Historical Provider, MD   clindamycin (CLEOCIN) 300 MG capsule Prior to dental work.  10/18/22  Yes Historical Provider, MD   Coenzyme Q10 200 MG capsule Take 200 mg by mouth daily     Yes Historical Provider, MD   COLLAGEN PO Take 1 tablet by mouth   Yes Historical Provider, MD   diphenhydrAMINE (BENADRYL) 25 mg capsule Take 25 mg by mouth every 6 (six) hours as needed for itching Patient not sure of the doseage   Yes Historical Provider, MD   Flaxseed, Linseed, (FLAXSEED OIL) 1000 MG CAPS Take 1 capsule by mouth daily   Yes Historical Provider, MD Lindsey Landsman Tea 250 MG CAPS Take 1 capsule by mouth daily   Yes Historical Provider, MD   Liver Extract 500 MG CAPS Take 1 capsule by mouth daily   Yes Historical Provider, MD   Magnesium Citrate 125 MG CAPS Take 1 capsule by mouth daily   Yes Historical Provider, MD   metoprolol tartrate (LOPRESSOR) 25 mg tablet Take 1 tablet (25 mg total) by mouth every 12 (twelve) hours 5/17/23  Yes Oscar Meneses DO Pancreatin 325 MG TABS Take 1 tablet by mouth daily   Yes Historical Provider, MD   Potassium Acetate LUCY Take by mouth   Yes Historical Provider, MD   Probiotic Product (PROBIOTIC-10 PO) Take 1 capsule by mouth daily   Yes Historical Provider, MD   rosuvastatin (CRESTOR) 20 MG tablet Take 1 tablet (20 mg total) by mouth daily 5/8/23  Yes DHEERAJ Schulz   Vitamin D, Cholecalciferol, 1000 units CAPS Take 2,000 Units by mouth daily   Yes Historical Provider, MD   aspirin (ECOTRIN LOW STRENGTH) 81 mg EC tablet Take 1 tablet (81 mg total) by mouth daily  Patient not taking: Reported on 5/17/2023 5/8/23   DHEERAJ Schulz   fluorouracil (EFUDEX) 5 % cream APPLY TWICE DAILY TO AREAS OF PRE-CANCERS FOR A MINIMUM OF 14 DAYS, MAXIMUM OF 28 DAYS  Patient not taking: Reported on 6/23/2023 2/23/23   Historical Provider, MD       Allergies:   Allergies   Allergen Reactions   • Oxycodone GI Intolerance     nausea  nausea       Review of Systems:  Review of Systems - History obtained from chart review and the patient  General ROS: positive for  - change in activity tolerance  negative for - chills, fever, malaise, sleep disturbance or weight gain  Psychological ROS: negative  Ophthalmic ROS: negative  ENT ROS: negative  Allergy and Immunology ROS: negative  Hematological and Lymphatic ROS: negative  Endocrine ROS: negative  Breast ROS: negative  Respiratory ROS: positive for - shortness of breath  negative for - cough, hemoptysis, orthopnea, pleuritic pain or wheezing  Cardiovascular ROS: positive for - dyspnea on exertion, edema and irregular heartbeat  negative for - chest pain, loss of consciousness, orthopnea, palpitations, paroxysmal nocturnal dyspnea or rapid heart rate  Gastrointestinal ROS: no abdominal pain, change in bowel habits, or black or bloody stools  Genito-Urinary ROS: no dysuria, trouble voiding, or hematuria  Musculoskeletal ROS: positive for - arthralgias  Neurological ROS: no TIA or stroke symptoms  Dermatological ROS: negative    Vital Signs:     Vitals:    06/23/23 1046 06/23/23 1052   BP: 121/87 147/82   BP Location: Left arm Right arm   Patient Position: Sitting Sitting   Cuff Size: Standard Standard   Pulse: 65    Temp: 98.2 °F (36.8 °C)    TempSrc: Tympanic    SpO2: 98%    Weight: 116 kg (256 lb 12.8 oz)    Height: 6' 6" (1.981 m)        Physical Exam:    General:  Alert, oriented, well developed, no acute distress  HEENT/NECK:  PERRLA. No jugular venous distention. Cardiac:Irregular rhythm, soft holosystolic murmur at apex. Carotid arteries: 2+ pulses, no bruits  Pulmonary:  Breath sounds clear bilaterally. Abdomen:  Non-tender, Non-distended. Positive bowel sounds. Upper extremities: 2+ radial pulses; brisk capillary refill; RIGHT hand dominant  Lower extremities: Extremities warm/dry. PT/DP pulses 1+ bilaterally. 1+ edema B/L  Musculoskeletal: MAEE,s tbale gait  Neuro: Alert and oriented X 3. Sensation is grossly intact. No focal deficits. Skin: Warm/Dry, without rashes or lesions. Right leg wounds healed     Lab Results:     Lab Results   Component Value Date    SODIUM 141 05/08/2023    K 3.9 05/08/2023     (H) 05/08/2023    CO2 27 05/08/2023    BUN 15 05/08/2023    CREATININE 0.88 05/08/2023    GLUC 101 05/08/2023    CALCIUM 9.6 05/08/2023     Lab Results   Component Value Date    CHOLESTEROL 177 05/08/2023     Lab Results   Component Value Date    HDL 48 05/08/2023     Lab Results   Component Value Date    TRIG 93 05/08/2023     Lab Results   Component Value Date    [address] 05/08/2023     Lab Results   Component Value Date    TROPONINI 0.04 02/23/2021       Imaging Studies:     Cardiac Catheterization: 5/8/23  Left Main   The vessel is large. The vessel exhibits minimal luminal irregularities. Left Anterior Descending   The vessel is large. There is mild diffuse disease throughout the vessel. First Diagonal Branch   1st Diag lesion is 40% stenosed.  RADHA flow is 3. The lesion is focal. The stenosis was measured using QCA. Left Circumflex   The vessel is moderate in size. The vessel exhibits minimal luminal irregularities. First Obtuse Marginal Branch   The vessel is large. The vessel exhibits minimal luminal irregularities. 1st Mrg lesion is 80% stenosed. RADHA flow is 3. The lesion is focal. The stenosis was measured using QCA. Right Coronary Artery   The vessel is large. There is mild diffuse disease throughout the vessel. Dist RCA lesion is 30% stenosed. RADHA flow is 3. EC23    Atrial fibrillation  Left anterior fascicular block  Incomplete right bundle branch block    3D  RANDALL: 22    •  Left Ventricle: Left ventricular cavity size is dilated. Wall thickness is normal. The left ventricular ejection fraction is 60%. Systolic function is normal. Wall motion is normal.  •  Left Atrium: The atrium is dilated. •  Right Atrium: The atrium is dilated. •  Atrial Septum: No patent foramen ovale detected using color flow Doppler at rest.  •  Left Atrial Appendage: There is reduced function. There is no thrombus. •  Aortic Valve: There is mild to moderate regurgitation with a centrally directed jet secondary to annular dilation. The AV PHT is 667 m/s. •  Mitral Valve: There is severe regurgitation with a centrally directed jet. There is malcoaptation of the mitral valve secondary to annular dilation. The mitral valve ERO is 0.40cm2. The regurgitant volume is 68 ml/beat. •  Tricuspid Valve: There is moderate regurgitation. •  Aorta: The aortic root is mildly dilated. The ascending aorta is moderately dilated. The aortic root is 4.10 cm. The ascending aorta is 4.5 cm.       Echocardiogram: 22    •  Left Ventricle: Left ventricular cavity size is mildly dilated. Wall thickness is normal. The left ventricular ejection fraction is 60%.  Systolic function is normal. Wall motion is normal.  •  Left Atrium: The atrium is severely dilated. •  Right Atrium: The atrium is dilated. •  Aortic Valve: The aortic valve is trileaflet. The leaflets are mildly thickened. The leaflets are mildly calcified. There is moderately reduced mobility. There is mild regurgitation. There is mild to moderate stenosis. •  Mitral Valve: There is moderate to severe regurgitation with an eccentrically directed jet. •  Tricuspid Valve: There is moderate to severe regurgitation. The right ventricular systolic pressure is severely elevated. The estimated right ventricular systolic pressure is 20.14 mmHg. •  Aorta: The aortic root is normal in size. The aortic root is 3.60 cm.     Findings    Left Ventricle Left ventricular cavity size is mildly dilated. Wall thickness is normal. The left ventricular ejection fraction is 60%. Systolic function is normal.  Wall motion is normal. Unable to assess diastolic function due to atrial fibrillation. Right Ventricle Right ventricular cavity size is normal. Systolic function is normal. Normal tricuspid annular plane systolic excursion (TAPSE) > 1.7 cm. Wall thickness is normal.      Left Atrium The atrium is severely dilated. Right Atrium The atrium is dilated. Aortic Valve The aortic valve is trileaflet. The leaflets are mildly thickened. The leaflets are mildly calcified. There is moderately reduced mobility. There is mild regurgitation. There is mild to moderate stenosis. Mitral Valve Mitral valve structure is normal. There is moderate to severe regurgitation with an eccentrically directed jet. There is no evidence of stenosis. Tricuspid Valve Tricuspid valve structure is normal. There is moderate to severe regurgitation. There is no evidence of stenosis. The right ventricular systolic pressure is severely elevated. The estimated right ventricular systolic pressure is 05.10 mmHg. Pulmonic Valve Pulmonic valve structure is normal. There is trace regurgitation. There is no evidence of stenosis. Ascending Aorta The aortic root is normal in size. The aortic root is 3.60 cm. IVC/SVC The right atrial pressure is estimated at 15.0 mmHg. The inferior vena cava is dilated. Respirophasic changes were blunted (less than 50% variation). Pericardium There is no pericardial effusion. The pericardium is normal in appearance. A fat pad is present.         Left Ventricle Measurements    Function/Volumes   A4C EF 52 %         Dimensions   LVIDd 5.3 cm         LVIDS 3.7 cm         IVSd 1.3 cm         LVPWd 1.3 cm         FS 30 %         Diastolic Filling   MV E' Tissue Velocity Septal 7 cm/s               Right Ventricle Measurements    Dimensions   RVID d 4.2 cm               Left Atrium Measurements    Dimensions   LA size 4.6 cm         LA length (A2C) 5.1 cm         JENNY A4C 32.9 cm2               Right Atrium Measurements    Dimensions   RAA A4C 34.2 cm2               Aortic Valve Measurements    Stenosis   Aortic valve peak velocity 4.5 m/s Abnormal          AV peak gradient 81.6 mmHg Abnormal          Regurgitation   AV peak gradient 84 mmHg         AV Deceleration Time 1,637 ms         AV regurgitation pressure 1/2 time 475 ms               Mitral Valve Measurements    Stenosis   MV VTI RETROGRADE 158.1 cm         MV mean gradient retrograde 77 mmHg         MR  mmHg               Tricuspid Valve Measurements    RVSP Parameters   TR Peak Luis A 3.5 m/s         Est. RA pres 15 mmHg         Triscuspid Valve Regurgitation Peak Gradient 50 mmHg         Right Ventricular Peak Systolic Pressure 65 mmHg               Aorta Measurements    Aortic Dimensions   Ao root 3.6 cm               IVC/SVC Measurements    IVC/SVC   Est. RA pres 15 mmHg                CTA chest: 11/11/22    AORTA/ARTERIAL VASCULATURE:       The ascending aorta measures 4.5 cm in diameter, measurement taken at the level of the crossing of the right main pulmonary artery, unchanged size compared to the comparison study in November 2021.  There is no aortic dissection or intramural hematoma. Great vessels are patent and exhibit a classic branching pattern. The remainder the visualized thoracoabdominal aorta is of normal caliber without focal abnormality. Visualized mesenteric and renal vessels are patent.       LE Arterial Duplex 5/19/23 [facility])    The femoral arteries appear patent without stenosis. The femoral-popliteal   bypass likewise appears patent without stenosis. Flow velocities in the bypass   range between 37 and 70 cm/s. Anterior tibial, posterior tibial, and peroneal   arteries appear patent with multiphasic Doppler signals within their lumens, and   flow velocities ranging between 46 and 61 cm/s. Left lower extremity:     The femoral arteries appear patent without stenosis. The femoral-popliteal   bypass likewise appears patent without stenosis. Flow velocities in the bypass   range between 38 and 72 cm/s. Anterior tibial and posterior tibial arteries   appear patent with multiphasic Doppler signals within their lumens, and flow   velocities ranging between 65 and 66 cm/s. Clinical correlation is suggested. Carotid Duplex:4/28/23    RIGHT:  There is <50% stenosis noted in the internal carotid artery. Plaque is  homogenous and smooth. Vertebral artery flow is antegrade. There is no significant subclavian artery  disease. LEFT:  There is <50% stenosis noted in the internal carotid artery. Plaque is  homogenous and smooth. Vertebral artery flow is antegrade. There is no significant subclavian artery  disease.     Vein Mapping: ordered      I have personally reviewed pertinent films in PACS    Assessment:  Patient Active Problem List    Diagnosis Date Noted   • Coronary artery disease involving native coronary artery 05/08/2023   • Aortic insufficiency 04/21/2023   • Permanent atrial fibrillation ([address]) 04/21/2023   • Chronic osteomyelitis of right tibia with draining sinus ([address]) 01/24/2023   • Pulmonary hypertension ([address]) 02/22/2021   • Erythrocytosis 02/22/2021   • Streptococcal bacteremia 08/17/2020   • Callus of foot 08/16/2020   • Sepsis ([address]) 08/15/2020   • Hypoxia 08/15/2020   • Aneurysm of ascending aorta ([address]) 04/11/2019   • Mixed hyperlipidemia 06/29/2018   • Mitral valve insufficiency 10/31/2017   • Paroxysmal atrial fibrillation ([address]) 11/18/2016   • History of DVT (deep vein thrombosis) 08/15/2016   • Hypertension    • Atrial fibrillation with RVR ([address]) 08/13/2016   • 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[address]) 08/13/2016   • Bilateral popliteal artery aneurysm ([address]) 07/12/2016   • Benign essential hypertension 05/11/2009         Impression/Plan:    Ascending Aortic Aneurysm- 4.5 cm, stable  Aortic Insufficiency  Mitral Insufficiency  Coronary Artery Disease  Chronic Atrial Fibrillation    Risks and benefits of aortic valve replacement, mitral valve repair vs replacement, ligation of left atrial appendage and possible coronary artery bypass grafting x 1 (SVG to OM1) [f vein mapping reveals available GSV for use as conduit] were discussed in detail today with the patient. We have reviewed results all preoperative testing. He have ordered vein mapping, CXR and pre-op laboratory studies. Patient understands and wish to proceed with surgical intervention on 7/12/23 with Dr. Amanda Giraldo M.D. Pre-op instructions reviewed with patient and he was instructed to stop now: CoQ 10, Flaxseed oil, Green Tea capsules and Liver extract supplements and stop Eliquis 5 days before surgery. Juan Laird was comfortable with our recommendations, and his questions were answered to his satisfaction. Thank you for allowing us to participate in the care of this patient.      Routine referral to gastroenterology for colonoscopy screening was not indicated, as the patient is over 75years old    SIGNATURE: Colleen Whitfield, [address]: June 23, 2023  TIME: 11:18 AM

## 2023-06-26 ENCOUNTER — HOSPITAL ENCOUNTER (OUTPATIENT)
Dept: RADIOLOGY | Facility: HOSPITAL | Age: 78
Discharge: HOME | End: 2023-06-26
Payer: MEDICARE

## 2023-06-26 ENCOUNTER — HOSPITAL ENCOUNTER (OUTPATIENT)
Dept: NON INVASIVE DIAGNOSTICS | Facility: HOSPITAL | Age: 78
Discharge: HOME | End: 2023-06-26
Payer: MEDICARE

## 2023-06-26 DIAGNOSIS — I48.21 PERMANENT ATRIAL FIBRILLATION (HCC): ICD-10-CM

## 2023-06-26 DIAGNOSIS — I35.1 NONRHEUMATIC AORTIC VALVE INSUFFICIENCY: ICD-10-CM

## 2023-06-26 DIAGNOSIS — I71.21 ANEURYSM OF ASCENDING AORTA WITHOUT RUPTURE (HCC): ICD-10-CM

## 2023-06-26 DIAGNOSIS — I34.0 NONRHEUMATIC MITRAL VALVE REGURGITATION: ICD-10-CM

## 2023-06-26 DIAGNOSIS — Z95.828 S/P FEMORAL-POPLITEAL BYPASS SURGERY: ICD-10-CM

## 2023-06-26 DIAGNOSIS — I25.10 CORONARY ARTERY DISEASE INVOLVING NATIVE CORONARY ARTERY OF NATIVE HEART WITHOUT ANGINA PECTORIS: ICD-10-CM

## 2023-06-26 DIAGNOSIS — Z01.89 ENCOUNTER FOR IMAGING OF BILATERAL GREATER SAPHENOUS VEINS: ICD-10-CM

## 2023-06-26 DIAGNOSIS — R06.02 SHORTNESS OF BREATH: ICD-10-CM

## 2023-06-26 PROCEDURE — 71046 X-RAY EXAM CHEST 2 VIEWS: CPT

## 2023-06-26 PROCEDURE — 93971 EXTREMITY STUDY: CPT

## 2023-06-26 PROCEDURE — 93971 EXTREMITY STUDY: CPT | Performed by: SURGERY

## 2023-06-27 ENCOUNTER — LAB REQUISITION (OUTPATIENT)
Dept: LAB | Facility: HOSPITAL | Age: 78
End: 2023-06-27
Payer: MEDICARE

## 2023-06-27 ENCOUNTER — APPOINTMENT (OUTPATIENT)
Dept: LAB | Facility: MEDICAL CENTER | Age: 78
End: 2023-06-27
Payer: MEDICARE

## 2023-06-27 DIAGNOSIS — I34.0 MITRAL VALVE INSUFFICIENCY, UNSPECIFIED ETIOLOGY: ICD-10-CM

## 2023-06-27 DIAGNOSIS — I35.1 NONRHEUMATIC AORTIC (VALVE) INSUFFICIENCY: ICD-10-CM

## 2023-06-27 DIAGNOSIS — I35.1 AORTIC VALVE INSUFFICIENCY, ETIOLOGY OF CARDIAC VALVE DISEASE UNSPECIFIED: ICD-10-CM

## 2023-06-27 DIAGNOSIS — I71.21 ANEURYSM OF ASCENDING AORTA WITHOUT RUPTURE (HCC): ICD-10-CM

## 2023-06-27 DIAGNOSIS — Z86.39 HX OF ELEVATED LIPIDS: ICD-10-CM

## 2023-06-27 DIAGNOSIS — I71.21 ASCENDING AORTIC ANEURYSM, UNSPECIFIED WHETHER RUPTURED (HCC): ICD-10-CM

## 2023-06-27 DIAGNOSIS — I34.0 NONRHEUMATIC MITRAL (VALVE) INSUFFICIENCY: ICD-10-CM

## 2023-06-27 DIAGNOSIS — I35.1 NONRHEUMATIC AORTIC VALVE INSUFFICIENCY: ICD-10-CM

## 2023-06-27 DIAGNOSIS — I48.21 PERMANENT ATRIAL FIBRILLATION (HCC): ICD-10-CM

## 2023-06-27 DIAGNOSIS — I71.21 ANEURYSM OF THE ASCENDING AORTA, WITHOUT RUPTURE (HCC): ICD-10-CM

## 2023-06-27 DIAGNOSIS — I25.10 CORONARY ARTERY DISEASE INVOLVING NATIVE CORONARY ARTERY OF NATIVE HEART WITHOUT ANGINA PECTORIS: ICD-10-CM

## 2023-06-27 DIAGNOSIS — I34.0 NONRHEUMATIC MITRAL VALVE REGURGITATION: ICD-10-CM

## 2023-06-27 LAB
ABO GROUP BLD: NORMAL
ALBUMIN SERPL BCP-MCNC: 4 G/DL (ref 3.5–5)
ALP SERPL-CCNC: 75 U/L (ref 46–116)
ALT SERPL W P-5'-P-CCNC: 28 U/L (ref 12–78)
ANION GAP SERPL CALCULATED.3IONS-SCNC: 2 MMOL/L
AST SERPL W P-5'-P-CCNC: 23 U/L (ref 5–45)
BILIRUB SERPL-MCNC: 1.97 MG/DL (ref 0.2–1)
BLD GP AB SCN SERPL QL: NEGATIVE
BUN SERPL-MCNC: 12 MG/DL (ref 5–25)
CALCIUM SERPL-MCNC: 9.8 MG/DL (ref 8.3–10.1)
CHLORIDE SERPL-SCNC: 112 MMOL/L (ref 96–108)
CO2 SERPL-SCNC: 28 MMOL/L (ref 21–32)
CREAT SERPL-MCNC: 1.02 MG/DL (ref 0.6–1.3)
ERYTHROCYTE [DISTWIDTH] IN BLOOD BY AUTOMATED COUNT: 13.3 % (ref 11.6–15.1)
EST. AVERAGE GLUCOSE BLD GHB EST-MCNC: 114 MG/DL
GFR SERPL CREATININE-BSD FRML MDRD: 69 ML/MIN/1.73SQ M
GLUCOSE P FAST SERPL-MCNC: 94 MG/DL (ref 65–99)
HBA1C MFR BLD: 5.6 %
HCT VFR BLD AUTO: 50.9 % (ref 36.5–49.3)
HGB BLD-MCNC: 17.5 G/DL (ref 12–17)
INR PPP: 1.14 (ref 0.84–1.19)
MCH RBC QN AUTO: 32 PG (ref 26.8–34.3)
MCHC RBC AUTO-ENTMCNC: 34.4 G/DL (ref 31.4–37.4)
MCV RBC AUTO: 93 FL (ref 82–98)
PLATELET # BLD AUTO: 157 THOUSANDS/UL (ref 149–390)
PMV BLD AUTO: 10.6 FL (ref 8.9–12.7)
POTASSIUM SERPL-SCNC: 4.5 MMOL/L (ref 3.5–5.3)
PROT SERPL-MCNC: 7.1 G/DL (ref 6.4–8.4)
PROTHROMBIN TIME: 14.8 SECONDS (ref 11.6–14.5)
RBC # BLD AUTO: 5.47 MILLION/UL (ref 3.88–5.62)
RH BLD: NEGATIVE
SODIUM SERPL-SCNC: 142 MMOL/L (ref 135–147)
SPECIMEN EXPIRATION DATE: NORMAL
WBC # BLD AUTO: 6.31 THOUSAND/UL (ref 4.31–10.16)

## 2023-06-27 PROCEDURE — 36415 COLL VENOUS BLD VENIPUNCTURE: CPT

## 2023-06-27 PROCEDURE — 86900 BLOOD TYPING SEROLOGIC ABO: CPT | Performed by: NURSE PRACTITIONER

## 2023-06-27 PROCEDURE — 80053 COMPREHEN METABOLIC PANEL: CPT

## 2023-06-27 PROCEDURE — 87081 CULTURE SCREEN ONLY: CPT

## 2023-06-27 PROCEDURE — 86850 RBC ANTIBODY SCREEN: CPT | Performed by: NURSE PRACTITIONER

## 2023-06-27 PROCEDURE — 85027 COMPLETE CBC AUTOMATED: CPT

## 2023-06-27 PROCEDURE — 83036 HEMOGLOBIN GLYCOSYLATED A1C: CPT

## 2023-06-27 PROCEDURE — 85610 PROTHROMBIN TIME: CPT

## 2023-06-27 PROCEDURE — 86901 BLOOD TYPING SEROLOGIC RH(D): CPT | Performed by: NURSE PRACTITIONER

## 2023-06-28 LAB — MRSA NOSE QL CULT: NORMAL

## 2023-06-29 ENCOUNTER — TELEPHONE (OUTPATIENT)
Dept: CARDIAC SURGERY | Facility: CLINIC | Age: 78
End: 2023-06-29

## 2023-07-11 RX ORDER — HEPARIN SODIUM 1000 [USP'U]/ML
400 INJECTION, SOLUTION INTRAVENOUS; SUBCUTANEOUS ONCE
Status: CANCELLED | OUTPATIENT
Start: 2023-07-12

## 2023-07-12 ENCOUNTER — ANESTHESIA (OUTPATIENT)
Dept: PERIOP | Facility: HOSPITAL | Age: 78
DRG: 219 | End: 2023-07-12
Payer: MEDICARE

## 2023-07-12 ENCOUNTER — HOSPITAL ENCOUNTER (INPATIENT)
Facility: HOSPITAL | Age: 78
LOS: 8 days | Discharge: HOME HEALTH | DRG: 219 | End: 2023-07-20
Attending: THORACIC SURGERY (CARDIOTHORACIC VASCULAR SURGERY) | Admitting: THORACIC SURGERY (CARDIOTHORACIC VASCULAR SURGERY)
Payer: MEDICARE

## 2023-07-12 ENCOUNTER — APPOINTMENT (OUTPATIENT)
Dept: NON INVASIVE DIAGNOSTICS | Facility: HOSPITAL | Age: 78
DRG: 219 | End: 2023-07-12
Payer: MEDICARE

## 2023-07-12 ENCOUNTER — APPOINTMENT (OUTPATIENT)
Dept: RADIOLOGY | Facility: HOSPITAL | Age: 78
DRG: 219 | End: 2023-07-12
Payer: MEDICARE

## 2023-07-12 ENCOUNTER — ANESTHESIA EVENT (OUTPATIENT)
Dept: PERIOP | Facility: HOSPITAL | Age: 78
DRG: 219 | End: 2023-07-12
Payer: MEDICARE

## 2023-07-12 DIAGNOSIS — I34.9 NONRHEUMATIC MITRAL VALVE DISORDER: ICD-10-CM

## 2023-07-12 DIAGNOSIS — I34.0 NONRHEUMATIC MITRAL VALVE REGURGITATION: ICD-10-CM

## 2023-07-12 DIAGNOSIS — I25.10 CORONARY ARTERY DISEASE INVOLVING NATIVE CORONARY ARTERY OF NATIVE HEART WITHOUT ANGINA PECTORIS: ICD-10-CM

## 2023-07-12 DIAGNOSIS — Z98.890 S/P LEFT ATRIAL APPENDAGE LIGATION: ICD-10-CM

## 2023-07-12 DIAGNOSIS — Z98.890 S/P MVR (MITRAL VALVE REPAIR): ICD-10-CM

## 2023-07-12 DIAGNOSIS — I45.9 HEART BLOCK: ICD-10-CM

## 2023-07-12 DIAGNOSIS — R00.1 JUNCTIONAL BRADYCARDIA: ICD-10-CM

## 2023-07-12 DIAGNOSIS — I35.1 NONRHEUMATIC AORTIC VALVE INSUFFICIENCY: ICD-10-CM

## 2023-07-12 DIAGNOSIS — Z95.2 S/P AVR: Primary | ICD-10-CM

## 2023-07-12 PROBLEM — E87.8 HYPERCHLOREMIA: Status: ACTIVE | Noted: 2023-07-12

## 2023-07-12 PROBLEM — I73.9 PAD (PERIPHERAL ARTERY DISEASE) (HCC): Status: ACTIVE | Noted: 2021-06-22

## 2023-07-12 LAB
ANCILLARY VALUES: ABNORMAL
ANION GAP SERPL CALCULATED.3IONS-SCNC: 8 MMOL/L
APTT PPP: 32 SECONDS (ref 23–37)
ARTERIAL PATENCY WRIST A: NO
BACTERIA UR QL AUTO: ABNORMAL /HPF
BASE EXCESS BLDA CALC-SCNC: -1 MMOL/L (ref -2–3)
BASE EXCESS BLDA CALC-SCNC: -1 MMOL/L (ref -2–3)
BASE EXCESS BLDA CALC-SCNC: -10.8 MMOL/L
BASE EXCESS BLDA CALC-SCNC: -2 MMOL/L (ref -2–3)
BASE EXCESS BLDA CALC-SCNC: -3 MMOL/L (ref -2–3)
BASE EXCESS BLDA CALC-SCNC: -6 MMOL/L (ref -2–3)
BASE EXCESS BLDA CALC-SCNC: -9 MMOL/L
BASE EXCESS BLDA CALC-SCNC: -9.6 MMOL/L
BASE EXCESS BLDA CALC-SCNC: -9.9 MMOL/L
BASE EXCESS BLDA CALC-SCNC: 0 MMOL/L (ref -2–3)
BILIRUB UR QL STRIP: NEGATIVE
BUN SERPL-MCNC: 13 MG/DL (ref 5–25)
CA-I BLD-SCNC: 1.07 MMOL/L (ref 1.12–1.32)
CA-I BLD-SCNC: 1.12 MMOL/L (ref 1.12–1.32)
CA-I BLD-SCNC: 1.13 MMOL/L (ref 1.12–1.32)
CA-I BLD-SCNC: 1.18 MMOL/L (ref 1.12–1.32)
CA-I BLD-SCNC: 1.22 MMOL/L (ref 1.12–1.32)
CA-I BLD-SCNC: 1.25 MMOL/L (ref 1.12–1.32)
CALCIUM SERPL-MCNC: 8.8 MG/DL (ref 8.3–10.1)
CHLORIDE SERPL-SCNC: 116 MMOL/L (ref 96–108)
CLARITY UR: CLEAR
CO2 SERPL-SCNC: 19 MMOL/L (ref 21–32)
COLOR UR: YELLOW
CREAT SERPL-MCNC: 1.07 MG/DL (ref 0.6–1.3)
DS:DELIVERY SYSTEM: AC
FIBRINOGEN PPP-MCNC: 261 MG/DL (ref 227–495)
FIO2 GAS DIL.REBREATH: 100 L
GFR SERPL CREATININE-BSD FRML MDRD: 65 ML/MIN/1.73SQ M
GLUCOSE SERPL-MCNC: 131 MG/DL (ref 65–140)
GLUCOSE SERPL-MCNC: 135 MG/DL (ref 65–140)
GLUCOSE SERPL-MCNC: 136 MG/DL (ref 65–140)
GLUCOSE SERPL-MCNC: 142 MG/DL (ref 65–140)
GLUCOSE SERPL-MCNC: 147 MG/DL (ref 65–140)
GLUCOSE SERPL-MCNC: 148 MG/DL (ref 65–140)
GLUCOSE SERPL-MCNC: 154 MG/DL (ref 65–140)
GLUCOSE SERPL-MCNC: 159 MG/DL (ref 65–140)
GLUCOSE SERPL-MCNC: 160 MG/DL (ref 65–140)
GLUCOSE SERPL-MCNC: 162 MG/DL (ref 65–140)
GLUCOSE SERPL-MCNC: 187 MG/DL (ref 65–140)
GLUCOSE SERPL-MCNC: 188 MG/DL (ref 65–140)
GLUCOSE SERPL-MCNC: 191 MG/DL (ref 65–140)
GLUCOSE SERPL-MCNC: 87 MG/DL (ref 65–140)
GLUCOSE UR STRIP-MCNC: NEGATIVE MG/DL
HCO3 BLDA-SCNC: 15.3 MMOL/L (ref 22–28)
HCO3 BLDA-SCNC: 15.7 MMOL/L (ref 22–28)
HCO3 BLDA-SCNC: 16.6 MMOL/L (ref 22–28)
HCO3 BLDA-SCNC: 17.4 MMOL/L (ref 22–28)
HCO3 BLDA-SCNC: 20.2 MMOL/L (ref 22–28)
HCO3 BLDA-SCNC: 21.6 MMOL/L (ref 22–28)
HCO3 BLDA-SCNC: 22.1 MMOL/L (ref 22–28)
HCO3 BLDA-SCNC: 24.5 MMOL/L (ref 24–30)
HCO3 BLDA-SCNC: 24.6 MMOL/L (ref 22–28)
HCO3 BLDA-SCNC: 24.8 MMOL/L (ref 24–30)
HCO3 BLDA-SCNC: 25.1 MMOL/L (ref 22–28)
HCO3 BLDA-SCNC: 25.8 MMOL/L (ref 22–28)
HCT VFR BLD AUTO: 44.8 % (ref 36.5–49.3)
HCT VFR BLD AUTO: 47.3 % (ref 36.5–49.3)
HCT VFR BLD CALC: 34 % (ref 36.5–49.3)
HCT VFR BLD CALC: 36 % (ref 36.5–49.3)
HCT VFR BLD CALC: 38 % (ref 36.5–49.3)
HCT VFR BLD CALC: 41 % (ref 36.5–49.3)
HCT VFR BLD CALC: 45 % (ref 36.5–49.3)
HCT VFR BLD CALC: 45 % (ref 36.5–49.3)
HGB BLD-MCNC: 15.2 G/DL (ref 12–17)
HGB BLD-MCNC: 16.6 G/DL (ref 12–17)
HGB BLDA-MCNC: 11.6 G/DL (ref 12–17)
HGB BLDA-MCNC: 12.2 G/DL (ref 12–17)
HGB BLDA-MCNC: 12.9 G/DL (ref 12–17)
HGB BLDA-MCNC: 13.9 G/DL (ref 12–17)
HGB BLDA-MCNC: 15.3 G/DL (ref 12–17)
HGB BLDA-MCNC: 15.3 G/DL (ref 12–17)
HGB UR QL STRIP.AUTO: ABNORMAL
HYALINE CASTS #/AREA URNS LPF: ABNORMAL /LPF
INR PPP: 1.38 (ref 0.84–1.19)
KCT BLD-ACNC: 126 SEC (ref 89–137)
KCT BLD-ACNC: 139 SEC (ref 89–137)
KCT BLD-ACNC: 483 SEC (ref 89–137)
KCT BLD-ACNC: 562 SEC (ref 89–137)
KCT BLD-ACNC: 606 SEC (ref 89–137)
KCT BLD-ACNC: 613 SEC (ref 89–137)
KCT BLD-ACNC: 637 SEC (ref 89–137)
KETONES UR STRIP-MCNC: NEGATIVE MG/DL
LEUKOCYTE ESTERASE UR QL STRIP: NEGATIVE
MUCOUS THREADS UR QL AUTO: ABNORMAL
NASAL CANNULA: 4
NITRITE UR QL STRIP: NEGATIVE
NON-SQ EPI CELLS URNS QL MICRO: ABNORMAL /HPF
O2 CT BLDA-SCNC: 21.8 ML/DL (ref 16–23)
O2 CT BLDA-SCNC: 22 ML/DL (ref 16–23)
O2 CT BLDA-SCNC: 22.2 ML/DL (ref 16–23)
O2 CT BLDA-SCNC: 22.3 ML/DL (ref 16–23)
OXYHGB MFR BLDA: 96.2 % (ref 94–97)
OXYHGB MFR BLDA: 96.6 % (ref 94–97)
OXYHGB MFR BLDA: 96.7 % (ref 94–97)
OXYHGB MFR BLDA: 97.1 % (ref 94–97)
PCO2 BLD: 21 MMOL/L (ref 21–32)
PCO2 BLD: 23 MMOL/L (ref 21–32)
PCO2 BLD: 23 MMOL/L (ref 21–32)
PCO2 BLD: 26 MMOL/L (ref 21–32)
PCO2 BLD: 27 MMOL/L (ref 21–32)
PCO2 BLD: 35.2 MM HG (ref 36–44)
PCO2 BLD: 35.3 MM HG (ref 36–44)
PCO2 BLD: 39.4 MM HG (ref 36–44)
PCO2 BLD: 41.7 MM HG (ref 36–44)
PCO2 BLD: 42.7 MM HG (ref 42–50)
PCO2 BLD: 43.5 MM HG (ref 36–44)
PCO2 BLD: 45.4 MM HG (ref 36–44)
PCO2 BLD: 46 MM HG (ref 42–50)
PCO2 BLDA: 34.2 MM HG (ref 36–44)
PCO2 BLDA: 35.2 MM HG (ref 36–44)
PCO2 BLDA: 37.4 MM HG (ref 36–44)
PCO2 BLDA: 39.1 MM HG (ref 36–44)
PH BLD: 7.29 [PH] (ref 7.35–7.45)
PH BLD: 7.34 [PH] (ref 7.3–7.4)
PH BLD: 7.36 [PH] (ref 7.35–7.45)
PH BLD: 7.37 [PH] (ref 7.35–7.45)
PH BLD: 7.37 [PH] (ref 7.3–7.4)
PH BLD: 7.39 [PH] (ref 7.35–7.45)
PH BLD: 7.4 [PH] (ref 7.35–7.45)
PH BLD: 7.41 [PH] (ref 7.35–7.45)
PH BLDA: 7.26 [PH] (ref 7.35–7.45)
PH BLDA: 7.28 [PH] (ref 7.35–7.45)
PH UR STRIP.AUTO: 5 [PH]
PLATELET # BLD AUTO: 113 THOUSANDS/UL (ref 149–390)
PLATELET # BLD AUTO: 173 THOUSANDS/UL (ref 149–390)
PMV BLD AUTO: 10.1 FL (ref 8.9–12.7)
PMV BLD AUTO: 10.3 FL (ref 8.9–12.7)
PO2 BLD: 171 MM HG (ref 75–129)
PO2 BLD: 203 MM HG (ref 75–129)
PO2 BLD: 221 MM HG (ref 75–129)
PO2 BLD: 260 MM HG (ref 75–129)
PO2 BLD: 263 MM HG (ref 75–129)
PO2 BLD: 327 MM HG (ref 75–129)
PO2 BLD: 46 MM HG (ref 35–45)
PO2 BLD: 55 MM HG (ref 35–45)
PO2 BLDA: 111.1 MM HG (ref 75–129)
PO2 BLDA: 125.1 MM HG (ref 75–129)
PO2 BLDA: 133 MM HG (ref 75–129)
PO2 BLDA: 140.5 MM HG (ref 75–129)
POTASSIUM BLD-SCNC: 3.6 MMOL/L (ref 3.5–5.3)
POTASSIUM BLD-SCNC: 4 MMOL/L (ref 3.5–5.3)
POTASSIUM BLD-SCNC: 4 MMOL/L (ref 3.5–5.3)
POTASSIUM BLD-SCNC: 4.1 MMOL/L (ref 3.5–5.3)
POTASSIUM BLD-SCNC: 4.2 MMOL/L (ref 3.5–5.3)
POTASSIUM BLD-SCNC: 4.5 MMOL/L (ref 3.5–5.3)
POTASSIUM BLD-SCNC: 5.2 MMOL/L (ref 3.5–5.3)
POTASSIUM BLD-SCNC: 5.4 MMOL/L (ref 3.5–5.3)
POTASSIUM SERPL-SCNC: 3.5 MMOL/L (ref 3.5–5.3)
POTASSIUM SERPL-SCNC: 3.7 MMOL/L (ref 3.5–5.3)
POTASSIUM SERPL-SCNC: 3.8 MMOL/L (ref 3.5–5.3)
PRESSURE SETTING: 9
PROT UR STRIP-MCNC: NEGATIVE MG/DL
PROTHROMBIN TIME: 17.2 SECONDS (ref 11.6–14.5)
PS CM H2O: 6
PS VENT FIO2: 40
PS VENT PEEP: 6
RBC #/AREA URNS AUTO: ABNORMAL /HPF
RESPIRATORY RATE: 16
SAO2 % BLD FROM PO2: 100 % (ref 60–85)
SAO2 % BLD FROM PO2: 78 % (ref 60–85)
SAO2 % BLD FROM PO2: 87 % (ref 60–85)
SAO2 % BLD FROM PO2: 99 % (ref 60–85)
SODIUM BLD-SCNC: 136 MMOL/L (ref 136–145)
SODIUM BLD-SCNC: 138 MMOL/L (ref 136–145)
SODIUM BLD-SCNC: 139 MMOL/L (ref 136–145)
SODIUM BLD-SCNC: 140 MMOL/L (ref 136–145)
SODIUM BLD-SCNC: 140 MMOL/L (ref 136–145)
SODIUM BLD-SCNC: 141 MMOL/L (ref 136–145)
SODIUM BLD-SCNC: 143 MMOL/L (ref 136–145)
SODIUM BLD-SCNC: 143 MMOL/L (ref 136–145)
SODIUM SERPL-SCNC: 143 MMOL/L (ref 135–147)
SP GR UR STRIP.AUTO: 1.01 (ref 1–1.03)
SPECIMEN SOURCE: ABNORMAL
SPECIMEN SOURCE: NORMAL
UROBILINOGEN UR STRIP-ACNC: <2 MG/DL
VENT - PS: ABNORMAL
VENT TYPE: ABNORMAL
VENTILATION VALUE: 600
WBC #/AREA URNS AUTO: ABNORMAL /HPF

## 2023-07-12 PROCEDURE — 82948 REAGENT STRIP/BLOOD GLUCOSE: CPT

## 2023-07-12 PROCEDURE — 93355 ECHO TRANSESOPHAGEAL (TEE): CPT

## 2023-07-12 PROCEDURE — 82803 BLOOD GASES ANY COMBINATION: CPT

## 2023-07-12 PROCEDURE — 02L70CK OCCLUSION OF LEFT ATRIAL APPENDAGE WITH EXTRALUMINAL DEVICE, OPEN APPROACH: ICD-10-PCS | Performed by: THORACIC SURGERY (CARDIOTHORACIC VASCULAR SURGERY)

## 2023-07-12 PROCEDURE — 94760 N-INVAS EAR/PLS OXIMETRY 1: CPT

## 2023-07-12 PROCEDURE — 33426 REPAIR OF MITRAL VALVE: CPT | Performed by: THORACIC SURGERY (CARDIOTHORACIC VASCULAR SURGERY)

## 2023-07-12 PROCEDURE — 88311 DECALCIFY TISSUE: CPT | Performed by: PATHOLOGY

## 2023-07-12 PROCEDURE — 99223 1ST HOSP IP/OBS HIGH 75: CPT | Performed by: INTERNAL MEDICINE

## 2023-07-12 PROCEDURE — 5A1221Z PERFORMANCE OF CARDIAC OUTPUT, CONTINUOUS: ICD-10-PCS | Performed by: THORACIC SURGERY (CARDIOTHORACIC VASCULAR SURGERY)

## 2023-07-12 PROCEDURE — 02HV33Z INSERTION OF INFUSION DEVICE INTO SUPERIOR VENA CAVA, PERCUTANEOUS APPROACH: ICD-10-PCS | Performed by: ANESTHESIOLOGY

## 2023-07-12 PROCEDURE — 82947 ASSAY GLUCOSE BLOOD QUANT: CPT

## 2023-07-12 PROCEDURE — 5A1223Z PERFORMANCE OF CARDIAC PACING, CONTINUOUS: ICD-10-PCS | Performed by: THORACIC SURGERY (CARDIOTHORACIC VASCULAR SURGERY)

## 2023-07-12 PROCEDURE — 84295 ASSAY OF SERUM SODIUM: CPT

## 2023-07-12 PROCEDURE — 81001 URINALYSIS AUTO W/SCOPE: CPT | Performed by: NURSE PRACTITIONER

## 2023-07-12 PROCEDURE — 85610 PROTHROMBIN TIME: CPT | Performed by: PHYSICIAN ASSISTANT

## 2023-07-12 PROCEDURE — 80048 BASIC METABOLIC PNL TOTAL CA: CPT | Performed by: PHYSICIAN ASSISTANT

## 2023-07-12 PROCEDURE — 30233R1 TRANSFUSION OF NONAUTOLOGOUS PLATELETS INTO PERIPHERAL VEIN, PERCUTANEOUS APPROACH: ICD-10-PCS | Performed by: ANESTHESIOLOGY

## 2023-07-12 PROCEDURE — 71045 X-RAY EXAM CHEST 1 VIEW: CPT

## 2023-07-12 PROCEDURE — 84132 ASSAY OF SERUM POTASSIUM: CPT | Performed by: PHYSICIAN ASSISTANT

## 2023-07-12 PROCEDURE — 85049 AUTOMATED PLATELET COUNT: CPT | Performed by: THORACIC SURGERY (CARDIOTHORACIC VASCULAR SURGERY)

## 2023-07-12 PROCEDURE — A7041 WATER SEAL DRAIN CONTAINER: HCPCS | Performed by: THORACIC SURGERY (CARDIOTHORACIC VASCULAR SURGERY)

## 2023-07-12 PROCEDURE — 86920 COMPATIBILITY TEST SPIN: CPT

## 2023-07-12 PROCEDURE — 33405 REPLACEMENT AORTIC VALVE OPN: CPT | Performed by: PHYSICIAN ASSISTANT

## 2023-07-12 PROCEDURE — P9037 PLATE PHERES LEUKOREDU IRRAD: HCPCS

## 2023-07-12 PROCEDURE — 02UG0JZ SUPPLEMENT MITRAL VALVE WITH SYNTHETIC SUBSTITUTE, OPEN APPROACH: ICD-10-PCS | Performed by: THORACIC SURGERY (CARDIOTHORACIC VASCULAR SURGERY)

## 2023-07-12 PROCEDURE — 85730 THROMBOPLASTIN TIME PARTIAL: CPT | Performed by: PHYSICIAN ASSISTANT

## 2023-07-12 PROCEDURE — 82805 BLOOD GASES W/O2 SATURATION: CPT | Performed by: PHYSICIAN ASSISTANT

## 2023-07-12 PROCEDURE — 94002 VENT MGMT INPAT INIT DAY: CPT

## 2023-07-12 PROCEDURE — 33426 REPAIR OF MITRAL VALVE: CPT | Performed by: PHYSICIAN ASSISTANT

## 2023-07-12 PROCEDURE — 85049 AUTOMATED PLATELET COUNT: CPT | Performed by: PHYSICIAN ASSISTANT

## 2023-07-12 PROCEDURE — 82805 BLOOD GASES W/O2 SATURATION: CPT | Performed by: NURSE PRACTITIONER

## 2023-07-12 PROCEDURE — 33405 REPLACEMENT AORTIC VALVE OPN: CPT | Performed by: THORACIC SURGERY (CARDIOTHORACIC VASCULAR SURGERY)

## 2023-07-12 PROCEDURE — 85347 COAGULATION TIME ACTIVATED: CPT

## 2023-07-12 PROCEDURE — 82330 ASSAY OF CALCIUM: CPT

## 2023-07-12 PROCEDURE — 93005 ELECTROCARDIOGRAM TRACING: CPT

## 2023-07-12 PROCEDURE — 30233N0 TRANSFUSION OF AUTOLOGOUS RED BLOOD CELLS INTO PERIPHERAL VEIN, PERCUTANEOUS APPROACH: ICD-10-PCS | Performed by: THORACIC SURGERY (CARDIOTHORACIC VASCULAR SURGERY)

## 2023-07-12 PROCEDURE — 02RF08Z REPLACEMENT OF AORTIC VALVE WITH ZOOPLASTIC TISSUE, OPEN APPROACH: ICD-10-PCS | Performed by: THORACIC SURGERY (CARDIOTHORACIC VASCULAR SURGERY)

## 2023-07-12 PROCEDURE — 85014 HEMATOCRIT: CPT

## 2023-07-12 PROCEDURE — 80048 BASIC METABOLIC PNL TOTAL CA: CPT | Performed by: NURSE PRACTITIONER

## 2023-07-12 PROCEDURE — 85384 FIBRINOGEN ACTIVITY: CPT | Performed by: PHYSICIAN ASSISTANT

## 2023-07-12 PROCEDURE — 84132 ASSAY OF SERUM POTASSIUM: CPT

## 2023-07-12 PROCEDURE — 85018 HEMOGLOBIN: CPT | Performed by: PHYSICIAN ASSISTANT

## 2023-07-12 PROCEDURE — 88305 TISSUE EXAM BY PATHOLOGIST: CPT | Performed by: PATHOLOGY

## 2023-07-12 PROCEDURE — 85014 HEMATOCRIT: CPT | Performed by: PHYSICIAN ASSISTANT

## 2023-07-12 DEVICE — IMPLANTABLE DEVICE
Type: IMPLANTABLE DEVICE | Site: HEART | Status: FUNCTIONAL
Brand: CARPENTIER-EDWARDS PHYSIO II ANNULOPLASTY RING

## 2023-07-12 DEVICE — VALVE AORTIC INSPIRIS RESILIA 29MM: Type: IMPLANTABLE DEVICE | Site: HEART | Status: FUNCTIONAL

## 2023-07-12 DEVICE — ATRICLIP® GILINOV-COSGROVE LAA EXCLUSION SYSTEM 45
Type: IMPLANTABLE DEVICE | Site: HEART | Status: FUNCTIONAL
Brand: ATRICLIP™ LAA EXCLUSION SYSTEM

## 2023-07-12 RX ORDER — CHLORHEXIDINE GLUCONATE 0.12 MG/ML
15 RINSE ORAL ONCE
Status: COMPLETED | OUTPATIENT
Start: 2023-07-12 | End: 2023-07-12

## 2023-07-12 RX ORDER — HYDROMORPHONE HCL/PF 1 MG/ML
0.5 SYRINGE (ML) INJECTION EVERY 2 HOUR PRN
Status: DISCONTINUED | OUTPATIENT
Start: 2023-07-12 | End: 2023-07-13

## 2023-07-12 RX ORDER — ASCORBIC ACID 500 MG
500 TABLET ORAL DAILY
Status: DISCONTINUED | OUTPATIENT
Start: 2023-07-13 | End: 2023-07-20 | Stop reason: HOSPADM

## 2023-07-12 RX ORDER — ALBUMIN, HUMAN INJ 5% 5 %
12.5 SOLUTION INTRAVENOUS ONCE
Status: COMPLETED | OUTPATIENT
Start: 2023-07-12 | End: 2023-07-12

## 2023-07-12 RX ORDER — HEPARIN SODIUM 1000 [USP'U]/ML
INJECTION, SOLUTION INTRAVENOUS; SUBCUTANEOUS AS NEEDED
Status: DISCONTINUED | OUTPATIENT
Start: 2023-07-12 | End: 2023-07-12

## 2023-07-12 RX ORDER — OXYCODONE HYDROCHLORIDE 5 MG/1
5 TABLET ORAL EVERY 4 HOURS PRN
Status: DISCONTINUED | OUTPATIENT
Start: 2023-07-12 | End: 2023-07-14

## 2023-07-12 RX ORDER — ALBUMIN, HUMAN INJ 5% 5 %
SOLUTION INTRAVENOUS
Status: COMPLETED
Start: 2023-07-12 | End: 2023-07-12

## 2023-07-12 RX ORDER — PROTAMINE SULFATE 10 MG/ML
INJECTION, SOLUTION INTRAVENOUS AS NEEDED
Status: DISCONTINUED | OUTPATIENT
Start: 2023-07-12 | End: 2023-07-12

## 2023-07-12 RX ORDER — FENTANYL CITRATE 50 UG/ML
50 INJECTION, SOLUTION INTRAMUSCULAR; INTRAVENOUS
Status: DISCONTINUED | OUTPATIENT
Start: 2023-07-12 | End: 2023-07-12

## 2023-07-12 RX ORDER — AMINOCAPROIC ACID 250 MG/ML
INJECTION, SOLUTION INTRAVENOUS AS NEEDED
Status: DISCONTINUED | OUTPATIENT
Start: 2023-07-12 | End: 2023-07-12

## 2023-07-12 RX ORDER — POTASSIUM CHLORIDE 14.9 MG/ML
20 INJECTION INTRAVENOUS
Status: DISCONTINUED | OUTPATIENT
Start: 2023-07-12 | End: 2023-07-12

## 2023-07-12 RX ORDER — MAGNESIUM SULFATE HEPTAHYDRATE 40 MG/ML
2 INJECTION, SOLUTION INTRAVENOUS ONCE
Status: COMPLETED | OUTPATIENT
Start: 2023-07-12 | End: 2023-07-12

## 2023-07-12 RX ORDER — SODIUM CHLORIDE 9 MG/ML
INJECTION, SOLUTION INTRAVENOUS CONTINUOUS PRN
Status: DISCONTINUED | OUTPATIENT
Start: 2023-07-12 | End: 2023-07-12

## 2023-07-12 RX ORDER — ACETAMINOPHEN 325 MG/1
975 TABLET ORAL EVERY 8 HOURS
Status: DISCONTINUED | OUTPATIENT
Start: 2023-07-12 | End: 2023-07-20 | Stop reason: HOSPADM

## 2023-07-12 RX ORDER — ACETAMINOPHEN 650 MG/1
650 SUPPOSITORY RECTAL EVERY 4 HOURS PRN
Status: DISCONTINUED | OUTPATIENT
Start: 2023-07-12 | End: 2023-07-13

## 2023-07-12 RX ORDER — AMIODARONE HYDROCHLORIDE 200 MG/1
200 TABLET ORAL EVERY 8 HOURS SCHEDULED
Status: DISCONTINUED | OUTPATIENT
Start: 2023-07-12 | End: 2023-07-13

## 2023-07-12 RX ORDER — ATORVASTATIN CALCIUM 80 MG/1
80 TABLET, FILM COATED ORAL
Status: DISCONTINUED | OUTPATIENT
Start: 2023-07-12 | End: 2023-07-20 | Stop reason: HOSPADM

## 2023-07-12 RX ORDER — DEXMEDETOMIDINE HYDROCHLORIDE 4 UG/ML
.1-.7 INJECTION, SOLUTION INTRAVENOUS
Status: DISCONTINUED | OUTPATIENT
Start: 2023-07-12 | End: 2023-07-12

## 2023-07-12 RX ORDER — SACCHAROMYCES BOULARDII 250 MG
250 CAPSULE ORAL 2 TIMES DAILY
Status: DISCONTINUED | OUTPATIENT
Start: 2023-07-13 | End: 2023-07-20 | Stop reason: HOSPADM

## 2023-07-12 RX ORDER — BISACODYL 10 MG
10 SUPPOSITORY, RECTAL RECTAL DAILY PRN
Status: DISCONTINUED | OUTPATIENT
Start: 2023-07-12 | End: 2023-07-20 | Stop reason: HOSPADM

## 2023-07-12 RX ORDER — LIDOCAINE HCL/PF 100 MG/5ML
100 SYRINGE (ML) INJECTION
Status: DISCONTINUED | OUTPATIENT
Start: 2023-07-12 | End: 2023-07-13

## 2023-07-12 RX ORDER — PHENYLEPHRINE HCL IN 0.9% NACL 1 MG/10 ML
SYRINGE (ML) INTRAVENOUS AS NEEDED
Status: DISCONTINUED | OUTPATIENT
Start: 2023-07-12 | End: 2023-07-12

## 2023-07-12 RX ORDER — MANNITOL 250 MG/ML
INJECTION, SOLUTION INTRAVENOUS AS NEEDED
Status: DISCONTINUED | OUTPATIENT
Start: 2023-07-12 | End: 2023-07-12

## 2023-07-12 RX ORDER — LIDOCAINE HCL/PF 100 MG/5ML
SYRINGE (ML) INJECTION AS NEEDED
Status: DISCONTINUED | OUTPATIENT
Start: 2023-07-12 | End: 2023-07-12

## 2023-07-12 RX ORDER — CEFAZOLIN SODIUM 2 G/50ML
2000 SOLUTION INTRAVENOUS ONCE
Status: COMPLETED | OUTPATIENT
Start: 2023-07-12 | End: 2023-07-12

## 2023-07-12 RX ORDER — MAGNESIUM SULFATE 500 MG/ML
VIAL (ML) INJECTION AS NEEDED
Status: DISCONTINUED | OUTPATIENT
Start: 2023-07-12 | End: 2023-07-12

## 2023-07-12 RX ORDER — FENTANYL CITRATE 50 UG/ML
50 INJECTION, SOLUTION INTRAMUSCULAR; INTRAVENOUS ONCE
Status: COMPLETED | OUTPATIENT
Start: 2023-07-12 | End: 2023-07-12

## 2023-07-12 RX ORDER — FUROSEMIDE 10 MG/ML
40 INJECTION INTRAMUSCULAR; INTRAVENOUS EVERY 6 HOURS PRN
Status: DISCONTINUED | OUTPATIENT
Start: 2023-07-12 | End: 2023-07-12

## 2023-07-12 RX ORDER — FENTANYL CITRATE 50 UG/ML
INJECTION, SOLUTION INTRAMUSCULAR; INTRAVENOUS AS NEEDED
Status: DISCONTINUED | OUTPATIENT
Start: 2023-07-12 | End: 2023-07-12

## 2023-07-12 RX ORDER — FONDAPARINUX SODIUM 2.5 MG/.5ML
2.5 INJECTION SUBCUTANEOUS DAILY
Status: DISCONTINUED | OUTPATIENT
Start: 2023-07-13 | End: 2023-07-17

## 2023-07-12 RX ORDER — PANTOPRAZOLE SODIUM 40 MG/1
40 TABLET, DELAYED RELEASE ORAL DAILY
Status: DISCONTINUED | OUTPATIENT
Start: 2023-07-12 | End: 2023-07-20 | Stop reason: HOSPADM

## 2023-07-12 RX ORDER — AMIODARONE HYDROCHLORIDE 50 MG/ML
INJECTION, SOLUTION INTRAVENOUS AS NEEDED
Status: DISCONTINUED | OUTPATIENT
Start: 2023-07-12 | End: 2023-07-12

## 2023-07-12 RX ORDER — ASPIRIN 325 MG
325 TABLET ORAL DAILY
Status: DISCONTINUED | OUTPATIENT
Start: 2023-07-12 | End: 2023-07-14

## 2023-07-12 RX ORDER — POLYETHYLENE GLYCOL 3350 17 G/17G
17 POWDER, FOR SOLUTION ORAL DAILY
Status: DISCONTINUED | OUTPATIENT
Start: 2023-07-13 | End: 2023-07-20 | Stop reason: HOSPADM

## 2023-07-12 RX ORDER — PROPOFOL 10 MG/ML
INJECTION, EMULSION INTRAVENOUS
Status: DISPENSED
Start: 2023-07-12 | End: 2023-07-13

## 2023-07-12 RX ORDER — POTASSIUM CHLORIDE 14.9 MG/ML
20 INJECTION INTRAVENOUS ONCE AS NEEDED
Status: COMPLETED | OUTPATIENT
Start: 2023-07-12 | End: 2023-07-12

## 2023-07-12 RX ORDER — SODIUM CHLORIDE 450 MG/100ML
20 INJECTION, SOLUTION INTRAVENOUS CONTINUOUS
Status: DISCONTINUED | OUTPATIENT
Start: 2023-07-12 | End: 2023-07-13

## 2023-07-12 RX ORDER — HEPARIN SODIUM 1000 [USP'U]/ML
10000 INJECTION, SOLUTION INTRAVENOUS; SUBCUTANEOUS ONCE
Status: DISCONTINUED | OUTPATIENT
Start: 2023-07-12 | End: 2023-07-12 | Stop reason: HOSPADM

## 2023-07-12 RX ORDER — VANCOMYCIN HYDROCHLORIDE 1 G/20ML
INJECTION, POWDER, LYOPHILIZED, FOR SOLUTION INTRAVENOUS AS NEEDED
Status: DISCONTINUED | OUTPATIENT
Start: 2023-07-12 | End: 2023-07-12 | Stop reason: HOSPADM

## 2023-07-12 RX ORDER — ONDANSETRON 2 MG/ML
INJECTION INTRAMUSCULAR; INTRAVENOUS AS NEEDED
Status: DISCONTINUED | OUTPATIENT
Start: 2023-07-12 | End: 2023-07-12

## 2023-07-12 RX ORDER — ONDANSETRON 2 MG/ML
4 INJECTION INTRAMUSCULAR; INTRAVENOUS EVERY 6 HOURS PRN
Status: DISCONTINUED | OUTPATIENT
Start: 2023-07-12 | End: 2023-07-20 | Stop reason: HOSPADM

## 2023-07-12 RX ORDER — CHLORHEXIDINE GLUCONATE 0.12 MG/ML
15 RINSE ORAL 2 TIMES DAILY
Status: DISCONTINUED | OUTPATIENT
Start: 2023-07-12 | End: 2023-07-13

## 2023-07-12 RX ORDER — MIDAZOLAM HYDROCHLORIDE 2 MG/2ML
INJECTION, SOLUTION INTRAMUSCULAR; INTRAVENOUS AS NEEDED
Status: DISCONTINUED | OUTPATIENT
Start: 2023-07-12 | End: 2023-07-12

## 2023-07-12 RX ORDER — MELATONIN
1000 DAILY
Status: DISCONTINUED | OUTPATIENT
Start: 2023-07-13 | End: 2023-07-20 | Stop reason: HOSPADM

## 2023-07-12 RX ORDER — ROCURONIUM BROMIDE 10 MG/ML
INJECTION, SOLUTION INTRAVENOUS AS NEEDED
Status: DISCONTINUED | OUTPATIENT
Start: 2023-07-12 | End: 2023-07-12

## 2023-07-12 RX ORDER — PROPOFOL 10 MG/ML
INJECTION, EMULSION INTRAVENOUS AS NEEDED
Status: DISCONTINUED | OUTPATIENT
Start: 2023-07-12 | End: 2023-07-12

## 2023-07-12 RX ORDER — CALCIUM CHLORIDE 100 MG/ML
1 INJECTION INTRAVENOUS; INTRAVENTRICULAR ONCE
Status: DISCONTINUED | OUTPATIENT
Start: 2023-07-12 | End: 2023-07-12

## 2023-07-12 RX ORDER — CALCIUM CHLORIDE 100 MG/ML
INJECTION INTRAVENOUS; INTRAVENTRICULAR AS NEEDED
Status: DISCONTINUED | OUTPATIENT
Start: 2023-07-12 | End: 2023-07-12

## 2023-07-12 RX ORDER — MAGNESIUM HYDROXIDE 1200 MG/15ML
LIQUID ORAL AS NEEDED
Status: DISCONTINUED | OUTPATIENT
Start: 2023-07-12 | End: 2023-07-12 | Stop reason: HOSPADM

## 2023-07-12 RX ORDER — CEFAZOLIN SODIUM 2 G/50ML
2000 SOLUTION INTRAVENOUS EVERY 8 HOURS
Status: COMPLETED | OUTPATIENT
Start: 2023-07-12 | End: 2023-07-13

## 2023-07-12 RX ADMIN — SODIUM CHLORIDE 1 EACH: 0.9 INJECTION, SOLUTION INTRAVENOUS at 08:29

## 2023-07-12 RX ADMIN — ALBUMIN (HUMAN) 12.5 G: 12.5 INJECTION, SOLUTION INTRAVENOUS at 16:19

## 2023-07-12 RX ADMIN — HYDROMORPHONE HYDROCHLORIDE 0.5 MG: 1 INJECTION, SOLUTION INTRAMUSCULAR; INTRAVENOUS; SUBCUTANEOUS at 17:27

## 2023-07-12 RX ADMIN — POTASSIUM CHLORIDE 20 MEQ: 14.9 INJECTION, SOLUTION INTRAVENOUS at 16:23

## 2023-07-12 RX ADMIN — AMINOCAPROIC ACID 10 G: 250 INJECTION, SOLUTION INTRAVENOUS at 08:59

## 2023-07-12 RX ADMIN — CEFAZOLIN SODIUM 2000 MG: 2 SOLUTION INTRAVENOUS at 09:02

## 2023-07-12 RX ADMIN — PROTAMINE SULFATE 10 MG: 10 INJECTION, SOLUTION INTRAVENOUS at 12:16

## 2023-07-12 RX ADMIN — SODIUM CHLORIDE, SODIUM LACTATE, POTASSIUM CHLORIDE, AND CALCIUM CHLORIDE 500 ML: .6; .31; .03; .02 INJECTION, SOLUTION INTRAVENOUS at 14:37

## 2023-07-12 RX ADMIN — LIDOCAINE HYDROCHLORIDE 100 MG: 20 INJECTION INTRAVENOUS at 12:01

## 2023-07-12 RX ADMIN — MANNITOL 25 G: 12.5 INJECTION, SOLUTION INTRAVENOUS at 08:29

## 2023-07-12 RX ADMIN — Medication 100 MCG: at 08:35

## 2023-07-12 RX ADMIN — Medication 200 MCG: at 08:51

## 2023-07-12 RX ADMIN — AMINOCAPROIC ACID 2 G/HR: 250 INJECTION, SOLUTION INTRAVENOUS at 08:59

## 2023-07-12 RX ADMIN — SODIUM BICARBONATE 50 MEQ: 84 INJECTION, SOLUTION INTRAVENOUS at 08:29

## 2023-07-12 RX ADMIN — ACETAMINOPHEN 975 MG: 325 TABLET, FILM COATED ORAL at 21:18

## 2023-07-12 RX ADMIN — MUPIROCIN 1 APPLICATION: 20 OINTMENT TOPICAL at 07:13

## 2023-07-12 RX ADMIN — ALBUMIN (HUMAN) 12.5 G: 12.5 INJECTION, SOLUTION INTRAVENOUS at 21:00

## 2023-07-12 RX ADMIN — Medication 350 ML: at 10:23

## 2023-07-12 RX ADMIN — FENTANYL CITRATE 50 MCG: 50 INJECTION INTRAMUSCULAR; INTRAVENOUS at 13:52

## 2023-07-12 RX ADMIN — MAGNESIUM SULFATE HEPTAHYDRATE 2 G: 40 INJECTION, SOLUTION INTRAVENOUS at 13:40

## 2023-07-12 RX ADMIN — POTASSIUM CHLORIDE 20 MEQ: 14.9 INJECTION, SOLUTION INTRAVENOUS at 14:38

## 2023-07-12 RX ADMIN — Medication 100 ML: at 11:28

## 2023-07-12 RX ADMIN — SODIUM BICARBONATE 50 MEQ: 84 INJECTION INTRAVENOUS at 16:34

## 2023-07-12 RX ADMIN — Medication 12.5 MG: at 07:12

## 2023-07-12 RX ADMIN — ONDANSETRON 4 MG: 2 INJECTION INTRAMUSCULAR; INTRAVENOUS at 21:23

## 2023-07-12 RX ADMIN — ALBUMIN (HUMAN) 12.5 G: 12.5 INJECTION, SOLUTION INTRAVENOUS at 17:23

## 2023-07-12 RX ADMIN — HEPARIN SODIUM 5000 UNITS: 1000 INJECTION INTRAVENOUS; SUBCUTANEOUS at 08:29

## 2023-07-12 RX ADMIN — FENTANYL CITRATE 50 MCG: 50 INJECTION INTRAMUSCULAR; INTRAVENOUS at 13:36

## 2023-07-12 RX ADMIN — MIDAZOLAM 2 MG: 1 INJECTION INTRAMUSCULAR; INTRAVENOUS at 10:17

## 2023-07-12 RX ADMIN — DEXMEDETOMIDINE HYDROCHLORIDE 0.5 MCG/KG/HR: 400 INJECTION INTRAVENOUS at 18:46

## 2023-07-12 RX ADMIN — MIDAZOLAM 2 MG: 1 INJECTION INTRAMUSCULAR; INTRAVENOUS at 08:04

## 2023-07-12 RX ADMIN — CEFAZOLIN SODIUM 2000 MG: 2 SOLUTION INTRAVENOUS at 19:42

## 2023-07-12 RX ADMIN — EPINEPHRINE 4 MCG/MIN: 1 INJECTION, SOLUTION, CONCENTRATE INTRAVENOUS at 12:03

## 2023-07-12 RX ADMIN — FENTANYL CITRATE 250 MCG: 50 INJECTION INTRAMUSCULAR; INTRAVENOUS at 08:22

## 2023-07-12 RX ADMIN — SODIUM CHLORIDE 20 ML/HR: 0.45 INJECTION, SOLUTION INTRAVENOUS at 14:10

## 2023-07-12 RX ADMIN — ROCURONIUM BROMIDE 100 MG: 50 INJECTION INTRAVENOUS at 08:22

## 2023-07-12 RX ADMIN — FENTANYL CITRATE 50 MCG: 50 INJECTION INTRAMUSCULAR; INTRAVENOUS at 18:51

## 2023-07-12 RX ADMIN — FENTANYL CITRATE 250 MCG: 50 INJECTION INTRAMUSCULAR; INTRAVENOUS at 10:17

## 2023-07-12 RX ADMIN — OXYCODONE HYDROCHLORIDE 5 MG: 5 TABLET ORAL at 21:23

## 2023-07-12 RX ADMIN — Medication 100 ML: at 11:27

## 2023-07-12 RX ADMIN — SODIUM CHLORIDE, SODIUM LACTATE, POTASSIUM CHLORIDE, AND CALCIUM CHLORIDE 500 ML: .6; .31; .03; .02 INJECTION, SOLUTION INTRAVENOUS at 13:55

## 2023-07-12 RX ADMIN — AMIODARONE HYDROCHLORIDE 150 MG: 50 INJECTION, SOLUTION INTRAVENOUS at 11:46

## 2023-07-12 RX ADMIN — CHLORHEXIDINE GLUCONATE 15 ML: 1.2 SOLUTION ORAL at 07:13

## 2023-07-12 RX ADMIN — ONDANSETRON 4 MG: 2 INJECTION INTRAMUSCULAR; INTRAVENOUS at 12:50

## 2023-07-12 RX ADMIN — Medication 100 MCG: at 08:33

## 2023-07-12 RX ADMIN — DEXMEDETOMIDINE HYDROCHLORIDE 0.5 MCG/KG/HR: 400 INJECTION INTRAVENOUS at 13:56

## 2023-07-12 RX ADMIN — PROTAMINE SULFATE 190 MG: 10 INJECTION, SOLUTION INTRAVENOUS at 12:20

## 2023-07-12 RX ADMIN — FENTANYL CITRATE 50 MCG: 50 INJECTION INTRAMUSCULAR; INTRAVENOUS at 18:46

## 2023-07-12 RX ADMIN — MAGNESIUM SULFATE HEPTAHYDRATE 2 G: 500 INJECTION, SOLUTION INTRAMUSCULAR; INTRAVENOUS at 11:44

## 2023-07-12 RX ADMIN — Medication 100 ML: at 10:25

## 2023-07-12 RX ADMIN — Medication 350 ML: at 10:24

## 2023-07-12 RX ADMIN — MUPIROCIN 1 APPLICATION: 20 OINTMENT TOPICAL at 21:18

## 2023-07-12 RX ADMIN — AMIODARONE HYDROCHLORIDE 200 MG: 200 TABLET ORAL at 21:19

## 2023-07-12 RX ADMIN — NOREPINEPHRINE BITARTRATE 4 MCG/MIN: 1 INJECTION INTRAVENOUS at 12:03

## 2023-07-12 RX ADMIN — SODIUM CHLORIDE: 0.9 INJECTION, SOLUTION INTRAVENOUS at 08:22

## 2023-07-12 RX ADMIN — CALCIUM CHLORIDE 1 G: 100 INJECTION INTRAVENOUS; INTRAVENTRICULAR at 12:05

## 2023-07-12 RX ADMIN — Medication 100 MCG: at 08:24

## 2023-07-12 RX ADMIN — PHENYLEPHRINE HYDROCHLORIDE 250 MCG: 10 INJECTION INTRAVENOUS at 11:36

## 2023-07-12 RX ADMIN — SODIUM BICARBONATE 50 MEQ: 84 INJECTION INTRAVENOUS at 17:23

## 2023-07-12 RX ADMIN — CEFAZOLIN SODIUM 2000 MG: 2 SOLUTION INTRAVENOUS at 11:50

## 2023-07-12 RX ADMIN — HEPARIN SODIUM 46400 UNITS: 1000 INJECTION INTRAVENOUS; SUBCUTANEOUS at 09:45

## 2023-07-12 RX ADMIN — PROPOFOL 100 MG: 10 INJECTION, EMULSION INTRAVENOUS at 08:22

## 2023-07-12 RX ADMIN — ALBUMIN, HUMAN INJ 5% 12.5 G: 5 SOLUTION at 16:19

## 2023-07-12 NOTE — ANESTHESIA PREPROCEDURE EVALUATION
Procedure:  REPLACEMENT VALVE MITRAL (MVR) W. TISSUE (Chest)  REPLACEMENT VALVE AORTIC (AVR) W. TISSUE/JENNY (Chest)  CORONARY ARTERY BYPASS GRAFT (CABG) 1 VESSELS (Chest)    Relevant Problems   CARDIO   (+) Aneurysm of ascending aorta (HCC)   (+) Aortic insufficiency   (+) Atrial fibrillation with RVR (HCC)   (+) Benign essential hypertension   (+) Coronary artery disease involving native coronary artery   (+) Hypertension   (+) Mitral valve insufficiency   (+) Mixed hyperlipidemia   (+) Paroxysmal atrial fibrillation (HCC)   (+) Permanent atrial fibrillation (HCC)      Other   (+) Chronic osteomyelitis of right tibia with draining sinus ([facility])      NYHA CLASS 3 SX      Lab Results   Component Value Date    SODIUM 142 06/27/2023    K 4.5 06/27/2023    BUN 12 06/27/2023    CREATININE 1.02 06/27/2023    EGFR 69 06/27/2023     Lab Results   Component Value Date    HGBA1C 5.6 06/27/2023       Lab Results   Component Value Date    HGB 17.5 (H) 06/27/2023    HGB 13.9 03/26/2022    HGB 16.1 03/25/2022     06/27/2023     (L) 03/26/2022     03/25/2022     Lab Results   Component Value Date    WBC 6.31 06/27/2023       Lab Results   Component Value Date    CREATININE 1.02 06/27/2023    CREATININE 0.88 05/08/2023    CREATININE 0.85 11/09/2022       Lab Results   Component Value Date    INR 1.14 06/27/2023    INR 1.21 (H) 03/25/2022    INR 1.09 08/15/2020     Lab Results   Component Value Date    PTT 47 (H) 03/26/2022    PTT 29 03/25/2022    PTT 23 08/15/2020       No results found for: "LACTATE"      Type and Screen  B    11/18/22 1244   RANDALL (Final result)        Impression   No impression found. Narrative   •  Left Ventricle: Left ventricular cavity size is dilated. Wall thickness    is normal. The left ventricular ejection fraction is 60%. Systolic    function is normal. Wall motion is normal.   •  Left Atrium: The atrium is dilated. •  Right Atrium: The atrium is dilated.    •  Atrial Septum: No patent foramen ovale detected using color flow    Doppler at rest.   •  Left Atrial Appendage: There is reduced function. There is no thrombus. •  Aortic Valve: There is mild to moderate regurgitation with a centrally    directed jet secondary to annular dilation. The AV PHT is 667 m/s. •  Mitral Valve: There is severe regurgitation with a centrally directed    jet. There is malcoaptation of the mitral valve secondary to annular    dilation. The mitral valve ERO is 0.40cm2. The regurgitant volume is 68    ml/beat. •  Tricuspid Valve: There is moderate regurgitation. •  Aorta: The aortic root is mildly dilated. The ascending aorta is    moderately dilated. The aortic root is 4.10 cm. The ascending aorta is 4.5    cm.       3D was performed for further investigation, better visualization,    additional quantification of the aortic valve, mitral valve, and left    atrial appendage. Results from the utilization of 3D are listed in the    report below. CONCLUSION:  Impression  RIGHT:  There is <50% stenosis noted in the internal carotid artery. Plaque is  homogenous and smooth. Vertebral artery flow is antegrade. There is no significant subclavian artery  disease. LEFT:  There is <50% stenosis noted in the internal carotid artery. Plaque is  homogenous and smooth. Vertebral artery flow is antegrade. There is no significant subclavian artery  disease. Physical Exam    Airway    Mallampati score: II  TM Distance: >3 FB       Dental   No notable dental hx     Cardiovascular      Pulmonary      Other Findings  LEFT UPPER IMPLANT; MULTIPLE CAPS      Anesthesia Plan  ASA Score- 4     Anesthesia Type- general with ASA Monitors. Additional Monitors: central venous line, pulmonary artery catheter and arterial line. Airway Plan: ETT. Comment: Mey Mccloud M.D., have personally seen and evaluated the patient prior to anesthetic care.   I have reviewed the pre-anesthetic record, and other medical records if appropriate to the anesthetic care. If a CRNA is involved in the case, I have reviewed the CRNA assessment, if present, and agree. Risks/benefits and alternatives discussed with patient including possible PONV, sore throat, and possibility of rare anesthetic and surgical emergencies. Patient denies history of dysphagia, diverticula, esophageal dysmotility, strictures, stents, or varices. Preinduction ABP; post-induction TLC and PAC; RANDALL and ICU post-operatively. .       Plan Factors-    Chart reviewed. EKG reviewed. Imaging results reviewed. Existing labs reviewed. Patient summary reviewed. Patient instructed to abstain from smoking on day of procedure. Obstructive sleep apnea risk education given perioperatively. Induction- intravenous. Postoperative Plan-     Informed Consent- Anesthetic plan and risks discussed with patient. I personally reviewed this patient with the CRNA. Discussed and agreed on the Anesthesia Plan with the CRNA. Cassandra Rod

## 2023-07-12 NOTE — RESPIRATORY THERAPY NOTE
RT Protocol Note  Lei Chapman. 77 y.o. male MRN: [id number]  Unit/Bed#: [facility] 418-01 Encounter: [id number]    Assessment    Principal Problem:    S/P AVR  Active Problems:    History of DVT (deep vein thrombosis)    Benign essential hypertension    Mitral valve insufficiency    Bilateral popliteal artery aneurysm (HCC)    Mixed hyperlipidemia    Aneurysm of ascending aorta (HCC)    Pulmonary hypertension (HCC)    Aortic insufficiency    Permanent atrial fibrillation (HCC)    Coronary artery disease involving native coronary artery    S/P MVR (mitral valve repair)    S/P left atrial appendage ligation    PAD (peripheral artery disease) (HCC)      Home Pulmonary Medications:  none       Past Medical History:   Diagnosis Date    Cardiac disease     Hypertension     PAF (paroxysmal atrial fibrillation) (HCC)     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HCC)     BILATERALLY     Social History     Socioeconomic History    Marital status: /Civil Union     Spouse name: None    Number of children: None    Years of education: None    Highest education level: None   Occupational History    None   Tobacco Use    Smoking status: Never    Smokeless tobacco: Never   Vaping Use    Vaping Use: Never used   Substance and Sexual Activity    Alcohol use: Not Currently    Drug use: No    Sexual activity: None   Other Topics Concern    None   Social History Narrative    None     Social Determinants of Health     Financial Resource Strain: Not on file   Food Insecurity: Not on file   Transportation Needs: Not on file   Physical Activity: Not on file   Stress: Not on file   Social Connections: Not on file   Intimate Partner Violence: Not on file   Housing Stability: Not on file       Subjective         Objective    Physical Exam:   Assessment Type: Assess only  General Appearance: Sedated  Respiratory Pattern: Assisted  Chest Assessment: Chest expansion symmetrical  Bilateral Breath Sounds: Clear    Vitals:  Blood pressure 146/93, pulse 102, temperature 98.1 °F (36.7 °C), temperature source Temporal, resp. rate 18, height 6' 3.2" (1.91 m), weight 115 kg (254 lb 3.1 oz), SpO2 96 %. Imaging and other studies: I have personally reviewed pertinent reports.             Plan    Respiratory Plan: (P) Vent/NIV/HFNC  Airway Clearance Plan: (P) Incentive Spirometer     Resp Comments: (P) pt assessed per resp and airway clearance protocols; just admitted to 418 from OR s/p AVR amd MVrepair; has no pulm hx; uses no pulm meds at home; bronchodilators indicated at this time; will start IS when extubated

## 2023-07-12 NOTE — ANESTHESIA POSTPROCEDURE EVALUATION
Post-Op Assessment Note    CV Status:  Stable       Mental Status:  Somnolent   Hydration Status:  Stable   PONV Controlled:  None   Airway Patency:  Patent  Airway: intubated   There is a medical reason for not screening for obstructive sleep apnea and/or for not using two or more mitigation strategies   Post Op Vitals Reviewed: Yes      Staff: Anesthesiologist         No notable events documented.     BP      Temp      Pulse     Resp      SpO2        Full report to ICU MD/NP; BPs 100s/60s; HR 90s, appears sinus; SpO2 98%

## 2023-07-12 NOTE — OR NURSING
Wedding band removed by Dr. Lenin Garibay, ring then transported by Romell Osler to Demetrio Medina. Demetrio Medina gave ring to security.

## 2023-07-12 NOTE — OP NOTE
OPERATIVE REPORT  PATIENT NAME: Kris Angel. :  1945  MRN: [id number]  Pt Location:  OR ROOM 16    SURGERY DATE: 2023    SURGEON: Brittney Perdue MD     ASSISTANT: Debby Puga PA-C    ADDITIONAL ASSISTANT: None     PREOPERATIVE DIAGNOSIS: 1. Aortic regurgitation; 2. Mitral regurgitation; 3. Permanent atrial fibrillation    POSTOPERATIVE DIAGNOSES:1. Aortic regurgitation; 2. Mitral regurgitation; 3. Permanent atrial fibrillation    NYHA CLASS: 3    CCS CLASS: 3    PROCEDURES:1. Aortic valve replacement with a 29 mm Escalante Inspiris bioprosthetic valve; 2. Mitral valve repair with a 36 mm Escalante Physio II ring annuloplasty; 3. Left atrial appendage ligation. ANESTHESIA General endotracheal anesthesia with transesophageal echocardiogram guidance, Dr. Macias An: 121 minutes. CROSSCLAMP TIME: 104 minutes. Chest tubes: x 2     Pacing wires: A x1 and V x1. TRANSFUSIONS: 2u Plts. SPECIMENS: Aortic valve     ESTIMATED BLOOD LOSS: 200 mL    OPERATIVE TECHNIQUE: The patient was taken to the operating room and placed supine on the operating table. Following the satisfactory induction of general anesthesia and the placement of monitoring lines the patient was prepped and draped in the usual sterile fashion. A time-out procedure was performed. The patient underwent median sternotomy, pericardiotomy, and systemic heparinization. Epiaortic ultrasound was used to evaluate the ascending aorta which was found to be free of significant atheromatous disease. The patient underwent aortic and bicaval cannulation and an antegrade was placed. The patient was initiated on bypass and an LV vent was placed through the right superior pulmonary vein. The ascending aorta was crossclamped.   Aortotomy was performed and [place] cardioplegia was delivered directly down the coronary ostia with an excellent arrest.  One additional dose of [place] cardioplegia was delivered during the crossclamp period. The left atrial appendage was sized to a 45 mm Atriclip which was deployed at the base of the appendage. The aortic valve was exposed. It was found to be trileaflet with severe regurgitation. There was mild annular calcification. The leaflets were excised and the annulus was debrided of calcium. The annulus was sized to a 29 mm Escalante Inspiris bioprosthetic valve. Valve sutures were placed with pledgets on the ventricular side. Left atriotomy was performed and the mitral valve was exposed with a self-retaining retractor. Thorough mitral valve analysis was performed. There was functional mitral valve disease with markedly dilated annulus. There was no leaflet prolapse. I felt that the valve was therefore repairable. Annuloplasty sutures were placed. A 36 mm Physio II ring was selected and brought to the field. The sutures were passed through the ring. The ring was seated and the sutures were tied down. The valve was tested and found to be competent. While de-airing the heart the left atriotomy was closed with running 3-0 Prolene suture. Attention was turned back to the aortic valve. A second dose of cardioplegia was delivered at this point. The 29 mm valve was brought to the field. The sutures were passed through the sewing ring of the valve, the valve was seated and the sutures were tied down. Clearance of the coronary ostia was confirmed. The heart was extensively de-aired and the crossclamp was removed. Atrial and ventricular pacing wires were placed. Following a period of reperfusion the patient was weaned from cardiopulmonary bypass and decannulated. Protamine was administered with normalization of the ACT. Hemostasis was confirmed in all fields. Thoracostomy tubes were placed. The sternum was closed with stainless steel wires. The fascia, subdermis and skin were closed with multiple layers of running absorbable suture.     As the attending surgeon, I was present and scrubbed for all critical portions of this procedure. Sponge, needle and instrument counts were reported as correct by the nursing staff. There is no cardiac residency program at this facility and for complex procedures such as this, it is vital to have a physician assistant experienced in cardiac surgery. The assistance of Teresa Hickey PAJohnnyC was necessary for experienced gentle retraction of the aortic root thereby providing exposure of the aortic valve during valve excision, annular debridement, placement of annular sutures, seating of the valve, and tying of the valve sutures. Teresa Bores, PA-C was also necessary for following the suture with correct tension during closure of the aortotomy. The assistance of Teresa Ruperto PA-C was necessary during mitral valve repair for retraction of the heart with correct tissue handling techniques during exposure of the mitral valve, for proper following of the suture during valve reconstruction, annular suture placement, and left atriotomy closure. Final RANDALL demonstrated: 1. normal bioAVR function with no PVL; 2. No mitral regurgitation or stenosis; 3. Unchanged LVEF 45%.       Carson Silva MD  DATE: July 12, 2023  TIME: 1:10 PM

## 2023-07-12 NOTE — ANESTHESIA PROCEDURE NOTES
Procedure Performed: RANDALL Anesthesia  Start Time:         Preanesthesia Checklist    Patient identified, IV assessed, risks and benefits discussed, monitors and equipment assessed, procedure being performed at surgeon's request and anesthesia consent obtained. Procedure     Type of RANDALL: complete RANDALL with interpretation. Images Saved: ultrasound permanent image saved. Location performed: OR. Intubated. Heart visualized. Insertion of RANDALL Probe:  Easy. Probe Type:  Epiaortic and multiplane. Modalities:  Color flow mapping, 3D, pulse wave Doppler and continuous wave Doppler. Echocardiographic and Doppler Measurements    PREPROCEDURE    LEFT VENTRICLE:  Systolic Function: mildly impaired. Ejection Fraction: 45%. Cavity Dimension: 5 cm. Regional Wall Motion Abnormalities: NONE. RIGHT VENTRICLE:  Systolic Function: moderately impaired. Cavity size moderately dilated. AORTIC VALVE:  Leaflets: trileaflet. Regurgitation: moderate. MITRAL VALVE:  Leaflets: calcified. Leaflet Motions: restricted. Regurgitation: mild. TRICUSPID VALVE:  Leaflets: normal.   Regurgitation: mild. PULMONIC VALVE:  Leaflets: normal.      OTHER VALVE FINDINGS:  AOV - 3 CUSPS; MILD-MODERATE CENTRAL AR; ANNULUS 3.4 CM2; STJ EFFACED  MV - MILD-MOD central MR; functional (3b) etiology. ASCENDING AORTA:  Size:  normal.  Dissection not present. AORTIC ARCH:  Size:  normal.  dissection not present. Grade 3: atheroma protruding < 0.5 cm into lumen. DESCENDING AORTA:  Size: normal.  Dissection not present. Grade 3: atheroma protruding < 0.5 cm into lumen. RIGHT ATRIUM:   No spontaneous echo contrast.    LEFT ATRIUM:   No spontaneous echo contrast.    LEFT ATRIAL APPENDAGE:   No spontaneous echo contrast         ATRIAL SEPTUM:  Intra-atrial septal morphology: normal.              EPIAORTIC:  Plaque Thickness: 0-5 mm.     OTHER FINDINGS:  Pericardium:  normal.         POSTPROCEDURE    LEFT VENTRICLE: Unchanged . Ejection Fraction: 45 %. RIGHT VENTRICLE:   Systolic Function: mildly depressed. Cavity Size: moderately dilated. AORTIC VALVE:   Leaflets: bioprosthetic. Mean Gradient: 4 mmHg. Regurgitation: none. MITRAL VALVE: Unchanged . Mean Gradient: 1.    TRICUSPID VALVE: Unchanged . PULMONIC VALVE: Unchanged         OTHER VALVE FINDINGS: S/p bio avr; mitral ring; both valves well seated; no pvl x 2; no tvl x2; no desmond on mitral; normal cusp motioin x 3 for aov. ATRIA:   Left Atrial Appendage Ligate: Yes. Residual Flow in Left Atrial Appendage by Color Flow Doppler: No.       AORTA: Unchanged . Dissection: Dissection not present. REMOVAL:  Probe Removal: atraumatic.

## 2023-07-12 NOTE — CONSULTS
[address] [place] [address]  Consult: Critical Care   Date of Service: 7/12/2023  Hospital Day: 0  Name: Rebecca Means I  MRN: [id number]  Unit/Bed#: [facility] 418-01    Inpatient consult to [address] performed by: Johnnie Girard PA-C  Consult ordered by: Milly Meneses PA-C        Assessment/Plan     Impressions:  1. Aortic regurgitation s/p aortic valve replacement with bioprosthetic valve  2. Mitral regurgitation s/p mitral valve repair with ring annuloplasty  3. Permanent atrial fibrillation  4. Hypertension  5. Hyperlipidemia  6. Pulmonary hypertension  7. DVT  8. Peripheral arterial disease    Neuro:   · Discontinue continuous sedation. · ATC tylenol, PRN oxycodone for pain  · Trend neuro exam  · Delirium precautions    CV:   · Goals:   · Cardiac Surgery Hemodynamic Monitoring goals: MAP goal >65 CI >2.2 Continue pulmonary artery catheter for hemodynamic monitoring  Continue central line due to vasoactive medications Continue arterial line for blood pressure monitoring and/or frequent blood gas draws  · Volume resuscitation as needed. · Epicardial pacing wire plan: Epicardial pacing wires in place. Will pace as needed for bradycardia or cardiac output   · Monitor rhythm on telemetry. Lung:   · Check STAT post-op ABG and CXR  · Wean vent with spontaneous breathing trial with goal to extubate today    GI:   · GI prophylaxis with PPI  · Bowel regimen  · Zofran PRN for nausea. FEN:   · NPO Replenish K >4.0, mag >2.0 and calcium >7.0. :   · Check STAT post-op BMP  · Khan in place. · Monitor UOP with goal >0.5cc/kg/hour. · Lasix versus volume resuscitate as needed depending on hemodynamics and volume status. ID:   · Prophylactic post-op abx. Maintain normothermia. Trend temps. Heme:   · Check STAT post-op H/H and platelets. · Monitor incision site, invasive lines, and chest tube outputs for bleeding.  Send coag panel if needed. Endo:   · Insulin gtt for blood sugar control. Disposition: ICU Care        Subjective     HPI: Jenelle Drummond is a 77 y.o.  male with PMH of HTN, HLD, A. 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 On ECHO in 11/2022 there was noted progression of valvular disease with mod-severe MR, mod-severe TR, mild AI and mild-mod AS. Patient was experiencing symptoms of SOB and LE edema with reoccurrence of his atrial fibrillation. Follow-up Cardiac cath (5/8/23) that demonstrates CAD with OM1 80% . Patient was scheduled for Elective AVR, MVR, and JENNY ligation. Patient arrives to the ICU s/p aortic valve replacement with bioprosthetic valve, mitral valve repair with ring annuloplasty, left atrial appendage ligation, supported on epi 2, levo 7. Post op EF 45%    Intra-op RANDALL LVEF 45%  Post-op medications: Critical Care Infusions: Levophed 7 mcg/min and Epinephrine 2 mcg/min    ROS: ROS unable to be obtained due to patient being intubated and sedated. History obtained from chart review due to patient being intubated and sedated. Objective          Vitals: Invasive Monitoring      /93   Pulse 102   Resp 18   O2 Sat 97 %   O2 Device Nasal cannula   Temp 98.1 °F (36.7 °C)    Arterial Line  Tierney BP    88/51   MAP    63     PA Catheter   Most Recent  Min/Max in 24hrs    PAP   31/11   CVP   5   CI    2.3   SVR   1000            Diagnostic Studies Physical exam   07/12/23 CXR: Lines and tubes in place, no pneumothorax or effusion  This was personally reviewed by myself in PACS.  07/12/23 EKG: Normal sinus rhythm, no ST or T wave abnormalities  This was personally reviewed by myself. Physical Exam  Vitals and nursing note reviewed. Constitutional:       Comments: Awake and following commands   HENT:      Mouth/Throat:      Mouth: Mucous membranes are moist.      Pharynx: Oropharynx is clear. Comments: Endotracheal tube in place. Eyes:      Extraocular Movements: Extraocular movements intact. Pupils: Pupils are equal, round, and reactive to light. Neck:      Comments: Central venous catheter in place. Cardiovascular:      Rate and Rhythm: Normal rate and regular rhythm. Pulses: Normal pulses. Pulmonary:      Effort: Pulmonary effort is normal.   Abdominal:      General: Abdomen is flat. Palpations: Abdomen is soft. Comments: Soft, nontender, nondistended. Musculoskeletal:         General: Normal range of motion. Cervical back: Normal range of motion. Skin:     General: Skin is warm. Capillary Refill: Capillary refill takes less than 2 seconds. Neurological:      General: No focal deficit present. Mental Status: He is alert.               Medications:  Scheduled PRN   acetaminophen, 975 mg, Q8H  amiodarone, 200 mg, Q8H [address]  [START ON 7/13/2023] ascorbic acid, 500 mg, Daily  aspirin, 325 mg, Daily  atorvastatin, 80 mg, Daily With Dinner  calcium chloride, 1 g, Once  cefazolin, 2,000 mg, Q8H  chlorhexidine, 15 mL, BID  [START ON 7/13/2023] cholecalciferol, 1,000 Units, Daily  [START ON 7/13/2023] fondaparinux, 2.5 mg, Daily  magnesium sulfate, 2 g, Once  mupirocin, 1 Application, Y96D [address]  [START ON 7/13/2023] pancrelipase (Lip-Prot-Amyl), 24,000 Units, TID With Meals  pantoprazole, 40 mg, Daily  [START ON 7/13/2023] polyethylene glycol, 17 g, Daily  propofol, ,   [START ON 7/13/2023] saccharomyces boulardii, 250 mg, BID      acetaminophen, 650 mg, Q4H PRN  bisacodyl, 10 mg, Daily PRN  fentanyl citrate (PF), 50 mcg, Q1H PRN  furosemide, 40 mg, Q6H PRN  HYDROmorphone, 0.5 mg, Q2H PRN  lactated ringers, 500 mL, Q30 Min PRN  lidocaine (cardiac), 100 mg, Q30 Min PRN  ondansetron, 4 mg, Q6H PRN  oxyCODONE, 5 mg, Q4H PRN  oxyCODONE, 2.5 mg, Q4H PRN  potassium chloride, 20 mEq, Once PRN  potassium chloride, 20 mEq, Q1H PRN  potassium chloride, 20 mEq, Q30 Min PRN  propofol, ,        Continuous    dexmedetomidine, 0.1-0.7 mcg/kg/hr, Last Rate: Stopped (07/12/23 1432)  epinephrine, 1-20 mcg/min, Last Rate: 2 mcg/min (07/12/23 1400)  insulin regular (HumuLIN R,NovoLIN R) 1 Units/mL in sodium chloride 0.9 % 100 mL infusion, 0.3-21 Units/hr  niCARdipine, 2.5-15 mg/hr  norepinephrine, 1-30 mcg/min, Last Rate: 3 mcg/min (07/12/23 1438)  sodium chloride, 20 mL/hr, Last Rate: 20 mL/hr (07/12/23 1410)         Labs:  CBC    Recent Labs     07/12/23  1125 07/12/23  1127 07/12/23  1336 07/12/23  1342   HGB  --    < > 16.6 15.3   HCT  --    < > 47.3 45   *  --  173  --     < > = values in this interval not displayed. BMP    Recent Labs     07/12/23  1336 07/12/23  1342   SODIUM 143  --    K 3.5  --    *  --    CO2 19* 21   AGAP 8  --    BUN 13  --    CREATININE 1.07  --    CALCIUM 8.8  --        Coags    Recent Labs     07/12/23  1336   INR 1.38*   PTT 32        Additional Electrolytes  Recent Labs     07/12/23  1235 07/12/23  1342   CAIONIZED 1.12 1.22          Blood Gas    No recent results  No recent results LFTs  No recent results    Infectious    No recent results     No recent results Glucose  Recent Labs     07/12/23  1336   GLUC 154*          Invasive lines and devices:   Invasive Devices     Central Venous Catheter Line  Duration           CVC Central Lines 07/12/23 Triple <1 day    Introducer 07/12/23 <1 day          Peripheral Intravenous Line  Duration           Peripheral IV 07/12/23 Right Wrist <1 day          Arterial Line  Duration           Arterial Line 07/12/23 Right Femoral <1 day          Line  Duration           Pacer Wires <1 day    Pacer Wires <1 day          Drain  Duration           Chest Tube 1 Pleural 32 Fr. <1 day    Chest Tube 2 Anterior;Mediastinal 32 Fr. <1 day Urethral Catheter Non-latex; Temperature probe 16 Fr. <1 day          Airway  Duration           ETT  Hi-Lo; Cuffed;Oral 9 mm <1 day                 Historical Information   Past Medical History:  No date: Cardiac disease  No date: Hypertension  No date: PAF (paroxysmal atrial fibrillation) (HCC)  No date: 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[address])      Comment:  BILATERALLY Past Surgical History:  5/8/2023: CARDIAC CATHETERIZATION; N/A      Comment:  Procedure: Cardiac Coronary Angiogram;  Surgeon:                Dre Reaves DO;  Location: [facility]; Service: Cardiology  3/25/2022: CARDIAC ELECTROPHYSIOLOGY PROCEDURE; N/A      Comment:  Procedure: Cardiac eps/afib ablation;  Surgeon: Ama Rodriguez MD;  Location: [facility]; Service:                Cardiology  8/16/2016: ESOPHAGOGASTRODUODENOSCOPY; N/A      Comment:  Procedure: ESOPHAGOGASTRODUODENOSCOPY (EGD); Surgeon:                Ary Rasmussen MD;  Location: [facility]; Service:   No date: HERNIA REPAIR; Left      Comment:  groin  1995: HERNIA REPAIR; Right  No date: OTHER SURGICAL HISTORY  04/01/2013: REPLACEMENT TOTAL KNEE; Left  No date: VASCULAR SURGERY; Right      Comment:  POPLITEAL BYPASS DUE TO ANEURYSM   Current Outpatient Medications   Medication Instructions   • apixaban (ELIQUIS) 5 mg, Oral, 2 times daily   • ascorbic acid (VITAMIN C) 500 mg, Oral, Daily   • aspirin (ECOTRIN LOW STRENGTH) 81 mg, Oral, Daily   • clindamycin (CLEOCIN) 300 MG capsule Prior to dental work.    • Coenzyme Q10 200 mg, Oral, Daily   • COLLAGEN PO 1 tablet, Oral   • diphenhydrAMINE (BENADRYL) 25 mg, Oral, Every 6 hours PRN, Patient not sure of the doseage   • Flaxseed, Linseed, (FLAXSEED OIL) 1000 MG CAPS 1 capsule, Oral, Daily   • fluorouracil (EFUDEX) 5 % cream APPLY TWICE DAILY TO AREAS OF PRE-CANCERS FOR A MINIMUM OF 14 DAYS, MAXIMUM OF 28 DAYS   • Green Tea 250 MG CAPS 1 capsule, Oral, Daily   • Liver Extract 500 MG CAPS 1 capsule, Oral, Daily   • Magnesium Citrate 125 MG CAPS 1 capsule, Oral, Daily   • metoprolol tartrate (LOPRESSOR) 25 mg, Oral, Every 12 hours scheduled   • mupirocin (BACTROBAN) 2 % ointment Topical, 2 times daily, Apply to each nostril twice daily for five days before your operation.    • Pancreatin 325 MG TABS 1 tablet, Oral, Daily   • Potassium Acetate LUCY Oral   • Probiotic Product (PROBIOTIC-10 PO) 1 capsule, Oral, Daily   • rosuvastatin (CRESTOR) 20 mg, Oral, Daily   • Vitamin D (Cholecalciferol) 2,000 Units, Oral, Daily    Allergies   Allergen Reactions   • Oxycodone GI Intolerance     nausea  nausea      Social History     Tobacco Use   • Smoking status: Never   • Smokeless tobacco: Never   Vaping Use   • Vaping Use: Never used   Substance Use Topics   • Alcohol use: Not Currently   • Drug use: No    Family History   Problem Relation Age of Onset   • Pancreatic cancer Mother    • Stroke Father    • Heart attack Paternal Grandmother             SIGNATURE: Gerry Brooks PA-C

## 2023-07-12 NOTE — Clinical Note
Site (pad location): lateral thigh. Laterality: right. Grounding pad site assessment: skin integrity intact.

## 2023-07-12 NOTE — ANESTHESIA PROCEDURE NOTES
Introducer/Ellijay-Lisa    Performed by: Jessica Wilson MD  Authorized by: Jessica Wilson MD    Date/Time: 7/12/2023 8:43 AM  Consent: Written consent obtained. Risks and benefits: risks, benefits and alternatives were discussed  Consent given by: patient  Patient understanding: patient states understanding of the procedure being performed  Patient consent: the patient's understanding of the procedure matches consent given  Patient identity confirmed: verbally with patient and arm band  Indications: vascular access  Location details: right internal jugular  Catheter size: 9 Fr  Assessment: blood return through all ports and free fluid flow  Preparation: skin prepped with 2% chlorhexidine  Skin prep agent dried: skin prep agent completely dried prior to procedure  Sterile barriers: all five maximum sterile barriers used - cap, mask, sterile gown, sterile gloves, and large sterile sheet  Hand hygiene: hand hygiene performed prior to central venous catheter insertion  Ultrasound guidance: yes  ultrasound permanent image saved  Pre-procedure: landmarks identified  Number of attempts: 1  Successful placement: yes  Post-procedure: line sutured and dressing applied  Patient tolerance: Patient tolerated the procedure well with no immediate complications  Comments: Single skin and vessel puncture. Easy thread of wires. Wires confirmed in SVC with ultrasound. No apparent complications.

## 2023-07-12 NOTE — INTERVAL H&P NOTE
Vitals:    07/12/23 0648   BP: 146/93   Pulse: 102   Resp: 18   Temp: 98.1 °F (36.7 °C)   SpO2: 97%         H&P reviewed. After examining the patient I find no changes in the patients condition since the H&P was completed. Plan for AVR, MV repair, Left atrial appendage ligation, possible CABG. .    Preoperative Beta Blocker: Contraindicated (aortic regurgitation). Anticipated Length of Stay: Patient will be admitted on an inpatient basis with an anticipated length of stay of grater than 2 midnights. Justification for Hospital StaY: Post surgical recovery following open heart surgery.       Ludivina Chen MD  07/12/23  7:22 AM

## 2023-07-12 NOTE — Clinical Note
Patient called with a question about spotting. Patient is approximately 6-8 weeks, and she has seen some spotting in a panty liner. No blood when wiping. Writer spoke with Nga Tamayo, and she said that some lite spotting is normal. Patient has no complaint of pain or cramping. She does not have an OB/GYN. Patient was instructed to call office if spotting gets worse or if she starts having pain or cramping. Sutured the generator.

## 2023-07-12 NOTE — ANESTHESIA PROCEDURE NOTES
Arterial Line Insertion    Performed by: Cuco Bautista MD  Authorized by: Cuco Bautista MD  Consent: Written consent obtained. Risks and benefits: risks, benefits and alternatives were discussed  Consent given by: patient  Patient understanding: patient states understanding of the procedure being performed  Patient consent: the patient's understanding of the procedure matches consent given  Required items: required blood products, implants, devices, and special equipment available  Patient identity confirmed: verbally with patient and arm band  Preparation: Patient was prepped and draped in the usual sterile fashion. Indications: hemodynamic monitoring  Orientation:  Left  Location: radial artery  Procedure Details:  Needle gauge: 20  Seldinger technique: Seldinger technique used  Number of attempts: 1    Post-procedure:  Post-procedure: dressing applied  Waveform: good waveform  Post-procedure CNS: normal and unchanged  Patient tolerance: Patient tolerated the procedure well with no immediate complications  Comments: Single skin and vessel puncture. Easy thread of wires. No apparent complications.

## 2023-07-12 NOTE — ANESTHESIA PROCEDURE NOTES
Central Line Insertion    Performed by: Vidal Chen MD  Authorized by: Vidal Chen MD    Date/Time: 7/12/2023 8:41 AM  Catheter Type:  triple lumen  Consent: Written consent obtained. Risks and benefits: risks, benefits and alternatives were discussed  Consent given by: patient  Patient understanding: patient states understanding of the procedure being performed  Patient consent: the patient's understanding of the procedure matches consent given  Patient identity confirmed: verbally with patient and arm band  Indications: vascular access  Catheter size: 7 Fr  Patient position: Trendelenburg  Assessment: blood return through all ports and free fluid flow  Preparation: skin prepped with 2% chlorhexidine  Skin prep agent dried: skin prep agent completely dried prior to procedure  Sterile barriers: all five maximum sterile barriers used - cap, mask, sterile gown, sterile gloves, and large sterile sheet  Hand hygiene: hand hygiene performed prior to central venous catheter insertion  sterile gel and probe cover used in ultrasound-guided central venous catheter insertionultrasound permanent image saved  Pre-procedure: landmarks identified  Vessel of Catheter Tip End: svc  Number of attempts: 1  Successful placement: yes  Post-procedure: line sutured and dressing applied  Patient tolerance: Patient tolerated the procedure well with no immediate complications  Comments: Single skin and vessel puncture. Easy thread of wires. Wires confirmed in SVC with ultrasound. No apparent complications.

## 2023-07-12 NOTE — Clinical Note
The PACER GENERATOR LIAM XT DR DYLAN COOLEY - [id number] device was inserted. The leads were placed into the connector and visually verified to be in correct position. Injury current obtained.

## 2023-07-12 NOTE — PLAN OF CARE
Problem: CARDIOVASCULAR - ADULT  Goal: Maintains optimal cardiac output and hemodynamic stability  Description: INTERVENTIONS:  - Monitor I/O, vital signs and rhythm  - Monitor for S/S and trends of decreased cardiac output  - Administer and titrate ordered vasoactive medications to optimize hemodynamic stability  - Assess quality of pulses, skin color and temperature  - Assess for signs of decreased coronary artery perfusion  - Instruct patient to report change in severity of symptoms  Outcome: Progressing     Problem: CARDIOVASCULAR - ADULT  Goal: Absence of cardiac dysrhythmias or at baseline rhythm  Description: INTERVENTIONS:  - Continuous cardiac monitoring, vital signs, obtain 12 lead EKG if ordered  - Administer antiarrhythmic and heart rate control medications as ordered  - Monitor electrolytes and administer replacement therapy as ordered  Outcome: Progressing     Problem: METABOLIC, FLUID AND ELECTROLYTES - ADULT  Goal: Electrolytes maintained within normal limits  Description: INTERVENTIONS:  - Monitor labs and assess patient for signs and symptoms of electrolyte imbalances  - Administer electrolyte replacement as ordered  - Monitor response to electrolyte replacements, including repeat lab results as appropriate  - Instruct patient on fluid and nutrition as appropriate  Outcome: Progressing     Problem: METABOLIC, FLUID AND ELECTROLYTES - ADULT  Goal: Fluid balance maintained  Description: INTERVENTIONS:  - Monitor labs   - Monitor I/O and WT  - Instruct patient on fluid and nutrition as appropriate  - Assess for signs & symptoms of volume excess or deficit  Outcome: Progressing     Problem: METABOLIC, FLUID AND ELECTROLYTES - ADULT  Goal: Glucose maintained within target range  Description: INTERVENTIONS:  - Monitor Blood Glucose as ordered  - Assess for signs and symptoms of hyperglycemia and hypoglycemia  - Administer ordered medications to maintain glucose within target range  - Assess nutritional intake and initiate nutrition service referral as needed  Outcome: Progressing     Problem: SKIN/TISSUE INTEGRITY - ADULT  Goal: Incision(s), wounds(s) or drain site(s) healing without S/S of infection  Description: INTERVENTIONS  - Assess and document dressing, incision, wound bed, drain sites and surrounding tissue  - Provide patient and family education  Outcome: Progressing

## 2023-07-13 ENCOUNTER — APPOINTMENT (OUTPATIENT)
Dept: RADIOLOGY | Facility: HOSPITAL | Age: 78
DRG: 219 | End: 2023-07-13
Payer: MEDICARE

## 2023-07-13 LAB
ABO GROUP BLD BPU: NORMAL
ANION GAP SERPL CALCULATED.3IONS-SCNC: 1 MMOL/L
ANION GAP SERPL CALCULATED.3IONS-SCNC: 6 MMOL/L
ATRIAL RATE: 32 BPM
ATRIAL RATE: 52 BPM
ATRIAL RATE: 80 BPM
ATRIAL RATE: 94 BPM
BASE EX.OXY STD BLDV CALC-SCNC: 63.8 % (ref 60–80)
BASE EXCESS BLDV CALC-SCNC: 0.3 MMOL/L
BPU ID: NORMAL
BUN SERPL-MCNC: 16 MG/DL (ref 5–25)
BUN SERPL-MCNC: 18 MG/DL (ref 5–25)
CALCIUM SERPL-MCNC: 8.7 MG/DL (ref 8.3–10.1)
CALCIUM SERPL-MCNC: 8.7 MG/DL (ref 8.3–10.1)
CHLORIDE SERPL-SCNC: 112 MMOL/L (ref 96–108)
CHLORIDE SERPL-SCNC: 113 MMOL/L (ref 96–108)
CO2 SERPL-SCNC: 24 MMOL/L (ref 21–32)
CO2 SERPL-SCNC: 29 MMOL/L (ref 21–32)
CREAT SERPL-MCNC: 1.02 MG/DL (ref 0.6–1.3)
CREAT SERPL-MCNC: 1.21 MG/DL (ref 0.6–1.3)
CROSSMATCH: NORMAL
ERYTHROCYTE [DISTWIDTH] IN BLOOD BY AUTOMATED COUNT: 13.9 % (ref 11.6–15.1)
GFR SERPL CREATININE-BSD FRML MDRD: 56 ML/MIN/1.73SQ M
GFR SERPL CREATININE-BSD FRML MDRD: 69 ML/MIN/1.73SQ M
GLUCOSE SERPL-MCNC: 100 MG/DL (ref 65–140)
GLUCOSE SERPL-MCNC: 110 MG/DL (ref 65–140)
GLUCOSE SERPL-MCNC: 140 MG/DL (ref 65–140)
GLUCOSE SERPL-MCNC: 146 MG/DL (ref 65–140)
GLUCOSE SERPL-MCNC: 154 MG/DL (ref 65–140)
GLUCOSE SERPL-MCNC: 162 MG/DL (ref 65–140)
GLUCOSE SERPL-MCNC: 170 MG/DL (ref 65–140)
GLUCOSE SERPL-MCNC: 97 MG/DL (ref 65–140)
GLUCOSE SERPL-MCNC: 99 MG/DL (ref 65–140)
HCO3 BLDV-SCNC: 27.3 MMOL/L (ref 24–30)
HCT VFR BLD AUTO: 42.9 % (ref 36.5–49.3)
HGB BLD-MCNC: 14.6 G/DL (ref 12–17)
MAGNESIUM SERPL-MCNC: 2.9 MG/DL (ref 1.6–2.6)
MCH RBC QN AUTO: 32.5 PG (ref 26.8–34.3)
MCHC RBC AUTO-ENTMCNC: 34 G/DL (ref 31.4–37.4)
MCV RBC AUTO: 96 FL (ref 82–98)
NASAL CANNULA: 2
O2 CT BLDV-SCNC: 13.8 ML/DL
P AXIS: 90 DEGREES
P AXIS: 90 DEGREES
P AXIS: 92 DEGREES
PCO2 BLDV: 52.8 MM HG (ref 42–50)
PH BLDV: 7.33 [PH] (ref 7.3–7.4)
PLATELET # BLD AUTO: 144 THOUSANDS/UL (ref 149–390)
PMV BLD AUTO: 10.1 FL (ref 8.9–12.7)
PO2 BLDV: 33.3 MM HG (ref 35–45)
POTASSIUM SERPL-SCNC: 4.1 MMOL/L (ref 3.5–5.3)
POTASSIUM SERPL-SCNC: 4.6 MMOL/L (ref 3.5–5.3)
PR INTERVAL: 170 MS
PR INTERVAL: 216 MS
QRS AXIS: -24 DEGREES
QRS AXIS: -30 DEGREES
QRS AXIS: -41 DEGREES
QRS AXIS: 0 DEGREES
QRSD INTERVAL: 112 MS
QRSD INTERVAL: 112 MS
QRSD INTERVAL: 90 MS
QRSD INTERVAL: 98 MS
QT INTERVAL: 406 MS
QT INTERVAL: 412 MS
QT INTERVAL: 430 MS
QT INTERVAL: 540 MS
QTC INTERVAL: 399 MS
QTC INTERVAL: 475 MS
QTC INTERVAL: 507 MS
QTC INTERVAL: 552 MS
RBC # BLD AUTO: 4.49 MILLION/UL (ref 3.88–5.62)
SODIUM SERPL-SCNC: 142 MMOL/L (ref 135–147)
SODIUM SERPL-SCNC: 143 MMOL/L (ref 135–147)
T WAVE AXIS: 35 DEGREES
T WAVE AXIS: 85 DEGREES
T WAVE AXIS: 85 DEGREES
T WAVE AXIS: 91 DEGREES
UNIT DISPENSE STATUS: NORMAL
UNIT PRODUCT CODE: NORMAL
UNIT PRODUCT VOLUME: 300 ML
UNIT PRODUCT VOLUME: 350 ML
UNIT RH: NORMAL
VENTRICULAR RATE: 52 BPM
VENTRICULAR RATE: 63 BPM
VENTRICULAR RATE: 80 BPM
VENTRICULAR RATE: 94 BPM
WBC # BLD AUTO: 14.25 THOUSAND/UL (ref 4.31–10.16)

## 2023-07-13 PROCEDURE — 93010 ELECTROCARDIOGRAM REPORT: CPT | Performed by: INTERNAL MEDICINE

## 2023-07-13 PROCEDURE — 94664 DEMO&/EVAL PT USE INHALER: CPT

## 2023-07-13 PROCEDURE — 94760 N-INVAS EAR/PLS OXIMETRY 1: CPT

## 2023-07-13 PROCEDURE — 99024 POSTOP FOLLOW-UP VISIT: CPT | Performed by: THORACIC SURGERY (CARDIOTHORACIC VASCULAR SURGERY)

## 2023-07-13 PROCEDURE — 82805 BLOOD GASES W/O2 SATURATION: CPT | Performed by: NURSE PRACTITIONER

## 2023-07-13 PROCEDURE — 83735 ASSAY OF MAGNESIUM: CPT | Performed by: PHYSICIAN ASSISTANT

## 2023-07-13 PROCEDURE — 97167 OT EVAL HIGH COMPLEX 60 MIN: CPT

## 2023-07-13 PROCEDURE — 97163 PT EVAL HIGH COMPLEX 45 MIN: CPT

## 2023-07-13 PROCEDURE — 93005 ELECTROCARDIOGRAM TRACING: CPT

## 2023-07-13 PROCEDURE — 99233 SBSQ HOSP IP/OBS HIGH 50: CPT | Performed by: INTERNAL MEDICINE

## 2023-07-13 PROCEDURE — 99222 1ST HOSP IP/OBS MODERATE 55: CPT | Performed by: INTERNAL MEDICINE

## 2023-07-13 PROCEDURE — 85027 COMPLETE CBC AUTOMATED: CPT | Performed by: PHYSICIAN ASSISTANT

## 2023-07-13 PROCEDURE — 80048 BASIC METABOLIC PNL TOTAL CA: CPT | Performed by: PHYSICIAN ASSISTANT

## 2023-07-13 PROCEDURE — 82948 REAGENT STRIP/BLOOD GLUCOSE: CPT

## 2023-07-13 PROCEDURE — 71045 X-RAY EXAM CHEST 1 VIEW: CPT

## 2023-07-13 RX ORDER — POTASSIUM CHLORIDE 20 MEQ/1
20 TABLET, EXTENDED RELEASE ORAL 2 TIMES DAILY
Status: DISCONTINUED | OUTPATIENT
Start: 2023-07-13 | End: 2023-07-14

## 2023-07-13 RX ORDER — MAGNESIUM SULFATE HEPTAHYDRATE 40 MG/ML
2 INJECTION, SOLUTION INTRAVENOUS ONCE
Status: COMPLETED | OUTPATIENT
Start: 2023-07-13 | End: 2023-07-13

## 2023-07-13 RX ORDER — METHOCARBAMOL 500 MG/1
500 TABLET, FILM COATED ORAL EVERY 6 HOURS PRN
Status: DISCONTINUED | OUTPATIENT
Start: 2023-07-13 | End: 2023-07-20 | Stop reason: HOSPADM

## 2023-07-13 RX ORDER — FUROSEMIDE 10 MG/ML
40 INJECTION INTRAMUSCULAR; INTRAVENOUS
Status: DISCONTINUED | OUTPATIENT
Start: 2023-07-13 | End: 2023-07-14

## 2023-07-13 RX ORDER — MAGNESIUM SULFATE HEPTAHYDRATE 40 MG/ML
2 INJECTION, SOLUTION INTRAVENOUS ONCE
Status: DISCONTINUED | OUTPATIENT
Start: 2023-07-13 | End: 2023-07-13

## 2023-07-13 RX ORDER — INSULIN LISPRO 100 [IU]/ML
1-5 INJECTION, SOLUTION INTRAVENOUS; SUBCUTANEOUS
Status: DISCONTINUED | OUTPATIENT
Start: 2023-07-13 | End: 2023-07-20 | Stop reason: HOSPADM

## 2023-07-13 RX ORDER — HYDROMORPHONE HCL/PF 1 MG/ML
0.5 SYRINGE (ML) INJECTION EVERY 2 HOUR PRN
Status: DISCONTINUED | OUTPATIENT
Start: 2023-07-13 | End: 2023-07-13

## 2023-07-13 RX ORDER — WARFARIN SODIUM 2.5 MG/1
2.5 TABLET ORAL
Status: COMPLETED | OUTPATIENT
Start: 2023-07-13 | End: 2023-07-13

## 2023-07-13 RX ORDER — HYDROMORPHONE HCL/PF 1 MG/ML
0.5 SYRINGE (ML) INJECTION ONCE
Status: DISCONTINUED | OUTPATIENT
Start: 2023-07-13 | End: 2023-07-13

## 2023-07-13 RX ORDER — TEMAZEPAM 15 MG/1
15 CAPSULE ORAL
Status: DISCONTINUED | OUTPATIENT
Start: 2023-07-13 | End: 2023-07-20 | Stop reason: HOSPADM

## 2023-07-13 RX ORDER — LIDOCAINE 50 MG/G
1 PATCH TOPICAL DAILY
Status: DISCONTINUED | OUTPATIENT
Start: 2023-07-13 | End: 2023-07-20 | Stop reason: HOSPADM

## 2023-07-13 RX ORDER — DOCUSATE SODIUM 100 MG/1
100 CAPSULE, LIQUID FILLED ORAL 2 TIMES DAILY
Status: DISCONTINUED | OUTPATIENT
Start: 2023-07-13 | End: 2023-07-20 | Stop reason: HOSPADM

## 2023-07-13 RX ADMIN — METHOCARBAMOL TABLETS 500 MG: 500 TABLET, COATED ORAL at 08:30

## 2023-07-13 RX ADMIN — ATORVASTATIN CALCIUM 80 MG: 80 TABLET, FILM COATED ORAL at 16:42

## 2023-07-13 RX ADMIN — MUPIROCIN 1 APPLICATION: 20 OINTMENT TOPICAL at 08:30

## 2023-07-13 RX ADMIN — MAGNESIUM SULFATE HEPTAHYDRATE 2 G: 40 INJECTION, SOLUTION INTRAVENOUS at 08:46

## 2023-07-13 RX ADMIN — ASPIRIN 325 MG ORAL TABLET 325 MG: 325 PILL ORAL at 08:30

## 2023-07-13 RX ADMIN — FONDAPARINUX SODIUM 2.5 MG: 2.5 INJECTION, SOLUTION SUBCUTANEOUS at 08:30

## 2023-07-13 RX ADMIN — ACETAMINOPHEN 975 MG: 325 TABLET, FILM COATED ORAL at 19:49

## 2023-07-13 RX ADMIN — ACETAMINOPHEN 975 MG: 325 TABLET, FILM COATED ORAL at 14:01

## 2023-07-13 RX ADMIN — AMIODARONE HYDROCHLORIDE 200 MG: 200 TABLET ORAL at 06:13

## 2023-07-13 RX ADMIN — Medication 250 MG: at 08:46

## 2023-07-13 RX ADMIN — ONDANSETRON 4 MG: 2 INJECTION INTRAMUSCULAR; INTRAVENOUS at 03:25

## 2023-07-13 RX ADMIN — CEFAZOLIN SODIUM 2000 MG: 2 SOLUTION INTRAVENOUS at 11:11

## 2023-07-13 RX ADMIN — POTASSIUM CHLORIDE 20 MEQ: 1500 TABLET, EXTENDED RELEASE ORAL at 08:30

## 2023-07-13 RX ADMIN — NOREPINEPHRINE BITARTRATE 3 MCG/MIN: 1 SOLUTION INTRAVENOUS at 00:20

## 2023-07-13 RX ADMIN — WARFARIN SODIUM 2.5 MG: 2.5 TABLET ORAL at 17:05

## 2023-07-13 RX ADMIN — MUPIROCIN 1 APPLICATION: 20 OINTMENT TOPICAL at 19:49

## 2023-07-13 RX ADMIN — METHOCARBAMOL TABLETS 500 MG: 500 TABLET, COATED ORAL at 19:49

## 2023-07-13 RX ADMIN — INSULIN LISPRO 1 UNITS: 100 INJECTION, SOLUTION INTRAVENOUS; SUBCUTANEOUS at 11:14

## 2023-07-13 RX ADMIN — DOCUSATE SODIUM 100 MG: 100 CAPSULE ORAL at 08:30

## 2023-07-13 RX ADMIN — LIDOCAINE 5% 1 PATCH: 700 PATCH TOPICAL at 08:31

## 2023-07-13 RX ADMIN — PANTOPRAZOLE SODIUM 40 MG: 40 TABLET, DELAYED RELEASE ORAL at 08:30

## 2023-07-13 RX ADMIN — INSULIN LISPRO 1 UNITS: 100 INJECTION, SOLUTION INTRAVENOUS; SUBCUTANEOUS at 17:09

## 2023-07-13 RX ADMIN — POTASSIUM CHLORIDE 20 MEQ: 1500 TABLET, EXTENDED RELEASE ORAL at 17:05

## 2023-07-13 RX ADMIN — FUROSEMIDE 40 MG: 10 INJECTION, SOLUTION INTRAMUSCULAR; INTRAVENOUS at 08:30

## 2023-07-13 RX ADMIN — OXYCODONE HYDROCHLORIDE 5 MG: 5 TABLET ORAL at 03:25

## 2023-07-13 RX ADMIN — Medication 1000 UNITS: at 08:31

## 2023-07-13 RX ADMIN — OXYCODONE HYDROCHLORIDE AND ACETAMINOPHEN 500 MG: 500 TABLET ORAL at 08:30

## 2023-07-13 RX ADMIN — ACETAMINOPHEN 975 MG: 325 TABLET, FILM COATED ORAL at 06:13

## 2023-07-13 RX ADMIN — DOCUSATE SODIUM 100 MG: 100 CAPSULE ORAL at 17:05

## 2023-07-13 RX ADMIN — Medication 250 MG: at 17:05

## 2023-07-13 RX ADMIN — PANCRELIPASE 24000 UNITS: 24000; 76000; 120000 CAPSULE, DELAYED RELEASE PELLETS ORAL at 17:05

## 2023-07-13 RX ADMIN — CEFAZOLIN SODIUM 2000 MG: 2 SOLUTION INTRAVENOUS at 03:04

## 2023-07-13 NOTE — RESPIRATORY THERAPY NOTE
RT Ventilator Management Note  Juan Villar. 77 y.o. male MRN: [id number]  Unit/Bed#: [facility] 574-25 Encounter: [id number]      Daily Screen         7/12/2023  1332 7/12/2023  1500          Patient safety screen outcome[de-identified] Failed Passed      Not Ready for Weaning due to[de-identified] -- --      Spont breathing trial % for 30 min: -- Yes                Physical Exam:   Assessment Type: Assess only  General Appearance: Sedated  Respiratory Pattern: Assisted  Chest Assessment: Chest expansion symmetrical  Bilateral Breath Sounds: Clear      Resp Comments: pt extubated without incident. Pt suctioned via ett and mouth, cuff deflated, tube removed. Pt denies distress, and has clear breath sounds with no stridor. Will continue to monitor per protocol.       07/12/23 2000   Respiratory Assessment   Resp Comments pt extubated without incident. Pt suctioned via ett and mouth, cuff deflated, tube removed. Pt denies distress, and has clear breath sounds with no stridor. Will continue to monitor per protocol.    Vent Information   Ventilator End Yes

## 2023-07-13 NOTE — CASE MANAGEMENT
Case Management Assessment & Discharge Planning Note    Patient name Wenceslao Carrion.   [facility] [id number]  : 1945 Date 2023       Current Admission Date: 2023  Current Admission Diagnosis:S/P AVR   Patient Active Problem List    Diagnosis Date Noted   • S/P AVR 2023   • S/P MVR (mitral valve repair) 2023   • S/P left atrial appendage ligation 2023   • Hyperchloremia 2023   • Coronary artery disease involving native coronary artery 2023   • Aortic insufficiency 2023   • Permanent atrial fibrillation ([address]) 2023   • Chronic osteomyelitis of right tibia with draining sinus ([address]) 2023   • PAD (peripheral artery disease) ([address]) 2021   • Pulmonary hypertension ([address]) 2021   • Erythrocytosis 2021   • Streptococcal bacteremia 2020   • Callus of foot 2020   • Sepsis ([address]) 08/15/2020   • Hypoxia 08/15/2020   • Aneurysm of ascending aorta ([address]) 2019   • Mixed hyperlipidemia 2018   • Mitral valve insufficiency 10/31/2017   • Paroxysmal atrial fibrillation ([address]) 2016   • History of DVT (deep vein thrombosis) 08/15/2016   • Hypertension    • Atrial fibrillation with RVR ([address]) 2016   • 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[address]) 2016   • Bilateral popliteal artery aneurysm ([address]) 2016   • Benign essential hypertension 2009      LOS (days): 1  Geometric Mean LOS (GMLOS) (days): 5.80  Days to GMLOS:4.5     OBJECTIVE:    Risk of Unplanned Readmission Score: 15.52         Current admission status: Inpatient       Preferred Pharmacy:   [facility] [address], [address] [address]  Phone: [phone] Fax: [phone]    Primary Care Provider: Josie Márquez DO    Primary Insurance: MEDICARE  Secondary Insurance: [address]:  Active Health Care Proxies    There are no active Health Care Proxies on file.                      Patient Information  Admitted from[de-identified] Home  Mental Status: Alert  During Assessment patient was accompanied by: Not accompanied during assessment  Assessment information provided by[de-identified] Patient  Primary Caregiver: Self  Support Systems: Spouse/significant other, [address] of Residence: [facility] do you live in?: [place]  Number of steps to enter home.: 3  Do the steps have railings?: Yes  Type of Current Residence: [facility]  In the last 12 months, was there a time when you were not able to pay the mortgage or rent on time?: No  In the last 12 months, how many places have you lived?: 1  In the last 12 months, was there a time when you did not have a steady place to sleep or slept in a shelter (including now)?: No  Homeless/housing insecurity resource given?: N/A  Living Arrangements: Lives w/ Spouse/significant other, Lives w/ Daughter  Is patient a ?: No    Activities of Daily Living Prior to Admission  Functional Status: Independent  Completes ADLs independently?: Yes  Ambulates independently?: Yes  Does patient use assisted devices?: No  Does patient currently own DME?: Yes  What DME does the patient currently own?: Shower Chair, Walker, Other (Comment) (grab bars)  Does patient have a history of Outpatient Therapy (PT/OT)?: Yes  Does the patient have a history of Short-Term Rehab?: No  Does patient have a history of HHC?: Yes  Does patient currently have [facility]?: No         Patient Information Continued  Income Source: Pension/skilled nursing  Does patient have prescription coverage?: Yes  Within the past 12 months, you worried that your food would run out before you got the money to buy more.: Never true  Within the past 12 months, the food you bought just didn't last and you didn't have money to get more.: Never true  Food insecurity resource given?: N/A  Does patient receive dialysis treatments?: No  Does patient have a history of substance abuse?: No  Does patient have a history of Mental Health Diagnosis?: No         Means of Transportation  Means of Transport to Appts[de-identified] Drives Self  In the past 12 months, has lack of transportation kept you from medical appointments or from getting medications?: No  In the past 12 months, has lack of transportation kept you from meetings, work, or from getting things needed for daily living?: No  Was application for public transport provided?: N/A        DISCHARGE DETAILS:    Discharge planning discussed with[de-identified] pt at bedside  [place] of Choice: Yes  Comments - Freedom of Choice: CM discussed PT discharge recommendation with pt. Pt was agreeable for HHC but not the bedside commode. Pt stated he did not need a bedside commode due to his bedroom being close to the bathroom and also having grabs bars in the bathroom as well. Pt stated he would like a referral submitted to [facility]. Referral submitted via [employer].   CM contacted family/caregiver?: No- see comments (pt alert and oriented)  Were Treatment Team discharge recommendations reviewed with patient/caregiver?: Yes  Did patient/caregiver verbalize understanding of patient care needs?: Yes  Were patient/caregiver advised of the risks associated with not following Treatment Team discharge recommendations?: Yes    Contacts  Patient Contacts: Cindy Eli  Relationship to Patient[de-identified] Family  Contact Method: Phone  Phone Number: [phone]  Reason/Outcome: Continuity of Care, Emergency Contact, Discharge [address]         Is the patient interested in [facility] at discharge?: Yes  [facility] requested[de-identified] Nursing, Physical [facility] Provider[de-identified] PCP  Home Health Services Needed[de-identified] [address] and Assessment, Gait/ADL Training, Evaluate Functional Status and Safety, Strengthening/Theraputic Exercises to Improve Function, Other (comment) (medication teaching, signs/symptoms to report, Vital sign check, Home Safety)  Homebound Criteria Met[de-identified] Requires the Assistance of Another Person for Safe Ambulation or to [address] Findings[de-identified] Limited Endurance    DME Referral Provided  Referral made for DME?: No    Other Referral/Resources/Interventions Provided:  Interventions: [facility]         Treatment Team Recommendation: Home with [address]  Discharge Destination Plan[de-identified] Home with [address] [facility] at Discharge : Family       CM reviewed d/c planning process including the following: identifying help at home, patient preference for d/c planning needs, Discharge Lounge, [facility] Meds to Bed program, availability of treatment team to discuss questions or concerns patient and/or family may have regarding understanding medications and recognizing signs and symptoms once discharged. CM also encouraged patient to follow up with all recommended appointments after discharge. Patient advised of importance for patient and family to participate in managing patient’s medical well being. Patient/caregiver received discharge checklist.  Content reviewed. Patient/caregiver encouraged to participate in discharge plan of care prior to discharge home. Additional Comments: CM introduced herself and role to pt at bedside. Pt stated he lives in a ranch with his wife and daughter in [place] [facility]. Pt stated he is indepednent at baseline. Pt stated he has a walker, shower chair, and grab bars at home. Pt stated once he is medically cleared and ready for discharge pt shaq will be able to provide transportation. CM discussed PT discharge recommendation with pt. Pt was agreeable for [facility] but not the bedside commode. Pt stated he did not need a bedside commode due to his bedroom being close to the bathroom and also having grabs bars in the bathroom as well. Pt stated he would like a referral submitted to [facility]. Referral submitted via [employer].

## 2023-07-13 NOTE — OCCUPATIONAL THERAPY NOTE
Occupational Therapy Evaluation     Patient Name: Riddhi Castellano. Today's Date: 7/13/2023  Problem List  Principal Problem:    S/P AVR  Active Problems:    History of DVT (deep vein thrombosis)    Benign essential hypertension    Mitral valve insufficiency    Bilateral popliteal artery aneurysm (HCC)    Mixed hyperlipidemia    Aneurysm of ascending aorta (HCC)    Pulmonary hypertension (HCC)    Aortic insufficiency    Permanent atrial fibrillation ([address])    Coronary artery disease involving native coronary artery    S/P MVR (mitral valve repair)    S/P left atrial appendage ligation    PAD (peripheral artery disease) ([address])    Hyperchloremia    Past Medical History  Past Medical History:   Diagnosis Date    Cardiac disease     Hypertension     PAF (paroxysmal atrial fibrillation) ([address])     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[address])     BILATERALLY     Past Surgical History  Past Surgical History:   Procedure Laterality Date    CARDIAC CATHETERIZATION N/A 5/8/2023    Procedure: Cardiac Coronary Angiogram;  Surgeon: Myrna Rudd DO;  Location: [facility] C[facility]; Service: Cardiology    CARDIAC ELECTROPHYSIOLOGY PROCEDURE N/A 3/25/2022    Procedure: Cardiac eps/afib ablation;  Surgeon: Lilli Seaman MD;  Location: [facility]; Service: Cardiology    ESOPHAGOGASTRODUODENOSCOPY N/A 8/16/2016    Procedure: ESOPHAGOGASTRODUODENOSCOPY (EGD); Surgeon: Jesus Parkinson MD;  Location: [facility] GI LAB; Service:     HERNIA REPAIR Left     groin    HERNIA REPAIR Right 1995    OTHER SURGICAL HISTORY      REPLACEMENT TOTAL KNEE Left 04/01/2013    VASCULAR SURGERY Right     POPLITEAL BYPASS DUE TO ANEURYSM           07/13/23 6698   OT Last Visit   OT Visit Date 07/13/23   Note Type   Note type Evaluation   Additional Comments Pt seen w/ PT to increase safety, decrease fall risk, and maximize functional/occupational performance 2* medical complexity which is a regression from pt's functional baseline.    Pain Assessment   Pain Assessment Tool 0-10   Pain Score No Pain   Hospital Pain Intervention(s) Repositioned; Ambulation/increased activity; Emotional support   Restrictions/Precautions   Weight Bearing Precautions Per Order No   Other Precautions Cardiac/sternal;Multiple lines;Telemetry;O2;Fall Risk  (chest tubes, guillermo)   Home Living   Type of Home House  (1  with 1+1 QASIM)   Home Layout One level;Performs ADLs on one level; Able to live on main level with bedroom/bathroom; Access   InhibOx Grab bars in shower; Shower chair;Grab bars around toilet   [address] Other (Comment)  (No DME)   Additional Comments Pt reports living with his wife and daughter in a 1  with 1+1 QASIM; reports using no DME PTA   Prior Function   Level of [place] Independent with ADLs; Independent with functional mobility; Independent with IADLS   Lives With Spouse; Family;Daughter   Receives Help From Family   IADLs Independent with driving; Independent with meal prep; Independent with medication management   Falls in the last 6 months 0   Vocational Retired   Comments (+) driving; reports that his daughter can assist with functional needs prn   Lifestyle   Autonomy PTA, pt was independent in ADLs/IADLs and functional mobility w/ no DME; (+) driving   Reciprocal Relationships Lives w/ his wife and daughter   Service to Others Retired; 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   Intrinsic Gratification Enjoys golfing and doing projects around the [address] [place] [address] "I just feel a little sore in the chest."   ADL   Where Assessed Chair   Eating Assistance 5  Supervision/Setup   Grooming Assistance 5  Supervision/Setup   [address] 4  Minimal Assistance   LB Bathing Assistance 4  [address]  4  Minimal Assistance   Functional Assistance 4  Minimal Assistance   Bed Mobility   Supine to Sit Unable to assess   Sit to Supine Unable to assess   Additional Comments Upon arrival, pt found sitting upright in recliner; @ end of session, pt left sitting upright in recliner with all functional needs in reach   Transfers   Sit to Stand 3  Moderate assistance   Additional items Assist x 2;Armrests; Increased time required;Verbal cues   Stand to Sit 3  Moderate assistance   Additional items Assist x 2;Armrests; Increased time required;Verbal cues   Additional Comments Initially performing STS w/ Mod A x2 w/ RW w/ verbal cues for proper hand placement/technique   Functional Mobility   Functional Mobility 3  Moderate assistance   Additional Comments Pt completed household functional mobility distances w/ Mod A x1 w/ RW for safety and support with x2-3 present for line management/safety   Additional items Rolling walker   Balance   Static Sitting Fair +   Dynamic Sitting Fair   Static Standing Poor +   Dynamic Standing Poor   Ambulatory Poor -   Activity Tolerance   Activity Tolerance Patient tolerated treatment well;Patient limited by fatigue   Medical Staff Made Aware Pablo, DPT; Bernabe Lick, SPT   Nurse Made Aware RN cleared   RUE Assessment   RUE Assessment WFL   LUE Assessment   LUE Assessment WFL   Hand Function   Gross Motor Coordination Functional   Fine Motor Coordination Functional   Cognition   Overall Cognitive Status WFL   Arousal/Participation Alert; Responsive;Arousable; Cooperative   Attention Within functional limits   Orientation Level Oriented X4   Memory Within functional limits   Following Commands Follows one step commands without difficulty   Comments Pt pleasant and cooperative   Assessment   Limitation Decreased ADL status; Decreased endurance;Decreased self-care trans;Decreased high-level ADLs   Prognosis Fair   Assessment Pt is a 76 yo male admitted to [facility] s/p AVR and MVR on 7/12 and now has cardiac/sternal precautions. Pt  has a past medical history of Cardiac disease, Hypertension, PAF (paroxysmal atrial fibrillation) ([address]), and 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[address]). Pt with active OT orders in which OT consulted to assess pt's functional status and occupational performance to determine safe d/c needs. Pt seen with PT to increase safety, decrease fall risk, and maximize functional/occupational performance 2* medical complexity which is a regression from pt's functional baseline. Pt lives with his wife and daughter in a 1 SH with 1+1 QASIM. PTA, pt was independent in ADLs/IADLs and functional mobility w/ no DME. (+) driving. Currently, pt performing functional transfers w/ Mod A x2 w/ RW, functional mobility w/ Mod A x1 w/ RW, and UB/LB ADLs w/ Min A. Pt demonstrates the following limitations/impairments which impact the pt's ability to engage in valued occupations: cardiac/sternal precautions, endurance/activity tolerance, standing tolerance, functional reach, postural/trunk control, strength, safety awareness, and pain. From an OT standpoint, recommend discharge to home with increased support/assistance, once medically stable. The patient's raw score on the -PAC Daily Activity Inpatient Short Form is 20. A raw score of greater than or equal to 19 suggests the patient may benefit from discharge to home. Please refer to the recommendation of the Occupational Therapist for safe discharge planning. Pt would benefit from skilled OT services 2-3x/wk to address immediate acute care needs and underlying performance skills to promote safety, decrease fall risk, and enhance occupational performance to return to OF. Goals to be met within the next 10-14 days. Goals   Patient Goals To go home   LTG Time Frame 10-14   Long Term Goal #1 See OT goals listed below   Plan   Treatment Interventions ADL retraining;Functional transfer training;UE strengthening/ROM; Endurance training;Patient/family training;Equipment evaluation/education; Compensatory technique education;Continued evaluation;Cardiac education; Energy conservation; Activityengagement   Goal Expiration Date 07/27/23   OT Frequency 2-3x/wk   Recommendation   OT Discharge Recommendation No rehabilitation needs  (Anticipate pt w/ progress to home with increased support/assistance prn)   AM-PAC Daily Activity Inpatient   Lower Body Dressing 3   Bathing 3   Toileting 3   Upper Body Dressing 3   Grooming 4   Eating 4   Daily Activity Raw Score 20   Daily Activity Standardized Score (Calc for Raw Score >=11) 42.03   AM-PAC Applied Cognition Inpatient   Following a Speech/Presentation 4   Understanding Ordinary Conversation 4   Taking Medications 4   Remembering Where Things Are Placed or Put Away 4   Remembering List of 4-5 Errands 4   Taking Care of Complicated Tasks 4   Applied Cognition Raw Score 24   Applied Cognition Standardized Score 62.21   End of Consult   Education Provided Yes   Patient Position at End of Consult Bedside chair; All needs within reach   Nurse Communication Nurse aware of consult     OT GOALS:    Pt will improve functional mobility during ADL/IADL/leisure tasks with Mod I using AE/DME prn. Pt will improve activity tolerance/functional endurance during ADL/IADL/leisure tasks for at least 20 minutes to improve occupational performance and engagement in valued occupations using AE/DME prn. Pt will engage in ongoing functional/formal cognitive assessments to assist with safe d/c planning and increase safety during functional tasks. Pt will participate in simulated IADL management task w/ Mod I to increase independence and engagement in valued occupations w/ good balance/safety. Pt will improve dynamic standing balance for at least 15 minutes with Mod I during functional tasks to decrease fall risk and improve independence and engagement in ADL/IADL/leisure activities.     Pt will follow 100% of multi-step commands in ADL/IADL/leisure activities to improve functional cognition used in functional daily routines. Pt will complete functional transfers on and off all surfaces used in daily routines with Mod I for safety to maximize functional/occupational performance. Pt will complete all bed mobility tasks with Mod I to serve as a prerequisite for EOB/OOB ADL/IADL/leisure tasks, optimize positioning/comfort, and increase functional independence. Pt will engage in cardiac education packet and independently demonstrate/verbalize safety precautions during ADL/IADL/leisure tasks with energy conservation techniques s/p skilled instruction without verbal cues. Pt will complete UB ADL tasks with Mod I using AE/DME prn to increase functional independence in ADL/IADL/leisure tasks. Pt will complete LB ADL tasks with Mod I using AE/DME prn to increase functional independence in ADL/IADL/leisure tasks. Pt will complete toileting tasks with Mod I and good hygiene/thoroughness using AE/DME prn to increase functional independence. Pt will independently identify and utilize 2-3 positive coping strategies to enhance overall wellbeing and engagement in valued occupations.     Vinicius Maddox MS, OTR/L

## 2023-07-13 NOTE — PLAN OF CARE
Problem: OCCUPATIONAL THERAPY ADULT  Goal: Performs self-care activities at highest level of function for planned discharge setting. See evaluation for individualized goals. Description: Treatment Interventions: ADL retraining, Functional transfer training, UE strengthening/ROM, Endurance training, Patient/family training, Equipment evaluation/education, Compensatory technique education, Continued evaluation, Cardiac education, Energy conservation, Activityengagement          See flowsheet documentation for full assessment, interventions and recommendations. Note: Limitation: Decreased ADL status, Decreased endurance, Decreased self-care trans, Decreased high-level ADLs  Prognosis: Fair  Assessment: Pt is a 78 yo male admitted to [facility] s/p AVR and MVR on 7/12 and now has cardiac/sternal precautions. Pt  has a past medical history of Cardiac disease, Hypertension, PAF (paroxysmal atrial fibrillation) ([address]), and 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[address]). Pt with active OT orders in which OT consulted to assess pt's functional status and occupational performance to determine safe d/c needs. Pt seen with PT to increase safety, decrease fall risk, and maximize functional/occupational performance 2* medical complexity which is a regression from pt's functional baseline. Pt lives with his wife and daughter in a 1  with 1+1 QASIM. PTA, pt was independent in ADLs/IADLs and functional mobility w/ no DME. (+) driving. Currently, pt performing functional transfers w/ Mod A x2 w/ RW, functional mobility w/ Mod A x1 w/ RW, and UB/LB ADLs w/ Min A. Pt demonstrates the following limitations/impairments which impact the pt's ability to engage in valued occupations: cardiac/sternal precautions, endurance/activity tolerance, standing tolerance, functional reach, postural/trunk control, strength, safety awareness, and pain. From an OT standpoint, recommend discharge to home with increased support/assistance, once medically stable.  The patient's raw score on the AM-PAC Daily Activity Inpatient Short Form is 20. A raw score of greater than or equal to 19 suggests the patient may benefit from discharge to home. Please refer to the recommendation of the Occupational Therapist for safe discharge planning. Pt would benefit from skilled OT services 2-3x/wk to address immediate acute care needs and underlying performance skills to promote safety, decrease fall risk, and enhance occupational performance to return to PLOF. Goals to be met within the next 10-14 days.      OT Discharge Recommendation: No rehabilitation needs (Anticipate pt w/ progress to home with increased support/assistance prn)    Equipment Recommendation: Bariatric Bedside commode

## 2023-07-13 NOTE — CONSULTS
Consultation - Cardiology Team One  Shana Klein. 77 y.o. male MRN: [id number]  Unit/Bed#: [facility] 314-16 Encounter: [id number]    Inpatient consult to Cardiology  Consult performed by: Tony Paulson PA-C  Consult ordered by: Roseline Salazar PA-C          Physician Requesting Consult: Uche Colunga MD  Reason for Consult / Principal Problem: s/p bioprosthetic AVR, mitral valve repair and JENNY ligation    Assessment:    1. Moderate AI/severe MR: s/p AVR with 29 mm Escalante Inspiris bioprosthetic valve, mitral valve repair with 36 mm Escalante physio 2 ring angioplasty, JENNY ligation. POD #1. · Final intraoperative RANDALL: Well-seated, normally functioning valves with no PVL. · Rhythm management: Amiodarone and beta-blocker on hold due to junctional bradycardia  · Volume management: IV Lasix 40 mg BID with K+ supplement. Net output +2.6 L  · Hemodynamics: Remains with CT and EPW. · Aspirin and statin. BB on hold  2. CAD: 80% OM1 lesion noted on cardiac catheterization 5/2023. This lesion is amenable to PCI. Denies any anginal chest discomfort. Currently on aspirin and high intensity statin. 3.  PAF: s/p ablation. JENNY ligation with valvular intervention. · [id number] 3: Eliquis switched to Coumadin postoperatively with INR 1.38 yesterday. 4.  Cardiomyopathy: EF 45% on final intraoperative RANDALL. EF was 60% on RANDALL 11/2022. Volume management as above. 5.  Ascending aortic aneurysm: Measuring 4.5 cm.  6.  Essential hypertension: Average BP 92/64  7. Hyperlipidemia: Lipid panel 5/2023 , TG 93, HDL 48,  on statin 20 mg daily switch to atorvastatin 80 mg this admission. 8.  PVD: History of bilateral popliteal aneurysm repair.   9.  Postoperative respiratory insufficiency: Currently on 2 L NC      Plan/Recommendations:  · Continue IV diuretic for negative fluid balance  · Monitor I&O's, renal function, electrolytes and standing weight  · Maintain potassium >4 and magnesium >2  · Continue to hold amiodarone and beta-blocker and monitor closely on telemetry  · Wean oxygen as able  · Continue Coumadin with goal INR of 2-3. Can likely be switched back to Eliquis 1 month postoperatively  · Encourage ambulation and incentive spirometry  · Can do a functional study once recovered from TAVR consideration of PCI to OM1 lesion    __________________________________________________________________    CC: No chief complaint on file      History of Present Illness   HPI: Raymundo Banuelos. is a 77y.o. year old male who has PAF s/p ablation, severe mitral regurgitation, ascending aortic aneurysm (4.5 cm), essential hypertension, hyperlipidemia, bilateral popliteal aneurysm s/p repair, history of DVT, history of right lower extremity osteomyelitis who follows with cardiologist Dr. Thalia Higgins. Patient was noted to have progression of valvular heart disease and was referred to CT surgery. He underwent cardiac catheterization that revealed single-vessel disease in OM1. After routine preoperative testing patient presented 7/12/2023 and underwent successful AVR with 29 mm Escalante Inspiris bioprosthetic valve, mitral valve repair with 36 mm Escalante physio 2 ring angioplasty, JENNY ligation. Final intraoperative RANDALL revealed EF 45% with well-seated, normally functioning valves and no PVL. Cardiology has been consulted for postoperative co-management. Home medication regimen includes Eliquis 5 mg BID, aspirin 81 mg daily, Lopressor 25 mg BID and rosuvastatin 20 mg daily. Patient sitting in a chair during consultation and reports that walking from the unit to the floor went really well. His prior complaints were exertional dyspnea which he has already noticed a vast improvement. He has some incisional chest discomfort. He denies any lightheadedness, dizziness or palpitations.     Cardiac catheterization 5/8/2023:  Impression: one-vessel CAD with significant OM1 lesion  Recommendation: Follow-up with CT surgery for AVR, MVR and CABG x1 (OM1 target). If CABG not possible, PCI of OM1 can be performed postoperatively. Left Main   The vessel is large. The vessel exhibits minimal luminal irregularities. Left Anterior Descending   The vessel is large. There is mild diffuse disease throughout the vessel. First Diagonal Branch   1st Diag lesion is 40% stenosed. RADHA flow is 3. The lesion is focal. The stenosis was measured using QCA. Left Circumflex   The vessel is moderate in size. The vessel exhibits minimal luminal irregularities. First Obtuse Marginal Branch   The vessel is large. The vessel exhibits minimal luminal irregularities. 1st Mrg lesion is 80% stenosed. RADHA flow is 3. The lesion is focal. The stenosis was measured using QCA. Right Coronary Artery   The vessel is large. There is mild diffuse disease throughout the vessel. Dist RCA lesion is 30% stenosed. RADHA flow is 3.        11/18/2022 RANDALL/Cardioversion: EF 60% with no WMA, normal RV function, mild biatrial dilatation, no PFO or JENNY thrombus, mild-moderate AI, severe MR, moderate TR. Patient received a 200 J biphasic shock with conversion to NSR. EKG reviewed personally: 7/13/2023-AV dual paced rhythm at 80 bpm.  When compared to the prior EKG from the same day sinus bradycardia at 52 bpm with junctional escape complexes was noted. Telemetry reviewed personally: Sinus rhythm in the 60s        Review of Systems   Constitutional: Positive for malaise/fatigue. Negative for chills. Cardiovascular: Positive for chest pain. Negative for dyspnea on exertion, leg swelling, near-syncope, orthopnea, palpitations, paroxysmal nocturnal dyspnea and syncope. Respiratory: Negative for cough, shortness of breath and wheezing. Endocrine: Negative. Hematologic/Lymphatic: Negative. Skin: Negative. Musculoskeletal: Negative. Gastrointestinal: Negative. Negative for diarrhea, nausea and vomiting.    Neurological: Negative for dizziness, light-headedness and weakness. Psychiatric/Behavioral: Negative. Negative for altered mental status. All other systems reviewed and are negative. Historical Information   Past Medical History:   Diagnosis Date   • Cardiac disease    • Hypertension    • PAF (paroxysmal atrial fibrillation) ([address])    • 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[address])     BILATERALLY     Past Surgical History:   Procedure Laterality Date   • CARDIAC CATHETERIZATION N/A 5/8/2023    Procedure: Cardiac Coronary Angiogram;  Surgeon: Sherman Perez DO;  Location: [facility]; Service: Cardiology   • CARDIAC ELECTROPHYSIOLOGY PROCEDURE N/A 3/25/2022    Procedure: Cardiac eps/afib ablation;  Surgeon: Parker Hernandez MD;  Location: [facility]; Service: Cardiology   • ESOPHAGOGASTRODUODENOSCOPY N/A 8/16/2016    Procedure: ESOPHAGOGASTRODUODENOSCOPY (EGD); Surgeon: Elvina Peabody, MD;  Location: [facility];   Service:    • HERNIA REPAIR Left     groin   • HERNIA REPAIR Right 1995   • OTHER SURGICAL HISTORY     • REPLACEMENT TOTAL KNEE Left 04/01/2013   • VASCULAR SURGERY Right     POPLITEAL BYPASS DUE TO ANEURYSM     Social History     Substance and Sexual Activity   Alcohol Use Not Currently     Social History     Substance and Sexual Activity   Drug Use No     Social History     Tobacco Use   Smoking Status Never   Smokeless Tobacco Never     Family History:   Family History   Problem Relation Age of Onset   • Pancreatic cancer Mother    • Stroke Father    • Heart attack Paternal Grandmother        Meds/Allergies   all current active meds have been reviewed, current meds:   Current Facility-Administered Medications   Medication Dose Route Frequency   • acetaminophen (TYLENOL) tablet 975 mg  975 mg Oral Q8H   • ascorbic acid (VITAMIN C) tablet 500 mg  500 mg Oral Daily   • aspirin tablet 325 mg  325 mg Oral Daily   • atorvastatin (LIPITOR) tablet 80 mg  80 mg Oral Daily With Dinner   • bisacodyl (DULCOLAX) rectal suppository 10 mg  10 mg Rectal Daily PRN   • ceFAZolin (ANCEF) IVPB (premix in dextrose) 2,000 mg 50 mL  2,000 mg Intravenous Q8H   • cholecalciferol (VITAMIN D3) tablet 1,000 Units  1,000 Units Oral Daily   • docusate sodium (COLACE) capsule 100 mg  100 mg Oral BID   • fondaparinux (ARIXTRA) subcutaneous injection 2.5 mg  2.5 mg Subcutaneous Daily   • furosemide (LASIX) injection 40 mg  40 mg Intravenous BID (diuretic)   • HYDROmorphone (DILAUDID) injection 0.5 mg  0.5 mg Intravenous Once   • insulin lispro (HumaLOG) 100 units/mL subcutaneous injection 1-5 Units  1-5 Units Subcutaneous TID AC   • insulin lispro (HumaLOG) 100 units/mL subcutaneous injection 1-5 Units  1-5 Units Subcutaneous HS   • lidocaine (LIDODERM) 5 % patch 1 patch  1 patch Topical Daily   • magnesium sulfate 2 g/50 mL IVPB (premix) 2 g  2 g Intravenous Once   • methocarbamol (ROBAXIN) tablet 500 mg  500 mg Oral Q6H PRN   • mupirocin (BACTROBAN) 2 % nasal ointment 1 Application  1 Application Nasal J90G [address]   • ondansetron (ZOFRAN) injection 4 mg  4 mg Intravenous Q6H PRN   • oxyCODONE (ROXICODONE) IR tablet 5 mg  5 mg Oral Q4H PRN   • oxyCODONE (ROXICODONE) split tablet 2.5 mg  2.5 mg Oral Q4H PRN   • pancrelipase (Lip-Prot-Amyl) (CREON) delayed release capsule 24,000 Units  24,000 Units Oral TID With Meals   • pantoprazole (PROTONIX) EC tablet 40 mg  40 mg Oral Daily   • polyethylene glycol (MIRALAX) packet 17 g  17 g Oral Daily   • potassium chloride (K-DUR,KLOR-CON) CR tablet 20 mEq  20 mEq Oral BID   • saccharomyces boulardii (FLORASTOR) capsule 250 mg  250 mg Oral BID   • temazepam (RESTORIL) capsule 15 mg  15 mg Oral HS PRN   • warfarin (COUMADIN) tablet 2.5 mg  2.5 mg Oral Once (warfarin)    and PTA meds:   Prior to Admission Medications   Prescriptions Last Dose Informant Patient Reported? Taking?    COLLAGEN PO 6/28/2023 Self Yes No   Sig: Take 1 tablet by mouth   Coenzyme Q10 200 MG capsule 6/28/2023 Self Yes No   Sig: Take 200 mg by mouth daily     Flaxseed, Linseed, (FLAXSEED OIL) 1000 MG CAPS 6/28/2023 Self Yes No   Sig: Take 1 capsule by mouth daily   Green Tea 250 MG CAPS 6/28/2023 Self Yes No   Sig: Take 1 capsule by mouth daily   Liver Extract 500 MG CAPS 6/28/2023 Self Yes No   Sig: Take 1 capsule by mouth daily   Magnesium Citrate 125 MG CAPS 6/28/2023 Self Yes No   Sig: Take 1 capsule by mouth daily   Pancreatin 325 MG TABS 6/28/2023 Self Yes No   Sig: Take 1 tablet by mouth daily   Potassium Acetate LUCY 6/28/2023 Self Yes No   Sig: Take by mouth   Probiotic Product (PROBIOTIC-10 PO) 7/5/2023 Self Yes No   Sig: Take 1 capsule by mouth daily   Vitamin D, Cholecalciferol, 1000 units CAPS 6/28/2023 Self Yes No   Sig: Take 2,000 Units by mouth daily   apixaban (ELIQUIS) 5 mg 7/5/2023 Self No No   Sig: Take 1 tablet (5 mg total) by mouth 2 (two) times a day   ascorbic acid (VITAMIN C) 500 mg tablet 6/28/2023 Self Yes No   Sig: Take 500 mg by mouth daily   aspirin (ECOTRIN LOW STRENGTH) 81 mg EC tablet 7/11/2023 at 2300 Self No Yes   Sig: Take 1 tablet (81 mg total) by mouth daily   clindamycin (CLEOCIN) 300 MG capsule More than a month Self Yes No   Sig: Prior to dental work. diphenhydrAMINE (BENADRYL) 25 mg capsule More than a month Self Yes No   Sig: Take 25 mg by mouth every 6 (six) hours as needed for itching Patient not sure of the doseage   fluorouracil (EFUDEX) 5 % cream  Self Yes No   Sig: APPLY TWICE DAILY TO AREAS OF PRE-CANCERS FOR A MINIMUM OF 14 DAYS, MAXIMUM OF 28 DAYS   Patient not taking: Reported on 6/23/2023   metoprolol tartrate (LOPRESSOR) 25 mg tablet 7/5/2023 Self No No   Sig: Take 1 tablet (25 mg total) by mouth every 12 (twelve) hours   mupirocin (BACTROBAN) 2 % ointment   No No   Sig: Apply topically 2 (two) times a day for 5 days Apply to each nostril twice daily for five days before your operation.    rosuvastatin (CRESTOR) 20 MG tablet 7/5/2023 Self No No   Sig: Take 1 tablet (20 mg total) by mouth daily Facility-Administered Medications: None          Allergies   Allergen Reactions   • Oxycodone GI Intolerance     nausea  nausea       Objective   Vitals: Blood pressure 92/64, pulse 80, temperature 97.9 °F (36.6 °C), temperature source Oral, resp. rate 20, height 6' 3.2" (1.91 m), weight 117 kg (258 lb 2.5 oz), SpO2 93 %. ,     Body mass index is 32.1 kg/m². ,     Systolic (95YVL), XHN:52 , Min:92 , UX     Diastolic (92WSS), RQT:77, Min:64, Max:64    Wt Readings from Last 3 Encounters:   23 117 kg (258 lb 2.5 oz)   23 116 kg (256 lb 12.8 oz)   23 114 kg (252 lb)      Lab Results   Component Value Date    CREATININE 1.02 2023    CREATININE 1.21 2023    CREATININE 1.07 2023             Intake/Output Summary (Last 24 hours) at 2023 1039  Last data filed at 2023 0800  Gross per 24 hour   Intake 7282.09 ml   Output 4645 ml   Net 2637.09 ml     Weight (last 2 days)     Date/Time Weight    23 0600 117 (258.16)    23 0648 115 (254.19)        Invasive Devices     Central Venous Catheter Line  Duration           CVC Central Lines 23 Triple 1 day          Peripheral Intravenous Line  Duration           Peripheral IV 23 Right Wrist 1 day          Line  Duration           Pacer Wires <1 day    Pacer Wires <1 day          Drain  Duration           Chest Tube 1 Mediastinal 32 Fr. 1 day    Urethral Catheter Non-latex; Temperature probe 16 Fr. 1 day    Chest Tube 2 Anterior;Mediastinal 32 Fr. <1 day                  Physical Exam  Vitals and nursing note reviewed. Constitutional:       General: He is not in acute distress. Appearance: He is well-developed. Comments: On 2 L NC in NAD   HENT:      Head: Normocephalic and atraumatic. Neck:      Vascular: No JVD. Comments: Invasive lines noted in right neck  Cardiovascular:      Rate and Rhythm: Normal rate and regular rhythm. Heart sounds: Normal heart sounds. No murmur heard.      No friction rub. No gallop. Pulmonary:      Effort: Pulmonary effort is normal. No respiratory distress. Breath sounds: No wheezing or rales. Comments: Diminished breath sounds at the bases bilaterally  Chest:      Chest wall: No tenderness. Abdominal:      General: Bowel sounds are normal. There is no distension. Palpations: Abdomen is soft. Tenderness: There is no abdominal tenderness. Musculoskeletal:         General: No tenderness. Normal range of motion. Cervical back: Normal range of motion and neck supple. Right lower leg: Edema present. Left lower leg: Edema present. Skin:     General: Skin is warm and dry. Coloration: Skin is pale. Findings: No erythema. Comments: Sternal incision with occlusive dressing   Neurological:      Mental Status: He is alert and oriented to person, place, and time. Psychiatric:         Mood and Affect: Mood normal.         Behavior: Behavior normal.         Thought Content:  Thought content normal.         Judgment: Judgment normal.           LABORATORY RESULTS:      CBC with diff:   Results from last 7 days   Lab Units 07/13/23  0304 07/12/23  2107 07/12/23  1342 07/12/23  1336 07/12/23  1235 07/12/23  1200 07/12/23  1127 07/12/23  1125   WBC Thousand/uL 14.25*  --   --   --   --   --   --   --    HEMOGLOBIN g/dL 14.6 15.2  --  16.6  --   --   --   --    I STAT HEMOGLOBIN g/dl  --   --  15.3  --  12.9 12.9 12.9  --    HEMATOCRIT % 42.9 44.8  --  47.3  --   --   --   --    HEMATOCRIT, ISTAT %  --   --  45  --  38 38 38  --    MCV fL 96  --   --   --   --   --   --   --    PLATELETS Thousands/uL 144*  --   --  173  --   --   --  113*   RBC Million/uL 4.49  --   --   --   --   --   --   --    MCH pg 32.5  --   --   --   --   --   --   --    MCHC g/dL 34.0  --   --   --   --   --   --   --    RDW % 13.9  --   --   --   --   --   --   --    MPV fL 10.1  --   --  10.1  --   --   --  10.3       CMP:  Results from last 7 days   Lab Units 07/13/23  0304 07/12/23 2339 07/12/23 2107 07/12/23  1821 07/12/23  1342 07/12/23  1336 07/12/23  1235 07/12/23  1200 07/12/23  1127 07/12/23  1052 07/12/23  1020 07/12/23  0952   POTASSIUM mmol/L 4.6 4.1 3.7 3.8  --  3.5  --   --   --   --   --   --    CHLORIDE mmol/L 112* 113*  --   --   --  116*  --   --   --   --   --   --    CO2 mmol/L 29 24  --   --   --  19*  --   --   --   --   --   --    CO2, I-STAT mmol/L  --   --   --   --  21 -- 23 26 26 26 27 23   BUN mg/dL 18 16  --   --   --  13  --   --   --   --   --   --    CREATININE mg/dL 1.02 1.21  --   --   --  1.07  --   --   --   --   --   --    GLUCOSE, ISTAT mg/dl  --   --   --   --  160*  --  162* 147* 148* 135 131 142*   CALCIUM mg/dL 8.7 8.7  --   --   --  8.8  --   --   --   --   --   --    EGFR ml/min/1.73sq m 69 56  --   --   --  65  --   --   --   --   --   --        BMP:  Results from last 7 days   Lab Units 07/13/23 0304 07/12/23 2339 07/12/23 2107 07/12/23  1821 07/12/23  1342 07/12/23  1336 07/12/23  1235 07/12/23  1200 07/12/23  1127   POTASSIUM mmol/L 4.6 4.1 3.7 3.8  --  3.5  --   --   --    CHLORIDE mmol/L 112* 113*  --   --   --  116*  --   --   --    CO2 mmol/L 29 24  --   --   --  19*  --   --   --    CO2, I-STAT mmol/L  --   --   --   --  21 -- 23 26 26   BUN mg/dL 18 16  --   --   --  13  --   --   --    CREATININE mg/dL 1.02 1.21  --   --   --  1.07  --   --   --    GLUCOSE, ISTAT mg/dl  --   --   --   --  160*  --  162* 147* 148*   CALCIUM mg/dL 8.7 8.7  --   --   --  8.8  --   --   --           Lab Results   Component Value Date    NTBNP 452 (H) 02/22/2021            Results from last 7 days   Lab Units 07/13/23  0304   MAGNESIUM mg/dL 2.9*                     Results from last 7 days   Lab Units 07/12/23  1336   INR  1.38*     Lipid Profile:   No results found for: "CHOL"  Lab Results   Component Value Date    HDL 48 05/08/2023     Lab Results   Component Value Date    LDLCALC 110 (H) 05/08/2023     Lab Results Component Value Date    TRIG 93 2023         Cardiac testing:   Results for orders placed during the hospital encounter of 21    Echo complete with contrast if indicated    Narrative  [id number] [facility] [address]  [address] [place] [address]  [phone]    Transthoracic Echocardiogram  2D, M-mode, Doppler, and Color Doppler    Study date:  2021    Patient: Olivia Warren  MR number: [id number]  Account number: [de-identified]  : 1945  Age: 67 years  Gender: Male  Status: Inpatient  Location: Bedside  Height: 78 in  Weight: 242 lb  BP: 122/ 84 mmHg    Indications: Atrial fibrillation. Diagnoses: I48.0 - Atrial fibrillation    Sonographer:  Negar Duran RDCS  Primary Physician:  Thang Olmos DO  Referring Physician:  Gianna Villa MD  Group:  [facility]  Cardiology Fellow: Aung Guerrier MD  Interpreting Physician:  Beryl Garcia MD    SUMMARY    LEFT VENTRICLE:  Size was normal.  Systolic function was normal. Ejection fraction was estimated to be 60 %. There were no regional wall motion abnormalities. Wall thickness was mildly increased. LEFT ATRIUM:  The atrium was mildly to moderately dilated. MITRAL VALVE:  There was moderate regurgitation. AORTIC VALVE:  There was mild to moderate regurgitation. TRICUSPID VALVE:  There was mild regurgitation. Pulmonary artery systolic pressure was within the normal range. HISTORY: PRIOR HISTORY: Atrial fibrillation. DVT. Aneurysm of ascending aorta. PROCEDURE: The procedure was performed at the bedside. This was a routine study. The transthoracic approach was used. The study included complete 2D imaging, M-mode, complete spectral Doppler, and color Doppler. The heart rate was 86 bpm,  at the start of the study. Images were obtained from the parasternal, apical, subcostal, and suprasternal notch acoustic windows. Image quality was adequate.     LEFT VENTRICLE: Size was normal. Systolic function was normal. Ejection fraction was estimated to be 60 %. There were no regional wall motion abnormalities. Wall thickness was mildly increased. The changes were consistent with concentric  remodeling (increased wall thickness with normal wall mass). DOPPLER: Transmitral flow pattern: atrial fibrillation. RIGHT VENTRICLE: The size was normal. Systolic function was normal.    LEFT ATRIUM: The atrium was mildly to moderately dilated. RIGHT ATRIUM: The atrium was mildly dilated. MITRAL VALVE: Valve structure was normal. There was normal leaflet separation. DOPPLER: The transmitral velocity was within the normal range. There was no evidence for stenosis. There was moderate regurgitation. AORTIC VALVE: The valve was trileaflet. Leaflets exhibited mild to moderate calcification, normal cuspal separation, and sclerosis. DOPPLER: Transaortic velocity was within the normal range. There was no evidence for stenosis. There was  mild to moderate regurgitation. TRICUSPID VALVE: The valve structure was normal. There was normal leaflet separation. DOPPLER: The transtricuspid velocity was within the normal range. There was no evidence for stenosis. There was mild regurgitation. Pulmonary artery  systolic pressure was within the normal range. Estimated peak PA pressure was 27 mmHg. PULMONIC VALVE: DOPPLER: The transpulmonic velocity was within the normal range. There was mild regurgitation. PERICARDIUM: There was no pericardial effusion. AORTA: The root exhibited normal size for BSA at 38 mm. SYSTEMIC VEINS: IVC: The inferior vena cava was normal in size and course.  Respirophasic changes were normal.    SYSTEM MEASUREMENT TABLES    2D  %FS: 28.01 %  Ao Diam: 3.58 cm  EDV(Teich): 122.74 ml  EF(Teich): 53.93 %  ESV(Teich): 56.54 ml  IVSd: 1.25 cm  LA Area: 31.62 cm2  LA Diam: 4.26 cm  LVEDV MOD A4C: 156.26 ml  LVEF MOD A4C: 60.45 %  LVESV MOD A4C: 61.8 ml  LVIDd: 5.08 cm  LVIDs: 3.66 cm  LVLd A4C: 8.59 cm  LVLs A4C: 7.52 cm  LVPWd: 1.33 cm  RA Area: 31.07 cm2  RVIDd: 4.15 cm  SV MOD A4C: 94.47 ml  SV(Teich): 66.2 ml    CW  AR Dec Dickenson: 3.14 m/s2  AR Dec Time: 1574.47 ms  AR PHT: 456.6 ms  AR Vmax: 4.92 m/s  AR maxP.95 mmHg  AV Env. Ti: 251.19 ms  AV VTI: 20.76 cm  AV Vmax: 1.24 m/s  AV Vmean: 0.83 m/s  AV maxP.2 mmHg  AV meanPG: 3.14 mmHg  TR Vmax: 2.38 m/s  TR maxP.7 mmHg    MM  TAPSE: 2.34 cm    PW  LVOT Env. Ti: 251.19 ms  LVOT VTI: 14.87 cm  LVOT Vmax: 0.85 m/s  LVOT Vmean: 0.59 m/s  LVOT maxP.9 mmHg  LVOT meanP.62 mmHg  MV A Luis A: 0.65 m/s  MV Dec Dickenson: 4.08 m/s2  MV DecT: 171.95 ms  MV E Luis A: 0.7 m/s  MV E/A Ratio: 1.08    [facility] Accredited Echocardiography Laboratory    Prepared and electronically signed by    Rell Rosenberg MD  Signed [id number] 14:53:21    Results for orders placed during the hospital encounter of 21    RANDALL    Narrative  [address] [place] [address]  [phone]    Transesophageal Echocardiogram  2D, Doppler, and Color Doppler    Study date:  2021    Patient: Lisa Toro  MR number: [id number]  Account number: [de-identified]  : 1945  Age: 67 years  Gender: Male  Status: Outpatient  Location: Echo lab  Height: 78 in  Weight: 242 lb  BP: 90/ 58 mmHg    Indications: Atrial Fibrillation    Diagnoses: I48.0 - Atrial fibrillation    Sonographer:  Jeremy Keith RDCS  Interpreting Physician:  Kristina Goff MD  Primary Physician:  Fadi Vasquez MD  Referring Physician:  Richy Jaimes PA-C  Group:  [facility]  Cardiology Fellow: Gisele Beal MD  RN:  Alex Cabello RN    SUMMARY    LEFT VENTRICLE:  Size was normal.  Systolic function was normal. Ejection fraction was estimated to be 55 %. There were no regional wall motion abnormalities. RIGHT VENTRICLE:  The ventricle was mildly dilated.   Systolic function was mildly reduced. LEFT ATRIUM:  The atrium was dilated. LEFT ATRIAL APPENDAGE:  The appendage was mildly dilated. The function was moderately reduced (moderately reduced emptying velocity). No thrombus was identified. There was mild spontaneous echo contrast ("smoke") in the appendage. ATRIAL SEPTUM:  No defect or patent foramen ovale was identified. Doppler evaluation was performed. There was no right-to-left shunt, in the baseline state. MITRAL VALVE:  There was mild to moderate regurgitation. AORTIC VALVE:  There was moderate regurgitation. No flow reversal was detected in the descending thoracic aorta. TRICUSPID VALVE:  There was mild to moderate regurgitation. Pulmonary artery systolic pressure was within the normal range. Estimated peak PA pressure was 25 mmHg. PULMONIC VALVE:  There was moderate regurgitation. AORTA:  The root exhibited dilatation at 41 mm. There was dilatation of the ascending aorta at 44 mm. HISTORY: PRIOR HISTORY: MR, HTN, Sepsis, DVT    PROCEDURE: The procedure was performed in the echo lab. This was a routine study. The risks and alternatives of the procedure were explained to the patient and informed consent was obtained. The transesophageal approach was used. The study  included complete 2D imaging, complete spectral Doppler, and color Doppler. The heart rate was 92 bpm, at the start of the study. An adult omniplane probe was inserted by the attending cardiologist. Intubated with ease. One intubation  attempt(s). There was blood detected on the probe. Image quality was adequate. There were no complications during the procedure. MEDICATIONS: Anesthesia administered by anesthesia team.    LEFT VENTRICLE: Size was normal. Systolic function was normal. Ejection fraction was estimated to be 55 %. There were no regional wall motion abnormalities.  Wall thickness was normal. DOPPLER: The study was not technically sufficient to  allow evaluation of LV diastolic function. RIGHT VENTRICLE: The ventricle was mildly dilated. Systolic function was mildly reduced. LEFT ATRIUM: The atrium was dilated. No thrombus was identified. APPENDAGE: The appendage was mildly dilated. No thrombus was identified. There was mild spontaneous echo contrast ("smoke") in the appendage. DOPPLER: The function was  moderately reduced (moderately reduced emptying velocity). ATRIAL SEPTUM: No defect or patent foramen ovale was identified. Doppler evaluation was performed. There was no right-to-left shunt, in the baseline state. RIGHT ATRIUM: Size was normal. No thrombus was identified. MITRAL VALVE: Valve structure was normal. There was normal leaflet separation. There was no echocardiographic evidence of vegetation. DOPPLER: There was mild to moderate regurgitation. AORTIC VALVE: The valve was trileaflet. Leaflets exhibited normal thickness and normal cuspal separation. There was no echocardiographic evidence of vegetation. DOPPLER: There was moderate regurgitation. No flow reversal was detected in  the descending thoracic aorta. TRICUSPID VALVE: The valve structure was normal. There was normal leaflet separation. There was no echocardiographic evidence of vegetation. DOPPLER: There was mild to moderate regurgitation. Pulmonary artery systolic pressure was within  the normal range. Estimated peak PA pressure was 25 mmHg. PULMONIC VALVE: Leaflets exhibited normal thickness, no calcification, and normal cuspal separation. There was no echocardiographic evidence of vegetation. DOPPLER: There was moderate regurgitation. PERICARDIUM: There was no pericardial effusion. AORTA: The root exhibited dilatation at 41 mm. There was dilatation of the ascending aorta at 44 mm. There was no atheroma. There was no evidence for dissection. There was no evidence for aneurysm.     [facility] Accredited Echocardiography Laboratory    Prepared and electronically signed by    Minnie Cho MD  Signed 26-Feb-2021 12:16:25    No valid procedures specified. No results found for this or any previous visit. Imaging: I have personally reviewed pertinent reports. XR chest portable ICU    Result Date: 7/13/2023  Narrative: CHEST INDICATION:   S/P open heart. COMPARISON: 6/26/2023 EXAM PERFORMED/VIEWS:  XR CHEST PORTABLE ICU FINDINGS: Endotracheal tube is present with its tip in satisfactory position above the level of the romain. An enteric tube is present with its tip in satisfactory position below the left hemidiaphragm. Right internal jugular central venous catheter is  noted. Also noted is a [place] catheter from a right internal jugular approach with its tip overlying the right main pulmonary artery. Mediastinal and pleural drains are present. There is an atrial appendage clip. Cardiomediastinal silhouette appears unremarkable. The lungs are clear. No pneumothorax or pleural effusion. Sternal wires are present. Visualized osseous structures appear otherwise unremarkable for the patient's age. Impression: No acute cardiopulmonary disease. Workstation performed: [id number]     RANDALL intraop interventional w/realtime guidance of cardiac procedures    Result Date: 7/12/2023  Narrative: This order contains the linked images for the RANDALL that was performed by the Anesthesiologist.  Please see the  CARDIAC RANDALL ANESTHESIA procedure for results. RANDALL Anesthesia    Result Date: 7/12/2023  Narrative: Ottis Spurling, MD     7/12/2023 12:38 PM Procedure Performed: RANDALL Anesthesia Start Time: Preanesthesia Checklist Patient identified, IV assessed, risks and benefits discussed, monitors and equipment assessed, procedure being performed at surgeon's request and anesthesia consent obtained. Procedure  Type of RANDALL: complete RANDALL with interpretation. Images Saved: ultrasound permanent image saved. Location performed: OR. Intubated. Heart visualized.  Insertion of RANDALL Probe: Easy. Probe Type:  Epiaortic and multiplane. Modalities:  Color flow mapping, 3D, pulse wave Doppler and continuous wave Doppler. Echocardiographic and Doppler Measurements PREPROCEDURE LEFT VENTRICLE: Systolic Function: mildly impaired. Ejection Fraction: 45%. Cavity Dimension: 5 cm. Regional Wall Motion Abnormalities: NONE. RIGHT VENTRICLE: Systolic Function: moderately impaired. Cavity size moderately dilated. AORTIC VALVE: Leaflets: trileaflet. Regurgitation: moderate. MITRAL VALVE: Leaflets: calcified. Leaflet Motions: restricted. Regurgitation: mild. TRICUSPID VALVE: Leaflets: normal.   Regurgitation: mild. PULMONIC VALVE: Leaflets: normal.  OTHER VALVE FINDINGS: AOV - 3 CUSPS; MILD-MODERATE CENTRAL AR; ANNULUS 3.4 CM2; STJ EFFACED MV - MILD-MOD central MR; functional (3b) etiology. ASCENDING AORTA: Size:  normal.  Dissection not present. AORTIC ARCH: Size:  normal.  dissection not present. Grade 3: atheroma protruding < 0.5 cm into lumen. DESCENDING AORTA: Size: normal.  Dissection not present. Grade 3: atheroma protruding < 0.5 cm into lumen. RIGHT ATRIUM:  No spontaneous echo contrast. LEFT ATRIUM:  No spontaneous echo contrast. LEFT ATRIAL APPENDAGE:  No spontaneous echo contrast ATRIAL SEPTUM: Intra-atrial septal morphology: normal.  EPIAORTIC: Plaque Thickness: 0-5 mm. OTHER FINDINGS: Pericardium:  normal. POSTPROCEDURE LEFT VENTRICLE: Unchanged . Ejection Fraction: 45 %. RIGHT VENTRICLE: Systolic Function: mildly depressed. Cavity Size: moderately dilated. AORTIC VALVE: Leaflets: bioprosthetic. Mean Gradient: 4 mmHg. Regurgitation: none. MITRAL VALVE: Unchanged . Mean Gradient: 1. TRICUSPID VALVE: Unchanged . PULMONIC VALVE: Unchanged   OTHER VALVE FINDINGS: S/p bio avr; mitral ring; both valves well seated; no pvl x 2; no tvl x2; no desmond on mitral; normal cusp motioin x 3 for aov. ATRIA: Left Atrial Appendage Ligate: Yes.  Residual Flow in Left Atrial Appendage by Color Flow Doppler: No.  AORTA: Unchanged . Dissection: Dissection not present. REMOVAL: Probe Removal: atraumatic. XR chest pa & lateral    Result Date: 6/28/2023  Narrative: CHEST INDICATION:   I25.10: Atherosclerotic heart disease of native coronary artery without angina pectoris I34.0: Nonrheumatic mitral (valve) insufficiency I35.1: Nonrheumatic aortic (valve) insufficiency I71.21: Aneurysm of the ascending aorta, without rupture I48.21: Permanent atrial fibrillation. COMPARISON: CXR 2/22/2021 and chest CT 11/11/2022. EXAM PERFORMED/VIEWS:  XR CHEST PA & LATERAL. DUAL ENERGY SUBTRACTION. FINDINGS: Cardiomediastinal silhouette normal. Lungs clear. No effusion or pneumothorax. Upper abdomen normal. Bones normal for age. Impression: No acute cardiopulmonary disease. Workstation performed: [id number]     VAS lower limb vein mapping bypass graft    Result Date: 6/26/2023  Narrative:  [facility] REPORT CLINICAL: Indications: Pre-operative vein mapping for upcoming open heart surgery scheduled for July 12th, 2023. Operative History: 2023-05-08 Cardiac catheterization 2022-03-25 Cardiac ablation Bilateral popliteal aneurysm bypass (B/L GSV use) Risk Factors The patient has history of HTN, Hyperlipidemia, CAD, A-fib, popliteal aneurysms, ascending aortic aneurysm, and DVT.   FINDINGS:  Right           Impression      AP (mm)  GSV Inguinal                        7.5  GSV Prox Thigh  Not Visualized           GSV Mid Thigh   Not Visualized           GSV Dist Thigh  Not Visualized           GSV Knee        Not Visualized           GSV Prox Calf   Not Visualized           GSV Mid Calf                        2.6  GSV Dist Calf                       2.6  GSV Ankle                           3.2  SSV Knee                            1.5  SSV Mid Calf                        2.6  SSV Ankle                           2.1   Left            Impression            AP (mm)  GSV Inguinal                              8.4  GSV Prox Thigh  Not Visualized                 GSV Mid Thigh   Not Visualized                 GSV Dist Thigh  Not Visualized                 GSV Knee        Not Visualized                 GSV Prox Calf   Not Visualized                 GSV Mid Calf                              3.7  GSV Dist Calf                             4.4  GSV Ankle                                 4.0  SSV Knee                                  6.1  SSV Mid Calf    Thrombosed (Chronic)      5.1     CONCLUSION:  Impression:  RIGHT LOWER LIMB: The great saphenous vein is patent  from the mid thigh to the ankle. The intraluminal diameter measurements range from 2.6 mm to 3.2 mm from the mid thigh to the ankle. The small saphenous vein is patent from the knee to the ankle with an intraluminal diameter ranging from 1.5 mm to 2.6 mm. LEFT LOWER LIMB: The great saphenous vein is patent  from the mid thigh to the ankle. The intraluminal diameter measurements range from 3.7 mm to 4.4 mm from the mid thigh to the ankle. Multiple branches noted. The small saphenous vein has evidence of chronic thrombus in the mid and distal calf. SIGNATURE: Electronically Signed by: Bryant Linares MD, RPVI on 2023-06-26 12:19:02 PM          Counseling / Coordination of Care  Total floor / unit time spent today 45 minutes. Greater than 50% of total time was spent with the patient and / or family counseling and / or coordination of care. A description of the counseling / coordination of care: Review of history, current assessment, development of a plan. Code Status: Level 1 - Full Code    ** Please Note: Dragon 360 Dictation voice to text software may have been used in the creation of this document.  **

## 2023-07-13 NOTE — PROGRESS NOTES
Progress Note - Cardiothoracic Surgery   [facility] 77 [facility] MRN: [id number]  Unit/Bed#: [facility] 418-01 Encounter: [id number]      POD # 1 s/p AVR, MV repair, LAAL    Pt seen/examined. Interval history and data reviewed with critical care team.  Pt doing well. No specific complaints. No vasoactive gtts.       Medications:   Scheduled Meds:  Current Facility-Administered Medications   Medication Dose Route Frequency Provider Last Rate   • acetaminophen  650 mg Rectal Q4H PRN Malu Jacobsen PA-C     • acetaminophen  975 mg Oral Q8H Malu Jacobsen PA-C     • amiodarone  200 mg Oral [facility] Malu Jacobsen PA-C     • ascorbic acid  500 mg Oral Daily Malu Jacobsen PA-C     • aspirin  325 mg Oral Daily Malu Jacobsen PA-C     • atorvastatin  80 mg Oral Daily With [email or web address] BRYAN Nguyen     • bisacodyl  10 mg Rectal Daily PRN Malu Jacobsen PA-C     • cefazolin  2,000 mg Intravenous Q8H Malu Jacobsen PA-C 2,000 mg (07/13/23 0304)   • chlorhexidine  15 mL Swish & Spit BID Malu Jacobsen PA-C     • cholecalciferol  1,000 Units Oral Daily Malu Jacobsen PA-C     • epinephrine  1-20 mcg/min Intravenous Continuous Malu Jacobsen PA-C Stopped (07/13/23 0020)   • fondaparinux  2.5 mg Subcutaneous Daily Malu Jacobsen PA-C     • HYDROmorphone  0.5 mg Intravenous Q2H PRN Malu Jacobsen PA-C     • insulin regular (HumuLIN R,NovoLIN R) 1 Units/mL in sodium chloride 0.9 % 100 mL infusion  0.3-21 Units/hr Intravenous Titrated Malu Jacobsen PA-C 0.5 Units/hr (07/13/23 0421)   • lidocaine (cardiac)  100 mg Intravenous Q30 Min PRN Malu Jacobsen PA-C     • mupirocin  1 Application Nasal G30W [facility] Malu Jacobsen PA-C     • niCARdipine  2.5-15 mg/hr Intravenous Titrated Malu Jacobsen PA-C     • norepinephrine  1-30 mcg/min Intravenous Titrated Malu Jacobsen PA-C Stopped (07/13/23 0600)   • ondansetron  4 mg Intravenous Q6H PRN Malu Jacobsen PA-C     • oxyCODONE  5 mg Oral Q4H PRN Malu Jacobsen, BRYAN     • oxyCODONE  2.5 mg Oral Q4H PRN Levorn Epley, PA-C     • pancrelipase (Lip-Prot-Amyl)  24,000 Units Oral TID With Meals Levorn Epley, PA-C     • pantoprazole  40 mg Oral Daily Levorn Epley, PA-C     • polyethylene glycol  17 g Oral Daily Levorn Epley, PA-C     • saccharomyces boulardii  250 mg Oral BID Levorn Epley, PA-C     • sodium chloride  20 mL/hr Intravenous Continuous Jose Nguyen PA-C 20 mL/hr (07/12/23 1410)     Continuous Infusions:epinephrine, 1-20 mcg/min, Last Rate: Stopped (07/13/23 0020)  insulin regular (HumuLIN R,NovoLIN R) 1 Units/mL in sodium chloride 0.9 % 100 mL infusion, 0.3-21 Units/hr, Last Rate: 0.5 Units/hr (07/13/23 0421)  niCARdipine, 2.5-15 mg/hr  norepinephrine, 1-30 mcg/min, Last Rate: Stopped (07/13/23 0600)  sodium chloride, 20 mL/hr, Last Rate: 20 mL/hr (07/12/23 1410)      PRN Meds:.•  acetaminophen  •  bisacodyl  •  HYDROmorphone  •  lidocaine (cardiac)  •  ondansetron  •  oxyCODONE  •  oxyCODONE    Vitals: Blood pressure 146/93, pulse 80, temperature 99 °F (37.2 °C), temperature source Core, resp. rate 21, height 6' 3.2" (1.91 m), weight 117 kg (258 lb 2.5 oz), SpO2 94 %. ,Body mass index is 32.1 kg/m². I/O last 24 hours: In: 7241.1 [I.V.:2241.1; Blood:200; IV INIBDBVXX:2598]  Out: 6743 [Urine:2895; Blood:1250; Chest Tube:320]  Invasive Devices     Central Venous Catheter Line  Duration           CVC Central Lines 07/12/23 Triple <1 day          Peripheral Intravenous Line  Duration           Peripheral IV 07/12/23 Right Wrist <1 day          Arterial Line  Duration           Arterial Line 07/12/23 Left Radial <1 day          Line  Duration           Pacer Wires <1 day    Pacer Wires <1 day          Drain  Duration           Chest Tube 1 Mediastinal 32 Fr. <1 day    Chest Tube 2 Anterior;Mediastinal 32 Fr. <1 day    Urethral Catheter Non-latex; Temperature probe 16 Fr. <1 day                  Lab, Imaging and other studies:   Results from last 7 days   Lab Units 07/13/23  0304 07/12/23  2107 07/12/23  1342 07/12/23  1336 07/12/23  1127 07/12/23  1125   WBC Thousand/uL 14.25*  --   --   --   --   --    HEMOGLOBIN g/dL 14.6 15.2  --  16.6  --   --    I STAT HEMOGLOBIN g/dl  --   --  15.3  --    < >  --    HEMATOCRIT % 42.9 44.8  --  47.3  --   --    HEMATOCRIT, ISTAT %  --   --  45  --    < >  --    PLATELETS Thousands/uL 144*  --   --  173  --  113*    < > = values in this interval not displayed. Results from last 7 days   Lab Units 07/13/23 0304 07/12/23  2339 07/12/23 2107 07/12/23  1821 07/12/23  1342 07/12/23  1336   POTASSIUM mmol/L 4.6 4.1 3.7   < >  --  3.5   CHLORIDE mmol/L 112* 113*  --   --   --  116*   CO2 mmol/L 29 24  --   --   --  19*   CO2, I-STAT mmol/L  --   --   --   --  21  --    BUN mg/dL 18 16  --   --   --  13   CREATININE mg/dL 1.02 1.21  --   --   --  1.07   GLUCOSE, ISTAT mg/dl  --   --   --   --  160*  --    CALCIUM mg/dL 8.7 8.7  --   --   --  8.8    < > = values in this interval not displayed. Results from last 7 days   Lab Units 07/12/23  1336   INR  1.38*   PTT seconds 32     Recent Labs     07/12/23 2107   PHART 7.265*   VSC2USS 17.4*   PO2ART 111.1   [id number] 39.1   BEART -9.0           Plan:    Hold Amio/BB for junctional bradycardia. May need PPM.  DC [place]. Continue chest tubes, pacing wires. Transfer to floor. Ambulate. Incentive spirometry. Diuresis. PO ASA/Statin. Dose Coumadin for AFib.       Asaf Mccullough MD  DATE: July 13, 2023  TIME: 7:53 AM

## 2023-07-13 NOTE — PLAN OF CARE
Problem: PHYSICAL THERAPY ADULT  Goal: Performs mobility at highest level of function for planned discharge setting. See evaluation for individualized goals. Description: Treatment/Interventions: Functional transfer training, LE strengthening/ROM, Elevations, Therapeutic exercise, Endurance training, Patient/family training, Equipment eval/education, Bed mobility, Gait training, Spoke to nursing, OT (Spoke to [employer])  Equipment Recommended: Mily Khan, Other (Comment) [facility])       See flowsheet documentation for full assessment, interventions and recommendations. Outcome: Progressing  Note: Prognosis: Good  Problem List: Decreased strength, Decreased endurance, Impaired balance, Decreased mobility  Assessment: Pt is a 78 y/o male presenting with symptoms of SOB and LE edema, diagnosed with Aortic regurgitation, Mitral regurgitation, and Permanent atrial fibrillation, who underwent aortic valve replacement, mitral valve repair, and left atrial appendage ligation on 7/12/2023. 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 Personal factors affecting POC include living in a [address] with 1+1 or 4 QASIM w/ HR. Jass Ferguson Pt's clinical presentation is unstable/unpredictable due to ongoing telemetry monitoring, chest tubes, and O2. Impairments include current deficits in strength, endurance, balance, and gait. Will follow pt to address above impairments. Recommend home PT pending additional progress and when medically cleared upon D/C. Barriers to Discharge: None     PT Discharge Recommendation: Home with home health rehabilitation    See flowsheet documentation for full assessment.

## 2023-07-13 NOTE — PHYSICAL THERAPY NOTE
Physical Therapy Evaluation     Patient's Name: Abril Livingston. Admitting Diagnosis  Coronary artery disease involving native coronary artery of native heart without angina pectoris [I25.10]  Nonrheumatic mitral valve regurgitation [I34.0]  Nonrheumatic aortic valve insufficiency [I35.1]    Problem List  Patient Active Problem List   Diagnosis    Atrial fibrillation with RVR ([address])    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HCC)    Hypertension    History of DVT (deep vein thrombosis)    Benign essential hypertension    Mitral valve insufficiency    Bilateral popliteal artery aneurysm (HCC)    Paroxysmal atrial fibrillation ([address])    Mixed hyperlipidemia    Aneurysm of ascending aorta (HCC)    Sepsis ([address])    Hypoxia    Callus of foot    Streptococcal bacteremia    Pulmonary hypertension (HCC)    Erythrocytosis    Chronic osteomyelitis of right tibia with draining sinus (HCC)    Aortic insufficiency    Permanent atrial fibrillation ([address])    Coronary artery disease involving native coronary artery    S/P AVR    S/P MVR (mitral valve repair)    S/P left atrial appendage ligation    PAD (peripheral artery disease) ([address])    Hyperchloremia       Past Medical History  Past Medical History:   Diagnosis Date    Cardiac disease     Hypertension     PAF (paroxysmal atrial fibrillation) ([address])     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[address])     BILATERALLY       Past Surgical History  Past Surgical History:   Procedure Laterality Date    CARDIAC CATHETERIZATION N/A 5/8/2023    Procedure: Cardiac Coronary Angiogram;  Surgeon: Jean Paul Auguste DO;  Location: [facility]; Service: Cardiology    CARDIAC ELECTROPHYSIOLOGY PROCEDURE N/A 3/25/2022    Procedure: Cardiac eps/afib ablation;  Surgeon: Fior Arceo MD;  Location: [facility]; Service: Cardiology    ESOPHAGOGASTRODUODENOSCOPY N/A 8/16/2016    Procedure: ESOPHAGOGASTRODUODENOSCOPY (EGD); Surgeon: Alen Connors MD;  Location: [facility];   Service:     HERNIA REPAIR Left     groin HERNIA REPAIR Right 1995    OTHER SURGICAL HISTORY      CO REPLACEMENT MITRAL VALVE W/CARDIOPULMONARY BYP N/A 7/12/2023    Procedure: MITRAL VALVE REPAIR WITH 36MM PHYSIO II ANNULOPLASTY RING;  Surgeon: Dimitri Hamman, MD;  Location: [facility];  Service: Cardiac Surgery    CO RPLCMT AORTIC VALVE OPN W/STENTLESS TISSUE VALVE N/A 7/12/2023    Procedure: REPLACEMENT VALVE AORTIC (AVR) WITH 29MM INSPIRIS TISSUE RESILIA VALVE. LEFT ATRIAL APPENDAGE LIGATION WITH 45 MM ATRICLIP;  Surgeon: Dimitri Hamman, MD;  Location: [facility];  Service: Cardiac Surgery    REPLACEMENT TOTAL KNEE Left 04/01/2013    VASCULAR SURGERY Right     POPLITEAL BYPASS DUE TO ANEURYSM          07/13/23 0926   PT Last Visit   PT Visit Date 07/13/23   Note Type   Note type Evaluation   Pain Assessment   Pain Assessment Tool 0-10   Pain Score No Pain   Restrictions/Precautions   Weight Bearing Precautions Per Order No   Braces or Orthoses   (Wears orthotics. Denies other braces/orthoses.)   Other Precautions Cardiac/sternal;Multiple lines;Telemetry;O2;Fall Risk  (Chest tube x2, pacer)   Home Living   Type of [address] [place] [address] One level;Performs ADLs on one level; Able to live on main level with bedroom/bathroom  (Pt lives in a [facility]  with 1+1 QASIM no HR and 4STE through his garage with HR)   Bathroom Shower/Tub Walk-in shower   Bathroom Toilet Standard   Bathroom Equipment Grab bars in shower; Shower chair;Grab bars around toilet   [address]   (None)   Prior Function   Level of [place] Independent with functional mobility  (w/o AD)   Lives With Spouse;Daughter   Receives Help From Family  (Daughter can help PRN.  Wife has current medical issues and is unable to assist.)   IADLs Independent with driving   Falls in the last 6 months 0   Vocational Retired   General   Additional Pertinent History Cleared by RN for assessment   Family/Caregiver Present No   Cognition   Overall Cognitive Status WFL   Arousal/Participation Alert   Attention Within functional limits   Orientation Level Oriented to person;Oriented to place;Oriented to situation   Memory Within functional limits   Following Commands Follows one step commands without difficulty   Subjective   Subjective Pt agreeable to mobilization   RUE Assessment   RUE Assessment WFL  (AROM)   LUE Assessment   LUE Assessment WFL  (AROM)   RLE Assessment   RLE Assessment WFL  (AROM)   Strength RLE   RLE Overall Strength 4-/5  (Assessed via mobility)   LLE Assessment   LLE Assessment WFL  (AROM)   Strength LLE   LLE Overall Strength 4-/5  (Assessed via mobility)   Bed Mobility   Additional Comments Pt in bedside chair upon arrival   Transfers   Sit to Stand 3  Moderate assistance   Additional items Assist x 2;Armrests; Increased time required;Verbal cues   Stand to Sit 3  Moderate assistance   Additional items Armrests; Verbal cues; Increased time required;Assist x 1   Additional Comments RW   Ambulation/Elevation   Gait pattern Forward Flexion;Decreased foot clearance; Short stride; Excessively slow;Decreased toe off;Decreased heel strike   Gait Assistance 3  Moderate assist   Additional items Assist x 1;Verbal cues   Assistive Device Rolling walker   Distance 100'   Balance   Static Sitting Fair +   Dynamic Sitting Fair   Static Standing Poor +   Dynamic Standing Poor   Ambulatory Poor   Activity Tolerance   Activity Tolerance Patient tolerated treatment well   Medical Staff Made Aware [facility] PT   Nurse Made Aware Cleared with RN   Assessment   Prognosis Good   Problem List Decreased strength;Decreased endurance; Impaired balance;Decreased mobility   Assessment Pt is a 78 y/o male presenting with symptoms of SOB and LE edema, diagnosed with Aortic regurgitation, Mitral regurgitation, and Permanent atrial fibrillation, who underwent aortic valve replacement, mitral valve repair, and left atrial appendage ligation on 7/12/2023.  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 Personal factors affecting POC include living in a [address] with 1+1 or 4 QASIM w/ HR. Sylvain Briscoe Pt's clinical presentation is unstable/unpredictable due to ongoing telemetry monitoring, chest tubes, and O2. Impairments include current deficits in strength, endurance, balance, and gait. Will follow pt to address above impairments. Recommend home PT pending additional progress and when medically cleared upon D/C. Barriers to Discharge None   Goals   Patient Goals To feel better   STG Expiration Date 07/23/23   Short Term Goal #1 7-10 Days: Pt will be able to ambulate 300' mod I w/ least restrictive AD PRN to improve community mobility. Pt will be able to negotiate 4 steps mod I w/ HR and no AD to enter his home safely. Pt will be able to perform bed mobility mod I to progress to OOB mobility. Pt will be able to transfer mod I to perform ADLs. PT Treatment Day 0   Plan   Treatment/Interventions Functional transfer training;LE strengthening/ROM; Elevations; Therapeutic exercise; Endurance training;Patient/family training;Equipment eval/education; Bed mobility;Gait training;Spoke to nursing;OT  (Spoke to CM)   PT Frequency 4-6x/wk   Recommendation   PT Discharge Recommendation Home with home health rehabilitation   Equipment Recommended [place]; Other (Comment)  ([facility])   Walker Package Recommended Wheeled walker   AM-PAC Basic Mobility Inpatient   Turning in Flat Bed Without Bedrails 3   Lying on Back to Sitting on Edge of Flat Bed Without Bedrails 2   Moving Bed to Chair 2   Standing Up From Chair Using Arms 2   Walk in Room 2   Climb 3-5 Stairs With Railing 1   Basic Mobility Inpatient Raw Score 12   Basic Mobility Standardized Score 32.23   Highest Level Of Mobility   -HLM Goal 4: Move to chair/commode   JH-HLM Achieved 7: Walk 25 feet or more   Modified [place] Scale   Modified [place] Scale 4   End of Consult   Patient Position at End of Consult Bedside chair; All needs within reach         [facility]

## 2023-07-13 NOTE — PROGRESS NOTES
[address] [facility]  Progress Note: Critical Care   Date of Service: 7/13/2023  Hospital Day: 1  Name: Chan Hardin I  MRN: [id number]  [facility]      Assessment/Plan     Impressions:  1. Aortic regurgitation s/p aortic valve replacement with bioprosthetic valve  2. Mitral regurgitation s/p mitral valve repair with ring annuloplasty  3. Atrial fibrillation  4. Hypertension  5. Hyperlipidemia  6. History of DVT  7. Peripheral tear disease. Neuro:   · Cardiac Surgery Pain Control: ATC tylenol and PRN oxycodone 2.5/5mg  · Delirium precautions  · Regulate sleep/wake cycle  · CAM-ICU daily  · Trend neuro exam      CV:   · Cardiac Surgery Hemodynamic Monitoring: MAP goal >65 SBP < 130 Continue central line due to vasoactive medications Continue arterial line for blood pressure monitoring and/or frequent blood gas draws Discontinue PA catheter  · Follow rhythm on telemetry  · Critical Care Infusions: Levophed currently held  · Beta Blocker Plan: Hold beta blocker secondary to Bradycardia and hypotension  · Epicardial pacing wire plan: Maintain for pacing needs  · Post op cardiac surgery medications:   ·  QD  · Statin: Lipitor 80 mg qHS  · Amiodarone 200 mg PO q8 hours   · History of atrial fibrillation  · Assess need for long-term anticoagulation    Lung:   · Acute post-op pulmonary insufficiency requiring 2 liters/min via nasal cannula. Secondary to poor inspiratory effort secondary to pain and pulmonary vascular congestion  · Chest tube output remains persistently high; Continue chest tubes to suction today    GI:   · Continue PPI for stress ulcer prophylaxis  · Continue bowel regimen  · Trend abdominal exam and bowel function    FEN:   · Diuresis Plan: Lasix 40mg IV BID  · Nutrition plan: as tolerated  · Replenish electrolytes with goals: K >4.0, Mag >2.0, and Phos >3.0    :   Khan Plan: Khan to be removed.  Order has been placed  · Trend UOP and BUN/creat  · Strict I and O     ID:   · Trend temps and WBC count  · Maintain normothermia    Heme:   · Trend hgb and plts    Endo:   · Patient is not a diabetic. Transition to sliding scale insulin. MSK/Skin:  · Mobility goal:   · PT/OT consult as medically appropriate   · Frequent turning and pressure off-loading  · Local wound care as needed    Disposition:  Med Surg with Telemetry    ICU Core Measures     A: Assess, Prevent, and Manage Pain · Has pain been assessed? Yes  · Need for changes to pain regimen? No   B: Both SAT/SAT  · N/A   C: Choice of Sedation · RASS Goal: 0 Alert and Calm  · Need for changes to sedation or analgesia regimen? No   D: Delirium · CAM-ICU: Negative   E: Early Mobility  · Plan for early mobility? Yes   F: Family Engagement · Plan for family engagement today? Yes     Antibiotic Review: Post op requirements     Review of Invasive Devices: Khan Plan: Khan to be removed. Order has been placed  Central access plan: Medications requiring central line  [place] Plan: Keep arterial line for hemodynamic monitoring    Prophylaxis:  VTE VTE covered by:  fondaparinux, Subcutaneous       Stress Ulcer  covered bypantoprazole (PROTONIX) EC tablet 40 mg [[id number]]        Subjective   24 Hour Events/HPI: POD # 1 s/p aortic valve replacement with bioprosthetic valve, mitral valve repair with ring annuloplasty, left atrial appendage ligation. Patient arrived supported on epi of 2 and Levophed of 7. Received a total of 1 L of LR and 500 of albumin due to low filling pressures, low cardiac index. Responded appropriately and epi was able to be weaned off, and Levophed came off this morning. He is AV paced at 80 blood pressure support, junctional bradycardia in the 30s underneath. De-lined this morning. Patient offers no acute this morning. Critical Care Infusions: Levophed currently held    Review of Systems   Constitutional: Positive for activity change.    Respiratory: Negative for chest tightness and shortness of breath. Cardiovascular: Negative for chest pain. Gastrointestinal: Negative for abdominal pain, nausea and vomiting. Genitourinary: Negative for difficulty urinating. Musculoskeletal: Negative for arthralgias. Skin: Negative for color change. Neurological: Negative for dizziness and light-headedness. All other systems reviewed and are negative.          Objective     Medications:  Scheduled Continuous   acetaminophen, 975 mg, Q8H  amiodarone, 200 mg, Q8H VANESSA  ascorbic acid, 500 mg, Daily  aspirin, 325 mg, Daily  atorvastatin, 80 mg, Daily With Dinner  cefazolin, 2,000 mg, Q8H  chlorhexidine, 15 mL, BID  cholecalciferol, 1,000 Units, Daily  fondaparinux, 2.5 mg, Daily  mupirocin, 1 Application, I66C VANESSA  pancrelipase (Lip-Prot-Amyl), 24,000 Units, TID With Meals  pantoprazole, 40 mg, Daily  polyethylene glycol, 17 g, Daily  saccharomyces boulardii, 250 mg, BID      epinephrine, 1-20 mcg/min, Last Rate: Stopped (07/13/23 0020)  insulin regular (HumuLIN R,NovoLIN R) 1 Units/mL in sodium chloride 0.9 % 100 mL infusion, 0.3-21 Units/hr, Last Rate: 0.5 Units/hr (07/13/23 0421)  niCARdipine, 2.5-15 mg/hr  norepinephrine, 1-30 mcg/min, Last Rate: 3 mcg/min (07/13/23 0426)  sodium chloride, 20 mL/hr, Last Rate: 20 mL/hr (07/12/23 1410)         Vitals: Invasive Monitoring       Most Recent Min/Max in 24hrs   /93 BP  Min: 121/87  Max: 147/82   Pulse 80 Pulse  Min: 65  Max: 113   Resp 19 Resp  Min: 13  Max: 23   O2 Sat 96 % SpO2  Min: 95 %  Max: 98 %   Temp 99 °F (37.2 °C) Temp  Min: 97.2 °F (36.2 °C)  Max: 99 °F (37.2 °C)   O2 Device: Nasal cannula  Nasal Cannula O2 Flow Rate (L/min): 2 L/min Arterial Line  [place] /65  Arterial Line BP  Min: 90/48  Max: 123/75   MAP 76 mmHg  Arterial Line MAP (mmHg)  Min: 63 mmHg  Max: 92 mmHg     PA Catheter   Most Recent  Min/Max in 24hrs    PAP 33/12 PAP  Min: 25/12  Max: 46/23   CVP 8 mmHg CVP (mean)  Min: 4 mmHg  Max: 12 mmHg   CI 2.7 L/min/m2  CI (L/min/m2)  Min: 1.5 L/min/m2  Max: 2.6 L/min/m2   SVR 1047 (dyne*sec)/cm5 SVR (dyne*sec)/cm5  Min: 800 (dyne*sec)/cm5  Max: 1401 (dyne*sec)/cm5            I/O Chest tube output (if present)     Intake/Output Summary (Last 24 hours) at 7/13/2023 0620  Last data filed at 7/13/2023 0400  Gross per 24 hour   Intake 7174.24 ml   Output 4465 ml   Net 2709.24 ml     UOP: 45/hour  895  BM: No BM in the last 24 hours  Weight change: 1.8 kg (3 lb 15.5 oz)     Mediastinal tubes:  160 mL/8hr  320 mL/24hr             Labs:  CBC    Recent Labs     07/12/23  1336 07/12/23  1342 07/12/23 2107 07/13/23  0304   WBC  --   --   --  14.25*   HGB 16.6   < > 15.2 14.6   HCT 47.3   < > 44.8 42.9     --   --  144*    < > = values in this interval not displayed. BMP    Recent Labs     07/12/23  2339 07/13/23  0304   SODIUM 143 142   K 4.1 4.6   * 112*   CO2 24 29   AGAP 6 1   BUN 16 18   CREATININE 1.21 1.02   CALCIUM 8.7 8.7        Baseline Creat: 0.8   Coags    Recent Labs     07/12/23  1336   INR 1.38*   PTT 32              Additional Electrolytes  Recent Labs     07/12/23  1235 07/12/23  1342 07/13/23  0304   MG  --   --  2.9*   CAIONIZED 1.12 1.22  --           Blood Gas    Recent Labs     07/12/23 2107   PHART 7.265*   NLT2EML 39.1   PO2ART 111.1   UBM8FBX 17.4*   BEART -9.0   SOURCE Line, Arterial     Recent Labs     07/12/23 2107 07/13/23  0305   PHVEN  --  7.331   JOH1SVV  --  52.8*   PO2VEN  --  33.3*   CPR3SCD  --  27.3   BEVEN  --  0.3   SOURCE Line, Arterial  --     LFTs  No recent results    Infectious    No recent results     No recent results Glucose  Recent Labs     07/12/23  1336 07/12/23  2339 07/13/23  0304   GLUC 154* 162* 97        Diagnostic Studies Physical exam   07/13/23 CXR: Lines and tubes in place, no pneumothorax or effusion  This was personally reviewed by myself in PACS.  07/13/23 EKG: Junctional bradycardia, no ST or T wave abnormalities.    This was personally reviewed by myself. Physical Exam  Vitals and nursing note reviewed. Constitutional:       Appearance: Normal appearance. HENT:      Head: Normocephalic and atraumatic. Nose: Nose normal.      Mouth/Throat:      Mouth: Mucous membranes are moist.      Pharynx: Oropharynx is clear. Eyes:      Pupils: Pupils are equal, round, and reactive to light. Neck:      Comments: Central venous catheter in place. Cardiovascular:      Rate and Rhythm: Normal rate and regular rhythm. Comments: Regular rate and rhythm. Warm distal extremities. Trace lower extremity edema. Pulmonary:      Effort: Pulmonary effort is normal.   Abdominal:      General: Abdomen is flat. Palpations: Abdomen is soft. Musculoskeletal:         General: Normal range of motion. Cervical back: Normal range of motion and neck supple. Skin:     General: Skin is warm. Capillary Refill: Capillary refill takes less than 2 seconds. Neurological:      General: No focal deficit present. Mental Status: He is alert and oriented to person, place, and time. Mental status is at baseline. Collaborative bedside rounds performed with cardiac surgery attending, critical care attending and bedside RN.      Shine Wheeler PA-C

## 2023-07-14 ENCOUNTER — HOME HEALTH ADMISSION (OUTPATIENT)
Dept: HOME HEALTH SERVICES | Facility: HOME HEALTHCARE | Age: 78
End: 2023-07-14
Payer: MEDICARE

## 2023-07-14 LAB
ABO GROUP BLD BPU: NORMAL
ABO GROUP BLD BPU: NORMAL
ANION GAP SERPL CALCULATED.3IONS-SCNC: 2 MMOL/L
BPU ID: NORMAL
BPU ID: NORMAL
BUN SERPL-MCNC: 26 MG/DL (ref 5–25)
CALCIUM SERPL-MCNC: 9 MG/DL (ref 8.3–10.1)
CHLORIDE SERPL-SCNC: 109 MMOL/L (ref 96–108)
CO2 SERPL-SCNC: 28 MMOL/L (ref 21–32)
CREAT SERPL-MCNC: 1.14 MG/DL (ref 0.6–1.3)
ERYTHROCYTE [DISTWIDTH] IN BLOOD BY AUTOMATED COUNT: 14.3 % (ref 11.6–15.1)
GFR SERPL CREATININE-BSD FRML MDRD: 60 ML/MIN/1.73SQ M
GLUCOSE SERPL-MCNC: 112 MG/DL (ref 65–140)
GLUCOSE SERPL-MCNC: 118 MG/DL (ref 65–140)
GLUCOSE SERPL-MCNC: 120 MG/DL (ref 65–140)
GLUCOSE SERPL-MCNC: 121 MG/DL (ref 65–140)
GLUCOSE SERPL-MCNC: 130 MG/DL (ref 65–140)
HCT VFR BLD AUTO: 43.6 % (ref 36.5–49.3)
HGB BLD-MCNC: 14.3 G/DL (ref 12–17)
INR PPP: 1.4 (ref 0.84–1.19)
MAGNESIUM SERPL-MCNC: 2.9 MG/DL (ref 1.6–2.6)
MCH RBC QN AUTO: 32 PG (ref 26.8–34.3)
MCHC RBC AUTO-ENTMCNC: 32.8 G/DL (ref 31.4–37.4)
MCV RBC AUTO: 98 FL (ref 82–98)
PLATELET # BLD AUTO: 116 THOUSANDS/UL (ref 149–390)
PMV BLD AUTO: 10.8 FL (ref 8.9–12.7)
POTASSIUM SERPL-SCNC: 5 MMOL/L (ref 3.5–5.3)
PROTHROMBIN TIME: 17.4 SECONDS (ref 11.6–14.5)
RBC # BLD AUTO: 4.47 MILLION/UL (ref 3.88–5.62)
SODIUM SERPL-SCNC: 139 MMOL/L (ref 135–147)
UNIT DISPENSE STATUS: NORMAL
UNIT DISPENSE STATUS: NORMAL
UNIT PRODUCT CODE: NORMAL
UNIT PRODUCT CODE: NORMAL
UNIT PRODUCT VOLUME: 250 ML
UNIT PRODUCT VOLUME: 280 ML
UNIT RH: NORMAL
UNIT RH: NORMAL
WBC # BLD AUTO: 15.87 THOUSAND/UL (ref 4.31–10.16)

## 2023-07-14 PROCEDURE — 99223 1ST HOSP IP/OBS HIGH 75: CPT | Performed by: INTERNAL MEDICINE

## 2023-07-14 PROCEDURE — 85027 COMPLETE CBC AUTOMATED: CPT | Performed by: THORACIC SURGERY (CARDIOTHORACIC VASCULAR SURGERY)

## 2023-07-14 PROCEDURE — 80048 BASIC METABOLIC PNL TOTAL CA: CPT | Performed by: THORACIC SURGERY (CARDIOTHORACIC VASCULAR SURGERY)

## 2023-07-14 PROCEDURE — 82948 REAGENT STRIP/BLOOD GLUCOSE: CPT

## 2023-07-14 PROCEDURE — 97116 GAIT TRAINING THERAPY: CPT

## 2023-07-14 PROCEDURE — 99232 SBSQ HOSP IP/OBS MODERATE 35: CPT | Performed by: INTERNAL MEDICINE

## 2023-07-14 PROCEDURE — 94664 DEMO&/EVAL PT USE INHALER: CPT

## 2023-07-14 PROCEDURE — 85610 PROTHROMBIN TIME: CPT | Performed by: THORACIC SURGERY (CARDIOTHORACIC VASCULAR SURGERY)

## 2023-07-14 PROCEDURE — 83735 ASSAY OF MAGNESIUM: CPT | Performed by: THORACIC SURGERY (CARDIOTHORACIC VASCULAR SURGERY)

## 2023-07-14 PROCEDURE — 97530 THERAPEUTIC ACTIVITIES: CPT

## 2023-07-14 PROCEDURE — 99024 POSTOP FOLLOW-UP VISIT: CPT | Performed by: PHYSICIAN ASSISTANT

## 2023-07-14 RX ORDER — TRAMADOL HYDROCHLORIDE 50 MG/1
50 TABLET ORAL EVERY 6 HOURS PRN
Status: DISCONTINUED | OUTPATIENT
Start: 2023-07-14 | End: 2023-07-20 | Stop reason: HOSPADM

## 2023-07-14 RX ORDER — POTASSIUM CHLORIDE 20 MEQ/1
20 TABLET, EXTENDED RELEASE ORAL DAILY
Status: DISCONTINUED | OUTPATIENT
Start: 2023-07-14 | End: 2023-07-20 | Stop reason: HOSPADM

## 2023-07-14 RX ORDER — TRAMADOL HYDROCHLORIDE 50 MG/1
25 TABLET ORAL EVERY 6 HOURS PRN
Status: DISCONTINUED | OUTPATIENT
Start: 2023-07-14 | End: 2023-07-20 | Stop reason: HOSPADM

## 2023-07-14 RX ORDER — FUROSEMIDE 10 MG/ML
60 INJECTION INTRAMUSCULAR; INTRAVENOUS
Status: DISCONTINUED | OUTPATIENT
Start: 2023-07-14 | End: 2023-07-16

## 2023-07-14 RX ORDER — METOCLOPRAMIDE HYDROCHLORIDE 5 MG/ML
10 INJECTION INTRAMUSCULAR; INTRAVENOUS EVERY 6 HOURS PRN
Status: DISCONTINUED | OUTPATIENT
Start: 2023-07-14 | End: 2023-07-20 | Stop reason: HOSPADM

## 2023-07-14 RX ORDER — WARFARIN SODIUM 2.5 MG/1
2.5 TABLET ORAL
Status: COMPLETED | OUTPATIENT
Start: 2023-07-14 | End: 2023-07-14

## 2023-07-14 RX ORDER — TRAMADOL HYDROCHLORIDE 50 MG/1
50 TABLET ORAL EVERY 6 HOURS PRN
Status: DISCONTINUED | OUTPATIENT
Start: 2023-07-14 | End: 2023-07-14

## 2023-07-14 RX ADMIN — OXYCODONE HYDROCHLORIDE AND ACETAMINOPHEN 500 MG: 500 TABLET ORAL at 08:41

## 2023-07-14 RX ADMIN — MUPIROCIN 1 APPLICATION: 20 OINTMENT TOPICAL at 21:23

## 2023-07-14 RX ADMIN — POTASSIUM CHLORIDE 20 MEQ: 1500 TABLET, EXTENDED RELEASE ORAL at 08:41

## 2023-07-14 RX ADMIN — ACETAMINOPHEN 975 MG: 325 TABLET, FILM COATED ORAL at 21:23

## 2023-07-14 RX ADMIN — ATORVASTATIN CALCIUM 80 MG: 80 TABLET, FILM COATED ORAL at 17:43

## 2023-07-14 RX ADMIN — ACETAMINOPHEN 975 MG: 325 TABLET, FILM COATED ORAL at 03:40

## 2023-07-14 RX ADMIN — POLYETHYLENE GLYCOL 3350 17 G: 17 POWDER, FOR SOLUTION ORAL at 08:41

## 2023-07-14 RX ADMIN — MUPIROCIN 1 APPLICATION: 20 OINTMENT TOPICAL at 08:41

## 2023-07-14 RX ADMIN — PANTOPRAZOLE SODIUM 40 MG: 40 TABLET, DELAYED RELEASE ORAL at 08:41

## 2023-07-14 RX ADMIN — DOCUSATE SODIUM 100 MG: 100 CAPSULE ORAL at 17:42

## 2023-07-14 RX ADMIN — OXYCODONE HYDROCHLORIDE 5 MG: 5 TABLET ORAL at 03:40

## 2023-07-14 RX ADMIN — DOCUSATE SODIUM 100 MG: 100 CAPSULE ORAL at 08:41

## 2023-07-14 RX ADMIN — FONDAPARINUX SODIUM 2.5 MG: 2.5 INJECTION, SOLUTION SUBCUTANEOUS at 08:42

## 2023-07-14 RX ADMIN — TRAMADOL HYDROCHLORIDE 50 MG: 50 TABLET, COATED ORAL at 11:22

## 2023-07-14 RX ADMIN — Medication 1000 UNITS: at 08:41

## 2023-07-14 RX ADMIN — LIDOCAINE 5% 1 PATCH: 700 PATCH TOPICAL at 08:42

## 2023-07-14 RX ADMIN — PANCRELIPASE 24000 UNITS: 24000; 76000; 120000 CAPSULE, DELAYED RELEASE PELLETS ORAL at 17:42

## 2023-07-14 RX ADMIN — ACETAMINOPHEN 975 MG: 325 TABLET, FILM COATED ORAL at 13:27

## 2023-07-14 RX ADMIN — ONDANSETRON 4 MG: 2 INJECTION INTRAMUSCULAR; INTRAVENOUS at 03:40

## 2023-07-14 RX ADMIN — WARFARIN SODIUM 2.5 MG: 2.5 TABLET ORAL at 17:44

## 2023-07-14 RX ADMIN — PANCRELIPASE 24000 UNITS: 24000; 76000; 120000 CAPSULE, DELAYED RELEASE PELLETS ORAL at 08:41

## 2023-07-14 RX ADMIN — FUROSEMIDE 60 MG: 10 INJECTION, SOLUTION INTRAMUSCULAR; INTRAVENOUS at 17:43

## 2023-07-14 RX ADMIN — PANCRELIPASE 24000 UNITS: 24000; 76000; 120000 CAPSULE, DELAYED RELEASE PELLETS ORAL at 11:26

## 2023-07-14 RX ADMIN — ASPIRIN 81 MG: 81 TABLET, COATED ORAL at 08:41

## 2023-07-14 NOTE — RESTORATIVE TECHNICIAN NOTE
Restorative Technician Note      Patient Name: Esteban Powell. Restorative Tech Visit Date: 07/14/23  Note Type: Mobility  Patient Position Upon Consult: Bedside chair  Activity Performed: Ambulated  Assistive Device: Roller walker  Patient Position at End of Consult: All needs within reach;  Bedside chair

## 2023-07-14 NOTE — CONSULTS
Consultation - Electrophysiology - Cardiology  Carmina Adames. 77 y.o. male MRN: [id number]  Unit/Bed#: [facility] 411-01 Encounter: [id number]      Inpatient consult to Electrophysiology  Consult performed by: Noelle Farrell PA-C  Consult ordered by: Jeanette Mosley PA-C        History of Present Illness   Physician Requesting Consult: Debo Lim MD  Reason for Consult / Principal Problem: bradycardia    Assessment/Plan   1. Moderate AI/severe MR s/p bioprosthetic AVR (29 mm Escalante Inspiris valve) and MV repair (36 mm Escalante physio II ring annuloplasty), JENNY ligation 7/12/2023 by Dr. Shamar Gutierrez  2. Post op junctional escape rhythm 50 bpm with underlying Afib with SVR   -- Epicardial pacing wires in place, programmed DDD 80 bpm  3. PAF s/p 2 prior Afib ablations by Dr. Caldera Orn  -- s/p PVI, CTI 1/24/2018  -- s/p PVI, LA posterior wall 3/25/2022  -- previously on Eliquis, now on warfarin post op, likely change back to Eliquis after 1 month  -- CHADS2 Vasc = 5 (HF, HTN, age, PAD)  -- h/o medication non-compliance  4. CAD  -- 80% OM1 lesion noted on cardiac catheterization 5/2023  -- eventual elective outpatient PCI to OM1   5. Cardiomyopathy EF 45% on final intraoperative RANDALL (previously 60%)  6. Ascending aortic aneurysm 4.5 cm  7. HTN  8. HLD  9. PAD s/p bilateral popliteal aneurysm repair    Carmina Adames. is a 77year old male who is POD#2 s/p bio AVR, MV repair, and JENNY ligation by Dr. Shamar Gutierrez who was noted to have bradycardia with a junctional escape requiring pacing with epicardial wires (programmed DDD 80 bpm). Currently, his underlying rhythm is Afib with SVR and a junctional escape at 50 bpm. Review of EKGs over the past 6 months documents Afib, sinus rhythm, LAFB, and RBBB. He received metoprolol and amiodarone in the post-operative setting; these have now been discontinued. We will continue to monitor him over the weekend.  If he does not have recovery of conduction, he will require implantation of a permanent pacemaker. Regarding his Afib and stroke prevention, he was previously on Eliquis. He is now on warfarin in the post-operative setting and will transition back to Eliquis as an outpatient. -- continue to monitor on telemetry  -- hold AV monica agents  -- temp pacer with epicardial wires programmed DDD 80 bpm  -- will re-evaluate rhythm on Sunday. If remains in heart block, will plan for implantation of a permanent pacemaker (tentatively Monday 7/17/23)      HPI: Shannan Boas. is a 77 y.o. male with a history of paroxysmal Afib s/p 2 prior ablations by Dr. Lindsay Luis (PVI, CTI ablation 1/24/2018 and PVI, LA posterior wall 3/25/2022), severe mitral regurgitation, ascending aortic aneurysm (4.5 cm), essential hypertension, hyperlipidemia, bilateral popliteal aneurysm s/p repair, history of DVT, history of right lower extremity osteomyelitis who follows with cardiologist Dr. Tory Champion. He had progression of valvular heart disease and was referred to CT surgery. He underwent cardiac catheterization that revealed single-vessel disease in OM1. After routine preoperative testing patient presented 7/12/2023 and underwent successful AVR with 29 mm Escalante Inspiris bioprosthetic valve, mitral valve repair with 36 mm Escalante physio 2 ring angioplasty, and JENNY ligation. Final intraoperative RANDALL revealed EF 45% with well-seated, normally functioning valves and no PVL. Cardiology is following for postoperative co-management.     Home medication regimen includes Eliquis 5 mg BID, aspirin 81 mg daily, Lopressor 25 mg BID and rosuvastatin 20 mg daily. Epicardial pacing wires are in place and he was found to have a junctional rhythm post-operatively requiring pacing. EP was asked to evaluation regarding his bradycardia. He received several doses of amiodarone and metoprolol; these have now been held.      Currently, he is in Afib with V-pacing at 80 bpm. EKGs yesterday document a junctional rhythm in the 50s followed by AV paced rhythm. EKGs from November document Afib with LAFB and RBBB. He had EKGs in sinus rhythm since them. He is sitting OOB in a chair and feels reasonably well. He denies chest pain, SOB, lightheadedness, dizziness, palpitations, or syncope. He denies significant pain related to surgery. He has ambulated in the halls and noticed improvement in [address] [place] [address]. Patient denies chest pain/heaviness/tightness/pressure, palpitations, shortness of breath, orthopnea, lightheadedness, presyncope, syncope or N/V. Epicardial pacing wires with external box programmed DDD 80 bpm - currently As- at 80 bpm with adequate sensing and threshold. At DDD 45 rhythm is Afib with junctional escape at 50 bpm    TELE: Afib with ventricular pacing at 80 bpm    EKG 7/13/23: AV paced rhythm    EKG 7/13/2023: junctional rhythm HR 50 bpm    EKGs 11/18/2022 document Afib with LAFB, incomplete RBBB      Review of Systems   Constitutional: Negative for fatigue. HENT: Negative. Eyes: Negative. Respiratory: Negative for cough, chest tightness, shortness of breath and wheezing. Cardiovascular: Negative for chest pain and palpitations. Gastrointestinal: Negative for abdominal pain, nausea and vomiting. Endocrine: Negative. Genitourinary: Negative. Musculoskeletal: Negative for back pain. Skin: Negative for rash. Allergic/Immunologic: Negative. Neurological: Negative for dizziness, syncope, weakness and light-headedness. Psychiatric/Behavioral: Negative. Historical Information   Past Medical History:   Diagnosis Date   • Cardiac disease    • Hypertension    • PAF (paroxysmal atrial fibrillation) ([address])    • 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[address])     BILATERALLY       Past Surgical History:   Procedure Laterality Date   • CARDIAC CATHETERIZATION N/A 5/8/2023    Procedure: Cardiac Coronary Angiogram;  Surgeon: Sherman Perez DO;  Location: [facility];   Service: Cardiology   • CARDIAC ELECTROPHYSIOLOGY PROCEDURE N/A 3/25/2022    Procedure: Cardiac eps/afib ablation;  Surgeon: Tamie Rizo MD;  Location: [facility]; Service: Cardiology   • ESOPHAGOGASTRODUODENOSCOPY N/A 8/16/2016    Procedure: ESOPHAGOGASTRODUODENOSCOPY (EGD); Surgeon: Pamela Dumont MD;  Location: [facility]; Service:    • HERNIA REPAIR Left     groin   • HERNIA REPAIR Right 1995   • OTHER SURGICAL HISTORY     • DC REPLACEMENT MITRAL VALVE W/CARDIOPULMONARY BYP N/A 7/12/2023    Procedure: MITRAL VALVE REPAIR WITH 36MM PHYSIO II ANNULOPLASTY RING;  Surgeon: Mulugeta Gordon MD;  Location: [facility];  Service: Cardiac Surgery   • DC RPLCMT AORTIC VALVE OPN W/STENTLESS TISSUE VALVE N/A 7/12/2023    Procedure: REPLACEMENT VALVE AORTIC (AVR) WITH 29MM INSPIRIS TISSUE RESILIA VALVE.  LEFT ATRIAL APPENDAGE LIGATION WITH 39 MM ATRICLIP;  Surgeon: Mulugeta Gordon MD;  Location: [facility];  Service: Cardiac Surgery   • REPLACEMENT TOTAL KNEE Left 04/01/2013   • VASCULAR SURGERY Right     POPLITEAL BYPASS DUE TO ANEURYSM       Social History     Substance and Sexual Activity   Alcohol Use Not Currently     Social History     Substance and Sexual Activity   Drug Use No     Social History     Tobacco Use   Smoking Status Never   Smokeless Tobacco Never       Family History   Problem Relation Age of Onset   • Pancreatic cancer Mother    • Stroke Father    • Heart attack Paternal Grandmother        Meds/Allergies   Hospital Medications:   Current Facility-Administered Medications   Medication Dose Route Frequency   • acetaminophen (TYLENOL) tablet 975 mg  975 mg Oral Q8H   • ascorbic acid (VITAMIN C) tablet 500 mg  500 mg Oral Daily   • aspirin (ECOTRIN LOW STRENGTH) EC tablet 81 mg  81 mg Oral Daily   • atorvastatin (LIPITOR) tablet 80 mg  80 mg Oral Daily With Dinner   • bisacodyl (DULCOLAX) rectal suppository 10 mg  10 mg Rectal Daily PRN   • cholecalciferol (VITAMIN D3) tablet 1,000 Units 1,000 Units Oral Daily   • docusate sodium (COLACE) capsule 100 mg  100 mg Oral BID   • fondaparinux (ARIXTRA) subcutaneous injection 2.5 mg  2.5 mg Subcutaneous Daily   • furosemide (LASIX) injection 60 mg  60 mg Intravenous BID (diuretic)   • insulin lispro (HumaLOG) 100 units/mL subcutaneous injection 1-5 Units  1-5 Units Subcutaneous TID AC   • insulin lispro (HumaLOG) 100 units/mL subcutaneous injection 1-5 Units  1-5 Units Subcutaneous HS   • lidocaine (LIDODERM) 5 % patch 1 patch  1 patch Topical Daily   • methocarbamol (ROBAXIN) tablet 500 mg  500 mg Oral Q6H PRN   • metoclopramide (REGLAN) injection 10 mg  10 mg Intravenous Q6H PRN   • mupirocin (BACTROBAN) 2 % nasal ointment 1 Application  1 Application Nasal X44H [address]   • ondansetron (ZOFRAN) injection 4 mg  4 mg Intravenous Q6H PRN   • pancrelipase (Lip-Prot-Amyl) (CREON) delayed release capsule 24,000 Units  24,000 Units Oral TID With Meals   • pantoprazole (PROTONIX) EC tablet 40 mg  40 mg Oral Daily   • polyethylene glycol (MIRALAX) packet 17 g  17 g Oral Daily   • potassium chloride (K-DUR,KLOR-CON) CR tablet 20 mEq  20 mEq Oral Daily   • saccharomyces boulardii (FLORASTOR) capsule 250 mg  250 mg Oral BID   • temazepam (RESTORIL) capsule 15 mg  15 mg Oral HS PRN   • traMADol (ULTRAM) tablet 25 mg  25 mg Oral Q6H PRN   • traMADol (ULTRAM) tablet 50 mg  50 mg Oral Q6H PRN   • warfarin (COUMADIN) tablet 2.5 mg  2.5 mg Oral Once (warfarin)       Home Medications:   Medications Prior to Admission   Medication   • aspirin (ECOTRIN LOW STRENGTH) 81 mg EC tablet   • apixaban (ELIQUIS) 5 mg   • ascorbic acid (VITAMIN C) 500 mg tablet   • clindamycin (CLEOCIN) 300 MG capsule   • Coenzyme Q10 200 MG capsule   • COLLAGEN PO   • diphenhydrAMINE (BENADRYL) 25 mg capsule   • Flaxseed, Linseed, (FLAXSEED OIL) 1000 MG CAPS   • fluorouracil (EFUDEX) 5 % cream   • Green Tea 250 MG CAPS   • Liver Extract 500 MG CAPS   • Magnesium Citrate 125 MG CAPS   • metoprolol tartrate (LOPRESSOR) 25 mg tablet   • mupirocin (BACTROBAN) 2 % ointment   • Pancreatin 325 MG TABS   • Potassium Acetate LUCY   • Probiotic Product (PROBIOTIC-10 PO)   • rosuvastatin (CRESTOR) 20 MG tablet   • Vitamin D, Cholecalciferol, 1000 units CAPS       Allergies   Allergen Reactions   • Oxycodone GI Intolerance     nausea  nausea       Objective   Vitals: Blood pressure 108/67, pulse 79, temperature 98.2 °F (36.8 °C), temperature source Oral, resp. rate 15, height 6' 3.2" (1.91 m), weight 122 kg (268 lb 1.3 oz), SpO2 93 %. Orthostatic Blood Pressures    Flowsheet Row Most Recent Value   Blood Pressure 108/67 filed at 07/14/2023 1540   Patient Position - Orthostatic VS Sitting filed at 07/14/2023 1540            Intake/Output Summary (Last 24 hours) at 7/14/2023 1601  Last data filed at 7/14/2023 1544  Gross per 24 hour   Intake 476 ml   Output 1105 ml   Net -629 ml       Invasive Devices     Central Venous Catheter Line  Duration           CV[facility] Central Lines 07/12/23 Triple 2 days          Peripheral Intravenous Line  Duration           Peripheral IV 07/12/23 Right Wrist 2 days          Line  Duration           Pacer Wires 2 days    Pacer Wires 2 days          Drain  Duration           Chest Tube 1 Mediastinal 32 Fr. 2 days    Chest Tube 2 Anterior;Mediastinal 32 Fr. 2 days                Physical Exam  Constitutional:       Appearance: Normal appearance. HENT:      Head: Normocephalic and atraumatic. Nose: No congestion. Mouth/Throat:      Mouth: Mucous membranes are moist.   Eyes:      Pupils: Pupils are equal, round, and reactive to light. Cardiovascular:      Rate and Rhythm: Normal rate and regular rhythm. Pulses: Normal pulses. Heart sounds: Normal heart sounds. Abdominal:      General: Abdomen is flat. Palpations: Abdomen is soft. Tenderness: There is no abdominal tenderness. Musculoskeletal:         General: No swelling. Skin:     General: Skin is warm and dry. Findings: No rash. Comments: Sternal incision with dressing   Neurological:      General: No focal deficit present. Mental Status: He is alert and oriented to person, place, and time. Psychiatric:         Mood and Affect: Mood normal.         Behavior: Behavior normal.          Lab Results: I have personally reviewed pertinent lab results. Results from last 7 days   Lab Units 07/14/23 0349 07/13/23  0304 07/12/23  2107 07/12/23  1342 07/12/23  1336   WBC Thousand/uL 15.87* 14.25*  --   --   --    HEMOGLOBIN g/dL 14.3 14.6 15.2  --  16.6   I STAT HEMOGLOBIN   --   --   --    < >  --    HEMATOCRIT % 43.6 42.9 44.8  --  47.3   HEMATOCRIT, ISTAT   --   --   --    < >  --    PLATELETS Thousands/uL 116* 144*  --   --  173    < > = values in this interval not displayed. Results from last 7 days   Lab Units 07/14/23 0349 07/13/23  0304 07/12/23  2339 07/12/23  1821 07/12/23  1342   POTASSIUM mmol/L 5.0 4.6 4.1   < >  --    CHLORIDE mmol/L 109* 112* 113*  --   --    CO2 mmol/L 28 29 24  --   --    CO2, I-STAT mmol/L  --   --   --   --  21   BUN mg/dL 26* 18 16  --   --    CREATININE mg/dL 1.14 1.02 1.21  --   --    GLUCOSE, ISTAT mg/dl  --   --   --   --  160*   CALCIUM mg/dL 9.0 8.7 8.7  --   --     < > = values in this interval not displayed. Results from last 7 days   Lab Units 07/14/23 0349 07/12/23  1336   INR  1.40* 1.38*   PTT seconds  --  32     Results from last 7 days   Lab Units 07/14/23 0349 07/13/23  0304   MAGNESIUM mg/dL 2.9* 2.9*       Imaging: I have personally reviewed pertinent reports. Cardiac testing:    ECHO 11/11/2022:   •  Left Ventricle: Left ventricular cavity size is mildly dilated. Wall thickness is normal. The left ventricular ejection fraction is 60%. Systolic function is normal. Wall motion is normal.  •  Left Atrium: The atrium is severely dilated. •  Right Atrium: The atrium is dilated. •  Aortic Valve: The aortic valve is trileaflet.  The leaflets are mildly thickened. The leaflets are mildly calcified. There is moderately reduced mobility. There is mild regurgitation. There is mild to moderate stenosis. •  Mitral Valve: There is moderate to severe regurgitation with an eccentrically directed jet. •  Tricuspid Valve: There is moderate to severe regurgitation. The right ventricular systolic pressure is severely elevated. The estimated right ventricular systolic pressure is 97.02 mmHg. •  Aorta: The aortic root is normal in size. The aortic root is 3.60 cm.    11/18/2022 RANDALL/Cardioversion: EF 60% with no WMA, normal RV function, mild biatrial dilatation, no PFO or JENNY thrombus, mild-moderate AI, severe MR, moderate TR.   Patient received a 200 J biphasic shock with conversion to NSR    Final intraop RANDALL 7/12/2023: EF 45%    VTE Prophylaxis: Warfarin (Coumadin)

## 2023-07-14 NOTE — PROGRESS NOTES
General Cardiology   Progress Note -  Team One   Rachel Galaviz. 77 y.o. male MRN: [id number]    Unit/Bed#: [facility] 411-01 Encounter: [id number]    Assessment:    1. Moderate AI/severe MR: s/p AVR with 29 mm Escalante Inspiris bioprosthetic valve, mitral valve repair with 36 mm Escalante physio 2 ring angioplasty, JENNY ligation. POD #2. • Final intraoperative RANDALL: Well-seated, normally functioning valves with no PVL. • Rhythm management: Amiodarone and beta-blocker on hold due to junctional bradycardia  • Volume management: IV Lasix 60 mg BID with K+ supplement. Net output +1.8 L  • Hemodynamics: Remains with CT and EPW. • Aspirin and statin. BB on hold  2. CAD: 80% OM1 lesion noted on cardiac catheterization 5/2023. This lesion is amenable to PCI. Denies any anginal chest discomfort. Currently on aspirin and high intensity statin. 3.  PAF: s/p ablation. JENNY ligation with valvular intervention as above. Noted to have atrial flutter with slow ventricular response in the 50s with subsequent V pacing. • [id number] 3: Eliquis switched to Coumadin postoperatively with INR 1.38 yesterday. 4.  Cardiomyopathy: EF 45% on final intraoperative RANDALL. EF was 60% on RANDALL 11/2022. Volume management as above. 5.  Ascending aortic aneurysm: Measuring 4.5 cm.  6.  Essential hypertension: Average /67  7. Hyperlipidemia: Lipid panel 5/2023 , TG 93, HDL 48,  on statin 20 mg daily switch to atorvastatin 80 mg this admission. 8.  PVD: History of bilateral popliteal aneurysm repair. 9.  Postoperative respiratory insufficiency:  Resolved, now on room air        Plan/Recommendations:  • Continue IV diuretic for negative fluid balance  • Monitor I&O's, renal function, electrolytes and standing weight  • Maintain potassium >4 and magnesium >2  • Continue to hold amiodarone and beta-blocker and monitor closely on telemetry  • Wean oxygen as able  • Continue Coumadin with goal INR of 2-3.   Can likely be switched back to Eliquis 1 month postoperatively  • Encourage ambulation and incentive spirometry  • Continue to monitor on telemetry  • Eventual PCI to OM1 lesion that can be done as an outpatient as there is no urgency. __________________________________________________________________    Subjective    Patient seen and examined. No acute events overnight. Denies any chest pain, shortness of breath or palpitations    Review of Systems   Constitutional: Negative. Negative for chills. Cardiovascular: Negative for chest pain, dyspnea on exertion, leg swelling, near-syncope, orthopnea, palpitations, paroxysmal nocturnal dyspnea and syncope. Respiratory: Negative. Negative for cough, shortness of breath and wheezing. Endocrine: Negative. Hematologic/Lymphatic: Negative. Skin: Negative. Musculoskeletal: Negative. Gastrointestinal: Negative. Negative for diarrhea, nausea and vomiting. Neurological: Negative for dizziness, light-headedness and weakness. Psychiatric/Behavioral: Negative. Negative for altered mental status. All other systems reviewed and are negative. Objective:   Vitals: Blood pressure 107/67, pulse 80, temperature 97.8 °F (36.6 °C), temperature source Oral, resp. rate 20, height 6' 3.2" (1.91 m), weight 122 kg (268 lb 1.3 oz), SpO2 92 %. ,     Wt Readings from Last 3 Encounters:   07/14/23 122 kg (268 lb 1.3 oz)   06/23/23 116 kg (256 lb 12.8 oz)   05/17/23 114 kg (252 lb)        Lab Results   Component Value Date    CREATININE 1.14 07/14/2023    CREATININE 1.02 07/13/2023    CREATININE 1.21 07/12/2023         Body mass index is 33.33 kg/m². ,     Systolic (95QDA), ZLE:803 , Min:91 , DFE:032     Diastolic (98AKM), CKL:31, Min:58, Max:79          Intake/Output Summary (Last 24 hours) at 7/14/2023 0834  Last data filed at 7/14/2023 0803  Gross per 24 hour   Intake 354 ml   Output 1120 ml   Net -766 ml     Weight (last 2 days)     Date/Time Weight    07/14/23 0600 122 (268.08)    07/13/23 0600 117 (258.16)    07/12/23 0648 115 (254.19)        Telemetry Review: Atrial flutter with slow ventricular response in the 50s with subsequent V pacing      Physical Exam  Vitals and nursing note reviewed. Constitutional:       General: He is not in acute distress. Appearance: He is well-developed. Comments: On RA in NAD   HENT:      Head: Normocephalic and atraumatic. Neck:      Vascular: No JVD. Comments: Invasive lines noted in right neck  Cardiovascular:      Rate and Rhythm: Normal rate and regular rhythm. Heart sounds: Normal heart sounds. No murmur heard. No friction rub. No gallop. Pulmonary:      Effort: Pulmonary effort is normal. No respiratory distress. Breath sounds: Normal breath sounds. No wheezing or rales. Chest:      Chest wall: No tenderness. Abdominal:      General: Bowel sounds are normal. There is no distension. Palpations: Abdomen is soft. Tenderness: There is no abdominal tenderness. Musculoskeletal:         General: No tenderness. Normal range of motion. Cervical back: Normal range of motion and neck supple. Skin:     General: Skin is warm and dry. Coloration: Skin is not pale. Findings: No erythema. Comments: Sternal incision with occlusive dressing   Neurological:      Mental Status: He is alert and oriented to person, place, and time. Psychiatric:         Mood and Affect: Mood normal.         Behavior: Behavior normal.         Thought Content:  Thought content normal.         Judgment: Judgment normal.         LABORATORY RESULTS      CBC with diff:   Results from last 7 days   Lab Units 07/14/23  0349 07/13/23  0304 07/12/23  2107 07/12/23  1342 07/12/23  1336 07/12/23  1235 07/12/23  1200 07/12/23  1127 07/12/23  1125   WBC Thousand/uL 15.87* 14.25*  --   --   --   --   --   --   --    HEMOGLOBIN g/dL 14.3 14.6 15.2  --  16.6  --   --   --   --    I STAT HEMOGLOBIN g/dl  --   --   --  15.3 --  12.9 12.9   < >  --    HEMATOCRIT % 43.6 42.9 44.8  --  47.3  --   --   --   --    HEMATOCRIT, ISTAT %  --   --   --  45  --  38 38   < >  --    MCV fL 98 96  --   --   --   --   --   --   --    PLATELETS Thousands/uL 116* 144*  --   --  173  --   --   --  113*   RBC Million/uL 4.47 4.49  --   --   --   --   --   --   --    MCH pg 32.0 32.5  --   --   --   --   --   --   --    MCHC g/dL 32.8 34.0  --   --   --   --   --   --   --    RDW % 14.3 13.9  --   --   --   --   --   --   --    MPV fL 10.8 10.1  --   --  10.1  --   --   --  10.3    < > = values in this interval not displayed.        CMP:  Results from last 7 days   Lab Units 07/14/23  0349 07/13/23  0304 07/12/23  2339 07/12/23  2107 07/12/23  1821 07/12/23  1342 07/12/23  1336 07/12/23  1235 07/12/23  1200 07/12/23  1127 07/12/23  1052 07/12/23  1020 07/12/23  0952   POTASSIUM mmol/L 5.0 4.6 4.1 3.7 3.8  --  3.5  --   --   --   --   --   --    CHLORIDE mmol/L 109* 112* 113*  --   --   --  116*  --   --   --   --   --   --    CO2 mmol/L 28 29 24  --   --   --  19*  --   --   --   --   --   --    CO2, I-STAT mmol/L  --   --   --   --   --  21  --  23 26 26 26 27 23   BUN mg/dL 26* 18 16  --   --   --  13  --   --   --   --   --   --    CREATININE mg/dL 1.14 1.02 1.21  --   --   --  1.07  --   --   --   --   --   --    GLUCOSE, ISTAT mg/dl  --   --   --   --   --  160*  --  162* 147* 148* 135 131 142*   CALCIUM mg/dL 9.0 8.7 8.7  --   --   --  8.8  --   --   --   --   --   --    EGFR ml/min/1.73sq m 60 69 56  --   --   --  65  --   --   --   --   --   --        BMP:  Results from last 7 days   Lab Units 07/14/23  0349 07/13/23  0304 07/12/23  2339 07/12/23  2107 07/12/23  1821 07/12/23  1342 07/12/23  1336 07/12/23  1235 07/12/23  1200   POTASSIUM mmol/L 5.0 4.6 4.1 3.7 3.8  --  3.5  --   --    CHLORIDE mmol/L 109* 112* 113*  --   --   --  116*  --   --    CO2 mmol/L 28 29 24  --   --   --  19*  --   --    CO2, I-STAT mmol/L  --   --   --   --   --  21  -- 23 26   BUN mg/dL 26* 18 16  --   --   --  13  --   --    CREATININE mg/dL 1.14 1.02 1.21  --   --   --  1.07  --   --    GLUCOSE, ISTAT mg/dl  --   --   --   --   --  160*  --  162* 147*   CALCIUM mg/dL 9.0 8.7 8.7  --   --   --  8.8  --   --        Lab Results   Component Value Date    NTBNP 452 (H) 2021             Results from last 7 days   Lab Units 23  0349 23  0304   MAGNESIUM mg/dL 2.9* 2.9*                     Results from last 7 days   Lab Units 23  0349 23  1336   INR  1.40* 1.38*       Lipid Profile:   No results found for: "CHOL"  Lab Results   Component Value Date    HDL 48 2023     Lab Results   Component Value Date    LDLCALC 110 (H) 2023     Lab Results   Component Value Date    TRIG 93 2023       Cardiac testing:   Results for orders placed during the hospital encounter of 21    Echo complete with contrast if indicated    Narrative  [address] [address]  [phone]    Transthoracic Echocardiogram  2D, M-mode, Doppler, and Color Doppler    Study date:  2021    Patient: Olivia Warren  MR number: [id number]  Account number: [de-identified]  : 1945  Age: 67 years  Gender: Male  Status: Inpatient  Location: Bedside  Height: 78 in  Weight: 242 lb  BP: 122/ 84 mmHg    Indications: Atrial fibrillation. Diagnoses: I48.0 - Atrial fibrillation    Sonographer:  Negar Duran RDCS  Primary Physician:  Thang Olmos DO  Referring Physician:  Gianna Villa MD  Group:  [facility]  Cardiology Fellow: Aung Guerrier MD  Interpreting Physician:  Beryl Garcia MD    SUMMARY    LEFT VENTRICLE:  Size was normal.  Systolic function was normal. Ejection fraction was estimated to be 60 %. There were no regional wall motion abnormalities. Wall thickness was mildly increased. LEFT ATRIUM:  The atrium was mildly to moderately dilated.     MITRAL VALVE:  There was moderate regurgitation. AORTIC VALVE:  There was mild to moderate regurgitation. TRICUSPID VALVE:  There was mild regurgitation. Pulmonary artery systolic pressure was within the normal range. HISTORY: PRIOR HISTORY: Atrial fibrillation. DVT. Aneurysm of ascending aorta. PROCEDURE: The procedure was performed at the bedside. This was a routine study. The transthoracic approach was used. The study included complete 2D imaging, M-mode, complete spectral Doppler, and color Doppler. The heart rate was 86 bpm,  at the start of the study. Images were obtained from the parasternal, apical, subcostal, and suprasternal notch acoustic windows. Image quality was adequate. LEFT VENTRICLE: Size was normal. Systolic function was normal. Ejection fraction was estimated to be 60 %. There were no regional wall motion abnormalities. Wall thickness was mildly increased. The changes were consistent with concentric  remodeling (increased wall thickness with normal wall mass). DOPPLER: Transmitral flow pattern: atrial fibrillation. RIGHT VENTRICLE: The size was normal. Systolic function was normal.    LEFT ATRIUM: The atrium was mildly to moderately dilated. RIGHT ATRIUM: The atrium was mildly dilated. MITRAL VALVE: Valve structure was normal. There was normal leaflet separation. DOPPLER: The transmitral velocity was within the normal range. There was no evidence for stenosis. There was moderate regurgitation. AORTIC VALVE: The valve was trileaflet. Leaflets exhibited mild to moderate calcification, normal cuspal separation, and sclerosis. DOPPLER: Transaortic velocity was within the normal range. There was no evidence for stenosis. There was  mild to moderate regurgitation. TRICUSPID VALVE: The valve structure was normal. There was normal leaflet separation. DOPPLER: The transtricuspid velocity was within the normal range. There was no evidence for stenosis. There was mild regurgitation. Pulmonary artery  systolic pressure was within the normal range. Estimated peak PA pressure was 27 mmHg. PULMONIC VALVE: DOPPLER: The transpulmonic velocity was within the normal range. There was mild regurgitation. PERICARDIUM: There was no pericardial effusion. AORTA: The root exhibited normal size for BSA at 38 mm. SYSTEMIC VEINS: IVC: The inferior vena cava was normal in size and course. Respirophasic changes were normal.    SYSTEM MEASUREMENT TABLES    2D  %FS: 28.01 %  Ao Diam: 3.58 cm  EDV(Teich): 122.74 ml  EF(Teich): 53.93 %  ESV(Teich): 56.54 ml  IVSd: 1.25 cm  LA Area: 31.62 cm2  LA Diam: 4.26 cm  LVEDV MOD A4C: 156.26 ml  LVEF MOD A4C: 60.45 %  LVESV MOD A4C: 61.8 ml  LVIDd: 5.08 cm  LVIDs: 3.66 cm  LVLd A4C: 8.59 cm  LVLs A4C: 7.52 cm  LVPWd: 1.33 cm  RA Area: 31.07 cm2  RVIDd: 4.15 cm  SV MOD A4C: 94.47 ml  SV(Teich): 66.2 ml    CW  AR Dec [place]: 3.14 m/s2  AR Dec Time: 1574.47 ms  AR PHT: 456.6 ms  AR Vmax: 4.92 m/s  AR maxP.95 mmHg  AV Env. Ti: 251.19 ms  AV VTI: 20.76 cm  AV Vmax: 1.24 m/s  AV Vmean: 0.83 m/s  AV maxP.2 mmHg  AV meanPG: 3.14 mmHg  TR Vmax: 2.38 m/s  TR maxP.7 mmHg    MM  TAPSE: 2.34 cm    PW  LVOT Env. Ti: 251.19 ms  LVOT VTI: 14.87 cm  LVOT Vmax: 0.85 m/s  LVOT Vmean: 0.59 m/s  LVOT maxP.9 mmHg  LVOT meanP.62 mmHg  MV A Luis A: 0.65 m/s  MV Dec [place]: 4.08 m/s2  MV DecT: 171.95 ms  MV E Luis A: 0.7 m/s  MV E/A Ratio: 1.08    [employer] Accredited Echocardiography Laboratory    Prepared and electronically signed by    Kavya Louis MD  Signed [id number] 14:53:21    Results for orders placed during the hospital encounter of 21    RANDALL    Narrative  [address] [place] [address]  [phone]    Transesophageal Echocardiogram  2D, Doppler, and Color Doppler    Study date:  2021    Patient: Darryle Cruel  MR number: [id number]  Account number: [de-identified]  : 1945  Age: 67 years  Gender: Male  Status: Outpatient  Location: Echo lab  Height: 78 in  Weight: 242 lb  BP: 90/ 58 mmHg    Indications: Atrial Fibrillation    Diagnoses: I48.0 - Atrial fibrillation    Sonographer:  Abigail Jurado RDCS  Interpreting Physician:  Catalina Henry MD  Primary Physician:  Radha Bentley MD  Referring Physician:  Sarah Uribe PA-C  Group:  [facility]  Cardiology Fellow: Kirby Tovar MD  RN:  Micheal Cortes RN    SUMMARY    LEFT VENTRICLE:  Size was normal.  Systolic function was normal. Ejection fraction was estimated to be 55 %. There were no regional wall motion abnormalities. RIGHT VENTRICLE:  The ventricle was mildly dilated. Systolic function was mildly reduced. LEFT ATRIUM:  The atrium was dilated. LEFT ATRIAL APPENDAGE:  The appendage was mildly dilated. The function was moderately reduced (moderately reduced emptying velocity). No thrombus was identified. There was mild spontaneous echo contrast ("smoke") in the appendage. ATRIAL SEPTUM:  No defect or patent foramen ovale was identified. Doppler evaluation was performed. There was no right-to-left shunt, in the baseline state. MITRAL VALVE:  There was mild to moderate regurgitation. AORTIC VALVE:  There was moderate regurgitation. No flow reversal was detected in the descending thoracic aorta. TRICUSPID VALVE:  There was mild to moderate regurgitation. Pulmonary artery systolic pressure was within the normal range. Estimated peak PA pressure was 25 mmHg. PULMONIC VALVE:  There was moderate regurgitation. AORTA:  The root exhibited dilatation at 41 mm. There was dilatation of the ascending aorta at 44 mm. HISTORY: PRIOR HISTORY: MR, HTN, Sepsis, DVT    PROCEDURE: The procedure was performed in the echo lab. This was a routine study. The risks and alternatives of the procedure were explained to the patient and informed consent was obtained.  The transesophageal approach was used. The study  included complete 2D imaging, complete spectral Doppler, and color Doppler. The heart rate was 92 bpm, at the start of the study. An adult omniplane probe was inserted by the attending cardiologist. Intubated with ease. One intubation  attempt(s). There was blood detected on the probe. Image quality was adequate. There were no complications during the procedure. MEDICATIONS: Anesthesia administered by anesthesia team.    LEFT VENTRICLE: Size was normal. Systolic function was normal. Ejection fraction was estimated to be 55 %. There were no regional wall motion abnormalities. Wall thickness was normal. DOPPLER: The study was not technically sufficient to  allow evaluation of LV diastolic function. RIGHT VENTRICLE: The ventricle was mildly dilated. Systolic function was mildly reduced. LEFT ATRIUM: The atrium was dilated. No thrombus was identified. APPENDAGE: The appendage was mildly dilated. No thrombus was identified. There was mild spontaneous echo contrast ("smoke") in the appendage. DOPPLER: The function was  moderately reduced (moderately reduced emptying velocity). ATRIAL SEPTUM: No defect or patent foramen ovale was identified. Doppler evaluation was performed. There was no right-to-left shunt, in the baseline state. RIGHT ATRIUM: Size was normal. No thrombus was identified. MITRAL VALVE: Valve structure was normal. There was normal leaflet separation. There was no echocardiographic evidence of vegetation. DOPPLER: There was mild to moderate regurgitation. AORTIC VALVE: The valve was trileaflet. Leaflets exhibited normal thickness and normal cuspal separation. There was no echocardiographic evidence of vegetation. DOPPLER: There was moderate regurgitation. No flow reversal was detected in  the descending thoracic aorta. TRICUSPID VALVE: The valve structure was normal. There was normal leaflet separation.  There was no echocardiographic evidence of vegetation. DOPPLER: There was mild to moderate regurgitation. Pulmonary artery systolic pressure was within  the normal range. Estimated peak PA pressure was 25 mmHg. PULMONIC VALVE: Leaflets exhibited normal thickness, no calcification, and normal cuspal separation. There was no echocardiographic evidence of vegetation. DOPPLER: There was moderate regurgitation. PERICARDIUM: There was no pericardial effusion. AORTA: The root exhibited dilatation at 41 mm. There was dilatation of the ascending aorta at 44 mm. There was no atheroma. There was no evidence for dissection. There was no evidence for aneurysm. [facility] Accredited Echocardiography Laboratory    Prepared and electronically signed by    Ramone Pennington MD  Signed 26-Feb-2021 12:16:25    No results found for this or any previous visit. No valid procedures specified. No results found for this or any previous visit.         Meds/Allergies   all current active meds have been reviewed, current meds:   Current Facility-Administered Medications   Medication Dose Route Frequency   • acetaminophen (TYLENOL) tablet 975 mg  975 mg Oral Q8H   • ascorbic acid (VITAMIN C) tablet 500 mg  500 mg Oral Daily   • aspirin (ECOTRIN LOW STRENGTH) EC tablet 81 mg  81 mg Oral Daily   • atorvastatin (LIPITOR) tablet 80 mg  80 mg Oral Daily With Dinner   • bisacodyl (DULCOLAX) rectal suppository 10 mg  10 mg Rectal Daily PRN   • cholecalciferol (VITAMIN D3) tablet 1,000 Units  1,000 Units Oral Daily   • docusate sodium (COLACE) capsule 100 mg  100 mg Oral BID   • fondaparinux (ARIXTRA) subcutaneous injection 2.5 mg  2.5 mg Subcutaneous Daily   • furosemide (LASIX) injection 60 mg  60 mg Intravenous BID (diuretic)   • insulin lispro (HumaLOG) 100 units/mL subcutaneous injection 1-5 Units  1-5 Units Subcutaneous TID AC   • insulin lispro (HumaLOG) 100 units/mL subcutaneous injection 1-5 Units  1-5 Units Subcutaneous HS   • lidocaine (LIDODERM) 5 % patch 1 patch  1 patch Topical Daily   • methocarbamol (ROBAXIN) tablet 500 mg  500 mg Oral Q6H PRN   • mupirocin (BACTROBAN) 2 % nasal ointment 1 Application  1 Application Nasal Q23L [address]   • ondansetron (ZOFRAN) injection 4 mg  4 mg Intravenous Q6H PRN   • oxyCODONE (ROXICODONE) IR tablet 5 mg  5 mg Oral Q4H PRN   • oxyCODONE (ROXICODONE) split tablet 2.5 mg  2.5 mg Oral Q4H PRN   • pancrelipase (Lip-Prot-Amyl) (CREON) delayed release capsule 24,000 Units  24,000 Units Oral TID With Meals   • pantoprazole (PROTONIX) EC tablet 40 mg  40 mg Oral Daily   • polyethylene glycol (MIRALAX) packet 17 g  17 g Oral Daily   • potassium chloride (K-DUR,KLOR-CON) CR tablet 20 mEq  20 mEq Oral Daily   • saccharomyces boulardii (FLORASTOR) capsule 250 mg  250 mg Oral BID   • temazepam (RESTORIL) capsule 15 mg  15 mg Oral HS PRN   • warfarin (COUMADIN) tablet 2.5 mg  2.5 mg Oral Once (warfarin)    and PTA meds:   Prior to Admission Medications   Prescriptions Last Dose Informant Patient Reported? Taking?    COLLAGEN PO 6/28/2023 Self Yes No   Sig: Take 1 tablet by mouth   Coenzyme Q10 200 MG capsule 6/28/2023 Self Yes No   Sig: Take 200 mg by mouth daily     Flaxseed, Linseed, (FLAXSEED OIL) 1000 MG CAPS 6/28/2023 Self Yes No   Sig: Take 1 capsule by mouth daily   Green Tea 250 MG CAPS 6/28/2023 Self Yes No   Sig: Take 1 capsule by mouth daily   Liver Extract 500 MG CAPS 6/28/2023 Self Yes No   Sig: Take 1 capsule by mouth daily   Magnesium Citrate 125 MG CAPS 6/28/2023 Self Yes No   Sig: Take 1 capsule by mouth daily   Pancreatin 325 MG TABS 6/28/2023 Self Yes No   Sig: Take 1 tablet by mouth daily   Potassium Acetate LUCY 6/28/2023 Self Yes No   Sig: Take by mouth   Probiotic Product (PROBIOTIC-10 PO) 7/5/2023 Self Yes No   Sig: Take 1 capsule by mouth daily   Vitamin D, Cholecalciferol, 1000 units CAPS 6/28/2023 Self Yes No   Sig: Take 2,000 Units by mouth daily   apixaban (ELIQUIS) 5 mg 7/5/2023 Self No No   Sig: Take 1 tablet (5 mg total) by mouth 2 (two) times a day   ascorbic acid (VITAMIN C) 500 mg tablet 6/28/2023 Self Yes No   Sig: Take 500 mg by mouth daily   aspirin (ECOTRIN LOW STRENGTH) 81 mg EC tablet 7/11/2023 at 2300 Self No Yes   Sig: Take 1 tablet (81 mg total) by mouth daily   clindamycin (CLEOCIN) 300 MG capsule More than a month Self Yes No   Sig: Prior to dental work. diphenhydrAMINE (BENADRYL) 25 mg capsule More than a month Self Yes No   Sig: Take 25 mg by mouth every 6 (six) hours as needed for itching Patient not sure of the doseage   fluorouracil (EFUDEX) 5 % cream  Self Yes No   Sig: APPLY TWICE DAILY TO AREAS OF PRE-CANCERS FOR A MINIMUM OF 14 DAYS, MAXIMUM OF 28 DAYS   Patient not taking: Reported on 6/23/2023   metoprolol tartrate (LOPRESSOR) 25 mg tablet 7/5/2023 Self No No   Sig: Take 1 tablet (25 mg total) by mouth every 12 (twelve) hours   mupirocin (BACTROBAN) 2 % ointment   No No   Sig: Apply topically 2 (two) times a day for 5 days Apply to each nostril twice daily for five days before your operation.    rosuvastatin (CRESTOR) 20 MG tablet 7/5/2023 Self No No   Sig: Take 1 tablet (20 mg total) by mouth daily      Facility-Administered Medications: None     Medications Prior to Admission   Medication   • aspirin (ECOTRIN LOW STRENGTH) 81 mg EC tablet   • apixaban (ELIQUIS) 5 mg   • ascorbic acid (VITAMIN C) 500 mg tablet   • clindamycin (CLEOCIN) 300 MG capsule   • Coenzyme Q10 200 MG capsule   • COLLAGEN PO   • diphenhydrAMINE (BENADRYL) 25 mg capsule   • Flaxseed, Linseed, (FLAXSEED OIL) 1000 MG CAPS   • fluorouracil (EFUDEX) 5 % cream   • Green Tea 250 MG CAPS   • Liver Extract 500 MG CAPS   • Magnesium Citrate 125 MG CAPS   • metoprolol tartrate (LOPRESSOR) 25 mg tablet   • mupirocin (BACTROBAN) 2 % ointment   • Pancreatin 325 MG TABS   • Potassium Acetate LUCY   • Probiotic Product (PROBIOTIC-10 PO)   • rosuvastatin (CRESTOR) 20 MG tablet   • Vitamin D, Cholecalciferol, 1000 units CAPS Counseling / Coordination of Care  Total floor / unit time spent today 20 minutes. Greater than 50% of total time was spent with the patient and / or family counseling and / or coordination of care. ** Please Note: Dragon 360 Dictation voice to text software may have been used in the creation of this document.  **

## 2023-07-14 NOTE — PHYSICAL THERAPY NOTE
PHYSICAL THERAPY TREATMENT  NAME:  Jenelle Quevedo. DATE: 07/14/23    AGE:   77 y.o.  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     Past Surgical History:   Procedure Laterality Date    CARDIAC CATHETERIZATION N/A 5/8/2023    Procedure: Cardiac Coronary Angiogram;  Surgeon: Hayley Valles DO;  [facility]: [facility]; Service: Cardiology    CARDIAC ELECTROPHYSIOLOGY PROCEDURE N/A 3/25/2022    Procedure: Cardiac eps/afib ablation;  Surgeon: Eric Dent MD;  [facility]: [facility]; Service: Cardiology    ESOPHAGOGASTRODUODENOSCOPY N/A 8/16/2016    Procedure: ESOPHAGOGASTRODUODENOSCOPY (EGD); Surgeon: Meera Steiner MD;  Location: [facility]; Service:     HERNIA REPAIR Left     groin    HERNIA REPAIR Right 1995    OTHER SURGICAL HISTORY      PA REPLACEMENT MITRAL VALVE W/CARDIOPULMONARY BYP N/A 7/12/2023    Procedure: MITRAL VALVE REPAIR WITH 36MM PHYSIO II ANNULOPLASTY RING;  Surgeon: Cuba Tracy MD;  Location: [facility];  Service: Cardiac Surgery    PA RPLCMT AORTIC VALVE OPN W/STENTLESS TISSUE VALVE N/A 7/12/2023    Procedure: REPLACEMENT VALVE AORTIC (AVR) WITH 29MM INSPIRIS TISSUE RESILIA VALVE.  LEFT ATRIAL APPENDAGE LIGATION WITH 45 MM ATRICLIP;  Surgeon: Cuba Tracy MD;  Location: [facility];  Service: Cardiac Surgery    REPLACEMENT TOTAL KNEE Left 04/01/2013    VASCULAR SURGERY Right     POPLITEAL BYPASS DUE TO ANEURYSM       Length Of Stay: 2     07/14/23 0950   PT Last Visit   PT Visit Date 07/14/23   Note Type   Note Type Treatment   Pain Assessment   Pain Assessment Tool 0-10   Pain Score 3 Pain Location/Orientation Location: Chest;Location: Incision   Hospital Pain Intervention(s) Ambulation/increased activity   Multiple Pain Sites No   Restrictions/Precautions   Other Precautions Cardiac/sternal;Multiple lines;Telemetry;Pain   General   Chart Reviewed Yes   Family/Caregiver Present Yes   Cognition   Overall Cognitive Status WFL   Arousal/Participation Alert   Attention Within functional limits   Orientation Level Oriented X4   Memory Within functional limits   Following Commands Follows all commands and directions without difficulty   Subjective   Subjective pt reports feeling well today- agreable to PT session   Bed Mobility   Additional Comments OOB to chair   Transfers   Sit to Stand 3  Moderate assistance  (progressing to Abdelrahman by completion of session)   Additional items Assist x 1; Increased time required;Verbal cues   Stand to Sit 4  Minimal assistance   Additional items Assist x 1; Increased time required;Verbal cues   Ambulation/Elevation   Gait pattern Decreased foot clearance   Gait Assistance 4  Minimal assist   Additional items Assist x 1;Verbal cues   Assistive Device Rolling walker   Distance 160+160 w/ brief standing rest b/t - on RA- stable vitals throughout- no dizziness   Balance   Static Sitting Good   Dynamic Sitting Fair +   Static Standing Fair   Dynamic Standing Fair -   Ambulatory Poor +  (rw)   Activity Tolerance   Activity Tolerance Patient tolerated treatment well   Medical Staff Made Aware yes- RN   Nurse Made Aware RN   Exercises   Knee AROM Long Arc Quad Sitting;15 reps   Ankle Pumps Sitting;25 reps   Balance training  STS from chair surface x 4 reps total w/ cues for technique + forward weight shift / propulsion. pt improved to Abdelrahman by completion of session (daughter present and able to assist pt from chair surface to stnading position)   Assessment   Prognosis Good   Problem List Decreased strength;Decreased endurance; Impaired balance;Decreased mobility; Decreased skin integrity;Pain   Assessment Pt making steady progress toward goals w/ improving gait distances + requiring less assist for STS transitions. PT provided education to pt + daughter on using pillows or foam to raise seat height of chairs for ease of xfers on d/c (2* pt height)- pt/ familypleased w/ progress and thankful to staff for care and education. anticipate home w/ family and [facility] w/ ongoing progress. Goals   Patient Goals get home   STG Expiration Date 07/23/23   PT Treatment Day 1   Recommendation   PT Discharge Recommendation Home with home health rehabilitation   Equipment Recommended [address] [facility] Recommended Wheeled walker   AM-PAC Basic Mobility Inpatient   Turning in Flat Bed Without Bedrails 3   Lying on Back to Sitting on Edge of Flat Bed Without Bedrails 2   Moving Bed to Chair 2   Standing Up From Chair Using Arms 2   Walk in Room 3   Climb 3-5 Stairs With Railing 3   Basic Mobility Inpatient Raw Score 15   Basic Mobility Standardized Score 36.97   Highest Level Of Mobility   JH-HL Goal 4: Move to chair/commode   JH-HLM Achieved 8: Walk 250 feet ot more   End of Consult   Patient Position at End of Consult Bedside chair;Bed/Chair alarm activated; All needs within reach       The patient's AM-PAC Basic Mobility Inpatient Short Form Raw Score is 15. A Raw score of less than or equal to 16 suggests the patient may benefit from discharge to post-acute rehabilitation services. Please also refer to the recommendation of the Physical Therapist for safe discharge planning.             Katerin Zhou, PT

## 2023-07-14 NOTE — PLAN OF CARE
Problem: PHYSICAL THERAPY ADULT  Goal: Performs mobility at highest level of function for planned discharge setting. See evaluation for individualized goals. Description: Treatment/Interventions: Functional transfer training, LE strengthening/ROM, Elevations, Therapeutic exercise, Endurance training, Patient/family training, Equipment eval/education, Bed mobility, Gait training, Spoke to nursing, OT (Spoke to [facility])  Equipment Recommended: [employer] Messing, Other (Comment) [facility])       See flowsheet documentation for full assessment, interventions and recommendations. Outcome: Progressing  Note: Prognosis: Good  Problem List: Decreased strength, Decreased endurance, Impaired balance, Decreased mobility, Decreased skin integrity, Pain  Assessment: Pt making steady progress toward goals w/ improving gait distances + requiring less assist for STS transitions. PT provided education to pt + daughter on using pillows or foam to raise seat height of chairs for ease of xfers on d/c (2* pt height)- pt/ familypleased w/ progress and thankful to staff for care and education. anticipate home w/ family and [address] w/ ongoing progress. Barriers to Discharge: None     PT Discharge Recommendation: Home with home health rehabilitation    See flowsheet documentation for full assessment.

## 2023-07-14 NOTE — PROGRESS NOTES
Progress Note - Cardiothoracic Surgery   Shana Klein. 77 y.o. male MRN: [id number]  Unit/Bed#: [facility] 411-01 Encounter: [id number]    Aortic regurgitation, Atrial fibrillation, Mitral regurgitation. S/P aortic valve replacement, left atrial appendage ligation and mitral valve repair; POD # 2      24 Hour Events: Doing well. ambulating in halls. + flatus, no BM, appetite ok.  Remains A-paced @ 80 with junctional in the 40s underneath    Medications:   Scheduled Meds:  Current Facility-Administered Medications   Medication Dose Route Frequency Provider Last Rate   • acetaminophen  975 mg Oral Q8H Arturo Gomez PA-C     • ascorbic acid  500 mg Oral Daily Roseline Salazar PA-C     • aspirin  325 mg Oral Daily Roseline Salazar PA-C     • atorvastatin  80 mg Oral Daily With [facility], PA-C     • bisacodyl  10 mg Rectal Daily PRN Roseline Salazar PA-C     • cholecalciferol  1,000 Units Oral Daily Roseline Salazar PA-C     • docusate sodium  100 mg Oral BID Roseline Salazar PA-C     • fondaparinux  2.5 mg Subcutaneous Daily Roseline Salazar PA-C     • furosemide  40 mg Intravenous BID (diuretic) Roseline Salazar PA-C     • insulin lispro  1-5 Units Subcutaneous TID  Arturo Gomez PA-C     • insulin lispro  1-5 Units Subcutaneous HS Roseline Salazar PA-C     • lidocaine  1 patch Topical Daily Whit December, CRNP     • methocarbamol  500 mg Oral Q6H PRN Whit December, CRNP     • mupirocin  1 Application Nasal D56I [address] Arturo Gomez PA-C     • ondansetron  4 mg Intravenous Q6H PRN Roseline Salazar PA-C     • oxyCODONE  5 mg Oral Q4H PRN Roseline Salazar PA-C     • oxyCODONE  2.5 mg Oral Q4H PRN Roseline Salazar PA-C     • pancrelipase (Lip-Prot-Amyl)  24,000 Units Oral TID With Meals Roseline Salazar PA-C     • pantoprazole  40 mg Oral Daily Roseline Salazar PA-C     • polyethylene glycol  17 g Oral Daily Roseline Salazar PA-C     • potassium chloride  20 mEq Oral BID Roseline Salazar PA-C     • saccharomyces boulardii  250 mg Oral BID Roselinenakia Salazar PA-C     • temazepam  15 mg Oral HS PRN Roseline Salazar PA-C       Continuous Infusions:   PRN Meds:.•  bisacodyl  •  methocarbamol  •  ondansetron  •  oxyCODONE  •  oxyCODONE  •  temazepam    Vitals:   Vitals:    07/14/23 0311 07/14/23 0600 07/14/23 0606 07/14/23 0710   BP: 114/69   107/67   BP Location: Left arm   Right arm   Pulse: 80   80   Resp: 15   20   Temp: 97.5 °F (36.4 °C)   97.8 °F (36.6 °C)   TempSrc: Oral   Oral   SpO2: 96%  97% 92%   Weight:  122 kg (268 lb 1.3 oz)     Height:           Telemetry: A-Paced; Heart Rate: 80, junctional underneath 40s    Respiratory:   SpO2: SpO2: 92 %; Room Air    Intake/Output:     Intake/Output Summary (Last 24 hours) at 7/14/2023 0810  Last data filed at 7/14/2023 0803  Gross per 24 hour   Intake 354 ml   Output 1120 ml   Net -766 ml        Chest tube Output:    Mediastinal tubes: 70 mL/8 hours  210 mL/24 hours           Weights:   Weight (last 2 days)     Date/Time Weight    07/14/23 0600 122 (268.08)    07/13/23 0600 117 (258.16)    07/12/23 0648 115 (254.19)            Results:   Results from last 7 days   Lab Units 07/14/23  0349 07/13/23  0304 07/12/23  2107 07/12/23  1342 07/12/23  1336   WBC Thousand/uL 15.87* 14.25*  --   --   --    HEMOGLOBIN g/dL 14.3 14.6 15.2  --  16.6   I STAT HEMOGLOBIN   --   --   --    < >  --    HEMATOCRIT % 43.6 42.9 44.8  --  47.3   HEMATOCRIT, ISTAT   --   --   --    < >  --    PLATELETS Thousands/uL 116* 144*  --   --  173    < > = values in this interval not displayed.      Results from last 7 days   Lab Units 07/14/23  0349 07/13/23  0304 07/12/23  2339 07/12/23  1821 07/12/23  1342   SODIUM mmol/L 139 142 143  --   --    POTASSIUM mmol/L 5.0 4.6 4.1   < >  --    CHLORIDE mmol/L 109* 112* 113*  --   --    CO2 mmol/L 28 29 24  --   --    CO2, I-STAT mmol/L  --   --   --   --  21   BUN mg/dL 26* 18 16  --   --    CREATININE mg/dL 1.14 1.02 1.21  --   --    GLUCOSE, ISTAT mg/dl  --   --   --   --  160*   CALCIUM mg/dL 9.0 8.7 8.7  --   --     < > = values in this interval not displayed. Results from last 7 days   Lab Units 07/14/23  0349 07/12/23  1336   INR  1.40* 1.38*   PTT seconds  --  32     Point of care glucose: 110 - 170    Studies:  None today    I have personally reviewed pertinent reports. and I have personally reviewed pertinent films in PACS    Invasive Lines/Tubes:  Invasive Devices     Central Venous Catheter Line  Duration           CVC Central Lines 07/12/23 Triple 1 day          Peripheral Intravenous Line  Duration           Peripheral IV 07/12/23 Right Wrist 2 days          Line  Duration           Pacer Wires 1 day    Pacer Wires 1 day          Drain  Duration           Chest Tube 1 Mediastinal 32 Fr. 1 day    Chest Tube 2 Anterior;Mediastinal 32 Fr. 1 day                Physical Exam:    HEENT/NECK:  Normocephalic. Atraumatic.  no jugular venous distention. Cardiac: Regular rate and rhythm and No murmurs/rubs/gallops  Pulmonary:  Breath sounds clear bilaterally and No rales/rhonchi/wheezes  Abdomen:  Non-tender, Non-distended and Normal bowel sounds  Incisions: Sternum is stable. Incision is clean, dry, and intact. Extremities: Extremities warm/dry and Trace edema B/L  Neuro: Alert and oriented X 3, Sensation is grossly intact and No focal deficits  Skin: Warm/Dry, without rashes or lesions.     Assessment:  Principal Problem:    S/P AVR  Active Problems:    History of DVT (deep vein thrombosis)    Benign essential hypertension    Mitral valve insufficiency    Bilateral popliteal artery aneurysm ([facility])    Mixed hyperlipidemia    Aneurysm of ascending aorta ([facility])    Pulmonary hypertension ([facility])    Aortic insufficiency    Permanent atrial fibrillation ([facility])    Coronary artery disease involving native coronary artery    S/P MVR (mitral valve repair)    S/P left atrial appendage ligation    PAD (peripheral artery disease) ([facility])    Hyperchloremia Aortic regurgitation, Atrial fibrillation, Mitral regurgitation. S/P aortic valve replacement, left atrial appendage ligation and mitral valve repair; POD # 2    Plan:    1. Cardiac:     Junctional Rhythm in 40s, A-Paced @ 80; BP well-controlled, Will consult EP  Holding BB and AMio given junctional rhythm    Anticoagulated for Afib   INR 1.4, Dose Coumadin, 2.5 mg today    Continue ASA and Statin therapy  Maintain epicardial pacing wires for pacemaker dependence  Maintain central IV access today for medications requiring central IV access  Continue DVT prophylaxis  PCI to OM1 as outpatient    2. Pulmonary:   Good Room air oxygen saturation; Continue incentive spirometry/Coughing/Deep breathing exercises  Chest tube output remains persistently high; Continue chest tubes to suction today, possible d/c later if drainage diminished     3. Renal:   Post op Creatinine stable; Follow up labs prn   Intake/Output net: -941 mL/24 hours  Diuretic Regimen:  Increase to IV Lasix, 60 mg BID  Decrease to Potassium Chloride 20 mEq PO QD    4. Neuro:  Neurologically intact; No active issues  Incisional pain well-controlled  Continue Tylenol, 975 mg PO q 8, standing dose  Continue Oxycodone, 2.5 to 5 mg PO q 4 hours prn pain    5. GI:  Cardiac diet, with 1800 mL fluid restriction  Continue stool softeners and prn suppository  Continue GI prophylaxis    6. Endo:   Glucose well-controlled with sliding scale coverage    7    Hematology:    Post-operative blood count acceptable; Trend prn  Thrombocytopenia  Leukocytosis; Afebrile with no signs of infection; Trend CBC prn    8.    Disposition:      Not yet medically cleared for discharge, pending rhythm     VTE Pharmacologic Prophylaxis: Warfarin (Coumadin)  VTE Mechanical Prophylaxis: sequential compression device    Collaborative rounds completed with supervising physician  Plan of care discussed with bedside nurse    SIGNATURE: Capo Schaeffer PA-C  DATE: July 14, 2023  TIME: 8:10 AM

## 2023-07-15 LAB
ANION GAP SERPL CALCULATED.3IONS-SCNC: 0 MMOL/L
BUN SERPL-MCNC: 25 MG/DL (ref 5–25)
CALCIUM SERPL-MCNC: 8.9 MG/DL (ref 8.3–10.1)
CHLORIDE SERPL-SCNC: 108 MMOL/L (ref 96–108)
CO2 SERPL-SCNC: 30 MMOL/L (ref 21–32)
CREAT SERPL-MCNC: 0.82 MG/DL (ref 0.6–1.3)
ERYTHROCYTE [DISTWIDTH] IN BLOOD BY AUTOMATED COUNT: 14 % (ref 11.6–15.1)
GFR SERPL CREATININE-BSD FRML MDRD: 84 ML/MIN/1.73SQ M
GLUCOSE SERPL-MCNC: 119 MG/DL (ref 65–140)
GLUCOSE SERPL-MCNC: 124 MG/DL (ref 65–140)
GLUCOSE SERPL-MCNC: 125 MG/DL (ref 65–140)
GLUCOSE SERPL-MCNC: 131 MG/DL (ref 65–140)
GLUCOSE SERPL-MCNC: 97 MG/DL (ref 65–140)
HCT VFR BLD AUTO: 44 % (ref 36.5–49.3)
HGB BLD-MCNC: 14.9 G/DL (ref 12–17)
INR PPP: 1.34 (ref 0.84–1.19)
MCH RBC QN AUTO: 32.8 PG (ref 26.8–34.3)
MCHC RBC AUTO-ENTMCNC: 33.9 G/DL (ref 31.4–37.4)
MCV RBC AUTO: 97 FL (ref 82–98)
PLATELET # BLD AUTO: 121 THOUSANDS/UL (ref 149–390)
PMV BLD AUTO: 10.5 FL (ref 8.9–12.7)
POTASSIUM SERPL-SCNC: 4.2 MMOL/L (ref 3.5–5.3)
PROTHROMBIN TIME: 16.9 SECONDS (ref 11.6–14.5)
RBC # BLD AUTO: 4.54 MILLION/UL (ref 3.88–5.62)
SODIUM SERPL-SCNC: 138 MMOL/L (ref 135–147)
WBC # BLD AUTO: 13.1 THOUSAND/UL (ref 4.31–10.16)

## 2023-07-15 PROCEDURE — 85610 PROTHROMBIN TIME: CPT | Performed by: THORACIC SURGERY (CARDIOTHORACIC VASCULAR SURGERY)

## 2023-07-15 PROCEDURE — 82948 REAGENT STRIP/BLOOD GLUCOSE: CPT

## 2023-07-15 PROCEDURE — 99024 POSTOP FOLLOW-UP VISIT: CPT | Performed by: THORACIC SURGERY (CARDIOTHORACIC VASCULAR SURGERY)

## 2023-07-15 PROCEDURE — 80048 BASIC METABOLIC PNL TOTAL CA: CPT | Performed by: PHYSICIAN ASSISTANT

## 2023-07-15 PROCEDURE — 99232 SBSQ HOSP IP/OBS MODERATE 35: CPT | Performed by: INTERNAL MEDICINE

## 2023-07-15 PROCEDURE — 85027 COMPLETE CBC AUTOMATED: CPT | Performed by: PHYSICIAN ASSISTANT

## 2023-07-15 RX ORDER — WARFARIN SODIUM 5 MG/1
5 TABLET ORAL
Status: COMPLETED | OUTPATIENT
Start: 2023-07-15 | End: 2023-07-15

## 2023-07-15 RX ADMIN — DOCUSATE SODIUM 100 MG: 100 CAPSULE ORAL at 08:55

## 2023-07-15 RX ADMIN — FUROSEMIDE 60 MG: 10 INJECTION, SOLUTION INTRAMUSCULAR; INTRAVENOUS at 17:12

## 2023-07-15 RX ADMIN — POTASSIUM CHLORIDE 20 MEQ: 1500 TABLET, EXTENDED RELEASE ORAL at 08:55

## 2023-07-15 RX ADMIN — LIDOCAINE 5% 1 PATCH: 700 PATCH TOPICAL at 08:54

## 2023-07-15 RX ADMIN — PANCRELIPASE 24000 UNITS: 24000; 76000; 120000 CAPSULE, DELAYED RELEASE PELLETS ORAL at 12:20

## 2023-07-15 RX ADMIN — DOCUSATE SODIUM 100 MG: 100 CAPSULE ORAL at 17:14

## 2023-07-15 RX ADMIN — MUPIROCIN 1 APPLICATION: 20 OINTMENT TOPICAL at 08:54

## 2023-07-15 RX ADMIN — OXYCODONE HYDROCHLORIDE AND ACETAMINOPHEN 500 MG: 500 TABLET ORAL at 08:54

## 2023-07-15 RX ADMIN — Medication 250 MG: at 08:54

## 2023-07-15 RX ADMIN — PANCRELIPASE 24000 UNITS: 24000; 76000; 120000 CAPSULE, DELAYED RELEASE PELLETS ORAL at 08:55

## 2023-07-15 RX ADMIN — PANCRELIPASE 24000 UNITS: 24000; 76000; 120000 CAPSULE, DELAYED RELEASE PELLETS ORAL at 17:15

## 2023-07-15 RX ADMIN — ACETAMINOPHEN 975 MG: 325 TABLET, FILM COATED ORAL at 05:24

## 2023-07-15 RX ADMIN — Medication 10 MG: at 17:24

## 2023-07-15 RX ADMIN — ATORVASTATIN CALCIUM 80 MG: 80 TABLET, FILM COATED ORAL at 17:14

## 2023-07-15 RX ADMIN — FONDAPARINUX SODIUM 2.5 MG: 2.5 INJECTION, SOLUTION SUBCUTANEOUS at 08:54

## 2023-07-15 RX ADMIN — Medication 1000 UNITS: at 08:55

## 2023-07-15 RX ADMIN — ACETAMINOPHEN 975 MG: 325 TABLET, FILM COATED ORAL at 13:59

## 2023-07-15 RX ADMIN — WARFARIN SODIUM 5 MG: 5 TABLET ORAL at 17:14

## 2023-07-15 RX ADMIN — PANTOPRAZOLE SODIUM 40 MG: 40 TABLET, DELAYED RELEASE ORAL at 08:54

## 2023-07-15 RX ADMIN — MUPIROCIN 1 APPLICATION: 20 OINTMENT TOPICAL at 21:24

## 2023-07-15 RX ADMIN — ACETAMINOPHEN 975 MG: 325 TABLET, FILM COATED ORAL at 21:24

## 2023-07-15 RX ADMIN — FUROSEMIDE 60 MG: 10 INJECTION, SOLUTION INTRAMUSCULAR; INTRAVENOUS at 08:54

## 2023-07-15 RX ADMIN — METHOCARBAMOL TABLETS 500 MG: 500 TABLET, COATED ORAL at 05:25

## 2023-07-15 RX ADMIN — TRAMADOL HYDROCHLORIDE 25 MG: 50 TABLET, COATED ORAL at 05:24

## 2023-07-15 RX ADMIN — ASPIRIN 81 MG: 81 TABLET, COATED ORAL at 08:54

## 2023-07-15 RX ADMIN — POLYETHYLENE GLYCOL 3350 17 G: 17 POWDER, FOR SOLUTION ORAL at 08:54

## 2023-07-15 NOTE — PLAN OF CARE
Problem: Prexisting or High Potential for Compromised Skin Integrity  Goal: Skin integrity is maintained or improved  Description: INTERVENTIONS:  - Identify patients at risk for skin breakdown  - Assess and monitor skin integrity  - Assess and monitor nutrition and hydration status  - Monitor labs   - Assess for incontinence   - Turn and reposition patient  - Assist with mobility/ambulation  - Relieve pressure over bony prominences  - Avoid friction and shearing  - Provide appropriate hygiene as needed including keeping skin clean and dry  - Evaluate need for skin moisturizer/barrier cream  - Collaborate with interdisciplinary team   - Patient/family teaching  - Consider wound care consult   Outcome: Progressing     Problem: MOBILITY - ADULT  Goal: Maintain or return to baseline ADL function  Description: INTERVENTIONS:  -  Assess patient's ability to carry out ADLs; assess patient's baseline for ADL function and identify physical deficits which impact ability to perform ADLs (bathing, care of mouth/teeth, toileting, grooming, dressing, etc.)  - Assess/evaluate cause of self-care deficits   - Assess range of motion  - Assess patient's mobility; develop plan if impaired  - Assess patient's need for assistive devices and provide as appropriate  - Encourage maximum independence but intervene and supervise when necessary  - Involve family in performance of ADLs  - Assess for home care needs following discharge   - Consider OT consult to assist with ADL evaluation and planning for discharge  - Provide patient education as appropriate  Outcome: Progressing  Goal: Maintains/Returns to pre admission functional level  Description: INTERVENTIONS:  - Perform BMAT or MOVE assessment daily.   - Set and communicate daily mobility goal to care team and patient/family/caregiver. - Collaborate with rehabilitation services on mobility goals if consulted  - Perform Range of Motion 4 times a day.   - Reposition patient every 2 hours.  - Dangle patient 4 times a day  - Stand patient 4 times a day  - Ambulate patient 4 times a day  - Out of bed to chair 3 times a day   - Out of bed for meals 3 times a day  - Out of bed for toileting  - Record patient progress and toleration of activity level   Outcome: Progressing     Problem: CARDIOVASCULAR - ADULT  Goal: Maintains optimal cardiac output and hemodynamic stability  Description: INTERVENTIONS:  - Monitor I/O, vital signs and rhythm  - Monitor for S/S and trends of decreased cardiac output  - Administer and titrate ordered vasoactive medications to optimize hemodynamic stability  - Assess quality of pulses, skin color and temperature  - Assess for signs of decreased coronary artery perfusion  - Instruct patient to report change in severity of symptoms  Outcome: Progressing  Goal: Absence of cardiac dysrhythmias or at baseline rhythm  Description: INTERVENTIONS:  - Continuous cardiac monitoring, vital signs, obtain 12 lead EKG if ordered  - Administer antiarrhythmic and heart rate control medications as ordered  - Monitor electrolytes and administer replacement therapy as ordered  Outcome: Progressing     Problem: RESPIRATORY - ADULT  Goal: Achieves optimal ventilation and oxygenation  Description: INTERVENTIONS:  - Assess for changes in respiratory status  - Assess for changes in mentation and behavior  - Position to facilitate oxygenation and minimize respiratory effort  - Oxygen administered by appropriate delivery if ordered  - Initiate smoking cessation education as indicated  - Encourage broncho-pulmonary hygiene including cough, deep breathe, Incentive Spirometry  - Assess the need for suctioning and aspirate as needed  - Assess and instruct to report SOB or any respiratory difficulty  - Respiratory Therapy support as indicated  Outcome: Progressing     Problem: METABOLIC, FLUID AND ELECTROLYTES - ADULT  Goal: Electrolytes maintained within normal limits  Description: INTERVENTIONS:  - Monitor labs and assess patient for signs and symptoms of electrolyte imbalances  - Administer electrolyte replacement as ordered  - Monitor response to electrolyte replacements, including repeat lab results as appropriate  - Instruct patient on fluid and nutrition as appropriate  Outcome: Progressing  Goal: Fluid balance maintained  Description: INTERVENTIONS:  - Monitor labs   - Monitor I/O and WT  - Instruct patient on fluid and nutrition as appropriate  - Assess for signs & symptoms of volume excess or deficit  Outcome: Progressing  Goal: Glucose maintained within target range  Description: INTERVENTIONS:  - Monitor Blood Glucose as ordered  - Assess for signs and symptoms of hyperglycemia and hypoglycemia  - Administer ordered medications to maintain glucose within target range  - Assess nutritional intake and initiate nutrition service referral as needed  Outcome: Progressing     Problem: SKIN/TISSUE INTEGRITY - ADULT  Goal: Skin Integrity remains intact(Skin Breakdown Prevention)  Description: Assess:  -Perform Bertram assessment every   -Clean and moisturize skin every   -Inspect skin when repositioning, toileting, and assisting with ADLS  -Assess under medical devices such as  every   -Assess extremities for adequate circulation and sensation     Bed Management:  -Have minimal linens on bed & keep smooth, unwrinkled  -Change linens as needed when moist or perspiring  -Avoid sitting or lying in one position for more than  hours while in bed  -Keep HOB at degrees     Toileting:  -Offer bedside commode  -Assess for incontinence every   -Use incontinent care products after each incontinent episode such as     Activity:  -Mobilize patient  times a day  -Encourage activity and walks on unit  -Encourage or provide ROM exercises   -Turn and reposition patient every  Hours  -Use appropriate equipment to lift or move patient in bed  -Instruct/ Assist with weight shifting every  when out of bed in chair  -Consider limitation of chair time  hour intervals    Skin Care:  -Avoid use of baby powder, tape, friction and shearing, hot water or constrictive clothing  -Relieve pressure over bony prominences using   -Do not massage red bony areas    Next Steps:  -Teach patient strategies to minimize risks such as    -Consider consults to  interdisciplinary teams such as   Outcome: Progressing  Goal: Incision(s), wounds(s) or drain site(s) healing without S/S of infection  Description: INTERVENTIONS  - Assess and document dressing, incision, wound bed, drain sites and surrounding tissue  - Provide patient and family education  - Perform skin care/dressing changes every   Outcome: Progressing  Goal: Pressure injury heals and does not worsen  Description: Interventions:  - Implement low air loss mattress or specialty surface (Criteria met)  - Apply silicone foam dressing  - Instruct/assist with weight shifting every  minutes when in chair   - Limit chair time to  hour intervals  - Use special pressure reducing interventions such as  when in chair   - Apply fecal or urinary incontinence containment device   - Perform passive or active ROM every   - Turn and reposition patient & offload bony prominences every  hours   - Utilize friction reducing device or surface for transfers   - Consider consults to  interdisciplinary teams such as   - Use incontinent care products after each incontinent episode such as   - Consider nutrition services referral as needed  Outcome: Progressing     Problem: PAIN - ADULT  Goal: Verbalizes/displays adequate comfort level or baseline comfort level  Description: Interventions:  - Encourage patient to monitor pain and request assistance  - Assess pain using appropriate pain scale  - Administer analgesics based on type and severity of pain and evaluate response  - Implement non-pharmacological measures as appropriate and evaluate response  - Consider cultural and social influences on pain and pain management  - Notify physician/advanced practitioner if interventions unsuccessful or patient reports new pain  Outcome: Progressing     Problem: INFECTION - ADULT  Goal: Absence or prevention of progression during hospitalization  Description: INTERVENTIONS:  - Assess and monitor for signs and symptoms of infection  - Monitor lab/diagnostic results  - Monitor all insertion sites, i.e. indwelling lines, tubes, and drains  - Monitor endotracheal if appropriate and nasal secretions for changes in amount and color  - [place] appropriate cooling/warming therapies per order  - Administer medications as ordered  - Instruct and encourage patient and family to use good hand hygiene technique  - Identify and instruct in appropriate isolation precautions for identified infection/condition  Outcome: Progressing  Goal: Absence of fever/infection during neutropenic period  Description: INTERVENTIONS:  - Monitor WBC    Outcome: Progressing     Problem: SAFETY ADULT  Goal: Maintain or return to baseline ADL function  Description: INTERVENTIONS:  -  Assess patient's ability to carry out ADLs; assess patient's baseline for ADL function and identify physical deficits which impact ability to perform ADLs (bathing, care of mouth/teeth, toileting, grooming, dressing, etc.)  - Assess/evaluate cause of self-care deficits   - Assess range of motion  - Assess patient's mobility; develop plan if impaired  - Assess patient's need for assistive devices and provide as appropriate  - Encourage maximum independence but intervene and supervise when necessary  - Involve family in performance of ADLs  - Assess for home care needs following discharge   - Consider OT consult to assist with ADL evaluation and planning for discharge  - Provide patient education as appropriate  Outcome: Progressing  Goal: Maintains/Returns to pre admission functional level  Description: INTERVENTIONS:  - Perform BMAT or MOVE assessment daily.   - Set and communicate daily mobility goal to care team and patient/family/caregiver. - Collaborate with rehabilitation services on mobility goals if consulted  - Perform Range of Motion 4  times a day. - Reposition patient every  hours.   - Dangle patient 4 times a day  - Stand patient 4 times a day  - Ambulate patient 4 times a day  - Out of bed to chair 3 times a day   - Out of bed for meals 3 times a day  - Out of bed for toileting  - Record patient progress and toleration of activity level   Outcome: Progressing  Goal: Patient will remain free of falls  Description: INTERVENTIONS:  - Educate patient/family on patient safety including physical limitations  - Instruct patient to call for assistance with activity   - Consult OT/PT to assist with strengthening/mobility   - Keep Call bell within reach  - Keep bed low and locked with side rails adjusted as appropriate  - Keep care items and personal belongings within reach  - Initiate and maintain comfort rounds  - Make Fall Risk Sign visible to staff  - Offer Toileting every 2 Hours, in advance of need  - Initiate/Maintain alarm  - Obtain necessary fall risk management equipment:   - Apply yellow socks and bracelet for high fall risk patients  - Consider moving patient to room near nurses station  Outcome: Progressing     Problem: DISCHARGE PLANNING  Goal: Discharge to home or other facility with appropriate resources  Description: INTERVENTIONS:  - Identify barriers to discharge w/patient and caregiver  - Arrange for needed discharge resources and transportation as appropriate  - Identify discharge learning needs (meds, wound care, etc.)  - Arrange for interpretive services to assist at discharge as needed  - Refer to Case Management Department for coordinating discharge planning if the patient needs post-hospital services based on physician/advanced practitioner order or complex needs related to functional status, cognitive ability, or social support system  Outcome: Progressing     Problem: Knowledge Deficit  Goal: Patient/family/caregiver demonstrates understanding of disease process, treatment plan, medications, and discharge instructions  Description: Complete learning assessment and assess knowledge base.   Interventions:  - Provide teaching at level of understanding  - Provide teaching via preferred learning methods  Outcome: Progressing

## 2023-07-15 NOTE — PROGRESS NOTES
Progress Note - Cardiothoracic Surgery   [place]. 77 y.o. male MRN: [id number]  Unit/Bed#: [facility] 411-01 Encounter: [id number]    Aortic regurgitation, Atrial fibrillation, Mitral regurgitation. S/P aortic valve replacement, left atrial appendage ligation and mitral valve repair; POD # 3      24 Hour Events: doing well, remains A-paced with junctional 40s underneath. Passing flatus, no BM yet, ambulating.     Medications:   Scheduled Meds:  Current Facility-Administered Medications   Medication Dose Route Frequency Provider Last Rate   • acetaminophen  975 mg Oral Q8H Arturo Gomez PA-C     • ascorbic acid  500 mg Oral Daily Ana Mendiola PA-C     • aspirin  81 mg Oral Daily Melany Claudio PA-C     • atorvastatin  80 mg Oral Daily With [facility], PA-C     • bisacodyl  10 mg Rectal Daily PRN Ana Mendiola PA-C     • cholecalciferol  1,000 Units Oral Daily Ana Mendiola PA-C     • docusate sodium  100 mg Oral BID Ana Mendiola PA-C     • fondaparinux  2.5 mg Subcutaneous Daily Ana Mendiola PA-C     • furosemide  60 mg Intravenous BID (diuretic) Melany Claudio PA-C     • insulin lispro  1-5 Units Subcutaneous TID  Arturo Gomez PA-C     • insulin lispro  1-5 Units Subcutaneous HS Ana Mendiola PA-C     • lidocaine  1 patch Topical Daily DHEERAJ Conn     • methocarbamol  500 mg Oral Q6H PRN DHEERAJ Conn     • metoclopramide  10 mg Intravenous Q6H PRN Isrrael Benavides PA-C     • mupirocin  1 Application Nasal X02I [address] Arturo Gomez PA-C     • ondansetron  4 mg Intravenous Q6H PRN Ana Mendiola PA-C     • pancrelipase (Lip-Prot-Amyl)  24,000 Units Oral TID With Meals Ana Mendiola PA-C     • pantoprazole  40 mg Oral Daily Ana Mendiola PA-C     • polyethylene glycol  17 g Oral Daily Ana Mendiola PA-C     • potassium chloride  20 mEq Oral Daily Melany Claudio PA-C     • saccharomyces boulardii  250 mg Oral BID Ana Mendiola BRYAN     • temazepam  15 mg Oral HS PRN Alf Collins PA-C     • traMADol  25 mg Oral Q6H PRN Abilio Khoury PA-C     • traMADol  50 mg Oral Q6H PRN Abilio Khoury PA-C       Continuous Infusions:   PRN Meds:.•  bisacodyl  •  methocarbamol  •  metoclopramide  •  ondansetron  •  temazepam  •  traMADol  •  traMADol    Vitals:   Vitals:    07/14/23 2312 07/15/23 0233 07/15/23 0545 07/15/23 0706   BP: 126/79 133/69  108/67   BP Location: Right arm Right arm  Right arm   Pulse: 80 80  79   Resp: 16 16  20   Temp: 97.8 °F (36.6 °C) 97.5 °F (36.4 °C)  (!) 97.4 °F (36.3 °C)   TempSrc: Oral Oral  Oral   SpO2: 96% 96%  93%   Weight:   121 kg (267 lb 13.7 oz)    Height:           Telemetry: A-Paced; Heart Rate: 80, junctional underneath 40s     Respiratory:   SpO2: SpO2: 93 %;  Room Air    Intake/Output:     Intake/Output Summary (Last 24 hours) at 7/15/2023 0729  Last data filed at 7/15/2023 0544  Gross per 24 hour   Intake 936 ml   Output 1740 ml   Net -804 ml        Chest tube Output:    Mediastinal tubes: 50 mL/8 hours  90 mL/24 hours           Weights:   Weight (last 2 days)     Date/Time Weight    07/15/23 0545 121 (267.86)    07/14/23 0600 122 (268.08)    07/13/23 0600 117 (258.16)            Results:   Results from last 7 days   Lab Units 07/15/23  0530 07/14/23  0349 07/13/23  0304   WBC Thousand/uL 13.10* 15.87* 14.25*   HEMOGLOBIN g/dL 14.9 14.3 14.6   HEMATOCRIT % 44.0 43.6 42.9   PLATELETS Thousands/uL 121* 116* 144*     Results from last 7 days   Lab Units 07/15/23  0530 07/14/23  0349 07/13/23  0304 07/12/23  1821 07/12/23  1342   SODIUM mmol/L 138 139 142   < >  --    POTASSIUM mmol/L 4.2 5.0 4.6   < >  --    CHLORIDE mmol/L 108 109* 112*   < >  --    CO2 mmol/L 30 28 29   < >  --    CO2, I-STAT mmol/L  --   --   --   --  21   BUN mg/dL 25 26* 18   < >  --    CREATININE mg/dL 0.82 1.14 1.02   < >  --    GLUCOSE, ISTAT mg/dl  --   --   --   --  160*   CALCIUM mg/dL 8.9 9.0 8.7   < >  --     < > = values in this interval not displayed. Results from last 7 days   Lab Units 07/15/23  0530 07/14/23  0349 07/12/23  1336   INR  1.34* 1.40* 1.38*   PTT seconds  --   --  32     Point of care glucose: 112 - 130    Studies:  None today    I have personally reviewed pertinent reports. and I have personally reviewed pertinent films in PACS    Invasive Lines/Tubes:  Invasive Devices     Central Venous Catheter Line  Duration           CVC Central Lines 07/12/23 Triple 2 days          Peripheral Intravenous Line  Duration           Peripheral IV 07/12/23 Right Wrist 2 days          Line  Duration           Pacer Wires 2 days    Pacer Wires 2 days          Drain  Duration           Chest Tube 1 Mediastinal 32 Fr. 2 days    Chest Tube 2 Anterior;Mediastinal 32 Fr. 2 days                Physical Exam:    HEENT/NECK:  Normocephalic. Atraumatic.  no jugular venous distention. Cardiac: Regular rate and rhythm and No murmurs/rubs/gallops  Pulmonary:  Breath sounds clear bilaterally and No rales/rhonchi/wheezes  Abdomen:  Non-tender, Non-distended and Normal bowel sounds  Incisions: Sternum is stable. Incision is clean, dry, and intact. Extremities: Extremities warm/dry and Trace edema B/L  Neuro: Alert and oriented X 3, Sensation is grossly intact and No focal deficits  Skin: Warm/Dry, without rashes or lesions.       Assessment:  Principal Problem:    S/P AVR  Active Problems:    History of DVT (deep vein thrombosis)    Benign essential hypertension    Mitral valve insufficiency    Bilateral popliteal artery aneurysm ([facility])    Mixed hyperlipidemia    Aneurysm of ascending aorta ([facility])    Pulmonary hypertension ([facility])    Aortic insufficiency    Permanent atrial fibrillation ([facility])    Coronary artery disease involving native coronary artery    S/P MVR (mitral valve repair)    S/P left atrial appendage ligation    PAD (peripheral artery disease) ([facility])    Hyperchloremia       Aortic regurgitation, Atrial fibrillation, Mitral regurgitation. S/P aortic valve replacement, left atrial appendage ligation and mitral valve repair; POD # 3    Plan:    1. Cardiac:     Junctional Rhythm in 40s, A-Paced @ 80; BP well-controlled, EP following, monitor for rhythm recovery otherwise may need PPM  Holding BB and AMio given junctional rhythm    Anticoagulated for Afib   INR 1.34, Dose Coumadin, 5 mg today    Continue ASA and Statin therapy  Maintain epicardial pacing wires for pacemaker dependence  Maintain central IV access today for medications requiring central IV access  Continue DVT prophylaxis  PCI to OM1 as outpatient    2. Pulmonary:   Good Room air oxygen saturation; Continue incentive spirometry/Coughing/Deep breathing exercises  Chest tube drainage diminished; D/C today?, PWs will remain 2/2 pacing needs, may keep CTs    3. Renal:   Post op Creatinine stable; Follow up labs prn   Intake/Output net: -804 mL/24 hours, + 2 unmeasured   Diuretic Regimen:  Continue IV Lasix, 60 mg BID  Continue Potassium Chloride 20 mEq PO QD    4. Neuro:  Neurologically intact; No active issues  Incisional pain well-controlled  Continue Tylenol, 975 mg PO q 8, standing dose  Continue Oxycodone, 2.5PO q 4 hours prn pain  Cotn Robaxin PRN  Cont Tramadol PRN    5. GI:  Cardiac diet, with 1800 mL fluid restriction  Continue stool softeners and prn suppository  Continue GI prophylaxis    6. Endo:   Glucose well-controlled with sliding scale coverage    7    Hematology:    Post-operative blood count acceptable; Trend prn  Thrombocytopenia  Leukocytosis; Afebrile with no signs of infection; Trend CBC prn    8.    Disposition:      Not yet medically cleared for discharge, pending rhythm     VTE Pharmacologic Prophylaxis: Warfarin (Coumadin)  VTE Mechanical Prophylaxis: sequential compression device    Collaborative rounds completed with supervising physician  Plan of care discussed with bedside nurse    SIGNATURE: Jermaine Craig PA-C  DATE: July 15, 2023  TIME: 7:29 AM

## 2023-07-15 NOTE — PROGRESS NOTES
Cardiology Progress Note - Rachel Martinez 77 y.o. male MRN: [id number]    Unit/Bed#: [facility] 411-01 Encounter: [id number]        Hospital Problems:  Principal Problem:    S/P AVR  Active Problems:    History of DVT (deep vein thrombosis)    Benign essential hypertension    Mitral valve insufficiency    Bilateral popliteal artery aneurysm (HCC)    Mixed hyperlipidemia    Aneurysm of ascending aorta (HCC)    Pulmonary hypertension (HCC)    Aortic insufficiency    Permanent atrial fibrillation (HCC)    Coronary artery disease involving native coronary artery    S/P MVR (mitral valve repair)    S/P left atrial appendage ligation    PAD (peripheral artery disease) (HCC)    Hyperchloremia      Assessment/Recommendations:    Regurgitant valvular heart disease  Status post AVR and mitral valve repair with left atrial appendage ligation, postop day 3  Continue aspirin, statins and diuretics. Amiodarone and beta-blockers on hold due to bradycardia. Hopefully history of stroke, today.     Junctional bradycardia  Continues to be  paced on telemetry at rate of 80. Underlying rhythm is junctional.  EPS  has evaluated. Continue telemetry monitoring. If no recovery of sinus node function, plan permanent pacemaker next week. Keep pacing wires in.     Single branch vessel CAD:  No anginal symptoms reported with ambulation. 70 to 80% focal OM1 lesion on cath. This should be amenable to PCI and will be planned as outpatient. Continue current medical therapy.     Paroxysmal atrial fibrillation  Previously was on Eliquis, now switched to warfarin. INR 1.34. Plan of care discussed with the patient and the surgery team.      Subjective:     Overnight events reviewed. Continues to be dependent on external pacemaker. Telemetry reveals paced rhythm at a rate of 80. Underlying rhythm is junctional.  Ambulatory with no dyspnea. No dizziness or syncope reported. Reports discomfort of chest tube site.       Review of Systems   Constitutional: Negative for fever. Respiratory: Negative for chest tightness, shortness of breath and wheezing. Cardiovascular: Positive for chest pain. Negative for palpitations and leg swelling. Skin: Negative for rash. Neurological: Negative for syncope. Hematological: Does not bruise/bleed easily. Psychiatric/Behavioral: Positive for sleep disturbance. Review of ten system was conducted and was negative except for findings stated elsewhere in the note.         Current Facility-Administered Medications:   •  acetaminophen (TYLENOL) tablet 975 mg, 975 mg, Oral, Q8H, Arturo Gomez PA-C, 975 mg at 07/15/23 3677  •  ascorbic acid (VITAMIN C) tablet 500 mg, 500 mg, Oral, Daily, BRYAN Brown, 500 mg at 07/15/23 8600  •  aspirin (ECOTRIN LOW STRENGTH) EC tablet 81 mg, 81 mg, Oral, Daily, Patricia Claudio PA-C, 81 mg at 07/15/23 0854  •  atorvastatin (LIPITOR) tablet 80 mg, 80 mg, Oral, Daily With Caterina Boudreaux PA-C, 80 mg at 07/14/23 1743  •  bisacodyl (DULCOLAX) rectal suppository 10 mg, 10 mg, Rectal, Daily PRN, BRYAN Brown  •  cholecalciferol (VITAMIN D3) tablet 1,000 Units, 1,000 Units, Oral, Daily, BRYAN Brown, 1,000 Units at 07/15/23 0855  •  docusate sodium (COLACE) capsule 100 mg, 100 mg, Oral, BID, BRYAN Brown, 100 mg at 07/15/23 4498  •  fondaparinux (ARIXTRA) subcutaneous injection 2.5 mg, 2.5 mg, Subcutaneous, Daily, BRYAN Brown, 2.5 mg at 07/15/23 3927  •  furosemide (LASIX) injection 60 mg, 60 mg, Intravenous, BID (diuretic), Patricia Claudio PA-C, 60 mg at 07/15/23 0854  •  insulin lispro (HumaLOG) 100 units/mL subcutaneous injection 1-5 Units, 1-5 Units, Subcutaneous, TID AC, 1 Units at 07/13/23 1709 **AND** Fingerstick Glucose (POCT), , , TID AC, Arturo Gomez PA-C  •  insulin lispro (HumaLOG) 100 units/mL subcutaneous injection 1-5 Units, 1-5 Units, Subcutaneous, HS, Arturo Gomez PA-C  •  lidocaine (LIDODERM) 5 % patch 1 patch, 1 patch, Topical, Daily, DHEERAJ Trejo, 1 patch at 07/15/23 0854  •  methocarbamol (ROBAXIN) tablet 500 mg, 500 mg, Oral, Q6H PRN, DHEERAJ Trejo, 500 mg at 07/15/23 0525  •  metoclopramide (REGLAN) injection 10 mg, 10 mg, Intravenous, Q6H PRN, Linministerio Cover, PA-C  •  mupirocin (BACTROBAN) 2 % nasal ointment 1 Application, 1 Application, Nasal, L80Y [facility], Arturo Gomez, PA-C, 1 Application at 85/96/64 0854  •  ondansetron (ZOFRAN) injection 4 mg, 4 mg, Intravenous, Q6H PRN, Janet Tadeo, PA-C, 4 mg at 07/14/23 0340  •  pancrelipase (Lip-Prot-Amyl) (CREON) delayed release capsule 24,000 Units, 24,000 Units, Oral, TID With Meals, Janet Tadeo, PA-C, 24,000 Units at 07/15/23 0855  •  pantoprazole (PROTONIX) EC tablet 40 mg, 40 mg, Oral, Daily, Janet Dichristaino, PA-C, 40 mg at 07/15/23 0854  •  polyethylene glycol (MIRALAX) packet 17 g, 17 g, Oral, Daily, Janet Dikes, PA-C, 17 g at 07/15/23 0854  •  potassium chloride (K-DUR,KLOR-CON) CR tablet 20 mEq, 20 mEq, Oral, Daily, Cynda Hopenoah Claudio, PA-C, 20 mEq at 07/15/23 7120  •  saccharomyces boulardii (FLORASTOR) capsule 250 mg, 250 mg, Oral, BID, Janet Dichristiano, PA-C, 250 mg at 07/15/23 1665  •  temazepam (RESTORIL) capsule 15 mg, 15 mg, Oral, HS PRN, Janet Dikes, PA-C  •  traMADol (ULTRAM) tablet 25 mg, 25 mg, Oral, Q6H PRN, Linministerio Cover, PA-C, 25 mg at 07/15/23 1235  •  traMADol (ULTRAM) tablet 50 mg, 50 mg, Oral, Q6H PRN, Daxa Floyd PA-C  •  warfarin (COUMADIN) tablet 5 mg, 5 mg, Oral, Once (warfarin), Luis Claudio PA-C     Objective:     Vitals:   Blood pressure 108/67, pulse 79, temperature (!) 97.4 °F (36.3 °C), temperature source Oral, resp. rate 20, height 6' 3.2" (1.91 m), weight 121 kg (267 lb 13.7 oz), SpO2 93 %. Body mass index is 33.3 kg/m².   Orthostatic Blood Pressures    Flowsheet Row Most Recent Value   Blood Pressure 108/67 filed at 07/15/2023 2226   Patient Position - Orthostatic VS Sitting filed at 07/15/2023 1866         Systolic (25ZGC), QGS:416 , Min:108 , TJD:537     Diastolic (69JOE), DFM:29, Min:67, Max:93      Intake/Output Summary (Last 24 hours) at 7/15/2023 0959  Last data filed at 7/15/2023 0950  Gross per 24 hour   Intake 1436 ml   Output 1540 ml   Net -104 ml     Weight (last 2 days)     Date/Time Weight    07/15/23 0545 121 (267.86)    07/14/23 0600 122 (268.08)    07/13/23 0600 117 (258.16)            Physical Exam  Vitals reviewed. Constitutional:       General: He is not in acute distress. HENT:      Head: Normocephalic. Right Ear: External ear normal.      Left Ear: External ear normal.      Mouth/Throat:      Mouth: Mucous membranes are moist.   Eyes:      General: No scleral icterus. Cardiovascular:      Rate and Rhythm: Normal rate and regular rhythm. Heart sounds: Heart sounds are distant. Murmur heard. Systolic murmur is present with a grade of 2/6. Comments: Chest incision stable    Pulmonary:      Breath sounds: No wheezing. Chest:      Comments: Chest tubes and pacer wires in place  Abdominal:      General: Bowel sounds are normal. There is no distension. Musculoskeletal:      Right lower leg: No edema. Left lower leg: No edema. Skin:     Findings: No rash. Comments: Incisions are stable. Neurological:      General: No focal deficit present.    Psychiatric:         Mood and Affect: Mood normal.           Labs & Results:    Lab Results   Component Value Date    SODIUM 138 07/15/2023    K 4.2 07/15/2023     07/15/2023    CO2 30 07/15/2023    BUN 25 07/15/2023    CREATININE 0.82 07/15/2023    GLUC 97 07/15/2023    CALCIUM 8.9 07/15/2023     Lab Results   Component Value Date    WBC 13.10 (H) 07/15/2023    RBC 4.54 07/15/2023    HGB 14.9 07/15/2023    HCT 44.0 07/15/2023    MCV 97 07/15/2023    MCH 32.8 07/15/2023    RDW 14.0 07/15/2023     (L) 07/15/2023     Results from last 7 days   Lab Units 07/15/23  0511 07/14/23  0349 07/12/23  1336   PTT seconds  --   --  32   INR  1.34* 1.40* 1.38*     Lab Results   Component Value Date    LDLCALC 110 (H) 05/08/2023     Lab Results   Component Value Date    [id number] 1.110 02/22/2021           Available cardiac and imaging studies were reviewed independently. Available cardiology studies, imaging and lab results independently reviewed today. This note was completed in part utilizing voice recognition software. Grammatical errors, random word insertion, spelling mistakes, occasional wrong word or "sound-alike" substitutions and incomplete sentences may be an occasional consequence of the system secondary to software limitations, ambient noise and hardware issues. At the time of dictation, efforts were made to edit, clarify and /or correct errors. Please read the chart carefully and recognize, using context, where substitutions have occurred. If you have any questions or concerns about the context, text or information contained within the body of this dictation, please contact myself, the provider, for further clarification.

## 2023-07-16 LAB
GLUCOSE SERPL-MCNC: 106 MG/DL (ref 65–140)
GLUCOSE SERPL-MCNC: 109 MG/DL (ref 65–140)
GLUCOSE SERPL-MCNC: 126 MG/DL (ref 65–140)
GLUCOSE SERPL-MCNC: 143 MG/DL (ref 65–140)
INR PPP: 1.44 (ref 0.84–1.19)
PROTHROMBIN TIME: 17.8 SECONDS (ref 11.6–14.5)

## 2023-07-16 PROCEDURE — 99232 SBSQ HOSP IP/OBS MODERATE 35: CPT | Performed by: INTERNAL MEDICINE

## 2023-07-16 PROCEDURE — 85610 PROTHROMBIN TIME: CPT | Performed by: PHYSICIAN ASSISTANT

## 2023-07-16 PROCEDURE — 99024 POSTOP FOLLOW-UP VISIT: CPT | Performed by: PHYSICIAN ASSISTANT

## 2023-07-16 PROCEDURE — 82948 REAGENT STRIP/BLOOD GLUCOSE: CPT

## 2023-07-16 RX ORDER — TORSEMIDE 20 MG/1
20 TABLET ORAL 2 TIMES DAILY
Status: DISCONTINUED | OUTPATIENT
Start: 2023-07-16 | End: 2023-07-17

## 2023-07-16 RX ORDER — WARFARIN SODIUM 7.5 MG/1
7.5 TABLET ORAL
Status: COMPLETED | OUTPATIENT
Start: 2023-07-16 | End: 2023-07-16

## 2023-07-16 RX ORDER — WARFARIN SODIUM 5 MG/1
5 TABLET ORAL
Status: DISCONTINUED | OUTPATIENT
Start: 2023-07-16 | End: 2023-07-16

## 2023-07-16 RX ADMIN — DOCUSATE SODIUM 100 MG: 100 CAPSULE ORAL at 17:37

## 2023-07-16 RX ADMIN — PANCRELIPASE 24000 UNITS: 24000; 76000; 120000 CAPSULE, DELAYED RELEASE PELLETS ORAL at 17:38

## 2023-07-16 RX ADMIN — Medication 250 MG: at 17:37

## 2023-07-16 RX ADMIN — FONDAPARINUX SODIUM 2.5 MG: 2.5 INJECTION, SOLUTION SUBCUTANEOUS at 09:30

## 2023-07-16 RX ADMIN — POLYETHYLENE GLYCOL 3350 17 G: 17 POWDER, FOR SOLUTION ORAL at 09:29

## 2023-07-16 RX ADMIN — MUPIROCIN 1 APPLICATION: 20 OINTMENT TOPICAL at 21:58

## 2023-07-16 RX ADMIN — ASPIRIN 81 MG: 81 TABLET, COATED ORAL at 09:30

## 2023-07-16 RX ADMIN — DOCUSATE SODIUM 100 MG: 100 CAPSULE ORAL at 09:30

## 2023-07-16 RX ADMIN — Medication 1000 UNITS: at 09:31

## 2023-07-16 RX ADMIN — ACETAMINOPHEN 975 MG: 325 TABLET, FILM COATED ORAL at 14:25

## 2023-07-16 RX ADMIN — PANCRELIPASE 24000 UNITS: 24000; 76000; 120000 CAPSULE, DELAYED RELEASE PELLETS ORAL at 14:26

## 2023-07-16 RX ADMIN — ACETAMINOPHEN 975 MG: 325 TABLET, FILM COATED ORAL at 06:21

## 2023-07-16 RX ADMIN — OXYCODONE HYDROCHLORIDE AND ACETAMINOPHEN 500 MG: 500 TABLET ORAL at 09:31

## 2023-07-16 RX ADMIN — ATORVASTATIN CALCIUM 80 MG: 80 TABLET, FILM COATED ORAL at 17:37

## 2023-07-16 RX ADMIN — ACETAMINOPHEN 975 MG: 325 TABLET, FILM COATED ORAL at 21:58

## 2023-07-16 RX ADMIN — PANTOPRAZOLE SODIUM 40 MG: 40 TABLET, DELAYED RELEASE ORAL at 09:30

## 2023-07-16 RX ADMIN — TORSEMIDE 20 MG: 20 TABLET ORAL at 17:38

## 2023-07-16 RX ADMIN — PANCRELIPASE 24000 UNITS: 24000; 76000; 120000 CAPSULE, DELAYED RELEASE PELLETS ORAL at 09:30

## 2023-07-16 RX ADMIN — MUPIROCIN 1 APPLICATION: 20 OINTMENT TOPICAL at 09:30

## 2023-07-16 RX ADMIN — TORSEMIDE 20 MG: 20 TABLET ORAL at 09:30

## 2023-07-16 RX ADMIN — Medication 10 MG: at 01:56

## 2023-07-16 RX ADMIN — POTASSIUM CHLORIDE 20 MEQ: 1500 TABLET, EXTENDED RELEASE ORAL at 09:30

## 2023-07-16 RX ADMIN — WARFARIN SODIUM 7.5 MG: 7.5 TABLET ORAL at 17:37

## 2023-07-16 NOTE — PROGRESS NOTES
Progress Note - Cardiothoracic Surgery   Caitlin Sidhu. 77 y.o. male MRN: [id number]  Unit/Bed#: [facility] 411-01 Encounter: [id number]    Aortic regurgitation, Atrial fibrillation, Mitral regurgitation. S/P aortic valve replacement, left atrial appendage ligation and mitral valve repair; POD # 4      24 Hour Events: Feeling ok. Appears Aflutter in high 50s-60. +PWs/CTs. Ambulating well.  Ate breakfast. +BM    Medications:   Scheduled Meds:  Current Facility-Administered Medications   Medication Dose Route Frequency Provider Last Rate   • acetaminophen  975 mg Oral Q8H Arturo Gomez PA-C     • ascorbic acid  500 mg Oral Daily Diane Sinclair PA-C     • aspirin  81 mg Oral Daily Kameron Claudio PA-C     • atorvastatin  80 mg Oral Daily With [facility], PA-C     • bisacodyl  10 mg Rectal Daily PRN Diane Sinclair PA-C     • cholecalciferol  1,000 Units Oral Daily Diane Sinclair PA-C     • docusate sodium  100 mg Oral BID Diane Sinclair PA-C     • fondaparinux  2.5 mg Subcutaneous Daily Diane Sinclair PA-C     • furosemide  60 mg Intravenous BID (diuretic) Kameron Claudio PA-C     • insulin lispro  1-5 Units Subcutaneous TID  Arturo Gomez PA-C     • insulin lispro  1-5 Units Subcutaneous HS Diane Sinclair PA-C     • lidocaine  1 patch Topical Daily ZEE JohnsonNP     • methocarbamol  500 mg Oral Q6H PRN ZEE JohnsonNP     • metoclopramide  10 mg Intravenous Q6H PRN Malu Jacobsen PA-C     • mupirocin  1 Application Nasal C89X [address] Arturo Gomez PA-C     • ondansetron  4 mg Intravenous Q6H PRN Diane Sinclair PA-C     • pancrelipase (Lip-Prot-Amyl)  24,000 Units Oral TID With Meals Diane Sinclair PA-C     • pantoprazole  40 mg Oral Daily Diane Sinclair PA-C     • polyethylene glycol  17 g Oral Daily Diane Sinclair PA-C     • potassium chloride  20 mEq Oral Daily Kameron Claudio PA-C     • saccharomyces boulardii  250 mg Oral BID Diane Sinclair PA-C     • temazepam  15 mg Oral HS PRN Pauly Vaughan PA-C     • traMADol  25 mg Oral Q6H PRN Clau Ahumada PA-C     • traMADol  50 mg Oral Q6H PRN Clau Ahumada PA-C       Continuous Infusions:   PRN Meds:.•  bisacodyl  •  methocarbamol  •  metoclopramide  •  ondansetron  •  temazepam  •  traMADol  •  traMADol    Vitals:   Vitals:    07/15/23 1941 07/15/23 2314 07/16/23 0312 07/16/23 0600   BP: 110/64 108/59 118/58    BP Location: Right arm Right arm Right arm    Pulse: 65 61 60    Resp: 14 16 14    Temp: 98.3 °F (36.8 °C) 98.2 °F (36.8 °C) 98.4 °F (36.9 °C)    TempSrc: Oral Oral Oral    SpO2: 98% 94% 94%    Weight:    120 kg (263 lb 14.3 oz)   Height:           Telemetry: A-Flutter; Heart Rate: 60, previous junctional underneath 40s     Respiratory:   SpO2: SpO2: 94 %;  Room Air    Intake/Output:     Intake/Output Summary (Last 24 hours) at 7/16/2023 0717  Last data filed at 7/16/2023 0601  Gross per 24 hour   Intake 1442 ml   Output 1205 ml   Net 237 ml        Chest tube Output:    Mediastinal tubes: 50 mL/8 hours  80 mL/24 hours           Weights:   Weight (last 2 days)     Date/Time Weight    07/16/23 0600 120 (263.89)    07/15/23 0545 121 (267.86)    07/14/23 0600 122 (268.08)            Results:   NO CBC/BMP today  Results from last 7 days   Lab Units 07/15/23  0530 07/14/23  0349 07/13/23  0304   WBC Thousand/uL 13.10* 15.87* 14.25*   HEMOGLOBIN g/dL 14.9 14.3 14.6   HEMATOCRIT % 44.0 43.6 42.9   PLATELETS Thousands/uL 121* 116* 144*     Results from last 7 days   Lab Units 07/15/23  0530 07/14/23  0349 07/13/23  0304 07/12/23  1821 07/12/23  1342   SODIUM mmol/L 138 139 142   < >  --    POTASSIUM mmol/L 4.2 5.0 4.6   < >  --    CHLORIDE mmol/L 108 109* 112*   < >  --    CO2 mmol/L 30 28 29   < >  --    CO2, I-STAT mmol/L  --   --   --   --  21   BUN mg/dL 25 26* 18   < >  --    CREATININE mg/dL 0.82 1.14 1.02   < >  --    GLUCOSE, ISTAT mg/dl  --   --   --   --  160*   CALCIUM mg/dL 8.9 9.0 8.7   < >  --     < > = values in this interval not displayed. Results from last 7 days   Lab Units 07/16/23  0526 07/15/23  0530 07/14/23  0349 07/12/23  1336   INR  1.44* 1.34* 1.40* 1.38*   PTT seconds  --   --   --  32     Point of care glucose: 109 - 131    Studies:  None today    I have personally reviewed pertinent reports. and I have personally reviewed pertinent films in PACS    Invasive Lines/Tubes:  Invasive Devices     Central Venous Catheter Line  Duration           CVC Central Lines 07/12/23 Triple 3 days          Peripheral Intravenous Line  Duration           Peripheral IV 07/12/23 Right Wrist 3 days          Line  Duration           Pacer Wires 3 days    Pacer Wires 3 days          Drain  Duration           Chest Tube 1 Mediastinal 32 Fr. 3 days    Chest Tube 2 Anterior;Mediastinal 32 Fr. 3 days                Physical Exam:    HEENT/NECK:  Normocephalic. Atraumatic.  no jugular venous distention. Cardiac: Irregularly irregular rate and rhythm and Bradycardic  Pulmonary:  Breath sounds clear bilaterally and No rales/rhonchi/wheezes  Abdomen:  Non-tender, Non-distended and Normal bowel sounds  Incisions: Sternum is stable. Incision is clean, dry, and intact. Extremities: Extremities warm/dry and Trace edema B/L  Neuro: Alert and oriented X 3, Sensation is grossly intact and No focal deficits  Skin: Warm/Dry, without rashes or lesions.       Assessment:  Principal Problem:    S/P AVR  Active Problems:    History of DVT (deep vein thrombosis)    Benign essential hypertension    Mitral valve insufficiency    Bilateral popliteal artery aneurysm ([facility])    Mixed hyperlipidemia    Aneurysm of ascending aorta ([facility])    Pulmonary hypertension ([facility])    Aortic insufficiency    Permanent atrial fibrillation ([facility])    Coronary artery disease involving native coronary artery    S/P MVR (mitral valve repair)    S/P left atrial appendage ligation    PAD (peripheral artery disease) ([facility])    Hyperchloremia       Aortic regurgitation, Atrial fibrillation, Mitral regurgitation. S/P aortic valve replacement, left atrial appendage ligation and mitral valve repair; POD # 4    Plan:    1. Cardiac:     A-flutter 60, junctional; BP well-controlled, EP following, monitor for rhythm recovery otherwise may need PPM  Holding BB and AMio given junctional rhythm    Anticoagulated for Afib   INR 1.44, Dose Coumadin, 5 mg today    Continue ASA and Statin therapy  Maintain epicardial pacing wires for pacemaker dependence  Maintain central IV access today for medications requiring central IV access  Continue DVT prophylaxis  PCI to OM1 as outpatient    2. Pulmonary:   Good Room air oxygen saturation; Continue incentive spirometry/Coughing/Deep breathing exercises  Keep chest tubes given need for pacing    3. Renal:   Post op Creatinine stable; Follow up labs prn   Intake/Output net: +237cc mL/24 hours, + 2 unmeasured   Diuretic Regimen:  Transition to Torsemide 20mg BID    4. Neuro:  Neurologically intact; No active issues  Incisional pain well-controlled  Continue Tylenol, 975 mg PO q 8, standing dose  Continue Oxycodone, 2.5PO q 4 hours prn pain  Cotn Robaxin PRN  Cont Tramadol PRN    5. GI:  Cardiac diet, with 1800 mL fluid restriction  Continue stool softeners and prn suppository  Continue GI prophylaxis    6. Endo:   Glucose well-controlled with sliding scale coverage    7    Hematology:    Post-operative blood count acceptable; Trend prn    8.    Disposition:      Not yet medically cleared for discharge, pending rhythm     VTE Pharmacologic Prophylaxis: Warfarin (Coumadin)  VTE Mechanical Prophylaxis: sequential compression device    Collaborative rounds completed with supervising physician  Plan of care discussed with bedside nurse    SIGNATURE: Paulo Lancaster PA-C  DATE: July 16, 2023  TIME: 7:17 AM

## 2023-07-16 NOTE — PROGRESS NOTES
Cardiology Progress Note - Christopher Dao. 77 y.o. male MRN: [id number]    Unit/Bed#: [facility] 411-01 Encounter: [id number]        Hospital Problems:  Principal Problem:    S/P AVR  Active Problems:    History of DVT (deep vein thrombosis)    Benign essential hypertension    Mitral valve insufficiency    Bilateral popliteal artery aneurysm (HCC)    Mixed hyperlipidemia    Aneurysm of ascending aorta (HCC)    Pulmonary hypertension (HCC)    Aortic insufficiency    Permanent atrial fibrillation (formerly [facility])    Coronary artery disease involving native coronary artery    S/P MVR (mitral valve repair)    S/P left atrial appendage ligation    PAD (peripheral artery disease) (formerly [facility])    Hyperchloremia      Assessment/Recommendations:    Regurgitant valvular heart disease  Status post AVR and mitral valve repair with left atrial appendage ligation, postop day 3  Continue aspirin, statins and diuretics. Amiodarone and beta-blockers on hold due to bradycardia. Hopefully history of stroke, today.     Junctional bradycardia/atrial flutter  Telemetry reveals persistent atrial flutter with ventricular rate of about 60. Pacemaker wire in situ. EP  has evaluated. There has been discussion about cardioversion for the persistent atrial flutter. Continue telemetry monitoring.       Single branch vessel CAD:  No anginal symptoms reported with ambulation. 70 to 80% focal OM1 lesion on cath. This should be amenable to PCI and will be planned as outpatient. Continue current medical therapy.     Paroxysmal atrial fibrillation  Has history of prior ablation x2 and multiple cardioversions. Previously was on Eliquis, now switched to warfarin. INR yesterday was 1.44. Subjective:     Overnight events reviewed. He is ambulatory. No dizziness reported. No lightheadedness. Telemetry shows atrial flutter with improved ventricular rate of 60  Weight is trending down though fluid balance slightly positive overnight.   Labs pending from today. INR 1.44. Review of Systems   Constitutional: Negative for fever. Respiratory: Negative for chest tightness, shortness of breath and wheezing. Cardiovascular: Negative for chest pain, palpitations and leg swelling. Musculoskeletal: Positive for myalgias. Skin: Negative for rash. Neurological: Negative for syncope. Hematological: Does not bruise/bleed easily. Review of ten system was conducted and was negative except for findings stated elsewhere in the note.         Current Facility-Administered Medications:   •  acetaminophen (TYLENOL) tablet 975 mg, 975 mg, Oral, Q8H, Arturo Gomez PA-C, 975 mg at 07/16/23 3531  •  ascorbic acid (VITAMIN C) tablet 500 mg, 500 mg, Oral, Daily, BRYAN Brown, 500 mg at 07/15/23 9614  •  aspirin (ECOTRIN LOW STRENGTH) EC tablet 81 mg, 81 mg, Oral, Daily, Malcolm Claudio PA-C, 81 mg at 07/15/23 0854  •  atorvastatin (LIPITOR) tablet 80 mg, 80 mg, Oral, Daily With Fartun Kearns PA-C, 80 mg at 07/15/23 1714  •  bisacodyl (DULCOLAX) rectal suppository 10 mg, 10 mg, Rectal, Daily PRN, BRYAN Brown, 10 mg at 07/16/23 3367  •  cholecalciferol (VITAMIN D3) tablet 1,000 Units, 1,000 Units, Oral, Daily, BRYAN Brown, 1,000 Units at 07/15/23 9137  •  docusate sodium (COLACE) capsule 100 mg, 100 mg, Oral, BID, BRYAN Brown, 100 mg at 07/15/23 1714  •  fondaparinux (ARIXTRA) subcutaneous injection 2.5 mg, 2.5 mg, Subcutaneous, Daily, BRYAN Brown, 2.5 mg at 07/15/23 0854  •  insulin lispro (HumaLOG) 100 units/mL subcutaneous injection 1-5 Units, 1-5 Units, Subcutaneous, TID AC, 1 Units at 07/13/23 1709 **AND** Fingerstick Glucose (POCT), , , TID AC, Arturo Gomez PA-C  •  insulin lispro (HumaLOG) 100 units/mL subcutaneous injection 1-5 Units, 1-5 Units, Subcutaneous, HS, Arturo Gomez PA-C  •  lidocaine (LIDODERM) 5 % patch 1 patch, 1 patch, Topical, Daily, DHEERAJ Maloney, 1 patch at 07/15/23 9897  •  methocarbamol (ROBAXIN) tablet 500 mg, 500 mg, Oral, Q6H PRN, Eren Hayes, CRNP, 500 mg at 07/15/23 8975  •  metoclopramide (REGLAN) injection 10 mg, 10 mg, Intravenous, Q6H PRN, KARYNA Cormier-C  •  mupirocin (BACTROBAN) 2 % nasal ointment 1 Application, 1 Application, Nasal, E38T [address], Rachel Sanchez PA-C, 1 Application at 75/07/07 2124  •  ondansetron (ZOFRAN) injection 4 mg, 4 mg, Intravenous, Q6H PRN, Rachel Sanchez, PA-C, 4 mg at 07/14/23 0340  •  pancrelipase (Lip-Prot-Amyl) (CREON) delayed release capsule 24,000 Units, 24,000 Units, Oral, TID With Meals, Rachel Sanchez PA-C, 24,000 Units at 07/15/23 1715  •  pantoprazole (PROTONIX) EC tablet 40 mg, 40 mg, Oral, Daily, Rachel Sanchez, PA-C, 40 mg at 07/15/23 0854  •  polyethylene glycol (MIRALAX) packet 17 g, 17 g, Oral, Daily, Rachel Sanchez, PA-C, 17 g at 07/15/23 0854  •  potassium chloride (K-DUR,KLOR-CON) CR tablet 20 mEq, 20 mEq, Oral, Daily, Keven Claudio PA-C, 20 mEq at 07/15/23 5292  •  saccharomyces boulardii (FLORASTOR) capsule 250 mg, 250 mg, Oral, BID, Rachel Sanchez, PA-C, 250 mg at 07/15/23 9718  •  temazepam (RESTORIL) capsule 15 mg, 15 mg, Oral, HS PRN, Rachel Sanchez PA-C  •  torsemide (DEMADEX) tablet 20 mg, 20 mg, Oral, BID, dajessica Claudio PA-C  •  traMADol (ULTRAM) tablet 25 mg, 25 mg, Oral, Q6H PRN, KARYNA Cormier-C, 25 mg at 07/15/23 7155  •  traMADol (ULTRAM) tablet 50 mg, 50 mg, Oral, Q6H PRN, Fidelia Dinh PA-C  •  warfarin (COUMADIN) tablet 5 mg, 5 mg, Oral, Once (warfarin), Keven Claudio PA-C     Objective:     Vitals:   Blood pressure 124/71, pulse 58, temperature 97.5 °F (36.4 °C), temperature source Oral, resp. rate 17, height 6' 3.2" (1.91 m), weight 120 kg (263 lb 14.3 oz), SpO2 96 %. Body mass index is 32.81 kg/m².   Orthostatic Blood Pressures    Flowsheet Row Most Recent Value   Blood Pressure 124/71 filed at 07/16/2023 6376   Patient Position - Orthostatic VS Sitting filed at 2023 2579         Systolic (43LVZ), LFM:732 , Min:108 , XB     Diastolic (95QWF), WNE:31, Min:57, Max:71      Intake/Output Summary (Last 24 hours) at 2023 0816  Last data filed at 2023 0744  Gross per 24 hour   Intake 1326 ml   Output 1205 ml   Net 121 ml     Weight (last 2 days)     Date/Time Weight    23 0600 120 (263.89)    07/15/23 0545 121 (267.86)    23 0600 122 (268.08)            Physical Exam  Vitals reviewed. Constitutional:       General: He is not in acute distress. HENT:      Head: Normocephalic. Right Ear: External ear normal.      Left Ear: External ear normal.      Mouth/Throat:      Mouth: Mucous membranes are moist.   Eyes:      General: No scleral icterus. Cardiovascular:      Rate and Rhythm: Normal rate. Rhythm irregular. Heart sounds: Heart sounds are distant. Murmur heard. Systolic murmur is present with a grade of 2/6. Comments: Chest incision stable  Cardiac pacing wires in place    Pulmonary:      Breath sounds: No wheezing. Abdominal:      General: Bowel sounds are normal. There is no distension. Musculoskeletal:      Right lower leg: No edema. Left lower leg: No edema. Skin:     Findings: No rash. Comments: Incisions are stable. Neurological:      General: No focal deficit present.    Psychiatric:         Mood and Affect: Mood normal.           Labs & Results:    Lab Results   Component Value Date    SODIUM 138 07/15/2023    K 4.2 07/15/2023     07/15/2023    CO2 30 07/15/2023    BUN 25 07/15/2023    CREATININE 0.82 07/15/2023    GLUC 97 07/15/2023    CALCIUM 8.9 07/15/2023     Lab Results   Component Value Date    WBC 13.10 (H) 07/15/2023    RBC 4.54 07/15/2023    HGB 14.9 07/15/2023    HCT 44.0 07/15/2023    MCV 97 07/15/2023    MCH 32.8 07/15/2023    RDW 14.0 07/15/2023     (L) 07/15/2023     Results from last 7 days   Lab Units 23  0526 07/15/23  0530 23  0349 23  1336   PTT seconds  --   --   --  32   INR  1.44* 1.34* 1.40* 1.38*     Lab Results   Component Value Date    LDLCALC 110 (H) 05/08/2023     Lab Results   Component Value Date    [id number] 1.110 02/22/2021           Available cardiac and imaging studies were reviewed independently. Available cardiology studies, imaging and lab results independently reviewed today. This note was completed in part utilizing voice recognition software. Grammatical errors, random word insertion, spelling mistakes, occasional wrong word or "sound-alike" substitutions and incomplete sentences may be an occasional consequence of the system secondary to software limitations, ambient noise and hardware issues. At the time of dictation, efforts were made to edit, clarify and /or correct errors. Please read the chart carefully and recognize, using context, where substitutions have occurred. If you have any questions or concerns about the context, text or information contained within the body of this dictation, please contact myself, the provider, for further clarification.

## 2023-07-16 NOTE — PROGRESS NOTES
Progress Note - Electrophysiology-Cardiology (EP)   Caitlin Sidhu. 77 y.o. male MRN: [id number]  Unit/Bed#: [facility] 411-01 Encounter: [id number]      Assessment:  1. Persistent atrial fibrillation/atrial flutter with junctional escape in the 50s   A.)  Afib initially diagnosed around 2008   B.)  Admitted 8/2016 with symptomatic recurrence requiring RANDALL/CV, started on dofetilide that admission --- self-discontinued medications 4/2017 when maintaining sinus rhythm   C.)  Recurrence 8/2017, status post RANDALL/CV and restarted on Tikosyn   D.)  Cryoablation with pulmonary vein isolation and typical flutter line 1/2018, Tikosyn again discontinued in the post ablation setting   E.)  Recurrent symptomatic AFib 2/2021, status post successful cardioversion --- patient preferred to be off AADs at that time   F.)  Repeat cryoablation with posterior wall isolation and re-isolation of LSPV and LIPV to make wider antral lesion set 3/2022   G.)  Recurrent afib status post another successful cardioversion 11/2022 with subsequent reversion noted at least 5/2023   H.)  Currently on coumadin anticoagulation, amiodarone and metoprolol held in the post op setting due to bradycardia  2. Moderate aortic insufficiency/severe mitral regurgitation status post bioprosthetic AVR, MV repair, and left atrial appendage ligation 7/12/2023  3. CAD with 80% OM1 lesion noted on preop cardiac catheterization, to undergo eventual elective outpatient PCI to OM1  4. Preserved LV systolic function with LVEF 60% per imaging 11/2022   A.)  Intra-Op RANDALL with LVEF 45%  5. Ascending aortic aneurysm, not requiring surgery  6. Hypertension  7. Hyperlipidemia  8. Peripheral vascular disease with bilateral popliteal aneurysm repair      Plan:  He has ongoing atrial fibrillation with rates around 60 bpm, unclear if he has ongoing junctional/complete heart block.   Recommend that he undergo RANDALL/cardioversion to determine underlying rhythm and reassess need for possible pacemaker, will try to schedule this for tomorrow, 7/17/2023     NPO at midnight, continue coumadin per primary team, continue to hold amiodarone and beta blockers. Subjective/Objective   Chief Complaint: no acute complaints    Subjective: No significant events reported overnight. He denies cardiac complaints including worsening chest pain, shortness of breath, dizziness or lightheadedness. He ambulated with Dr. Alex Panda without significant symptoms.        Objective:     Vitals: /71 (BP Location: Right arm)   Pulse 58   Temp 97.5 °F (36.4 °C) (Oral)   Resp 17   Ht 6' 3.2" (1.91 m)   Wt 120 kg (263 lb 14.3 oz)   SpO2 96%   BMI 32.81 kg/m²   Vitals:    07/15/23 0545 07/16/23 0600   Weight: 121 kg (267 lb 13.7 oz) 120 kg (263 lb 14.3 oz)     Orthostatic Blood Pressures    Flowsheet Row Most Recent Value   Blood Pressure 124/71 filed at 07/16/2023 1872   Patient Position - Orthostatic VS Sitting filed at 07/16/2023 9343            Intake/Output Summary (Last 24 hours) at 7/16/2023 0819  Last data filed at 7/16/2023 0744  Gross per 24 hour   Intake 1326 ml   Output 1205 ml   Net 121 ml       Invasive Devices     Central Venous Catheter Line  Duration           CVC Central Lines 07/12/23 Triple 3 days          Peripheral Intravenous Line  Duration           Peripheral IV 07/12/23 Right Wrist 4 days          Line  Duration           Pacer Wires 3 days    Pacer Wires 3 days          Drain  Duration           Chest Tube 1 Mediastinal 32 Fr. 3 days    Chest Tube 2 Anterior;Mediastinal 32 Fr. 3 days                            Scheduled Meds:  Current Facility-Administered Medications   Medication Dose Route Frequency Provider Last Rate   • acetaminophen  975 mg Oral Q8H Arturo Gomez PA-C     • ascorbic acid  500 mg Oral Daily Cindy Hernandez PA-C     • aspirin  81 mg Oral Daily Pascale Claudio PA-C     • atorvastatin  80 mg Oral Daily With [facility], PA-C     • bisacodyl  10 mg Rectal Daily PRN Dania Andres PA-C     • cholecalciferol  1,000 Units Oral Daily Dania Andres PA-C     • docusate sodium  100 mg Oral BID Dania Andres PA-C     • fondaparinux  2.5 mg Subcutaneous Daily Dania Andres PA-C     • insulin lispro  1-5 Units Subcutaneous TID AC Arturo Gomez PA-C     • insulin lispro  1-5 Units Subcutaneous HS Dania Andres PA-C     • lidocaine  1 patch Topical Daily Charanjit Murrieta, CRNP     • methocarbamol  500 mg Oral Q6H PRN Charanjit Murrieta, CRNP     • metoclopramide  10 mg Intravenous Q6H PRN Theo Willard PA-C     • mupirocin  1 Application Nasal K22R [facility] Arturo Gomez PA-C     • ondansetron  4 mg Intravenous Q6H PRN Dania Andres PA-C     • pancrelipase (Lip-Prot-Amyl)  24,000 Units Oral TID With Meals Dania Andres PA-C     • pantoprazole  40 mg Oral Daily Dania Andres PA-C     • polyethylene glycol  17 g Oral Daily Dania Andres PA-C     • potassium chloride  20 mEq Oral Daily Arina Claudio PA-C     • saccharomyces boulardii  250 mg Oral BID Dania Andres PA-C     • temazepam  15 mg Oral HS PRN Dania Andres PA-C     • torsemide  20 mg Oral BID Arina Claudio PA-C     • traMADol  25 mg Oral Q6H PRN Theo Willard PA-C     • traMADol  50 mg Oral Q6H PRN Theo Willard PA-C     • warfarin  5 mg Oral Once (warfarin) Arina Claudio PA-C       Continuous Infusions:   PRN Meds:.•  bisacodyl  •  methocarbamol  •  metoclopramide  •  ondansetron  •  temazepam  •  traMADol  •  traMADol    Review of Systems   Constitutional: Negative for fever and malaise/fatigue. Cardiovascular: Negative for chest pain, dyspnea on exertion, irregular heartbeat, leg swelling, near-syncope, orthopnea, palpitations, paroxysmal nocturnal dyspnea and syncope. All other systems reviewed and are negative.         Physical Exam:   GEN: NAD, alert and oriented x 3, well appearing  SKIN: dry without significant lesions or rashes  HEENT: NCAT, PERRL, EOMs intact  NECK: No JVD appreciated, + central line  CARDIOVASCULAR: largely RRR, normal S1, S2 without murmurs, rubs, or gallops appreciated  LUNGS: decreased breath but otherwise clear to auscultation bilaterally without wheezes, rhonchi, or rales; + chest tubes  ABDOMEN: Soft, nontender, nondistended  EXTREMITIES/VASCULAR: perfused without clubbing, cyanosis, or LE edema b/l  PSYCH: Normal mood and affect  NEURO: CN ll-Xll grossly intact                Lab Results: I have personally reviewed pertinent lab results. Results from last 7 days   Lab Units 07/15/23  0530 07/14/23  0349 07/13/23  0304   WBC Thousand/uL 13.10* 15.87* 14.25*   HEMOGLOBIN g/dL 14.9 14.3 14.6   HEMATOCRIT % 44.0 43.6 42.9   PLATELETS Thousands/uL 121* 116* 144*     Results from last 7 days   Lab Units 07/15/23  0530 07/14/23 0349 07/13/23  0304 07/12/23  1821 07/12/23  1342   POTASSIUM mmol/L 4.2 5.0 4.6   < >  --    CHLORIDE mmol/L 108 109* 112*   < >  --    CO2 mmol/L 30 28 29   < >  --    CO2, I-STAT mmol/L  --   --   --   --  21   BUN mg/dL 25 26* 18   < >  --    CREATININE mg/dL 0.82 1.14 1.02   < >  --    GLUCOSE, ISTAT mg/dl  --   --   --   --  160*   CALCIUM mg/dL 8.9 9.0 8.7   < >  --     < > = values in this interval not displayed. Results from last 7 days   Lab Units 07/16/23  0526 07/15/23  0530 07/14/23 0349 07/12/23  1336   INR  1.44* 1.34* 1.40* 1.38*   PTT seconds  --   --   --  32     Results from last 7 days   Lab Units 07/14/23 0349 07/13/23  0304   MAGNESIUM mg/dL 2.9* 2.9*         Imaging: I have personally reviewed pertinent reports.       ECHO: Results for orders placed during the hospital encounter of 02/22/21    Echo complete with contrast if indicated    Narrative  [address] [place] [address]  [address] [place] [address]  [phone]    Transthoracic Echocardiogram  2D, M-mode, Doppler, and Color Doppler    Study date:  23-Feb-2021    Patient: Germain Montenegro  MR number: [id number]  Account number: [de-identified]  : 1945  Age: 67 years  Gender: Male  Status: Inpatient  Location: Bedside  Height: 78 in  Weight: 242 lb  BP: 122/ 84 mmHg    Indications: Atrial fibrillation. Diagnoses: I48.0 - Atrial fibrillation    Sonographer:  Margie Meneses RDCS  Primary Physician:  Shama Reddy DO  Referring Physician:  Jazlyn Forman MD  Group:  [facility]  Cardiology Fellow: Kirby Alpers, MD  Interpreting Physician:  Klaus Cat MD    SUMMARY    LEFT VENTRICLE:  Size was normal.  Systolic function was normal. Ejection fraction was estimated to be 60 %. There were no regional wall motion abnormalities. Wall thickness was mildly increased. LEFT ATRIUM:  The atrium was mildly to moderately dilated. MITRAL VALVE:  There was moderate regurgitation. AORTIC VALVE:  There was mild to moderate regurgitation. TRICUSPID VALVE:  There was mild regurgitation. Pulmonary artery systolic pressure was within the normal range. HISTORY: PRIOR HISTORY: Atrial fibrillation. DVT. Aneurysm of ascending aorta. PROCEDURE: The procedure was performed at the bedside. This was a routine study. The transthoracic approach was used. The study included complete 2D imaging, M-mode, complete spectral Doppler, and color Doppler. The heart rate was 86 bpm,  at the start of the study. Images were obtained from the parasternal, apical, subcostal, and suprasternal notch acoustic windows. Image quality was adequate. LEFT VENTRICLE: Size was normal. Systolic function was normal. Ejection fraction was estimated to be 60 %. There were no regional wall motion abnormalities. Wall thickness was mildly increased. The changes were consistent with concentric  remodeling (increased wall thickness with normal wall mass). DOPPLER: Transmitral flow pattern: atrial fibrillation.     RIGHT VENTRICLE: The size was normal. Systolic function was normal.    LEFT ATRIUM: The atrium was mildly to moderately dilated. RIGHT ATRIUM: The atrium was mildly dilated. MITRAL VALVE: Valve structure was normal. There was normal leaflet separation. DOPPLER: The transmitral velocity was within the normal range. There was no evidence for stenosis. There was moderate regurgitation. AORTIC VALVE: The valve was trileaflet. Leaflets exhibited mild to moderate calcification, normal cuspal separation, and sclerosis. DOPPLER: Transaortic velocity was within the normal range. There was no evidence for stenosis. There was  mild to moderate regurgitation. TRICUSPID VALVE: The valve structure was normal. There was normal leaflet separation. DOPPLER: The transtricuspid velocity was within the normal range. There was no evidence for stenosis. There was mild regurgitation. Pulmonary artery  systolic pressure was within the normal range. Estimated peak PA pressure was 27 mmHg. PULMONIC VALVE: DOPPLER: The transpulmonic velocity was within the normal range. There was mild regurgitation. PERICARDIUM: There was no pericardial effusion. AORTA: The root exhibited normal size for BSA at 38 mm. SYSTEMIC VEINS: IVC: The inferior vena cava was normal in size and course. Respirophasic changes were normal.    SYSTEM MEASUREMENT TABLES    2D  %FS: 28.01 %  Ao Diam: 3.58 cm  EDV(Teich): 122.74 ml  EF(Teich): 53.93 %  ESV(Teich): 56.54 ml  IVSd: 1.25 cm  LA Area: 31.62 cm2  LA Diam: 4.26 cm  LVEDV MOD A4C: 156.26 ml  LVEF MOD A4C: 60.45 %  LVESV MOD A4C: 61.8 ml  LVIDd: 5.08 cm  LVIDs: 3.66 cm  LVLd A4C: 8.59 cm  LVLs A4C: 7.52 cm  LVPWd: 1.33 cm  RA Area: 31.07 cm2  RVIDd: 4.15 cm  SV MOD A4C: 94.47 ml  SV(Teich): 66.2 ml    CW  AR Dec [place]: 3.14 m/s2  AR Dec Time: 1574.47 ms  AR PHT: 456.6 ms  AR Vmax: 4.92 m/s  AR maxP.95 mmHg  AV Env. Ti: 251.19 ms  AV VTI: 20.76 cm  AV Vmax: 1.24 m/s  AV Vmean: 0.83 m/s  AV maxP.2 mmHg  AV meanPG: 3.14 mmHg  TR Vmax: 2.38 m/s  TR maxP.7 mmHg    MM  TAPSE: 2.34 cm    PW  LVOT Env. Ti: 251.19 ms  LVOT VTI: 14.87 cm  LVOT Vmax: 0.85 m/s  LVOT Vmean: 0.59 m/s  LVOT maxP.9 mmHg  LVOT meanP.62 mmHg  MV A Luis A: 0.65 m/s  MV Dec [place]: 4.08 m/s2  MV DecT: 171.95 ms  MV E Luis A: 0.7 m/s  MV E/A Ratio: 1.08    [facility]    Prepared and electronically signed by    Jonnie Reveles MD  Signed [id number] 14:53:21      Results for orders placed during the hospital encounter of 22    Echo complete w/ contrast if indicated    Interpretation Summary  •  Left Ventricle: Left ventricular cavity size is mildly dilated. Wall thickness is normal. The left ventricular ejection fraction is 60%. Systolic function is normal. Wall motion is normal.  •  Left Atrium: The atrium is severely dilated. •  Right Atrium: The atrium is dilated. •  Aortic Valve: The aortic valve is trileaflet. The leaflets are mildly thickened. The leaflets are mildly calcified. There is moderately reduced mobility. There is mild regurgitation. There is mild to moderate stenosis. •  Mitral Valve: There is moderate to severe regurgitation with an eccentrically directed jet. •  Tricuspid Valve: There is moderate to severe regurgitation. The right ventricular systolic pressure is severely elevated. The estimated right ventricular systolic pressure is 15.32 mmHg. •  Aorta: The aortic root is normal in size. The aortic root is 3.60 cm.         EKG/TELEMETRY: atrial fibrillation with rates around 60 bpm      VTE Pharmacologic Prophylaxis: Warfarin (Coumadin) - with subtherapeutic INR  VTE Mechanical Prophylaxis: sequential compression device

## 2023-07-16 NOTE — PROGRESS NOTES
07/16/23    Procedure: Epicardial Pacing Wire removal    Lisa Client. was returned to bed and informed of mandatory one hour post-procedure bed rest.  The assigned nurse was notified. Epicardial pacing wires removed in routine fashion, without incident. The patient tolerated the procedure well. Vital signs ordered  q 15 minutes for one hour, as per protocol. SIGNATURE: Ronnell Toro  DATE: July 16, 2023  TIME: 12:05 PM      07/16/23    Procedure: Chest tube removal    Chest tubes removed in routine fashion without incident. Insertion site dressed with Acticoat. Lisa Client. tolerated the procedure well. Nurse notified.     SIGNATURE: Ronnell Toro  DATE: July 16, 2023  TIME: 12:05 PM

## 2023-07-17 ENCOUNTER — ANESTHESIA EVENT (INPATIENT)
Dept: NON INVASIVE DIAGNOSTICS | Facility: HOSPITAL | Age: 78
DRG: 219 | End: 2023-07-17
Payer: MEDICARE

## 2023-07-17 LAB
ANION GAP SERPL CALCULATED.3IONS-SCNC: 1 MMOL/L
APTT PPP: 104 SECONDS (ref 23–37)
APTT PPP: 95 SECONDS (ref 23–37)
ASCENDING AORTA: 4.5 CM
ATRIAL RATE: 36 BPM
ATRIAL RATE: 394 BPM
BUN SERPL-MCNC: 19 MG/DL (ref 5–25)
CALCIUM SERPL-MCNC: 9.1 MG/DL (ref 8.3–10.1)
CHLORIDE SERPL-SCNC: 107 MMOL/L (ref 96–108)
CO2 SERPL-SCNC: 30 MMOL/L (ref 21–32)
CREAT SERPL-MCNC: 0.75 MG/DL (ref 0.6–1.3)
ERYTHROCYTE [DISTWIDTH] IN BLOOD BY AUTOMATED COUNT: 13.5 % (ref 11.6–15.1)
GFR SERPL CREATININE-BSD FRML MDRD: 87 ML/MIN/1.73SQ M
GLUCOSE SERPL-MCNC: 102 MG/DL (ref 65–140)
GLUCOSE SERPL-MCNC: 114 MG/DL (ref 65–140)
GLUCOSE SERPL-MCNC: 116 MG/DL (ref 65–140)
GLUCOSE SERPL-MCNC: 123 MG/DL (ref 65–140)
GLUCOSE SERPL-MCNC: 99 MG/DL (ref 65–140)
HCT VFR BLD AUTO: 41.5 % (ref 36.5–49.3)
HGB BLD-MCNC: 14.4 G/DL (ref 12–17)
INR PPP: 1.73 (ref 0.84–1.19)
MCH RBC QN AUTO: 32.7 PG (ref 26.8–34.3)
MCHC RBC AUTO-ENTMCNC: 34.7 G/DL (ref 31.4–37.4)
MCV RBC AUTO: 94 FL (ref 82–98)
PLATELET # BLD AUTO: 149 THOUSANDS/UL (ref 149–390)
PMV BLD AUTO: 10.1 FL (ref 8.9–12.7)
POTASSIUM SERPL-SCNC: 3.8 MMOL/L (ref 3.5–5.3)
PROTHROMBIN TIME: 20.5 SECONDS (ref 11.6–14.5)
QRS AXIS: -36 DEGREES
QRS AXIS: -37 DEGREES
QRSD INTERVAL: 100 MS
QRSD INTERVAL: 100 MS
QT INTERVAL: 440 MS
QT INTERVAL: 450 MS
QTC INTERVAL: 417 MS
QTC INTERVAL: 426 MS
RBC # BLD AUTO: 4.4 MILLION/UL (ref 3.88–5.62)
SL CV LV EF: 45
SODIUM SERPL-SCNC: 138 MMOL/L (ref 135–147)
T WAVE AXIS: 5 DEGREES
T WAVE AXIS: 7 DEGREES
VENTRICULAR RATE: 54 BPM
VENTRICULAR RATE: 54 BPM
WBC # BLD AUTO: 7.98 THOUSAND/UL (ref 4.31–10.16)

## 2023-07-17 PROCEDURE — 88305 TISSUE EXAM BY PATHOLOGIST: CPT | Performed by: PATHOLOGY

## 2023-07-17 PROCEDURE — 82948 REAGENT STRIP/BLOOD GLUCOSE: CPT

## 2023-07-17 PROCEDURE — 92960 CARDIOVERSION ELECTRIC EXT: CPT

## 2023-07-17 PROCEDURE — 97530 THERAPEUTIC ACTIVITIES: CPT

## 2023-07-17 PROCEDURE — 93010 ELECTROCARDIOGRAM REPORT: CPT | Performed by: INTERNAL MEDICINE

## 2023-07-17 PROCEDURE — 93312 ECHO TRANSESOPHAGEAL: CPT

## 2023-07-17 PROCEDURE — 93320 DOPPLER ECHO COMPLETE: CPT | Performed by: INTERNAL MEDICINE

## 2023-07-17 PROCEDURE — 92960 CARDIOVERSION ELECTRIC EXT: CPT | Performed by: INTERNAL MEDICINE

## 2023-07-17 PROCEDURE — 80048 BASIC METABOLIC PNL TOTAL CA: CPT | Performed by: PHYSICIAN ASSISTANT

## 2023-07-17 PROCEDURE — 5A2204Z RESTORATION OF CARDIAC RHYTHM, SINGLE: ICD-10-PCS | Performed by: INTERNAL MEDICINE

## 2023-07-17 PROCEDURE — 85610 PROTHROMBIN TIME: CPT | Performed by: PHYSICIAN ASSISTANT

## 2023-07-17 PROCEDURE — 85027 COMPLETE CBC AUTOMATED: CPT | Performed by: PHYSICIAN ASSISTANT

## 2023-07-17 PROCEDURE — 93312 ECHO TRANSESOPHAGEAL: CPT | Performed by: INTERNAL MEDICINE

## 2023-07-17 PROCEDURE — 99232 SBSQ HOSP IP/OBS MODERATE 35: CPT | Performed by: INTERNAL MEDICINE

## 2023-07-17 PROCEDURE — 99024 POSTOP FOLLOW-UP VISIT: CPT | Performed by: PHYSICIAN ASSISTANT

## 2023-07-17 PROCEDURE — 85730 THROMBOPLASTIN TIME PARTIAL: CPT | Performed by: THORACIC SURGERY (CARDIOTHORACIC VASCULAR SURGERY)

## 2023-07-17 PROCEDURE — 93325 DOPPLER ECHO COLOR FLOW MAPG: CPT | Performed by: INTERNAL MEDICINE

## 2023-07-17 PROCEDURE — 88311 DECALCIFY TISSUE: CPT | Performed by: PATHOLOGY

## 2023-07-17 PROCEDURE — 93005 ELECTROCARDIOGRAM TRACING: CPT

## 2023-07-17 RX ORDER — TORSEMIDE 20 MG/1
20 TABLET ORAL DAILY
Status: DISCONTINUED | OUTPATIENT
Start: 2023-07-17 | End: 2023-07-20 | Stop reason: HOSPADM

## 2023-07-17 RX ORDER — HEPARIN SODIUM 10000 [USP'U]/100ML
3-30 INJECTION, SOLUTION INTRAVENOUS
Status: DISCONTINUED | OUTPATIENT
Start: 2023-07-17 | End: 2023-07-18

## 2023-07-17 RX ORDER — HEPARIN SODIUM 1000 [USP'U]/ML
9200 INJECTION, SOLUTION INTRAVENOUS; SUBCUTANEOUS ONCE
Status: DISCONTINUED | OUTPATIENT
Start: 2023-07-17 | End: 2023-07-18

## 2023-07-17 RX ORDER — LIDOCAINE HYDROCHLORIDE 10 MG/ML
INJECTION, SOLUTION EPIDURAL; INFILTRATION; INTRACAUDAL; PERINEURAL AS NEEDED
Status: DISCONTINUED | OUTPATIENT
Start: 2023-07-17 | End: 2023-07-17

## 2023-07-17 RX ORDER — PROPOFOL 10 MG/ML
INJECTION, EMULSION INTRAVENOUS CONTINUOUS PRN
Status: DISCONTINUED | OUTPATIENT
Start: 2023-07-17 | End: 2023-07-17

## 2023-07-17 RX ORDER — SODIUM CHLORIDE 9 MG/ML
INJECTION, SOLUTION INTRAVENOUS CONTINUOUS PRN
Status: DISCONTINUED | OUTPATIENT
Start: 2023-07-17 | End: 2023-07-17

## 2023-07-17 RX ORDER — FENTANYL CITRATE 50 UG/ML
INJECTION, SOLUTION INTRAMUSCULAR; INTRAVENOUS AS NEEDED
Status: DISCONTINUED | OUTPATIENT
Start: 2023-07-17 | End: 2023-07-17

## 2023-07-17 RX ORDER — WARFARIN SODIUM 5 MG/1
5 TABLET ORAL
Status: COMPLETED | OUTPATIENT
Start: 2023-07-17 | End: 2023-07-17

## 2023-07-17 RX ORDER — PROPOFOL 10 MG/ML
INJECTION, EMULSION INTRAVENOUS AS NEEDED
Status: DISCONTINUED | OUTPATIENT
Start: 2023-07-17 | End: 2023-07-17

## 2023-07-17 RX ADMIN — PANCRELIPASE 24000 UNITS: 24000; 76000; 120000 CAPSULE, DELAYED RELEASE PELLETS ORAL at 12:30

## 2023-07-17 RX ADMIN — HEPARIN SODIUM 18 UNITS/KG/HR: 10000 INJECTION, SOLUTION INTRAVENOUS at 09:06

## 2023-07-17 RX ADMIN — WARFARIN SODIUM 5 MG: 5 TABLET ORAL at 17:28

## 2023-07-17 RX ADMIN — ACETAMINOPHEN 975 MG: 325 TABLET, FILM COATED ORAL at 12:30

## 2023-07-17 RX ADMIN — OXYCODONE HYDROCHLORIDE AND ACETAMINOPHEN 500 MG: 500 TABLET ORAL at 10:09

## 2023-07-17 RX ADMIN — DOCUSATE SODIUM 100 MG: 100 CAPSULE ORAL at 17:28

## 2023-07-17 RX ADMIN — Medication 250 MG: at 10:09

## 2023-07-17 RX ADMIN — PANTOPRAZOLE SODIUM 40 MG: 40 TABLET, DELAYED RELEASE ORAL at 10:09

## 2023-07-17 RX ADMIN — FENTANYL CITRATE 25 MCG: 50 INJECTION INTRAMUSCULAR; INTRAVENOUS at 09:25

## 2023-07-17 RX ADMIN — ATORVASTATIN CALCIUM 80 MG: 80 TABLET, FILM COATED ORAL at 17:28

## 2023-07-17 RX ADMIN — FENTANYL CITRATE 25 MCG: 50 INJECTION INTRAMUSCULAR; INTRAVENOUS at 09:10

## 2023-07-17 RX ADMIN — NOREPINEPHRINE BITARTRATE 2 MCG/MIN: 1 INJECTION INTRAVENOUS at 09:10

## 2023-07-17 RX ADMIN — FENTANYL CITRATE 25 MCG: 50 INJECTION INTRAMUSCULAR; INTRAVENOUS at 09:02

## 2023-07-17 RX ADMIN — HEPARIN SODIUM 16 UNITS/KG/HR: 10000 INJECTION, SOLUTION INTRAVENOUS at 21:50

## 2023-07-17 RX ADMIN — ACETAMINOPHEN 975 MG: 325 TABLET, FILM COATED ORAL at 05:34

## 2023-07-17 RX ADMIN — PROPOFOL 100 MG: 10 INJECTION, EMULSION INTRAVENOUS at 09:10

## 2023-07-17 RX ADMIN — ACETAMINOPHEN 975 MG: 325 TABLET, FILM COATED ORAL at 21:23

## 2023-07-17 RX ADMIN — PANCRELIPASE 24000 UNITS: 24000; 76000; 120000 CAPSULE, DELAYED RELEASE PELLETS ORAL at 17:28

## 2023-07-17 RX ADMIN — DOCUSATE SODIUM 100 MG: 100 CAPSULE ORAL at 10:09

## 2023-07-17 RX ADMIN — POTASSIUM CHLORIDE 20 MEQ: 1500 TABLET, EXTENDED RELEASE ORAL at 10:09

## 2023-07-17 RX ADMIN — Medication 1000 UNITS: at 10:09

## 2023-07-17 RX ADMIN — LIDOCAINE HYDROCHLORIDE 100 MG: 10 INJECTION, SOLUTION EPIDURAL; INFILTRATION; INTRACAUDAL; PERINEURAL at 09:10

## 2023-07-17 RX ADMIN — PROPOFOL 50 MCG/KG/MIN: 10 INJECTION, EMULSION INTRAVENOUS at 09:10

## 2023-07-17 RX ADMIN — TORSEMIDE 20 MG: 20 TABLET ORAL at 10:09

## 2023-07-17 RX ADMIN — LIDOCAINE 5% 1 PATCH: 700 PATCH TOPICAL at 10:12

## 2023-07-17 RX ADMIN — ASPIRIN 81 MG: 81 TABLET, COATED ORAL at 10:09

## 2023-07-17 RX ADMIN — Medication 250 MG: at 17:28

## 2023-07-17 RX ADMIN — SODIUM CHLORIDE: 0.9 INJECTION, SOLUTION INTRAVENOUS at 09:01

## 2023-07-17 RX ADMIN — POLYETHYLENE GLYCOL 3350 17 G: 17 POWDER, FOR SOLUTION ORAL at 10:09

## 2023-07-17 NOTE — PROGRESS NOTES
Progress Note - Electrophysiology - Cardiology  Alma Conrad. 77 y.o. male MRN: [id number]  Unit/Bed#: [facility] 411-01 Encounter: [id number]      Assessment:    1. Bradycardia - Afib with junctional escape  -- junctional escape in 40-50s last week requiring pacing with epicardial wires   -- HR now improved to 50-60s   -- epicardial wires D/C'd 7/16/23  -- unsuccessful RANDALL/DCCV today (2 shocks with ERAF)  -- continue to monitor on tele  -- ambulate tomorrow and monitor HR response  -- no clear indication for PPM at present    2. Persistent AFib  -- s/p 2 prior Afib ablations by Dr. Mali Peguero (PVI/CTI 2018, PVI/LA posterior wall 2022), now with recurrence  -- unsuccessful RANDALL/DCCV today (2 shocks with ERAF)  -- bridging with heparin IV to warfarin  -- continue warfarin x 1 month, then likely change back to Eliquis  -- [id number] = 5 (HF, HTN, age, PAD)  -- amiodarone and metoprolol held last week due to bradycardia  -- h/o medication non-compliance    3. Moderate AI/severe MR s/p bioprosthetic AVR (29 mm Escalante Inspiris valve) and MV repair (36 mm Escalante physio II ring annuloplasty), JENNY ligation 7/12/2023 by Dr. Carri Saavedra  -- management per CT surgery    4. CAD  -- 80% OM1 lesion noted on cardiac catheterization 5/2023  -- eventual elective outpatient PCI to OM1     5. Cardiomyopathy EF 45% on final intraoperative RANDALL (previously 60%)  6. Ascending aortic aneurysm 4.5 cm  7. HTN  8. HLD  9. PAD s/p bilateral popliteal aneurysm repair      Subjective/Objective   Subjective: Alma Hilton is a 77year old male with a history of Afib s/p 2 prior ablations, now with recurrence who is s/p bio AVR, MV repair, and JENNY ligation on 7/12/23 by Dr. Carri Saavedra. Post op, he is in Afib with slow ventricular response with a junctional escape at 50 bpm. He was paced with epicardial wires (programmed DDD 80 bpm).  Epicardial wires were D/C'd 7/16 and he has an escape at 60 bpm.   He received metoprolol and amiodarone in the post-operative setting which have been held. RANDALL/DCCV was attempted today. He was shocked twice with several beats of sinus, then ERAF both times. Currently, he is feeling well. He is OOB in the chair. He denies chest pain, SOB, lightheadedness, dizziness, or palpitations. TELE: Afib with junctional escape HR 55 bpm    EKG:  Afib with junctional escape HR 55 bpm    Objective:  Vitals: /85 (BP Location: Right arm)   Pulse (!) 54   Temp 98.1 °F (36.7 °C) (Oral)   Resp 18   Ht 6' 3.2" (1.91 m)   Wt 118 kg (261 lb 3.9 oz)   SpO2 94%   BMI 32.48 kg/m²     Vitals:    07/16/23 0600 07/17/23 0549   Weight: 120 kg (263 lb 14.3 oz) 118 kg (261 lb 3.9 oz)     Orthostatic Blood Pressures    Flowsheet Row Most Recent Value   Blood Pressure 137/85 filed at 07/17/2023 6332   Patient Position - Orthostatic VS Sitting filed at 07/17/2023 5674            Intake/Output Summary (Last 24 hours) at 7/17/2023 0913  Last data filed at 7/17/2023 0726  Gross per 24 hour   Intake 290 ml   Output 2125 ml   Net -1835 ml       Invasive Devices     Central Venous Catheter Line  Duration           CVC Central Lines 07/12/23 Triple 5 days                Scheduled Meds:  Current Facility-Administered Medications   Medication Dose Route Frequency Provider Last Rate   • acetaminophen  975 mg Oral Q8H Arturo Gomez PA-C     • ascorbic acid  500 mg Oral Daily Stewart Balbuena PA-C     • aspirin  81 mg Oral Daily Shireen Claudio PA-C     • atorvastatin  80 mg Oral Daily With [facility], PA-C     • bisacodyl  10 mg Rectal Daily PRN Steawrt Balbuena PA-C     • cholecalciferol  1,000 Units Oral Daily Stewart Balbuena PA-C     • docusate sodium  100 mg Oral BID Stewart Balbuena PA-C     • heparin (porcine)  3-30 Units/kg/hr (Order-Specific) Intravenous Titrated Delmi Rios PA-C 18 Units/kg/hr (07/17/23 9235)   • heparin (porcine)  9,200 Units Intravenous Once Delmi Rios PA-C     • insulin lispro  1-5 Units Subcutaneous TID AC Arturo Gomez PA-C     • insulin lispro  1-5 Units Subcutaneous HS Kentrell Mojica PA-C     • lidocaine  1 patch Topical Daily DHEERAJ Larson     • methocarbamol  500 mg Oral Q6H PRN ZEE LarsonNP     • metoclopramide  10 mg Intravenous Q6H PRN Selvin Waggoner PA-C     • ondansetron  4 mg Intravenous Q6H PRN Kentrell Mojica PA-C     • pancrelipase (Lip-Prot-Amyl)  24,000 Units Oral TID With Meals Kentrell Mojica PA-C     • pantoprazole  40 mg Oral Daily Kentrell Mojica PA-C     • polyethylene glycol  17 g Oral Daily Kentrell Mojica PA-C     • potassium chloride  20 mEq Oral Daily Markel Russell PA-C     • saccharomyces boulardii  250 mg Oral BID Kentrell Mojica PA-C     • temazepam  15 mg Oral HS PRN Kentrell Mojica PA-C     • torsemide  20 mg Oral Daily Markel Russell PA-C     • traMADol  25 mg Oral Q6H PRN Selvin Waggoner PA-C     • traMADol  50 mg Oral Q6H PRN Selvin Waggoenr PA-C     • warfarin  5 mg Oral Once (warfarin) Markel Russell PA-C       Facility-Administered Medications Ordered in Other Encounters   Medication Dose Route Frequency Provider Last Rate   • fentanyl citrate (PF)   Intravenous PRN Brandi Leong CRNA     • sodium chloride   Intravenous Continuous PRN Neetu Jansen CRNA       Continuous Infusions:heparin (porcine), 3-30 Units/kg/hr (Order-Specific), Last Rate: 18 Units/kg/hr (07/17/23 0906)      PRN Meds:.•  bisacodyl  •  methocarbamol  •  metoclopramide  •  ondansetron  •  temazepam  •  traMADol  •  traMADol    Review of Systems:  Review of Systems   Constitutional: Negative for malaise/fatigue. HENT: Negative. Eyes: Negative. Cardiovascular: Negative for chest pain, near-syncope, palpitations and syncope. Respiratory: Negative for cough and wheezing. Endocrine: Negative. Skin: Negative for rash. Musculoskeletal: Negative for back pain and neck pain.    Gastrointestinal: Negative for abdominal pain, nausea and vomiting. Genitourinary: Negative. Neurological: Negative. Psychiatric/Behavioral: Negative. Allergic/Immunologic: Negative. ROS as noted above, otherwise 12 point review of systems was performed and is negative. Physical Exam:   GEN: NAD, alert and oriented x 3, well appearing  SKIN: warm, dry without significant lesions or rashes  HEENT: NCAT, PERRL, EOMs intact  NECK: supple, no JVD appreciated  CARDIOVASCULAR: RRR, normal S1, S2 without murmurs, rubs, or gallops   LUNGS: CTA bilaterally without wheezes, rhonchi, or rales  ABDOMEN: Soft, nontender, nondistended. Groins soft with no bleeding, bruits or hematoma bilaterally  EXTREMITIES/VASCULAR: perfused without clubbing, cyanosis, or LE edema b/l  PSYCH: Normal mood and affect  NEURO: CN ll-Xll grossly intact    Lab Results: I have personally reviewed pertinent lab results. Results from last 7 days   Lab Units 07/17/23  0533 07/15/23  0530 07/14/23  0349   WBC Thousand/uL 7.98 13.10* 15.87*   HEMOGLOBIN g/dL 14.4 14.9 14.3   HEMATOCRIT % 41.5 44.0 43.6   PLATELETS Thousands/uL 149 121* 116*     Results from last 7 days   Lab Units 07/17/23  0533 07/15/23  0530 07/14/23  0349 07/12/23  1821 07/12/23  1342   POTASSIUM mmol/L 3.8 4.2 5.0   < >  --    CHLORIDE mmol/L 107 108 109*   < >  --    CO2 mmol/L 30 30 28   < >  --    CO2, I-STAT mmol/L  --   --   --   --  21   BUN mg/dL 19 25 26*   < >  --    CREATININE mg/dL 0.75 0.82 1.14   < >  --    GLUCOSE, ISTAT mg/dl  --   --   --   --  160*   CALCIUM mg/dL 9.1 8.9 9.0   < >  --     < > = values in this interval not displayed. Results from last 7 days   Lab Units 07/17/23  0533 07/16/23  0526 07/15/23  0530 07/14/23  0349 07/12/23  1336   INR  1.73* 1.44* 1.34*   < > 1.38*   PTT seconds  --   --   --   --  32    < > = values in this interval not displayed.      Results from last 7 days   Lab Units 07/14/23  0349 07/13/23  0304   MAGNESIUM mg/dL 2.9* 2.9*       Imaging: I have personally reviewed pertinent reports. Results for orders placed during the hospital encounter of 21    Echo complete with contrast if indicated    Narrative  [address] [address]  [phone]    Transthoracic Echocardiogram  2D, M-mode, Doppler, and Color Doppler    Study date:  2021    Patient: Fredo Duran  MR number: [id number]  Account number: [de-identified]  : 1945  Age: 67 years  Gender: Male  Status: Inpatient  Location: Bedside  Height: 78 in  Weight: 242 lb  BP: 122/ 84 mmHg    Indications: Atrial fibrillation. Diagnoses: I48.0 - Atrial fibrillation    Sonographer:  Kimmy Segundo RDCS  Primary Physician:  Cony Funez DO  Referring Physician:  Souleymane Lobato MD  Group:  [facility]  Cardiology Fellow: Belkis Jj MD  Interpreting Physician:  Lasha Og MD    SUMMARY    LEFT VENTRICLE:  Size was normal.  Systolic function was normal. Ejection fraction was estimated to be 60 %. There were no regional wall motion abnormalities. Wall thickness was mildly increased. LEFT ATRIUM:  The atrium was mildly to moderately dilated. MITRAL VALVE:  There was moderate regurgitation. AORTIC VALVE:  There was mild to moderate regurgitation. TRICUSPID VALVE:  There was mild regurgitation. Pulmonary artery systolic pressure was within the normal range. HISTORY: PRIOR HISTORY: Atrial fibrillation. DVT. Aneurysm of ascending aorta. PROCEDURE: The procedure was performed at the bedside. This was a routine study. The transthoracic approach was used. The study included complete 2D imaging, M-mode, complete spectral Doppler, and color Doppler. The heart rate was 86 bpm,  at the start of the study. Images were obtained from the parasternal, apical, subcostal, and suprasternal notch acoustic windows. Image quality was adequate.     LEFT VENTRICLE: Size was normal. Systolic function was normal. Ejection fraction was estimated to be 60 %. There were no regional wall motion abnormalities. Wall thickness was mildly increased. The changes were consistent with concentric  remodeling (increased wall thickness with normal wall mass). DOPPLER: Transmitral flow pattern: atrial fibrillation. RIGHT VENTRICLE: The size was normal. Systolic function was normal.    LEFT ATRIUM: The atrium was mildly to moderately dilated. RIGHT ATRIUM: The atrium was mildly dilated. MITRAL VALVE: Valve structure was normal. There was normal leaflet separation. DOPPLER: The transmitral velocity was within the normal range. There was no evidence for stenosis. There was moderate regurgitation. AORTIC VALVE: The valve was trileaflet. Leaflets exhibited mild to moderate calcification, normal cuspal separation, and sclerosis. DOPPLER: Transaortic velocity was within the normal range. There was no evidence for stenosis. There was  mild to moderate regurgitation. TRICUSPID VALVE: The valve structure was normal. There was normal leaflet separation. DOPPLER: The transtricuspid velocity was within the normal range. There was no evidence for stenosis. There was mild regurgitation. Pulmonary artery  systolic pressure was within the normal range. Estimated peak PA pressure was 27 mmHg. PULMONIC VALVE: DOPPLER: The transpulmonic velocity was within the normal range. There was mild regurgitation. PERICARDIUM: There was no pericardial effusion. AORTA: The root exhibited normal size for BSA at 38 mm. SYSTEMIC VEINS: IVC: The inferior vena cava was normal in size and course.  Respirophasic changes were normal.    SYSTEM MEASUREMENT TABLES    2D  %FS: 28.01 %  Ao Diam: 3.58 cm  EDV(Teich): 122.74 ml  EF(Teich): 53.93 %  ESV(Teich): 56.54 ml  IVSd: 1.25 cm  LA Area: 31.62 cm2  LA Diam: 4.26 cm  LVEDV MOD A4C: 156.26 ml  LVEF MOD A4C: 60.45 %  LVESV MOD A4C: 61.8 ml  LVIDd: 5.08 cm  LVIDs: 3.66 cm  LVLd A4C: 8.59 cm  LVLs A4C: 7.52 cm  LVPWd: 1.33 cm  RA Area: 31.07 cm2  RVIDd: 4.15 cm  SV MOD A4C: 94.47 ml  SV(Teich): 66.2 ml    CW  AR Dec Blaine: 3.14 m/s2  AR Dec Time: 1574.47 ms  AR PHT: 456.6 ms  AR Vmax: 4.92 m/s  AR maxP.95 mmHg  AV Env. Ti: 251.19 ms  AV VTI: 20.76 cm  AV Vmax: 1.24 m/s  AV Vmean: 0.83 m/s  AV maxP.2 mmHg  AV meanPG: 3.14 mmHg  TR Vmax: 2.38 m/s  TR maxP.7 mmHg    MM  TAPSE: 2.34 cm    PW  LVOT Env. Ti: 251.19 ms  LVOT VTI: 14.87 cm  LVOT Vmax: 0.85 m/s  LVOT Vmean: 0.59 m/s  LVOT maxP.9 mmHg  LVOT meanP.62 mmHg  MV A Luis A: 0.65 m/s  MV Dec Blaine: 4.08 m/s2  MV DecT: 171.95 ms  MV E Luis A: 0.7 m/s  MV E/A Ratio: 1.08    [facility]ly [facility] Accredited Echocardiography Laboratory    Prepared and electronically signed by    Dasia Rice MD  Signed 2021 14:53:21      VTE Pharmacologic Prophylaxis: Heparin  VTE Mechanical Prophylaxis: sequential compression device

## 2023-07-17 NOTE — PROGRESS NOTES
Progress Note - Cardiothoracic Surgery   Lei Chapman. 77 y.o. male MRN: [id number]  Unit/Bed#: [facility] 411-01 Encounter: [id number]    Aortic regurgitation, Atrial fibrillation, Mitral regurgitation. S/P aortic valve replacement, left atrial appendage ligation and mitral valve repair; POD # 5      24 Hour Events: No events. Weaned to room air. + BM. NPO for cardioversion today.      Medications:   Scheduled Meds:  Current Facility-Administered Medications   Medication Dose Route Frequency Provider Last Rate   • acetaminophen  975 mg Oral Q8H Arturo Gomez PA-C     • ascorbic acid  500 mg Oral Daily Jose R Callahan PA-C     • aspirin  81 mg Oral Daily Ava Claudio PA-C     • atorvastatin  80 mg Oral Daily With [facility], PA-C     • bisacodyl  10 mg Rectal Daily PRN Jose R Callahan PA-C     • cholecalciferol  1,000 Units Oral Daily Jose R Callahan PA-C     • docusate sodium  100 mg Oral BID Jose R Callahan PA-C     • fondaparinux  2.5 mg Subcutaneous Daily Jose R Callahan PA-C     • insulin lispro  1-5 Units Subcutaneous TID  Arturo Gomez PA-C     • insulin lispro  1-5 Units Subcutaneous HS Jose R Callahan PA-C     • lidocaine  1 patch Topical Daily Terrial Holes, CRNP     • methocarbamol  500 mg Oral Q6H PRN Terrial Holes, CRNP     • metoclopramide  10 mg Intravenous Q6H PRN Levorn Epley, PA-C     • mupirocin  1 Application Nasal Q25N [address] Arturo Gomez PA-C     • ondansetron  4 mg Intravenous Q6H PRN Jose R Callahan PA-C     • pancrelipase (Lip-Prot-Amyl)  24,000 Units Oral TID With Meals Jose R Callahan PA-C     • pantoprazole  40 mg Oral Daily Jose R Callahan PA-C     • polyethylene glycol  17 g Oral Daily Jose R Callahan PA-C     • potassium chloride  20 mEq Oral Daily Ava Claudio PA-C     • saccharomyces boulardii  250 mg Oral BID Jose R Callahan PA-C     • temazepam  15 mg Oral HS PRN Jose R Callahan PA-C     • torsemide  20 mg Oral BID Ava Patterson BRYAN Claudio     • traMADol  25 mg Oral Q6H PRN Danilo Mcarthur PA-C     • traMADol  50 mg Oral Q6H PRN Danilo Mcarthur PA-C       Continuous Infusions:   PRN Meds:.•  bisacodyl  •  methocarbamol  •  metoclopramide  •  ondansetron  •  temazepam  •  traMADol  •  traMADol    Vitals:   Vitals:    07/16/23 1900 07/16/23 2240 07/17/23 0334 07/17/23 0549   BP: 113/68 107/62 132/74    BP Location: Right arm Right arm Right arm    Pulse: 62 61 (!) 54    Resp: 18  18    Temp: 98.4 °F (36.9 °C) 99.1 °F (37.3 °C) 97.7 °F (36.5 °C)    TempSrc: Oral Oral Oral    SpO2: 96% 95% 95%    Weight:    118 kg (261 lb 3.9 oz)   Height:           Telemetry: A-Flutter; Heart Rate: 60. Into the 40's overnight. Respiratory:   SpO2: SpO2: 95 %; Room Air    Intake/Output:     Intake/Output Summary (Last 24 hours) at 7/17/2023 0657  Last data filed at 7/17/2023 0535  Gross per 24 hour   Intake 410 ml   Output 1925 ml   Net -1515 ml        Weights:   Weight (last 2 days)     Date/Time Weight    07/17/23 0549 118 (261.25)    07/16/23 0600 120 (263.89)    07/15/23 0545 121 (267.86)            Results:   NO CBC/BMP today  Results from last 7 days   Lab Units 07/17/23  0533 07/15/23  0530 07/14/23  0349   WBC Thousand/uL 7.98 13.10* 15.87*   HEMOGLOBIN g/dL 14.4 14.9 14.3   HEMATOCRIT % 41.5 44.0 43.6   PLATELETS Thousands/uL 149 121* 116*     Results from last 7 days   Lab Units 07/17/23  0533 07/15/23  0530 07/14/23  0349 07/12/23  1821 07/12/23  1342   SODIUM mmol/L 138 138 139   < >  --    POTASSIUM mmol/L 3.8 4.2 5.0   < >  --    CHLORIDE mmol/L 107 108 109*   < >  --    CO2 mmol/L 30 30 28   < >  --    CO2, I-STAT mmol/L  --   --   --   --  21   BUN mg/dL 19 25 26*   < >  --    CREATININE mg/dL 0.75 0.82 1.14   < >  --    GLUCOSE, ISTAT mg/dl  --   --   --   --  160*   CALCIUM mg/dL 9.1 8.9 9.0   < >  --     < > = values in this interval not displayed.      Results from last 7 days   Lab Units 07/17/23  0533 07/16/23  0526 07/15/23  0530 07/14/23  0349 07/12/23  1336   INR  1.73* 1.44* 1.34*   < > 1.38*   PTT seconds  --   --   --   --  32    < > = values in this interval not displayed. Coumadin:   7/17 - 5  7/16 - 7.5  7/15 - 2.5  7/14 - 2.5    Point of care glucose:     Studies:  None today    I have personally reviewed pertinent reports. and I have personally reviewed pertinent films in PACS    Invasive Lines/Tubes:  Invasive Devices     Central Venous Catheter Line  Duration           CVC Central Lines 07/12/23 Triple 4 days            Physical Exam:    HEENT/NECK:  Normocephalic. Atraumatic. No jugular venous distention. Cardiac: Irregularly irregular rate and rhythm and Bradycardic  Pulmonary:  Breath sounds clear bilaterally and No rales/rhonchi/wheezes  Abdomen:  Non-tender and Non-distended  Incisions: Sternum is stable. Incision is clean, dry, and intact. Extremities: Extremities warm/dry and 1+ edema B/L  Neuro: Alert and oriented X 3  Skin: Warm/Dry, without rashes or lesions. Assessment:  Principal Problem:    S/P AVR  Active Problems:    History of DVT (deep vein thrombosis)    Benign essential hypertension    Mitral valve insufficiency    Bilateral popliteal artery aneurysm (HCC)    Mixed hyperlipidemia    Aneurysm of ascending aorta (HCC)    Pulmonary hypertension (HCC)    Aortic insufficiency    Permanent atrial fibrillation (HCC)    Coronary artery disease involving native coronary artery    S/P MVR (mitral valve repair)    S/P left atrial appendage ligation    PAD (peripheral artery disease) ([facility])    Hyperchloremia       Aortic regurgitation, Atrial fibrillation, Mitral regurgitation. S/P aortic valve replacement, left atrial appendage ligation and mitral valve repair; POD # 5    Plan:    1.  Cardiac:    A-flutter 60    Hold Amiodarone/Beta blocker    NPO for Cardioversion/poss PPM today         Anticoagulated for Afib   INR 1.73, Dose Coumadin, 5 mg today   Pacing wires are out   D/C DVT prophylaxis    Continue ASA and Statin therapy    Maintain central IV access today for medications requiring central IV access    PCI to OM1 as outpatient    2. Pulmonary:   Good Room air oxygen saturation; Continue incentive spirometry/Coughing/Deep breathing exercises      3. Renal:   Post op Creatinine stable; Follow up labs prn   Intake/Output net: -1500 mL/24 hours  Diuretic Regimen:  Decrease to to Torsemide 20mg QD    4. Neuro:  Neurologically intact; No active issues  Incisional pain well-controlled  Continue Tylenol, 975 mg PO q 8, standing dose  Continue Oxycodone, 2.5PO q 4 hours prn pain  Cotn Robaxin PRN  Cont Tramadol PRN    5. GI:  Cardiac diet, with 1800 mL fluid restriction  Continue stool softeners and prn suppository  Continue GI prophylaxis    6. Endo:   Glucose well-controlled with sliding scale coverage    7    Hematology:    Post-operative blood count acceptable; Trend prn    8.    Disposition:      Not yet medically cleared for discharge, pending rhythm     VTE Pharmacologic Prophylaxis: Warfarin (Coumadin)  VTE Mechanical Prophylaxis: sequential compression device    Collaborative rounds completed with supervising physician  Plan of care discussed with bedside nurse    SIGNATURE: Florance Hatchet, PA-C  DATE: July 17, 2023  TIME: 6:57 AM

## 2023-07-17 NOTE — PLAN OF CARE
Problem: PHYSICAL THERAPY ADULT  Goal: Performs mobility at highest level of function for planned discharge setting. See evaluation for individualized goals. Description: Treatment/Interventions: Functional transfer training, LE strengthening/ROM, Elevations, Therapeutic exercise, Endurance training, Patient/family training, Equipment eval/education, Bed mobility, Gait training, Spoke to nursing, OT (Spoke to [employer])  Equipment Recommended: Asael Arreola, [facility])       See flowsheet documentation for full assessment, interventions and recommendations. Outcome: Progressing  Note: Prognosis: Good  Problem List: Decreased strength, Decreased endurance, Impaired balance, Decreased mobility  Assessment: Pt cont to gradually improve in balance and mobility skills achieving (S) level w/ transfers and amb w/ rw; pt still remains to be generally weak and deconditioned w/ HR in the 50s bpm t/o the session (pt indicates he may need a PPM); will cont to follow pt in PT for graded mobilization to max endurance and safety; will need to address elevations prior to D/C; home PT follow up is recommended  Barriers to Discharge: None     PT Discharge Recommendation: Home with home health rehabilitation    See flowsheet documentation for full assessment.

## 2023-07-17 NOTE — ANESTHESIA POSTPROCEDURE EVALUATION
Post-Op Assessment Note    CV Status:  Stable  Pain Score: 0    Pain management: adequate     Mental Status:  Sleepy   Hydration Status:  Stable   PONV Controlled:  Controlled   Airway Patency:  Patent      Post Op Vitals Reviewed: Yes      Staff: Anesthesiologist, CRNA         No notable events documented.     BP   134/72   Temp      Pulse 45   Resp   18   SpO2   98

## 2023-07-17 NOTE — PHYSICAL THERAPY NOTE
PHYSICAL THERAPY NOTE          Patient Name: Caitlin Sidhu. Today's Date: 7/17/2023 07/17/23 1059   PT Last Visit   PT Visit Date 07/17/23   Note Type   Note Type Treatment   Pain Assessment   Pain Assessment Tool 0-10   Pain Score No Pain   Restrictions/Precautions   Other Precautions Cardiac/sternal;Multiple lines;Telemetry   General   Chart Reviewed Yes   Additional Pertinent History pt undewent unsuccessful RANDALL/DCCV earlier this morning; cleared for Tx session/mobilization (spoke to nsg)   Response to Previous Treatment Patient with no complaints from previous session. Cognition   Overall Cognitive Status WFL   Arousal/Participation Alert; Cooperative   Attention Within functional limits   Orientation Level Oriented to person;Oriented to place;Oriented to situation   Memory Within functional limits   Following Commands Follows one step commands without difficulty   Subjective   Subjective Alert; in the chair; denies dizziness or discomfort; agreeable to mobilize   Transfers   Sit to Stand 5  Supervision   Stand to Sit 5  Supervision   Ambulation/Elevation   Gait pattern Excessively slow; Short stride; Inconsistent cherrie   Gait Assistance 5  Supervision   Additional items Assist x 1;Verbal cues; Tactile cues   Assistive Device Rolling walker   Distance 110 ft   Stair Management Assistance Not tested   Balance   Static Sitting Good   Dynamic Sitting Fair +   Static Standing Fair   Dynamic Standing Fair -   Ambulatory Fair -   Activity Tolerance   Activity Tolerance Patient tolerated treatment well  (HR in the 50s bpm t/o the session)   Nurse Made Aware spoke to [address]   Exercises   Knee AROM Long Arc Quad Sitting;15 reps;AROM; Bilateral   Ankle Pumps Sitting;15 reps;AROM; Bilateral   Marching Sitting;10 reps;AROM; Bilateral   Assessment   Prognosis Good   Problem List Decreased strength;Decreased endurance; Impaired balance;Decreased mobility   Assessment Pt cont to gradually improve in balance and mobility skills achieving (S) level w/ transfers and amb w/ rw; pt still remains to be generally weak and deconditioned w/ HR in the 50s bpm t/o the session (pt indicates he may need a PPM); will cont to follow pt in PT for graded mobilization to max endurance and safety; will need to address elevations prior to D/C; home PT follow up is recommended   Goals   Patient Goals to go home   STG Expiration Date 07/23/23   PT Treatment Day 2   Plan   Treatment/Interventions Functional transfer training;Elevations;LE strengthening/ROM; Therapeutic exercise; Endurance training;Equipment eval/education; Bed mobility;Gait training;Spoke to nursing;Spoke to case management   Progress Progressing toward goals   PT Frequency 4-6x/wk   Recommendation   PT Discharge Recommendation Home with home health rehabilitation   Equipment Recommended Walker; Other (Comment)  (BSC)   Walker Package Recommended Wheeled walker   AM-PAC Basic Mobility Inpatient   Turning in Flat Bed Without Bedrails 4   Lying on Back to Sitting on Edge of Flat Bed Without Bedrails 3   Moving Bed to Chair 3   Standing Up From Chair Using Arms 3   Walk in Room 3   Climb 3-5 Stairs With Railing 3   Basic Mobility Inpatient Raw Score 19   Basic Mobility Standardized Score 42.48   Highest Level Of Mobility   JH-HLM Goal 6: Walk 10 steps or more   JH-HLM Achieved 7: Walk 25 feet or more   Education   Education Provided Mobility training;Home exercise program;Assistive device   Patient Demonstrates verbal understanding   End of Consult   Patient Position at End of Consult Bedside chair; All needs within [facility]

## 2023-07-18 LAB
APTT PPP: 76 SECONDS (ref 23–37)
GLUCOSE SERPL-MCNC: 109 MG/DL (ref 65–140)
GLUCOSE SERPL-MCNC: 109 MG/DL (ref 65–140)
GLUCOSE SERPL-MCNC: 122 MG/DL (ref 65–140)
GLUCOSE SERPL-MCNC: 157 MG/DL (ref 65–140)
INR PPP: 2.33 (ref 0.84–1.19)
PROTHROMBIN TIME: 25.8 SECONDS (ref 11.6–14.5)

## 2023-07-18 PROCEDURE — 82948 REAGENT STRIP/BLOOD GLUCOSE: CPT

## 2023-07-18 PROCEDURE — 85730 THROMBOPLASTIN TIME PARTIAL: CPT | Performed by: THORACIC SURGERY (CARDIOTHORACIC VASCULAR SURGERY)

## 2023-07-18 PROCEDURE — 85610 PROTHROMBIN TIME: CPT | Performed by: PHYSICIAN ASSISTANT

## 2023-07-18 PROCEDURE — 99024 POSTOP FOLLOW-UP VISIT: CPT | Performed by: THORACIC SURGERY (CARDIOTHORACIC VASCULAR SURGERY)

## 2023-07-18 PROCEDURE — 97530 THERAPEUTIC ACTIVITIES: CPT

## 2023-07-18 PROCEDURE — 99232 SBSQ HOSP IP/OBS MODERATE 35: CPT | Performed by: INTERNAL MEDICINE

## 2023-07-18 RX ORDER — WARFARIN SODIUM 5 MG/1
5 TABLET ORAL
Status: COMPLETED | OUTPATIENT
Start: 2023-07-18 | End: 2023-07-18

## 2023-07-18 RX ORDER — CEFAZOLIN SODIUM 2 G/50ML
2000 SOLUTION INTRAVENOUS ONCE
Status: COMPLETED | OUTPATIENT
Start: 2023-07-19 | End: 2023-07-19

## 2023-07-18 RX ADMIN — ATORVASTATIN CALCIUM 80 MG: 80 TABLET, FILM COATED ORAL at 16:02

## 2023-07-18 RX ADMIN — Medication 1000 UNITS: at 08:02

## 2023-07-18 RX ADMIN — ASPIRIN 81 MG: 81 TABLET, COATED ORAL at 08:02

## 2023-07-18 RX ADMIN — ACETAMINOPHEN 975 MG: 325 TABLET, FILM COATED ORAL at 21:46

## 2023-07-18 RX ADMIN — POTASSIUM CHLORIDE 20 MEQ: 1500 TABLET, EXTENDED RELEASE ORAL at 08:02

## 2023-07-18 RX ADMIN — ACETAMINOPHEN 975 MG: 325 TABLET, FILM COATED ORAL at 04:40

## 2023-07-18 RX ADMIN — OXYCODONE HYDROCHLORIDE AND ACETAMINOPHEN 500 MG: 500 TABLET ORAL at 08:02

## 2023-07-18 RX ADMIN — TORSEMIDE 20 MG: 20 TABLET ORAL at 08:02

## 2023-07-18 RX ADMIN — PANTOPRAZOLE SODIUM 40 MG: 40 TABLET, DELAYED RELEASE ORAL at 08:02

## 2023-07-18 RX ADMIN — DOCUSATE SODIUM 100 MG: 100 CAPSULE ORAL at 17:34

## 2023-07-18 RX ADMIN — LIDOCAINE 5% 1 PATCH: 700 PATCH TOPICAL at 08:02

## 2023-07-18 RX ADMIN — PANCRELIPASE 24000 UNITS: 24000; 76000; 120000 CAPSULE, DELAYED RELEASE PELLETS ORAL at 11:26

## 2023-07-18 RX ADMIN — WARFARIN SODIUM 5 MG: 5 TABLET ORAL at 17:34

## 2023-07-18 RX ADMIN — PANCRELIPASE 24000 UNITS: 24000; 76000; 120000 CAPSULE, DELAYED RELEASE PELLETS ORAL at 08:05

## 2023-07-18 RX ADMIN — PANCRELIPASE 24000 UNITS: 24000; 76000; 120000 CAPSULE, DELAYED RELEASE PELLETS ORAL at 16:01

## 2023-07-18 RX ADMIN — Medication 250 MG: at 17:34

## 2023-07-18 RX ADMIN — Medication 250 MG: at 08:02

## 2023-07-18 RX ADMIN — POLYETHYLENE GLYCOL 3350 17 G: 17 POWDER, FOR SOLUTION ORAL at 08:03

## 2023-07-18 RX ADMIN — DOCUSATE SODIUM 100 MG: 100 CAPSULE ORAL at 08:02

## 2023-07-18 RX ADMIN — ACETAMINOPHEN 975 MG: 325 TABLET, FILM COATED ORAL at 14:07

## 2023-07-18 NOTE — PLAN OF CARE
Problem: PHYSICAL THERAPY ADULT  Goal: Performs mobility at highest level of function for planned discharge setting. See evaluation for individualized goals. Description: Treatment/Interventions: Functional transfer training, LE strengthening/ROM, Elevations, Therapeutic exercise, Endurance training, Patient/family training, Equipment eval/education, Bed mobility, Gait training, Spoke to nursing, OT (Spoke to [employer])  Equipment Recommended: Anisha Galvan, Other (Comment) [facility])       See flowsheet documentation for full assessment, interventions and recommendations. Outcome: Progressing  Note: Prognosis: Good  Problem List: Decreased strength, Decreased endurance, Impaired balance, Decreased mobility  Assessment: Pt demonstrated increased tolerance to mobilization ambulating further distances w/ rw and (S) provided; no c/o dizziness reported w/ HR in the 50s bpm for the most part t/o the session. At this time, cont to recommend home PT follow up upon returning home; will follow  Barriers to Discharge: None     PT Discharge Recommendation: Home with home health rehabilitation    See flowsheet documentation for full assessment.

## 2023-07-18 NOTE — PROGRESS NOTES
Progress Note - Cardiothoracic Surgery   Abril Livingston. 77 y.o. male MRN: [id number]  Unit/Bed#: [facility] 411-01 Encounter: [id number]    Aortic regurgitation, Atrial fibrillation, Mitral regurgitation. S/P aortic valve replacement, left atrial appendage ligation and mitral valve repair; POD # 6    24 Hour Events: Overnight heart rate into 40s at his lowest.  No complaints this morning. Moving around well. Tolerating diet. Pain controlled.      Medications:   Scheduled Meds:  Current Facility-Administered Medications   Medication Dose Route Frequency Provider Last Rate   • acetaminophen  975 mg Oral Q8H Arturo Gomez PA-C     • ascorbic acid  500 mg Oral Daily BRYAN Brown     • aspirin  81 mg Oral Daily Loomis Eduard Claudio PA-C     • atorvastatin  80 mg Oral Daily With Versa AbiBRYAN mccoy     • bisacodyl  10 mg Rectal Daily PRN BRYAN Brown     • cholecalciferol  1,000 Units Oral Daily BRYAN Brown     • docusate sodium  100 mg Oral BID BRYAN Brown     • heparin (porcine)  3-30 Units/kg/hr (Order-Specific) Intravenous Titrated Miracle Rios PA-C 14 Units/kg/hr (07/17/23 2244)   • heparin (porcine)  9,200 Units Intravenous Once Miracle Rios PA-C     • insulin lispro  1-5 Units Subcutaneous TID AC Arturo Gomez PA-C     • insulin lispro  1-5 Units Subcutaneous HS BRYAN Brown     • lidocaine  1 patch Topical Daily Ora Portal, CRNP     • methocarbamol  500 mg Oral Q6H PRN Ora Portal, CRNP     • metoclopramide  10 mg Intravenous Q6H PRN Saurabh Valenzuela PA-C     • ondansetron  4 mg Intravenous Q6H PRN BRYAN Brown     • pancrelipase (Lip-Prot-Amyl)  24,000 Units Oral TID With Meals BRYAN Brown     • pantoprazole  40 mg Oral Daily BRYAN Brown     • polyethylene glycol  17 g Oral Daily BRYAN Brown     • potassium chloride  20 mEq Oral Daily Salome BRYAN Riddle     • saccharomyces boulardii  250 mg Oral BID Kentrell Mojica PA-C     • temazepam  15 mg Oral HS PRN Kentrell Mojica PA-C     • torsemide  20 mg Oral Daily Markel Russell PA-C     • traMADol  25 mg Oral Q6H PRN Selvin Waggoner PA-C     • traMADol  50 mg Oral Q6H PRN Selvin Waggoner PA-C       Continuous Infusions:heparin (porcine), 3-30 Units/kg/hr (Order-Specific), Last Rate: 14 Units/kg/hr (07/17/23 2244)      PRN Meds:.•  bisacodyl  •  methocarbamol  •  metoclopramide  •  ondansetron  •  temazepam  •  traMADol  •  traMADol    Vitals:   Vitals:    07/17/23 2322 07/18/23 0405 07/18/23 0600 07/18/23 0658   BP: 128/72 131/83  132/66   BP Location:  Right arm  Right arm   Pulse: (!) 53 56  (!) 51   Resp:  18  18   Temp: 98.2 °F (36.8 °C) 98.3 °F (36.8 °C)  98 °F (36.7 °C)   TempSrc: Oral Oral  Oral   SpO2:  95%  95%   Weight:   118 kg (259 lb 7.7 oz)    Height:           Telemetry: NSR; Heart Rate: 68    Respiratory:   SpO2: SpO2: 95 %;  Room Air    Intake/Output:     Intake/Output Summary (Last 24 hours) at 7/18/2023 0745  Last data filed at 7/18/2023 0405  Gross per 24 hour   Intake 711.38 ml   Output 675 ml   Net 36.38 ml      Weights:   Weight (last 2 days)     Date/Time Weight    07/18/23 0600 118 (259.48)    07/17/23 0948 118 (261)    07/17/23 0549 118 (261.25)    07/16/23 0600 120 (263.89)            Results:   Results from last 7 days   Lab Units 07/17/23  0533 07/15/23  0530 07/14/23  0349   WBC Thousand/uL 7.98 13.10* 15.87*   HEMOGLOBIN g/dL 14.4 14.9 14.3   HEMATOCRIT % 41.5 44.0 43.6   PLATELETS Thousands/uL 149 121* 116*     Results from last 7 days   Lab Units 07/17/23  0533 07/15/23  0530 07/14/23  0349 07/12/23  1821 07/12/23  1342   SODIUM mmol/L 138 138 139   < >  --    POTASSIUM mmol/L 3.8 4.2 5.0   < >  --    CHLORIDE mmol/L 107 108 109*   < >  --    CO2 mmol/L 30 30 28   < >  --    CO2, I-STAT mmol/L  --   --   --   --  21   BUN mg/dL 19 25 26*   < >  --    CREATININE mg/dL 0.75 0.82 1.14   < >  --    GLUCOSE, ISTAT mg/dl  -- --   --   --  160*   CALCIUM mg/dL 9.1 8.9 9.0   < >  --     < > = values in this interval not displayed. Results from last 7 days   Lab Units 23  0440 23  2217 23  1448 23  0533 23  0526   INR  2.33*  --   --  1.73* 1.44*   PTT seconds 76* 95* 104*  --   --      Coumadin dosin/17 -  - 7.5  7/15 - 5   - 2.5   - 2.5    Point of care glucose: 102-143    Invasive Lines/Tubes:  Invasive Devices     Central Venous Catheter Line  Duration           CVC Central Lines 23 Triple 5 days                Physical Exam:    HEENT/NECK:  Normocephalic. Atraumatic. No jugular venous distention. Cardiac: Regular rate and rhythm  Pulmonary:  Breath sounds clear bilaterally  Abdomen:  Non-tender, Non-distended and Normal bowel sounds  Incisions: Sternum is stable. Incision is clean, dry, and intact. Extremities: Extremities warm/dry and Trace edema B/L  Neuro: Alert and oriented X 3  Skin: Warm/Dry, without rashes or lesions. Assessment:  Principal Problem:    S/P AVR  Active Problems:    History of DVT (deep vein thrombosis)    Benign essential hypertension    Mitral valve insufficiency    Bilateral popliteal artery aneurysm ([facility])    Mixed hyperlipidemia    Aneurysm of ascending aorta ([facility])    Pulmonary hypertension ([facility])    Aortic insufficiency    Permanent atrial fibrillation ([facility])    Coronary artery disease involving native coronary artery    S/P MVR (mitral valve repair)    S/P left atrial appendage ligation    PAD (peripheral artery disease) ([facility])    Hyperchloremia       Aortic regurgitation, Atrial fibrillation, Mitral regurgitation. S/P aortic valve replacement, left atrial appendage ligation and mitral valve repair; POD # 6    Plan:    1.  Cardiac:   Persistent atrial fib/flutter, had 2 prior ablations and yesterday unsuccessful DCCV  INR therapeutic at 2.33 - 5 mg coumadin tonight, d/c IV heparin  Follow up EP planning for PPM need   No amiodarone or BB secondary to arrhythmias - not clear if heart block or accelerated junctional per EP  Continue ASA and Statin therapy  Epicardial pacing wires have been removed  Maintain central IV access today for lack of peripheral access  Continue DVT prophylaxis    2. Pulmonary:   Good Room air oxygen saturation; Continue incentive spirometry/Coughing/Deep breathing exercises  Chest tubes have been discontinued    3. Renal:   Post op Creatinine stable; Follow up labs prn   Intake/Output net: +36 mL/24 hours  Diuretic Regimen: on daily torsemide     4. Neuro:  Neurologically intact; No active issues  Incisional pain well-controlled    5. GI:  Cardiac diet, with 1800 mL fluid restriction  Continue stool softeners and prn suppository  Continue GI prophylaxis    6. Endo:   Glucose well-controlled with sliding scale coverage    7    Hematology:    Post-operative blood count acceptable; Trend prn    8.    Disposition:      Anticipated discharge date: when EP cleared    [facility]     VTE Pharmacologic Prophylaxis: Heparin and Warfarin (Coumadin)  VTE Mechanical Prophylaxis: sequential compression device    Collaborative rounds completed with supervising physician  Plan of care discussed with bedside nurse    SIGNATURE: Florin Pagan PA-C  DATE: July 18, 2023  TIME: 7:45 AM

## 2023-07-18 NOTE — PROGRESS NOTES
Progress Note - Electrophysiology - Cardiology  Rachel Martinez 77 y.o. male MRN: [id number]  Unit/Bed#: [facility] 411-01 Encounter: [id number]      Assessment:    1. Symptomatic radycardia - Afib with heart block, junctional escape in 50s  -- telemetry reviewed today - remains in Afib with regular junctional escape in 50s  -- patient is fatigued, HR does not improve with ambulation  -- recommend implantation of a permanent pacemaker (dual chamber in case rhythm control is pursued in the future)   -- epicardial wires D/C'd 7/16/23  -- continue to monitor on tele  -- heparin D/C'd, continue warfarin  -- NPO after midnight for PPM implant tomorrow    2. Persistent AFib  -- s/p 2 prior Afib ablations by Dr. Micheal Nieto (PVI/CTI 2018, PVI/LA posterior wall 2022), now with recurrence  -- unsuccessful RANDALL/DCCV 7/17 (2 shocks with ERAF)  -- continue warfarin, heparin D/C'd today as INR is >2.0  -- continue warfarin x 1 month, then likely change back to Eliquis  -- [id number] = 5 (HF, HTN, age, PAD)  -- amiodarone and metoprolol held last week due to bradycardia  -- h/o medication non-compliance (patient prefers not to take medications)    3. Moderate AI/severe MR s/p bioprosthetic AVR (29 mm Escalante Inspiris valve) and MV repair (36 mm Escalante physio II ring annuloplasty), JENNY ligation 7/12/2023 by Dr. Lenin Garibay  -- management per CT surgery    4. CAD  -- 80% OM1 lesion noted on cardiac catheterization 5/2023  -- eventual elective outpatient PCI to OM1     5. Cardiomyopathy EF 45% on final intraoperative RANDALL (previously 60%)  6. Ascending aortic aneurysm 4.5 cm  7. HTN  8. HLD  9. PAD s/p bilateral popliteal aneurysm repair      Subjective/Objective   Subjective: Rachel Martinez is a 77year old male with a history of Afib s/p 2 prior ablations, now with recurrence who is s/p bio AVR, MV repair, and JENNY ligation on 7/12/23 by Dr. Lenin Garibay.   Post op, he is in Afib with slow ventricular response with a junctional escape at 50 bpm. He was paced with epicardial wires (programmed DDD 80 bpm). Epicardial wires were D/C'd 7/16 and he has an escape at 60 bpm.   He received metoprolol and amiodarone in the post-operative setting which have been held. RANDALL/DCCV was attempted 7/17. He was shocked twice with several beats of sinus, then ERAF both times. Today, he remains in Afib with a junctional escape in the 50s. His heart rate does not increase with ambulation. He reports fatigue. He is OOB in the chair. He denies chest pain, SOB, lightheadedness, dizziness, or palpitations. TELE: Afib with junctional escape HR 50 bpm    EKG 7/17:  Afib with junctional escape HR 54 bpm    Objective:  Vitals: /74 (BP Location: Right arm)   Pulse (!) 53   Temp 98.4 °F (36.9 °C) (Oral)   Resp 18   Ht 6' 3.2" (1.91 m)   Wt 118 kg (259 lb 7.7 oz)   SpO2 97%   BMI 32.26 kg/m²     Vitals:    07/17/23 0948 07/18/23 0600   Weight: 118 kg (261 lb) 118 kg (259 lb 7.7 oz)     Orthostatic Blood Pressures    Flowsheet Row Most Recent Value   Blood Pressure 117/74 filed at 07/18/2023 1138   Patient Position - Orthostatic VS Sitting filed at 07/18/2023 1138            Intake/Output Summary (Last 24 hours) at 7/18/2023 1230  Last data filed at 7/18/2023 1226  Gross per 24 hour   Intake 1124.21 ml   Output 1134 ml   Net -9.79 ml       Invasive Devices     Central Venous Catheter Line  Duration           CVC Central Lines 07/12/23 Triple 6 days                Scheduled Meds:  Current Facility-Administered Medications   Medication Dose Route Frequency Provider Last Rate   • acetaminophen  975 mg Oral Q8H Arturo Gomez PA-C     • ascorbic acid  500 mg Oral Daily Kristie Valdovinos PA-C     • aspirin  81 mg Oral Daily Rebecca Claudio PA-C     • atorvastatin  80 mg Oral Daily With [facility], PA-C     • bisacodyl  10 mg Rectal Daily PRN Kristie Valdovinos PA-C     • [START ON 7/19/2023] cefazolin  2,000 mg Intravenous Once Bossman Jimenez BRYAN Rios     • cholecalciferol  1,000 Units Oral Daily Janet Tadeo PA-C     • docusate sodium  100 mg Oral BID Janet Tadeo PA-C     • insulin lispro  1-5 Units Subcutaneous TID AC Arturo Goemz PA-C     • insulin lispro  1-5 Units Subcutaneous HS Janet Tadeo PA-C     • lidocaine  1 patch Topical Daily DHEERAJ Trejo     • methocarbamol  500 mg Oral Q6H PRN DHEERAJ Trejo     • metoclopramide  10 mg Intravenous Q6H PRN Linministerio Floyd PA-C     • ondansetron  4 mg Intravenous Q6H PRN Janet Tadeo PA-C     • pancrelipase (Lip-Prot-Amyl)  24,000 Units Oral TID With Meals Janet Tadeo PA-C     • pantoprazole  40 mg Oral Daily Janet Tadeo PA-C     • polyethylene glycol  17 g Oral Daily Janet Tadeo PA-C     • potassium chloride  20 mEq Oral Daily Shannan Shields PA-C     • saccharomyces boulardii  250 mg Oral BID Janet Tadeo PA-C     • temazepam  15 mg Oral HS PRN Janet Tadeo PA-C     • torsemide  20 mg Oral Daily Shannan Shields PA-C     • traMADol  25 mg Oral Q6H PRN Linministerio Floyd PA-C     • traMADol  50 mg Oral Q6H PRN Linministerio Floyd PA-C     • warfarin  5 mg Oral Once (warfarin) Jose Francisco Mckeon PA-C       Continuous Infusions:   PRN Meds:.•  bisacodyl  •  methocarbamol  •  metoclopramide  •  ondansetron  •  temazepam  •  traMADol  •  traMADol    Review of Systems:  Review of Systems   Constitutional: Negative for malaise/fatigue. HENT: Negative. Eyes: Negative. Cardiovascular: Negative for chest pain, near-syncope, palpitations and syncope. Respiratory: Negative for cough and wheezing. Endocrine: Negative. Skin: Negative for rash. Musculoskeletal: Negative for back pain and neck pain. Gastrointestinal: Negative for abdominal pain, nausea and vomiting. Genitourinary: Negative. Neurological: Negative. Psychiatric/Behavioral: Negative. Allergic/Immunologic: Negative.       ROS as noted above, otherwise 12 point review of systems was performed and is negative. Physical Exam:   GEN: NAD, alert and oriented x 3, well appearing  SKIN: warm, dry without significant lesions or rashes. Sternal incision healing well  HEENT: NCAT, PERRL, EOMs intact  NECK: supple, no JVD appreciated  CARDIOVASCULAR: RRR, normal S1, S2 without murmurs, rubs, or gallops   LUNGS: CTA bilaterally without wheezes, rhonchi, or rales  ABDOMEN: Soft, nontender, nondistended. EXTREMITIES/VASCULAR: perfused without clubbing, cyanosis, or LE edema b/l  PSYCH: Normal mood and affect  NEURO: CN ll-Xll grossly intact    Lab Results: I have personally reviewed pertinent lab results. Results from last 7 days   Lab Units 07/17/23  0533 07/15/23  0530 07/14/23  0349   WBC Thousand/uL 7.98 13.10* 15.87*   HEMOGLOBIN g/dL 14.4 14.9 14.3   HEMATOCRIT % 41.5 44.0 43.6   PLATELETS Thousands/uL 149 121* 116*     Results from last 7 days   Lab Units 07/17/23  0533 07/15/23  0530 07/14/23  0349 07/12/23  1821 07/12/23  1342   POTASSIUM mmol/L 3.8 4.2 5.0   < >  --    CHLORIDE mmol/L 107 108 109*   < >  --    CO2 mmol/L 30 30 28   < >  --    CO2, I-STAT mmol/L  --   --   --   --  21   BUN mg/dL 19 25 26*   < >  --    CREATININE mg/dL 0.75 0.82 1.14   < >  --    GLUCOSE, ISTAT mg/dl  --   --   --   --  160*   CALCIUM mg/dL 9.1 8.9 9.0   < >  --     < > = values in this interval not displayed. Results from last 7 days   Lab Units 07/18/23  0440 07/17/23  2217 07/17/23  1448 07/17/23  0533 07/16/23  0526   INR  2.33*  --   --  1.73* 1.44*   PTT seconds 76* 95* 104*  --   --      Results from last 7 days   Lab Units 07/14/23  0349 07/13/23  0304   MAGNESIUM mg/dL 2.9* 2.9*       Imaging: I have personally reviewed pertinent reports.     Results for orders placed during the hospital encounter of 02/22/21    Echo complete with contrast if indicated    Narrative  [address] [place] [address] 43  [facility]  [facility], [address] [place] [address]  [phone]    Transthoracic Echocardiogram  2D, M-mode, Doppler, and Color Doppler    Study date:  2021    Patient: Ian Rogers  MR number: [id number]  Account number: [de-identified]  : 1945  Age: 67 years  Gender: Male  Status: Inpatient  Location: Bedside  Height: 78 in  Weight: 242 lb  BP: 122/ 84 mmHg    Indications: Atrial fibrillation. Diagnoses: I48.0 - Atrial fibrillation    Sonographer:  Pancho Neumann RDCS  Primary Physician:  Denny Adler DO  Referring Physician:  Shanice Landeros MD  Group:  [facility]  Cardiology Fellow: Tal Mcmillan MD  Interpreting Physician:  Contreras Aguillon MD    SUMMARY    LEFT VENTRICLE:  Size was normal.  Systolic function was normal. Ejection fraction was estimated to be 60 %. There were no regional wall motion abnormalities. Wall thickness was mildly increased. LEFT ATRIUM:  The atrium was mildly to moderately dilated. MITRAL VALVE:  There was moderate regurgitation. AORTIC VALVE:  There was mild to moderate regurgitation. TRICUSPID VALVE:  There was mild regurgitation. Pulmonary artery systolic pressure was within the normal range. HISTORY: PRIOR HISTORY: Atrial fibrillation. DVT. Aneurysm of ascending aorta. PROCEDURE: The procedure was performed at the bedside. This was a routine study. The transthoracic approach was used. The study included complete 2D imaging, M-mode, complete spectral Doppler, and color Doppler. The heart rate was 86 bpm,  at the start of the study. Images were obtained from the parasternal, apical, subcostal, and suprasternal notch acoustic windows. Image quality was adequate. LEFT VENTRICLE: Size was normal. Systolic function was normal. Ejection fraction was estimated to be 60 %. There were no regional wall motion abnormalities. Wall thickness was mildly increased. The changes were consistent with concentric  remodeling (increased wall thickness with normal wall mass).  DOPPLER: Transmitral flow pattern: atrial fibrillation. RIGHT VENTRICLE: The size was normal. Systolic function was normal.    LEFT ATRIUM: The atrium was mildly to moderately dilated. RIGHT ATRIUM: The atrium was mildly dilated. MITRAL VALVE: Valve structure was normal. There was normal leaflet separation. DOPPLER: The transmitral velocity was within the normal range. There was no evidence for stenosis. There was moderate regurgitation. AORTIC VALVE: The valve was trileaflet. Leaflets exhibited mild to moderate calcification, normal cuspal separation, and sclerosis. DOPPLER: Transaortic velocity was within the normal range. There was no evidence for stenosis. There was  mild to moderate regurgitation. TRICUSPID VALVE: The valve structure was normal. There was normal leaflet separation. DOPPLER: The transtricuspid velocity was within the normal range. There was no evidence for stenosis. There was mild regurgitation. Pulmonary artery  systolic pressure was within the normal range. Estimated peak PA pressure was 27 mmHg. PULMONIC VALVE: DOPPLER: The transpulmonic velocity was within the normal range. There was mild regurgitation. PERICARDIUM: There was no pericardial effusion. AORTA: The root exhibited normal size for BSA at 38 mm. SYSTEMIC VEINS: IVC: The inferior vena cava was normal in size and course. Respirophasic changes were normal.    SYSTEM MEASUREMENT TABLES    2D  %FS: 28.01 %  Ao Diam: 3.58 cm  EDV(Teich): 122.74 ml  EF(Teich): 53.93 %  ESV(Teich): 56.54 ml  IVSd: 1.25 cm  LA Area: 31.62 cm2  LA Diam: 4.26 cm  LVEDV MOD A4C: 156.26 ml  LVEF MOD A4C: 60.45 %  LVESV MOD A4C: 61.8 ml  LVIDd: 5.08 cm  LVIDs: 3.66 cm  LVLd A4C: 8.59 cm  LVLs A4C: 7.52 cm  LVPWd: 1.33 cm  RA Area: 31.07 cm2  RVIDd: 4.15 cm  SV MOD A4C: 94.47 ml  SV(Teich): 66.2 ml    CW  AR Dec [place]: 3.14 m/s2  AR Dec Time: 1574.47 ms  AR PHT: 456.6 ms  AR Vmax: 4.92 m/s  AR maxP.95 mmHg  AV Env. Ti: 251.19 ms  AV VTI: 20.76 cm  AV Vmax: 1.24 m/s  AV Vmean: 0.83 m/s  AV maxP.2 mmHg  AV meanPG: 3.14 mmHg  TR Vmax: 2.38 m/s  TR maxP.7 mmHg    MM  TAPSE: 2.34 cm    PW  LVOT Env. Ti: 251.19 ms  LVOT VTI: 14.87 cm  LVOT Vmax: 0.85 m/s  LVOT Vmean: 0.59 m/s  LVOT maxP.9 mmHg  LVOT meanP.62 mmHg  MV A Luis A: 0.65 m/s  MV Dec [place]: 4.08 m/s2  MV DecT: 171.95 ms  MV E Luis A: 0.7 m/s  MV E/A Ratio: 1.08    [facility] Accredited Echocardiography Laboratory    Prepared and electronically signed by    Dex Murphy MD  Signed [id number] 14:53:21

## 2023-07-18 NOTE — PROGRESS NOTES
Progress Note - Cardiology   Kris Angel. 77 y.o. male MRN: [id number]  Unit/Bed#: [facility] 411-01 Encounter: [id number]    Assessment:  Principal Problem:    S/P AVR  Active Problems:    History of DVT (deep vein thrombosis)    Benign essential hypertension    Mitral valve insufficiency    Bilateral popliteal artery aneurysm (HCC)    Mixed hyperlipidemia    Aneurysm of ascending aorta (HCC)    Pulmonary hypertension (HCC)    Aortic insufficiency    Permanent atrial fibrillation (HCC)    Coronary artery disease involving native coronary artery    S/P MVR (mitral valve repair)    S/P left atrial appendage ligation    PAD (peripheral artery disease) ([address])    Hyperchloremia    Patient is s/p surgical AVR (29 mm Escalante Inspiris valve) and mitral valve repair (36 mm Escalante physio II ring annuloplasty) with left atrial appendage ligation. Maintained on Aspirin and high intensity statin therapy. Post-operatively, patient has been in a narrow complex regular rhythm that is relatively slow. This was initially felt to be a slow atrial fibrillation, however the patient underwent RANDALL/DCCV with rhythm refractory to cardioversion. EP is following, noting that this may represent a narrow accelerated junctional rhythm. Plan:  Continue routine post-operative medications, Aspirin, high intensity statin. Continue coumadin with IV heparin for bridging in the setting of recent DCCV and [id number] = 5.   - Will need close outpatient follow up to determine the need and timing for OM1 PCI in the future for 80% lesion noted on pre-operative cath. I saw and evaluated the patient on 7/17/23 post cardioversion. Delayed note entry due to patient care. Subjective/Objective       Subjective: Patient seen and evaluated. He denies active cardiopulmonary complaints.     Objective:     Vitals: /66 (BP Location: Right arm)   Pulse (!) 51   Temp 98 °F (36.7 °C) (Oral)   Resp 18   Ht 6' 3.2" (1.91 m)   Wt 118 kg (259 lb 7.7 oz)   SpO2 95%   BMI 32.26 kg/m²   Vitals:    07/17/23 0948 07/18/23 0600   Weight: 118 kg (261 lb) 118 kg (259 lb 7.7 oz)     Orthostatic Blood Pressures    Flowsheet Row Most Recent Value   Blood Pressure 132/66 filed at 07/18/2023 9369   Patient Position - Orthostatic VS Sitting filed at 07/18/2023 3980            Intake/Output Summary (Last 24 hours) at 7/18/2023 0838  Last data filed at 7/18/2023 0825  Gross per 24 hour   Intake 941.38 ml   Output 675 ml   Net 266.38 ml       Invasive Devices     Central Venous Catheter Line  Duration           CVC Central Lines 07/12/23 Triple 5 days              Physical Examination:  Gen: A&Ox3  HEENT: no JVD  CVS: Regular rhythm, bradycardia, S1S2 audible. No murmur or rub heard. Resp: Clear to ascultation bilaterally, no wheeze, rales, ronchi. Abd: Soft, non-tender, non-distended. MSK: Trace bilateral LE edema  Neuro: Normal strength and sensory exam.      Lab Results: I have personally reviewed pertinent lab results. Imaging: I have personally reviewed pertinent reports. Telemetry: Narrow complex regular rhythm, no distinct P-waves.      VTE Pharmacologic Prophylaxis: Heparin      Amanda Ambrose MD

## 2023-07-18 NOTE — PROGRESS NOTES
Pastoral Care Progress Note    2023  Patient: Chin Cook. : 1945  Admission Date & Time: 2023 1144  MRN: [id number] CSN: [id number]        Made pastoral care introduction to Pt and asked how he was doing, then listened as he recounted his heart issues, surgery, and more. He's a bit discouraged that he has to also get a pacemaker, but accepts it as part of his health journey, saying "what are you livier do?!"    Offered empathy, support, and spoken prayer for continued healing.  remains available.              Chaplaincy Interventions Utilized:   Empowerment: Encouraged self-care    Exploration: Explored hope and Explored emotional needs & resources    Collaboration: Encouraged adherence to treatment plan    Relationship Building: Cultivated a relationship of care and support and Listened empathically     23 1100   Clinical Encounter Type   Visited With Patient   Routine Visit Introduction

## 2023-07-18 NOTE — PROGRESS NOTES
Progress Note - Cardiology   Riddhi Castellano. 77 y.o. male MRN: [id number]  Unit/Bed#: [facility] 411-01 Encounter: [id number]    Assessment:  Principal Problem:    S/P AVR  Active Problems:    History of DVT (deep vein thrombosis)    Benign essential hypertension    Mitral valve insufficiency    Bilateral popliteal artery aneurysm (HCC)    Mixed hyperlipidemia    Aneurysm of ascending aorta (HCC)    Pulmonary hypertension (HCC)    Aortic insufficiency    Permanent atrial fibrillation (HCC)    Coronary artery disease involving native coronary artery    S/P MVR (mitral valve repair)    S/P left atrial appendage ligation    PAD (peripheral artery disease) ([address])    Hyperchloremia    Patient is s/p surgical AVR (29 mm Escalante Inspiris valve) and mitral valve repair (36 mm Escalante physio II ring annuloplasty) with left atrial appendage ligation. Maintained on Aspirin and high intensity statin therapy. Post-operatively, patient has been in a narrow complex regular rhythm that is relatively slow. This was initially felt to be a slow atrial fibrillation, and the patient underwent RANDALL/DCCV with rhythm refractory to cardioversion. EP is following, now planning PPM implantation due to chronotropic incompetence and persistent fatigue with this rhythm. Plan:  -Continue routine post-operative medications, Aspirin, high intensity statin. -INR is now therapeutic, so IV Heparin can be discontinued. -EP is planning for PPM implantation tomorrow.   -Will need close outpatient follow up to determine the need and timing for OM1 PCI in the future for 80% lesion noted on pre-operative cath. This may be discussed with his primary Cardiologist Dr. Yee Hadley in the outpatient setting. Subjective/Objective       Subjective: Patient seen and evaluated. He has no active cardiopulmonary complaints with the exception of fatigue.      Objective:     Vitals: /74 (BP Location: Right arm)   Pulse (!) 53   Temp 98.4 °F (36.9 °C) (Oral)   Resp 18   Ht 6' 3.2" (1.91 m)   Wt 118 kg (259 lb 7.7 oz)   SpO2 97%   BMI 32.26 kg/m²   Vitals:    07/17/23 0948 07/18/23 0600   Weight: 118 kg (261 lb) 118 kg (259 lb 7.7 oz)     Orthostatic Blood Pressures    Flowsheet Row Most Recent Value   Blood Pressure 117/74 filed at 07/18/2023 1138   Patient Position - Orthostatic VS Sitting filed at 07/18/2023 1138            Intake/Output Summary (Last 24 hours) at 7/18/2023 1258  Last data filed at 7/18/2023 1226  Gross per 24 hour   Intake 1124.21 ml   Output 1134 ml   Net -9.79 ml       Invasive Devices     Central Venous Catheter Line  Duration           CVC Central Lines 07/12/23 Triple 6 days              Physical Examination:  Gen: A&Ox3  HEENT: no JVD  CVS: Regular bradycardia, S1S2 audible. No murmur or rub heard. Resp: Clear to ascultation bilaterally, no wheeze, rales, ronchi. Abd: Soft, non-tender, non-distended. MSK: Trace bilateral LE edema  Neuro: Normal strength and sensory exam.      Lab Results: I have personally reviewed pertinent lab results. Imaging: I have personally reviewed pertinent reports. Telemetry: Junctional escape rhythm. Probable underlying atrial fibrillation.      VTE Pharmacologic Prophylaxis: Warfarin (Coumadin)      Max Harris MD

## 2023-07-18 NOTE — CASE MANAGEMENT
Case Management Discharge Planning Note    Patient name Princess Jackman.   [facility] [id number]  : 1945 Date 2023       Current Admission Date: 2023  Current Admission Diagnosis:S/P AVR   Patient Active Problem List    Diagnosis Date Noted   • S/P AVR 2023   • S/P MVR (mitral valve repair) 2023   • S/P left atrial appendage ligation 2023   • Hyperchloremia 2023   • Coronary artery disease involving native coronary artery 2023   • Aortic insufficiency 2023   • Permanent atrial fibrillation ([address]) 2023   • Chronic osteomyelitis of right tibia with draining sinus ([address]) 2023   • PAD (peripheral artery disease) ([address]) 2021   • Pulmonary hypertension ([address]) 2021   • Erythrocytosis 2021   • Streptococcal bacteremia 2020   • Callus of foot 2020   • Sepsis ([address]) 08/15/2020   • Hypoxia 08/15/2020   • Aneurysm of ascending aorta ([address]) 2019   • Mixed hyperlipidemia 2018   • Mitral valve insufficiency 10/31/2017   • Paroxysmal atrial fibrillation ([address]) 2016   • History of DVT (deep vein thrombosis) 08/15/2016   • Hypertension    • Atrial fibrillation with RVR ([address]) 2016   • 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[address]) 2016   • Bilateral popliteal artery aneurysm ([address]) 2016   • Benign essential hypertension 2009      LOS (days): 6  Geometric Mean LOS (GMLOS) (days): 5.80  Days to GMLOS:-0.5     OBJECTIVE:  Risk of Unplanned Readmission Score: 14.72         Current admission status: Inpatient   [facility]   [facility] [address] [address]  Phone: [phone] Fax: [phone]    Primary Care Provider: Sadi Harry DO    Primary Insurance: MEDICARE  Secondary Insurance: [address]:                                [address] [place] [address] Name[de-identified] Arian Campbell OBDULIA  Supporting Clincal Findings[de-identified] Fatigues Easliy in [employer]    DME Referral Provided  Referral made for DME?: No (has RW)    Other Referral/Resources/Interventions Provided:  Referral Comments: accepted [facility]                                                      Additional Comments: Pt. had cardioversion yesterday but did not convert from afib. Now EP plans PPM tomorrow. Has [facility].

## 2023-07-18 NOTE — PHYSICAL THERAPY NOTE
PHYSICAL THERAPY NOTE          Patient Name: Lisa Smith. Today's Date: 7/18/2023 07/18/23 0843   PT Last Visit   PT Visit Date 07/18/23   Note Type   Note Type Treatment   Pain Assessment   Pain Assessment Tool 0-10   Pain Score No Pain   Restrictions/Precautions   Other Precautions Cardiac/sternal;Telemetry   General   Chart Reviewed Yes   Additional Pertinent History Cleared for Tx session by nsg; also spoke to BRYAN w/ CT sx   Response to Previous Treatment Patient with no complaints from previous session. Cognition   Overall Cognitive Status WFL   Arousal/Participation Alert; Cooperative   Attention Within functional limits   Orientation Level Oriented to person;Oriented to place;Oriented to situation   Memory Within functional limits   Following Commands Follows one step commands without difficulty   Transfers   Sit to Stand 5  Supervision   Stand to Sit 5  Supervision   Ambulation/Elevation   Gait pattern Excessively slow; Short stride   Gait Assistance 5  Supervision   Assistive Device Rolling walker   Distance 80 ft and 110 ft w/ seated rest period in between   Stair Management Assistance Not tested  (HR in the 50s bpm)   Balance   Static Sitting Good   Dynamic Sitting Fair +   Static Standing Fair   Dynamic Standing Fair   Ambulatory Fair -   Activity Tolerance   Activity Tolerance Patient tolerated treatment well   Medical Staff Made Aware spoke to Anat Pulliam PA-C w/ CT sx   Exercises   Knee AROM Long Arc Quad Sitting;20 reps;AROM; Bilateral   Ankle Pumps Sitting;20 reps;AROM; Bilateral   Marching Sitting;10 reps;AROM; Bilateral   Assessment   Prognosis Good   Problem List Decreased strength;Decreased endurance; Impaired balance;Decreased mobility   Assessment Pt demonstrated increased tolerance to mobilization ambulating further distances w/ rw and (S) provided; no c/o dizziness reported w/ HR in the 50s bpm for the most part t/o the session. At this time, cont to recommend home PT follow up upon returning home; will follow   Goals   Patient Goals to return home   STG Expiration Date 07/23/23   PT Treatment Day 3   Plan   Treatment/Interventions Functional transfer training;LE strengthening/ROM; Elevations; Therapeutic exercise; Endurance training;Bed mobility;Gait training;Spoke to case management;Spoke to advanced practitioner   Progress Progressing toward goals   PT Frequency 4-6x/wk   Recommendation   PT Discharge Recommendation Home with home health rehabilitation   Equipment Recommended Walker; Other (Comment)  ([facility])   Walker Package Recommended Wheeled walker   AM-PAC Basic Mobility Inpatient   Turning in Flat Bed Without Bedrails 4   Lying on Back to Sitting on Edge of Flat Bed Without Bedrails 4   Moving Bed to Chair 3   Standing Up From Chair Using Arms 3   Walk in Room 3   Climb 3-5 Stairs With Railing 3   Basic Mobility Inpatient Raw Score 20   Basic Mobility Standardized Score 43.99   Highest Level Of Mobility   JH-HLM Goal 6: Walk 10 steps or more   JH-HLM Achieved 7: Walk 25 feet or more   Education   Education Provided Mobility training;Home exercise program;Assistive device   Patient Demonstrates verbal understanding   End of Consult   Patient Position at End of Consult Bedside chair; All needs within reach     [facility]

## 2023-07-19 ENCOUNTER — APPOINTMENT (OUTPATIENT)
Dept: RADIOLOGY | Facility: HOSPITAL | Age: 78
DRG: 219 | End: 2023-07-19
Payer: MEDICARE

## 2023-07-19 LAB
ANION GAP SERPL CALCULATED.3IONS-SCNC: 4 MMOL/L
ATRIAL RATE: 202 BPM
ATRIAL RATE: 202 BPM
BUN SERPL-MCNC: 13 MG/DL (ref 5–25)
CALCIUM SERPL-MCNC: 9.2 MG/DL (ref 8.3–10.1)
CHLORIDE SERPL-SCNC: 107 MMOL/L (ref 96–108)
CO2 SERPL-SCNC: 29 MMOL/L (ref 21–32)
CREAT SERPL-MCNC: 0.76 MG/DL (ref 0.6–1.3)
ERYTHROCYTE [DISTWIDTH] IN BLOOD BY AUTOMATED COUNT: 13.6 % (ref 11.6–15.1)
GFR SERPL CREATININE-BSD FRML MDRD: 86 ML/MIN/1.73SQ M
GLUCOSE SERPL-MCNC: 104 MG/DL (ref 65–140)
GLUCOSE SERPL-MCNC: 107 MG/DL (ref 65–140)
GLUCOSE SERPL-MCNC: 109 MG/DL (ref 65–140)
GLUCOSE SERPL-MCNC: 114 MG/DL (ref 65–140)
GLUCOSE SERPL-MCNC: 141 MG/DL (ref 65–140)
HCT VFR BLD AUTO: 41 % (ref 36.5–49.3)
HGB BLD-MCNC: 13.7 G/DL (ref 12–17)
INR PPP: 2.29 (ref 0.84–1.19)
MCH RBC QN AUTO: 31.7 PG (ref 26.8–34.3)
MCHC RBC AUTO-ENTMCNC: 33.4 G/DL (ref 31.4–37.4)
MCV RBC AUTO: 95 FL (ref 82–98)
PLATELET # BLD AUTO: 161 THOUSANDS/UL (ref 149–390)
PMV BLD AUTO: 9.9 FL (ref 8.9–12.7)
POTASSIUM SERPL-SCNC: 3.9 MMOL/L (ref 3.5–5.3)
PROTHROMBIN TIME: 25.5 SECONDS (ref 11.6–14.5)
QRS AXIS: -39 DEGREES
QRS AXIS: 17 DEGREES
QRSD INTERVAL: 114 MS
QRSD INTERVAL: 154 MS
QT INTERVAL: 444 MS
QT INTERVAL: 490 MS
QTC INTERVAL: 401 MS
QTC INTERVAL: 490 MS
RBC # BLD AUTO: 4.32 MILLION/UL (ref 3.88–5.62)
SODIUM SERPL-SCNC: 140 MMOL/L (ref 135–147)
T WAVE AXIS: 1 DEGREES
T WAVE AXIS: 249 DEGREES
VENTRICULAR RATE: 49 BPM
VENTRICULAR RATE: 60 BPM
WBC # BLD AUTO: 6.73 THOUSAND/UL (ref 4.31–10.16)

## 2023-07-19 PROCEDURE — 02H63JZ INSERTION OF PACEMAKER LEAD INTO RIGHT ATRIUM, PERCUTANEOUS APPROACH: ICD-10-PCS | Performed by: INTERNAL MEDICINE

## 2023-07-19 PROCEDURE — 99024 POSTOP FOLLOW-UP VISIT: CPT | Performed by: PHYSICIAN ASSISTANT

## 2023-07-19 PROCEDURE — 85027 COMPLETE CBC AUTOMATED: CPT | Performed by: PHYSICIAN ASSISTANT

## 2023-07-19 PROCEDURE — C1898 LEAD, PMKR, OTHER THAN TRANS: HCPCS | Performed by: INTERNAL MEDICINE

## 2023-07-19 PROCEDURE — 3E0102A INTRODUCTION OF ANTI-INFECTIVE ENVELOPE INTO SUBCUTANEOUS TISSUE, OPEN APPROACH: ICD-10-PCS | Performed by: INTERNAL MEDICINE

## 2023-07-19 PROCEDURE — C1785 PMKR, DUAL, RATE-RESP: HCPCS | Performed by: INTERNAL MEDICINE

## 2023-07-19 PROCEDURE — C1894 INTRO/SHEATH, NON-LASER: HCPCS | Performed by: INTERNAL MEDICINE

## 2023-07-19 PROCEDURE — 76937 US GUIDE VASCULAR ACCESS: CPT | Performed by: INTERNAL MEDICINE

## 2023-07-19 PROCEDURE — 80048 BASIC METABOLIC PNL TOTAL CA: CPT | Performed by: PHYSICIAN ASSISTANT

## 2023-07-19 PROCEDURE — 0JH606Z INSERTION OF PACEMAKER, DUAL CHAMBER INTO CHEST SUBCUTANEOUS TISSUE AND FASCIA, OPEN APPROACH: ICD-10-PCS | Performed by: INTERNAL MEDICINE

## 2023-07-19 PROCEDURE — 93005 ELECTROCARDIOGRAM TRACING: CPT

## 2023-07-19 PROCEDURE — 97530 THERAPEUTIC ACTIVITIES: CPT

## 2023-07-19 PROCEDURE — 33208 INSRT HEART PM ATRIAL & VENT: CPT | Performed by: INTERNAL MEDICINE

## 2023-07-19 PROCEDURE — 85610 PROTHROMBIN TIME: CPT | Performed by: PHYSICIAN ASSISTANT

## 2023-07-19 PROCEDURE — 71045 X-RAY EXAM CHEST 1 VIEW: CPT

## 2023-07-19 PROCEDURE — 82948 REAGENT STRIP/BLOOD GLUCOSE: CPT

## 2023-07-19 PROCEDURE — C1892 INTRO/SHEATH,FIXED,PEEL-AWAY: HCPCS | Performed by: INTERNAL MEDICINE

## 2023-07-19 PROCEDURE — C1769 GUIDE WIRE: HCPCS | Performed by: INTERNAL MEDICINE

## 2023-07-19 PROCEDURE — 02HK3JZ INSERTION OF PACEMAKER LEAD INTO RIGHT VENTRICLE, PERCUTANEOUS APPROACH: ICD-10-PCS | Performed by: INTERNAL MEDICINE

## 2023-07-19 PROCEDURE — 33217 INSERT 2 ELECTRODE PM-DEFIB: CPT | Performed by: INTERNAL MEDICINE

## 2023-07-19 PROCEDURE — 93010 ELECTROCARDIOGRAM REPORT: CPT | Performed by: INTERNAL MEDICINE

## 2023-07-19 DEVICE — LEAD 5076-58 MRI US RCMCRD
Type: IMPLANTABLE DEVICE | Site: HEART | Status: FUNCTIONAL
Brand: CAPSUREFIX NOVUS MRI™ SURESCAN®

## 2023-07-19 DEVICE — IPG W1DR01 AZURE XT DR MRI USA
Type: IMPLANTABLE DEVICE | Site: CHEST  WALL | Status: FUNCTIONAL
Brand: AZURE™ XT DR MRI SURESCAN™

## 2023-07-19 DEVICE — LEAD 3830 US MKT/ 69CM MRI LBBAP
Type: IMPLANTABLE DEVICE | Site: HEART | Status: FUNCTIONAL
Brand: SELECTSECURE™ MRI SURESCAN™

## 2023-07-19 DEVICE — ENVELOPE CMRM6122 ABSORB MED MR
Type: IMPLANTABLE DEVICE | Site: CHEST  WALL | Status: FUNCTIONAL
Brand: TYRX™

## 2023-07-19 RX ORDER — SODIUM CHLORIDE 9 MG/ML
INJECTION, SOLUTION INTRAVENOUS CONTINUOUS PRN
Status: DISCONTINUED | OUTPATIENT
Start: 2023-07-19 | End: 2023-07-19

## 2023-07-19 RX ORDER — EPHEDRINE SULFATE 50 MG/ML
INJECTION INTRAVENOUS AS NEEDED
Status: DISCONTINUED | OUTPATIENT
Start: 2023-07-19 | End: 2023-07-19

## 2023-07-19 RX ORDER — LIDOCAINE HYDROCHLORIDE 10 MG/ML
INJECTION, SOLUTION EPIDURAL; INFILTRATION; INTRACAUDAL; PERINEURAL CODE/TRAUMA/SEDATION MEDICATION
Status: DISCONTINUED | OUTPATIENT
Start: 2023-07-19 | End: 2023-07-19 | Stop reason: HOSPADM

## 2023-07-19 RX ORDER — ONDANSETRON 2 MG/ML
INJECTION INTRAMUSCULAR; INTRAVENOUS AS NEEDED
Status: DISCONTINUED | OUTPATIENT
Start: 2023-07-19 | End: 2023-07-19

## 2023-07-19 RX ORDER — GENTAMICIN SULFATE 40 MG/ML
INJECTION, SOLUTION INTRAMUSCULAR; INTRAVENOUS CODE/TRAUMA/SEDATION MEDICATION
Status: DISCONTINUED | OUTPATIENT
Start: 2023-07-19 | End: 2023-07-19 | Stop reason: HOSPADM

## 2023-07-19 RX ORDER — SODIUM CHLORIDE 9 MG/ML
75 INJECTION, SOLUTION INTRAVENOUS CONTINUOUS
Status: CANCELLED | OUTPATIENT
Start: 2023-07-19

## 2023-07-19 RX ORDER — MIDAZOLAM HYDROCHLORIDE 2 MG/2ML
INJECTION, SOLUTION INTRAMUSCULAR; INTRAVENOUS AS NEEDED
Status: DISCONTINUED | OUTPATIENT
Start: 2023-07-19 | End: 2023-07-19

## 2023-07-19 RX ORDER — ACETAMINOPHEN 325 MG/1
650 TABLET ORAL EVERY 4 HOURS PRN
Status: DISCONTINUED | OUTPATIENT
Start: 2023-07-19 | End: 2023-07-20 | Stop reason: HOSPADM

## 2023-07-19 RX ORDER — FENTANYL CITRATE 50 UG/ML
INJECTION, SOLUTION INTRAMUSCULAR; INTRAVENOUS AS NEEDED
Status: DISCONTINUED | OUTPATIENT
Start: 2023-07-19 | End: 2023-07-19

## 2023-07-19 RX ORDER — FENTANYL CITRATE/PF 50 MCG/ML
25 SYRINGE (ML) INJECTION
Status: CANCELLED | OUTPATIENT
Start: 2023-07-19

## 2023-07-19 RX ORDER — PROPOFOL 10 MG/ML
INJECTION, EMULSION INTRAVENOUS CONTINUOUS PRN
Status: DISCONTINUED | OUTPATIENT
Start: 2023-07-19 | End: 2023-07-19

## 2023-07-19 RX ORDER — ONDANSETRON 2 MG/ML
4 INJECTION INTRAMUSCULAR; INTRAVENOUS ONCE AS NEEDED
Status: CANCELLED | OUTPATIENT
Start: 2023-07-19

## 2023-07-19 RX ORDER — WARFARIN SODIUM 3 MG/1
6 TABLET ORAL
Status: COMPLETED | OUTPATIENT
Start: 2023-07-19 | End: 2023-07-19

## 2023-07-19 RX ORDER — LIDOCAINE HYDROCHLORIDE 10 MG/ML
INJECTION, SOLUTION EPIDURAL; INFILTRATION; INTRACAUDAL; PERINEURAL AS NEEDED
Status: DISCONTINUED | OUTPATIENT
Start: 2023-07-19 | End: 2023-07-19

## 2023-07-19 RX ADMIN — MIDAZOLAM 1 MG: 1 INJECTION INTRAMUSCULAR; INTRAVENOUS at 12:48

## 2023-07-19 RX ADMIN — FENTANYL CITRATE 25 MCG: 50 INJECTION, SOLUTION INTRAMUSCULAR; INTRAVENOUS at 13:25

## 2023-07-19 RX ADMIN — FENTANYL CITRATE 25 MCG: 50 INJECTION, SOLUTION INTRAMUSCULAR; INTRAVENOUS at 13:21

## 2023-07-19 RX ADMIN — OXYCODONE HYDROCHLORIDE AND ACETAMINOPHEN 500 MG: 500 TABLET ORAL at 08:34

## 2023-07-19 RX ADMIN — POTASSIUM CHLORIDE 20 MEQ: 1500 TABLET, EXTENDED RELEASE ORAL at 08:34

## 2023-07-19 RX ADMIN — ACETAMINOPHEN 975 MG: 325 TABLET, FILM COATED ORAL at 21:16

## 2023-07-19 RX ADMIN — TORSEMIDE 20 MG: 20 TABLET ORAL at 08:34

## 2023-07-19 RX ADMIN — ASPIRIN 81 MG: 81 TABLET, COATED ORAL at 08:34

## 2023-07-19 RX ADMIN — MIDAZOLAM 1 MG: 1 INJECTION INTRAMUSCULAR; INTRAVENOUS at 12:44

## 2023-07-19 RX ADMIN — EPHEDRINE SULFATE 10 MG: 50 INJECTION INTRAVENOUS at 13:21

## 2023-07-19 RX ADMIN — ONDANSETRON 4 MG: 2 INJECTION INTRAMUSCULAR; INTRAVENOUS at 13:28

## 2023-07-19 RX ADMIN — SODIUM CHLORIDE: 0.9 INJECTION, SOLUTION INTRAVENOUS at 12:42

## 2023-07-19 RX ADMIN — PANCRELIPASE 24000 UNITS: 24000; 76000; 120000 CAPSULE, DELAYED RELEASE PELLETS ORAL at 16:24

## 2023-07-19 RX ADMIN — ATORVASTATIN CALCIUM 80 MG: 80 TABLET, FILM COATED ORAL at 16:24

## 2023-07-19 RX ADMIN — Medication 250 MG: at 17:19

## 2023-07-19 RX ADMIN — PANTOPRAZOLE SODIUM 40 MG: 40 TABLET, DELAYED RELEASE ORAL at 08:34

## 2023-07-19 RX ADMIN — LIDOCAINE HYDROCHLORIDE 50 MG: 10 INJECTION, SOLUTION EPIDURAL; INFILTRATION; INTRACAUDAL; PERINEURAL at 12:48

## 2023-07-19 RX ADMIN — ACETAMINOPHEN 975 MG: 325 TABLET, FILM COATED ORAL at 14:59

## 2023-07-19 RX ADMIN — DOCUSATE SODIUM 100 MG: 100 CAPSULE ORAL at 17:19

## 2023-07-19 RX ADMIN — ACETAMINOPHEN 975 MG: 325 TABLET, FILM COATED ORAL at 05:44

## 2023-07-19 RX ADMIN — FENTANYL CITRATE 25 MCG: 50 INJECTION, SOLUTION INTRAMUSCULAR; INTRAVENOUS at 13:05

## 2023-07-19 RX ADMIN — FENTANYL CITRATE 25 MCG: 50 INJECTION, SOLUTION INTRAMUSCULAR; INTRAVENOUS at 12:52

## 2023-07-19 RX ADMIN — PANCRELIPASE 24000 UNITS: 24000; 76000; 120000 CAPSULE, DELAYED RELEASE PELLETS ORAL at 08:34

## 2023-07-19 RX ADMIN — WARFARIN SODIUM 6 MG: 3 TABLET ORAL at 17:19

## 2023-07-19 RX ADMIN — CEFAZOLIN SODIUM 2000 MG: 2 SOLUTION INTRAVENOUS at 12:45

## 2023-07-19 RX ADMIN — PROPOFOL 50 MCG/KG/MIN: 10 INJECTION, EMULSION INTRAVENOUS at 12:52

## 2023-07-19 RX ADMIN — Medication 1000 UNITS: at 08:34

## 2023-07-19 NOTE — CASE MANAGEMENT
Case Management Discharge Planning Note    Patient name Carmina Adames.   [facility]N [id number]  : 1945 Date 2023       Current Admission Date: 2023  Current Admission Diagnosis:S/P AVR   Patient Active Problem List    Diagnosis Date Noted   • S/P AVR 2023   • S/P MVR (mitral valve repair) 2023   • S/P left atrial appendage ligation 2023   • Hyperchloremia 2023   • Coronary artery disease involving native coronary artery 2023   • Aortic insufficiency 2023   • Permanent atrial fibrillation ([address]) 2023   • Chronic osteomyelitis of right tibia with draining sinus ([address]) 2023   • PAD (peripheral artery disease) ([address]) 2021   • Pulmonary hypertension ([address]) 2021   • Erythrocytosis 2021   • Streptococcal bacteremia 2020   • Callus of foot 2020   • Sepsis ([address]) 08/15/2020   • Hypoxia 08/15/2020   • Aneurysm of ascending aorta ([address]) 2019   • Mixed hyperlipidemia 2018   • Mitral valve insufficiency 10/31/2017   • Paroxysmal atrial fibrillation ([address]) 2016   • History of DVT (deep vein thrombosis) 08/15/2016   • Hypertension    • Atrial fibrillation with RVR ([address]) 2016   • 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[address]) 2016   • Bilateral popliteal artery aneurysm ([address]) 2016   • Benign essential hypertension 2009      LOS (days): 7  Geometric Mean LOS (GMLOS) (days): 5.80  Days to GMLOS:-1.6     OBJECTIVE:  Risk of Unplanned Readmission Score: 14.85         Current admission status: Inpatient   [facility]   [facility] [address] [address]  Phone: [phone] Fax: [phone]    Primary Care Provider: Balbina Baum DO    Primary Insurance: MEDICARE  Secondary Insurance: [address] [place] [address] DETAILS: IMM Given (Date):: 07/19/23  IMM Given to[de-identified] Patient

## 2023-07-19 NOTE — PHYSICAL THERAPY NOTE
Physical Therapy Treatment    Patient Name: Raymundo Banuelos. Today's Date: 7/19/2023     Problem List  Principal Problem:    S/P AVR  Active Problems:    History of DVT (deep vein thrombosis)    Benign essential hypertension    Mitral valve insufficiency    Bilateral popliteal artery aneurysm (HCC)    Mixed hyperlipidemia    Aneurysm of ascending aorta (HCC)    Pulmonary hypertension (HCC)    Aortic insufficiency    Permanent atrial fibrillation ([address])    Coronary artery disease involving native coronary artery    S/P MVR (mitral valve repair)    S/P left atrial appendage ligation    PAD (peripheral artery disease) ([address])    Hyperchloremia       Past Medical History  Past Medical History:   Diagnosis Date    Cardiac disease     Hypertension     PAF (paroxysmal atrial fibrillation) ([address])     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[address])     BILATERALLY        Past Surgical History  Past Surgical History:   Procedure Laterality Date    CARDIAC CATHETERIZATION N/A 5/8/2023    Procedure: Cardiac Coronary Angiogram;  Surgeon: Fannie Hatfield DO;  [facility]; Service: Cardiology    CARDIAC ELECTROPHYSIOLOGY PROCEDURE N/A 3/25/2022    Procedure: Cardiac eps/afib ablation;  Surgeon: Maribell Shah MD;  [facility]; Service: Cardiology    ESOPHAGOGASTRODUODENOSCOPY N/A 8/16/2016    Procedure: ESOPHAGOGASTRODUODENOSCOPY (EGD); Surgeon: Moon Couch MD;  Location: [facility] GI [facility];   Service:     HERNIA REPAIR Left     groin    HERNIA REPAIR Right 1995    OTHER SURGICAL HISTORY      DC REPLACEMENT MITRAL VALVE W/CARDIOPULMONARY BYP N/A 7/12/2023    Procedure: MITRAL VALVE REPAIR WITH 36MM PHYSIO II ANNULOPLASTY RING;  Surgeon: Clary Bruno MD;  Location: [facility];  Service: Cardiac Surgery    DC RPLCMT AORTIC VALVE OPN W/STENTLESS TISSUE VALVE N/A 7/12/2023    Procedure: REPLACEMENT VALVE AORTIC (AVR) WITH 29MM INSPIRIS TISSUE RESILIA VALVE. LEFT ATRIAL APPENDAGE LIGATION WITH 45 MM ATRICLIP;  Surgeon: lCary Bruno MD;  Location: [facility];  Service: Cardiac Surgery    REPLACEMENT TOTAL KNEE Left 04/01/2013    VASCULAR SURGERY Right     POPLITEAL BYPASS DUE TO ANEURYSM           07/19/23 0954   PT Last Visit   PT Visit Date 07/19/23   Note Type   Note Type Treatment   Pain Assessment   Pain Assessment Tool 0-10   Pain Score No Pain   Restrictions/Precautions   Weight Bearing Precautions Per Order No   Other Precautions Cardiac/sternal;Telemetry   General   Chart Reviewed Yes   Additional Pertinent History Cleared for treatment by RN   Response to Previous Treatment Patient with no complaints from previous session. Family/Caregiver Present No   Cognition   Overall Cognitive Status WFL   Arousal/Participation Cooperative   Attention Within functional limits   Orientation Level Oriented to person;Oriented to place;Oriented to situation   Memory Within functional limits   Following Commands Follows one step commands without difficulty   Comments pleasant and cooperative   Subjective   Subjective Pt provided consent for PT treatment   Bed Mobility   Supine to Sit Unable to assess   Sit to Supine Unable to assess   Additional Comments in bedside chair at beginning of session. Returned to bedside chair at end of session   Transfers   Sit to Stand 6  Modified independent  (x1 performed)   Additional items Increased time required   Stand to Sit 6  Modified independent   Additional Comments RW   Ambulation/Elevation   Gait pattern Short stride; Excessively slow; Forward Flexion   Gait Assistance 5  Supervision   Additional items Verbal cues  (+ 2nd for chair follow; verbal cues to maintain upright posture)   Assistive Device Rolling walker   Distance 200'   Balance   Static Sitting Good   Dynamic Sitting Fair +   Static Standing Fair   Dynamic Standing Fair   Ambulatory Fair -   Endurance Deficit   Endurance Deficit No   Activity Tolerance Activity Tolerance Patient tolerated treatment well   Medical Staff Made Aware PT Pablo   Nurse Made Aware Yes   Exercises   Knee AROM Long Arc Quad 20 reps; Sitting;AROM; Bilateral   Ankle Pumps Sitting;20 reps;AROM; Bilateral   Marching Sitting;10 reps;AROM; Bilateral   Assessment   Prognosis Good   Problem List Decreased strength;Decreased endurance; Impaired balance;Decreased mobility;Obesity  (Pt to have cardiac pacer implanted today (7/19/23))   Barriers to Discharge None   Goals   Patient Goals return home   STG Expiration Date 07/23/23   Short Term Goal #1 Pt provided consent for PT treatment. Pt performed outlined activities. Pt continued to progress ambulation distance. Verbal cues provided to maintain upright posture during ambulation. Pt denied pain or dizziness throughout session. At the end of the session pt was positioned bedside chair with call bell, and all personal needs in reach. D/C rec remains home with HHPT. PT Treatment Day 4   Plan   Treatment/Interventions Functional transfer training;LE strengthening/ROM; Therapeutic exercise; Endurance training;Equipment eval/education;Patient/family training;Bed mobility;Gait training;OT;Spoke to case management  (PT spoke to nursing)   Progress Progressing toward goals   PT Frequency 4-6x/wk   Recommendation   PT Discharge Recommendation Home with home health rehabilitation   [address] [place] [address]; Other (Comment)  ([facility])   Walker Package Recommended Wheeled walker   AM-PAC Basic Mobility Inpatient   Turning in Flat Bed Without Bedrails 4   Lying on Back to Sitting on Edge of Flat Bed Without Bedrails 4   Moving Bed to Chair 4   Standing Up From Chair Using Arms 4   Walk in Room 3   Climb 3-5 Stairs With Railing 3   Basic Mobility Inpatient Raw Score 22   Basic Mobility Standardized Score 47.4   Highest Level Of Mobility   JH-HLM Goal 7: Walk 25 feet or more   JH-HLM Achieved 7: Walk 25 feet or more   End of Consult   Patient Position at End of Consult Bed/Chair alarm activated; All needs within reach     [facility]

## 2023-07-19 NOTE — ANESTHESIA PREPROCEDURE EVALUATION
Procedure:  Cardiac pacer implant (Chest)    Relevant Problems   CARDIO   (+) Aneurysm of ascending aorta (HCC)   (+) Aortic insufficiency   (+) Atrial fibrillation with RVR (HCC)   (+) Benign essential hypertension   (+) Coronary artery disease involving native coronary artery   (+) Hypertension   (+) Mitral valve insufficiency   (+) Mixed hyperlipidemia   (+) Paroxysmal atrial fibrillation (HCC)   (+) Permanent atrial fibrillation (HCC)   (+) Pulmonary hypertension (HCC)      Other   (+) Chronic osteomyelitis of right tibia with draining sinus (HCC)   (+) S/P AVR   (+) S/P MVR (mitral valve repair)   (+) S/P left atrial appendage ligation      RANDALL 7/17/23  LVEF 45%, RV mild-mod red fxn, bioprosthetic AV, mitral annular ring    Lab Results   Component Value Date    WBC 6.73 07/19/2023    HGB 13.7 07/19/2023    HCT 41.0 07/19/2023    MCV 95 07/19/2023     07/19/2023     Lab Results   Component Value Date    K 3.9 07/19/2023    CO2 29 07/19/2023     07/19/2023    BUN 13 07/19/2023    CREATININE 0.76 07/19/2023     Lab Results   Component Value Date    INR 2.29 (H) 07/19/2023    INR 2.33 (H) 07/18/2023    INR 1.73 (H) 07/17/2023    PROTIME 25.5 (H) 07/19/2023    PROTIME 25.8 (H) 07/18/2023    PROTIME 20.5 (H) 07/17/2023     Lab Results   Component Value Date    PTT 76 (H) 07/18/2023       Lab Results   Component Value Date    GLUCOSE 160 (H) 07/12/2023    GLUCOSE 162 (H) 07/12/2023    GLUCOSE 147 (H) 07/12/2023       Lab Results   Component Value Date    HGBA1C 5.6 06/27/2023       Type and Screen:  B    Physical Exam    Airway    Mallampati score: II  TM Distance: >3 FB  Neck ROM: full     Dental       Cardiovascular      Pulmonary      Other Findings        Anesthesia Plan  ASA Score- 4     Anesthesia Type- IV sedation with anesthesia with ASA Monitors. Additional Monitors:   Airway Plan:           Plan Factors-Exercise tolerance (METS): >4 METS. Chart reviewed. Existing labs reviewed. Patient summary reviewed. Induction- intravenous. Postoperative Plan-     Informed Consent- Anesthetic plan and risks discussed with patient. I personally reviewed this patient with the CRNA. Discussed and agreed on the Anesthesia Plan with the CRNA. Jolene Littlejohn

## 2023-07-19 NOTE — PLAN OF CARE
Problem: PHYSICAL THERAPY ADULT  Goal: Performs mobility at highest level of function for planned discharge setting. See evaluation for individualized goals. Description: Treatment/Interventions: Functional transfer training, LE strengthening/ROM, Elevations, Therapeutic exercise, Endurance training, Patient/family training, Equipment eval/education, Bed mobility, Gait training, Spoke to nursing, OT (Spoke to Sling)  Equipment Recommended: Asael Arreola, Other (Comment) [facility])       See flowsheet documentation for full assessment, interventions and recommendations. Note: Prognosis: Good (Simultaneous filing. User may not have seen previous data.)  Problem List: Decreased strength, Decreased endurance, Impaired balance, Decreased mobility, Obesity (Pt to have cardiac pacer implanted today (7/19/23)  Simultaneous filing. User may not have seen previous data.)  Assessment: Pt demonstrated increased tolerance to mobilization ambulating further distances w/ rw and (S) provided; no c/o dizziness reported w/ HR in the 50s bpm for the most part t/o the session. At this time, cont to recommend home PT follow up upon returning home; will follow  Barriers to Discharge: None (Simultaneous filing. User may not have seen previous data.)     PT Discharge Recommendation: Home with home health rehabilitation (Simultaneous filing. User may not have seen previous data.)    See flowsheet documentation for full assessment.

## 2023-07-19 NOTE — PROGRESS NOTES
Progress Note - Cardiothoracic Surgery   Desi Greer. 77 y.o. male MRN: [id number]  Unit/Bed#: [facility] 411-01 Encounter: [id number]    Aortic regurgitation, Atrial fibrillation, Mitral regurgitation. S/P aortic valve replacement, left atrial appendage ligation and mitral valve repair; POD # 7    24 Hour Events: No events overnight. No complaints. NPO for procedure. No pain. Ambulating well.      Medications:   Scheduled Meds:  Current Facility-Administered Medications   Medication Dose Route Frequency Provider Last Rate   • acetaminophen  975 mg Oral Q8H Arturo Gomez PA-C     • ascorbic acid  500 mg Oral Daily Kristie Valdovinos PA-C     • aspirin  81 mg Oral Daily Rebecca Claudio PA-C     • atorvastatin  80 mg Oral Daily With [facility], PA-C     • bisacodyl  10 mg Rectal Daily PRN Kristie Valdovinos PA-C     • cefazolin  2,000 mg Intravenous Once Bossman Rios PA-C     • cholecalciferol  1,000 Units Oral Daily Kristie Valdovinos PA-C     • docusate sodium  100 mg Oral BID Kristie Valdovinos PA-C     • insulin lispro  1-5 Units Subcutaneous TID AC Arturo Gomez PA-C     • insulin lispro  1-5 Units Subcutaneous HS Kristie Valdovinos PA-C     • lidocaine  1 patch Topical Daily DHEERAJ Ferrer     • methocarbamol  500 mg Oral Q6H PRN DHEERAJ Ferrer     • metoclopramide  10 mg Intravenous Q6H PRN Jorge Gallegos PA-C     • ondansetron  4 mg Intravenous Q6H PRN Kristie Valdovinos PA-C     • pancrelipase (Lip-Prot-Amyl)  24,000 Units Oral TID With Meals Kristie Valdovinos PA-C     • pantoprazole  40 mg Oral Daily Kristie Valdovinos PA-C     • polyethylene glycol  17 g Oral Daily Kristie Valdovinos PA-C     • potassium chloride  20 mEq Oral Daily Phoebe Santana PA-C     • saccharomyces boulardii  250 mg Oral BID Kristie Valdovinos PA-C     • temazepam  15 mg Oral HS PRN Kristie Valdovinos PA-C     • torsemide  20 mg Oral Daily Phoebe Santana PA-C     • traMADol  25 mg Oral Q6H PRN Patricia Bhakta BRYAN Nguyen     • traMADol  50 mg Oral Q6H PRN Bhavesh Chowdhury PA-C       Continuous Infusions:   PRN Meds:.•  bisacodyl  •  methocarbamol  •  metoclopramide  •  ondansetron  •  temazepam  •  traMADol  •  traMADol    Vitals:   Vitals:    07/18/23 2300 07/19/23 0251 07/19/23 0600 07/19/23 0714   BP: 145/85 133/76  128/74   BP Location:  Right arm  Right arm   Pulse: 62 55  (!) 53   Resp:  18  16   Temp: 98.1 °F (36.7 °C) 98.1 °F (36.7 °C)  98.4 °F (36.9 °C)   TempSrc: Oral Oral  Oral   SpO2: 97% 98%  95%   Weight:   117 kg (258 lb 13.1 oz)    Height:           Telemetry: Sinus Bradycardia; Heart Rate: 52    Respiratory:   SpO2: SpO2: 95 %; Room Air    Intake/Output:     Intake/Output Summary (Last 24 hours) at 7/19/2023 0825  Last data filed at 7/19/2023 0530  Gross per 24 hour   Intake 118 ml   Output 1359 ml   Net -1241 ml      Weights:   Weight (last 2 days)     Date/Time Weight    07/19/23 0600 117 (258.82)    07/18/23 0600 118 (259.48)    07/17/23 0948 118 (261)    07/17/23 0549 118 (261.25)            Results:   Results from last 7 days   Lab Units 07/19/23  0526 07/17/23  0533 07/15/23  0530   WBC Thousand/uL 6.73 7.98 13.10*   HEMOGLOBIN g/dL 13.7 14.4 14.9   HEMATOCRIT % 41.0 41.5 44.0   PLATELETS Thousands/uL 161 149 121*     Results from last 7 days   Lab Units 07/19/23  0526 07/17/23  0533 07/15/23  0530 07/12/23  1821 07/12/23  1342   SODIUM mmol/L 140 138 138   < >  --    POTASSIUM mmol/L 3.9 3.8 4.2   < >  --    CHLORIDE mmol/L 107 107 108   < >  --    CO2 mmol/L 29 30 30   < >  --    CO2, I-STAT mmol/L  --   --   --   --  21   BUN mg/dL 13 19 25   < >  --    CREATININE mg/dL 0.76 0.75 0.82   < >  --    GLUCOSE, ISTAT mg/dl  --   --   --   --  160*   CALCIUM mg/dL 9.2 9.1 8.9   < >  --     < > = values in this interval not displayed.      Results from last 7 days   Lab Units 07/19/23  0526 07/18/23  0440 07/17/23  2217 07/17/23  1448 07/17/23  0533   INR  2.29* 2.33*  --   --  1.73*   PTT seconds  -- 76* 95* 104*  --      Invasive Lines/Tubes:  Invasive Devices     Central Venous Catheter Line  Duration           CVC Central Lines 07/12/23 Triple 6 days                Physical Exam:    HEENT/NECK:  Normocephalic. Atraumatic. No jugular venous distention. Cardiac: Regular rate and rhythm  Pulmonary:  Breath sounds clear bilaterally  Abdomen:  Non-tender, Non-distended and Normal bowel sounds  Incisions: Sternum is stable. Incision is clean, dry, and intact. Extremities: Extremities warm/dry and Trace edema B/L  Neuro: Alert and oriented X 3  Skin: Warm/Dry, without rashes or lesions. Assessment:  Principal Problem:    S/P AVR  Active Problems:    History of DVT (deep vein thrombosis)    Benign essential hypertension    Mitral valve insufficiency    Bilateral popliteal artery aneurysm ([facility])    Mixed hyperlipidemia    Aneurysm of ascending aorta ([facility])    Pulmonary hypertension ([facility])    Aortic insufficiency    Permanent atrial fibrillation ([facility])    Coronary artery disease involving native coronary artery    S/P MVR (mitral valve repair)    S/P left atrial appendage ligation    PAD (peripheral artery disease) ([facility])    Hyperchloremia       Aortic regurgitation, Atrial fibrillation, Mitral regurgitation. S/P aortic valve replacement, left atrial appendage ligation and mitral valve repair; POD # 7    Plan:    1. Cardiac:   PPM today with EP for bradycardia/heart block/junctional rhythm/afib  Start BB following PPM placement   Anticoagulated for atrial fibrillation   INR 2.33, Dose Coumadin, 6 mg today  Continue ASA and Statin therapy  Epicardial pacing wires have been removed   D/C Central line after PPM placement  Continue DVT prophylaxis    2. Pulmonary:   Good Room air oxygen saturation; Continue incentive spirometry/Coughing/Deep breathing exercises  Chest tubes have been discontinued    3. Renal:   Post op Creatinine stable;  Follow up labs prn   Intake/Output net: -1241 mL/24 hours  Diuretic Regimen: on oral diuretics     4. Neuro:  Neurologically intact; No active issues  Incisional pain well-controlled    5. GI:  NPO for procedure   Continue stool softeners and prn suppository  Continue GI prophylaxis    6. Endo:   Glucose well-controlled with sliding scale coverage    7    Hematology:    Post-operative blood count acceptable; Trend prn    8.    Disposition:      Anticipated discharge date: tomorrow     VTE Pharmacologic Prophylaxis: Warfarin (Coumadin)  VTE Mechanical Prophylaxis: sequential compression device    Collaborative rounds completed with supervising physician  Plan of care discussed with bedside nurse    SIGNATURE: Phan Booth PA-C  DATE: July 19, 2023  TIME: 8:25 AM

## 2023-07-19 NOTE — ANESTHESIA POSTPROCEDURE EVALUATION
Post-Op Assessment Note    CV Status:  Stable  Pain Score: 0    Pain management: adequate     Mental Status:  Awake and alert   Hydration Status:  Stable and euvolemic   PONV Controlled:  None   Airway Patency:  Patent and adequate      Post Op Vitals Reviewed: Yes      Staff: CRNA         No notable events documented.     BP   135/76   Temp   97F   Pulse 62   Resp   18   SpO2   98%

## 2023-07-20 ENCOUNTER — APPOINTMENT (OUTPATIENT)
Dept: RADIOLOGY | Facility: HOSPITAL | Age: 78
DRG: 219 | End: 2023-07-20
Payer: MEDICARE

## 2023-07-20 VITALS
HEART RATE: 62 BPM | DIASTOLIC BLOOD PRESSURE: 75 MMHG | RESPIRATION RATE: 18 BRPM | SYSTOLIC BLOOD PRESSURE: 134 MMHG | TEMPERATURE: 97.8 F | BODY MASS INDEX: 31.72 KG/M2 | OXYGEN SATURATION: 95 % | WEIGHT: 255.07 LBS | HEIGHT: 75 IN

## 2023-07-20 PROBLEM — Z95.0 PACEMAKER: Status: ACTIVE | Noted: 2023-07-20

## 2023-07-20 LAB
ANION GAP SERPL CALCULATED.3IONS-SCNC: 4 MMOL/L
BUN SERPL-MCNC: 11 MG/DL (ref 5–25)
CALCIUM SERPL-MCNC: 9.1 MG/DL (ref 8.3–10.1)
CHLORIDE SERPL-SCNC: 108 MMOL/L (ref 96–108)
CO2 SERPL-SCNC: 29 MMOL/L (ref 21–32)
CREAT SERPL-MCNC: 0.83 MG/DL (ref 0.6–1.3)
ERYTHROCYTE [DISTWIDTH] IN BLOOD BY AUTOMATED COUNT: 13.6 % (ref 11.6–15.1)
GFR SERPL CREATININE-BSD FRML MDRD: 83 ML/MIN/1.73SQ M
GLUCOSE SERPL-MCNC: 110 MG/DL (ref 65–140)
GLUCOSE SERPL-MCNC: 111 MG/DL (ref 65–140)
GLUCOSE SERPL-MCNC: 154 MG/DL (ref 65–140)
HCT VFR BLD AUTO: 43.4 % (ref 36.5–49.3)
HGB BLD-MCNC: 14.3 G/DL (ref 12–17)
INR PPP: 2.55 (ref 0.84–1.19)
MCH RBC QN AUTO: 31.8 PG (ref 26.8–34.3)
MCHC RBC AUTO-ENTMCNC: 32.9 G/DL (ref 31.4–37.4)
MCV RBC AUTO: 97 FL (ref 82–98)
PLATELET # BLD AUTO: 183 THOUSANDS/UL (ref 149–390)
PMV BLD AUTO: 9.9 FL (ref 8.9–12.7)
POTASSIUM SERPL-SCNC: 4.2 MMOL/L (ref 3.5–5.3)
PROTHROMBIN TIME: 27.7 SECONDS (ref 11.6–14.5)
RBC # BLD AUTO: 4.49 MILLION/UL (ref 3.88–5.62)
SODIUM SERPL-SCNC: 141 MMOL/L (ref 135–147)
WBC # BLD AUTO: 6.45 THOUSAND/UL (ref 4.31–10.16)

## 2023-07-20 PROCEDURE — 99024 POSTOP FOLLOW-UP VISIT: CPT | Performed by: PHYSICIAN ASSISTANT

## 2023-07-20 PROCEDURE — 85610 PROTHROMBIN TIME: CPT | Performed by: PHYSICIAN ASSISTANT

## 2023-07-20 PROCEDURE — 82948 REAGENT STRIP/BLOOD GLUCOSE: CPT

## 2023-07-20 PROCEDURE — 80048 BASIC METABOLIC PNL TOTAL CA: CPT | Performed by: PHYSICIAN ASSISTANT

## 2023-07-20 PROCEDURE — 71046 X-RAY EXAM CHEST 2 VIEWS: CPT

## 2023-07-20 PROCEDURE — 85027 COMPLETE CBC AUTOMATED: CPT | Performed by: PHYSICIAN ASSISTANT

## 2023-07-20 PROCEDURE — 97530 THERAPEUTIC ACTIVITIES: CPT

## 2023-07-20 PROCEDURE — 97116 GAIT TRAINING THERAPY: CPT

## 2023-07-20 RX ORDER — POLYETHYLENE GLYCOL 3350 17 G/17G
17 POWDER, FOR SOLUTION ORAL DAILY
Qty: 510 G | Refills: 0 | Status: SHIPPED | OUTPATIENT
Start: 2023-07-20 | End: 2023-08-19

## 2023-07-20 RX ORDER — TORSEMIDE 20 MG/1
20 TABLET ORAL DAILY
Qty: 5 TABLET | Refills: 0 | Status: SHIPPED | OUTPATIENT
Start: 2023-07-20 | End: 2023-07-25

## 2023-07-20 RX ORDER — DOCUSATE SODIUM 100 MG/1
100 CAPSULE, LIQUID FILLED ORAL 2 TIMES DAILY
Qty: 60 CAPSULE | Refills: 0 | Status: SHIPPED | OUTPATIENT
Start: 2023-07-20 | End: 2023-08-19

## 2023-07-20 RX ORDER — OMEPRAZOLE 20 MG/1
20 CAPSULE, DELAYED RELEASE ORAL DAILY
Qty: 30 CAPSULE | Refills: 0 | Status: SHIPPED | OUTPATIENT
Start: 2023-07-20 | End: 2023-08-19

## 2023-07-20 RX ORDER — TRAMADOL HYDROCHLORIDE 50 MG/1
50 TABLET ORAL EVERY 6 HOURS PRN
Qty: 20 TABLET | Refills: 0 | Status: SHIPPED | OUTPATIENT
Start: 2023-07-20 | End: 2023-07-27

## 2023-07-20 RX ORDER — WARFARIN SODIUM 5 MG/1
5 TABLET ORAL
Qty: 60 TABLET | Refills: 2 | Status: SHIPPED | OUTPATIENT
Start: 2023-07-20

## 2023-07-20 RX ORDER — SENNOSIDES 8.6 MG
650 CAPSULE ORAL EVERY 8 HOURS PRN
Qty: 30 TABLET | Refills: 0 | Status: SHIPPED | OUTPATIENT
Start: 2023-07-20 | End: 2023-08-19

## 2023-07-20 RX ORDER — WARFARIN SODIUM 3 MG/1
6 TABLET ORAL
Status: DISCONTINUED | OUTPATIENT
Start: 2023-07-20 | End: 2023-07-20 | Stop reason: HOSPADM

## 2023-07-20 RX ORDER — POTASSIUM CHLORIDE 20 MEQ/1
20 TABLET, EXTENDED RELEASE ORAL DAILY
Qty: 5 TABLET | Refills: 0 | Status: SHIPPED | OUTPATIENT
Start: 2023-07-20 | End: 2023-07-25

## 2023-07-20 RX ADMIN — ACETAMINOPHEN 975 MG: 325 TABLET, FILM COATED ORAL at 06:01

## 2023-07-20 RX ADMIN — PANTOPRAZOLE SODIUM 40 MG: 40 TABLET, DELAYED RELEASE ORAL at 10:31

## 2023-07-20 RX ADMIN — POTASSIUM CHLORIDE 20 MEQ: 1500 TABLET, EXTENDED RELEASE ORAL at 10:31

## 2023-07-20 RX ADMIN — OXYCODONE HYDROCHLORIDE AND ACETAMINOPHEN 500 MG: 500 TABLET ORAL at 10:31

## 2023-07-20 RX ADMIN — ASPIRIN 81 MG: 81 TABLET, COATED ORAL at 10:31

## 2023-07-20 RX ADMIN — Medication 12.5 MG: at 10:31

## 2023-07-20 RX ADMIN — TORSEMIDE 20 MG: 20 TABLET ORAL at 10:31

## 2023-07-20 RX ADMIN — Medication 1000 UNITS: at 10:31

## 2023-07-20 RX ADMIN — PANCRELIPASE 24000 UNITS: 24000; 76000; 120000 CAPSULE, DELAYED RELEASE PELLETS ORAL at 10:31

## 2023-07-20 RX ADMIN — Medication 250 MG: at 10:31

## 2023-07-20 NOTE — PROGRESS NOTES
Progress Note - Cardiothoracic Surgery   Raymundo Banuelos. 77 y.o. male MRN: [id number]  Unit/Bed#: [facility] 411-01 Encounter: [id number]    Aortic regurgitation, Atrial fibrillation, Mitral regurgitation. S/P aortic valve replacement, left atrial appendage ligation and mitral valve repair; POD # 8  POD 1 PPM     24 Hour Events: No complaints this morning. Moving self around room without assistance. Pain controlled. No overnight events. Had PPM interrogated this morning with Medtronic.      Medications:   Scheduled Meds:  Current Facility-Administered Medications   Medication Dose Route Frequency Provider Last Rate   • acetaminophen  650 mg Oral Q4H PRN Pradip Self PA-C     • acetaminophen  975 mg Oral Q8H Arturo Gomez PA-C     • ascorbic acid  500 mg Oral Daily Jj Ac PA-C     • aspirin  81 mg Oral Daily Arthurine Karen Claudio PA-C     • atorvastatin  80 mg Oral Daily With [facility], PA-C     • bisacodyl  10 mg Rectal Daily PRN Jj Ac PA-C     • cholecalciferol  1,000 Units Oral Daily Jj Ac PA-C     • docusate sodium  100 mg Oral BID Jj Ac PA-C     • insulin lispro  1-5 Units Subcutaneous TID AC Arturo Gomez PA-C     • insulin lispro  1-5 Units Subcutaneous HS Jj Ac PA-C     • lidocaine  1 patch Topical Daily DHEERAJ Nevarez     • methocarbamol  500 mg Oral Q6H PRN CesarZEE ReyesNP     • metoclopramide  10 mg Intravenous Q6H PRN Teresa Hickey PA-C     • ondansetron  4 mg Intravenous Q6H PRN Jj Ac PA-C     • pancrelipase (Lip-Prot-Amyl)  24,000 Units Oral TID With Meals Jj Ac PA-C     • pantoprazole  40 mg Oral Daily jJ Ac PA-C     • polyethylene glycol  17 g Oral Daily Jj Ac PA-C     • potassium chloride  20 mEq Oral Daily Jcarlos Waite PA-C     • saccharomyces boulardii  250 mg Oral BID Jj Ac PA-C     • temazepam  15 mg Oral HS PRN Jj Ac PA-C     • torsemide 20 mg Oral Daily Shannan Osei PA-C     • traMADol  25 mg Oral Q6H PRN Maria Del Rosario CordBRYAN     • traMADol  50 mg Oral Q6H PRN Maria Del Rosario BRYAN Li       Continuous Infusions:   PRN Meds:.•  acetaminophen  •  bisacodyl  •  methocarbamol  •  metoclopramide  •  ondansetron  •  temazepam  •  traMADol  •  traMADol    Vitals:   Vitals:    07/19/23 1830 07/19/23 1900 07/19/23 2337 07/20/23 0600   BP: 103/56 118/60 112/64    BP Location:  Right arm Left arm    Pulse: 60 60 60    Resp:  16 16    Temp:  98.1 °F (36.7 °C) 98.4 °F (36.9 °C)    TempSrc:  Oral Oral    SpO2:  99% 98%    Weight:    116 kg (255 lb 1.2 oz)   Height:           Telemetry: V-Paced; Heart Rate: 70    Respiratory:   SpO2: SpO2: 98 %;  Room Air    Intake/Output:     Intake/Output Summary (Last 24 hours) at 7/20/2023 0759  Last data filed at 7/20/2023 0209  Gross per 24 hour   Intake 580 ml   Output 2000 ml   Net -1420 ml      Weights:   Weight (last 2 days)     Date/Time Weight    07/20/23 0600 116 (255.07)    07/19/23 0600 117 (258.82)    07/18/23 0600 118 (259.48)            Results:   Results from last 7 days   Lab Units 07/20/23  0454 07/19/23  0526 07/17/23  0533   WBC Thousand/uL 6.45 6.73 7.98   HEMOGLOBIN g/dL 14.3 13.7 14.4   HEMATOCRIT % 43.4 41.0 41.5   PLATELETS Thousands/uL 183 161 149     Results from last 7 days   Lab Units 07/20/23  0454 07/19/23  0526 07/17/23  0533   SODIUM mmol/L 141 140 138   POTASSIUM mmol/L 4.2 3.9 3.8   CHLORIDE mmol/L 108 107 107   CO2 mmol/L 29 29 30   BUN mg/dL 11 13 19   CREATININE mg/dL 0.83 0.76 0.75   CALCIUM mg/dL 9.1 9.2 9.1     Results from last 7 days   Lab Units 07/20/23  0454 07/19/23  0526 07/18/23  0440 07/17/23  2217 07/17/23  1448   INR  2.55* 2.29* 2.33*  --   --    PTT seconds  --   --  76* 95* 104*   7/19 - 6 7/18 - 5 7/17 - 5 7/16 - 5    Point of care glucose: 109-157    Invasive Lines/Tubes:  Invasive Devices     Peripheral Intravenous Line  Duration           Peripheral IV 07/19/23 Left;Ventral (anterior) Forearm <1 day                Physical Exam:    HEENT/NECK:  Normocephalic. Atraumatic. No jugular venous distention. Cardiac: Regular rate and rhythm  Pulmonary:  Breath sounds clear bilaterally  Abdomen:  Non-tender, Non-distended and Normal bowel sounds  Incisions: Sternum is stable. Incision is clean, dry, and intact. and Pacemaker incision dressed with Acticoat. No erythema or drainage  Extremities: Extremities warm/dry and No edema B/L  Neuro: Alert and oriented X 3  Skin: Warm/Dry, without rashes or lesions. Assessment:  Principal Problem:    S/P AVR  Active Problems:    History of DVT (deep vein thrombosis)    Benign essential hypertension    Mitral valve insufficiency    Bilateral popliteal artery aneurysm ([facility])    Mixed hyperlipidemia    Aneurysm of ascending aorta ([facility])    Pulmonary hypertension ([facility])    Aortic insufficiency    Permanent atrial fibrillation ([facility])    Coronary artery disease involving native coronary artery    S/P MVR (mitral valve repair)    S/P left atrial appendage ligation    PAD (peripheral artery disease) ([facility])    Hyperchloremia         Aortic regurgitation, Atrial fibrillation, Mitral regurgitation. S/P aortic valve replacement, left atrial appendage ligation and mitral valve repair; POD # 8    Plan:    1. Cardiac:     V-Paced; HR well-controlled  BP well-controlled  Start Lopressor, 12.5mg PO BID  Continue prophylactic Amiodarone, 200 mg PO TID    Anticoagulated for atrial fibrillation   INR 2.55, Dose Coumadin, 6 mg today    Continue ASA and Statin therapy  Epicardial pacing wires have been removed  Patient no longer has central IV access   Continue DVT prophylaxis    2. Pulmonary:   Good Room air oxygen saturation; Continue incentive spirometry/Coughing/Deep breathing exercises  Chest tubes have been discontinued    3. Renal:   Post op Creatinine stable;  Follow up labs prn   Intake/Output net: -1420 mL/24 hours  Diuretic Regimen:po diuretics at home    4. Neuro:  Neurologically intact; No active issues  Incisional pain well-controlled    5. GI:  Cardiac diet, with 1800 mL fluid restriction  Continue stool softeners and prn suppository  Continue GI prophylaxis    6. Endo:   Glucose well-controlled with sliding scale coverage    7    Hematology:    Post-operative blood count acceptable; Trend prn    8.    Disposition:      Anticipated discharge date: today with [facility]      VTE Pharmacologic Prophylaxis: Warfarin (Coumadin)  VTE Mechanical Prophylaxis: sequential compression device    Collaborative rounds completed with supervising physician  Plan of care discussed with bedside nurse    SIGNATURE: Erlin Tinoco PA-C  DATE: July 20, 2023  TIME: 7:59 AM

## 2023-07-20 NOTE — PHYSICAL THERAPY NOTE
Physical Therapy Treatment    Patient Name: Chin Cook. Today's Date: 7/20/2023     Problem List  Principal Problem:    S/P AVR  Active Problems:    History of DVT (deep vein thrombosis)    Benign essential hypertension    Mitral valve insufficiency    Bilateral popliteal artery aneurysm ([facility])    Mixed hyperlipidemia    Aneurysm of ascending aorta ([facility])    Pulmonary hypertension ([facility])    Aortic insufficiency    Permanent atrial fibrillation ([address])    Coronary artery disease involving native coronary artery    S/P MVR (mitral valve repair)    S/P left atrial appendage ligation    PAD (peripheral artery disease) ([address])    Hyperchloremia    s/p Medtronic dual chamber PPM with left bundle pacing lead 7/19/2023       Past Medical History  Past Medical History:   Diagnosis Date    Cardiac disease     Hypertension     PAF (paroxysmal atrial fibrillation) (HCC)     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[address])     BILATERALLY    s/p Medtronic dual chamber PPM with left bundle pacing lead 7/19/2023 7/20/2023        Past Surgical History  Past Surgical History:   Procedure Laterality Date    CARDIAC CATHETERIZATION N/A 5/8/2023    Procedure: Cardiac Coronary Angiogram;  Surgeon: Jolynn Kenney DO;  [facility]; Service: Cardiology    CARDIAC ELECTROPHYSIOLOGY PROCEDURE N/A 3/25/2022    Procedure: Cardiac eps/afib ablation;  Surgeon: Tamie Rizo MD;  [facility]: [facility]; Service: Cardiology    CARDIAC ELECTROPHYSIOLOGY PROCEDURE N/A 7/19/2023    Procedure: Cardiac pacer implant;  Surgeon: Delio Oconnell MD;  [facility]: [facility]; Service: Cardiology    ESOPHAGOGASTRODUODENOSCOPY N/A 8/16/2016    Procedure: ESOPHAGOGASTRODUODENOSCOPY (EGD); Surgeon: Pamela Dumont MD;  Location: [facility] GI [facility];   Service:     HERNIA REPAIR Left     groin    HERNIA REPAIR Right 1995    OTHER SURGICAL HISTORY      LA REPLACEMENT MITRAL VALVE W/CARDIOPULMONARY BYP N/A 7/12/2023    Procedure: MITRAL VALVE REPAIR WITH 36MM PHYSIO II ANNULOPLASTY RING;  Surgeon: Mabel Ybarra MD;  Location: [facility] MA[facility];  Service: Cardiac Surgery    [place] RPLCMT AORTIC VALVE OPN W/STENTLESS TISSUE VALVE N/A 7/12/2023    Procedure: REPLACEMENT VALVE AORTIC (AVR) WITH 29MM INSPIRIS TISSUE RESILIA VALVE. LEFT ATRIAL APPENDAGE LIGATION WITH 45 MM ATRICLIP;  Surgeon: Mabel Ybarra MD;  Location: [facility];  Service: Cardiac Surgery    REPLACEMENT TOTAL KNEE Left 04/01/2013    VASCULAR SURGERY Right     POPLITEAL BYPASS DUE TO ANEURYSM           07/20/23 1009   PT Last Visit   PT Visit Date 07/20/23   Note Type   Note Type Treatment   Pain Assessment   Pain Assessment Tool 0-10   Pain Score No Pain   Restrictions/Precautions   Weight Bearing Precautions Per Order No   Other Precautions Cardiac/sternal;Telemetry  (s/p caridac pacer implant (7/19); POD 8 AVR)   General   Chart Reviewed Yes   Additional Pertinent History cleared for treatment by RN   Response to Previous Treatment Patient with no complaints from previous session. Family/Caregiver Present No   Cognition   Overall Cognitive Status WFL   Arousal/Participation Cooperative   Attention Within functional limits   Orientation Level Oriented to person;Oriented to place;Oriented to situation   Memory Within functional limits   Following Commands Follows all commands and directions without difficulty   Comments Pleasant and cooperative   Subjective   Subjective pt provided consent for PT   Bed Mobility   Supine to Sit Unable to assess   Sit to Supine Unable to assess   Additional Comments In bedside chair at beginning of session. Returned to bedside chair at end of session. Transfers   Sit to Stand 6  Modified independent  (x3 performed)   Additional items Increased time required   Stand to Sit 6  Modified independent   Additional Comments RW   Ambulation/Elevation   Gait pattern Short stride; Excessively slow;Forward Flexion   Gait Assistance 6  Modified independent   Additional items Verbal cues  (verbal cues to maintain upright posture; +2nd for chair follow)   Assistive Device Rolling walker   Distance 30' to stairs + 30' to bedside chair (rest break) + 130' with rest break in bedside chair + 130' back to room   Stair Management Assistance 5  Supervision   Additional items Verbal cues  (verbal cues for safety)   Stair Management Technique One rail R;Foreward;Backward; Step to pattern   Number of Stairs 2  (1 step 2x forewards and backwards)   Ambulation/Elevation Additional Comments Pt stated that he will use garage entrance with 2 QASIM with R handrail upon return home   Balance   Static Sitting Good   Dynamic Sitting Fair +   Static Standing Fair   Dynamic Standing Fair   Ambulatory Fair   Endurance Deficit   Endurance Deficit No   Activity Tolerance   Activity Tolerance Patient tolerated treatment well   Medical Staff Made Aware PT Pablo   Nurse Made Aware yes   Assessment   Prognosis Good   Problem List Decreased strength;Decreased endurance; Impaired balance;Decreased mobility   Assessment Pt provided consent for PT treatment. Pt performed outlined activities. Pt progressed and tolerated increased ambulation distance. Additionally, pt progressed stair negotiation ability with R hand rail and S. Pt denied dizziness and pain throughout session. Pt eduacted/ demonstrated verbal understanding of precautions s/p cardiac pacer implantation on (7/19/23). At the end of the session pt was positioned in bedside chair, call bell, and all personal needs in reach. D/C rec remains home with HHPT. Barriers to Discharge None   Goals   Patient Goals return home   STG Expiration Date 07/23/23   PT Treatment Day 5   Plan   Treatment/Interventions Functional transfer training;LE strengthening/ROM; Elevations; Therapeutic exercise; Endurance training;Equipment eval/education; Bed mobility;Gait training;OT  (PT spoke to nursing) Progress Progressing toward goals   PT Frequency 4-6x/wk   Recommendation   PT Discharge Recommendation Home with home health rehabilitation   Equipment Recommended Walker; Other (Comment)  (BSC)   Walker Package Recommended Wheeled walker   AM-PAC Basic Mobility Inpatient   Turning in Flat Bed Without Bedrails 4   Lying on Back to Sitting on Edge of Flat Bed Without Bedrails 4   Moving Bed to Chair 4   Standing Up From Chair Using Arms 4   Walk in Room 4   Climb 3-5 Stairs With Railing 3   Basic Mobility Inpatient Raw Score 23   Basic Mobility Standardized Score 50.88   Highest Level Of Mobility   JH-HLM Goal 7: Walk 25 feet or more   JH-HLM Achieved 8: Walk 250 feet ot more   Education   Education Provided   (Precautions s/p cardiac pacemaker implantation (L))   Patient Demonstrates verbal understanding   End of Consult   Patient Position at End of Consult All needs within reach; Bedside chair     Ziyad Boyle, SPT

## 2023-07-20 NOTE — PROGRESS NOTES
Progress Note - Electrophysiology  Carmina Adames. 77 y.o. male MRN: [id number]  Unit/Bed#: [facility] 411-01 Encounter: [id number]      Assessment/Plan:  1. Heart block with junctional escape s/p Medtronic dual chamber PPM with left bundle pacing lead 7/19/2023 by Dr. Rosio Wilson  -- device interrogation showed normal device function with stable lead parameters  -- post op chest x-ray showed proper lead placement, no pneumothorax  -- left chest implant site healing well  -- keep Aquacell dressing in place x 1 week  -- ok to get incision wet in shower after 1 week  -- arm restrictions x 6 weeks  -- remote monitor paired with device  -- pain control  -- discharge instructions reviewed with patient in detail  -- F/U in 2 weeks for wound check  -- stable for D/C from EP standpoint    2. Persistent Afib with heart block, junctional escape in 50s  -- continue warfarin for stroke prevention, plan to switch back to Eliquis once cleared by CT surgery  -- metoprolol restarted  -- s/p 2 prior Afib ablations by Dr. Caldera [facility] (PVI/CTI 2018, PVI/LA posterior wall 2022), now with recurrence  -- unsuccessful RANDALL/DCCV 7/17 (2 shocks with ERAF)  -- [id number] = 5 (HF, HTN, age, PAD)  -- consider pursing rhythm control strategy in future    3. Moderate AI/severe MR s/p bioprosthetic AVR (29 mm Escalante Inspiris valve) and MV repair (36 mm Escalante physio II ring annuloplasty), JENNY ligation 7/12/2023 by Dr. Ibanez Home  -- management per CT surgery    4. CAD  -- 80% OM1 lesion noted on cardiac catheterization 5/2023  -- eventual elective outpatient PCI to OM1      5. Cardiomyopathy EF 45% on final intraoperative RANDALL (previously 60%)  6. Ascending aortic aneurysm 4.5 cm  7. HTN  8. HLD  9.  PAD s/p bilateral popliteal aneurysm repair    Subjective/Objective   Subjective: Carmina Selby is a 77year old male with a history of Afib s/p 2 prior ablations, now with recurrence who is s/p bio AVR, MV repair, and JENNY ligation on 7/12/23 by Dr. Steph Garcia. Post op, he is in Afib with slow ventricular response with a junctional escape at 50 bpm. He was paced with epicardial wires (programmed DDD 80 bpm). Epicardial wires were D/C'd 7/16 and he had an escape at 50 bpm.   He received metoprolol and amiodarone in the post-operative setting which have been held.     RANDALL/DCCV was attempted 7/17. He was shocked twice with several beats of sinus, then ERAF both times. He remained in Afib with heart block and a junctional escape in the 50s. Therefore, PPM implantation was recommended. He underwent implantation of a dual chamber PPM with left bundle pacing lead on 7/19/2023. Today, he is feeling well. He denies pain at the implant site. His incision is clean, dry, and intact without evidence of hematoma or ecchymosis or signs of infection. Vital signs and labs were reviewed. Assessment of chest x-rays show proper lead placement without evidence of pneumothorax. Device interrogation showed appropriate device function with lead parameters such as sensing, threshold, and impedance being tested. Patient denies chest pain/heaviness/tightness/pressure, palpitations, shortness of breath, orthopnea, lightheadedness, presyncope, syncope or N/V.       Objective:  Vitals: /75 (BP Location: Right arm)   Pulse 62   Temp 97.8 °F (36.6 °C) (Oral)   Resp 18   Ht 6' 3.2" (1.91 m)   Wt 116 kg (255 lb 1.2 oz)   SpO2 95%   BMI 31.71 kg/m²     Vitals:    07/19/23 0600 07/20/23 0600   Weight: 117 kg (258 lb 13.1 oz) 116 kg (255 lb 1.2 oz)     Orthostatic Blood Pressures    Flowsheet Row Most Recent Value   Blood Pressure 134/75 filed at 07/20/2023 1202   Patient Position - Orthostatic VS Sitting filed at 07/20/2023 1202            Intake/Output Summary (Last 24 hours) at 7/20/2023 1212  Last data filed at 7/20/2023 0825  Gross per 24 hour   Intake 998 ml   Output 1200 ml   Net -202 ml       Invasive Devices     Peripheral Intravenous Line  Duration Peripheral IV 07/19/23 Left;Ventral (anterior) Forearm 1 day                Scheduled Meds:  Current Facility-Administered Medications   Medication Dose Route Frequency Provider Last Rate   • acetaminophen  650 mg Oral Q4H PRN Pradip Self PA-C     • acetaminophen  975 mg Oral Q8H Arturo Gomez PA-C     • ascorbic acid  500 mg Oral Daily Jaqueline Cabot, PA-C     • aspirin  81 mg Oral Daily Khloe Claudio PA-C     • atorvastatin  80 mg Oral Daily With [facility]     • bisacodyl  10 mg Rectal Daily PRN Jaqueline Cabot, PA-C     • cholecalciferol  1,000 Units Oral Daily Jaqueline Cabot, PA-C     • docusate sodium  100 mg Oral BID Jaqueline Cabot, PA-C     • insulin lispro  1-5 Units Subcutaneous TID AC Arturo Gomez PA-C     • insulin lispro  1-5 Units Subcutaneous HS Jaqueline Cabot, PA-C     • lidocaine  1 patch Topical Daily Kisha Drake CRNP     • methocarbamol  500 mg Oral Q6H PRN Kisha Drake, CRNP     • metoclopramide  10 mg Intravenous Q6H PRN Suyapa Rudolph PA-C     • metoprolol tartrate  12.5 mg Oral Q12H [address] [facility] BRYAN Morgan     • ondansetron  4 mg Intravenous Q6H PRN Jaqueline Cabot, PA-C     • pancrelipase (Lip-Prot-Amyl)  24,000 Units Oral TID With Meals Jaqueline Cabot, PA-C     • pantoprazole  40 mg Oral Daily Jaqueline Cabot, PA-C     • polyethylene glycol  17 g Oral Daily Jaqueline Cabot, PA-C     • potassium chloride  20 mEq Oral Daily Molina Sanchez PA-C     • saccharomyces boulardii  250 mg Oral BID Jaqueline Cabot, PA-C     • temazepam  15 mg Oral HS PRN Jaqueline Cabot, PA-C     • torsemide  20 mg Oral Daily Molina Sanchez PA-C     • traMADol  25 mg Oral Q6H PRN Suyapa Rudolph PA-C     • traMADol  50 mg Oral Q6H PRN Suyapa Rudolph PA-C     • warfarin  6 mg Oral Once (warfarin) Erik Tse PA-C       Continuous Infusions:   PRN Meds:.•  acetaminophen  •  bisacodyl  •  methocarbamol  •  metoclopramide  •  ondansetron  •  temazepam  • traMADol  •  traMADol    Review of Systems:  ROS as noted above, otherwise 12 point review of systems was performed and is negative. Physical Exam:   GEN: NAD, alert and oriented x 3, well appearing  SKIN: warm, dry without significant lesions or rashes. Left chest implant site C/D/I, no erythema or hematoma. No signs of infection. HEENT: NCAT, PERRL, EOMs intact  NECK: supple, no JVD appreciated  CARDIOVASCULAR: RRR, normal S1, S2 without murmurs, rubs, or gallops   LUNGS: CTA bilaterally without wheezes, rhonchi, or rales  ABDOMEN: Soft, nontender, nondistended. EXTREMITIES/VASCULAR: perfused without clubbing, cyanosis, or LE edema b/l  PSYCH: Normal mood and affect  NEURO: CN ll-Xll grossly intact       Lab Results: I have personally reviewed pertinent lab results. Results from last 7 days   Lab Units 07/20/23  0454 07/19/23  0526 07/17/23  0533   WBC Thousand/uL 6.45 6.73 7.98   HEMOGLOBIN g/dL 14.3 13.7 14.4   HEMATOCRIT % 43.4 41.0 41.5   PLATELETS Thousands/uL 183 161 149     Results from last 7 days   Lab Units 07/20/23  0454 07/19/23  0526 07/17/23  0533   POTASSIUM mmol/L 4.2 3.9 3.8   CHLORIDE mmol/L 108 107 107   CO2 mmol/L 29 29 30   BUN mg/dL 11 13 19   CREATININE mg/dL 0.83 0.76 0.75   CALCIUM mg/dL 9.1 9.2 9.1     Results from last 7 days   Lab Units 07/20/23  0454 07/19/23  0526 07/18/23  0440 07/17/23  2217 07/17/23  1448   INR  2.55* 2.29* 2.33*  --   --    PTT seconds  --   --  76* 95* 104*     Results from last 7 days   Lab Units 07/14/23  0349   MAGNESIUM mg/dL 2.9*       Imaging: I have personally reviewed pertinent reports.

## 2023-07-20 NOTE — DISCHARGE SUMMARY
Discharge Summary - Cardiothoracic Surgery   Travis Hamilton. 77 y.o. male MRN: [id number]  Unit/Bed#: [facility] 174-56 Encounter: [id number]    Admission Date: 7/12/2023     Discharge Date: 07/20/23    Admitting Diagnosis: Coronary artery disease involving native coronary artery of native heart without angina pectoris [I25.10]  Nonrheumatic mitral valve regurgitation [I34.0]  Nonrheumatic aortic valve insufficiency [I35.1]    Primary Discharge Diagnosis:   Aortic regurgitation, Mitral regurgitation. S/P AVR (#29mm Inspiris Resilia), MVR (#36mm Escalante Physio II Ring), JENNY Clip 45mm, insertion femoral a line. 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 Attending: Sara Atkins M.D. Consulting Physician(s):   Cardiology  Electrophysiology  Medical critical care    Procedures Performed:   Procedure(s):  Cardiac pacer implant     Hospital Course: The patient was seen in consultation prior to this admission for evaluation of Aortic regurgitation, Mitral regurgitation. Risks and benefits of aortic valve replacement and mitral valve repair vs replacement were discussed in detail, and patient was agreeable. Routine preoperative evaluation was completed and informed consent was obtained prior to admission. 7/12: Elective admission for AVR (#29mm Inspiris Resilia), MVR (#36mm Escalante Physio II Ring), JENNY Clip 45mm, insertion femoral a line. Intraoperative blood products include: 2 Plts.   No significant postoperative bleeding. Transferred to ICU supported with Epi @ 3, Levo @ 7. Wean towards extubation. Intra-op UA w/ 1-2 WBC otherwise (-) infx. PM: Stable on same gtts. A line not working, to remove femoral a line. On vent wean. 7/13: Given 1L LR & 500cc albumin post-op for low filling pressures/CI. Weaned off epi & levo. Delined. AV paced at 80 w/ junctional bradycardia in 40s underneath, hold beta blocker & amio, maintain EPWs. INR 1.38, dose Coumadin 2.5mg tonight. Discontinue a line. Extubated to [facility], wean as tolerated. Start Lasix 40mg IV BID, discontinue guillermo catheter. Discontinue insulin gtt, transition to [facility]. Transfer to telem. 7/14: H/o perm Afib. A-paced 80, junctional 40s underneath. EP consulted. Hold BB and Amio. Sats good on RA. INR 1.4, continue coumadin 2.5mg today. Increase lasix 60 BID. KEep PWs, possible d/c CTs today. SSI. Ambulating well. Change oxycodone to tramadol due to nausea/GI upset from oxycodone, add additional reglan IV PRN to zofran. EP recs cont hold BB, no amio and observe rhythm x 48 hrs, if remains junctional/heart block then PPM.      7/15: A-paced. Aflutter in 46s to 62s underneath. EP following for possible PPM. Keep PWs and CTs. Sats good on RA. Hold BB and Amio. Cont iv Lasix BID. SSI. Labs stable. INR 1.34, coumadin 5 mg today. 7/16: A-flutter 50s-60s. EP following for possible PPM. Keep PWs/CTs. Hold BB/Amio. INR 1.44. Coumadin 5mg today. SSI. Ambulating well. OK with EP to take out PWs/CTs, will do RANDALL/CV tomorrow     7/17: + BM. NPO for cardioversion today. Poss PPM. INR 1.73, Dose Coumadin, 5 mg today. D/C Arixtra. Intake/Output net: -1500 mL/24 hours. Decrease to Torsemide 20mg QD. RANDALL/CVN unsuccessful x 2 shocks. Bridge with IV heparin. Hold BB, no amio. HRs improved but monitor per EP.      7/18: Bradycardic overnight low at 40, stable BP. EP planning PPM tomorrow. Coumadin 5 mg tonight, INR 2.33. D/C IV heparin. 7/19: PPM today.  INR 2.26 - 5 mg coumadin tonight. PM: Resumed Metoprolol 25mg q12.     7/20: No events. CXR clear. INR therapeutic. Fit for discharge to home. Condition at Discharge:   good     Discharge Physical Exam:    Please see the documented physical exam from this morning's progress note for details. Discharge Data:  Results from last 7 days   Lab Units 07/20/23  0454 07/19/23  0526 07/17/23  0533   WBC Thousand/uL 6.45 6.73 7.98   HEMOGLOBIN g/dL 14.3 13.7 14.4   HEMATOCRIT % 43.4 41.0 41.5   PLATELETS Thousands/uL 183 161 149     Results from last 7 days   Lab Units 07/20/23  0454 07/19/23  0526 07/17/23  0533   POTASSIUM mmol/L 4.2 3.9 3.8   CHLORIDE mmol/L 108 107 107   CO2 mmol/L 29 29 30   BUN mg/dL 11 13 19   CREATININE mg/dL 0.83 0.76 0.75   CALCIUM mg/dL 9.1 9.2 9.1     Results from last 7 days   Lab Units 07/20/23  0454 07/19/23  0526 07/18/23  0440 07/17/23  2217 07/17/23  1448   INR  2.55* 2.29* 2.33*  --   --    PTT seconds  --   --  76* 95* 104*       Discharge instructions/Information to patient and family:   See after visit summary for information provided to patient and family. Barbara Chatterjee. was educated on restrictions regarding driving and lifting, and techniques of proper incisional care. They were specifically counselled on signs and symptoms of an incisional infection, and advised to contact our service immediately should they develop fevers, sweats, chill, redness or drainage at the site of any incisions. Provisions for Follow-Up Care:  See after visit summary for information related to follow-up care and any pertinent home health orders. Disposition:  Home    Planned Readmission:   No    Discharge Medications:  See after visit summary for reconciled discharge medications provided to patient and family. Barbara Chapa was provided contact information and scheduled a follow up appointment with Pily Colin M.D.   Additionally, follow up appointments have been scheduled for their primary care physician and primary cardiologist.  Contact information was provided. Sheldon Fermin was counseled on the importance of avoiding tobacco products. As with all patients whom have undergone open heart surgery, tobacco cessation medication was contraindicated at the time of discharge. ACE/ARB was Contraindicated secondary to hypotension    Beta Blocker was Prescribed at discharge    Aspirin was Prescribed at discharge    Statin was Prescribed at discharge     Sheldon Fermin is being discharged on anticoagulation therapy for treatment of AF. Prior to this surgical admission they were anticoagulated with Eliquis. As the novel anticoagulants are not reversible, they are held in our open heart patients for a period of three months, should they require reoperation. Consequently the patient will be discharged on Coumadin. Arrangements have been made for the [address] [place] [address] to monitor INR levels and provide dosing instructions. Their office was notified by [facility], which itemized the patient’s daily INR’s and correlating Coumadin doses during their hospitalization. After a period of one to three months, the patient can return to their preoperative anticoagulation regimen. Sheldon Fermin has been prescribed Coumadin, 5 mg tabs, with 60 tablets being dispensed. They have been advised to take 5 mg daily, unless otherwise directed. Follow up PT/INR will be ordered at the discretion of the Coumadin clinic. The patient was discharged on ongoing diuretic therapy with Torsemide, 20 mg, PO QD and Potassium Chloride 20 mEq, PO QD. They were advised to continue these medications for 5 days, unless otherwise directed.      Narcotic pain medication was prescribed in the form of Ultram.  Prior to prescribing, their prescription profile was reviewed on the [place] department of health prescription drug monitoring program.    The patient was informed that following their postoperative surgical evaluation, they will be referred to outpatient cardiac rehabilitation. They were counseled that this program is run by specialists who will help them safely strengthen their heart and prevent more heart disease. Cardiac rehabilitation will include exercise, relaxation, stress management, and heart-healthy nutrition. Caregivers will also check to make sure their medication regimen is working. During this admission, the patient was questioned on their use of tobacco, alcohol, and illicit/non-prescription drug use in the  previous 24 months. Alma Conrad. states that they have not used any of these substances in this time frame. I spent 35 minutes discharging the patient. This time was spent on the day of discharge. I had direct contact with the patient on the day of discharge. Additional documentation is required if more than 30 minutes were spent on discharge.      SIGNATURE: Malick Lozano PA-C  DATE: July 20, 2023  TIME: 10:16 AM

## 2023-07-20 NOTE — PLAN OF CARE
Problem: PHYSICAL THERAPY ADULT  Goal: Performs mobility at highest level of function for planned discharge setting. See evaluation for individualized goals. Description: Treatment/Interventions: Functional transfer training, LE strengthening/ROM, Elevations, Therapeutic exercise, Endurance training, Equipment eval/education, Bed mobility, Gait training, OT (PT spoke to nursing  Simultaneous filing. User may not have seen previous data.)  Equipment Recommended: Walker, Other (Comment) ([facility]  Simultaneous filing. User may not have seen previous data.)       See flowsheet documentation for full assessment, interventions and recommendations. Note: Prognosis: Good (Simultaneous filing. User may not have seen previous data.)  Problem List: Decreased strength, Decreased endurance, Impaired balance, Decreased mobility (Simultaneous filing. User may not have seen previous data.)  Assessment: Pt provided consent for PT treatment. Pt performed outlined activities. Pt progressed and tolerated increased ambulation distance. Additionally, pt progressed stair negotiation ability with R hand rail and S. Pt denied dizziness and pain throughout session. Pt eduacted/ demonstrated verbal understanding of precautions s/p cardiac pacer implantation on (7/19/23). At the end of the session pt was positioned in bedside chair, call bell, and all personal needs in reach. D/C rec remains home with HHPT. (Simultaneous filing. User may not have seen previous data.)  Barriers to Discharge: None (Simultaneous filing. User may not have seen previous data.)     PT Discharge Recommendation: Home with home health rehabilitation (Simultaneous filing. User may not have seen previous data.)    See flowsheet documentation for full assessment.

## 2023-07-20 NOTE — DISCHARGE INSTRUCTIONS
Blood Thinners    What you need to know:     You have been prescribed a blood thinner(s) to help prevent blood clots. Blood clots can cause strokes, heart attacks, and death. Examples of blood thinners include Aspirin, Plavix (clopidorel), Brilinta (ticagrelor), Xarelto (rivaroxaban), Pradaxa., (dabigatran), Eliquis (apixaban), and Coumadin (warfarin)    While taking any blood thinner, watch for bleeding and bruising. Watch for blood in your urine and bowel movements. Use a soft washcloth on your skin, and a soft toothbrush to brush your teeth. If you shave, use an electric shaver. Do not play contact sports. Do not start or stop any other medicines unless your healthcare provider tells you to do so. (Many medicines cannot be used with blood thinners)    Take your blood thinner exactly as prescribed by your healthcare provider. Do not skip a dose or take less than prescribed. Tell your provider right away if you forget to take your blood thinner, or if you took too much. Warfarin (Coumadin): You will need a regular blood test called a PT/INR. This test checks how thin your blood is. Your doctor may change your Coumadin dose, based on blood test (INR) results. Following discharge from the hospital, you will be contacted by the [address] to order these blood tests. If you do not hear from them within two days, contact the office at [phone]  It is not safe to take this medicine during pregnancy. It could harm an unborn baby. Tell your doctor right away if you become pregnant. Use an effective form of birth control to keep from getting pregnant during treatment and for at least 1 month after your last dose. If you miss a dose do not take extra medicine to make up for a missed dose. Inform your healthcare provider. Foods and medicines can affect your body's response to Coumadin. Warfarin works best when you eat about the same amount of vitamin K every day.  Vitamin K is found in green leafy vegetables and certain other foods. Do not make major changes to your diet while you take Coumadin. Do not eat grapefruit or drink grapefruit juice while you are using this medicine. Instructions Following Open Heart Surgery      Protect your sternum (breast bone). Wires are placed during surgery to hold the sternum together. Do not lift anything heavier than 10 pounds for the first four weeks after surgery. (For example, a gallon of milk weighs 8 pounds) When you are seen for a routine postop check, you will be cleared to lift up to 25 lbs. Following three months from the time of surgery, you may lift without any restrictions. Hug a pillow to your chest or cross your arms over your chest when you laugh, sneeze, or cough. You may resume sexual activity when you feel ready. Do not put any excessive pressure on your chest.    Be careful when you get into or out of a chair or bed. Hug a pillow or cross your arms when you stand or sit. Do not twist as you move. Use only your legs to sit and stand. You may need a raised toilet seat if you have trouble standing up without using your arms. No sleeping on side for the first 4 weeks. Limit daytime naps, to prevent difficulty sleeping at night. Women: Wear a clean surgical bra every day. Do not play sports that use your shoulder. Examples include tennis and golf. Do not drive until cleared by surgeon. Typically cleared to drive after one month, determination will be made at routine postop appointment. When a passenger in the car, wear a seat belt. Continue you breathing exercises throughout the day with incentive spirometry    Activity: Your goal is to go on frequent walks, slowly increasing your activity level. Walking will help prevent leg swelling and prevent blood clots. When you start outpatient cardiac rehab in one month, you will be given additional instructions and a formal exercise plan. It is OK to go up steps, with help. You may resume sexual activity when you are comfortable. Do not put excessive pressure on your chest.     Weigh yourself daily in the morning and keep of log of your weight. If your weight increases more than 2 lbs per day or more than 5 lbs in a week, call your surgeon's office. Keep your legs elevated when sitting. Pressure stockings may be worn to prevent significant leg swelling. You may get routine vaccines any time after open heart surgery    Do not smoke:  Nicotine can damage blood vessels and make it more difficult to heal. Do not use e-cigarettes or smokeless tobacco in place of cigarettes or to help you quit. They still contain nicotine. Ask your primary care provider for information if you currently smoke and need help quitting. Incision/Wound Care: Shower daily. Gently wash your incision with warm water and mild soap. Do not scrub the area. Gently pat the area dry with a clean towel. If you have a bandage, dry the area and put on a new, clean bandage. Change your bandage at least daily, or if it gets wet or dirty. Always wash your hands before you care for your wound. Do not apply ointments, creams, lotions, powders, etc. If you develop signs of an infection (fever > 101, redness, warm skin, or drainage) please call your surgeon's office. Do not pick at or touch puncture sites or incisions. Allow and scabs to come off on their own. It is normal to have some drainage from the chest tube sites. Apply clean/dry dressings, until this resolves. You may have discomfort or numbness along the incision for 3-4 weeks. It is normal to occasionally experience brief sharp shooing pains, tightness, or pulling sensations. Do not take a bath or swim until cleared by surgeon. As your incision heals, sometimes small tender lumps or pimple-like bumps develop which is due to your body attempting to [facility] the suture (stiches).      Call your local emergency number (911 in the [address]) or have someone call if:   You have squeezing, pressure, or pain in your chest or back, lightheadedness or sudden cold sweat  Short of breath that does not go away with rest  You cough up blood. You have a fast heartbeat that flutters. Or palpitations  You faint. Signs of a stroke:  Numbness or drooping on one side of your face. Weakness in an arm or leg. Confusion or difficulty speaking. Dizziness, a severe headache, or vision loss    Call your surgeons office if:   You have bleeding that does not stop even after you apply pressure for 5 minutes. You have redness, tenderness, or signs of infection at incision (pain, swelling, odor, or green/yellow discharge from incision site)  You have a severe headache. You have a fever higher than 101°F (38.4°C). You urinate less, or not at all. You have persistent nausea, vomiting, or diarrhea  You hear persistent or worsening crunching or grinding in your sternum. You have questions or concerns about your condition or care. Diet:    Eat heart-healthy foods, low in unhealthy fats and sodium (salt). A heart healthy diet helps improve your cholesterol levels and lowers your risk for heart disease and stroke. You will receive formal education on healthy eating and label reading during your cardiac rehab in one month. Choose foods that contain healthy fats, such as soybean, canola, olive, corn, and sunflower oils. Limit unhealthy fats. Examples include:  Cholesterol  is found in animal foods, such as eggs and lobster, and in dairy products made from whole milk. Saturated fat  is found in meats, such as murray and hamburger. It is also found in chicken or turkey skin, whole milk, and butter. Trans fat  is found in packaged foods, such as potato chips and cookies. It is also in some fried foods, and shortening. Do not eat foods that contain trans fats. Get 20 to 30 grams of fiber each day.   Fruits, vegetables, whole-grain foods, and legumes (cooked beans) are good sources of fiber. Low Sodium: Limit to 2,000 mg or less each day, unless otherwise directed. Choose low-sodium or no-salt-added foods. Add little or no salt to food you prepare. Use herbs and spices in place of salt. Foods high in sodium include frozen dinners, macaroni and cheese, instant potatoes, canned vegetables, cured or smoked meats, hot dogs, murray and sausage, ketchup, barbecue sauce, salad dressing, pickles, olives, soy sauce, and miso. Fluid Restriction: Fluid restriction after hospital discharge is advised. Limit your fluid intake to no more than 8 cups of liquid (8oz = 1cup) or 2000 mL per day. Limit/Do not drink alcohol. Alcohol can damage heart and raise your blood pressure. The general recommended limit is 1 drink a day for women 21 or older and for men 72 or older. Do not have more than 3 drinks within 24 hours or 7 within a week. A drink of alcohol is 12 oz of beer, 5 oz of wine, or 1½ oz of liquor. Narcotic Safety     What do I need to know about narcotic safety? A narcotic is a type of medicine used to treat pain. When you are discharged from the hospital, you will be prescribed a narcotic called oxycodone. Pain control and management may help you rest, heal, and return to your daily activities. Do not take more than the recommended amount. Too much can cause a life-threatening overdose. Do not continue to take it after your pain stops. How do I use narcotics safely? Take prescribed narcotics exactly as directed. You may develop tolerance. This means you keep needing higher doses to get the same effect. You may also develop narcotic use disorder. This means you are not able to control your narcotic use. Do not give narcotics to others or take narcotics that belong to someone else. Do not mix narcotics with other medicines or alcohol. The combination can cause an overdose, or cause you to stop breathing.    Do not drive or operate heavy machinery after you use a narcotic. Talk to your healthcare provider if you have any side effects. Side effects include nausea, sleepiness, itching, and trouble thinking clearly. What can I do to manage constipation? Constipation is the most common side effect of narcotic medicine. Constipation is when you have hard, dry bowel movements, or you go longer than usual between bowel movements. The following are ways you can prevent or relieve constipation:  Drink liquids as allowed. Drinking extra liquids will help soften and move your bowels. You should drink up to your maximum fluid allotment of 2 liters. Eat high-fiber foods. This may help decrease constipation by adding bulk to your bowel movements. High-fiber foods include fruits, vegetables, whole-grain breads and cereals, and beans  Supplements. You have been prescribed a fiber supplement called polyethylene glycol (Dulce lax) and a stool softener called docusate sodium (Colace). You should take these for as long as you continue narcotic medications. Exercise regularly. Regular physical activity can help stimulate your intestines. Walking is a good exercise to prevent or relieve constipation. Ask which exercises are best for you. How do I store narcotics safely? Store narcotics where others cannot easily get them. Keep them in a locked cabinet or secure area. Do not  keep them in a purse or other bag you carry with you. A person may be looking for something else and find the narcotics. Make sure narcotics are stored out of the reach of children. A child can easily overdose on narcotics. Narcotics may look like candy to a small child.

## 2023-07-20 NOTE — DISCHARGE INSTR - AVS FIRST PAGE
Please refer to post pacemaker implantation discharge instructions and restrictions and your pacemaker booklet/temporary card. Keep incision dry for one week. Do not use lotions/powders/creams on incision. Leave outer bandage in place for 1 week - it is water proof, and as long as it is fully adhered to your skin you may shower with it. If it appears as though the bandage is coming off and/or there is any communication to the area of device incision, please then keep the whole area dry for the remaining week. After 1 week, please remove by pulling all edges away from the center of the bandage. No overhead reaching/pushing/pulling/lifting greater than 5-10lbs with left arm for six weeks. Please call the office if you notice redness, swelling, bleeding, or drainage from incision or if you develop fevers.

## 2023-07-21 ENCOUNTER — HOME CARE VISIT (OUTPATIENT)
Dept: HOME HEALTH SERVICES | Facility: HOME HEALTHCARE | Age: 78
End: 2023-07-21

## 2023-07-21 ENCOUNTER — ANTICOAG VISIT (OUTPATIENT)
Dept: CARDIOLOGY CLINIC | Facility: CLINIC | Age: 78
End: 2023-07-21

## 2023-07-21 DIAGNOSIS — I48.91 ATRIAL FIBRILLATION WITH RVR (HCC): Primary | ICD-10-CM

## 2023-07-21 NOTE — CASE COMMUNICATION
[facility] and no other SN agencies in home: yes saturday or sunday     Communication to the physician regarding delay: na    Insurance, copays, HB status reviewed: yes     Verified address and phone number: yes     Requested that patient secure pets: yes    Medication bottles and DC instructions in home: yes     Wound care/IV supplies in home: na    CG present to learn: no    Other concerns patient/family would like to ad dress at visit:

## 2023-07-22 ENCOUNTER — HOME CARE VISIT (OUTPATIENT)
Dept: HOME HEALTH SERVICES | Facility: HOME HEALTHCARE | Age: 78
End: 2023-07-22
Payer: MEDICARE

## 2023-07-22 VITALS
TEMPERATURE: 97.8 F | RESPIRATION RATE: 16 BRPM | HEART RATE: 64 BPM | SYSTOLIC BLOOD PRESSURE: 116 MMHG | OXYGEN SATURATION: 95 % | DIASTOLIC BLOOD PRESSURE: 80 MMHG

## 2023-07-22 PROCEDURE — 10330081 VN NO-PAY CLAIM PROCEDURE

## 2023-07-22 PROCEDURE — G0299 HHS/HOSPICE OF RN EA 15 MIN: HCPCS

## 2023-07-22 PROCEDURE — 400013 VN SOC

## 2023-07-22 NOTE — CASE COMMUNICATION
[facility] has admitted your patient to [facility] service with the following disciplines:      SN and PT  This report is informational only, no responses is needed  Primary focus of home health care: Cardiac post op cares/education  Patient stated goals of care: "to be able to walk without getting SOB"  Anticipated visit pattern and next visit [id number] 2w4, 1w5, nex anticipated SN visit 7/25/23    See medication list - meds in home  differ from AVS:  -na  -Please be advised, EMR/EPIC identified drug interactions for this patient based on the current medication profile. Significant clinical findings: A&Ox4. Lungs clear. VSS, +1 BLLE. Midline incision, trocar sites and L thigh incision scabbed. CINTIA LCW PPM incision dt nonremovable post op dressing. Callus to plantar L foot. Denies incont b/b. Potential barriers to goal achievement: functional mobility deficit,  incisions, fall risk, pain    Thank you for allowing us to participate in the care of your patient.       Los Sanders RN  [address]

## 2023-07-24 ENCOUNTER — APPOINTMENT (OUTPATIENT)
Dept: LAB | Facility: MEDICAL CENTER | Age: 78
End: 2023-07-24
Payer: MEDICARE

## 2023-07-24 LAB
INR PPP: 2.19 (ref 0.84–1.19)
PROTHROMBIN TIME: 24.6 SECONDS (ref 11.6–14.5)

## 2023-07-24 PROCEDURE — 85610 PROTHROMBIN TIME: CPT

## 2023-07-24 PROCEDURE — 36415 COLL VENOUS BLD VENIPUNCTURE: CPT

## 2023-07-25 ENCOUNTER — HOME CARE VISIT (OUTPATIENT)
Dept: HOME HEALTH SERVICES | Facility: HOME HEALTHCARE | Age: 78
End: 2023-07-25
Payer: MEDICARE

## 2023-07-25 ENCOUNTER — ANTICOAG VISIT (OUTPATIENT)
Dept: CARDIOLOGY CLINIC | Facility: CLINIC | Age: 78
End: 2023-07-25

## 2023-07-25 VITALS — DIASTOLIC BLOOD PRESSURE: 86 MMHG | OXYGEN SATURATION: 98 % | SYSTOLIC BLOOD PRESSURE: 116 MMHG | HEART RATE: 75 BPM

## 2023-07-25 VITALS
HEART RATE: 76 BPM | SYSTOLIC BLOOD PRESSURE: 110 MMHG | DIASTOLIC BLOOD PRESSURE: 60 MMHG | RESPIRATION RATE: 16 BRPM | OXYGEN SATURATION: 98 % | TEMPERATURE: 98.1 F

## 2023-07-25 DIAGNOSIS — I48.91 ATRIAL FIBRILLATION WITH RVR (HCC): Primary | ICD-10-CM

## 2023-07-25 PROCEDURE — G0151 HHCP-SERV OF PT,EA 15 MIN: HCPCS

## 2023-07-25 PROCEDURE — G0299 HHS/HOSPICE OF RN EA 15 MIN: HCPCS

## 2023-07-27 ENCOUNTER — HOME CARE VISIT (OUTPATIENT)
Dept: HOME HEALTH SERVICES | Facility: HOME HEALTHCARE | Age: 78
End: 2023-07-27
Payer: MEDICARE

## 2023-07-27 ENCOUNTER — TELEPHONE (OUTPATIENT)
Dept: HOME HEALTH SERVICES | Facility: HOME HEALTHCARE | Age: 78
End: 2023-07-27

## 2023-07-27 ENCOUNTER — TELEPHONE (OUTPATIENT)
Dept: CARDIAC SURGERY | Facility: CLINIC | Age: 78
End: 2023-07-27

## 2023-07-27 NOTE — TELEPHONE ENCOUNTER
Post-op call. S/p AVR, MV repair, JENNY clip on 7/12/23 with Dr. Liam Mahmood. S/p PPM placement on 7/19  Discharged home on 7/20 with [facility] services  -Patient reports that he is feeling well, recovering without issue. Using IS several times a day.   -Weighting self daily (has been stable). -Showering and cleaning incisions daily. Otherwise no incisional complaints or concerns.  -Ambulating well, around his yard and around his home, working w/ home PT, and doing recommended exercises.  -Abiding by dietary/fluid restrictions.  -No pain or sleeping concerns. -Appts, medications & basic post-op instructions reviewed.

## 2023-07-28 ENCOUNTER — HOME CARE VISIT (OUTPATIENT)
Dept: HOME HEALTH SERVICES | Facility: HOME HEALTHCARE | Age: 78
End: 2023-07-28
Payer: MEDICARE

## 2023-07-28 ENCOUNTER — APPOINTMENT (OUTPATIENT)
Dept: LAB | Facility: MEDICAL CENTER | Age: 78
End: 2023-07-28

## 2023-07-28 PROCEDURE — G0151 HHCP-SERV OF PT,EA 15 MIN: HCPCS

## 2023-07-28 NOTE — CASE COMMUNICATION
Initial eval completed for home P.T. on 7/25/23 and initiated a HEP for LE strength and endurance and also an ambulation program. Plan to visit only 2 times to be sure his HEP is going well with plan to DC 7/28/23 from home P.T. He has a cardiac rehab appt. in mid August. He is doing very well with his mobility at this time.

## 2023-07-30 VITALS — OXYGEN SATURATION: 98 % | HEART RATE: 72 BPM | SYSTOLIC BLOOD PRESSURE: 112 MMHG | DIASTOLIC BLOOD PRESSURE: 88 MMHG

## 2023-07-31 ENCOUNTER — ANTICOAG VISIT (OUTPATIENT)
Dept: CARDIOLOGY CLINIC | Facility: CLINIC | Age: 78
End: 2023-07-31

## 2023-07-31 DIAGNOSIS — I48.91 ATRIAL FIBRILLATION WITH RVR (HCC): Primary | ICD-10-CM

## 2023-08-01 ENCOUNTER — HOME CARE VISIT (OUTPATIENT)
Dept: HOME HEALTH SERVICES | Facility: HOME HEALTHCARE | Age: 78
End: 2023-08-01
Payer: MEDICARE

## 2023-08-01 VITALS
SYSTOLIC BLOOD PRESSURE: 130 MMHG | DIASTOLIC BLOOD PRESSURE: 70 MMHG | HEART RATE: 82 BPM | TEMPERATURE: 98 F | OXYGEN SATURATION: 100 % | RESPIRATION RATE: 22 BRPM

## 2023-08-01 PROCEDURE — G0300 HHS/HOSPICE OF LPN EA 15 MIN: HCPCS

## 2023-08-01 NOTE — PROGRESS NOTES
Cardiology Follow Up    Jenna [place].  1945  [id number]  [facility] [address]  [phone]    1. S/P MVR (mitral valve repair)        2. S/P AVR        3. S/P left atrial appendage ligation        4. Persistent atrial fibrillation ([address])        5. Pacemaker        6. Hypertension, unspecified type  POCT ECG      7. Mixed hyperlipidemia            Interval History:   Ms Lorenzo Hogan was admitted to [facility] on 7/12 - 7/20/23 sp AVR and MVR. He was electively admitted and underwent MVR 36 mm Escalante physio II ring, AVR 29 mm Inspiris Resilia, JENNY clip 45 mm, by Dr. Nestor Russell no significant postoperative bleeding postoperatively rhythm showed junctional rhythm EP consulte. 7/19 he underwent placement of dual-chamber permanent pacemaker and discharged home on postop day number 8. Deyanira Walton presents to our office for a recent hospitalization follow up visit. He is accompanied by his daughter. Deyanira Walton admits to fatigue, denies CP, shortness of breath, lightheadedness or dizziness.         Medical History  Primary Cardiologist Dr Danny Link   Aortic insufficiency  Mitral valve regurgitation  Mild dilated ascending aorta  Hypertension  Hyperlipidemia  Permanent atrial fibrillation status post ablation in 2018, 2021 on Eliquis  PAH  CAD of OM  History of DVT  2016 LVH bilateral Burns bypass with GSV  Chronic right tibial osteomyelitis  Bilateral venous insufficiency  History of right lower extremity melanoma in 2010 and 2011 5/28/23 LHC: 1st diag 40% stenosis, 1st [place] 80% stenosis, lesion is focal.  Distal RCA 30% stenosis   Patient Active Problem List   Diagnosis   • Atrial fibrillation with RVR ([address])   • 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HCC)   • Hypertension   • History of DVT (deep vein thrombosis)   • Benign essential hypertension   • Mitral valve insufficiency   • Bilateral popliteal artery aneurysm (HCC)   • Paroxysmal atrial fibrillation ([facility])   • Mixed hyperlipidemia   • Aneurysm of ascending aorta ([facility])   • Sepsis ([facility])   • Hypoxia   • Callus of foot   • Streptococcal bacteremia   • Pulmonary hypertension ([facility])   • Erythrocytosis   • Chronic osteomyelitis of right tibia with draining sinus ([facility])   • Aortic insufficiency   • Permanent atrial fibrillation ([facility])   • Coronary artery disease involving native coronary artery   • S/P AVR   • S/P MVR (mitral valve repair)   • S/P left atrial appendage ligation   • PAD (peripheral artery disease) ([facility])   • Hyperchloremia   • s/p Medtronic dual chamber PPM with left bundle pacing lead 7/19/2023     Past Medical History:   Diagnosis Date   • Cardiac disease    • Hypertension    • PAF (paroxysmal atrial fibrillation) ([facility])    • 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[facility])     BILATERALLY   • s/p Medtronic dual chamber PPM with left bundle pacing lead 7/19/2023 7/20/2023     Social History     Socioeconomic History   • Marital status: /Civil Union     Spouse name: Not on file   • Number of children: Not on file   • Years of education: Not on file   • Highest education level: Not on file   Occupational History   • Not on file   Tobacco Use   • Smoking status: Never   • Smokeless tobacco: Never   Vaping Use   • Vaping Use: Never used   Substance and Sexual Activity   • Alcohol use: Not Currently   • Drug use: No   • Sexual activity: Not on file   Other Topics Concern   • Not on file   Social History Narrative   • Not on file     Social Determinants of Health     Financial Resource Strain: Not on file   Food Insecurity: No Food Insecurity (7/13/2023)    Hunger Vital Sign    • Worried About Running Out of Food in the Last Year: Never true    • Ran Out of Food in the Last Year: Never true   Transportation Needs: No Transportation Needs (7/13/2023)    PRAPARE - Transportation    • Lack of Transportation (Medical): No    • Lack of Transportation (Non-Medical):  No Physical Activity: Not on file   Stress: Not on file   Social Connections: Not on file   Intimate Partner Violence: Not on file   Housing Stability: Low Risk  (7/13/2023)    Housing Stability Vital Sign    • Unable to Pay for Housing in the Last Year: No    • Number of Places Lived in the Last Year: 1    • Unstable Housing in the Last Year: No      Family History   Problem Relation Age of Onset   • Pancreatic cancer Mother    • Stroke Father    • Heart attack Paternal Grandmother      Past Surgical History:   Procedure Laterality Date   • CARDIAC CATHETERIZATION N/A 5/8/2023    Procedure: Cardiac Coronary Angiogram;  Surgeon: Jacki Foote DO;  Location: [facility]; Service: Cardiology   • CARDIAC ELECTROPHYSIOLOGY PROCEDURE N/A 3/25/2022    Procedure: Cardiac eps/afib ablation;  Surgeon: Kellen Ray MD;  Location: [facility]; Service: Cardiology   • CARDIAC ELECTROPHYSIOLOGY PROCEDURE N/A 7/19/2023    Procedure: Cardiac pacer implant;  Surgeon: Elizabeth Ashton MD;  Location: [facility]; Service: Cardiology   • ESOPHAGOGASTRODUODENOSCOPY N/A 8/16/2016    Procedure: ESOPHAGOGASTRODUODENOSCOPY (EGD); Surgeon: Julian Schaefer MD;  Location: [facility] GI LAB; Service:    • HERNIA REPAIR Left     groin   • HERNIA REPAIR Right 1995   • OTHER SURGICAL HISTORY     • UT REPLACEMENT MITRAL VALVE W/CARDIOPULMONARY BYP N/A 7/12/2023    Procedure: MITRAL VALVE REPAIR WITH 36MM PHYSIO II ANNULOPLASTY RING;  Surgeon: Anai Roque MD;  Location: [facility] MAIN [facility];  Service: Cardiac Surgery   • UT RPLCMT AORTIC VALVE OPN W/STENTLESS TISSUE VALVE N/A 7/12/2023    Procedure: REPLACEMENT VALVE AORTIC (AVR) WITH 29MM INSPIRIS TISSUE RESILIA VALVE.  LEFT ATRIAL APPENDAGE LIGATION WITH 45 MM ATRICLIP;  Surgeon: Anai Roque MD;  Location: [facility];  Service: Cardiac Surgery   • REPLACEMENT TOTAL KNEE Left 04/01/2013   • VASCULAR SURGERY Right     POPLITEAL BYPASS DUE TO ANEURYSM Current Outpatient Medications:   •  acetaminophen (TYLENOL) 650 mg CR tablet, Take 1 tablet (650 mg total) by mouth every 8 (eight) hours as needed for mild pain, Disp: 30 tablet, Rfl: 0  •  aspirin (ECOTRIN LOW STRENGTH) 81 mg EC tablet, Take 1 tablet (81 mg total) by mouth daily, Disp: , Rfl: 0  •  clindamycin (CLEOCIN) 300 MG capsule, Prior to dental work., Disp: , Rfl:   •  diphenhydrAMINE (BENADRYL) 25 mg capsule, Take 25 mg by mouth every 6 (six) hours as needed for itching Patient not sure of the doseage, Disp: , Rfl:   •  docusate sodium (COLACE) 100 mg capsule, Take 1 capsule (100 mg total) by mouth 2 (two) times a day, Disp: 60 capsule, Rfl: 0  •  metoprolol tartrate (LOPRESSOR) 25 mg tablet, Take 0.5 tablets (12.5 mg total) by mouth every 12 (twelve) hours, Disp: 30 tablet, Rfl: 2  •  omeprazole (PriLOSEC) 20 mg delayed release capsule, Take 1 capsule (20 mg total) by mouth daily, Disp: 30 capsule, Rfl: 0  •  Pancreatin 325 MG TABS, Take 1 tablet by mouth daily, Disp: , Rfl:   •  polyethylene glycol (GLYCOLAX) 17 GM/SCOOP powder, Take 17 g by mouth daily, Disp: 510 g, Rfl: 0  •  potassium chloride (K-DUR,KLOR-CON) 20 mEq tablet, Take 1 tablet (20 mEq total) by mouth daily for 5 days, Disp: 5 tablet, Rfl: 0  •  Probiotic Product (PROBIOTIC-10 PO), Take 1 capsule by mouth daily, Disp: , Rfl:   •  rosuvastatin (CRESTOR) 20 MG tablet, Take 1 tablet (20 mg total) by mouth daily, Disp: 90 tablet, Rfl: 3  •  torsemide (DEMADEX) 20 mg tablet, Take 1 tablet (20 mg total) by mouth daily for 5 days, Disp: 5 tablet, Rfl: 0  •  Vitamin D, Cholecalciferol, 1000 units CAPS, Take 2,000 Units by mouth daily, Disp: , Rfl:   •  warfarin (COUMADIN) 5 mg tablet, Take 1 tablet (5 mg total) by mouth daily, Disp: 60 tablet, Rfl: 2  Allergies   Allergen Reactions   • Oxycodone GI Intolerance     nausea  nausea       Labs:   Ancillary Orders on 07/28/2023   Component Date Value   • Protime 07/28/2023 21.5 (H)    • INR 07/28/2023 1.84 (H)    Orders Only on 07/21/2023   Component Date Value   • Protime 07/24/2023 24.6 (H)    • INR 07/24/2023 2.19 (H)    No results displayed because visit has over 200 results. Lab Requisition on 06/27/2023   Component Date Value   • ABO Grouping 06/27/2023 B    • Rh Factor 06/27/2023 Negative    • Antibody Screen 06/27/2023 Negative    • Specimen Expiration Date 06/27/2023 [id number]    Appointment on 06/27/2023   Component Date Value   • Hemoglobin A1C 06/27/2023 5.6    • EAG 06/27/2023 114    • WBC 06/27/2023 6.31    • RBC 06/27/2023 5.47    • Hemoglobin 06/27/2023 17.5 (H)    • Hematocrit 06/27/2023 50.9 (H)    • MCV 06/27/2023 93    • MCH 06/27/2023 32.0    • MCHC 06/27/2023 34.4    • RDW 06/27/2023 13.3    • Platelets 98/51/1877 157    • MPV 06/27/2023 10.6    • Sodium 06/27/2023 142    • Potassium 06/27/2023 4.5    • Chloride 06/27/2023 112 (H)    • CO2 06/27/2023 28    • ANION GAP 06/27/2023 2    • BUN 06/27/2023 12    • Creatinine 06/27/2023 1.02    • Glucose, Fasting 06/27/2023 94    • Calcium 06/27/2023 9.8    • AST 06/27/2023 23    • ALT 06/27/2023 28    • Alkaline Phosphatase 06/27/2023 75    • Total Protein 06/27/2023 7.1    • Albumin 06/27/2023 4.0    • Total Bilirubin 06/27/2023 1.97 (H)    • eGFR 06/27/2023 69    • Protime 06/27/2023 14.8 (H)    • INR 06/27/2023 1.14    • MRSA Culture Only 06/27/2023 No Methicillin Resistant Staphlyococcus aureus (MRSA) isolated      Imaging: XR chest portable    Result Date: 7/20/2023  Narrative: CHEST INDICATION:   Patient s/p Pacemaker/ICD Insertion. COMPARISON: CXR 7/13/2023 and chest CT 11/11/2022. EXAM PERFORMED/VIEWS:  XR CHEST PORTABLE. FINDINGS: Mild cardiomegaly, AVR, mitral valve annuloplasty. AtriClip. Left subclavian pacemaker leads in right atrium and right ventricle. No acute disease. Mild atelectasis in the left base with blunting of the left costophrenic sulcus. No effusion or pneumothorax.  Upper abdomen normal. Bones normal for age.     Impression: Pacemaker well-positioned with no pneumothorax. Workstation performed: [id number]     XR chest 2 views    Result Date: 2023  Narrative: CHEST INDICATION:   Patient s/p Pacemaker/ICD Insertion. To be done post-procedure day one at 7a. m. and read before 9 a.m. Do not lift affected arm above shoulder. COMPARISON: CXR 2023, chest CT 2022. EXAM PERFORMED/VIEWS:  XR CHEST PA & LATERAL. DUAL ENERGY SUBTRACTION. FINDINGS: Mild cardiomegaly, AVR, mitral valve annuloplasty. AtriClip. Left subclavian pacemaker leads in right atrial appendage and right ventricular septum. Lungs clear. No pneumothorax. Trace effusions. Upper abdomen normal.  Bones normal for age. Impression: Pacemaker well-positioned with no pneumothorax. Trace effusions. Workstation performed: [id number]     Cardiac ep lab eps/ablations    Result Date: 2023  Narrative: Images from the original result were not included. ELECTROPHYSIOLOGY OPERATIVE REPORT PATIENT NAME: Chan Hardin :  1945 MRN: [id number] Date of surgery: 23 Surgeon: Guero Boateng MD Pt Location: Cath Lab PROCEDURE PERFORMED: 1)Dual Chamber Pacemaker with septal lead Preoperative Medications: ANESTHESIA: mac POSTOPERATIVE DIAGNOSIS: Successful dual chamber pacemaker implant. Same as Preop. Informed Consent: Risks, benefits, and alternatives discussed with patient and any family present. Patient/family understands risks, which include but are not limited to life threatening  bleeding, infection, air around lungs, blood around the heart and reoperation dislodged or malfunctioning device. Procedure Description: Patient written consent was obtained following full risk/benefit discussion. A “time-out” was conducted in the EP lab. Pre-procedure IV antibiotic was given. The patient was prepped and draped in sterile fashion.   Local anesthetic was used and systemic sedation was managed by the anesthesia team.  Using ultrasound and micropuncture needle two axillary vein accesses were obtained. Guidewires were advanced to the right atrium and then to the IVC. Incision was made slightly inferior and medial to the access site. Pocket was carried down to the pectoralis muscle. Guidewires were pulled into the pocket. Two 7-Romanian sheaths were advanced over the two guidewires. A His sheath and lead was passed through the sheath. The left posterior fascicle (LPF) was mapped. However pacing morphology did not reveal LPF capture. Therefore lead was positioned on the septum. Position of the lead was confirmed in both SANDERS and Sammarinese views Appropriate sensing and thresholds were noted. Lead was screwed in septal position with non selective capture. The sheath was slit and removed. Once again the lead was tested for appropriate sensing, impedance and threshold. The lead was tied down to the prepectoral fascia and muscle. Next atrial lead with active fixation was advanced through the second 7Fr sheath. It was maneuvered into the right atrial appendage. Appropriate sensing and threshold was noted. The sheath was removed. The lead was sutured to the pectoral muscle and fascia. A subcutaneous pocket was created. The leads were connected to a dual chamber pacemaker, which was sutured to the pocket floor. The pocket was irrigated with antibiotic solution. Antibiotic pouch was placed around the generator. A full device interrogation showed good lead function. The full system was imaged fluoroscopically. The wound was closed in 3 layers with Vicryl suture. Aquaseal surgical and surgical glue adhesive were used to dress the closed incision. Final programmed parameters were tailored to the patient's specific pacing needs (see parameters below). EBL: Minimal Complications: None Contrast:none Findings: Successful placement of dual chamber pacemaker in the septal location. The patient tolerated the procedure well.  Plan: Routine postoperative care, CXR, and overnight observation with telemetry. Chest x-ray portable on 07/19/23 and PA & lateral x-ray on 07/20/23. Follow-up in 2 weeks with incision check and interrogation. Cardioversion    Result Date: 7/17/2023  Narrative: A synchronized cardioversion was requested to treat recurrence of persistent atrial fibrillation. 12-lead ECG demonstrated atrial fibrillation with complete AV block and a narrow junctional escape rhythm at 50-60 bpm. A pre-cardioversion RANDALL was indicated given subtherapeutic INR levels on coumadin. The patient was explained the risks, benefits, and alternatives to both procedures. No contraindications were identified, and he agreed to proceed. Anesthesia was present to provide procedural sedation. The patient was positioned and prepped, with defibrillator pads in an saima-posterior configuration. No thrombus was identified during the RANDALL procedure. Synchronized cardioversion was attempted with 200J of biphasic energy. 2-3 beats of sinus rhythm were observed followed by return to atrial fibrillation. A second attempt was made with 300J of energy, again with brief sinus rhythm and degeneration into coarse atrial fibrillation / flutter and fine atrial fibrillation. Given the patient's inability to maintain sinus rhythm, no further attempts were made. He recovered from anesthesia without any complications, and was returned to the medical floor in stable condition. RANDALL    Result Date: 7/17/2023  Narrative: •  Left Ventricle: Left ventricular cavity size is normal. The left ventricular ejection fraction is 45%. Systolic function is mildly reduced. There is mild global hypokinesis. •  Right Ventricle: Systolic function is mildly to moderately reduced. •  Left Atrium: The atrium is dilated. There is no thrombus. There is no mass. There is moderate, continuous spontaneous echo contrast. •  Right Atrium: The atrium is dilated.  •  Atrial Septum: There is no atrial septal defect by color Doppler. No patent foramen ovale detected using color flow Doppler at rest. •  Left Atrial Appendage: There is a surgical closure with complete occlusion. •  Aortic Valve: There is a surgical bioprosthetic valve. The prosthetic valve appears well-seated. Prosthetic valve leaflet motion is normal. There is no evidence of paravalvular regurgitation. There is trace transvalvular regurgitation. •  Mitral Valve: The valve has been repaired with an annular ring. •  Tricuspid Valve: There is mild to moderate regurgitation. •  Aorta: The aortic root is normal in size. The ascending aorta is mildly dilated for the patient's body surface area. The ascending aorta is 4.4 cm (index 1.8 cm/m2). •  Pericardium: There is a small left pleural effusion. Normal function of bioprosthetic SAVR and repaired mitral valve. LA appendage appears completely closed. No intracardiac thrombus noted. XR chest portable    Result Date: 7/14/2023  Narrative: CHEST INDICATION:   Post Open Heart Surgey. COMPARISON: 7/12/2023 EXAM PERFORMED/VIEWS:  XR CHEST PORTABLE FINDINGS: Endotracheal tube has been removed. Otherwise unchanged. Cardiomediastinal silhouette appears unremarkable. The lungs are clear. No pneumothorax or pleural effusion. Osseous structures appear within normal limits for patient age. Impression: No acute cardiopulmonary disease. Workstation performed: [id number]     XR chest portable ICU    Result Date: 7/13/2023  Narrative: CHEST INDICATION:   S/P open heart. COMPARISON: 6/26/2023 EXAM PERFORMED/VIEWS:  XR CHEST PORTABLE ICU FINDINGS: Endotracheal tube is present with its tip in satisfactory position above the level of the romain. An enteric tube is present with its tip in satisfactory position below the left hemidiaphragm. Right internal jugular central venous catheter is  noted. Also noted is a [place]-Lisa catheter from a right internal jugular approach with its tip overlying the right main pulmonary artery. Mediastinal and pleural drains are present. There is an atrial appendage clip. Cardiomediastinal silhouette appears unremarkable. The lungs are clear. No pneumothorax or pleural effusion. Sternal wires are present. Visualized osseous structures appear otherwise unremarkable for the patient's age. Impression: No acute cardiopulmonary disease. Workstation performed: [id number]     RANDALL intraop interventional w/realtime guidance of cardiac procedures    Result Date: 7/12/2023  Narrative: This order contains the linked images for the RANDALL that was performed by the Anesthesiologist.  Please see the  CARDIAC RANDALL ANESTHESIA procedure for results. RANDALL Anesthesia    Result Date: 7/12/2023  Narrative: Pamela Jaquez MD     7/12/2023 12:38 PM Procedure Performed: RANDALL Anesthesia Start Time: Preanesthesia Checklist Patient identified, IV assessed, risks and benefits discussed, monitors and equipment assessed, procedure being performed at surgeon's request and anesthesia consent obtained. Procedure  Type of RANDALL: complete RANDALL with interpretation. Images Saved: ultrasound permanent image saved. Location performed: OR. Intubated. Heart visualized. Insertion of RANDALL Probe:  Easy. Probe Type:  Epiaortic and multiplane. Modalities:  Color flow mapping, 3D, pulse wave Doppler and continuous wave Doppler. Echocardiographic and Doppler Measurements PREPROCEDURE LEFT VENTRICLE: Systolic Function: mildly impaired. Ejection Fraction: 45%. Cavity Dimension: 5 cm. Regional Wall Motion Abnormalities: NONE. RIGHT VENTRICLE: Systolic Function: moderately impaired. Cavity size moderately dilated. AORTIC VALVE: Leaflets: trileaflet. Regurgitation: moderate. MITRAL VALVE: Leaflets: calcified. Leaflet Motions: restricted. Regurgitation: mild. TRICUSPID VALVE: Leaflets: normal.   Regurgitation: mild.  PULMONIC VALVE: Leaflets: normal.  OTHER VALVE FINDINGS: AOV - 3 CUSPS; MILD-MODERATE CENTRAL AR; ANNULUS 3.4 CM2; STJ EFFACED MV - MILD-MOD central MR; functional (3b) etiology. ASCENDING AORTA: Size:  normal.  Dissection not present. AORTIC ARCH: Size:  normal.  dissection not present. Grade 3: atheroma protruding < 0.5 cm into lumen. DESCENDING AORTA: Size: normal.  Dissection not present. Grade 3: atheroma protruding < 0.5 cm into lumen. RIGHT ATRIUM:  No spontaneous echo contrast. LEFT ATRIUM:  No spontaneous echo contrast. LEFT ATRIAL APPENDAGE:  No spontaneous echo contrast ATRIAL SEPTUM: Intra-atrial septal morphology: normal.  EPIAORTIC: Plaque Thickness: 0-5 mm. OTHER FINDINGS: Pericardium:  normal. POSTPROCEDURE LEFT VENTRICLE: Unchanged . Ejection Fraction: 45 %. RIGHT VENTRICLE: Systolic Function: mildly depressed. Cavity Size: moderately dilated. AORTIC VALVE: Leaflets: bioprosthetic. Mean Gradient: 4 mmHg. Regurgitation: none. MITRAL VALVE: Unchanged . Mean Gradient: 1. TRICUSPID VALVE: Unchanged . PULMONIC VALVE: Unchanged   OTHER VALVE FINDINGS: S/p bio avr; mitral ring; both valves well seated; no pvl x 2; no tvl x2; no desmond on mitral; normal cusp motioin x 3 for aov. ATRIA: Left Atrial Appendage Ligate: Yes. Residual Flow in Left Atrial Appendage by Color Flow Doppler: No.  AORTA: Unchanged . Dissection: Dissection not present. REMOVAL: Probe Removal: atraumatic. Review of Systems:  Review of Systems   Musculoskeletal: Positive for arthralgias and myalgias. All other systems reviewed and are negative. Physical Exam:  Physical Exam  Vitals reviewed. Constitutional:       Appearance: Normal appearance. Cardiovascular:      Rate and Rhythm: Normal rate. Rhythm irregular. Pulses: Normal pulses. Heart sounds: Normal heart sounds. Pulmonary:      Effort: Pulmonary effort is normal.      Breath sounds: Normal breath sounds. Musculoskeletal:         General: Normal range of motion. Cervical back: Normal range of motion and neck supple. Right lower leg: Edema present. Left lower leg: Edema present. Comments: Trace federico LE edema    Skin:     General: Skin is warm and dry. Capillary Refill: Capillary refill takes less than 2 seconds. Comments: Sternal incision appears clean dry intact approximated without erythema or ecchymosis. Left SC PPM site dressing intact    Neurological:      General: No focal deficit present. Mental Status: He is alert and oriented to person, place, and time. Psychiatric:         Mood and Affect: Mood normal.         Behavior: Behavior normal.         Discussion/Summary:  # 7/12/23  sp AVR and MVR. He was electively admitted and underwent MVR 36 mm Escalante physio II ring, AVR 29 mm Inspiris Resilia, JENNY clip 45 mm, by Dr. Jennifer Cook continue on ASA 81mg daily, Metoprolol tartrate 12.5mg Q12 hours. Crestor 20mg daily, Coumadin last INR 1.84. Heart healthy diet, cardiac rehabilitation in near future. Chio Cardona is aware of lifelong pre medication with antibiotic prior to dental procedures. #  7/19/23 sp dual chamber PPM in plant for HB/JR. Chio Cardona will undergo device interrogation after this office visit. # Permanent atrial fibrillation status post ablation in 2018, 2021.  7/17/23  attempted DCCV unsuccessful, was on Eliquis prior to [facility]. Now on Coumadin goal INR 2.0 - 3.0 followed by [facility].   EKG atrial fibrillation Ventricular response 70 BPM.  7/28/23 INR 1.84 educated on Vit K+ in diet   # Hypertension RUE sitting 110/70 continue on Metoprolol Tartrate 12.5mg Q12 hours, DASH diet   # Hyperlipidemia 5/28/23 , TG 93, HDL 48,    Continue on Crestor 20mg daily heart healthy diet

## 2023-08-02 ENCOUNTER — OFFICE VISIT (OUTPATIENT)
Dept: CARDIOLOGY CLINIC | Facility: CLINIC | Age: 78
End: 2023-08-02

## 2023-08-02 ENCOUNTER — IN-CLINIC DEVICE VISIT (OUTPATIENT)
Dept: CARDIOLOGY CLINIC | Facility: CLINIC | Age: 78
End: 2023-08-02

## 2023-08-02 VITALS
HEIGHT: 78 IN | WEIGHT: 251.2 LBS | HEART RATE: 70 BPM | SYSTOLIC BLOOD PRESSURE: 126 MMHG | OXYGEN SATURATION: 100 % | BODY MASS INDEX: 29.06 KG/M2 | DIASTOLIC BLOOD PRESSURE: 80 MMHG

## 2023-08-02 DIAGNOSIS — Z95.0 PRESENCE OF PERMANENT CARDIAC PACEMAKER: Primary | ICD-10-CM

## 2023-08-02 DIAGNOSIS — Z95.2 S/P AVR: ICD-10-CM

## 2023-08-02 DIAGNOSIS — Z98.890 S/P MVR (MITRAL VALVE REPAIR): Primary | ICD-10-CM

## 2023-08-02 DIAGNOSIS — I48.19 PERSISTENT ATRIAL FIBRILLATION (HCC): ICD-10-CM

## 2023-08-02 DIAGNOSIS — Z98.890 S/P LEFT ATRIAL APPENDAGE LIGATION: ICD-10-CM

## 2023-08-02 DIAGNOSIS — E78.2 MIXED HYPERLIPIDEMIA: ICD-10-CM

## 2023-08-02 DIAGNOSIS — Z95.0 PACEMAKER: ICD-10-CM

## 2023-08-02 DIAGNOSIS — I10 HYPERTENSION, UNSPECIFIED TYPE: ICD-10-CM

## 2023-08-02 PROCEDURE — 99024 POSTOP FOLLOW-UP VISIT: CPT | Performed by: INTERNAL MEDICINE

## 2023-08-02 NOTE — PROGRESS NOTES
Results for orders placed or performed in visit on 08/02/23   Cardiac EP device report    Narrative    DEVICE INTERROGATED IN THE [facility] OFFICE. BATTERY VOLTAGE ADEQUATE. (12.2 YRS) AP <1%  89%. ALL LEAD PARAMETERS WITHIN NORMAL LIMITS. NO SIGNIFICANT HIGH RATE EPISODES. CURRENTLY IN AF; PATIENT IS ON COUMADIN. NO PROGRAMMING CHANGES MADE TO DEVICE PARAMETERS. NORMAL DEVICE FUNCTION. WOUND CHECK: INCISION CLEAN AND DRY WITH EDGES APPROXIMATED; WOUND CARE AND RESTRICTIONS REVIEWED WITH PATIENT.  ---LEGER

## 2023-08-03 ENCOUNTER — ANTICOAG VISIT (OUTPATIENT)
Dept: CARDIOLOGY CLINIC | Facility: CLINIC | Age: 78
End: 2023-08-03

## 2023-08-03 ENCOUNTER — APPOINTMENT (OUTPATIENT)
Dept: LAB | Facility: MEDICAL CENTER | Age: 78
End: 2023-08-03
Payer: MEDICARE

## 2023-08-03 DIAGNOSIS — I48.91 ATRIAL FIBRILLATION WITH RVR (HCC): Primary | ICD-10-CM

## 2023-08-03 DIAGNOSIS — I48.91 ATRIAL FIBRILLATION WITH RVR (HCC): ICD-10-CM

## 2023-08-03 LAB
INR PPP: 1.98 (ref 0.84–1.19)
PROTHROMBIN TIME: 22.8 SECONDS (ref 11.6–14.5)

## 2023-08-03 PROCEDURE — 85610 PROTHROMBIN TIME: CPT

## 2023-08-03 PROCEDURE — 36415 COLL VENOUS BLD VENIPUNCTURE: CPT

## 2023-08-04 ENCOUNTER — HOME CARE VISIT (OUTPATIENT)
Dept: HOME HEALTH SERVICES | Facility: HOME HEALTHCARE | Age: 78
End: 2023-08-04
Payer: MEDICARE

## 2023-08-04 VITALS
HEART RATE: 74 BPM | RESPIRATION RATE: 18 BRPM | OXYGEN SATURATION: 98 % | TEMPERATURE: 98 F | DIASTOLIC BLOOD PRESSURE: 60 MMHG | SYSTOLIC BLOOD PRESSURE: 120 MMHG

## 2023-08-04 PROCEDURE — G0300 HHS/HOSPICE OF LPN EA 15 MIN: HCPCS

## 2023-08-08 ENCOUNTER — HOME CARE VISIT (OUTPATIENT)
Dept: HOME HEALTH SERVICES | Facility: HOME HEALTHCARE | Age: 78
End: 2023-08-08
Payer: MEDICARE

## 2023-08-08 VITALS
OXYGEN SATURATION: 98 % | RESPIRATION RATE: 16 BRPM | HEART RATE: 86 BPM | DIASTOLIC BLOOD PRESSURE: 80 MMHG | SYSTOLIC BLOOD PRESSURE: 110 MMHG | TEMPERATURE: 97.6 F

## 2023-08-08 PROCEDURE — G0299 HHS/HOSPICE OF RN EA 15 MIN: HCPCS

## 2023-08-10 ENCOUNTER — ESTABLISHED COMPREHENSIVE EXAM (OUTPATIENT)
Dept: URBAN - METROPOLITAN AREA CLINIC 6 | Facility: CLINIC | Age: 78
End: 2023-08-10

## 2023-08-10 ENCOUNTER — HOME CARE VISIT (OUTPATIENT)
Dept: HOME HEALTH SERVICES | Facility: HOME HEALTHCARE | Age: 78
End: 2023-08-10
Payer: MEDICARE

## 2023-08-10 ENCOUNTER — APPOINTMENT (OUTPATIENT)
Dept: LAB | Facility: MEDICAL CENTER | Age: 78
End: 2023-08-10
Payer: MEDICARE

## 2023-08-10 VITALS
RESPIRATION RATE: 16 BRPM | HEART RATE: 86 BPM | TEMPERATURE: 98.1 F | DIASTOLIC BLOOD PRESSURE: 70 MMHG | OXYGEN SATURATION: 96 % | SYSTOLIC BLOOD PRESSURE: 120 MMHG

## 2023-08-10 DIAGNOSIS — H43.813: ICD-10-CM

## 2023-08-10 DIAGNOSIS — I48.91 ATRIAL FIBRILLATION WITH RVR (HCC): ICD-10-CM

## 2023-08-10 DIAGNOSIS — Z96.1: ICD-10-CM

## 2023-08-10 DIAGNOSIS — H04.123: ICD-10-CM

## 2023-08-10 DIAGNOSIS — G43.B0: ICD-10-CM

## 2023-08-10 LAB
INR PPP: 1.82 (ref 0.84–1.19)
PROTHROMBIN TIME: 21.4 SECONDS (ref 11.6–14.5)

## 2023-08-10 PROCEDURE — 36415 COLL VENOUS BLD VENIPUNCTURE: CPT

## 2023-08-10 PROCEDURE — 85610 PROTHROMBIN TIME: CPT

## 2023-08-10 PROCEDURE — 92015 DETERMINE REFRACTIVE STATE: CPT

## 2023-08-10 PROCEDURE — 92014 COMPRE OPH EXAM EST PT 1/>: CPT

## 2023-08-10 PROCEDURE — G0299 HHS/HOSPICE OF RN EA 15 MIN: HCPCS

## 2023-08-10 ASSESSMENT — TONOMETRY
OD_IOP_MMHG: 12
OS_IOP_MMHG: 13

## 2023-08-10 ASSESSMENT — VISUAL ACUITY
OD_SC: 20/30+2
OU_SC: J3-1
OS_SC: 20/30

## 2023-08-11 ENCOUNTER — ANTICOAG VISIT (OUTPATIENT)
Dept: CARDIOLOGY CLINIC | Facility: CLINIC | Age: 78
End: 2023-08-11

## 2023-08-11 DIAGNOSIS — I48.91 ATRIAL FIBRILLATION WITH RVR (HCC): Primary | ICD-10-CM

## 2023-08-17 ENCOUNTER — CLINICAL SUPPORT (OUTPATIENT)
Dept: CARDIAC REHAB | Facility: CLINIC | Age: 78
End: 2023-08-17
Payer: MEDICARE

## 2023-08-17 ENCOUNTER — APPOINTMENT (OUTPATIENT)
Dept: LAB | Facility: MEDICAL CENTER | Age: 78
End: 2023-08-17
Payer: MEDICARE

## 2023-08-17 DIAGNOSIS — Z98.890 S/P MVR (MITRAL VALVE REPAIR): ICD-10-CM

## 2023-08-17 DIAGNOSIS — I48.91 ATRIAL FIBRILLATION WITH RVR (HCC): ICD-10-CM

## 2023-08-17 DIAGNOSIS — Z98.890 S/P LEFT ATRIAL APPENDAGE LIGATION: ICD-10-CM

## 2023-08-17 DIAGNOSIS — Z95.2 S/P AVR: Primary | ICD-10-CM

## 2023-08-17 LAB
INR PPP: 2.43 (ref 0.84–1.19)
PROTHROMBIN TIME: 26.7 SECONDS (ref 11.6–14.5)

## 2023-08-17 PROCEDURE — 36415 COLL VENOUS BLD VENIPUNCTURE: CPT

## 2023-08-17 PROCEDURE — 93797 PHYS/QHP OP CAR RHAB WO ECG: CPT

## 2023-08-17 PROCEDURE — 85610 PROTHROMBIN TIME: CPT

## 2023-08-17 NOTE — PROGRESS NOTES
CARDIAC REHAB ASSESSMENT    Today's date: 2023  Patient name: Lei Chapman. : 1945       MRN: [id number]  PCP: Deysi Parks DO  Referring Physician: Daisha Edwards DO  Cardiologist: Susan Garvin DO  Surgeon: Hany Batista MD  Dx:   Encounter Diagnoses   Name Primary?    • S/P AVR    • S/P MVR (mitral valve repair)    • S/P left atrial appendage ligation        Date of onset: 23  Cultural needs: None    Weight    Wt Readings from Last 1 Encounters:   23 114 kg (251 lb 3.2 oz)      Height:   Ht Readings from Last 1 Encounters:   23 6' 6" (1.981 m)     Medical History:   Past Medical History:   Diagnosis Date   • Cardiac disease    • Hypertension    • PAF (paroxysmal atrial fibrillation) ([address])    • 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HCC)     BILATERALLY   • s/p Medtronic dual chamber PPM with left bundle pacing lead 2023         Physical Limitations: L rotator cuff injury with possible surgery scheduled; bilateral knee replacements  (recent R knee infection)    Fall Risk: Moderate   Comments: Denies a fall in the past 6 months and occaisonal unsteadiness    Anginal Equivalent: Dyspnea and Excessive fatigue   NTG use: No prescription    Risk Factors   Cholesterol: Yes  Smoking: Never used  HTN: Yes  DM: No  Obesity: No   Inactivity: Yes  Stress:  perceived  stress: 10     Family History:  Family History   Problem Relation Age of Onset   • Pancreatic cancer Mother    • Stroke Father    • Heart attack Paternal Grandmother        Allergies: Oxycodone  ETOH:   Social History     Substance and Sexual Activity   Alcohol Use Not Currently         Current Medications:   Current Outpatient Medications   Medication Sig Dispense Refill   • acetaminophen (TYLENOL) 650 mg CR tablet Take 1 tablet (650 mg total) by mouth every 8 (eight) hours as needed for mild pain 30 tablet 0   • aspirin (ECOTRIN LOW STRENGTH) 81 mg EC tablet Take 1 tablet (81 mg total) by mouth daily  0   • clindamycin (CLEOCIN) 300 MG capsule Prior to dental work. (Patient not taking: Reported on 8/2/2023)     • diphenhydrAMINE (BENADRYL) 25 mg capsule Take 25 mg by mouth every 6 (six) hours as needed for itching Patient not sure of the doseage     • docusate sodium (COLACE) 100 mg capsule Take 1 capsule (100 mg total) by mouth 2 (two) times a day 60 capsule 0   • metoprolol tartrate (LOPRESSOR) 25 mg tablet Take 0.5 tablets (12.5 mg total) by mouth every 12 (twelve) hours 30 tablet 2   • omeprazole (PriLOSEC) 20 mg delayed release capsule Take 1 capsule (20 mg total) by mouth daily (Patient not taking: Reported on 8/2/2023) 30 capsule 0   • Pancreatin 325 MG TABS Take 1 tablet by mouth daily (Patient not taking: Reported on 8/2/2023)     • polyethylene glycol (GLYCOLAX) 17 GM/SCOOP powder Take 17 g by mouth daily (Patient not taking: Reported on 8/2/2023) 510 g 0   • Probiotic Product (PROBIOTIC-10 PO) Take 1 capsule by mouth daily     • rosuvastatin (CRESTOR) 20 MG tablet Take 1 tablet (20 mg total) by mouth daily 90 tablet 3   • Vitamin D, Cholecalciferol, 1000 units CAPS Take 2,000 Units by mouth daily     • warfarin (COUMADIN) 5 mg tablet Take 1 tablet (5 mg total) by mouth daily 60 tablet 2     No current facility-administered medications for this visit.          Functional Status Prior to Diagnosis for Treatment   Occupation: retired contractor  Recreation: charlie; remodeling house  ADL’s: No limitations  [place]: No limitations  Exercise: None  Other: used treadmill yrs ago for 15 min/day    Current Functional Status  Occupation: retired  Recreation: sitting and watching TV  ADL’s:resumed all ADLs within sternal precautions  [place]: resumed all ADLs within sternal precautions able to perform self-care  Exercise: walking 15 min daily - fatigue by the end and slight SOB    Patient Specific Goals:  Get back to juicing and being able to  the kitchen for longer periods of time, return to regular exercise; get back to [employer] next spring    Short Term Program Goals: increased strength improved energy/stamina with ADLs exercise 120-150 mins/wk    Long Term Goals: Improved [facility] Activity Status score  Improved functional capacity  Improved lipid profile  Reduced waist circumference  improved Rate Your Plate Score    Ability to reach goals/rehabilitation potential:  Very Good     Projected return to function: 12 weeks  Objective tests: sub-max TM ETT      Nutritional   Reviewed details of Rate your Plate. Discussed key elements of heart healthy eating. Reviewed patient goals for dietary modifications and their clinical implications. Reviewed most recent lipid profile.      Goals for dietary modification based on Rate Your Plate Dietary Assessment:  reduced fat cheese  increase whole grains  increase fruits and vegetables  eliminate butter  low sodium  improved snack choices  more nuts/seeds  reduce sweets/frozen desserts      Emotional/Social  Patient reports he is coping well with good social support and denies depression or anxiety    Marital status:   Domestic Violence Screening: No

## 2023-08-17 NOTE — PROGRESS NOTES
Cardiac Rehabilitation Plan of Care   Initial Care Plan        Today's date: 2023   # of Exercise Sessions Completed: 1  Patient name: Raymundo Banuelos. : 1945  Age: 77 y.o. MRN: [id number]  Referring Physician: Taryn Espinosa DO  Cardiologist: Kathrine Bernabe DO  Provider: [facility]  Clinician: Perez Teresa MS, CEP    Dx:   Encounter Diagnoses   Name Primary? • S/P AVR    • S/P MVR (mitral valve repair)    • S/P left atrial appendage ligation      Date of onset: 2023      SUMMARY OF PROGRESS:  Today is North's initial evaluation to begin Cardiac Rehab now 5 wks post AVR, mitral valve repair, and JENNY ligation. One week post op, Pako Oliva had permanent dual chamber pacemaker placed. Pako Oliva has a history of CAD 80% stenosis of OM1, Afib s/p ablation  and , HTN, HLD, DVT, and bilateral popliteal artery aneurysm. He does not currently follow a formal exercise program at home and is not interested in joining a gym. He has not resumed most of ADLs he used to do following sternal restrictions and is limited by fatigue. Today, Pako Oliva completed an initial submaximal TM ETT. He completed 3 minutes of stage 1 (2.2METs) with test termination of RPE 6 and RHR +30. Resting  BP of 110/74 with appropriate hemodynamic response to exercise; BP reaching 146/80 and peak HR of 126 bpm (Restign HR 90 bpm). Pako Oliva reported moderate TINSLEY during exercise with full resolution in recovery. Telemetry revealed intermittent dual chamber pacing with underlying Afib. Pako Oliva was counseled on exercise guidelines and the goal to achieve a minimum of 150 mins/wk of moderate intensity (RPE 4-6) exercise per CSF - [place] guidelines. We also discussed current dietary habits and goals of heart healthy eating for lipid management. Depression and anxiety were assessed with PHQ-9 and DENY-7 questionnaires with scores of 0 for each.  When addressed, Pako Oliva denies having depression/anxiety symptoms and reports very good social/emotional support. His goals for cardiac rehab include: Get back to [employer] and being able to  the kitchen for longer periods of time, return to regular exercise; get back to [employer] next spring. [employer] Faith will attend group education classes on heart healthy eating, reading food labels, stress management, risk factor reduction, understanding heart disease and common heart medications. He will attend 35 monitored exercise sessions, 3x/wk for 12-18 weeks beginning Monday, August 21. Medication compliance: Yes   Comments: Pt reports to be compliant with medications; however he takes a full tablet Metoprolol once daily instead of half tablet BID  Fall Risk: Moderate   Comments: Denies a fall in the past 6 months and occasional unsteadiness since surgery    EKG Interpretation: intermittent dual chamber pacing with underlying Afib      EXERCISE ASSESSMENT and PLAN    Exercise Prescription:      Frequency: 3 days/week        Minutes: 30-40         METS: 1.8-2.5            HR: 110-130   RPE: 3-5         Modalities: UBE, Lifecycle, NuStep and Recumbent bike  (no upper body until 8/30)     30 Day Goals for Exercise Progression:    Frequency: 3 days/week of cardiac rehab       Supplement with home exercise 2+ days/wk as tolerated    Minutes: 40                              >150 mins/wk of moderate intensity exercise   METS: 1.8-3.0   HR: 110-130    RPE: 4-6   Modalities: Treadmill, UBE and Lifecycle    Strength training:   Will be added following at least 8 weeks post surgery and 8-10 monitored sessions   Modalities: Leg Press, Chest Press, Pull Downs, Lateral Raise, Arm Extension, Arm Curl, Front Raises, Shoulder Shrugs and Calf Raises    Home Exercise: none    Goals: 10% improvement in functional capacity - based on max METs achieved in fitness assessment, improved DASI score by 10%, Increase in exercise capacity by 40% - based on peak METs tolerated in cardiac rehab exercise session, Exercise 5 days/wk, >150mins/wk of moderate intensity exercise, Attend Rehab regularly and start a home exercise program    Progression Toward Goals:  Reviewed Pt goals and determined plan of care, Will continue to educate and progress as tolerated. Education: benefit of exercise for CAD risk factors, AHA guidelines to achieve >150 mins/wk of moderate exercise and RPE scale   Plan:education on home exercise guidelines, home exercise 30+ mins 2 days opposite CR and Education class: Risk Factors for Heart Disease  Readiness to change: Preparation:  (Getting ready to change)       NUTRITION ASSESSMENT AND PLAN    Weight control:    Starting weight: 255.4 lb   Current weight:     Waist circumference:    Startin.5 in   Current:      Diabetes: N/A  A1c: 5.6%    last measured: 23    Lipid management: Discussed diet and lipid management and Last lipid profile 23  Chol 177  TRG 93  HDL 48      Goals:LDL <100, reduced waist circumference <40 inches (M), Improved Rate Your Plate score  >24, eat 3 or more servings of whole grains a day, Eat 4-5 cups of fruits and vegetables daily, eliminate butter, use fat-free dressings/pierce or seldom use, choose healthy snacks: light popcorn, plain pretzels, seldom eat or choose low fat ice-cream, fruit juice bars or frozen yogurt  and eliminate or choose low-fat sweets    Measurable goals were based Rate Your Plate Dietary Self-Assessment. These are the areas in which the patient could score higher on the assessment. Goals include recommendations for a heart healthy diet based on American Heart Association. Progression Toward Goals: Reviewed Pt goals and determined plan of care, Will continue to educate and progress as tolerated.     Education: heart healthy eating  nutrition for  lipid management  [facility] Diet  Plan: Education class: Reading Food Labels and Education Class: Heart Healthy Eating  Readiness to change: Pre-Contemplation:   (Not yet acknowledging that there is a problem behavior that needs to change)      PSYCHOSOCIAL ASSESSMENT AND PLAN    Emotional:  Depression assessment:  PHQ-9 = 0  0 =No Depression            Anxiety measure:  DENY-7 = 0  0-4  = Not anxious  Self-reported stress level:  1  Social support: Very Good    Goals:  Physical Fitness in [facility] Score < 3 and increased energy    Progression Toward Goals: Reviewed Pt goals and determined plan of care, Will continue to educate and progress as tolerated. Education: depression and CAD  Plan: Exercise  Readiness to change: Action:  (Changing behavior)      OTHER CORE COMPONENTS     Tobacco:   Social History     Tobacco Use   Smoking Status Never   Smokeless Tobacco Never       Tobacco Use Intervention:   N/A:  Patient is a non-smoker     Anginal Symptoms:  TINSLEY and excessive fatigue   NTG use: No prescription    Blood pressure:    Restin/74   Exercise: 146/80    Goals: consistent BP < 130/80, moderate intensity exercise >150 mins/wk and medication compliance    Progression Toward Goals: Reviewed Pt goals and determined plan of care, Will continue to educate and progress as tolerated.     Education:  benefits of exercise  Plan: Class: Understanding Heart Disease, Class: Common Heart Medications, medication compliance and engage in regular exercise  Readiness to change: Preparation:  (Getting ready to change)

## 2023-08-18 ENCOUNTER — OFFICE VISIT (OUTPATIENT)
Dept: CARDIAC SURGERY | Facility: CLINIC | Age: 78
End: 2023-08-18

## 2023-08-18 ENCOUNTER — ANTICOAG VISIT (OUTPATIENT)
Dept: CARDIOLOGY CLINIC | Facility: CLINIC | Age: 78
End: 2023-08-18

## 2023-08-18 VITALS
OXYGEN SATURATION: 98 % | WEIGHT: 256.4 LBS | HEIGHT: 78 IN | SYSTOLIC BLOOD PRESSURE: 124 MMHG | HEART RATE: 64 BPM | BODY MASS INDEX: 29.67 KG/M2 | DIASTOLIC BLOOD PRESSURE: 81 MMHG | TEMPERATURE: 98.2 F

## 2023-08-18 DIAGNOSIS — Z98.890 S/P MVR (MITRAL VALVE REPAIR): ICD-10-CM

## 2023-08-18 DIAGNOSIS — Z98.890 S/P LEFT ATRIAL APPENDAGE LIGATION: ICD-10-CM

## 2023-08-18 DIAGNOSIS — Z95.2 S/P AVR: Primary | ICD-10-CM

## 2023-08-18 DIAGNOSIS — I48.91 ATRIAL FIBRILLATION WITH RVR (HCC): Primary | ICD-10-CM

## 2023-08-18 PROCEDURE — 99024 POSTOP FOLLOW-UP VISIT: CPT | Performed by: THORACIC SURGERY (CARDIOTHORACIC VASCULAR SURGERY)

## 2023-08-18 NOTE — PROGRESS NOTES
POST OP FOLLOW UP VISIT    Procedure:   7/12/23  1. Aortic valve replacement with a 29 mm Escalante Inspiris bioprosthetic valve  2. Mitral valve repair with a 36 mm Escalante Physio II ring annuloplasty  3. Left atrial appendage ligation. History: Jessica Carbajal is a 77y.o. year old male who presents to our office today for routine follow up care from elective aortic valve replacement, mitral valve repair and ligation of left atrial appendage. Patient tolerated the procedure well. Immediately post op he was pacer dependent with underlying junctional rhythm (HR 40's). Beta Blocker was held. He then developed Aflutter with rates 40-50's,  A-Pacing maintained. EP consult obtained. POD #4 still in Aflutter with rates 50-60. POD #5 - DCCV performed with only brief NSR with return to [facility]. Patient subsequently underwent PPM implantation on POD # 7  Patient started on Metoprolol after PPM and anticoagulated with Coumadin (planned duration of treatment 1 month) until cleared to resume Eliquis as taken pre-op for chronic AFIb. Patient discharged home on POD # 8. Patient has had routine out patient f/u with PCP and Cardiology. Today he reports he is doing well. He is tolerating gradual increase in activity and ambulation. He is now walking abound his neighbor hernandez for 10-15 min . He denies TINSLEY, lightheadedness, chest pain, palpitations, weight gain edema, fever or chills. He reports his incisions have healed well. He has a good appetite, is eating well and denies GI disturbance. He denies any abnormal bleeding or bruising. He had his initial cardiac rehab consultation yesterday and is scheduled to begin the program next week.        Vital Signs:   Vitals:    08/18/23 1413 08/18/23 1416   BP: 131/90 124/81   BP Location: Left arm Right arm   Patient Position: Sitting Sitting   Cuff Size: Large Large   Pulse: 64    Temp: 98.2 °F (36.8 °C)    TempSrc: Tympanic    SpO2: 98%    Weight: 116 kg (256 lb 6.4 oz)    Height: 6' 6" (1.981 m)        Home Medications:   Prior to Admission medications    Medication Sig Start Date End Date Taking? Authorizing Provider   acetaminophen (TYLENOL) 650 mg CR tablet Take 1 tablet (650 mg total) by mouth every 8 (eight) hours as needed for mild pain  Patient taking differently: Take 650 mg by mouth every 8 (eight) hours as needed for mild pain As needed 7/20/23 8/19/23 Yes Shannan Osei PA-C   aspirin (ECOTRIN LOW STRENGTH) 81 mg EC tablet Take 1 tablet (81 mg total) by mouth daily 5/8/23  Yes DHEERAJ Meléndez   clindamycin (CLEOCIN) 300 MG capsule Take 300 mg by mouth Prior to dental work.  10/18/22  Yes Historical Provider, MD   metoprolol tartrate (LOPRESSOR) 25 mg tablet Take 0.5 tablets (12.5 mg total) by mouth every 12 (twelve) hours 7/20/23  Yes Shannan Osei PA-C   Probiotic Product (PROBIOTIC-10 PO) Take 1 capsule by mouth daily   Yes Historical Provider, MD   rosuvastatin (CRESTOR) 20 MG tablet Take 1 tablet (20 mg total) by mouth daily 5/8/23  Yes DHEERAJ Massey   Vitamin D, Cholecalciferol, 1000 units CAPS Take 2,000 Units by mouth daily   Yes Historical Provider, MD   warfarin (COUMADIN) 5 mg tablet Take 1 tablet (5 mg total) by mouth daily 7/20/23  Yes Shannan Osei PA-C   Pancreatin 325 MG TABS Take 1 tablet by mouth daily  Patient not taking: Reported on 8/2/2023    Historical Provider, MD   diphenhydrAMINE (BENADRYL) 25 mg capsule Take 25 mg by mouth every 6 (six) hours as needed for itching Patient not sure of the doseage  Patient not taking: Reported on 8/18/2023 8/18/23  Historical Provider, MD   docusate sodium (COLACE) 100 mg capsule Take 1 capsule (100 mg total) by mouth 2 (two) times a day  Patient not taking: Reported on 8/18/2023 7/20/23 8/18/23  Shannan Osei PA-C   omeprazole (PriLOSEC) 20 mg delayed release capsule Take 1 capsule (20 mg total) by mouth daily  Patient not taking: Reported on 8/2/2023 7/20/23 8/18/23  Shannan Osei BRYAN   polyethylene glycol (GLYCOLAX) 17 GM/SCOOP powder Take 17 g by mouth daily  Patient not taking: Reported on 8/2/2023 7/20/23 8/18/23  Michelle Marie PA-C       Physical Exam:  General: Alert, oriented, well developed, no acute distress  HEENT/NECK:  PERRLA. No jugular venous distention. Cardiac:Irregular rhythm, no murmurs rubs or gallops. Pulmonary:Breath sounds clear bilaterally  Abdomen:  Non-tender, Non-distended. Positive bowel sounds. Upper extremities: 2+ radial pulses; brisk capillary refill  Lower extremities: Extremities warm/dry. PT/DP pules 2+ bilaterally. 1+ edema B/L  Incisions: Sternum is stable. Incision is clean, dry, and intact. PPM incision is clean, dry, and intact  Musculoskeletal: MAEE, stable gait  Neuro: Alert and oriented X 3. Sensation is grossly intact. No focal deficits  Skin: Warm/Dry, without rashes or lesions. Lab Results:     Results from last 7 days   Lab Units 08/17/23  1223   INR  2.43*     Lab Results   Component Value Date    HGBA1C 5.6 06/27/2023     Lab Results   Component Value Date    TROPONINI 0.04 02/23/2021       Assessment:   Aortic regurgitation, Mitral regurgitation. S/P aortic valve replacement, mitral valve repair and ligation of left atrial appendage    Esteban Powell. is 5 weeks post-op, making good progress in their recovery. Incisions are well-healed and the sternum is stable. Weight and VS are stable. Plan:   Medications reviewed with patient. He may stop Coumadin and resume Eliquis at this point. Benefits of participating in cardiac rehab have been discussed and patient is cleared to begin the outpatient  program.     May resume driving. Increase activity as tolerated and maintain alifting restriction of no more than 25 lbs until 10/12/23, which is 12 weeks from the surgical date. No further follow up in our office is needed; call with questions or concerns.   He does not need ongoing surveillance imaging for his ascending aortic aneurysms that is 4.5 cm and has been stable. Patient should maintain routine follow-up with Cardiology and Primary Care for ongoing medical care. Patient verbalizes understanding of recommendations and all questions were answered to their satisfaction.     Routine referral to gastroenterology for colonoscopy screening was not indicated, as the patient is over 75years old    Yuliet Benjamin, [address] [place] [address]  8/18/23  2:30PM

## 2023-08-21 ENCOUNTER — CLINICAL SUPPORT (OUTPATIENT)
Dept: CARDIAC REHAB | Facility: CLINIC | Age: 78
End: 2023-08-21
Payer: MEDICARE

## 2023-08-21 DIAGNOSIS — Z95.2 S/P AVR: Primary | ICD-10-CM

## 2023-08-21 PROCEDURE — 93798 PHYS/QHP OP CAR RHAB W/ECG: CPT

## 2023-08-23 ENCOUNTER — CLINICAL SUPPORT (OUTPATIENT)
Dept: CARDIAC REHAB | Facility: CLINIC | Age: 78
End: 2023-08-23
Payer: MEDICARE

## 2023-08-23 DIAGNOSIS — Z95.2 S/P AVR: Primary | ICD-10-CM

## 2023-08-23 PROCEDURE — 93798 PHYS/QHP OP CAR RHAB W/ECG: CPT

## 2023-08-24 ENCOUNTER — APPOINTMENT (OUTPATIENT)
Dept: LAB | Facility: MEDICAL CENTER | Age: 78
End: 2023-08-24
Payer: MEDICARE

## 2023-08-24 DIAGNOSIS — I48.91 ATRIAL FIBRILLATION WITH RVR (HCC): ICD-10-CM

## 2023-08-24 LAB
INR PPP: 2.69 (ref 0.84–1.19)
PROTHROMBIN TIME: 28.9 SECONDS (ref 11.6–14.5)

## 2023-08-24 PROCEDURE — 85610 PROTHROMBIN TIME: CPT

## 2023-08-24 PROCEDURE — 36415 COLL VENOUS BLD VENIPUNCTURE: CPT

## 2023-08-25 ENCOUNTER — ANTICOAG VISIT (OUTPATIENT)
Dept: CARDIOLOGY CLINIC | Facility: CLINIC | Age: 78
End: 2023-08-25

## 2023-08-25 ENCOUNTER — CLINICAL SUPPORT (OUTPATIENT)
Dept: CARDIAC REHAB | Facility: CLINIC | Age: 78
End: 2023-08-25
Payer: MEDICARE

## 2023-08-25 DIAGNOSIS — Z95.2 S/P AVR: Primary | ICD-10-CM

## 2023-08-25 DIAGNOSIS — I48.91 ATRIAL FIBRILLATION WITH RVR (HCC): Primary | ICD-10-CM

## 2023-08-25 NOTE — PROGRESS NOTES
See attached exercise session details - Pt arrived to cardiac rehab today with resting HR  bpm and /86. Afib noted on telemetry with occasional PVC and isolated couplet. After 3 min on the recumbent cycle, North's HR increased to 180-185 bpm with SVT noted on telemetry. Normal BP response to exercise (152/76) and Michelle Shannon was asymptomatic. SVT broke after 15 seconds without treatment and he returned to Afib. Exercise was terminated, pt was given 8 oz water and asked to sit/rest. He remained in Afib for the remainder of his time in cardiac rehab with no more exercise completed. Michelle Shannon reported that he forgot to take his medication this morning and since he was asymptomatic, he was sent home to take his medication.

## 2023-08-28 ENCOUNTER — CLINICAL SUPPORT (OUTPATIENT)
Dept: CARDIAC REHAB | Facility: CLINIC | Age: 78
End: 2023-08-28
Payer: MEDICARE

## 2023-08-28 DIAGNOSIS — Z95.2 S/P AVR: Primary | ICD-10-CM

## 2023-08-28 PROCEDURE — 93798 PHYS/QHP OP CAR RHAB W/ECG: CPT

## 2023-08-29 ENCOUNTER — TELEPHONE (OUTPATIENT)
Dept: CARDIOLOGY CLINIC | Facility: CLINIC | Age: 78
End: 2023-08-29

## 2023-08-30 ENCOUNTER — CLINICAL SUPPORT (OUTPATIENT)
Dept: CARDIAC REHAB | Facility: CLINIC | Age: 78
End: 2023-08-30
Payer: MEDICARE

## 2023-08-30 DIAGNOSIS — Z95.2 S/P AVR: Primary | ICD-10-CM

## 2023-08-30 PROCEDURE — 93798 PHYS/QHP OP CAR RHAB W/ECG: CPT

## 2023-09-01 ENCOUNTER — CLINICAL SUPPORT (OUTPATIENT)
Dept: CARDIAC REHAB | Facility: CLINIC | Age: 78
End: 2023-09-01
Payer: MEDICARE

## 2023-09-01 DIAGNOSIS — Z95.2 S/P AVR: Primary | ICD-10-CM

## 2023-09-01 PROCEDURE — 93798 PHYS/QHP OP CAR RHAB W/ECG: CPT

## 2023-09-05 ENCOUNTER — CLINICAL SUPPORT (OUTPATIENT)
Dept: CARDIAC REHAB | Facility: CLINIC | Age: 78
End: 2023-09-05
Payer: MEDICARE

## 2023-09-05 DIAGNOSIS — Z95.2 S/P AVR: Primary | ICD-10-CM

## 2023-09-05 PROCEDURE — 93798 PHYS/QHP OP CAR RHAB W/ECG: CPT

## 2023-09-06 ENCOUNTER — CLINICAL SUPPORT (OUTPATIENT)
Dept: CARDIAC REHAB | Facility: CLINIC | Age: 78
End: 2023-09-06
Payer: MEDICARE

## 2023-09-06 DIAGNOSIS — Z95.2 S/P AVR: Primary | ICD-10-CM

## 2023-09-06 PROCEDURE — 93798 PHYS/QHP OP CAR RHAB W/ECG: CPT

## 2023-09-08 ENCOUNTER — CLINICAL SUPPORT (OUTPATIENT)
Dept: CARDIAC REHAB | Facility: CLINIC | Age: 78
End: 2023-09-08
Payer: MEDICARE

## 2023-09-08 DIAGNOSIS — Z95.2 S/P AVR: Primary | ICD-10-CM

## 2023-09-08 PROCEDURE — 93798 PHYS/QHP OP CAR RHAB W/ECG: CPT

## 2023-09-11 ENCOUNTER — CLINICAL SUPPORT (OUTPATIENT)
Dept: CARDIAC REHAB | Facility: CLINIC | Age: 78
End: 2023-09-11
Payer: MEDICARE

## 2023-09-11 DIAGNOSIS — Z95.2 S/P AVR: Primary | ICD-10-CM

## 2023-09-11 PROCEDURE — 93798 PHYS/QHP OP CAR RHAB W/ECG: CPT

## 2023-09-13 ENCOUNTER — CLINICAL SUPPORT (OUTPATIENT)
Dept: CARDIAC REHAB | Facility: CLINIC | Age: 78
End: 2023-09-13
Payer: MEDICARE

## 2023-09-13 DIAGNOSIS — Z95.2 S/P AVR: Primary | ICD-10-CM

## 2023-09-13 PROCEDURE — 93798 PHYS/QHP OP CAR RHAB W/ECG: CPT

## 2023-09-13 NOTE — PROGRESS NOTES
Cardiac Rehabilitation Plan of Care   30 Day Reassessment        Today's date: 2023   # of Exercise Sessions Completed: 12  Patient name: Wendy Ibarra. : 1945  Age: 77 y.o. MRN: [id number]  Referring Physician: Matthew Gonzalez PA-C  Cardiologist: Wen Galvez DO  Provider: [facility]  Clinician: Lee De Los Santos MS, CEP    Dx:   Encounter Diagnosis   Name Primary? • S/P AVR Yes     Date of onset: 2023      SUMMARY OF PROGRESS:  Monica Guerrero has been compliant with cardiac rehab exercise 3 days/week. He has increased his exercise duration and intensity to 40-45 min of exercise at 3.3-3.6 METs without cardiac complaints. Weight training was added today as well. Afib and intermittent pacing noted on telemetry with occasional PVCs and couplets (ectopy much more frequent at rest). Resting HR 72-82 bpm with increased HR to  bpm during exercise. On , Monica Guerrero had forgotten to take his morning medications before exercise with resting HR of  bpm and exercise HR peaking at 180-185 bpm with SVT noted on telemetry. He remained asymptomatic and was able to go home and take his medications with no additional incident. He has been 100% compliant with medication since then. At home, Monica Guerrero has not started a regular exercise routine but was encouraged to walk more frequently 2-3 days/week around the neighborhood. Resting /84 - 140/98 with slightly increased BP during exercise reaching 122/72 - 154/94. Monica Guerrero reports changes in his diet, including returning to juicing more often and a more whole food diet. He has had 6 lb weight gain in the past 30 days. Monica Guerrero continues to deny depression/anxiety symptoms and reports very good social/emotional support. His exercise intensity in cardiac rehab will continue to be increased as tolerated and home exercise encouraged during the next 30 days.     His goals for cardiac rehab include: Get back to juicing and being able to  the kitchen for longer periods of time, return to regular exercise; get back to [employer] next spring. Medication compliance: Yes   Comments: Pt reports to be compliant with medications; however he takes a full tablet Metoprolol once daily instead of half tablet BID  Fall Risk: Moderate   Comments: Denies a fall in the past 6 months and occasional unsteadiness since surgery    EKG Interpretation:  Afib/intermittent pacing with frequent PVCs and couplets at rest - reduced frequency of ectopy during exercise      EXERCISE ASSESSMENT and PLAN    Exercise Prescription:      Frequency: 3 days/week        Minutes: 40-45        METS: 3.3-3.6            HR:   (152-167 bpm on treadmill)   RPE: 4-6         Modalities: Treadmill, UBE, NuStep and Recumbent bike     30 Day Goals for Exercise Progression:    Frequency: 3 days/week of cardiac rehab       Supplement with home exercise 2+ days/wk as tolerated    Minutes: 40-45                             >150 mins/wk of moderate intensity exercise   METS: 3-4.2   HR: 110-130    RPE: 4-6   Modalities: Treadmill, UBE, Lifecycle and NuStep    Strength trainin-3 days / week  12-15 repetitions  1-2 sets per modality    Modalities: Leg Press, Chest Press, Pull Downs, Lateral Raise, Arm Extension, Arm Curl, Front Raises, Shoulder Shrugs and Calf Raises    Home Exercise: none    Goals: 10% improvement in functional capacity - based on max METs achieved in fitness assessment, improved DASI score by 10%, Increase in exercise capacity by 40% - based on peak METs tolerated in cardiac rehab exercise session, Exercise 5 days/wk, >150mins/wk of moderate intensity exercise, Attend Rehab regularly and start a home exercise program    Progression Toward Goals:  Pt is progressing and showing improvement  toward the following goals:  compliant with cardiac rehab, icnreased exercise intensity and duration.   , Patient will increase neighborhood walking frequency in the next 30 days, Will continue to educate and progress as tolerated. Education: benefit of exercise for CAD risk factors, AHA guidelines to achieve >150 mins/wk of moderate exercise and RPE scale   Plan:education on home exercise guidelines, home exercise 30+ mins 2 days opposite CR and Education class: Risk Factors for Heart Disease  Readiness to change: Action:  (Changing behavior)      NUTRITION ASSESSMENT AND PLAN    Weight control:    Starting weight: 255.4 lb   Current weight:   261.8 lb  Waist circumference:    Startin.5 in   Current:      Diabetes: N/A  A1c: 5.6%    last measured: 23    Lipid management: Last lipid profile 23  Chol 177  TRG 93  HDL 48      Goals:LDL <100, reduced waist circumference <40 inches (M), Improved Rate Your Plate score  >33, eat 3 or more servings of whole grains a day, Eat 4-5 cups of fruits and vegetables daily, eliminate butter, use fat-free dressings/pierce or seldom use, choose healthy snacks: light popcorn, plain pretzels, seldom eat or choose low fat ice-cream, fruit juice bars or frozen yogurt  and eliminate or choose low-fat sweets    Measurable goals were based Rate Your Plate Dietary Self-Assessment. These are the areas in which the patient could score higher on the assessment. Goals include recommendations for a heart healthy diet based on American Heart Association. Progression Toward Goals: Pt is progressing and showing improvement  toward the following goals:  restarted juicing diet. , Pt has not made progress toward the following goals: weight gain of 6 lbs since initial evaluation. , Will continue to educate and progress as tolerated.     Education: heart healthy eating  nutrition for  lipid management  education class: Heart Healthy Eating  education class:  Label Reading  Mediteranean Diet  Plan: reduce portion sizes and continue with Mediteranean/whole food diet  Readiness to change: Action:  (Changing behavior)      PSYCHOSOCIAL ASSESSMENT AND PLAN    Emotional:  Depression assessment:  PHQ-9 = 0  0 =No Depression            Anxiety measure:  DENY-7 = 0  0-4  = Not anxious  Self-reported stress level:  1  Social support: Very Good    Goals:  Physical Fitness in [facility] Score < 3 and increased energy    Progression Toward Goals: Pt is progressing and showing improvement  toward the following goals:  education received on stress/depression/anxiety; pt deneis symptoms of depression/anxiety. Education: depression and CAD and class:  Stress and Your Health   Plan: Exercise  Readiness to change: Maintenance: (Maintaining the behavior change)      OTHER CORE COMPONENTS     Tobacco:   Social History     Tobacco Use   Smoking Status Never   Smokeless Tobacco Never       Tobacco Use Intervention:   N/A:  Patient is a non-smoker     Anginal Symptoms:  TINSLEY and excessive fatigue   NTG use: No prescription    Blood pressure:    Restin/84 - 140/98   Exercise: 122/72 - 154/94    Goals: consistent BP < 130/80, moderate intensity exercise >150 mins/wk and medication compliance    Progression Toward Goals: Pt is progressing and showing improvement  toward the following goals:  normal resting and exercise BP, improved medication compliance.  , Will continue to educate and progress as tolerated.     Education:  Education class:  Common Heart Medications and benefits of exercise  Plan: Class: Understanding Heart Disease, medication compliance and engage in regular exercise  Readiness to change: Action:  (Changing behavior)

## 2023-09-15 ENCOUNTER — CLINICAL SUPPORT (OUTPATIENT)
Dept: CARDIAC REHAB | Facility: CLINIC | Age: 78
End: 2023-09-15
Payer: MEDICARE

## 2023-09-15 DIAGNOSIS — Z95.2 S/P AVR: Primary | ICD-10-CM

## 2023-09-15 PROCEDURE — 93798 PHYS/QHP OP CAR RHAB W/ECG: CPT

## 2023-09-18 ENCOUNTER — CLINICAL SUPPORT (OUTPATIENT)
Dept: CARDIAC REHAB | Facility: CLINIC | Age: 78
End: 2023-09-18
Payer: MEDICARE

## 2023-09-18 DIAGNOSIS — Z95.2 S/P AVR: Primary | ICD-10-CM

## 2023-09-18 PROCEDURE — 93798 PHYS/QHP OP CAR RHAB W/ECG: CPT

## 2023-09-18 NOTE — PROGRESS NOTES
Exercise Session Detail - Pt with recently increased TINSLEY and excess fatigue that often comes on suddenly. Since cardiac rehab evaluation, North's amount of pacing has decreased especially during exercise. Afib is underlying rhythm. Increased HR to 152-167 bpm while walking on the treadmill. Pt also with 7 lb weight gain int he past 30 days despite diet changes with more juicing and whole food consumption. Above was reported to Dr. Christian Baker (pt's cardiologist) with result of increased metoprolol dose to 50mg BID. Pt reported taking 25 mg BID and will increase starting tonight. He also has a follow up now scheduled with cardiology next Tuesday 9/26.

## 2023-09-20 ENCOUNTER — CLINICAL SUPPORT (OUTPATIENT)
Dept: CARDIAC REHAB | Facility: CLINIC | Age: 78
End: 2023-09-20
Payer: MEDICARE

## 2023-09-20 DIAGNOSIS — Z95.2 S/P AVR: Primary | ICD-10-CM

## 2023-09-20 PROCEDURE — 93798 PHYS/QHP OP CAR RHAB W/ECG: CPT

## 2023-09-22 ENCOUNTER — CLINICAL SUPPORT (OUTPATIENT)
Dept: CARDIAC REHAB | Facility: CLINIC | Age: 78
End: 2023-09-22
Payer: MEDICARE

## 2023-09-22 DIAGNOSIS — Z95.2 S/P AVR: Primary | ICD-10-CM

## 2023-09-22 PROCEDURE — 93798 PHYS/QHP OP CAR RHAB W/ECG: CPT

## 2023-09-25 ENCOUNTER — CLINICAL SUPPORT (OUTPATIENT)
Dept: CARDIAC REHAB | Facility: CLINIC | Age: 78
End: 2023-09-25
Payer: MEDICARE

## 2023-09-25 DIAGNOSIS — Z95.2 S/P AVR: Primary | ICD-10-CM

## 2023-09-25 PROCEDURE — 93798 PHYS/QHP OP CAR RHAB W/ECG: CPT

## 2023-09-25 NOTE — PROGRESS NOTES
Cardiology Follow Up    Michelle Garsia.  1945  [id number]  [facility] [address]  [phone]    1. Shortness of breath  torsemide (DEMADEX) 10 mg tablet    apixaban (Eliquis) 5 mg      2. S/P AVR        3. S/P left atrial appendage ligation        4. Pacemaker        5. Permanent atrial fibrillation (HCC)  apixaban (Eliquis) 5 mg          Interval History:   Ms Pauly Gracia was admitted to [address] [place] [address] on 7/12 - 7/20/23 sp AVR and MVR. He was electively admitted and underwent MVR 36 mm Escalante physio II ring, AVR 29 mm Inspiris Resilia, JENNY clip 45 mm, by Dr. Jolie Sinclair no significant postoperative bleeding postoperatively rhythm showed junctional rhythm EP consulte. 7/19 he underwent placement of dual-chamber permanent pacemaker and discharged home on postop day number [id number] [address] presents to our office with a 2 week history of shortness of breath during cardiac rehabilitation riding the bike. Jayden Alejandro does not restrict his diet. His weight has increased from 251 pounds on 8/02/23 to 263 pounds today.  Jayden Alejadnro admits to being sedentary other than cardiac rehabilitation.         Medical History  Primary Cardiologist Dr Shelbie Castillo   Aortic insufficiency  Mitral valve regurgitation  Mild dilated ascending aorta  Hypertension  Hyperlipidemia  Permanent atrial fibrillation status post ablation in 2018, 2021 on Eliquis  PAH  CAD 80% stenosis of OM1  History of DVT  2016 [facility] bilateral Burns bypass with GSV  Chronic right tibial osteomyelitis  Bilateral venous insufficiency  History of right lower extremity melanoma in 2010 and 2011 5/28/23 LHC: 1st diag 40% stenosis, 1st [place] 80% stenosis, lesion is focal.  Distal RCA 30% stenosis     Patient Active Problem List   Diagnosis   • Atrial fibrillation with RVR ([address])   • 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[address])   • Hypertension   • History of DVT (deep vein thrombosis)   • Benign essential hypertension   • Mitral valve insufficiency   • Bilateral popliteal artery aneurysm (HCC)   • Paroxysmal atrial fibrillation ([facility])   • Mixed hyperlipidemia   • Aneurysm of ascending aorta ([facility])   • Sepsis ([facility])   • Hypoxia   • Callus of foot   • Streptococcal bacteremia   • Pulmonary hypertension ([facility])   • Erythrocytosis   • Chronic osteomyelitis of right tibia with draining sinus ([facility])   • Aortic insufficiency   • Permanent atrial fibrillation ([facility])   • Coronary artery disease involving native coronary artery   • S/P AVR   • S/P MVR (mitral valve repair)   • S/P left atrial appendage ligation   • PAD (peripheral artery disease) ([facility])   • Hyperchloremia   • s/p Medtronic dual chamber PPM with left bundle pacing lead 7/19/2023     Past Medical History:   Diagnosis Date   • Cardiac disease    • Hypertension    • PAF (paroxysmal atrial fibrillation) ([facility])    • 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[facility])     BILATERALLY   • s/p Medtronic dual chamber PPM with left bundle pacing lead 7/19/2023 7/20/2023     Social History     Socioeconomic History   • Marital status: /Civil Union     Spouse name: Not on file   • Number of children: Not on file   • Years of education: Not on file   • Highest education level: Not on file   Occupational History   • Not on file   Tobacco Use   • Smoking status: Never   • Smokeless tobacco: Never   Vaping Use   • Vaping Use: Never used   Substance and Sexual Activity   • Alcohol use: Not Currently   • Drug use: No   • Sexual activity: Not on file   Other Topics Concern   • Not on file   Social History Narrative   • Not on file     Social Determinants of Health     Financial Resource Strain: Not on file   Food Insecurity: No Food Insecurity (7/13/2023)    Hunger Vital Sign    • Worried About Running Out of Food in the Last Year: Never true    • Ran Out of Food in the Last Year: Never true   Transportation Needs: No Transportation Needs (7/13/2023)    AYDEE Turner Transportation    • Lack of Transportation (Medical): No    • Lack of Transportation (Non-Medical): No   Physical Activity: Not on file   Stress: Not on file   Social Connections: Not on file   Intimate Partner Violence: Not on file   Housing Stability: Low Risk  (7/13/2023)    Housing Stability Vital Sign    • Unable to Pay for Housing in the Last Year: No    • Number of Places Lived in the Last Year: 1    • Unstable Housing in the Last Year: No      Family History   Problem Relation Age of Onset   • Pancreatic cancer Mother    • Stroke Father    • Heart attack Paternal Grandmother      Past Surgical History:   Procedure Laterality Date   • CARDIAC CATHETERIZATION N/A 5/8/2023    Procedure: Cardiac Coronary Angiogram;  Surgeon: Francis Martínez DO;  [facility]: [facility]; Service: Cardiology   • CARDIAC ELECTROPHYSIOLOGY PROCEDURE N/A 3/25/2022    Procedure: Cardiac eps/afib ablation;  Surgeon: Sheri Selby MD;  [facility]: [facility]; Service: Cardiology   • CARDIAC ELECTROPHYSIOLOGY PROCEDURE N/A 7/19/2023    Procedure: Cardiac pacer implant;  Surgeon: Megha Epstein MD;  [facility]: [facility]; Service: Cardiology   • ESOPHAGOGASTRODUODENOSCOPY N/A 8/16/2016    Procedure: ESOPHAGOGASTRODUODENOSCOPY (EGD); Surgeon: Rabia Barrera MD;  Location: [facility]; Service:    • HERNIA REPAIR Left     groin   • HERNIA REPAIR Right 1995   • OTHER SURGICAL HISTORY     • PA REPLACEMENT MITRAL VALVE W/CARDIOPULMONARY BYP N/A 7/12/2023    Procedure: MITRAL VALVE REPAIR WITH 36MM PHYSIO II ANNULOPLASTY RING;  Surgeon: Lyssa Hoff MD;  Location: [facility];  Service: Cardiac Surgery   • PA RPLCMT AORTIC VALVE OPN W/STENTLESS TISSUE VALVE N/A 7/12/2023    Procedure: REPLACEMENT VALVE AORTIC (AVR) WITH 29MM INSPIRIS TISSUE RESILIA VALVE.  LEFT ATRIAL APPENDAGE LIGATION WITH 45 MM ATRICLIP;  Surgeon: Lyssa Hoff MD;  Location: [facility];  Service: Cardiac Surgery   • REPLACEMENT TOTAL KNEE Left 04/01/2013   • VASCULAR SURGERY Right     POPLITEAL BYPASS DUE TO ANEURYSM       Current Outpatient Medications:   •  aspirin (ECOTRIN LOW STRENGTH) 81 mg EC tablet, Take 1 tablet (81 mg total) by mouth daily, Disp: , Rfl: 0  •  clindamycin (CLEOCIN) 300 MG capsule, Take 300 mg by mouth Prior to dental work., Disp: , Rfl:   •  metoprolol tartrate (LOPRESSOR) 25 mg tablet, Take 0.5 tablets (12.5 mg total) by mouth every 12 (twelve) hours, Disp: 30 tablet, Rfl: 2  •  Pancreatin 325 MG TABS, Take 1 tablet by mouth daily (Patient not taking: Reported on 8/2/2023), Disp: , Rfl:   •  Probiotic Product (PROBIOTIC-10 PO), Take 1 capsule by mouth daily, Disp: , Rfl:   •  rosuvastatin (CRESTOR) 20 MG tablet, Take 1 tablet (20 mg total) by mouth daily, Disp: 90 tablet, Rfl: 3  •  Vitamin D, Cholecalciferol, 1000 units CAPS, Take 2,000 Units by mouth daily, Disp: , Rfl:   •  warfarin (COUMADIN) 5 mg tablet, Take 1 tablet (5 mg total) by mouth daily, Disp: 60 tablet, Rfl: 2  Allergies   Allergen Reactions   • Oxycodone GI Intolerance     nausea  nausea       Labs:  Appointment on 08/24/2023   Component Date Value   • Protime 08/24/2023 28.9 (H)    • INR 08/24/2023 2.69 (H)    Appointment on 08/17/2023   Component Date Value   • Protime 08/17/2023 26.7 (H)    • INR 08/17/2023 2.43 (H)    Appointment on 08/10/2023   Component Date Value   • Protime 08/10/2023 21.4 (H)    • INR 08/10/2023 1.82 (H)    Appointment on 08/03/2023   Component Date Value   • Protime 08/03/2023 22.8 (H)    • INR 08/03/2023 1.98 (H)    Ancillary Orders on 07/28/2023   Component Date Value   • Protime 07/28/2023 21.5 (H)    • INR 07/28/2023 1.84 (H)      Imaging: No results found. Review of Systems:  Review of Systems   Respiratory: Positive for shortness of breath. Per HPI     Musculoskeletal: Positive for arthralgias and myalgias. All other systems reviewed and are negative.       Physical Exam:  Physical Exam  Vitals reviewed. Constitutional:       Appearance: He is well-developed. HENT:      Head: Normocephalic. Cardiovascular:      Rate and Rhythm: Normal rate. Rhythm irregular. Pulmonary:      Effort: Pulmonary effort is normal.      Breath sounds: Normal breath sounds. Musculoskeletal:         General: Normal range of motion. Right lower leg: Edema present. Left lower leg: Edema present. Comments: Trace to 1+ Federico LE edema    Skin:     General: Skin is warm and dry. Capillary Refill: Capillary refill takes less than 2 seconds. Neurological:      General: No focal deficit present. Mental Status: He is alert and oriented to person, place, and time. Psychiatric:         Mood and Affect: Mood normal.         Behavior: Behavior normal.       Discussion/Summary:  # Shortness of breath with riding the bike a cardiac rehabiliation. SOB started 2 weeks ago. Bobbi Peterson denies CP. On PE trace to 1+ federico LE edema. He does not restrict his diet. possibly multifactorial dietary indiscretion, deconditioning, ? CAD of OM. Bobbi Peterson is not willing to restrict his diet. I have ordered Torsemide 10mg daily for 3 days. I have asked Bobbi Peterson to call our office on Monday,  10/02/23 to evaluate symptoms after taking Torsemide. BB increased and did not improve SOB. If symptoms not improved I will have his device checked  # 7/12/23 sp  MVR 36 mm Escalante physio II ring, AVR 29 mm Inspiris Resilia, JENNY clip 45 mm, by Dr. Souleymane Gaming continue on ASA 81mg daily, Metoprolol tartrate 50mg Q12 hours. , lifelong use of antibiotics prior to dental procedure  # 7/19 sp  placement of dual-chamber permanent pacemaker 8/02/23 Pacer device interrogation showed  89% all lead parameters WNL. No sigh high rates.     # Permanent atrial fibrillation status post ablation in 2018, 2021 on Eliquis 5mg BID, 7/12/23 sp JENNY clip 45 mm, EKG atrial fibrillation Ventricular rate 91 BPM.  with PVC's

## 2023-09-26 ENCOUNTER — OFFICE VISIT (OUTPATIENT)
Dept: CARDIOLOGY CLINIC | Facility: CLINIC | Age: 78
End: 2023-09-26
Payer: MEDICARE

## 2023-09-26 VITALS
BODY MASS INDEX: 30.46 KG/M2 | DIASTOLIC BLOOD PRESSURE: 62 MMHG | WEIGHT: 263.3 LBS | HEART RATE: 78 BPM | SYSTOLIC BLOOD PRESSURE: 100 MMHG | HEIGHT: 78 IN | OXYGEN SATURATION: 100 %

## 2023-09-26 DIAGNOSIS — R06.02 SHORTNESS OF BREATH: Primary | ICD-10-CM

## 2023-09-26 DIAGNOSIS — I48.21 PERMANENT ATRIAL FIBRILLATION (HCC): ICD-10-CM

## 2023-09-26 DIAGNOSIS — Z95.2 S/P AVR: ICD-10-CM

## 2023-09-26 DIAGNOSIS — Z95.0 PACEMAKER: ICD-10-CM

## 2023-09-26 DIAGNOSIS — Z98.890 S/P LEFT ATRIAL APPENDAGE LIGATION: ICD-10-CM

## 2023-09-26 PROCEDURE — 93000 ELECTROCARDIOGRAM COMPLETE: CPT | Performed by: NURSE PRACTITIONER

## 2023-09-26 PROCEDURE — 99024 POSTOP FOLLOW-UP VISIT: CPT | Performed by: NURSE PRACTITIONER

## 2023-09-26 RX ORDER — TORSEMIDE 10 MG/1
TABLET ORAL
Qty: 10 TABLET | Refills: 3 | Status: SHIPPED | OUTPATIENT
Start: 2023-09-26

## 2023-09-26 NOTE — PATIENT INSTRUCTIONS
Torsemide 10mg daily for 3 days  Call our office on Monday to report shortness of breath taking torsemide

## 2023-09-27 ENCOUNTER — CLINICAL SUPPORT (OUTPATIENT)
Dept: CARDIAC REHAB | Facility: CLINIC | Age: 78
End: 2023-09-27
Payer: MEDICARE

## 2023-09-27 DIAGNOSIS — Z95.2 S/P AVR: Primary | ICD-10-CM

## 2023-09-27 PROCEDURE — 93798 PHYS/QHP OP CAR RHAB W/ECG: CPT

## 2023-09-29 ENCOUNTER — CLINICAL SUPPORT (OUTPATIENT)
Dept: CARDIAC REHAB | Facility: CLINIC | Age: 78
End: 2023-09-29
Payer: MEDICARE

## 2023-09-29 DIAGNOSIS — Z95.2 S/P AVR: Primary | ICD-10-CM

## 2023-09-29 PROCEDURE — 93798 PHYS/QHP OP CAR RHAB W/ECG: CPT

## 2023-10-02 ENCOUNTER — CLINICAL SUPPORT (OUTPATIENT)
Dept: CARDIAC REHAB | Facility: CLINIC | Age: 78
End: 2023-10-02
Payer: MEDICARE

## 2023-10-02 DIAGNOSIS — Z95.2 S/P AVR: Primary | ICD-10-CM

## 2023-10-02 PROCEDURE — 93798 PHYS/QHP OP CAR RHAB W/ECG: CPT

## 2023-10-04 ENCOUNTER — CLINICAL SUPPORT (OUTPATIENT)
Dept: CARDIAC REHAB | Facility: CLINIC | Age: 78
End: 2023-10-04
Payer: MEDICARE

## 2023-10-04 DIAGNOSIS — Z95.2 S/P AVR: Primary | ICD-10-CM

## 2023-10-04 PROCEDURE — 93798 PHYS/QHP OP CAR RHAB W/ECG: CPT

## 2023-10-06 ENCOUNTER — CLINICAL SUPPORT (OUTPATIENT)
Dept: CARDIAC REHAB | Facility: CLINIC | Age: 78
End: 2023-10-06
Payer: MEDICARE

## 2023-10-06 DIAGNOSIS — Z95.2 S/P AVR: Primary | ICD-10-CM

## 2023-10-06 PROCEDURE — 93798 PHYS/QHP OP CAR RHAB W/ECG: CPT

## 2023-10-09 ENCOUNTER — CLINICAL SUPPORT (OUTPATIENT)
Dept: CARDIAC REHAB | Facility: CLINIC | Age: 78
End: 2023-10-09
Payer: MEDICARE

## 2023-10-09 DIAGNOSIS — Z95.2 S/P AVR: Primary | ICD-10-CM

## 2023-10-09 PROCEDURE — 93798 PHYS/QHP OP CAR RHAB W/ECG: CPT

## 2023-10-09 NOTE — PROGRESS NOTES
Cardiac Rehabilitation Plan of Care   60 Day Reassessment        Today's date: 10/9/2023   # of Exercise Sessions Completed: 23  Patient name: Erlinda Carrillo. : 1945  Age: 77 y.o. MRN: [id number]  Referring Physician: Hermilo Coyle PA-C  Cardiologist: Noelle Smith DO  Provider: [facility]  Clinician: Sathish Charles MS, CEP    Dx:   Encounter Diagnosis   Name Primary? • S/P AVR Yes     Date of onset: 2023      SUMMARY OF PROGRESS:  Akash Carver continues to be compliant with exercise in cardiac rehab 3 days/week. He completes 40 min of cardiovascular exercise at increased METs of 3.6-4.5 plus weight training. Akash Carver does complain of fatigue often and TINSLEY. Akash Carver has not been doing the treadmill recently because of these symptoms and steep increase in HR while on the treadmill. Afib noted on telemetry with intermittent pacing (more frequent pacing than the past 30 days with increased dose of Metoprolol to 50mg BID started on ), and occasional PVCs. Resting HR 70-80 bpm with increased HR to 110-130 bpm during exercise. Normal resting /88 - 120/88 with rare increases to 144/88. Increased BP response to exercise reaching 130/84 - 158/92. At home, Akash Carver has not started a regular exercise and is not interested in doing so at this time. He struggles to find the motivation or energy to walk at home and is not interested in buying any exercise equipment or joining a gym. 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 He will call to schedule surgery and plan to continue with exercise with PT after potential surgery. Akash Carver has added a green juice every day but has not changed anything else in his diet. He has gained 10 lbs since starting cardiac rehab and reports that he can feel it the way his belly gets in the way of his legs at times.  He is not interested in other dietary changes at this time. Stone Cardoso continues to deny depression/anxiety symptoms and reports very good social/emotional support. His goals for cardiac rehab include: Get back to [employer] and being able to  the kitchen for longer periods of time, return to regular exercise; get back to [email or web address] next spring.       Medication compliance: Yes   Comments: Pt reports to be compliant with medications; however he takes a full tablet Metoprolol once daily instead of half tablet BID  Fall Risk: Moderate   Comments: Denies a fall in the past 6 months and occasional unsteadiness since surgery    EKG Interpretation:  Afib/intermittent pacing with occasional PVCs (more frequent pacing the past 3 weeks)      EXERCISE ASSESSMENT and PLAN    Exercise Prescription:      Frequency: 3 days/week        Minutes: 40       METS: 3.6-4.5           HR: 110-130   RPE: 4-6         Modalities: UBE, NuStep and Recumbent bike     30 Day Goals for Exercise Progression:    Frequency: 3 days/week of cardiac rehab       Supplement with home exercise 2+ days/wk as tolerated    Minutes: 40-45                             >150 mins/wk of moderate intensity exercise   METS: 3.8-4.8   HR: 110-130    RPE: 4-6   Modalities: Treadmill, UBE, Lifecycle, NuStep and Recumbent bike    Strength trainin-3 days / week  12-15 repetitions  1-2 sets per modality    Modalities: Leg Press, Chest Press, Arm Extension, Arm Curl, Front Raises, Shoulder Shrugs and Calf Raises    Home Exercise: none     Goals: 10% improvement in functional capacity - based on max METs achieved in fitness assessment, improved DASI score by 10%, Increase in exercise capacity by 40% - based on peak METs tolerated in cardiac rehab exercise session, Exercise 5 days/wk, >150mins/wk of moderate intensity exercise, Attend Rehab regularly and start a home exercise program    Progression Toward Goals:  Pt is progressing and showing improvement  toward the following goals:  compliant with cardiac rehab, increased exercise intensity. , Pt has not made progress toward the following goals: mostly sedentary at home, not interested in starting home exercise at this time. , Will continue to educate and progress as tolerated. Education: benefit of exercise for CAD risk factors, home exercise guidelines, AHA guidelines to achieve >150 mins/wk of moderate exercise, RPE scale and class: Risk Factors for Heart Disease   Plan:home exercise 30+ mins 2 days opposite CR  Readiness to change: Action:  (Changing behavior)      NUTRITION ASSESSMENT AND PLAN    Weight control:    Starting weight: 255.4 lb   Current weight:   265.2 lb  Waist circumference:    Startin.5 in   Current:      Diabetes: N/A  A1c: 5.6%    last measured: 23    Lipid management: Last lipid profile 23  Chol 177  TRG 93  HDL 48      Goals:LDL <100, reduced waist circumference <40 inches (M), Improved Rate Your Plate score  >84, eat 3 or more servings of whole grains a day, Eat 4-5 cups of fruits and vegetables daily, eliminate butter, use fat-free dressings/pierce or seldom use, choose healthy snacks: light popcorn, plain pretzels, seldom eat or choose low fat ice-cream, fruit juice bars or frozen yogurt  and eliminate or choose low-fat sweets    Measurable goals were based Rate Your Plate Dietary Self-Assessment. These are the areas in which the patient could score higher on the assessment. Goals include recommendations for a heart healthy diet based on American Heart Association. Progression Toward Goals: Pt is progressing and showing improvement  toward the following goals:  increased vegetable intake with 1 greens juice daily. , Pt has not made progress toward the following goals: not watching diet at all - 10 lb weight gain since initial evaluation. , Will continue to educate and progress as tolerated.     Education: heart healthy eating  nutrition for  lipid management  education class: Heart Healthy Eating  education class:  Label Reading  Mediteranean Diet  Plan: reduce portion sizes and continue with Mediteranean/whole food diet  Readiness to change: Contemplation:  (Acknowledging that there is a problem but not yet ready or sure of wanting to make a change)      PSYCHOSOCIAL ASSESSMENT AND PLAN    Emotional:  Depression assessment:  PHQ-9 = 0  0 =No Depression            Anxiety measure:  DENY-7 = 0  0-4  = Not anxious  Self-reported stress level:  1  Social support: Very Good    Goals:  Physical Fitness in [facility] Score < 3 and increased energy    Progression Toward Goals: Pt is progressing and showing improvement  toward the following goals:  education received on stress/depression/anxiety; pt deneis symptoms of depression/anxiety. Education: depression and CAD and class:  Stress and Your Health   Plan: Exercise  Readiness to change: Maintenance: (Maintaining the behavior change)      OTHER CORE COMPONENTS     Tobacco:   Social History     Tobacco Use   Smoking Status Never   Smokeless Tobacco Never       Tobacco Use Intervention:   N/A:  Patient is a non-smoker     Anginal Symptoms:  TISNLEY and excessive fatigue   NTG use: No prescription    Blood pressure:    Restin/88 - 144/88   Exercise: 130/84 - 158/92    Goals: consistent BP < 130/80, moderate intensity exercise >150 mins/wk and medication compliance    Progression Toward Goals: Pt is progressing and showing improvement  toward the following goals:  normal resting and exercise BP, improved medication compliance.  , Pt has not made progress toward the following goals: no home exercise - reaching ~ 120 min in cardiac rehab. , Will continue to educate and progress as tolerated.     Education:  Education class:  Common Heart Medications, Education class: Understanding Heart Disease and benefits of exercise  Plan: medication compliance, Avoid Processed foods, engage in regular exercise and monitor home BP  Readiness to change: Action: (Changing behavior)

## 2023-10-11 ENCOUNTER — CLINICAL SUPPORT (OUTPATIENT)
Dept: CARDIAC REHAB | Facility: CLINIC | Age: 78
End: 2023-10-11
Payer: MEDICARE

## 2023-10-11 DIAGNOSIS — Z95.2 S/P AVR: Primary | ICD-10-CM

## 2023-10-11 PROCEDURE — 93798 PHYS/QHP OP CAR RHAB W/ECG: CPT

## 2023-10-12 ENCOUNTER — CLINICAL SUPPORT (OUTPATIENT)
Dept: CARDIAC REHAB | Facility: CLINIC | Age: 78
End: 2023-10-12
Payer: MEDICARE

## 2023-10-12 DIAGNOSIS — Z95.2 S/P AVR: Primary | ICD-10-CM

## 2023-10-12 PROCEDURE — 93798 PHYS/QHP OP CAR RHAB W/ECG: CPT

## 2023-10-13 ENCOUNTER — APPOINTMENT (OUTPATIENT)
Dept: CARDIAC REHAB | Facility: CLINIC | Age: 78
End: 2023-10-13
Payer: MEDICARE

## 2023-10-16 ENCOUNTER — CLINICAL SUPPORT (OUTPATIENT)
Dept: CARDIAC REHAB | Facility: CLINIC | Age: 78
End: 2023-10-16
Payer: MEDICARE

## 2023-10-16 DIAGNOSIS — Z95.2 S/P AVR: Primary | ICD-10-CM

## 2023-10-16 PROCEDURE — 93798 PHYS/QHP OP CAR RHAB W/ECG: CPT

## 2023-10-18 ENCOUNTER — CLINICAL SUPPORT (OUTPATIENT)
Dept: CARDIAC REHAB | Facility: CLINIC | Age: 78
End: 2023-10-18
Payer: MEDICARE

## 2023-10-18 DIAGNOSIS — Z95.2 S/P AVR: Primary | ICD-10-CM

## 2023-10-18 PROCEDURE — 93798 PHYS/QHP OP CAR RHAB W/ECG: CPT

## 2023-10-20 ENCOUNTER — CLINICAL SUPPORT (OUTPATIENT)
Dept: CARDIAC REHAB | Facility: CLINIC | Age: 78
End: 2023-10-20
Payer: MEDICARE

## 2023-10-20 DIAGNOSIS — Z95.2 S/P AVR: Primary | ICD-10-CM

## 2023-10-20 PROCEDURE — 93798 PHYS/QHP OP CAR RHAB W/ECG: CPT

## 2023-10-23 ENCOUNTER — CLINICAL SUPPORT (OUTPATIENT)
Dept: CARDIAC REHAB | Facility: CLINIC | Age: 78
End: 2023-10-23
Payer: MEDICARE

## 2023-10-23 DIAGNOSIS — Z95.2 S/P AVR: Primary | ICD-10-CM

## 2023-10-23 PROCEDURE — 93798 PHYS/QHP OP CAR RHAB W/ECG: CPT

## 2023-10-25 ENCOUNTER — CLINICAL SUPPORT (OUTPATIENT)
Dept: CARDIAC REHAB | Facility: CLINIC | Age: 78
End: 2023-10-25
Payer: MEDICARE

## 2023-10-25 DIAGNOSIS — Z95.2 S/P AVR: Primary | ICD-10-CM

## 2023-10-25 PROCEDURE — 93798 PHYS/QHP OP CAR RHAB W/ECG: CPT

## 2023-10-27 ENCOUNTER — CLINICAL SUPPORT (OUTPATIENT)
Dept: CARDIAC REHAB | Facility: CLINIC | Age: 78
End: 2023-10-27
Payer: MEDICARE

## 2023-10-27 DIAGNOSIS — Z95.2 S/P AVR: Primary | ICD-10-CM

## 2023-10-27 PROCEDURE — 93798 PHYS/QHP OP CAR RHAB W/ECG: CPT

## 2023-10-30 ENCOUNTER — CLINICAL SUPPORT (OUTPATIENT)
Dept: CARDIAC REHAB | Facility: CLINIC | Age: 78
End: 2023-10-30
Payer: MEDICARE

## 2023-10-30 DIAGNOSIS — Z95.2 S/P AVR: Primary | ICD-10-CM

## 2023-10-30 PROCEDURE — 93798 PHYS/QHP OP CAR RHAB W/ECG: CPT

## 2023-11-01 ENCOUNTER — CLINICAL SUPPORT (OUTPATIENT)
Dept: CARDIAC REHAB | Facility: CLINIC | Age: 78
End: 2023-11-01
Payer: MEDICARE

## 2023-11-01 DIAGNOSIS — Z95.2 S/P AVR: Primary | ICD-10-CM

## 2023-11-01 PROCEDURE — 93798 PHYS/QHP OP CAR RHAB W/ECG: CPT

## 2023-11-01 NOTE — PROGRESS NOTES
Cardiac Rehabilitation Plan of Care   90 Day Reassessment        Today's date: 2023   # of Exercise Sessions Completed: 32  Patient name: Greta Malave. : 1945  Age: 77 y.o. MRN: [id number]  Referring Physician: Haseeb Allen PA-C  Cardiologist: Candi Rivera DO  Provider: [facility]  Clinician: Andrew Pugh MS, CEP    Dx:   Encounter Diagnosis   Name Primary? S/P AVR Yes     Date of onset: 2023      SUMMARY OF PROGRESS: This is North's final care plan before cardiac rehab discharge. He currently tolerates 40 min of exercise at 3.2-4.2 METs plus weight training. His main complaints at this time are fatigue and occasional double vision when first waking up or watching TV. Double vision has also occurred once during cardiac rehab (/106) and Anthony Walls was encouraged to discuss that with his PCP. Anthony Walls reports not having seen his PCP for a while and will call to schedule an appt. North's SOB has improved slightly. He also reports difficulty standing up straight and leg pain for which he decided to go to the chiropractor last week with minimal improvement at this time. Resting HR in cardiac rehab of 70-76 bpm with increased HR to  bpm during exercise. He has not restarted the treadmill due to elevated HR with walking. Afib noted on telemetry with intermittent pacing and occasional PVCs. Ventricular pacing has been more frequent with slightly less Afib over the past 2-3 weeks. Resting /70, 122/88, 110/90, 128/70 with blunted BP response to exercise reaching 140/84, 120/84, 120/106, and 138/80. DBP usually in the 80s-90s mmHg. Anthony Walls is not always compliant with his medications. He had decreased his metoprolol dose from 50 mg BID back to 25 mg due to no sensation of improved symptoms. He has also stopped his Crestor due to concerns of shoulder discomfort that he now attributes to pushing himself out of his recliner with his arms due to his leg pain.  I encouraged Nilay Seaman to take his medication as prescribed and recommended that he discuss his concerns with his cardiology before making changes himself. Nilay Seaman has maintained his weight at 264-265 lb in the past 30 days with no significant change in his diet. He does continue to have is green juice daily. His goals for cardiac rehab include: Get back to [employer] and being able to  the kitchen for longer periods of time, return to regular exercise; get back to [employer] next spring.       Medication compliance: Yes   Comments: Pt reports to be compliant with medications; however he takes a full tablet Metoprolol once daily instead of half tablet BID  Fall Risk: Moderate   Comments: Denies a fall in the past 6 months and occasional unsteadiness since surgery    EKG Interpretation:  Afib/intermittent pacing with occasional PVCs (more frequent pacing the past 3 weeks)      EXERCISE ASSESSMENT and PLAN    Exercise Prescription:      Frequency: 3 days/week        Minutes: 40       METS: 3.2-4.2           HR:    RPE: 4-6         Modalities: UBE, NuStep and Recumbent bike     30 Day Goals for Exercise Progression:    Frequency: 3 days/week of cardiac rehab       Supplement with home exercise 2+ days/wk as tolerated    Minutes: 40-45                             >150 mins/wk of moderate intensity exercise   METS: 3.5-4.8   HR: 110-130    RPE: 4-6   Modalities: UBE, Lifecycle, NuStep, Recumbent bike, and Room walking    Strength trainin-3 days / week  12-15 repetitions  1-2 sets per modality    Modalities: Leg Press, Chest Press, Arm Extension, Arm Curl, Front Raises, Shoulder Shrugs and Calf Raises    Home Exercise: none     Goals: 10% improvement in functional capacity - based on max METs achieved in fitness assessment, improved DASI score by 10%, Increase in exercise capacity by 40% - based on peak METs tolerated in cardiac rehab exercise session, Exercise 5 days/wk, >150mins/wk of moderate intensity exercise, Attend Rehab regularly and start a home exercise program    Progression Toward Goals:  Pt is progressing and showing improvement  toward the following goals:  compliant with cardiac rehab.  , Pt has not made progress toward the following goals: slight decrease in exercise intensity, mostly sedentary at home, not interested in starting home exercise at this time. , Will continue to educate and progress as tolerated. Education: benefit of exercise for CAD risk factors, home exercise guidelines, AHA guidelines to achieve >150 mins/wk of moderate exercise, RPE scale and class: Risk Factors for Heart Disease   Plan:education on home exercise guidelines and home exercise 30+ mins 2 days opposite CR  Readiness to change: Contemplation:  (Acknowledging that there is a problem but not yet ready or sure of wanting to make a change)      NUTRITION ASSESSMENT AND PLAN    Weight control:    Starting weight: 255.4 lb   Current weight:   264.2 lb  Waist circumference:    Startin.5 in   Current:      Diabetes: N/A  A1c: 5.6%    last measured: 23    Lipid management: Last lipid profile 23  Chol 177  TRG 93  HDL 48      Goals:LDL <100, reduced waist circumference <40 inches (M), Improved Rate Your Plate score  >98, eat 3 or more servings of whole grains a day, Eat 4-5 cups of fruits and vegetables daily, eliminate butter, use fat-free dressings/pierce or seldom use, choose healthy snacks: light popcorn, plain pretzels, seldom eat or choose low fat ice-cream, fruit juice bars or frozen yogurt  and eliminate or choose low-fat sweets    Measurable goals were based Rate Your Plate Dietary Self-Assessment. These are the areas in which the patient could score higher on the assessment. Goals include recommendations for a heart healthy diet based on American Heart Association.     Progression Toward Goals: Pt has not made progress toward the following goals: not watching diet at all - 10 lb weight gain since initial evaluation. , Will continue to educate and progress as tolerated. Education: heart healthy eating  nutrition for  lipid management  education class: Heart Healthy Eating  education class:  Label Reading  Mediteranean Diet  Plan: reduce portion sizes and continue with Mediteranean/whole food diet  Readiness to change: Contemplation:  (Acknowledging that there is a problem but not yet ready or sure of wanting to make a change)      PSYCHOSOCIAL ASSESSMENT AND PLAN    Emotional:  Depression assessment:  PHQ-9 = 0  0 =No Depression            Anxiety measure:  DENY-7 = 0  0-4  = Not anxious  Self-reported stress level:  1  Social support: Very Good    Goals:  Physical Fitness in [facility] Score < 3 and increased energy    Progression Toward Goals: Pt is progressing and showing improvement  toward the following goals:  education received on stress/depression/anxiety; pt deneis symptoms of depression/anxiety. Education: depression and CAD and class:  Stress and Your Health   Plan: Exercise  Readiness to change: Maintenance: (Maintaining the behavior change)      OTHER CORE COMPONENTS     Tobacco:   Social History     Tobacco Use   Smoking Status Never   Smokeless Tobacco Never       Tobacco Use Intervention:   N/A:  Patient is a non-smoker     Anginal Symptoms:  TINSLEY and excessive fatigue   NTG use: No prescription    Blood pressure:    Restin/90 - 140/70   Exercise: 120/84 - 140/84    Goals: consistent BP < 130/80, moderate intensity exercise >150 mins/wk and medication compliance    Progression Toward Goals: Pt is progressing and showing improvement  toward the following goals:  normal resting and exercise BP, improved medication compliance.  , Pt has not made progress toward the following goals: no home exercise - reaching ~ 120 min in cardiac rehab. , Will continue to educate and progress as tolerated.     Education:  Education class:  Common Heart Medications, Education class: Understanding Heart Disease and benefits of exercise  Plan: medication compliance, Avoid Processed foods, engage in regular exercise and monitor home BP  Readiness to change: Action:  (Changing behavior)

## 2023-11-03 ENCOUNTER — CLINICAL SUPPORT (OUTPATIENT)
Dept: CARDIAC REHAB | Facility: CLINIC | Age: 78
End: 2023-11-03
Payer: MEDICARE

## 2023-11-03 DIAGNOSIS — Z95.2 S/P AVR: Primary | ICD-10-CM

## 2023-11-03 PROCEDURE — 93798 PHYS/QHP OP CAR RHAB W/ECG: CPT

## 2023-11-06 ENCOUNTER — CLINICAL SUPPORT (OUTPATIENT)
Dept: CARDIAC REHAB | Facility: CLINIC | Age: 78
End: 2023-11-06
Payer: MEDICARE

## 2023-11-06 DIAGNOSIS — Z95.2 S/P AVR: Primary | ICD-10-CM

## 2023-11-06 PROCEDURE — 93798 PHYS/QHP OP CAR RHAB W/ECG: CPT

## 2023-11-07 ENCOUNTER — IN-CLINIC DEVICE VISIT (OUTPATIENT)
Dept: CARDIOLOGY CLINIC | Facility: CLINIC | Age: 78
End: 2023-11-07
Payer: MEDICARE

## 2023-11-07 ENCOUNTER — OFFICE VISIT (OUTPATIENT)
Dept: CARDIOLOGY CLINIC | Facility: CLINIC | Age: 78
End: 2023-11-07
Payer: MEDICARE

## 2023-11-07 VITALS
HEIGHT: 78 IN | HEART RATE: 72 BPM | OXYGEN SATURATION: 97 % | DIASTOLIC BLOOD PRESSURE: 90 MMHG | WEIGHT: 264 LBS | BODY MASS INDEX: 30.55 KG/M2 | SYSTOLIC BLOOD PRESSURE: 118 MMHG

## 2023-11-07 DIAGNOSIS — I48.0 PAROXYSMAL ATRIAL FIBRILLATION (HCC): Primary | ICD-10-CM

## 2023-11-07 DIAGNOSIS — Z98.890 S/P LEFT ATRIAL APPENDAGE LIGATION: ICD-10-CM

## 2023-11-07 DIAGNOSIS — I35.1 NONRHEUMATIC AORTIC VALVE INSUFFICIENCY: ICD-10-CM

## 2023-11-07 DIAGNOSIS — I34.0 NONRHEUMATIC MITRAL VALVE REGURGITATION: ICD-10-CM

## 2023-11-07 DIAGNOSIS — R06.02 SHORTNESS OF BREATH: ICD-10-CM

## 2023-11-07 DIAGNOSIS — Z95.2 S/P AVR: ICD-10-CM

## 2023-11-07 DIAGNOSIS — Z95.0 PRESENCE OF PERMANENT CARDIAC PACEMAKER: Primary | ICD-10-CM

## 2023-11-07 DIAGNOSIS — I25.10 CORONARY ARTERY DISEASE INVOLVING NATIVE CORONARY ARTERY OF NATIVE HEART WITHOUT ANGINA PECTORIS: ICD-10-CM

## 2023-11-07 DIAGNOSIS — I72.4 BILATERAL POPLITEAL ARTERY ANEURYSM (HCC): ICD-10-CM

## 2023-11-07 DIAGNOSIS — Z95.0 PACEMAKER: ICD-10-CM

## 2023-11-07 DIAGNOSIS — I10 HYPERTENSION, UNSPECIFIED TYPE: ICD-10-CM

## 2023-11-07 DIAGNOSIS — I71.21 ANEURYSM OF ASCENDING AORTA WITHOUT RUPTURE (HCC): ICD-10-CM

## 2023-11-07 DIAGNOSIS — I73.9 PAD (PERIPHERAL ARTERY DISEASE) (HCC): ICD-10-CM

## 2023-11-07 DIAGNOSIS — Z98.890 S/P MVR (MITRAL VALVE REPAIR): ICD-10-CM

## 2023-11-07 DIAGNOSIS — I27.20 PULMONARY HYPERTENSION (HCC): ICD-10-CM

## 2023-11-07 DIAGNOSIS — I10 BENIGN ESSENTIAL HYPERTENSION: ICD-10-CM

## 2023-11-07 DIAGNOSIS — I48.21 PERMANENT ATRIAL FIBRILLATION (HCC): ICD-10-CM

## 2023-11-07 PROCEDURE — 99214 OFFICE O/P EST MOD 30 MIN: CPT | Performed by: INTERNAL MEDICINE

## 2023-11-07 PROCEDURE — 93280 PM DEVICE PROGR EVAL DUAL: CPT | Performed by: INTERNAL MEDICINE

## 2023-11-07 PROCEDURE — 93000 ELECTROCARDIOGRAM COMPLETE: CPT | Performed by: INTERNAL MEDICINE

## 2023-11-07 NOTE — PROGRESS NOTES
Results for orders placed or performed in visit on 11/07/23   Cardiac EP device report    Narrative    MDT D-PM/ACTIVE SYSTEM IS MRI CONDITIONAL  DEVICE INTERROGATED IN THE [facility] OFFICE. BATTERY VOLTAGE ADEQUATE (14.3 YRS). AP <0.1%  23% ALL LEAD PARAMETERS WITHIN NORMAL LIMITS. 68 FAST A&V EPISODES WITH AVAILABLE EGMS SHOWING RVR UP TO  194 BPM, LONGEST 3 MINS 5 SECS. 1 AT/AF EPISODE CURRENTLY IN PROGRESS SINCE 7/20/23. AT/AF BURDEN 100% SINCE 8/3/23. TAKES ELIQUIS & METOPROLOL TART. EF 60% (ECHO 11/11/22). NO PROGRAMMING CHANGES MADE TO DEVICE PARAMETERS. NORMAL DEVICE FUNCTION.  AM/NC

## 2023-11-07 NOTE — PROGRESS NOTES
Cardiology Follow Up    Raul Curran  1945  [id number]  [address], [facility]  [address] [place] [address]    1. Paroxysmal atrial fibrillation (HCC)        2. PAD (peripheral artery disease) ([address])        3. Nonrheumatic mitral valve regurgitation  POCT ECG    Echo complete w/ contrast if indicated      4. Hypertension, unspecified type        5. Coronary artery disease involving native coronary artery of native heart without angina pectoris        6. Bilateral popliteal artery aneurysm ([address])        7. Benign essential hypertension        8. Nonrheumatic aortic valve insufficiency        9. Aneurysm of ascending aorta without rupture ([address])        10. Pulmonary hypertension ([address])        11. S/P AVR  POCT ECG    Echo complete w/ contrast if indicated      12. S/P MVR (mitral valve repair)  POCT ECG    Echo complete w/ contrast if indicated      13. S/P left atrial appendage ligation        14. s/p Medtronic dual chamber PPM with left bundle pacing lead 7/19/2023        15. Shortness of breath  apixaban (Eliquis) 5 mg      16. Permanent atrial fibrillation (HCC)  apixaban (Eliquis) 5 mg          Discussion/Summary:    Since her last visit he underwent open heart surgery with aortic valve replacement, mitral valve repair and left atrial appendage ligation. He had postop bradycardia necessitating a dual-chamber pacer. He is now been feeling better he has noticed a significant change in his breathing following valve repair and replacement. He does have persistent atrial fibrillation at this time. We talked about options we elected to continue with a rate control strategy moving forward. He is in the 70s he does not sense that he is in atrial fibrillation continue full anticoagulation with Eliquis.   Continue to work with vascular surgery for claudication which seems to be the biggest symptoms that are limiting him.  Blood pressure is well controlled. He needs a 2D echocardiogram for baseline gradients across the valves. I will continue to work closely with him. I will see him back in 4 to 6 months. Interval History:   50-year-old gentleman with a history of paroxysmal atrial fibrillation, hypertension, dyslipidemia, bilateral popliteal artery aneurysm status post repair. History of acute DVT. He has been doing quite well since her last office appointment. There was some atypical chest pain a couple of months ago nuclear stress test showed no ischemia. Overall he has been doing well since his last visit. He underwent AFib ablation with good success he is now maintaining sinus rhythm. Functionally he is doing well he has some mild generalized fatigue but he attributes this to the recent procedure. He did have some bruising and he came off of the anticoagulation he would rather take fish oil and tends to have more of a homeopathic approach to medicine. He understands the risk of doing so. Since her last visit he underwent open heart surgery with mitral valve repair, aortic valve replacement, and left atrial appendage ligation. He also needed a dual-chamber pacer. Functional capacity has improved significantly he still somewhat fatigued during rehab a lot of this he attributes to his legs to claudication symptoms that have been chronic. He follows with vascular surgery at an outside hospital.  He has been taking his anticoagulation. He denies any anginal sounding chest pain or discomfort. He has been in persistent atrial fibrillation. He has had 2 ablations in the past does not want to undergo any further procedures trying to limit total number of medications.   Problem List       PAF (paroxysmal atrial fibrillation) (HCC)    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HCC) (Chronic)    Hypertension    Acute DVT (deep venous thrombosis) ([address])    A-fib ([address])    Benign essential hypertension    Overview Signed 6/29/2018 8:02 AM by Stephon White DO     Overview:   dx 2002         Mitral valve insufficiency    Bilateral popliteal artery aneurysm (HCC)    Paroxysmal atrial fibrillation (HCC)    Mixed hyperlipidemia          Past Medical History:   Diagnosis Date    Cardiac disease     Hypertension     PAF (paroxysmal atrial fibrillation) (HCC)     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HCC)     BILATERALLY    s/p Medtronic dual chamber PPM with left bundle pacing lead 7/19/2023 7/20/2023     Social History     Socioeconomic History    Marital status: /Civil Union     Spouse name: Not on file    Number of children: Not on file    Years of education: Not on file    Highest education level: Not on file   Occupational History    Not on file   Tobacco Use    Smoking status: Never    Smokeless tobacco: Never   Vaping Use    Vaping Use: Never used   Substance and Sexual Activity    Alcohol use: Not Currently    Drug use: No    Sexual activity: Not on file   Other Topics Concern    Not on file   Social History Narrative    Not on file     Social Determinants of Health     Financial Resource Strain: Not on file   Food Insecurity: No Food Insecurity (7/13/2023)    Hunger Vital Sign     Worried About Running Out of Food in the Last Year: Never true     Ran Out of Food in the Last Year: Never true   Transportation Needs: No Transportation Needs (7/13/2023)    PRAPARE - Transportation     Lack of Transportation (Medical): No     Lack of Transportation (Non-Medical):  No   Physical Activity: Not on file   Stress: Not on file   Social Connections: Not on file   Intimate Partner Violence: Not on file   Housing Stability: Low Risk  (7/13/2023)    Housing Stability Vital Sign     Unable to Pay for Housing in the Last Year: No     Number of Places Lived in the Last Year: 1     Unstable Housing in the Last Year: No      Family History   Problem Relation Age of Onset    Pancreatic cancer Mother     Stroke Father     Heart attack Paternal Grandmother      Past Surgical History:   Procedure Laterality Date    CARDIAC CATHETERIZATION N/A 5/8/2023    Procedure: Cardiac Coronary Angiogram;  Surgeon: Germain Frazier DO;  [facility]: [facility]; Service: Cardiology    CARDIAC ELECTROPHYSIOLOGY PROCEDURE N/A 3/25/2022    Procedure: Cardiac eps/afib ablation;  Surgeon: Denita Feliz MD;  [facility]; Service: Cardiology    CARDIAC ELECTROPHYSIOLOGY PROCEDURE N/A 7/19/2023    Procedure: Cardiac pacer implant;  Surgeon: Agatha aVladez MD;  [facility]; Service: Cardiology    ESOPHAGOGASTRODUODENOSCOPY N/A 8/16/2016    Procedure: ESOPHAGOGASTRODUODENOSCOPY (EGD); Surgeon: Darwin Barajas MD;  Location: [facility] GI [facility]; Service:     HERNIA REPAIR Left     groin    HERNIA REPAIR Right 1995    OTHER SURGICAL HISTORY      OH REPLACEMENT MITRAL VALVE W/CARDIOPULMONARY BYP N/A 7/12/2023    Procedure: MITRAL VALVE REPAIR WITH 36MM PHYSIO II ANNULOPLASTY RING;  Surgeon: Juvencio Peterson MD;  Location: [facility];  Service: Cardiac Surgery    OH RPLCMT AORTIC VALVE OPN W/STENTLESS TISSUE VALVE N/A 7/12/2023    Procedure: REPLACEMENT VALVE AORTIC (AVR) WITH 29MM INSPIRIS TISSUE RESILIA VALVE.  LEFT ATRIAL APPENDAGE LIGATION WITH 45 MM ATRICLIP;  Surgeon: Juvencio Peterson MD;  Location: [facility];  Service: Cardiac Surgery    REPLACEMENT TOTAL KNEE Left 04/01/2013    VASCULAR SURGERY Right     POPLITEAL BYPASS DUE TO ANEURYSM       Current Outpatient Medications:     apixaban (Eliquis) 5 mg, Take 1 tablet (5 mg total) by mouth 2 (two) times a day, Disp: 180 tablet, Rfl: 3    aspirin (ECOTRIN LOW STRENGTH) 81 mg EC tablet, Take 1 tablet (81 mg total) by mouth daily, Disp: , Rfl: 0    metoprolol tartrate (LOPRESSOR) 25 mg tablet, Take 0.5 tablets (12.5 mg total) by mouth every 12 (twelve) hours (Patient taking differently: Take 50 mg by mouth every 12 (twelve) hours), Disp: 30 tablet, Rfl: 2    Probiotic Product (PROBIOTIC-10 PO), Take 1 capsule by mouth daily, Disp: , Rfl:     Vitamin D, Cholecalciferol, 1000 units CAPS, Take 2,000 Units by mouth daily, Disp: , Rfl:     clindamycin (CLEOCIN) 300 MG capsule, Take 300 mg by mouth Prior to dental work. (Patient not taking: Reported on 9/26/2023), Disp: , Rfl:     Pancreatin 325 MG TABS, Take 1 tablet by mouth daily (Patient not taking: Reported on 8/2/2023), Disp: , Rfl:     rosuvastatin (CRESTOR) 20 MG tablet, Take 1 tablet (20 mg total) by mouth daily, Disp: 90 tablet, Rfl: 3    torsemide (DEMADEX) 10 mg tablet, One tab daily for 3 days and then when instructed, Disp: 10 tablet, Rfl: 3    VITAMIN D PO, Take by mouth, Disp: , Rfl:   Allergies   Allergen Reactions    Oxycodone GI Intolerance     nausea  nausea       Labs:     Chemistry        Component Value Date/Time    K 4.2 07/20/2023 0454     07/20/2023 0454    CO2 29 07/20/2023 0454    CO2 21 07/12/2023 1342    BUN 11 07/20/2023 0454    CREATININE 0.83 07/20/2023 0454        Component Value Date/Time    CALCIUM 9.1 07/20/2023 0454    ALKPHOS 75 06/27/2023 0912    AST 23 06/27/2023 0912    ALT 28 06/27/2023 0912            No results found for: "CHOL"  Lab Results   Component Value Date    HDL 48 05/08/2023     Lab Results   Component Value Date    LDLCALC 110 (H) 05/08/2023     Lab Results   Component Value Date    TRIG 93 05/08/2023     No results found for: "CHOLHDL"    Imaging: No results found. ECG:    Atrial fibrillation nonspecific ST and T changes      ROS    Vitals:    11/07/23 0944   BP: 118/90   Pulse: 72   SpO2: 97%     Vitals:    11/07/23 0944   Weight: 120 kg (264 lb)     Height: 6' 6" (198.1 cm)   Body mass index is 30.51 kg/m².     Physical Exam:  Vital signs reviewed  General:  Alert and cooperative, appears stated age, no acute distress  HEENT:  PERRLA, EOMI, no scleral icterus, no conjunctival pallor  Neck:  No lymphadenopathy, no thyromegaly, no carotid bruits, no elevated JVP  Heart:  Regular rate and rhythm, normal S1/S2, no S3/S4, no murmur, rubs or gallops. PMI nondisplaced  Lungs:  Clear to auscultation bilaterally, no wheezes rales or rhonchi  Abdomen:  Soft, non-tender, positive bowel sounds, no rebound or guarding,   no organomegaly   Extremities:  Normal range of motion.   No clubbing, cyanosis or edema   Vascular:  2+ pedal pulses  Skin:  No rashes or lesions on exposed skin  Neurologic:  Cranial nerves II-XII grossly intact without focal deficits  Psych:  Normal mood and affect

## 2023-11-08 ENCOUNTER — CLINICAL SUPPORT (OUTPATIENT)
Dept: CARDIAC REHAB | Facility: CLINIC | Age: 78
End: 2023-11-08
Payer: MEDICARE

## 2023-11-08 DIAGNOSIS — Z95.2 S/P AVR: Primary | ICD-10-CM

## 2023-11-08 PROCEDURE — 93798 PHYS/QHP OP CAR RHAB W/ECG: CPT

## 2023-11-09 NOTE — PROGRESS NOTES
Cardiac Rehabilitation Plan of Care   Discharge        Today's date: 2023   # of Exercise Sessions Completed: 36  Patient name: Navin Anna : 1945  Age: 77 y.o. MRN: [id number]  Referring Physician: Yoana Traylor PA-C  Cardiologist: Rae Barrios DO  Provider: [facility]  Clinician: Pernell Boyer MS, CEP    Dx:   Encounter Diagnosis   Name Primary? S/P AVR Yes       Date of onset: 2023      SUMMARY OF PROGRESS: Discharge note for [facility]. He had 31.8% improvement in functional capacity increasing His max METs in the submaximal TM ETT  from 2.2 to 2.9 with test termination of RPE 6. His exercise tolerance (max METs in tolerated in cardiac rehab) increased by 40%. He had a 18.6% improvement in the DUKE activity estimated MET level with ADLs and physical activity. His [facility] QOL did not improve with an increased score from 15 to 20. PHQ-9 and DENY-7 score did not change with a score of 0 for both. His weight increased by 9 pounds. Waist circumference increased by 2.25 inches. Rate Your Plate score improved from 45 to 54. [facility] has not been completing exercise on his own and is not interested in any further exercise after rehab. [facility] continues to struggle with SOB and fatigue. North tolerates 40 mins at 3.2 - 4.2 METs plus wt training. Afib on telemetry with intermittent pacing and occ PVCs. RHR 70 - 76, ExHR 95 - 115. Resting /90 - 140/70 with appropriate response to exercise reaching 120/84 - 140/106. All group education classes on cardiac risk factor modification were attended by the patient. [facility] does not have any discharge plans. He hopes to get his L shoulder taken care of so he can start working on re-doing his basement. Encouraged Pt to continue exercise. Frequency: 4-6 days/wk, Intensity: RPE 4-5, Time: 40-50 mins daily, 150-200 mins/wk. Pt was encouraged to continue eating heart healthy.   Pt was encouraged to remain compliant with medications and f/u with cardiologist with any cardiac symptoms, medication management and updated lipid profile. Medication compliance: Yes   Comments: Pt reports to be compliant with medications; however he takes a full tablet Metoprolol once daily instead of half tablet BID  Fall Risk: Moderate   Comments: Denies a fall in the past 6 months and occasional unsteadiness since surgery    EKG Interpretation:  Afib/intermittent pacing with occasional PVCs       EXERCISE ASSESSMENT and PLAN    Exercise Prescription:      Frequency: 3 days/week        Minutes: 40       METS: 3.2-4.2           HR:    RPE: 4-6         Modalities: UBE, NuStep and Recumbent bike     Exercise Progression after Discharge:    Frequency: 3-5 days of gym or home exercise   Minutes: 30-50  >150 mins/wk of moderate intensity exercise   METS: 3.0 - 4.5   HR: 90- 120    RPE: 4-6   Modalities:  walking      Strength trainin-3 days / week  12-15 repetitions  1-2 sets per modality    Modalities: Leg Press, Chest Press, Arm Extension, Arm Curl, Front Raises, Shoulder Shrugs and Calf Raises    Home Exercise: none     Goals: 10% improvement in functional capacity - based on max METs achieved in fitness assessment, improved DASI score by 10%, Increase in exercise capacity by 40% - based on peak METs tolerated in cardiac rehab exercise session, Exercise 5 days/wk, >150mins/wk of moderate intensity exercise, Attend Rehab regularly and start a home exercise program    Progression Toward Goals:  Goals not met: no plan for future exercise.  , Goals met: increased max METs tolerated in rehab and in the submax ETT; improved DUKE activity score, completed cardiac rehab., Patient will be encouraged to focus on lifestyle modifications following discharge.     Education: benefit of exercise for CAD risk factors, home exercise guidelines, AHA guidelines to achieve >150 mins/wk of moderate exercise, RPE scale, class: Risk Factors for Heart Disease, and exercise instructions/guidelines for discharge    Plan: start home walking and look into exercise equipment for home use  Readiness to change: Preparation:  (Getting ready to change)       NUTRITION ASSESSMENT AND PLAN    Weight control:    Starting weight: 255.4 lb   Current weight: 264.4 lb  Waist circumference:    Startin.5 in   Current: 47.25 in    Diabetes: N/A  A1c: 5.6%    last measured: 23    Lipid management: Last lipid profile 23  Chol 177  TRG 93  HDL 48      Goals:LDL <100, reduced waist circumference <40 inches (M), Improved Rate Your Plate score  >83, eat 3 or more servings of whole grains a day, Eat 4-5 cups of fruits and vegetables daily, eliminate butter, use fat-free dressings/pierce or seldom use, choose healthy snacks: light popcorn, plain pretzels, seldom eat or choose low fat ice-cream, fruit juice bars or frozen yogurt  and eliminate or choose low-fat sweets    Measurable goals were based Rate Your Plate Dietary Self-Assessment. These are the areas in which the patient could score higher on the assessment. Goals include recommendations for a heart healthy diet based on American Heart Association. Progression Toward Goals: Goals not met: does not watch diet at all, 9lb weight gain since initial evaluation. , Patient will be encouraged to focus on lifestyle modifications following discharge.     Education: heart healthy eating  nutrition for  lipid management  education class: Heart Healthy Eating  education class:  Label Reading  Mediteranean Diet  Plan: reduce portion sizes and continue with Mediteranean/whole food diet  Readiness to change: Contemplation:  (Acknowledging that there is a problem but not yet ready or sure of wanting to make a change)      PSYCHOSOCIAL ASSESSMENT AND PLAN    Emotional:  Depression assessment:  PHQ-9 = 0  0 =No Depression            Anxiety measure:  DENY-7 = 0  0-4  = Not anxious  Self-reported stress level:  1  Social support: Very Good    Goals:  Physical Fitness in [facility] Score < 3 and increased energy    Progression Toward Goals: Goals met: no depression or anxiety. Education: depression and CAD and class:  Stress and Your Health   Plan: Exercise  Readiness to change: Maintenance: (Maintaining the behavior change)      OTHER CORE COMPONENTS     Tobacco:   Social History     Tobacco Use   Smoking Status Never   Smokeless Tobacco Never       Tobacco Use Intervention:   N/A:  Patient is a non-smoker     Anginal Symptoms:  TINSLEY and excessive fatigue   NTG use: No prescription    Blood pressure:    Restin/90 - 140/70   Exercise: 120/84 - 140/84    Goals: consistent BP < 130/80, moderate intensity exercise >150 mins/wk and medication compliance    Progression Toward Goals: Goals not met: does not meet 150 mins/week of exercise.  , Goals met: normal BP with normal response to exercise, reports 100% medication compliance., Patient will be encouraged to focus on lifestyle modifications following discharge.     Education:  Education class:  Common Heart Medications, Education class: Understanding Heart Disease and benefits of exercise  Plan: medication compliance, Avoid Processed foods, engage in regular exercise and monitor home BP  Readiness to change: Action:  (Changing behavior)

## 2023-11-10 ENCOUNTER — CLINICAL SUPPORT (OUTPATIENT)
Dept: CARDIAC REHAB | Facility: CLINIC | Age: 78
End: 2023-11-10
Payer: MEDICARE

## 2023-11-10 DIAGNOSIS — Z95.2 S/P AVR: Primary | ICD-10-CM

## 2023-11-10 PROCEDURE — 93798 PHYS/QHP OP CAR RHAB W/ECG: CPT

## 2024-01-01 NOTE — PROGRESS NOTES
Bilateral groins clean dry and intact, no evidence of bleeding   Adequate circulation to bilateral lower extremities negative

## 2024-01-24 NOTE — LETTER
May 14, 2021     Fam DO Conchita  [address] [address]    Patient: Dennys Krishna  YOB: 1945   Date of Visit: 5/14/2021       Dear Dr Bryan Carney: Thank you for referring Murali Willams to me for evaluation  Below are my notes for this consultation  If you have questions, please do not hesitate to call me  I look forward to following your patient along with you  Sincerely,        Reyna Seo MD        CC: DO Maryann Grier PA-C  5/14/2021 10:08 AM  Attested  Consultation - Cardiothoracic Surgery   Dennys Krishna  67 y o  male MRN: [id number]    Physician Requesting Consult: Bryan Carney    Reason for Consult / Principal Problem: Ascending aortic aneurysm    History of Present Illness: Dennys Krishna  is a 67y o  year old male who presents today for surgical consultation for ascending aortic aneurysm  Srinivas Escamilla has a complex cardiovascular past medical history significant for bilateral popliteal mycotic aneurysms in 2016  This was treated with bilateral femoral popliteal bypasses performed [facility]   At this time echocardiogram was also performed  Dilation of the ascending aorta and aortic root were noted  He had follow-up CT imaging of the chest which did demonstrate aortic ectasias measuring approximately 4 4-4 6 cm  His current clinical course began in the preceding months  At that time he did developed bacteremia from a dental source  This led to seeding of his right popliteal bypass requiring surgical revision  He has subsequently undergone PICC catheter insertion with long-term antibiotics  These have now been discontinued  More recent CT imaging of the chest did demonstrate enlargement of his AA ascending aorta from 4 6-4 9 cm  He has not subsequently been referred to our office for surgical discussion      His most recent echocardiogram demonstrated preserved ventricular systolic function with moderate aortic insufficiency and moderate mitral regurgitation  During interview today and he does complain of some fatigability and intermittent dyspnea  He also notes bilateral lower extremity edema  He specifically denies any history of past or current angina  Portions of this history have been obtained from office visit with Dr Sam Leal on April 26th of this year  Past Medical History:  Past Medical History:   Diagnosis Date    Cardiac disease     Hypertension     PAF (paroxysmal atrial fibrillation) ([address] )     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HCC)     BILATERALLY       Past Surgical History:   Past Surgical History:   Procedure Laterality Date    ESOPHAGOGASTRODUODENOSCOPY N/A 8/16/2016    Procedure: ESOPHAGOGASTRODUODENOSCOPY (EGD); Surgeon: Anne Hammonds MD;  Location: [facility]; Service:     HERNIA REPAIR Left     groin    HERNIA REPAIR Right 1995    OTHER SURGICAL HISTORY      REPLACEMENT TOTAL KNEE Left 04/01/2013    VASCULAR SURGERY Right     POPLITEAL BYPASS DUE TO ANEURYSM       Family History:  Family History   Problem Relation Age of Onset    Pancreatic cancer Mother     Stroke Father     Heart attack Paternal Grandmother        Social History:    Social History     Substance and Sexual Activity   Alcohol Use Not Currently     Social History     Substance and Sexual Activity   Drug Use No     Social History     Tobacco Use   Smoking Status Never Smoker   Smokeless Tobacco Never Used       Marital Status: [unfilled]    Home Medications:   Prior to Admission medications    Medication Sig Start Date End Date Taking?  Authorizing Provider   apixaban (ELIQUIS) 5 mg Take 1 tablet (5 mg total) by mouth 2 (two) times a day 4/9/21  Yes Anirudh Brown DO   ascorbic acid (VITAMIN C) 500 mg tablet Take 500 mg by mouth daily   Yes Historical Provider, MD   aspirin 81 mg chewable tablet Chew 81 mg daily   Yes Historical Provider, MD   Coenzyme Q10 200 MG capsule Take 200 mg by mouth daily     Yes Historical Provider, MD   COLLAGEN PO Take 1 tablet by mouth   Yes Historical Provider, MD   Flaxseed, Linseed, (FLAXSEED OIL) 1000 MG CAPS Take 1 capsule by mouth daily   Yes Historical Provider, MD   Illene [place]ish Tea 250 MG CAPS Take 1 capsule by mouth daily   Yes Historical Provider, MD   Liver Extract 500 MG CAPS Take 1 capsule by mouth daily   Yes Historical Provider, MD   Magnesium Citrate 125 MG CAPS Take 1 capsule by mouth daily   Yes Historical Provider, MD   metoprolol tartrate (LOPRESSOR) 25 mg tablet Take 1 tablet (25 mg total) by mouth every 12 (twelve) hours 2/23/21  Yes Jimy Mandujano PA-C   niacin 50 mg tablet Take 50 mg by mouth daily with breakfast    Yes Historical Provider, MD   Pancreatin 325 MG TABS Take 1 tablet by mouth daily   Yes Historical Provider, MD   Probiotic Product (PROBIOTIC-10 PO) Take 1 capsule by mouth daily   Yes Historical Provider, MD   Vitamin D, Cholecalciferol, 1000 units CAPS Take 2,000 Units by mouth daily   Yes Historical Provider, MD       Allergies: Allergies   Allergen Reactions    Oxycodone GI Intolerance     nausea  nausea       Review of Systems:     Review of Systems   Constitutional: Positive for fatigue  HENT: Negative  Eyes: Negative  Respiratory: Negative for chest tightness and shortness of breath  Cardiovascular: Positive for leg swelling  Negative for chest pain  Endocrine: Negative  Genitourinary: Negative  Musculoskeletal: Negative  Skin: Negative  Neurological: Negative  Psychiatric/Behavioral: Negative  Vital Signs:     Vitals:    05/14/21 0929 05/14/21 0932   BP: 104/70 102/64   BP Location: Left arm Right arm   Patient Position: Sitting    Cuff Size: Standard    Pulse: 62    Resp: 18    Temp: 97 7 °F (36 5 °C)    TempSrc: Tympanic    SpO2: 98%    Weight: 113 kg (250 lb 1 6 oz)    Height: 6' 6" (1 981 m)        Physical Exam:     Physical Exam  Constitutional:       Appearance: Normal appearance   He is well-developed  HENT:      Head: Normocephalic and atraumatic  Eyes:      Conjunctiva/sclera: Conjunctivae normal    Neck:      Musculoskeletal: Full passive range of motion without pain and normal range of motion  Thyroid: No thyromegaly  Vascular: No carotid bruit or JVD  Trachea: No tracheal deviation  Cardiovascular:      Rate and Rhythm: Normal rate  Rhythm irregular  Pulses: Normal pulses  Carotid pulses are 2+ on the right side and 2+ on the left side  Dorsalis pedis pulses are 2+ on the right side and 2+ on the left side  Posterior tibial pulses are 2+ on the right side and 2+ on the left side  Heart sounds: S1 normal and S2 normal    Pulmonary:      Effort: No accessory muscle usage or respiratory distress  Breath sounds: No wheezing or rales  Chest:      Chest wall: No tenderness  Abdominal:      General: Bowel sounds are normal       Palpations: Abdomen is soft  Tenderness: There is no abdominal tenderness  Musculoskeletal: Normal range of motion  General: Swelling present  Right lower leg: Edema present  Left lower leg: Edema present  Skin:     General: Skin is warm and dry  Neurological:      Mental Status: He is alert and oriented to person, place, and time  Cranial Nerves: No cranial nerve deficit  Sensory: No sensory deficit  Psychiatric:         Speech: Speech normal          Behavior: Behavior normal          Lab Results:               Invalid input(s): LABGLOM      No results found for: HGBA1C  Lab Results   Component Value Date    TROPONINI 0 04 02/23/2021       Imaging Studies:     CT Chest:     1  No evidence of pulmonary embolus or other acute abnormality in the chest   2   4 9 cm ascending aortic aneurysm, measuring 4 5 cm on a CT from 8/15/2020  Echocardiogram:     Ejection fraction 55%  RV mildly dilated  Smoking the appendage  Mild-to-moderate mitral regurgitation    Moderate aortic insufficiency  Moderate tricuspid regurgitation  Aortic root measuring 4 1 cm at its greatest diameter  Ascending aorta measuring 4 4 cm at its greatest diameter  I have personally reviewed pertinent reports  and I have personally reviewed pertinent films in PACS    Assessment:  Ascending aortic aneurysm; Ongoing ascending aortic replacement workup    Plan:    Aubrey Deluca  has an ascending aorta measuring 49 mm in size at its greatest diameter  As they are asymptomatic, with no family history follow-up monitoring is the treatment plan  Arrangements have been made for future surveillance to be completed with CTA chest in 6 Months  Aubrey Deluca  was comfortable with our recommendations, and their questions were answered to their satisfaction  Thank you for allowing us to participate in the care of this patient  Aortic Aneurysm Instructions were provided to the patient as follows:    1  No lifting more than 50 pounds  2  Maintain a controlled blood pressure with a goal of 120/80  3  Follow up in Aortic Clinic as recommended with radiology follow up as instructed  4  Report to the ER or call 911 immediately with the following signs / symptoms: sudden onset of back pain, chest pain or shortness of breath  The patient was referred to gastroenterology for consideration of routine colonoscopy screening of all patients aged 54-65  Gastroenterology evaluation will not be necessary prior to any open heart procedures, and can be completed following surgical recovery  SIGNATURE: Bon Wilson PA-C  DATE: May 14, 2021  TIME: 10:00 AM    * This note was completed in part utilizing m-Victrix direct voice recognition software  Grammatical errors, random word insertion, spelling mistakes, and incomplete sentences may be an occasional consequence of the system secondary to software limitations, ambient noise and hardware issues   At the time of dictation, efforts were made to edit, clarify and /or correct errors  Please read the chart carefully and recognize, using context, where substitutions have occurred  If you have any questions or concerns about the context, text or information contained within the body of this dictation, please contact myself, the provider, for further clarification  Attestation signed by Amie Cruz MD at 5/14/2021 11:11 AM:  Pt seen and examined with PA  I agree with the above assessment and plan  It was my pleasure to evaluate Onelia Tinoco  today for aortic aneurysm  He is referred for consideration of aortic aneurysm repair  I reviewed all of the available testing and I had a lengthy discussion with the patient and have recommended 6 month interval follow up with imaging surveillance  He has had 2 recent CT scans done in PE protocol  The recent one (2/21) showed possible enlargement from 4 5 to 4 9 cm though the contrast phase makes the study inadequate for direct comparison to the prior study)  It is also noted that he has moderate AI, moderate MR, moderate TR, and AFib  Certainly all of these could contribute to cardiac symptoms and could cause trouble if he continues to have bacteremia with dental interventions  None of the above meet criteria for surgery at this time but they do mandate close follow up  I've therefore scheduled 6 month follow up with an arterial phase CTA of the chest and a TTE  I also strongly encouraged compliance with antibiotic prophylaxis prior to dental cleanings/procedures, as well as routine daily dental hygiene  He is not vaccinated against COVID and does not intend to get a vaccine  His wife is vaccinated and has a lung transplant! We had a lengthy discussion and I strongly encouraged him to get vaccinated to protect himself, his wife, and others he comes in contact with        Omaira Ventura MD  DATE: May 14, 2021  TIME: 11:05 AM No (0)

## 2024-02-02 DIAGNOSIS — R06.02 SHORTNESS OF BREATH: ICD-10-CM

## 2024-02-02 DIAGNOSIS — I48.21 PERMANENT ATRIAL FIBRILLATION (HCC): ICD-10-CM

## 2024-02-09 ENCOUNTER — REMOTE DEVICE CLINIC VISIT (OUTPATIENT)
Dept: CARDIOLOGY CLINIC | Facility: CLINIC | Age: 79
End: 2024-02-09
Payer: MEDICARE

## 2024-02-09 DIAGNOSIS — Z95.0 PRESENCE OF PERMANENT CARDIAC PACEMAKER: Primary | ICD-10-CM

## 2024-02-09 PROCEDURE — 93296 REM INTERROG EVL PM/IDS: CPT | Performed by: INTERNAL MEDICINE

## 2024-02-09 PROCEDURE — 93294 REM INTERROG EVL PM/LDLS PM: CPT | Performed by: INTERNAL MEDICINE

## 2024-02-09 NOTE — PROGRESS NOTES
MDT D-PM/ACTIVE SYSTEM IS MRI CONDITIONAL  CARELINK TRANSMISSION: BATTERY VOLTAGE ADEQUATE (13.9 YRS). AP <0.1%  38.7% (AAIR-DDDR 60/MODE SWITCH); ALL AVAILABLE LEAD PARAMETERS WITHIN NORMAL LIMITS. 1 PERSISTENT AF EPISODE REMAINS IN PROGRESS SINCE 7/20/2023 (BURDEN 100%); 45 FAST A&V, SVT-AF EPISODES W/MULTIPLE SAME DAY EPISODES - SOME EPISODES RATES >200 BPM; MAX EPISODE DURATION 11MIN, 41SEC; EF 45% (7/17/2023 RANDALL); HX: SAME & PATIENT TAKING ASA 81, ELIQUIS, METO TART; NORMAL DEVICE FUNCTION.  ES

## 2024-02-21 PROBLEM — A41.9 SEPSIS (HCC): Status: RESOLVED | Noted: 2020-08-15 | Resolved: 2024-02-21

## 2024-02-28 ENCOUNTER — PREP FOR PROCEDURE (OUTPATIENT)
Dept: OBGYN CLINIC | Facility: OTHER | Age: 79
End: 2024-02-28

## 2024-02-28 DIAGNOSIS — S46.092A OTHER INJURY OF MUSCLE(S) AND TENDON(S) OF THE ROTATOR CUFF OF LEFT SHOULDER, INITIAL ENCOUNTER: Primary | ICD-10-CM

## 2024-03-05 ENCOUNTER — APPOINTMENT (OUTPATIENT)
Dept: LAB | Facility: MEDICAL CENTER | Age: 79
End: 2024-03-05
Payer: MEDICARE

## 2024-03-05 DIAGNOSIS — Z01.818 OTHER SPECIFIED PRE-OPERATIVE EXAMINATION: ICD-10-CM

## 2024-03-05 LAB
ALBUMIN SERPL BCP-MCNC: 4.1 G/DL (ref 3.5–5)
ALP SERPL-CCNC: 74 U/L (ref 34–104)
ALT SERPL W P-5'-P-CCNC: 14 U/L (ref 7–52)
ANION GAP SERPL CALCULATED.3IONS-SCNC: 10 MMOL/L
APTT PPP: 30 SECONDS (ref 23–37)
AST SERPL W P-5'-P-CCNC: 23 U/L (ref 13–39)
BASOPHILS # BLD AUTO: 0.05 THOUSANDS/ÂΜL (ref 0–0.1)
BASOPHILS NFR BLD AUTO: 1 % (ref 0–1)
BILIRUB SERPL-MCNC: 1.25 MG/DL (ref 0.2–1)
BUN SERPL-MCNC: 14 MG/DL (ref 5–25)
CALCIUM SERPL-MCNC: 9.6 MG/DL (ref 8.4–10.2)
CHLORIDE SERPL-SCNC: 103 MMOL/L (ref 96–108)
CO2 SERPL-SCNC: 29 MMOL/L (ref 21–32)
CREAT SERPL-MCNC: 0.88 MG/DL (ref 0.6–1.3)
EOSINOPHIL # BLD AUTO: 0.28 THOUSAND/ÂΜL (ref 0–0.61)
EOSINOPHIL NFR BLD AUTO: 4 % (ref 0–6)
ERYTHROCYTE [DISTWIDTH] IN BLOOD BY AUTOMATED COUNT: 13.5 % (ref 11.6–15.1)
GFR SERPL CREATININE-BSD FRML MDRD: 81 ML/MIN/1.73SQ M
GLUCOSE P FAST SERPL-MCNC: 128 MG/DL (ref 65–99)
HCT VFR BLD AUTO: 48.9 % (ref 36.5–49.3)
HGB BLD-MCNC: 16.9 G/DL (ref 12–17)
IMM GRANULOCYTES # BLD AUTO: 0.01 THOUSAND/UL (ref 0–0.2)
IMM GRANULOCYTES NFR BLD AUTO: 0 % (ref 0–2)
LYMPHOCYTES # BLD AUTO: 1.67 THOUSANDS/ÂΜL (ref 0.6–4.47)
LYMPHOCYTES NFR BLD AUTO: 22 % (ref 14–44)
MCH RBC QN AUTO: 31.9 PG (ref 26.8–34.3)
MCHC RBC AUTO-ENTMCNC: 34.6 G/DL (ref 31.4–37.4)
MCV RBC AUTO: 92 FL (ref 82–98)
MONOCYTES # BLD AUTO: 0.54 THOUSAND/ÂΜL (ref 0.17–1.22)
MONOCYTES NFR BLD AUTO: 7 % (ref 4–12)
NEUTROPHILS # BLD AUTO: 4.93 THOUSANDS/ÂΜL (ref 1.85–7.62)
NEUTS SEG NFR BLD AUTO: 66 % (ref 43–75)
NRBC BLD AUTO-RTO: 0 /100 WBCS
PLATELET # BLD AUTO: 183 THOUSANDS/UL (ref 149–390)
PMV BLD AUTO: 10.1 FL (ref 8.9–12.7)
POTASSIUM SERPL-SCNC: 4.1 MMOL/L (ref 3.5–5.3)
PROT SERPL-MCNC: 6.9 G/DL (ref 6.4–8.4)
RBC # BLD AUTO: 5.3 MILLION/UL (ref 3.88–5.62)
SODIUM SERPL-SCNC: 142 MMOL/L (ref 135–147)
WBC # BLD AUTO: 7.48 THOUSAND/UL (ref 4.31–10.16)

## 2024-03-05 PROCEDURE — 85730 THROMBOPLASTIN TIME PARTIAL: CPT

## 2024-03-05 PROCEDURE — 80053 COMPREHEN METABOLIC PANEL: CPT

## 2024-03-05 PROCEDURE — 85025 COMPLETE CBC W/AUTO DIFF WBC: CPT

## 2024-03-05 PROCEDURE — 36415 COLL VENOUS BLD VENIPUNCTURE: CPT

## 2024-03-06 ENCOUNTER — ANESTHESIA EVENT (OUTPATIENT)
Dept: PERIOP | Facility: HOSPITAL | Age: 79
End: 2024-03-06
Payer: MEDICARE

## 2024-03-06 RX ORDER — AMOXICILLIN 125 MG/1
125 TABLET, CHEWABLE ORAL 2 TIMES DAILY
COMMUNITY
End: 2024-04-05

## 2024-03-06 NOTE — PRE-PROCEDURE INSTRUCTIONS
Pre-Surgery Instructions:   Medication Instructions    amoxicillin (AMOXIL) 125 MG chewable tablet Started on 3/4 - taking for 10 days - will be completed by DOS     apixaban (Eliquis) 5 mg Will verify with cardiology on instructions of use prior to surgery - call pt will need instructions     aspirin (ECOTRIN LOW STRENGTH) 81 mg EC tablet Will verify  with cardiology on instructions of use prior to surgery - call pt will need instructions    metoprolol tartrate (LOPRESSOR) 25 mg tablet Take day of surgery. With sip of water     NON FORMULARY Stop taking 7 days prior to surgery.    NON FORMULARY Stop taking 7 days prior to surgery.    Probiotic Product (PROBIOTIC-10 PO) Stop taking 7 days prior to surgery.    rosuvastatin (CRESTOR) 20 MG tablet Take day of surgery. With sip of water     Specialty Vitamins Products (Advanced Collagen) TABS Stop taking 7 days prior to surgery.    Vitamin D, Cholecalciferol, 1000 units CAPS Stop taking 7 days prior to surgery.    Pt reports did not have cleanser - did instruct may use HIBICLENS ( can  at local pharmacy )- reviewed showering instructions with pt per [facility] protocol - 5 days prior to surgery. Pt verbalized understanding of instructions.    Pt instructed no hair or body products on skin for DOS   Pt instructed no razor blade shaving within one week prior to surgery nellie surgical site area - ok to use electric razor between now and 3/13- no shaving 3/14 or 3/15.    Pt instructed if with any health status changes between now and DOS - notify surgeon office  Pt did report seen at urgent care on 3/4 for head cold - placed on ABX Amoxicillin for 10 days - continue as prescribed - pt instructed to inform surgeon office of head cold and being treated with ABX.  Pt also informed that Eliquis and ASA use will be confirmed with cardiology and pt will be called with instructions of use for DOS.    Medication instructions for day surgery reviewed. Please use only a sip of water to  take your instructed medications. Avoid all over the counter vitamins, supplements and NSAIDS for one week prior to surgery per anesthesia guidelines. Tylenol is ok to take as needed.     You will receive a call one business day prior to surgery with an arrival time and hospital directions. If your surgery is scheduled on a Monday, the hospital will be calling you on the Friday prior to your surgery. If you have not heard from anyone by 8pm, please call the hospital supervisor through the hospital  at [phone]. ([place] [phone] or [place] [phone]).    Do not eat or drink anything after midnight the night before your surgery, including candy, mints, lifesavers, or chewing gum. Do not drink alcohol 24hrs before your surgery. Try not to smoke at least 24hrs before your surgery.       Follow the pre surgery showering instructions as listed in the “My Surgical Experience Booklet” or otherwise provided by your surgeon's office. Do not use a blade to shave the surgical area 1 week before surgery. It is okay to use a clean electric clippers up to 24 hours before surgery. Do not apply any lotions, creams, including makeup, cologne, deodorant, or perfumes after showering on the day of your surgery. Do not use dry shampoo, hair spray, hair gel, or any type of hair products.     No contact lenses, eye make-up, or artificial eyelashes. Remove nail polish, including gel polish, and any artificial, gel, or acrylic nails if possible. Remove all jewelry including rings and body piercing jewelry.     Wear causal clothing that is easy to take on and off. Consider your type of surgery.    Keep any valuables, jewelry, piercings at home. Please bring any specially ordered equipment (sling, braces) if indicated.    Arrange for a responsible person to drive you to and from the hospital on the day of your surgery. Please confirm the visitor policy for the day of your procedure when you receive your phone call with an  arrival time.     Call the surgeon's office with any new illnesses, exposures, or additional questions prior to surgery.    Please reference your “My Surgical Experience Booklet” for additional information to prepare for your upcoming surgery.

## 2024-03-15 ENCOUNTER — ANESTHESIA (OUTPATIENT)
Dept: PERIOP | Facility: HOSPITAL | Age: 79
End: 2024-03-15
Payer: MEDICARE

## 2024-04-05 ENCOUNTER — HOSPITAL ENCOUNTER (OUTPATIENT)
Facility: HOSPITAL | Age: 79
Setting detail: OUTPATIENT SURGERY
Discharge: HOME | End: 2024-04-05
Attending: ORTHOPAEDIC SURGERY | Admitting: ORTHOPAEDIC SURGERY
Payer: MEDICARE

## 2024-04-05 VITALS
TEMPERATURE: 97.8 F | HEIGHT: 78 IN | OXYGEN SATURATION: 95 % | HEART RATE: 86 BPM | RESPIRATION RATE: 16 BRPM | DIASTOLIC BLOOD PRESSURE: 67 MMHG | SYSTOLIC BLOOD PRESSURE: 99 MMHG | WEIGHT: 270.28 LBS | BODY MASS INDEX: 31.27 KG/M2

## 2024-04-05 PROBLEM — S46.092A: Status: ACTIVE | Noted: 2024-04-05

## 2024-04-05 PROCEDURE — C9290 INJ, BUPIVACAINE LIPOSOME: HCPCS | Performed by: ANESTHESIOLOGY

## 2024-04-05 PROCEDURE — C1713 ANCHOR/SCREW BN/BN,TIS/BN: HCPCS | Performed by: ORTHOPAEDIC SURGERY

## 2024-04-05 RX ORDER — BUPIVACAINE HYDROCHLORIDE 5 MG/ML
INJECTION, SOLUTION EPIDURAL; INTRACAUDAL AS NEEDED
Status: DISCONTINUED | OUTPATIENT
Start: 2024-04-05 | End: 2024-04-05

## 2024-04-05 RX ORDER — ONDANSETRON 2 MG/ML
4 INJECTION INTRAMUSCULAR; INTRAVENOUS ONCE AS NEEDED
Status: DISCONTINUED | OUTPATIENT
Start: 2024-04-05 | End: 2024-04-05 | Stop reason: HOSPADM

## 2024-04-05 RX ORDER — SODIUM CHLORIDE 9 MG/ML
125 INJECTION, SOLUTION INTRAVENOUS CONTINUOUS
Status: DISCONTINUED | OUTPATIENT
Start: 2024-04-05 | End: 2024-04-05 | Stop reason: HOSPADM

## 2024-04-05 RX ORDER — LIDOCAINE HCL/PF 100 MG/5ML
SYRINGE (ML) INJECTION AS NEEDED
Status: DISCONTINUED | OUTPATIENT
Start: 2024-04-05 | End: 2024-04-05

## 2024-04-05 RX ORDER — DEXAMETHASONE SODIUM PHOSPHATE 4 MG/ML
INJECTION, SOLUTION INTRA-ARTICULAR; INTRALESIONAL; INTRAMUSCULAR; INTRAVENOUS; SOFT TISSUE AS NEEDED
Status: DISCONTINUED | OUTPATIENT
Start: 2024-04-05 | End: 2024-04-05

## 2024-04-05 RX ORDER — FENTANYL CITRATE/PF 50 MCG/ML
25 SYRINGE (ML) INJECTION
Status: DISCONTINUED | OUTPATIENT
Start: 2024-04-05 | End: 2024-04-05 | Stop reason: HOSPADM

## 2024-04-05 RX ORDER — ONDANSETRON 2 MG/ML
INJECTION INTRAMUSCULAR; INTRAVENOUS AS NEEDED
Status: DISCONTINUED | OUTPATIENT
Start: 2024-04-05 | End: 2024-04-05

## 2024-04-05 RX ORDER — MAGNESIUM HYDROXIDE 1200 MG/15ML
LIQUID ORAL AS NEEDED
Status: DISCONTINUED | OUTPATIENT
Start: 2024-04-05 | End: 2024-04-05 | Stop reason: HOSPADM

## 2024-04-05 RX ORDER — PHENYLEPHRINE HCL IN 0.9% NACL 1 MG/10 ML
SYRINGE (ML) INTRAVENOUS AS NEEDED
Status: DISCONTINUED | OUTPATIENT
Start: 2024-04-05 | End: 2024-04-05

## 2024-04-05 RX ORDER — PROPOFOL 10 MG/ML
INJECTION, EMULSION INTRAVENOUS AS NEEDED
Status: DISCONTINUED | OUTPATIENT
Start: 2024-04-05 | End: 2024-04-05

## 2024-04-05 RX ORDER — FENTANYL CITRATE 50 UG/ML
INJECTION, SOLUTION INTRAMUSCULAR; INTRAVENOUS AS NEEDED
Status: DISCONTINUED | OUTPATIENT
Start: 2024-04-05 | End: 2024-04-05

## 2024-04-05 RX ADMIN — LIDOCAINE HYDROCHLORIDE 100 MG: 20 INJECTION INTRAVENOUS at 07:32

## 2024-04-05 RX ADMIN — Medication 3000 MG: at 07:22

## 2024-04-05 RX ADMIN — BUPIVACAINE HYDROCHLORIDE 10 ML: 5 INJECTION, SOLUTION EPIDURAL; INTRACAUDAL; PERINEURAL at 07:03

## 2024-04-05 RX ADMIN — PROPOFOL 160 MG: 10 INJECTION, EMULSION INTRAVENOUS at 07:32

## 2024-04-05 RX ADMIN — Medication 100 MCG: at 07:51

## 2024-04-05 RX ADMIN — SODIUM CHLORIDE: 0.9 INJECTION, SOLUTION INTRAVENOUS at 08:50

## 2024-04-05 RX ADMIN — BUPIVACAINE 10 ML: 13.3 INJECTION, SUSPENSION, LIPOSOMAL INFILTRATION at 07:03

## 2024-04-05 RX ADMIN — Medication 100 MCG: at 08:24

## 2024-04-05 RX ADMIN — SODIUM CHLORIDE 125 ML/HR: 0.9 INJECTION, SOLUTION INTRAVENOUS at 05:44

## 2024-04-05 RX ADMIN — FENTANYL CITRATE 100 MCG: 50 INJECTION INTRAMUSCULAR; INTRAVENOUS at 06:57

## 2024-04-05 RX ADMIN — Medication 100 MCG: at 07:42

## 2024-04-05 RX ADMIN — ONDANSETRON 4 MG: 2 INJECTION INTRAMUSCULAR; INTRAVENOUS at 07:32

## 2024-04-05 RX ADMIN — DEXAMETHASONE SODIUM PHOSPHATE 10 MG: 4 INJECTION INTRA-ARTICULAR; INTRALESIONAL; INTRAMUSCULAR; INTRAVENOUS; SOFT TISSUE at 07:32

## 2024-04-05 NOTE — ANESTHESIA PROCEDURE NOTES
Peripheral Block    Patient location during procedure: holding area  Start time: 4/5/2024 6:57 AM  Reason for block: at surgeon's request and post-op pain management  Staffing  Performed by: Sallie Barrios MD  Authorized by: Sallie Barrios MD    Preanesthetic Checklist  Completed: patient identified, IV checked, site marked, risks and benefits discussed, surgical consent, monitors and equipment checked, pre-op evaluation and timeout performed  Peripheral Block  Patient position: sitting  Prep: ChloraPrep  Patient monitoring: continuous pulse oximetry, frequent blood pressure checks and heart rate  Block type: Interscalene  Laterality: left  Injection technique: single-shot  Procedures: ultrasound guided, Ultrasound guidance required for the procedure to increase accuracy and safety of medication placement and decrease risk of complications. and nerve stimulator  Ultrasound permanent image saved  Needle  Needle type: Stimuplex   Needle gauge: 22 G  Needle length: 2 in  Needle localization: nerve stimulator and ultrasound guidance  Assessment  Injection assessment: frequent aspiration, injected with ease, negative aspiration, no paresthesia on injection, no symptoms of intraneural/intravenous injection, negative for heart rate change, needle tip visualized at all times and incremental injection  Paresthesia pain: none  Post-procedure:  site cleaned  patient tolerated the procedure well with no immediate complications

## 2024-04-05 NOTE — ANESTHESIA PREPROCEDURE EVALUATION
Procedure:  SHOULDER DIAGNOSTIC GLENOHUMERAL ARTHROSCOPY W/ ROTATOR CUFF REPAIR, SUBACROMIAL DECOMPRESSION W/ ACROMIPLASTY, PSB BICEPS TENOTOMY, LABRAL DEBRIDEMENT (Left: Shoulder)    Relevant Problems   CARDIO   (+) Aneurysm of ascending aorta (HCC)   (+) Aortic insufficiency   (+) Atrial fibrillation with RVR (HCC)   (+) Benign essential hypertension   (+) Bilateral popliteal artery aneurysm (HCC)   (+) Coronary artery disease involving native coronary artery   (+) Hypertension   (+) Mitral valve insufficiency   (+) Mixed hyperlipidemia   (+) Paroxysmal atrial fibrillation (HCC)   (+) Permanent atrial fibrillation (HCC)   (+) Pulmonary hypertension (HCC)   (+) s/p Medtronic dual chamber PPM with left bundle pacing lead 7/19/2023      Surgery/Wound/Pain   (+) S/P AVR   (+) S/P MVR (mitral valve repair)   (+) S/P left atrial appendage ligation      Other   (+) Chronic osteomyelitis of right tibia with draining sinus ([facility])   (+) History of DVT (deep vein thrombosis)        Physical Exam    Airway    Mallampati score: II  TM Distance: >3 FB  Neck ROM: full     Dental   No notable dental hx     Cardiovascular  Rhythm: irregular    Pulmonary  Pulmonary exam normal Breath sounds clear to auscultation    Other Findings        Anesthesia Plan  ASA Score- 3     Anesthesia Type- general with ASA Monitors.         Additional Monitors:     Airway Plan: LMA.    Comment: ISB  CLEARED BY CARDIO AS MODERATe risk  Instructed to place magnet over pacemaker during priocedure  3D RANDALL: 11/18/23  ·  Left Ventricle: Left ventricular cavity size is dilated. Wall thickness is normal. The left ventricular ejection fraction is 60%. Systolic function is normal. Wall motion is normal.  ·  Left Atrium: The atrium is dilated.  ·  Right Atrium: The atrium is dilated.  ·  Atrial Septum: No patent foramen ovale detected using color flow Doppler at rest.  ·  Left Atrial Appendage: There is reduced function. There is no thrombus.  ·  Aortic Valve:  There is mild to moderate regurgitation with a centrally directed jet secondary to annular dilation. The AV PHT is 667 m/s.  ·  Mitral Valve: There is severe regurgitation with a centrally directed jet. There is malcoaptation of the mitral valve secondary to annular dilation. The mitral valve ERO is 0.40cm2. The regurgitant volume is 68 ml/beat.  ·  Tricuspid Valve: There is moderate regurgitation.  ·  Aorta: The aortic root is mildly dilated. The ascending aorta is moderately dilated. The aortic root is 4.10 cm. The ascending aorta is 4.5 cm.    .       Plan Factors-    Chart reviewed. EKG reviewed. Imaging results reviewed. Existing labs reviewed. Patient summary reviewed.          Obstructive sleep apnea risk education given perioperatively.        Induction- intravenous.    Postoperative Plan-     Informed Consent- Anesthetic plan and risks discussed with patient.

## 2024-04-05 NOTE — OP NOTE
OPERATIVE REPORT  PATIENT NAME: [place] Jr.    :  1945  MRN: [id number]  Pt Location: [facility] OR ROOM 02    SURGERY DATE: 2024    Surgeons and Role:     * Luke Mclain MD - Primary    Preop Diagnosis:  Other injury of muscle(s) and tendon(s) of the rotator cuff of left shoulder, initial encounter S46.092A    Post-Op Diagnosis Codes:     * Other injury of muscle(s) and tendon(s) of the rotator cuff of left shoulder, initial encounter [S46.092A]    Procedure(s):  Left - SHOULDER DIAGNOSTIC GLENOHUMERAL ARTHROSCOPY W/ ROTATOR CUFF REPAIR. SUBACROMIAL DECOMPRESSION W/ ACROMIPLASTY. BICEPS TENOTOMY. LABRAL DEBRIDEMENT    Specimen(s):  * No specimens in log *    Estimated Blood Loss:   Minimal    Drains:  * No LDAs found *    Anesthesia Type:   General w/ Interscalene Block    Operative Indications:  Other injury of muscle(s) and tendon(s) of the rotator cuff of left shoulder, initial encounter S46.092A      Operative Findings:  Biceps tendon tearing  Full thickness rotator cuff tearing  Subacromial impingement/bursitis    Complications:   None    Procedure and Technique:  The patient was seen and evaluated in the preop holding area.  I identified the left shoulder as the operative site.  He confirmed the left shoulder was the operative site and it was marked with my initials.  A left interscalene block was performed by anesthesia.  Once in the operating theater he was placed under general anesthesia and an LMA was placed.  A magnet was placed over the pacemaker located about the left chest.  He was positioned in the lateral decubitus position.  Examination of the left shoulder demonstrated mild stiffness.  With gentle traction there was release of adhesions and a full passive range of motion was achieved.  No instability was noted.  The left arm and shoulder were prepped and draped in usual orthopedic sterile fashion.  The arm was hung in suspension.  A timeout was initiated by me and finished by  the circulating nurse.  A posterior portal was created.  The camera was introduced as portal.  Diagnostic arthroscopy of the humeral joint ensued.    Findings on diagnostic fluoroscopy of the left glenohumeral joint include #1 synovitis/mild capsulitis #2 fraying of the anterior superior and posterior labrum #3 tearing of the intra-articular portion of the biceps tendon #4 full-thickness tearing of the supraspinatus #5 mild degenerative changes seen on the articular surfaces    An anterior portal was created.  A soft tissue shaver was brought in the biceps was tenotomized.  The labrum was debrided.  The capsulitis was debrided.  All cannulas were removed from the glenohumeral joint.    The subacromial space was entered to the posterior portal.  An anterolateral portal was created.  The soft tissue shaver was brought in.  The bursitis was excised from the subcoracoid, subdeltoid, and subacromial regions.  The rotator cuff tear was identified and debrided.  The greater tuberosity was debrided to a new bony bed.  The ablator was brought in and the undersurface of the acromion was debrided of tissue.  The CA ligament was released.  The camera was then brought to the anterolateral portal.  The remainder bursa excised posteriorly.  There was noted to be a L-shaped tear of the supraspinatus.  The posterior aspect of the tearing was mobile.    A bur was brought in and the acromioplasty was performed.  The acromion was made into a type I morphology.    Through superior stab incision an anchor was placed in the greater tuberosity.  Sutures were then passed through the anterior and posterior leaves of the tearing.  Upon tying the third knot, the anchor dislodged from the greater tuberosity.  The sutures were removed and the anchor was removed entirely.  We then placed 3 margin convergence sutures beginning medially and progressing laterally.  We then tied the convergence sutures with a modified locking knot backed by half  fabiano.  This successfully brought the rotator cuff back down to the greater tuberosity with complete coverage.  All cannulas were removed.  3-0 nylon was used to close the portal sites and a sterile dressing was applied.  A sling was applied to the left upper extremity.  The patient was awoken and taken to recovery in stable condition.   I was present for the entire procedure.    Patient Disposition:  PACU         SIGNATURE: Luke Mclain MD  DATE: April 5, 2024  TIME: 8:46 AM

## 2024-04-05 NOTE — INTERVAL H&P NOTE
H&P reviewed. After examining the patient I find no changes in the patients condition since the H&P had been written.    Vitals:    04/05/24 0546   BP: 116/86   Pulse: 84   Resp: 16   Temp: 98.1 °F (36.7 °C)   SpO2: 96%

## 2024-04-05 NOTE — ANESTHESIA POSTPROCEDURE EVALUATION
Post-Op Assessment Note    CV Status:  Stable    Pain management: adequate       Mental Status:  Alert and awake   Hydration Status:  Euvolemic   PONV Controlled:  Controlled   Airway Patency:  Patent     Post Op Vitals Reviewed: Yes    No anethesia notable event occurred.    Staff: Anesthesiologist               BP 97/63 (04/05/24 0948)    Temp (!) 97.4 °F (36.3 °C) (04/05/24 0948)    Pulse 77 (04/05/24 0948)   Resp 16 (04/05/24 0948)    SpO2 94 % (04/05/24 0948)

## 2024-04-08 NOTE — PROGRESS NOTES
After reviewed pt chart RN called and lm  on main number listed for pt to clarify  with Dr. Mclain and cardiology if aspirin and eliquis if both should be taken postop

## 2024-05-10 ENCOUNTER — REMOTE DEVICE CLINIC VISIT (OUTPATIENT)
Dept: CARDIOLOGY CLINIC | Facility: CLINIC | Age: 79
End: 2024-05-10
Payer: MEDICARE

## 2024-05-10 DIAGNOSIS — Z95.0 PRESENCE OF PERMANENT CARDIAC PACEMAKER: Primary | ICD-10-CM

## 2024-05-10 PROCEDURE — 93296 REM INTERROG EVL PM/IDS: CPT | Performed by: STUDENT IN AN ORGANIZED HEALTH CARE EDUCATION/TRAINING PROGRAM

## 2024-05-10 PROCEDURE — 93294 REM INTERROG EVL PM/LDLS PM: CPT | Performed by: STUDENT IN AN ORGANIZED HEALTH CARE EDUCATION/TRAINING PROGRAM

## 2024-05-10 NOTE — PROGRESS NOTES
MDT D-PM/ACTIVE SYSTEM IS MRI CONDITIONAL   CARELINK TRANSMISSION: BATTERY VOLTAGE ADEQUATE (13.6  YRS). AP <0.1%  41.8% (AAIR-DDDR 60/MODE SWITCH); ALL AVAILABLE LEAD PARAMETERS WITHIN NORMAL LIMITS. 1 PERSISTENT AF EPISODE REMAINS IN PROGRESS SINCE 7/20/2023 (BURDEN 100%); 72 FAST A&V, SVT-AF EPISODES W/MULTIPLE SAME DAY EPISODES - SOME EPISODES RATES >200 BPM; MAX EPISODE DURATION 10MIN, 41SEC; EF 45% (7/17/2023 RANDALL); HX: SAME & PATIENT TAKING ASA 81, ELIQUIS, METO TART; NORMAL DEVICE FUNCTION.  ES

## 2024-06-05 DIAGNOSIS — E78.5 HYPERLIPIDEMIA: ICD-10-CM

## 2024-06-05 NOTE — TELEPHONE ENCOUNTER
Reason for call:   [x] Refill   [] Prior Auth  [] Other:     Office:   [] PCP/Provider -   [x] Specialty/Provider - Cardio    Medication:         Does the patient have enough for 3 days?   [x] Yes   [] No - Send as HP to POD

## 2024-06-07 DIAGNOSIS — E78.5 HYPERLIPIDEMIA: ICD-10-CM

## 2024-06-08 RX ORDER — ROSUVASTATIN CALCIUM 20 MG/1
20 TABLET, COATED ORAL DAILY
Qty: 90 TABLET | Refills: 3 | OUTPATIENT
Start: 2024-06-08

## 2024-06-10 RX ORDER — ROSUVASTATIN CALCIUM 20 MG/1
20 TABLET, COATED ORAL DAILY
Qty: 90 TABLET | Refills: 0 | Status: SHIPPED | OUTPATIENT
Start: 2024-06-10

## 2024-06-26 ENCOUNTER — APPOINTMENT (EMERGENCY)
Dept: RADIOLOGY | Facility: HOSPITAL | Age: 79
End: 2024-06-26
Payer: MEDICARE

## 2024-06-26 ENCOUNTER — HOSPITAL ENCOUNTER (EMERGENCY)
Facility: HOSPITAL | Age: 79
Discharge: HOME | End: 2024-06-26
Attending: EMERGENCY MEDICINE
Payer: MEDICARE

## 2024-06-26 VITALS
OXYGEN SATURATION: 96 % | HEART RATE: 88 BPM | SYSTOLIC BLOOD PRESSURE: 128 MMHG | DIASTOLIC BLOOD PRESSURE: 80 MMHG | TEMPERATURE: 98 F | RESPIRATION RATE: 20 BRPM

## 2024-06-26 DIAGNOSIS — R07.9 CHEST PAIN: Primary | ICD-10-CM

## 2024-06-26 LAB
2HR DELTA HS TROPONIN: 2 NG/L
ALBUMIN SERPL BCG-MCNC: 4.2 G/DL (ref 3.5–5)
ALP SERPL-CCNC: 68 U/L (ref 34–104)
ALT SERPL W P-5'-P-CCNC: 14 U/L (ref 7–52)
ANION GAP SERPL CALCULATED.3IONS-SCNC: 7 MMOL/L (ref 4–13)
AST SERPL W P-5'-P-CCNC: 20 U/L (ref 13–39)
ATRIAL RATE: 108 BPM
BASOPHILS # BLD AUTO: 0.05 THOUSANDS/ÂΜL (ref 0–0.1)
BASOPHILS NFR BLD AUTO: 1 % (ref 0–1)
BILIRUB SERPL-MCNC: 1.3 MG/DL (ref 0.2–1)
BUN SERPL-MCNC: 13 MG/DL (ref 5–25)
CALCIUM SERPL-MCNC: 9.6 MG/DL (ref 8.4–10.2)
CARDIAC TROPONIN I PNL SERPL HS: 19 NG/L
CARDIAC TROPONIN I PNL SERPL HS: 21 NG/L
CHLORIDE SERPL-SCNC: 106 MMOL/L (ref 96–108)
CO2 SERPL-SCNC: 28 MMOL/L (ref 21–32)
CREAT SERPL-MCNC: 0.82 MG/DL (ref 0.6–1.3)
EOSINOPHIL # BLD AUTO: 0.29 THOUSAND/ÂΜL (ref 0–0.61)
EOSINOPHIL NFR BLD AUTO: 4 % (ref 0–6)
ERYTHROCYTE [DISTWIDTH] IN BLOOD BY AUTOMATED COUNT: 13.3 % (ref 11.6–15.1)
GFR SERPL CREATININE-BSD FRML MDRD: 83 ML/MIN/1.73SQ M
GLUCOSE SERPL-MCNC: 87 MG/DL (ref 65–140)
HCT VFR BLD AUTO: 48.6 % (ref 36.5–49.3)
HGB BLD-MCNC: 16.9 G/DL (ref 12–17)
IMM GRANULOCYTES # BLD AUTO: 0.01 THOUSAND/UL (ref 0–0.2)
IMM GRANULOCYTES NFR BLD AUTO: 0 % (ref 0–2)
LYMPHOCYTES # BLD AUTO: 2.12 THOUSANDS/ÂΜL (ref 0.6–4.47)
LYMPHOCYTES NFR BLD AUTO: 32 % (ref 14–44)
MCH RBC QN AUTO: 33.1 PG (ref 26.8–34.3)
MCHC RBC AUTO-ENTMCNC: 34.8 G/DL (ref 31.4–37.4)
MCV RBC AUTO: 95 FL (ref 82–98)
MONOCYTES # BLD AUTO: 0.57 THOUSAND/ÂΜL (ref 0.17–1.22)
MONOCYTES NFR BLD AUTO: 9 % (ref 4–12)
NEUTROPHILS # BLD AUTO: 3.64 THOUSANDS/ÂΜL (ref 1.85–7.62)
NEUTS SEG NFR BLD AUTO: 54 % (ref 43–75)
NRBC BLD AUTO-RTO: 0 /100 WBCS
PLATELET # BLD AUTO: 163 THOUSANDS/UL (ref 149–390)
PMV BLD AUTO: 9.5 FL (ref 8.9–12.7)
POTASSIUM SERPL-SCNC: 3.9 MMOL/L (ref 3.5–5.3)
PROT SERPL-MCNC: 6.7 G/DL (ref 6.4–8.4)
QRS AXIS: -48 DEGREES
QRSD INTERVAL: 104 MS
QT INTERVAL: 376 MS
QTC INTERVAL: 480 MS
RBC # BLD AUTO: 5.11 MILLION/UL (ref 3.88–5.62)
SODIUM SERPL-SCNC: 141 MMOL/L (ref 135–147)
T WAVE AXIS: 32 DEGREES
VENTRICULAR RATE: 98 BPM
WBC # BLD AUTO: 6.68 THOUSAND/UL (ref 4.31–10.16)

## 2024-06-26 PROCEDURE — 36415 COLL VENOUS BLD VENIPUNCTURE: CPT

## 2024-06-26 PROCEDURE — 99285 EMERGENCY DEPT VISIT HI MDM: CPT | Performed by: EMERGENCY MEDICINE

## 2024-06-26 PROCEDURE — 85025 COMPLETE CBC W/AUTO DIFF WBC: CPT | Performed by: EMERGENCY MEDICINE

## 2024-06-26 PROCEDURE — 93005 ELECTROCARDIOGRAM TRACING: CPT

## 2024-06-26 PROCEDURE — 93308 TTE F-UP OR LMTD: CPT | Performed by: EMERGENCY MEDICINE

## 2024-06-26 PROCEDURE — 93010 ELECTROCARDIOGRAM REPORT: CPT | Performed by: INTERNAL MEDICINE

## 2024-06-26 PROCEDURE — 99285 EMERGENCY DEPT VISIT HI MDM: CPT

## 2024-06-26 PROCEDURE — 80053 COMPREHEN METABOLIC PANEL: CPT | Performed by: EMERGENCY MEDICINE

## 2024-06-26 PROCEDURE — 84484 ASSAY OF TROPONIN QUANT: CPT | Performed by: EMERGENCY MEDICINE

## 2024-06-26 PROCEDURE — 71045 X-RAY EXAM CHEST 1 VIEW: CPT

## 2024-06-26 NOTE — ED PROVIDER NOTES
History  Chief Complaint   Patient presents with    Chest Pain     CP starting around 330 pm today. EMS gave 324 mg ASA and 1 nitro with relief     HPI    Patient is an 79 year male with history of afib s/p 2 ablations and pacemaker placement on Eliquis, valve repair and placement in July who presents with CP that began at 330 today while driving. He describes it as pressure in his lower sternum. EMS gave ASA and Nitro and CP resolved. The pain does not radiate. He denies shortness of breath, nausea, vomiting, or diaphoresis. He is concerned because this morning he went to his chiropractor who used a TENS unit and he is worried it interfered with his pacemaker.     Prior to Admission Medications   Prescriptions Last Dose Informant Patient Reported? Taking?   Probiotic Product (PROBIOTIC-10 PO)  Self Yes No   Sig: Take 1 capsule by mouth daily   Specialty Vitamins Products (Advanced Collagen) TABS   Yes No   Sig: Take by mouth   VITAMIN D PO  Self Yes No   Sig: Take by mouth   Vitamin D, Cholecalciferol, 1000 units CAPS  Self Yes No   Sig: Take 2,000 Units by mouth daily   apixaban (Eliquis) 5 mg   No No   Sig: Take 1 tablet (5 mg total) by mouth 2 (two) times a day   aspirin (ECOTRIN LOW STRENGTH) 81 mg EC tablet  Self No No   Sig: Take 1 tablet (81 mg total) by mouth daily   metoprolol tartrate (LOPRESSOR) 25 mg tablet  Self No No   Sig: Take 0.5 tablets (12.5 mg total) by mouth every 12 (twelve) hours   Patient taking differently: Take 50 mg by mouth every 12 (twelve) hours   rosuvastatin (CRESTOR) 20 MG tablet   No No   Sig: Take 1 tablet (20 mg total) by mouth daily   torsemide (DEMADEX) 10 mg tablet  Self No No   Sig: One tab daily for 3 days and then when instructed      Facility-Administered Medications: None       Past Medical History:   Diagnosis Date    Cardiac disease     GERD (gastroesophageal reflux disease)     occasional - per pt takes TUMS if needed    Hyperlipidemia     Hypertension     Irregular  heart beat     Kidney stone     PAF (paroxysmal atrial fibrillation) (HCC)     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    s/p Medtronic dual chamber PPM with left bundle pacing lead 7/19/2023 07/20/2023       Past Surgical History:   Procedure Laterality Date    CARDIAC CATHETERIZATION N/A 05/08/2023    Procedure: Cardiac Coronary Angiogram;  Surgeon: Anirudh Mo DO;  [facility]: [facility];  Service: Cardiology    CARDIAC ELECTROPHYSIOLOGY PROCEDURE N/A 03/25/2022    Procedure: Cardiac eps/afib ablation;  Surgeon: Javon Valdes MD;  Location: [facility];  Service: Cardiology    CARDIAC ELECTROPHYSIOLOGY PROCEDURE N/A 07/19/2023    Procedure: Cardiac pacer implant;  Surgeon: Steven Palacio MD;  [facility];  Service: Cardiology    CATARACT EXTRACTION Bilateral     ESOPHAGOGASTRODUODENOSCOPY N/A 08/16/2016    Procedure: ESOPHAGOGASTRODUODENOSCOPY (EGD);  Surgeon: Lucio Barnes MD;  Location: [facility];  Service:     HERNIA REPAIR Left     groin    HERNIA REPAIR Right 1995    OTHER SURGICAL HISTORY      TX REPLACEMENT MITRAL VALVE W/CARDIOPULMONARY BYP N/A 07/12/2023    Procedure: MITRAL VALVE REPAIR WITH 36MM PHYSIO II ANNULOPLASTY RING;  Surgeon: MODESTO Álvarez MD;  Location: [facility];  Service: Cardiac Surgery    TX RPLCMT AORTIC VALVE OPN W/STENTLESS TISSUE VALVE N/A 07/12/2023    Procedure: REPLACEMENT VALVE AORTIC (AVR) WITH 29MM INSPIRIS TISSUE RESILIA VALVE. LEFT ATRIAL APPENDAGE LIGATION WITH 45 MM ATRICLIP;  Surgeon: MODESTO Álvarez MD;  Location: [facility];  Service: Cardiac Surgery    TX SURGICAL ARTHROSCOPY ANDRES W/CORACOACRM LIGM RLS Left 4/5/2024    Procedure: SHOULDER DIAGNOSTIC GLENOHUMERAL ARTHROSCOPY W/ ROTATOR CUFF REPAIR, SUBACROMIAL DECOMPRESSION W/ ACROMIPLASTY, BICEPS TENOTOMY, LABRAL DEBRIDEMENT;  Surgeon: Luke Mclain MD;  Location[facility];  Service: Orthopedics    TX SURGICAL ARTHROSCOPY SHOULDER W/ROTATOR CUFF RPR  Left 4/5/2024    Procedure: SHOULDER DIAGNOSTIC GLENOHUMERAL ARTHROSCOPY W/ ROTATOR CUFF REPAIR, SUBACROMIAL DECOMPRESSION W/ ACROMIPLASTY, BICEPS TENOTOMY, LABRAL DEBRIDEMENT;  Surgeon: Luke Mclain MD;  [facility];  Service: Orthopedics    REPLACEMENT TOTAL KNEE Left 04/01/2013    TONSILLECTOMY      child surgery    VASCULAR SURGERY Right     POPLITEAL BYPASS DUE TO ANEURYSM       Family History   Problem Relation Age of Onset    Pancreatic cancer Mother     Stroke Father     Heart attack Paternal Grandmother     Anesthesia problems Neg Hx      I have reviewed and agree with the history as documented.    E-Cigarette/Vaping    E-Cigarette Use Never User     Comments Denies any use per pt      E-Cigarette/Vaping Substances    Nicotine No     THC No     CBD No      Social History     Tobacco Use    Smoking status: Never    Smokeless tobacco: Never    Tobacco comments:     Never a smoker or use of any tobacco products per pt    Vaping Use    Vaping status: Never Used   Substance Use Topics    Alcohol use: Not Currently     Comment: Denies any alcohol use per pt    Drug use: No     Comment: Denies any drug use per pt        Review of Systems   Constitutional:  Negative for chills and fever.   HENT:  Negative for congestion and sore throat.    Respiratory:  Negative for cough and shortness of breath.    Cardiovascular:  Positive for chest pain. Negative for palpitations.   Gastrointestinal:  Negative for abdominal pain and vomiting.   Genitourinary:  Negative for dysuria and hematuria.   Musculoskeletal:  Negative for back pain and neck pain.   Neurological:  Positive for headaches. Negative for syncope.       Physical Exam  ED Triage Vitals   Temperature Pulse Respirations Blood Pressure SpO2   06/26/24 1625 06/26/24 1622 06/26/24 1622 06/26/24 1622 06/26/24 1622   98 °F (36.7 °C) 92 20 114/75 95 %      Temp Source Heart Rate Source Patient Position - Orthostatic VS BP Location FiO2 (%)   06/26/24 1625  06/26/24 1622 06/26/24 1622 06/26/24 1622 --   Oral Monitor Sitting Right arm       Pain Score       06/26/24 1622       No Pain             Orthostatic Vital Signs  Vitals:    06/26/24 1700 06/26/24 1800 06/26/24 1845 06/26/24 1915   BP: 100/67 112/76  128/80   Pulse: 84 85 89 88   Patient Position - Orthostatic VS:    Lying       Physical Exam  Vitals and nursing note reviewed.   Constitutional:       General: He is not in acute distress.     Appearance: He is well-developed.   HENT:      Head: Normocephalic and atraumatic.      Nose: No congestion or rhinorrhea.      Mouth/Throat:      Mouth: Mucous membranes are moist.   Eyes:      Extraocular Movements: Extraocular movements intact.      Conjunctiva/sclera: Conjunctivae normal.   Cardiovascular:      Rate and Rhythm: Normal rate and regular rhythm.      Heart sounds: No murmur heard.  Pulmonary:      Effort: Pulmonary effort is normal. No respiratory distress.      Breath sounds: Normal breath sounds.   Abdominal:      Palpations: Abdomen is soft.      Tenderness: There is no abdominal tenderness.   Musculoskeletal:      Cervical back: Neck supple.      Right lower leg: No edema.      Left lower leg: No edema.   Skin:     General: Skin is warm and dry.      Capillary Refill: Capillary refill takes less than 2 seconds.   Neurological:      Mental Status: He is alert.   Psychiatric:         Mood and Affect: Mood normal.         ED Medications  Medications - No data to display    Diagnostic Studies  Results Reviewed       Procedure Component Value Units Date/Time    HS Troponin I 2hr [[id number]]  (Normal) Collected: 06/26/24 1926    Lab Status: Final result Specimen: Blood from Arm, Left Updated: 06/26/24 2009     hs TnI 2hr 21 ng/L      Delta 2hr hsTnI 2 ng/L     HS Troponin 0hr (reflex protocol) [[id number]]  (Normal) Collected: 06/26/24 1626    Lab Status: Final result Specimen: Blood from Arm, Left Updated: 06/26/24 1907     hs TnI 0hr 19 ng/L     Comprehensive  metabolic panel [[id number]]  (Abnormal) Collected: 06/26/24 1626    Lab Status: Final result Specimen: Blood from Arm, Left Updated: 06/26/24 1658     Sodium 141 mmol/L      Potassium 3.9 mmol/L      Chloride 106 mmol/L      CO2 28 mmol/L      ANION GAP 7 mmol/L      BUN 13 mg/dL      Creatinine 0.82 mg/dL      Glucose 87 mg/dL      Calcium 9.6 mg/dL      AST 20 U/L      ALT 14 U/L      Alkaline Phosphatase 68 U/L      Total Protein 6.7 g/dL      Albumin 4.2 g/dL      Total Bilirubin 1.30 mg/dL      eGFR 83 ml/min/1.73sq m     Narrative:      National Kidney Disease Foundation guidelines for Chronic Kidney Disease (CKD):     Stage 1 with normal or high GFR (GFR > 90 mL/min/1.73 square meters)    Stage 2 Mild CKD (GFR = 60-89 mL/min/1.73 square meters)    Stage 3A Moderate CKD (GFR = 45-59 mL/min/1.73 square meters)    Stage 3B Moderate CKD (GFR = 30-44 mL/min/1.73 square meters)    Stage 4 Severe CKD (GFR = 15-29 mL/min/1.73 square meters)    Stage 5 End Stage CKD (GFR <15 mL/min/1.73 square meters)  Note: GFR calculation is accurate only with a steady state creatinine    CBC and differential [[id number]] Collected: 06/26/24 1626    Lab Status: Final result Specimen: Blood from Arm, Left Updated: 06/26/24 1637     WBC 6.68 Thousand/uL      RBC 5.11 Million/uL      Hemoglobin 16.9 g/dL      Hematocrit 48.6 %      MCV 95 fL      MCH 33.1 pg      MCHC 34.8 g/dL      RDW 13.3 %      MPV 9.5 fL      Platelets 163 Thousands/uL      nRBC 0 /100 WBCs      Segmented % 54 %      Immature Grans % 0 %      Lymphocytes % 32 %      Monocytes % 9 %      Eosinophils Relative 4 %      Basophils Relative 1 %      Absolute Neutrophils 3.64 Thousands/µL      Absolute Immature Grans 0.01 Thousand/uL      Absolute Lymphocytes 2.12 Thousands/µL      Absolute Monocytes 0.57 Thousand/µL      Eosinophils Absolute 0.29 Thousand/µL      Basophils Absolute 0.05 Thousands/µL                    XR chest 1 view portable   ED Interpretation by Gaurang  MD Shailesh (06/26 1806)   No obvious effusion.  Borderline widened mediastinum in this portable film.       Final Result by Darwin Hutchinson MD (06/27 0689)      No acute cardiopulmonary disease.            Workstation performed: [id number]               Procedures  ECG 12 Lead Documentation Only    Date/Time: 6/26/2024 4:30 PM    Performed by: Kristy Green MD  Authorized by: Kristy Green MD    Patient location:  ED  Rate:     ECG rate:  98    ECG rate assessment: normal    Rhythm:     Rhythm: sinus rhythm    Ectopy:     Ectopy: none    QRS:     QRS axis:  Normal    QRS intervals:  Normal  Conduction:     Conduction: abnormal      Abnormal conduction: LAFB    ST segments:     ST segments:  Normal  T waves:     T waves: normal    POC Cardiac US    Date/Time: 6/26/2024 5:30 PM    Performed by: Kristy Green MD  Authorized by: Kristy Green MD    Patient location:  ED  Other Assisting Provider: Yes (comment) (Gaurang Cash)    Procedure details:     Exam Type:  Diagnostic    Indications: chest pain      Assessment / Evaluation for: cardiac function and pericardial effusion      Exam Type: initial exam      Image quality: limited diagnostic      Image availability:  Images available in PACS  Patient Details:     Cardiac Rhythm:  Regular  Cardiac findings:     Echo technique: limited 2D      Views obtained: parasternal long axis, parasternal short axis, subcostal and apical      Views obtained comment:  Limited quality    Pericardial effusion: absent      Tamponade physiology: absent    Pulmonary findings:     Left Lung Findings: left lung sliding      Right lung findings: right lung sliding      B-lines: no B-lines present    Interpretation:      Poor quality images. No pericardial effusion. Lung sliding present.         ED Course  ED Course as of 06/30/24 1126   Wed Jun 26, 2024   1908 hs TnI 0hr: 19                                       Medical Decision Making  Differential includes ACS, costochondritis,  pneumonia, pneumothorax, anemia, electrolyte abnormality, musculoskeletal strain.  Will order cardiac workup.  EKG is nonischemic.  Chest x-ray is negative for acute pathology.  Labs are unremarkable.  Bedside cardiac ultrasound does not show evidence of pericardial effusion.  Patient's symptoms have improved while he has been here.  He was told to follow-up with his cardiologist.  He was given return precautions and discharged from the ED.    Amount and/or Complexity of Data Reviewed  Labs: ordered. Decision-making details documented in ED Course.  Radiology: ordered and independent interpretation performed.          Disposition  Final diagnoses:   Chest pain     Time reflects when diagnosis was documented in both MDM as applicable and the Disposition within this note       Time User Action Codes Description Comment    6/26/2024  8:25 PM Kristy Green Add [R07.9] Chest pain           ED Disposition       ED Disposition   Discharge    Condition   Stable    Date/Time   Wed Jun 26, 2024  8:25 PM    Comment   [place] Jr. discharge to home/self care.                   Follow-up Information       Follow up With Specialties Details Why Contact Info Additional Information    Amrik Bonner, DO Family Medicine   [address] [place] [address]  [phone]       [facility]   [address] [address]  [phone] [facility], [address] [place] [address], [facility] [address], [place], [place], [address]   [phone]            Discharge Medication List as of 6/26/2024  8:28 PM        CONTINUE these medications which have NOT CHANGED    Details   apixaban (Eliquis) 5 mg Take 1 tablet (5 mg total) by mouth 2 (two) times a day, Starting Fri 2/2/2024, Normal      aspirin (ECOTRIN LOW STRENGTH) 81 mg EC tablet Take 1 tablet (81 mg total) by mouth daily, Starting Mon 5/8/2023, No Print      metoprolol  tartrate (LOPRESSOR) 25 mg tablet Take 0.5 tablets (12.5 mg total) by mouth every 12 (twelve) hours, Starting Thu 7/20/2023, Normal      Probiotic Product (PROBIOTIC-10 PO) Take 1 capsule by mouth daily, Historical Med      rosuvastatin (CRESTOR) 20 MG tablet Take 1 tablet (20 mg total) by mouth daily, Starting Mon 6/10/2024, Normal      Specialty Vitamins Products (Advanced Collagen) TABS Take by mouth, Historical Med      torsemide (DEMADEX) 10 mg tablet One tab daily for 3 days and then when instructed, Normal      !! VITAMIN D PO Take by mouth, Historical Med      !! Vitamin D, Cholecalciferol, 1000 units CAPS Take 2,000 Units by mouth daily, Historical Med       !! - Potential duplicate medications found. Please discuss with provider.        No discharge procedures on file.    PDMP Review         Value Time User    PDMP Reviewed  Yes 4/5/2024  8:54 AM Luke Mclain MD             ED Provider  Attending physically available and evaluated [place] Jr.. I managed the patient along with the ED Attending.    Electronically Signed by           Kristy Green MD  06/30/24 1121

## 2024-06-26 NOTE — ED ATTENDING ATTESTATION
Final Diagnoses:     1. Chest pain      ED Course as of 06/27/24 9769   Wed Jun 26, 2024   1722 POCUS Cardiac/IVC + POCUS Lung:  - transthoracic echocardiogram was performed at bedside by myself and resident.   - Images collected were poor quality.   - This was a technically difficult study due to lung interference.   - Apical, parasternal, subcostal views were obtained/attempted.   - The main purpose of this study was to r/o obvious pathology requiring emergent intervention as in summary.   - Many views/images obtained for educational reasons.   - Findings:    There was no obvious pericardial effusion:   LV function couldn't be seen.    RV function couldn't be seen     IVC was IVC < 2.1cm; >50% compression w/ sniff likely RAP 3 mmHg     IVC Max: 1.9     CI: 100%   Lungs:    There was no obvious pleural effusion.     B-lines were not present anteriorly bilaterally at upper BLUE-point (3+ B-lines in 2+ fields w/ concern for HF/Cardiogenic pulmonary edema sensitivity 85-94%, specificity 92% LR+ 7.4-12; LR- 0.06-0.16)    Bilateral anterior lung sliding bilaterally present at upper BLUE-point (no pneumothorax, sen 91%; spec 98%)    Summary:   - Small IVC without B-lines. Unlikely fluid overload.   - There was no obvious anterior pneumothorax as normal lung sliding.   - There was no large pericardial effusion.    1921 hs TnI 0hr: 19     I, Gaurang Cash MD, saw and evaluated the patient. All available labs and X-rays were ordered by me or the resident / non-physician and have been reviewed by myself. I discussed the patient with the resident / non-physician and agree with the resident's / non-physician practitioner's findings and plan as documented in the resident's / non-physician practicitioner's note, except where noted.   At this point, I agree with the current assessment done in the ED.   I was present during key portions of all procedures performed unless otherwise stated.     HPI:  NURSING TRIAGE:    This is  a 79 y.o. male presenting for evaluation of CP.  Patient this afternoon started to have CP. Nothing particularly triggered it. It was continuous. Then went driving to see dentist. Turned back home b/c the pain was continuing.   Called EMS due to continued pain.    Has hx of AFib on Eliquis in July had a valve repair  Pressure over lower sternum  No radiation.  No diaphoresis  No Nausea/vomiting.  No dizziness/LH.    NTG helped resolve it, given by EMS but then caused a headache.   Eating drinking okay.  ASA en route.    Chief Complaint   Patient presents with    Chest Pain     CP starting around 330 pm today. EMS gave 324 mg ASA and 1 nitro with relief      PHYSICAL: ASSESSMENT + PLAN:   Pertinent: 2+ radials bilaterally equal.     General: VS reviewed  Appears in NAD  awake, alert.   Well-nourished, well-developed. Appears stated age.   Speaking normally in full sentences.   Head: Normocephalic, atraumatic  Eyes: EOM-I. No diplopia.   No hyphema.   No subconjunctival hemorrhages.  Symmetrical lids.   ENT: Atraumatic external nose and ears.    MMM  No malocclusion. No stridor. Normal phonation. No drooling. Normal swallowing.   Neck: No JVD.  CV: No pallor noted  No tachycardia  Lungs:   No tachypnea  No respiratory distress  Abd: soft nt nd no rebound/guarding  MSK:   FROM spontaneously, pain with lifting up arm (known rotator cuff disease on LEFT).  Skin: Dry, intact.   Neuro: Awake, alert, GCS15, CN II-XII grossly intact.   Motor grossly intact.  Psychiatric/Behavioral: interacting normally; appropriate mood/affect.    Exam: deferred    Vitals:    06/26/24 1700 06/26/24 1800 06/26/24 1845 06/26/24 1915   BP: 100/67 112/76  128/80   BP Location:    Right arm   Pulse: 84 85 89 88   Resp: 18 18 18 20   Temp:       TempSrc:       SpO2: 94% 96% 97% 96%    - Given patient's concerns, will do a cardiac workup.   - Will do an EKG for arrythmia, strain; troponin for same as per protocol for evaluation of ACS.   - CBC  for anemia; CMP for kidney function and electrolytes.   - Will check CXR for pneumonia, PTX, fluid overload  - Will do POCUS Cardiac to evaluate for pericardial effusion.   HEART score:  History 0=Slightly or non-suspicious   ECG 0=Normal   Age 2= > 65 years   Risk Factors 2= > 3 risk factors, or history of atherosclerotic disease   Troponin 0= Less than or equal to 12 ng/L   Total 4   - Disposition per workup.     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   Nursing note reviewed.   Vitals reviewed.   Orders placed by myself and/or advanced practitioner / resident.    Previous chart was reviewed  No language barrier.   History obtained from daughter, patient.    There are no limitations to the history obtained:     Past Medical: Past Surgical:    has a past medical history of Cardiac disease, GERD (gastroesophageal reflux disease), Hyperlipidemia, Hypertension, Irregular heart beat, Kidney stone, PAF (paroxysmal atrial fibrillation) (HCC), 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HCC) (2015), and s/p Medtronic dual chamber PPM with left bundle pacing lead 7/19/2023 (07/20/2023).  has a past surgical history that includes Other surgical history; Vascular surgery (Right); Esophagogastroduodenoscopy (N/A, 08/16/2016); Replacement total knee (Left, 04/01/2013); Hernia repair (Left); Hernia repair (Right, 1995); Cardiac electrophysiology procedure (N/A, 03/25/2022); Cardiac catheterization (N/A, 05/08/2023); pr replacement mitral valve w/cardiopulmonary byp (N/A, 07/12/2023); pr rplcmt aortic valve opn w/stentless  tissue valve (N/A, 2023); Cardiac electrophysiology procedure (N/A, 2023); Tonsillectomy; Cataract extraction (Bilateral); pr surgical arthroscopy shoulder w/rotator cuff rpr (Left, 2024); and pr surgical arthroscopy mariola w/coracoacrm ligm rls (Left, 2024).   Social: Cardiac (Echo/Cath)   Social History     Substance and Sexual Activity   Alcohol Use Not Currently    Comment: Denies any alcohol use per pt     Social History     Tobacco Use   Smoking Status Never   Smokeless Tobacco Never   Tobacco Comments    Never a smoker or use of any tobacco products per pt      Social History     Substance and Sexual Activity   Drug Use No    Comment: Denies any drug use per pt    Results for orders placed during the hospital encounter of 21    Echo complete with contrast if indicated    Narrative  [place] [id number] [phone]    Transthoracic Echocardiogram  2D, M-mode, Doppler, and Color Doppler    Study date:  2021    Patient: GERONIMO ARITA  MR number: [id number]  Account number: [id number]  : 1945  Age: 76 years  Gender: Male  Status: Inpatient  Location: Bedside  Height: 78 in  Weight: 242 lb  BP: 122/ 84 mmHg    Indications: Atrial fibrillation.    Diagnoses: I48.0 - Atrial fibrillation    Sonographer:  Florencia Meyers RDCS  Primary Physician:  Darinel Saucedo DO  Referring Physician:  Khalif Gibson MD  Group:  [facility]  Cardiology Fellow:  Michael Marie MD  Interpreting Physician:  Evan Ponce MD    SUMMARY    LEFT VENTRICLE:  Size was normal.  Systolic function was normal. Ejection fraction was estimated to be 60 %.  There were no regional wall motion abnormalities.  Wall thickness was mildly increased.    LEFT ATRIUM:  The atrium was mildly to moderately dilated.    MITRAL VALVE:  There was moderate regurgitation.    AORTIC VALVE:  There was mild to moderate  regurgitation.    TRICUSPID VALVE:  There was mild regurgitation.  Pulmonary artery systolic pressure was within the normal range.    HISTORY: PRIOR HISTORY: Atrial fibrillation. DVT. Aneurysm of ascending aorta.    PROCEDURE: The procedure was performed at the bedside. This was a routine study. The transthoracic approach was used. The study included complete 2D imaging, M-mode, complete spectral Doppler, and color Doppler. The heart rate was 86 bpm,  at the start of the study. Images were obtained from the parasternal, apical, subcostal, and suprasternal notch acoustic windows. Image quality was adequate.    LEFT VENTRICLE: Size was normal. Systolic function was normal. Ejection fraction was estimated to be 60 %. There were no regional wall motion abnormalities. Wall thickness was mildly increased. The changes were consistent with concentric  remodeling (increased wall thickness with normal wall mass). DOPPLER: Transmitral flow pattern: atrial fibrillation.    RIGHT VENTRICLE: The size was normal. Systolic function was normal.    LEFT ATRIUM: The atrium was mildly to moderately dilated.    RIGHT ATRIUM: The atrium was mildly dilated.    MITRAL VALVE: Valve structure was normal. There was normal leaflet separation. DOPPLER: The transmitral velocity was within the normal range. There was no evidence for stenosis. There was moderate regurgitation.    AORTIC VALVE: The valve was trileaflet. Leaflets exhibited mild to moderate calcification, normal cuspal separation, and sclerosis. DOPPLER: Transaortic velocity was within the normal range. There was no evidence for stenosis. There was  mild to moderate regurgitation.    TRICUSPID VALVE: The valve structure was normal. There was normal leaflet separation. DOPPLER: The transtricuspid velocity was within the normal range. There was no evidence for stenosis. There was mild regurgitation. Pulmonary artery  systolic pressure was within the normal range. Estimated peak PA  pressure was 27 mmHg.    PULMONIC VALVE: DOPPLER: The transpulmonic velocity was within the normal range. There was mild regurgitation.    PERICARDIUM: There was no pericardial effusion.    AORTA: The root exhibited normal size for BSA at 38 mm.    SYSTEMIC VEINS: IVC: The inferior vena cava was normal in size and course. Respirophasic changes were normal.    SYSTEM MEASUREMENT TABLES    2D  %FS: 28.01 %  Ao Diam: 3.58 cm  EDV(Teich): 122.74 ml  EF(Teich): 53.93 %  ESV(Teich): 56.54 ml  IVSd: 1.25 cm  LA Area: 31.62 cm2  LA Diam: 4.26 cm  LVEDV MOD A4C: 156.26 ml  LVEF MOD A4C: 60.45 %  LVESV MOD A4C: 61.8 ml  LVIDd: 5.08 cm  LVIDs: 3.66 cm  LVLd A4C: 8.59 cm  LVLs A4C: 7.52 cm  LVPWd: 1.33 cm  RA Area: 31.07 cm2  RVIDd: 4.15 cm  SV MOD A4C: 94.47 ml  SV(Teich): 66.2 ml    CW  AR Dec [place]: 3.14 m/s2  AR Dec Time: 1574.47 ms  AR PHT: 456.6 ms  AR Vmax: 4.92 m/s  AR maxP.95 mmHg  AV Env.Ti: 251.19 ms  AV VTI: 20.76 cm  AV Vmax: 1.24 m/s  AV Vmean: 0.83 m/s  AV maxP.2 mmHg  AV meanPG: 3.14 mmHg  TR Vmax: 2.38 m/s  TR maxP.7 mmHg    MM  TAPSE: 2.34 cm    PW  LVOT Env.Ti: 251.19 ms  LVOT VTI: 14.87 cm  LVOT Vmax: 0.85 m/s  LVOT Vmean: 0.59 m/s  LVOT maxP.9 mmHg  LVOT meanP.62 mmHg  MV A Luis A: 0.65 m/s  MV Dec [place]: 4.08 m/s2  MV DecT: 171.95 ms  MV E Luis A: 0.7 m/s  MV E/A Ratio: 1.08    [employer] Commission Accredited Echocardiography Laboratory    Prepared and electronically signed by    Evan Ponce MD  Signed 2021 14:53:21    Results for orders placed during the hospital encounter of 21    [place]    Narrative  [address] [address] [phone]    Transesophageal Echocardiogram  2D, Doppler, and Color Doppler    Study date:  2021    Patient: GERONIMO ARITA  MR number: [id number]  Account number: [id number]  : 1945  Age: 76 years  Gender: Male  Status: Outpatient  Location: Echo lab  Height: 78  "in  Weight: 242 lb  BP: 90/ 58 mmHg    Indications: Atrial Fibrillation    Diagnoses: I48.0 - Atrial fibrillation    Sonographer:  Song Guillen RDCS  Interpreting Physician:  Anthony Borges MD  Primary Physician:  Dionne Huston MD  Referring Physician:  Arturo Haney PA-C  Group:  [facility]  Cardiology Fellow:  Michael Marie MD  RN:  Dacia Dawson RN    SUMMARY    LEFT VENTRICLE:  Size was normal.  Systolic function was normal. Ejection fraction was estimated to be 55 %.  There were no regional wall motion abnormalities.    RIGHT VENTRICLE:  The ventricle was mildly dilated.  Systolic function was mildly reduced.    LEFT ATRIUM:  The atrium was dilated.    LEFT ATRIAL APPENDAGE:  The appendage was mildly dilated.  The function was moderately reduced (moderately reduced emptying velocity).  No thrombus was identified.  There was mild spontaneous echo contrast (\"smoke\") in the appendage.    ATRIAL SEPTUM:  No defect or patent foramen ovale was identified.  Doppler evaluation was performed. There was no right-to-left shunt, in the baseline state.    MITRAL VALVE:  There was mild to moderate regurgitation.    AORTIC VALVE:  There was moderate regurgitation.  No flow reversal was detected in the descending thoracic aorta.    TRICUSPID VALVE:  There was mild to moderate regurgitation.  Pulmonary artery systolic pressure was within the normal range.  Estimated peak PA pressure was 25 mmHg.    PULMONIC VALVE:  There was moderate regurgitation.    AORTA:  The root exhibited dilatation at 41 mm.  There was dilatation of the ascending aorta at 44 mm.    HISTORY: PRIOR HISTORY: MR, HTN, Sepsis, DVT    PROCEDURE: The procedure was performed in the echo lab. This was a routine study. The risks and alternatives of the procedure were explained to the patient and informed consent was obtained. The transesophageal approach was used. The study  included complete 2D imaging, complete spectral Doppler, " "and color Doppler. The heart rate was 92 bpm, at the start of the study. An adult omniplane probe was inserted by the attending cardiologist. Intubated with ease. One intubation  attempt(s). There was blood detected on the probe. Image quality was adequate. There were no complications during the procedure. MEDICATIONS: Anesthesia administered by anesthesia team.    LEFT VENTRICLE: Size was normal. Systolic function was normal. Ejection fraction was estimated to be 55 %. There were no regional wall motion abnormalities. Wall thickness was normal. DOPPLER: The study was not technically sufficient to  allow evaluation of LV diastolic function.    RIGHT VENTRICLE: The ventricle was mildly dilated. Systolic function was mildly reduced.    LEFT ATRIUM: The atrium was dilated. No thrombus was identified. APPENDAGE: The appendage was mildly dilated. No thrombus was identified. There was mild spontaneous echo contrast (\"smoke\") in the appendage. DOPPLER: The function was  moderately reduced (moderately reduced emptying velocity).    ATRIAL SEPTUM: No defect or patent foramen ovale was identified. Doppler evaluation was performed. There was no right-to-left shunt, in the baseline state.    RIGHT ATRIUM: Size was normal. No thrombus was identified.    MITRAL VALVE: Valve structure was normal. There was normal leaflet separation. There was no echocardiographic evidence of vegetation. DOPPLER: There was mild to moderate regurgitation.    AORTIC VALVE: The valve was trileaflet. Leaflets exhibited normal thickness and normal cuspal separation. There was no echocardiographic evidence of vegetation. DOPPLER: There was moderate regurgitation. No flow reversal was detected in  the descending thoracic aorta.    TRICUSPID VALVE: The valve structure was normal. There was normal leaflet separation. There was no echocardiographic evidence of vegetation. DOPPLER: There was mild to moderate regurgitation. Pulmonary artery systolic pressure " was within  the normal range. Estimated peak PA pressure was 25 mmHg.    PULMONIC VALVE: Leaflets exhibited normal thickness, no calcification, and normal cuspal separation. There was no echocardiographic evidence of vegetation. DOPPLER: There was moderate regurgitation.    PERICARDIUM: There was no pericardial effusion.    AORTA: The root exhibited dilatation at 41 mm. There was dilatation of the ascending aorta at 44 mm. There was no atheroma. There was no evidence for dissection. There was no evidence for aneurysm.    [facility] Accredited Echocardiography Laboratory    Prepared and electronically signed by    Anthony Borges MD  Signed 26-Feb-2021 12:16:25    No results found for this or any previous visit.     Labs: Imaging:   Labs Reviewed   COMPREHENSIVE METABOLIC PANEL - Abnormal       Result Value Ref Range Status    Sodium 141  135 - 147 mmol/L Final    Potassium 3.9  3.5 - 5.3 mmol/L Final    Chloride 106  96 - 108 mmol/L Final    CO2 28  21 - 32 mmol/L Final    ANION GAP 7  4 - 13 mmol/L Final    BUN 13  5 - 25 mg/dL Final    Creatinine 0.82  0.60 - 1.30 mg/dL Final    Comment: Standardized to IDMS reference method    Glucose 87  65 - 140 mg/dL Final    Comment: If the patient is fasting, the ADA then defines impaired fasting glucose as > 100 mg/dL and diabetes as > or equal to 123 mg/dL.    Calcium 9.6  8.4 - 10.2 mg/dL Final    AST 20  13 - 39 U/L Final    ALT 14  7 - 52 U/L Final    Comment: Specimen collection should occur prior to Sulfasalazine administration due to the potential for falsely depressed results.     Alkaline Phosphatase 68  34 - 104 U/L Final    Total Protein 6.7  6.4 - 8.4 g/dL Final    Albumin 4.2  3.5 - 5.0 g/dL Final    Total Bilirubin 1.30 (*) 0.20 - 1.00 mg/dL Final    Comment: Use of this assay is not recommended for patients undergoing treatment with eltrombopag due to the potential for falsely elevated results.  N-acetyl-p-benzoquinone imine (metabolite of  "Acetaminophen) will generate erroneously low results in samples for patients that have taken an overdose of Acetaminophen.    eGFR 83  ml/min/1.73sq m Final    Narrative:     National Kidney Disease Foundation guidelines for Chronic Kidney Disease (CKD):     Stage 1 with normal or high GFR (GFR > 90 mL/min/1.73 square meters)    Stage 2 Mild CKD (GFR = 60-89 mL/min/1.73 square meters)    Stage 3A Moderate CKD (GFR = 45-59 mL/min/1.73 square meters)    Stage 3B Moderate CKD (GFR = 30-44 mL/min/1.73 square meters)    Stage 4 Severe CKD (GFR = 15-29 mL/min/1.73 square meters)    Stage 5 End Stage CKD (GFR <15 mL/min/1.73 square meters)  Note: GFR calculation is accurate only with a steady state creatinine   HS TROPONIN I 0HR - Normal    hs TnI 0hr 19  \"Refer to ACS Flowchart\"- see link ng/L Final    Comment:                                              Initial (time 0) result  If >=50 ng/L, Myocardial injury suggested ;  Type of myocardial injury and treatment strategy  to be determined.  If 5-49 ng/L, a delta result at 2 hours and or 4 hours will be needed to further evaluate.  If <4 ng/L, and chest pain has been >3 hours since onset, patient may qualify for discharge based on the HEART score in the ED.  If <5 ng/L and <3hours since onset of chest pain, a delta result at 2 hours will be needed to further evaluate.    HS Troponin 99th Percentile URL of a Health Population=12 ng/L with a 95% Confidence Interval of 8-18 ng/L.    Second Troponin (time 2 hours)  If calculated delta >= 20 ng/L,  Myocardial injury suggested ; Type of myocardial injury and treatment strategy to be determined.  If 5-49 ng/L and the calculated delta is 5-19 ng/L, consult medical service for evaluation.  Continue evaluation for ischemia on ecg and other possible etiology and repeat hs troponin at 4 hours.  If delta is <5 ng/L at 2 hours, consider discharge based on risk stratification via the HEART score (if in ED), or RADHA risk score in " "IP/Observation.    HS Troponin 99th Percentile URL of a Health Population=12 ng/L with a 95% Confidence Interval of 8-18 ng/L.   HS TROPONIN I 2HR - Normal    hs TnI 2hr 21  \"Refer to ACS Flowchart\"- see link ng/L Final    Comment:                                              Initial (time 0) result  If >=50 ng/L, Myocardial injury suggested ;  Type of myocardial injury and treatment strategy  to be determined.  If 5-49 ng/L, a delta result at 2 hours and or 4 hours will be needed to further evaluate.  If <4 ng/L, and chest pain has been >3 hours since onset, patient may qualify for discharge based on the HEART score in the ED.  If <5 ng/L and <3hours since onset of chest pain, a delta result at 2 hours will be needed to further evaluate.    HS Troponin 99th Percentile URL of a Health Population=12 ng/L with a 95% Confidence Interval of 8-18 ng/L.    Second Troponin (time 2 hours)  If calculated delta >= 20 ng/L,  Myocardial injury suggested ; Type of myocardial injury and treatment strategy to be determined.  If 5-49 ng/L and the calculated delta is 5-19 ng/L, consult medical service for evaluation.  Continue evaluation for ischemia on ecg and other possible etiology and repeat hs troponin at 4 hours.  If delta is <5 ng/L at 2 hours, consider discharge based on risk stratification via the HEART score (if in ED), or RADHA risk score in IP/Observation.    HS Troponin 99th Percentile URL of a Health Population=12 ng/L with a 95% Confidence Interval of 8-18 ng/L.    Delta 2hr hsTnI 2  <20 ng/L Final   CBC AND DIFFERENTIAL    WBC 6.68  4.31 - 10.16 Thousand/uL Final    RBC 5.11  3.88 - 5.62 Million/uL Final    Hemoglobin 16.9  12.0 - 17.0 g/dL Final    Hematocrit 48.6  36.5 - 49.3 % Final    MCV 95  82 - 98 fL Final    MCH 33.1  26.8 - 34.3 pg Final    MCHC 34.8  31.4 - 37.4 g/dL Final    RDW 13.3  11.6 - 15.1 % Final    MPV 9.5  8.9 - 12.7 fL Final    Platelets 163  149 - 390 Thousands/uL Final    nRBC 0  /100 WBCs " Final    Segmented % 54  43 - 75 % Final    Immature Grans % 0  0 - 2 % Final    Lymphocytes % 32  14 - 44 % Final    Monocytes % 9  4 - 12 % Final    Eosinophils Relative 4  0 - 6 % Final    Basophils Relative 1  0 - 1 % Final    Absolute Neutrophils 3.64  1.85 - 7.62 Thousands/µL Final    Absolute Immature Grans 0.01  0.00 - 0.20 Thousand/uL Final    Absolute Lymphocytes 2.12  0.60 - 4.47 Thousands/µL Final    Absolute Monocytes 0.57  0.17 - 1.22 Thousand/µL Final    Eosinophils Absolute 0.29  0.00 - 0.61 Thousand/µL Final    Basophils Absolute 0.05  0.00 - 0.10 Thousands/µL Final    XR chest 1 view portable   ED Interpretation   No obvious effusion.  Borderline widened mediastinum in this portable film.       Final Result      No acute cardiopulmonary disease.            Workstation performed: [id number]            Medications: Code Status:   Medications - No data to display Code Status: Prior  Advance Directive and Living Will:      Power of :    POLST:       Orders Placed This Encounter   Procedures    XR chest 1 view portable    CBC and differential    Comprehensive metabolic panel    HS Troponin 0hr (reflex protocol)    HS Troponin I 2hr    Continuous cardiac monitoring    Continuous pulse oximetry    ECG 12 lead    ECG 12 lead     Time reflects when diagnosis was documented in both MDM as applicable and the Disposition within this note       Time User Action Codes Description Comment    6/26/2024  8:25 PM Kristy Green Add [R07.9] Chest pain           ED Disposition       ED Disposition   Discharge    Condition   Stable    Date/Time   Wed Jun 26, 2024  8:25 PM    Comment   [place] discharge to home/self care.                   Follow-up Information       Follow up With Specialties Details Why Contact Info Additional Information    Amrik Bonner DO [address] [address] [phone]       [facility] [place]  Gastroenterology   [address]  [address]  [facility] [address]  [phone] [facility] [place], [address], [address], [place], [place], [id number] [phone]          Discharge Medication List as of 6/26/2024  8:28 PM        CONTINUE these medications which have NOT CHANGED    Details   apixaban (Eliquis) 5 mg Take 1 tablet (5 mg total) by mouth 2 (two) times a day, Starting Fri 2/2/2024, Normal      aspirin (ECOTRIN LOW STRENGTH) 81 mg EC tablet Take 1 tablet (81 mg total) by mouth daily, Starting Mon 5/8/2023, No Print      metoprolol tartrate (LOPRESSOR) 25 mg tablet Take 0.5 tablets (12.5 mg total) by mouth every 12 (twelve) hours, Starting Thu 7/20/2023, Normal      Probiotic Product (PROBIOTIC-10 PO) Take 1 capsule by mouth daily, Historical Med      rosuvastatin (CRESTOR) 20 MG tablet Take 1 tablet (20 mg total) by mouth daily, Starting Mon 6/10/2024, Normal      Specialty Vitamins Products (Advanced Collagen) TABS Take by mouth, Historical Med      torsemide (DEMADEX) 10 mg tablet One tab daily for 3 days and then when instructed, Normal      !! VITAMIN D PO Take by mouth, Historical Med      !! Vitamin D, Cholecalciferol, 1000 units CAPS Take 2,000 Units by mouth daily, Historical Med       !! - Potential duplicate medications found. Please discuss with provider.        No discharge procedures on file.  Prior to Admission Medications   Prescriptions Last Dose Informant Patient Reported? Taking?   Probiotic Product (PROBIOTIC-10 PO)  Self Yes No   Sig: Take 1 capsule by mouth daily   Specialty Vitamins Products (Advanced Collagen) TABS   Yes No   Sig: Take by mouth   VITAMIN D PO  Self Yes No   Sig: Take by mouth   Vitamin D, Cholecalciferol, 1000 units CAPS  Self Yes No   Sig: Take 2,000 Units by mouth daily   apixaban (Eliquis) 5 mg   No No   Sig: Take 1 tablet (5 mg total) by mouth 2 (two) times a day   aspirin (ECOTRIN LOW STRENGTH) 81 mg EC tablet  " Self No No   Sig: Take 1 tablet (81 mg total) by mouth daily   metoprolol tartrate (LOPRESSOR) 25 mg tablet  Self No No   Sig: Take 0.5 tablets (12.5 mg total) by mouth every 12 (twelve) hours   Patient taking differently: Take 50 mg by mouth every 12 (twelve) hours   rosuvastatin (CRESTOR) 20 MG tablet   No No   Sig: Take 1 tablet (20 mg total) by mouth daily   torsemide (DEMADEX) 10 mg tablet  Self No No   Sig: One tab daily for 3 days and then when instructed      Facility-Administered Medications: None                        Portions of the record may have been created with voice recognition software. Occasional wrong word or \"sound a like\" substitutions may have occurred due to the inherent limitations of voice recognition software. Read the chart carefully and recognize, using context, where substitutions have occurred.    Electronically signed by:  Gaurang Cash  "

## 2024-06-26 NOTE — ED NOTES
This RN called lab to find out about results of troponin. Lab reports that they have the specimen and should result within 20 mins.      Cristina Artis RN  06/26/24 [id number]

## 2024-06-27 NOTE — DISCHARGE INSTRUCTIONS
You were seen in the Emergency Department today for chest pain.    Please follow up with your primary care doctor.   Please return to the Emergency Department if you experience worsening of your current symptoms or any other concerning symptoms.

## 2024-07-09 ENCOUNTER — APPOINTMENT (OUTPATIENT)
Dept: LAB | Facility: MEDICAL CENTER | Age: 79
End: 2024-07-09
Payer: MEDICARE

## 2024-07-09 DIAGNOSIS — E66.9 LIFELONG OBESITY: ICD-10-CM

## 2024-07-09 LAB
ALBUMIN SERPL BCG-MCNC: 4.2 G/DL (ref 3.5–5)
ALP SERPL-CCNC: 66 U/L (ref 34–104)
ALT SERPL W P-5'-P-CCNC: 12 U/L (ref 7–52)
ANION GAP SERPL CALCULATED.3IONS-SCNC: 11 MMOL/L (ref 4–13)
AST SERPL W P-5'-P-CCNC: 22 U/L (ref 13–39)
BASOPHILS # BLD AUTO: 0.07 THOUSANDS/ÂΜL (ref 0–0.1)
BASOPHILS NFR BLD AUTO: 1 % (ref 0–1)
BILIRUB SERPL-MCNC: 1.5 MG/DL (ref 0.2–1)
BUN SERPL-MCNC: 14 MG/DL (ref 5–25)
CALCIUM SERPL-MCNC: 9.8 MG/DL (ref 8.4–10.2)
CHLORIDE SERPL-SCNC: 107 MMOL/L (ref 96–108)
CHOLEST SERPL-MCNC: 118 MG/DL
CO2 SERPL-SCNC: 26 MMOL/L (ref 21–32)
CREAT SERPL-MCNC: 0.97 MG/DL (ref 0.6–1.3)
EOSINOPHIL # BLD AUTO: 0.21 THOUSAND/ÂΜL (ref 0–0.61)
EOSINOPHIL NFR BLD AUTO: 3 % (ref 0–6)
ERYTHROCYTE [DISTWIDTH] IN BLOOD BY AUTOMATED COUNT: 12.9 % (ref 11.6–15.1)
EST. AVERAGE GLUCOSE BLD GHB EST-MCNC: 117 MG/DL
GFR SERPL CREATININE-BSD FRML MDRD: 73 ML/MIN/1.73SQ M
GLUCOSE P FAST SERPL-MCNC: 101 MG/DL (ref 65–99)
HBA1C MFR BLD: 5.7 %
HCT VFR BLD AUTO: 51.9 % (ref 36.5–49.3)
HDLC SERPL-MCNC: 44 MG/DL
HGB BLD-MCNC: 18.1 G/DL (ref 12–17)
IMM GRANULOCYTES # BLD AUTO: 0.01 THOUSAND/UL (ref 0–0.2)
IMM GRANULOCYTES NFR BLD AUTO: 0 % (ref 0–2)
INSULIN SERPL-ACNC: 8.23 UIU/ML (ref 1.9–23)
LDLC SERPL CALC-MCNC: 58 MG/DL (ref 0–100)
LDLC SERPL DIRECT ASSAY-MCNC: 63 MG/DL (ref 0–100)
LYMPHOCYTES # BLD AUTO: 1.77 THOUSANDS/ÂΜL (ref 0.6–4.47)
LYMPHOCYTES NFR BLD AUTO: 29 % (ref 14–44)
MCH RBC QN AUTO: 32.8 PG (ref 26.8–34.3)
MCHC RBC AUTO-ENTMCNC: 34.9 G/DL (ref 31.4–37.4)
MCV RBC AUTO: 94 FL (ref 82–98)
MONOCYTES # BLD AUTO: 0.58 THOUSAND/ÂΜL (ref 0.17–1.22)
MONOCYTES NFR BLD AUTO: 10 % (ref 4–12)
NEUTROPHILS # BLD AUTO: 3.47 THOUSANDS/ÂΜL (ref 1.85–7.62)
NEUTS SEG NFR BLD AUTO: 57 % (ref 43–75)
NONHDLC SERPL-MCNC: 74 MG/DL
NRBC BLD AUTO-RTO: 0 /100 WBCS
PLATELET # BLD AUTO: 162 THOUSANDS/UL (ref 149–390)
PMV BLD AUTO: 10.6 FL (ref 8.9–12.7)
POTASSIUM SERPL-SCNC: 4.1 MMOL/L (ref 3.5–5.3)
PROT SERPL-MCNC: 6.9 G/DL (ref 6.4–8.4)
RBC # BLD AUTO: 5.51 MILLION/UL (ref 3.88–5.62)
SODIUM SERPL-SCNC: 144 MMOL/L (ref 135–147)
TRIGL SERPL-MCNC: 78 MG/DL
WBC # BLD AUTO: 6.11 THOUSAND/UL (ref 4.31–10.16)

## 2024-07-09 PROCEDURE — 80061 LIPID PANEL: CPT

## 2024-07-09 PROCEDURE — 80053 COMPREHEN METABOLIC PANEL: CPT

## 2024-07-09 PROCEDURE — 83721 ASSAY OF BLOOD LIPOPROTEIN: CPT

## 2024-07-09 PROCEDURE — 83036 HEMOGLOBIN GLYCOSYLATED A1C: CPT

## 2024-07-09 PROCEDURE — 83525 ASSAY OF INSULIN: CPT

## 2024-07-09 PROCEDURE — 85025 COMPLETE CBC W/AUTO DIFF WBC: CPT

## 2024-07-18 ENCOUNTER — ESTABLISHED COMPREHENSIVE EXAM (OUTPATIENT)
Dept: URBAN - METROPOLITAN AREA CLINIC 6 | Facility: CLINIC | Age: 79
End: 2024-07-18

## 2024-07-18 DIAGNOSIS — H04.123: ICD-10-CM

## 2024-07-18 DIAGNOSIS — H43.813: ICD-10-CM

## 2024-07-18 DIAGNOSIS — H53.2: ICD-10-CM

## 2024-07-18 PROCEDURE — 92014 COMPRE OPH EXAM EST PT 1/>: CPT

## 2024-07-18 ASSESSMENT — VISUAL ACUITY
OS_SC: 20/20
OD_SC: 20/20-2

## 2024-07-18 ASSESSMENT — TONOMETRY
OS_IOP_MMHG: 11
OD_IOP_MMHG: 10

## 2024-08-09 ENCOUNTER — REMOTE DEVICE CLINIC VISIT (OUTPATIENT)
Dept: CARDIOLOGY CLINIC | Facility: CLINIC | Age: 79
End: 2024-08-09
Payer: MEDICARE

## 2024-08-09 DIAGNOSIS — Z95.0 PRESENCE OF PERMANENT CARDIAC PACEMAKER: Primary | ICD-10-CM

## 2024-08-09 PROCEDURE — 93296 REM INTERROG EVL PM/IDS: CPT | Performed by: STUDENT IN AN ORGANIZED HEALTH CARE EDUCATION/TRAINING PROGRAM

## 2024-08-09 PROCEDURE — 93294 REM INTERROG EVL PM/LDLS PM: CPT | Performed by: STUDENT IN AN ORGANIZED HEALTH CARE EDUCATION/TRAINING PROGRAM

## 2024-08-09 NOTE — PROGRESS NOTES
Results for orders placed or performed in visit on 08/09/24   Cardiac EP device report    Narrative    MDT D-PM/ACTIVE SYSTEM IS MRI CONDITIONAL  CARELINK TRANSMISSION: BATTERY VOLTAGE ADEQUATE (13.3 YRS). AP <0.1%  56.1% (AAIR-DDDR 60/MODE SWITCH); ALL AVAILABLE LEAD PARAMETERS WITHIN NORMAL LIMITS. 1 PERSISTENT AF EPISODE REMAINS IN PROGRESS SINCE 7/20/2023 (BURDEN 100%); 13 SVT-AF EPISODES W/RVR ON AVAILABLE EGM'S - SOME EPISODES RATES >200 BPM; MAX EPISODE DURATION 06:03 MINS; EF 45% (7/17/2023 RANDALL); HX: SAME & PATIENT TAKING ASA 81, ELIQUIS, METO TART; NORMAL DEVICE FUNCTION.  ES

## 2024-09-05 ENCOUNTER — NURSE TRIAGE (OUTPATIENT)
Age: 79
End: 2024-09-05

## 2024-09-05 NOTE — TELEPHONE ENCOUNTER
"Reason for Disposition  • MODERATE longstanding difficulty breathing (e.g., speaks in phrases, SOB even at rest, pulse 100-120) and SAME as normal    Answer Assessment - Initial Assessment Questions  1. RESPIRATORY STATUS: \"Describe your breathing?\" (e.g., wheezing, shortness of breath, unable to speak, severe coughing)       Shortness of breath with activity  2. ONSET: \"When did this breathing problem begin?\"       Month ago   3. PATTERN \"Does the difficult breathing come and go, or has it been constant since it started?\"       Comes and goes  4. SEVERITY: \"How bad is your breathing?\" (e.g., mild, moderate, severe)     - MILD: No SOB at rest, mild SOB with walking, speaks normally in sentences, can lay down, no retractions, pulse < 100.     - MODERATE: SOB at rest, SOB with minimal exertion and prefers to sit, cannot lie down flat, speaks in phrases, mild retractions, audible wheezing, pulse 100-120.     - SEVERE: Very SOB at rest, speaks in single words, struggling to breathe, sitting hunched forward, retractions, pulse > 120       Mild   5. RECURRENT SYMPTOM: \"Have you had difficulty breathing before?\" If Yes, ask: \"When was the last time?\" and \"What happened that time?\"       Occurred today, could not do physical therapy today  6. CARDIAC HISTORY: \"Do you have any history of heart disease?\" (e.g., heart attack, angina, bypass surgery, angioplasty)       2 lead pacer, open heart surgery a year ago- AV replacement, atrial fibrillation  7. LUNG HISTORY: \"Do you have any history of lung disease?\"  (e.g., pulmonary embolus, asthma, emphysema)      Denies   8. CAUSE: \"What do you think is causing the breathing problem?\"       Unsure   9. OTHER SYMPTOMS: \"Do you have any other symptoms? (e.g., dizziness, runny nose, cough, chest pain, fever)      Dizziness with riding bike at PT  11. TRAVEL: \"Have you traveled out of the country in the last month?\" (e.g., travel history, exposures)        Denies    Protocols used: " Breathing Difficulty-ADULT-[place]

## 2024-09-05 NOTE — TELEPHONE ENCOUNTER
Chief Complaint: shortness of breath    History of Present Illness (HPI): pt states he's been getting shortness of breath with exertion for a month.  He could not complete physical therapy today as he was SOB with riding the bike.  Denies chest pain.  He did have some dizziness while riding the bike.     VS/Weight: unknown    Pain: No    Risk Factors: Hypertension, Devices, and Arrhythmia    Recent Testing: Other device report 8/9    Medicine: eliquis 5 mg bid, crestor 20 mg qd, lopressor 12.5 mg bid, aspirin 81 mg qd  Upcoming Office Visit: Yes- 9/9 Aleida ESQUEDA    Last Office Visit: 11/7/23    Additional Comments: scheduled pt visit 9/9 to see Aleida ESQUEDA

## 2024-09-06 NOTE — PROGRESS NOTES
General Cardiology Note  [place]  1945  [id number]  [facility]  [address]  [place] [id number] [phone]    Referring Provider - No ref. provider found    1. Permanent atrial fibrillation (HCC)  -     POCT ECG  2. S/P left atrial appendage ligation  3. Nonrheumatic mitral valve regurgitation  4. S/P MVR (mitral valve repair)  -     POCT ECG  -     XR chest pa and lateral; Future; Expected date: 09/09/2024  -     B-Type Natriuretic Peptide(BNP); Future  5. Nonrheumatic aortic valve insufficiency  6. S/P AVR  -     POCT ECG  -     XR chest pa and lateral; Future; Expected date: 09/09/2024  -     B-Type Natriuretic Peptide(BNP); Future  7. Aneurysm of ascending aorta without rupture (HCC)  8. Pulmonary hypertension (HCC)  9. Coronary artery disease involving native coronary artery of native heart without angina pectoris  -     POCT ECG  10. Benign essential hypertension  11. History of DVT (deep vein thrombosis)  12. Mixed hyperlipidemia  13. s/p Medtronic dual chamber PPM with left bundle pacing lead 7/19/2023    Assessment  -He reports dyspnea on exertion and chronic leg swelling. No other associated symptoms  - Last echo in July 2023 with LVEF 45%.  Repeat echo was ordered but patient has not completed yet.    -CT chest in 2022 the ascending aorta 4.5 cm and in July 2023 TTE shows ascending aorta 4.4 cm.  - At an earlier visit, he was prescribed torsemide 10 mg daily for 3 days for shortness of breath. Cannot remember if he took them or if it helped with the symptoms.  - July 2024 lab work creat 0.97, GFR 77, normal Na, K+, total bilirubin elevated 1.50  - He stopped taking his aspirin due to bruising.  He is on statin and beta-blocker  - Atrial fibrillation persistent per recent device check. Rhythm regular on exam. EKG today Vpaced, PVC. He is On Eliquis and lopressor   - Dual Chamber Pacemaker with septal lead July 2023  - Hypertension  controlled on metoprolol 12.5 mg twice daily.    - Lipids well controlled on Crestor  LDL 63 in August 2024  - Hx DVT.  On Eliquis    Cardiac testing  July 2023 RANDALL: LVEF 45%, mild global hypokinesis.prosthetic valve appears well-seated.There is no evidence of paravalvular regurgitation. There is trace transvalvular regurgitation.  Mild to moderate TR.The ascending aorta is 4.4 cm   8/9/2024 device check report  AP <0.1%  56.1% (AAIR-DDDR 60/MODE SWITCH); ALL AVAILABLE LEAD PARAMETERS WITHIN NORMAL LIMITS. 1 PERSISTENT AF EPISODE REMAINS IN PROGRESS SINCE 7/20/2023 (BURDEN 100%); 13 SVT-AF EPISODES W/RVR ON AVAILABLE EGM'S - SOME EPISODES RATES >200 BPM; MAX EPISODE DURATION 06:03 MINS; EF 45% (7/17/2023 RANDALL)   Last EKG June 2024: Atrial fibrillation with premature ventricular or aberrantly conducted complexes. Incomplete right bundle branch block. Left anterior fascicular block  May 2023 cardiac cath:  1-vessel CAD w/ significant OM1 lesion . F/U with CT surgery for AVR, MVR & CABG x1 (OM1 target)     Plan Summary  - No medication changes today  - BNP + Repeat echocardiogram ordered by Dr Brown to monitor heart function and valvular status  - Though negative exam. Will order chest xray to rule out pneumonia and vascular congestion.   - post test result we can determine if he requires diuretics  - Reinforced low sodium, heart healthy diet and lifestyle management, activity and symptom monitoring    - Patient is deconditioned, he is not very active. Can consider cardiac rehab  - Advised to call the office or go to ER if symptoms worsen     Will RTO in 1 months with Dr Brown or sooner if necessary. Will call with any concerns.     Interval History: 79 y.o.  male  with PMH as below presents for urgent follow-up for shortness of breath. Follows with Dr. Brown  He has history of persistent A-fib. He has had 2 ablations in the past .  Per device check since July 2023, 13 episodes of SVT-A-fib W/RVR.  AVR and MVR  valve replacement and maze intervention in July 2023; He had postop bradycardia necessitating a dual-chamber pacer. At his last visit with Dr. Brown, he did report a significant change in his breathing following valve repair and replacement.     -Hx of bilateral popliteal artery aneurysms treated with bypass and exclusion procedures in 2016. The leg sellling is about the same, has been wearing compression stockings but have not really helped. Follows with vascular.  -The SOB started not too long after heart surgery. He completed cardiac rehab and states that the symptoms have not gotten better since and worsening. Now he feels like he can't do PT. He can''t take a long walk anymore and needs frequent rest. When he climbs the stairs he is out of breath. Denies dizziness.   He rolls around in his chair to complete some activities.   -States that he used to be able to feel the a fib but since surgery he has limited sensitivity now. He has been sleeping in recliner because of left rotator cuff injury. Denies chest pain/pressure, palpitations, diaphoresis, lightheadedness, dizziness, syncope, visual changes, nausea, vomiting. Denies sudden weight gain. States that he has been taking medications as prescribed.  He stopped his aspirin on his own because of bruising.  BP has been stable. Usually 120/80. He has not been maintaining a heart healthy diet, likes to eat pizza . Reports no issues with sleep.  Denies use of alcohol.  Denies smoking.  denies drug use.      Patient Active Problem List    Diagnosis Date Noted    Other injury of muscle(s) and tendon(s) of the rotator cuff of left shoulder, initial encounter 04/05/2024    s/p Medtronic dual chamber PPM with left bundle pacing lead 7/19/2023 07/20/2023    S/P AVR 07/12/2023    S/P MVR (mitral valve repair) 07/12/2023    S/P left atrial appendage ligation 07/12/2023    Hyperchloremia 07/12/2023    Coronary artery disease involving native coronary artery 05/08/2023     Aortic insufficiency 04/21/2023    Permanent atrial fibrillation ([facility]) 04/21/2023    Chronic osteomyelitis of right tibia with draining sinus ([facility]) 01/24/2023    PAD (peripheral artery disease) ([facility]) 06/22/2021    Pulmonary hypertension ([facility]) 02/22/2021    Erythrocytosis 02/22/2021    Streptococcal bacteremia 08/17/2020    Callus of foot 08/16/2020    Hypoxia 08/15/2020    Aneurysm of ascending aorta ([facility]) 04/11/2019    Mixed hyperlipidemia 06/29/2018    Mitral valve insufficiency 10/31/2017    Paroxysmal atrial fibrillation ([facility]) 11/18/2016    History of DVT (deep vein thrombosis) 08/15/2016    Hypertension     Atrial fibrillation with RVR ([facility]) 08/13/2016    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[facility]) 08/13/2016    Bilateral popliteal artery aneurysm ([facility]) 07/12/2016    Benign essential hypertension 05/11/2009     Past Medical History:   Diagnosis Date    Cardiac disease     GERD (gastroesophageal reflux disease)     occasional - per pt takes TUMS if needed    Hyperlipidemia     Hypertension     Irregular heart beat     Kidney stone     PAF (paroxysmal atrial fibrillation) ([facility])     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[facility]) 2015    BILATERALLY    s/p Medtronic dual chamber PPM with left bundle pacing lead 7/19/2023 07/20/2023     Social History     Tobacco Use    Smoking status: Never    Smokeless tobacco: Never    Tobacco comments:     Never a smoker or use of any tobacco products per pt    Vaping Use    Vaping status: Never Used   Substance Use Topics    Alcohol use: Not Currently     Comment: Denies any alcohol use per pt    Drug use: No     Comment: Denies any drug use per pt      Family History   Problem Relation Age of Onset    Pancreatic cancer Mother     Stroke Father     Heart attack Paternal Grandmother     Anesthesia problems Neg Hx      Past Surgical History:   Procedure Laterality Date    CARDIAC CATHETERIZATION N/A 05/08/2023    Procedure: Cardiac Coronary Angiogram;  Surgeon: Anirudh Mo DO;  Location: [facility];   Service: Cardiology    CARDIAC ELECTROPHYSIOLOGY PROCEDURE N/A 03/25/2022    Procedure: Cardiac eps/afib ablation;  Surgeon: Javon Valdes MD;  Location: [facility];  Service: Cardiology    CARDIAC ELECTROPHYSIOLOGY PROCEDURE N/A 07/19/2023    Procedure: Cardiac pacer implant;  Surgeon: Steven Palacio MD;  Location: [facility];  Service: Cardiology    CATARACT EXTRACTION Bilateral     ESOPHAGOGASTRODUODENOSCOPY N/A 08/16/2016    Procedure: ESOPHAGOGASTRODUODENOSCOPY (EGD);  Surgeon: Lucio Barnes MD;  Location: [facility];  Service:     HERNIA REPAIR Left     groin    HERNIA REPAIR Right 1995    OTHER SURGICAL HISTORY      GA REPLACEMENT MITRAL VALVE W/CARDIOPULMONARY BYP N/A 07/12/2023    Procedure: MITRAL VALVE REPAIR WITH 36MM PHYSIO II ANNULOPLASTY RING;  Surgeon: MODESTO Álvarez MD;  Location: [facility];  Service: Cardiac Surgery    GA RPLCMT AORTIC VALVE OPN W/STENTLESS TISSUE VALVE N/A 07/12/2023    Procedure: REPLACEMENT VALVE AORTIC (AVR) WITH 29MM INSPIRIS TISSUE RESILIA VALVE. LEFT ATRIAL APPENDAGE LIGATION WITH 45 MM ATRICLIP;  Surgeon: MODESTO Álvarez MD;  Location: [facility];  Service: Cardiac Surgery    GA SURGICAL ARTHROSCOPY ANDRES W/CORACOACRM LIGM RLS Left 4/5/2024    Procedure: SHOULDER DIAGNOSTIC GLENOHUMERAL ARTHROSCOPY W/ ROTATOR CUFF REPAIR, SUBACROMIAL DECOMPRESSION W/ ACROMIPLASTY, BICEPS TENOTOMY, LABRAL DEBRIDEMENT;  Surgeon: Luke Mclain MD;  Location: [facility];  Service: Orthopedics    GA SURGICAL ARTHROSCOPY SHOULDER W/ROTATOR CUFF RPR Left 4/5/2024    Procedure: SHOULDER DIAGNOSTIC GLENOHUMERAL ARTHROSCOPY W/ ROTATOR CUFF REPAIR, SUBACROMIAL DECOMPRESSION W/ ACROMIPLASTY, BICEPS TENOTOMY, LABRAL DEBRIDEMENT;  Surgeon: Luke Mclain MD;  Location: [facility];  Service: Orthopedics    REPLACEMENT TOTAL KNEE Left 04/01/2013    TONSILLECTOMY      child surgery    VASCULAR SURGERY Right     POPLITEAL BYPASS DUE TO ANEURYSM       Current Outpatient  "Medications:     apixaban (Eliquis) 5 mg, Take 1 tablet (5 mg total) by mouth 2 (two) times a day (Patient taking differently: Take 5 mg by mouth in the morning), Disp: 180 tablet, Rfl: 3    metoprolol tartrate (LOPRESSOR) 25 mg tablet, Take 0.5 tablets (12.5 mg total) by mouth every 12 (twelve) hours (Patient taking differently: Take 50 mg by mouth every 12 (twelve) hours), Disp: 30 tablet, Rfl: 2    Probiotic Product (PROBIOTIC-10 PO), Take 1 capsule by mouth daily, Disp: , Rfl:     rosuvastatin (CRESTOR) 20 MG tablet, Take 1 tablet (20 mg total) by mouth daily, Disp: 90 tablet, Rfl: 0    Specialty Vitamins Products (Advanced Collagen) TABS, Take by mouth, Disp: , Rfl:     VITAMIN D PO, Take by mouth, Disp: , Rfl:     aspirin (ECOTRIN LOW STRENGTH) 81 mg EC tablet, Take 1 tablet (81 mg total) by mouth daily, Disp: , Rfl: 0    torsemide (DEMADEX) 10 mg tablet, One tab daily for 3 days and then when instructed (Patient not taking: Reported on 9/9/2024), Disp: 10 tablet, Rfl: 3    Vitamin D, Cholecalciferol, 1000 units CAPS, Take 2,000 Units by mouth daily (Patient not taking: Reported on 9/9/2024), Disp: , Rfl:     Allergies   Allergen Reactions    Oxycodone GI Intolerance     nausea  nausea       Vitals:    09/09/24 0806   BP: 130/90   BP Location: Left arm   Patient Position: Sitting   Cuff Size: Large   Pulse: 85   Weight: 120 kg (263 lb 9.6 oz)   Height: 6' 6\" (1.981 m)        Vitals:    09/09/24 0806   Weight: 120 kg (263 lb 9.6 oz)      Height: 6' 6\" (198.1 cm)   Body mass index is 30.46 kg/m².    Labs:     Chemistry        Component Value Date/Time    K 4.1 07/09/2024 0837    K 4.2 05/03/2023 0915     07/09/2024 0837     05/03/2023 0915    CO2 26 07/09/2024 0837    CO2 21 07/12/2023 1342    CO2 28 05/03/2023 0915    BUN 14 07/09/2024 0837    BUN 16 05/03/2023 0915    BUN 17 11/23/2021 0705    CREATININE 0.97 07/09/2024 0837    CREATININE 0.88 05/03/2023 0915        Component Value Date/Time    " "CALCIUM 9.8 07/09/2024 0837    CALCIUM 9.5 05/03/2023 0915    ALKPHOS 66 07/09/2024 0837    ALKPHOS 57 05/03/2023 0915    AST 22 07/09/2024 0837    AST 21 05/03/2023 0915    AST 15 09/18/2021 0945    ALT 12 07/09/2024 0837    ALT 15 (L) 05/03/2023 0915    ALT 18 09/18/2021 0945          Lab Results   Component Value Date    HGBA1C 5.7 (H) 07/09/2024      No results found for: \"CHOL\"  Lab Results   Component Value Date    HDL 44 07/09/2024    HDL 48 05/08/2023     Lab Results   Component Value Date    LDLCALC 58 07/09/2024    LDLCALC 110 (H) 05/08/2023     Lab Results   Component Value Date    TRIG 78 07/09/2024    TRIG 93 05/08/2023     No results found for: \"CHOLHDL\"    Imaging: Cardiac EP device report    Result Date: 8/9/2024  Narrative: MDT D-PM/ACTIVE SYSTEM IS MRI CONDITIONAL CARELINK TRANSMISSION: BATTERY VOLTAGE ADEQUATE (13.3 YRS). AP <0.1%  56.1% (AAIR-DDDR 60/MODE SWITCH); ALL AVAILABLE LEAD PARAMETERS WITHIN NORMAL LIMITS. 1 PERSISTENT AF EPISODE REMAINS IN PROGRESS SINCE 7/20/2023 (BURDEN 100%); 13 SVT-AF EPISODES W/RVR ON AVAILABLE EGM'S - SOME EPISODES RATES >200 BPM; MAX EPISODE DURATION 06:03 MINS; EF 45% (7/17/2023 RANDALL); HX: SAME & PATIENT TAKING ASA 81, ELIQUIS, METO TART; NORMAL DEVICE FUNCTION.  ES       ECG:  Ventricular paced, PVCs.  Nonspecific intraventricular block.  T wave abnormality.   85 bpm   Reviewed by DHEERAJ Leroy      Review of Systems   Cardiovascular:  Positive for dyspnea on exertion and leg swelling (chronic).   All other systems reviewed and are negative.    Except as noted in HPI, is otherwise reviewed in detail and a 12 point review of systems is negative.    Physical Exam  Vitals reviewed.   Constitutional:       General: He is not in acute distress.     Appearance: Normal appearance. He is not diaphoretic.   HENT:      Head: Normocephalic and atraumatic.   Eyes:      General: No scleral icterus.     Extraocular Movements: Extraocular movements intact.      Pupils: " Pupils are equal, round, and reactive to light.   Neck:      Vascular: No JVD.   Cardiovascular:      Rate and Rhythm: Normal rate and regular rhythm.      Pulses: Normal pulses.           Radial pulses are 2+ on the right side and 2+ on the left side.      Heart sounds: Normal heart sounds, S1 normal and S2 normal. No murmur heard.  Pulmonary:      Effort: Pulmonary effort is normal. No tachypnea or respiratory distress.      Breath sounds: Normal breath sounds. No wheezing or rales.   Abdominal:      General: Bowel sounds are normal. There is no distension.      Palpations: Abdomen is soft.   Musculoskeletal:      Right lower leg: No edema.      Left lower leg: No edema.   Skin:     General: Skin is warm and dry.      Capillary Refill: Capillary refill takes less than 2 seconds.   Neurological:      General: No focal deficit present.      Mental Status: He is alert and oriented to person, place, and time.      Gait: Gait normal.   Psychiatric:         Mood and Affect: Mood normal.         Behavior: Behavior normal.          **Please Note: This note may have been constructed using a voice recognition system**     Thank you for the opportunity to participate in the care of this patient.   DHEERAJ Leroy

## 2024-09-09 ENCOUNTER — OFFICE VISIT (OUTPATIENT)
Dept: CARDIOLOGY CLINIC | Facility: CLINIC | Age: 79
End: 2024-09-09

## 2024-09-09 VITALS
HEART RATE: 85 BPM | BODY MASS INDEX: 30.5 KG/M2 | HEIGHT: 78 IN | SYSTOLIC BLOOD PRESSURE: 130 MMHG | WEIGHT: 263.6 LBS | DIASTOLIC BLOOD PRESSURE: 90 MMHG

## 2024-09-09 DIAGNOSIS — I27.20 PULMONARY HYPERTENSION (HCC): ICD-10-CM

## 2024-09-09 DIAGNOSIS — I10 BENIGN ESSENTIAL HYPERTENSION: ICD-10-CM

## 2024-09-09 DIAGNOSIS — Z86.718 HISTORY OF DVT (DEEP VEIN THROMBOSIS): ICD-10-CM

## 2024-09-09 DIAGNOSIS — I35.1 NONRHEUMATIC AORTIC VALVE INSUFFICIENCY: ICD-10-CM

## 2024-09-09 DIAGNOSIS — I25.10 CORONARY ARTERY DISEASE INVOLVING NATIVE CORONARY ARTERY OF NATIVE HEART WITHOUT ANGINA PECTORIS: ICD-10-CM

## 2024-09-09 DIAGNOSIS — I71.21 ANEURYSM OF ASCENDING AORTA WITHOUT RUPTURE (HCC): ICD-10-CM

## 2024-09-09 DIAGNOSIS — I34.0 NONRHEUMATIC MITRAL VALVE REGURGITATION: ICD-10-CM

## 2024-09-09 DIAGNOSIS — Z95.2 S/P AVR: ICD-10-CM

## 2024-09-09 DIAGNOSIS — Z95.0 PACEMAKER: ICD-10-CM

## 2024-09-09 DIAGNOSIS — I48.21 PERMANENT ATRIAL FIBRILLATION (HCC): Primary | ICD-10-CM

## 2024-09-09 DIAGNOSIS — Z98.890 S/P LEFT ATRIAL APPENDAGE LIGATION: ICD-10-CM

## 2024-09-09 DIAGNOSIS — Z98.890 S/P MVR (MITRAL VALVE REPAIR): ICD-10-CM

## 2024-09-09 DIAGNOSIS — E78.2 MIXED HYPERLIPIDEMIA: ICD-10-CM

## 2024-09-09 NOTE — PATIENT INSTRUCTIONS
Please complete your ECHO, chest xray and lab work  Keep a diary of your blood pressure readings  Continue heart healthy diet by limiting saturated fat, sodium and added sugars while increasing fiber and healthy fats.  Continue lifestyle management  Bring complete list of medications to your follow-up appointment.     Follow-up in 1 month or earlier if any concerns  Please call the office if you have any questions  Keep track of your weight. Watch your fluid intake  Limit daily sodium/salt intake to 2000 mg daily to prevent fluid retention.  Avoid canned foods, fast food/Chinese food, and processed meats (hot dogs, lunch meat, and sausage etc.). Caution with condiments.

## 2024-09-25 ENCOUNTER — APPOINTMENT (OUTPATIENT)
Dept: LAB | Facility: HOSPITAL | Age: 79
End: 2024-09-25
Payer: MEDICARE

## 2024-09-25 ENCOUNTER — HOSPITAL ENCOUNTER (OUTPATIENT)
Dept: NON INVASIVE DIAGNOSTICS | Facility: HOSPITAL | Age: 79
Discharge: HOME | End: 2024-09-25
Attending: INTERNAL MEDICINE
Payer: MEDICARE

## 2024-09-25 ENCOUNTER — HOSPITAL ENCOUNTER (OUTPATIENT)
Dept: RADIOLOGY | Facility: HOSPITAL | Age: 79
Discharge: HOME | End: 2024-09-25
Payer: MEDICARE

## 2024-09-25 VITALS
HEIGHT: 78 IN | DIASTOLIC BLOOD PRESSURE: 90 MMHG | WEIGHT: 263 LBS | HEART RATE: 85 BPM | SYSTOLIC BLOOD PRESSURE: 130 MMHG | BODY MASS INDEX: 30.43 KG/M2

## 2024-09-25 DIAGNOSIS — Z95.2 S/P AVR: ICD-10-CM

## 2024-09-25 DIAGNOSIS — Z98.890 S/P MVR (MITRAL VALVE REPAIR): ICD-10-CM

## 2024-09-25 DIAGNOSIS — I34.0 NONRHEUMATIC MITRAL VALVE REGURGITATION: ICD-10-CM

## 2024-09-25 LAB
AORTIC ROOT: 3.8 CM
AORTIC VALVE MEAN VELOCITY: 11 M/S
APICAL FOUR CHAMBER EJECTION FRACTION: 60 %
ASCENDING AORTA: 4.7 CM
AV AREA BY CONTINUOUS VTI: 2.6 CM2
AV AREA PEAK VELOCITY: 2.5 CM2
AV LVOT MEAN GRADIENT: 2 MMHG
AV LVOT PEAK GRADIENT: 3 MMHG
AV MEAN GRADIENT: 5 MMHG
AV PEAK GRADIENT: 10 MMHG
AV VALVE AREA: 2.58 CM2
AV VELOCITY RATIO: 0.56
BNP SERPL-MCNC: 210 PG/ML (ref 0–100)
BSA FOR ECHO PROCEDURE: 2.54 M2
DOP CALC AO PEAK VEL: 1.54 M/S
DOP CALC AO VTI: 28.19 CM
DOP CALC LVOT AREA: 4.52 CM2
DOP CALC LVOT CARDIAC INDEX: 2.54 L/MIN/M2
DOP CALC LVOT CARDIAC OUTPUT: 6.46 L/MIN
DOP CALC LVOT DIAMETER: 2.4 CM
DOP CALC LVOT PEAK VEL VTI: 16.07 CM
DOP CALC LVOT PEAK VEL: 0.86 M/S
DOP CALC LVOT STROKE INDEX: 28.7 ML/M2
DOP CALC LVOT STROKE VOLUME: 72.66
FRACTIONAL SHORTENING: 25 (ref 28–44)
INTERVENTRICULAR SEPTUM IN DIASTOLE (PARASTERNAL SHORT AXIS VIEW): 1.3 CM
INTERVENTRICULAR SEPTUM: 1.3 CM (ref 0.6–1.1)
LAAS-AP2: 25.8 CM2
LAAS-AP4: 29.7 CM2
LEFT ATRIUM SIZE: 4.9 CM
LEFT ATRIUM VOLUME (MOD BIPLANE): 95 ML
LEFT ATRIUM VOLUME INDEX (MOD BIPLANE): 37.4 ML/M2
LEFT INTERNAL DIMENSION IN SYSTOLE: 3.3 CM (ref 2.1–4)
LEFT VENTRICULAR INTERNAL DIMENSION IN DIASTOLE: 4.4 CM (ref 3.5–6)
LEFT VENTRICULAR POSTERIOR WALL IN END DIASTOLE: 1.2 CM
LEFT VENTRICULAR STROKE VOLUME: 45 ML
LVSV (TEICH): 45 ML
RA PRESSURE ESTIMATED: 8 MMHG
RIGHT ATRIUM AREA SYSTOLE A4C: 33.3 CM2
RIGHT VENTRICLE ID DIMENSION: 5.4 CM
RV PSP: 35 MMHG
SINOTUBULAR JUNCTION: 4.1 CM
SL CV LEFT ATRIUM LENGTH A2C: 6.5 CM
SL CV LV EF: 65
SL CV PED ECHO LEFT VENTRICLE DIASTOLIC VOLUME (MOD BIPLANE) 2D: 90 ML
SL CV PED ECHO LEFT VENTRICLE SYSTOLIC VOLUME (MOD BIPLANE) 2D: 45 ML
SL CV SINUS OF VALSALVA 2D: 4.2 CM
STJ: 4.1 CM
TR MAX PG: 27 MMHG
TR PEAK VELOCITY: 2.6 M/S
TRICUSPID ANNULAR PLANE SYSTOLIC EXCURSION: 1.5 CM
TRICUSPID VALVE PEAK REGURGITATION VELOCITY: 2.59 M/S

## 2024-09-25 PROCEDURE — 93306 TTE W/DOPPLER COMPLETE: CPT

## 2024-09-25 PROCEDURE — 71046 X-RAY EXAM CHEST 2 VIEWS: CPT

## 2024-09-25 PROCEDURE — 83880 ASSAY OF NATRIURETIC PEPTIDE: CPT

## 2024-10-01 ENCOUNTER — TELEPHONE (OUTPATIENT)
Age: 79
End: 2024-10-01

## 2024-10-01 NOTE — TELEPHONE ENCOUNTER
Pts wife accidentally called refill line. She is wondering if there is a cancellation list pt can be added to as he is grieving right now and wife is concerned given his age and health. Please contact pt if any sooner appts become available.

## 2024-10-08 NOTE — Clinical Note
The left coronary artery was selectively engaged, injected and visualized  Multiple views of the injected vessel were taken  Marital status:      Spouse name: Not on file    Number of children: Not on file    Years of education: Not on file    Highest education level: Not on file   Occupational History    Not on file   Tobacco Use    Smoking status: Former    Smokeless tobacco: Current     Types: Chew   Substance and Sexual Activity    Alcohol use: Yes     Comment: social    Drug use: No    Sexual activity: Not on file   Other Topics Concern    Not on file   Social History Narrative    Not on file     Social Determinants of Health     Financial Resource Strain: Not on file   Food Insecurity: Not on file   Transportation Needs: Not on file   Physical Activity: Not on file   Stress: Not on file   Social Connections: Not on file   Intimate Partner Violence: Not on file   Housing Stability: Not on file     History reviewed. No pertinent past medical history.  Past Surgical History:   Procedure Laterality Date    SHOULDER SURGERY Right     age 17, labrum repair    WISDOM TOOTH EXTRACTION       Family History   Problem Relation Age of Onset    High Cholesterol Father     Heart Attack Father 58    High Cholesterol Paternal Grandfather     Heart Disease Paternal Grandfather     Diabetes Maternal Grandmother     Cancer Maternal Grandmother         breast cancer    Diabetes Maternal Grandfather     Cancer Maternal Grandfather         pancreatic cancer     High Cholesterol Paternal Grandmother           Electronically signed by MARY Restrepo CNP on 10/8/2024 at 10:16 AM     Please note that this chart was generated using voice recognition Dragon dictation software.  Although every effort was made to ensure the accuracy of this automated transcription, some errors in transcription may have occurred.

## 2024-10-30 ENCOUNTER — OFFICE VISIT (OUTPATIENT)
Dept: CARDIOLOGY CLINIC | Facility: CLINIC | Age: 79
End: 2024-10-30
Payer: MEDICARE

## 2024-10-30 VITALS
DIASTOLIC BLOOD PRESSURE: 82 MMHG | SYSTOLIC BLOOD PRESSURE: 132 MMHG | HEART RATE: 81 BPM | OXYGEN SATURATION: 97 % | BODY MASS INDEX: 30.62 KG/M2 | WEIGHT: 259.3 LBS | HEIGHT: 77 IN

## 2024-10-30 DIAGNOSIS — I48.91 ATRIAL FIBRILLATION WITH RVR (HCC): Primary | ICD-10-CM

## 2024-10-30 DIAGNOSIS — I73.9 PAD (PERIPHERAL ARTERY DISEASE) (HCC): ICD-10-CM

## 2024-10-30 DIAGNOSIS — R06.02 SHORTNESS OF BREATH: ICD-10-CM

## 2024-10-30 DIAGNOSIS — I27.20 PULMONARY HYPERTENSION (HCC): ICD-10-CM

## 2024-10-30 DIAGNOSIS — Z95.2 S/P AVR: ICD-10-CM

## 2024-10-30 DIAGNOSIS — I25.10 CORONARY ARTERY DISEASE INVOLVING NATIVE CORONARY ARTERY OF NATIVE HEART WITHOUT ANGINA PECTORIS: ICD-10-CM

## 2024-10-30 DIAGNOSIS — Z98.890 S/P LEFT ATRIAL APPENDAGE LIGATION: ICD-10-CM

## 2024-10-30 DIAGNOSIS — E78.2 MIXED HYPERLIPIDEMIA: ICD-10-CM

## 2024-10-30 DIAGNOSIS — I10 BENIGN ESSENTIAL HYPERTENSION: ICD-10-CM

## 2024-10-30 DIAGNOSIS — Z98.890 S/P MVR (MITRAL VALVE REPAIR): ICD-10-CM

## 2024-10-30 PROCEDURE — 99214 OFFICE O/P EST MOD 30 MIN: CPT | Performed by: INTERNAL MEDICINE

## 2024-10-30 RX ORDER — METOPROLOL TARTRATE 50 MG
50 TABLET ORAL EVERY 12 HOURS SCHEDULED
Qty: 180 TABLET | Refills: 2 | Status: SHIPPED | OUTPATIENT
Start: 2024-10-30

## 2024-10-30 RX ORDER — TORSEMIDE 20 MG/1
20 TABLET ORAL DAILY
Qty: 90 TABLET | Refills: 3 | Status: SHIPPED | OUTPATIENT
Start: 2024-10-30

## 2024-10-30 NOTE — PROGRESS NOTES
Cardiology Follow Up    [place] Jr[place]  1945  [id number]  [facility]  [address]  [place] [address]    1. Atrial fibrillation with RVR ([facility])  Basic metabolic panel      2. Benign essential hypertension        3. Coronary artery disease involving native coronary artery of native heart without angina pectoris  Basic metabolic panel      4. PAD (peripheral artery disease) ([facility])        5. Pulmonary hypertension (HCC)        6. S/P AVR  metoprolol tartrate (LOPRESSOR) 50 mg tablet      7. S/P MVR (mitral valve repair)  metoprolol tartrate (LOPRESSOR) 50 mg tablet      8. S/P left atrial appendage ligation  metoprolol tartrate (LOPRESSOR) 50 mg tablet      9. Mixed hyperlipidemia        10. Shortness of breath  torsemide (DEMADEX) 20 mg tablet          Discussion/Summary:   He has had increasing shortness of breath I think some of this may be related to the persistent atrial fibrillation with RVR at times.  Will start by doubling his metoprolol he was only taking it once a day will go to twice daily dosing.  He also has some lower extremity edema will double his torsemide for a few days check labs on Friday.  Will see if this has any benefit.  I will talk to electrophysiology consider repeat ablation with pulsed field versus AVJ ablation and pacing.  I think this may be the more realistic option.  Valves showed stable function on recent echo.  PA pressure was in the 30s        Interval History:   79-year-old gentleman with a history of paroxysmal atrial fibrillation, hypertension, dyslipidemia, bilateral popliteal artery aneurysm status post repair.  History of acute DVT.  He has been doing quite well since her last office appointment.  There was some atypical chest pain a couple of months ago nuclear stress test showed no ischemia.     He was doing better after his surgery for a little while now he is more  short of breath with activity.  This may be related to A-fib with RVR.  He denies any anginal sounding chest pain or discomfort.  Does not feel when he is in atrial fibrillation.  He has had some RVR on device interrogations.  Remains on anticoagulation.  Has mild lower extremity edema.  Claudication symptoms have been fairly well-controlled.         Problem List       PAF (paroxysmal atrial fibrillation) ([facility])    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HCC) (Chronic)    Hypertension    Acute DVT (deep venous thrombosis) ([facility])    A-fib ([facility])    Benign essential hypertension    Overview Signed 6/29/2018  8:02 AM by Anirudh Brown DO     Overview:   dx 2002         Mitral valve insufficiency    Bilateral popliteal artery aneurysm (HCC)    Paroxysmal atrial fibrillation ([facility])    Mixed hyperlipidemia          Past Medical History:   Diagnosis Date    Cardiac disease     GERD (gastroesophageal reflux disease)     occasional - per pt takes TUMS if needed    Hyperlipidemia     Hypertension     Irregular heart beat     Kidney stone     PAF (paroxysmal atrial fibrillation) ([facility])     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[facility]) 2015    BILATERALLY    s/p Medtronic dual chamber PPM with left bundle pacing lead 7/19/2023 07/20/2023     Social History     Socioeconomic History    Marital status: /Civil Union     Spouse name: Not on file    Number of children: Not on file    Years of education: Not on file    Highest education level: Not on file   Occupational History    Not on file   Tobacco Use    Smoking status: Never    Smokeless tobacco: Never    Tobacco comments:     Never a smoker or use of any tobacco products per pt    Vaping Use    Vaping status: Never Used   Substance and Sexual Activity    Alcohol use: Not Currently     Comment: Denies any alcohol use per pt    Drug use: No     Comment: Denies any drug use per pt    Sexual activity: Not Currently     Comment: Not active at this time per pt   Other Topics Concern    Not on file   Social History  Narrative    Not on file     Social Determinants of Health     Financial Resource Strain: Not on file   Food Insecurity: No Food Insecurity (7/13/2023)    Hunger Vital Sign     Worried About Running Out of Food in the Last Year: Never true     Ran Out of Food in the Last Year: Never true   Transportation Needs: No Transportation Needs (7/13/2023)    PRAPARE - Transportation     Lack of Transportation (Medical): No     Lack of Transportation (Non-Medical): No   Physical Activity: Not on file   Stress: Not on file   Social Connections: Not on file   Intimate Partner Violence: Not on file   Housing Stability: Low Risk  (7/13/2023)    Housing Stability Vital Sign     Unable to Pay for Housing in the Last Year: No     Number of Places Lived in the Last Year: 1     Unstable Housing in the Last Year: No      Family History   Problem Relation Age of Onset    Pancreatic cancer Mother     Stroke Father     Heart attack Paternal Grandmother     Anesthesia problems Neg Hx      Past Surgical History:   Procedure Laterality Date    CARDIAC CATHETERIZATION N/A 05/08/2023    Procedure: Cardiac Coronary Angiogram;  Surgeon: Anirudh Mo DO;  Location: [facility];  Service: Cardiology    CARDIAC ELECTROPHYSIOLOGY PROCEDURE N/A 03/25/2022    Procedure: Cardiac eps/afib ablation;  Surgeon: Javon Valdes MD;  Location: [facility];  Service: Cardiology    CARDIAC ELECTROPHYSIOLOGY PROCEDURE N/A 07/19/2023    Procedure: Cardiac pacer implant;  Surgeon: Steven Palacio MD;  Location: [facility];  Service: Cardiology    CATARACT EXTRACTION Bilateral     ESOPHAGOGASTRODUODENOSCOPY N/A 08/16/2016    Procedure: ESOPHAGOGASTRODUODENOSCOPY (EGD);  Surgeon: Lucio Barnes MD;  Location: [facility];  Service:     HERNIA REPAIR Left     groin    HERNIA REPAIR Right 1995    OTHER SURGICAL HISTORY      ID REPLACEMENT MITRAL VALVE W/CARDIOPULMONARY BYP N/A 07/12/2023    Procedure: MITRAL VALVE REPAIR WITH 36MM PHYSIO  II ANNULOPLASTY RING;  Surgeon: MODESTO Álvarez MD;  Location: [facility];  Service: Cardiac Surgery    MN RPLCMT AORTIC VALVE OPN W/STENTLESS TISSUE VALVE N/A 07/12/2023    Procedure: REPLACEMENT VALVE AORTIC (AVR) WITH 29MM INSPIRIS TISSUE RESILIA VALVE. LEFT ATRIAL APPENDAGE LIGATION WITH 45 MM ATRICLIP;  Surgeon: MODESTO Álvarez MD;  Location: [facility];  Service: Cardiac Surgery    MN SURGICAL ARTHROSCOPY ANDRES W/CORACOACRM LIGM RLS Left 4/5/2024    Procedure: SHOULDER DIAGNOSTIC GLENOHUMERAL ARTHROSCOPY W/ ROTATOR CUFF REPAIR, SUBACROMIAL DECOMPRESSION W/ ACROMIPLASTY, BICEPS TENOTOMY, LABRAL DEBRIDEMENT;  Surgeon: Luke Mclain MD;  Location: [facility];  Service: Orthopedics    MN SURGICAL ARTHROSCOPY SHOULDER W/ROTATOR CUFF RPR Left 4/5/2024    Procedure: SHOULDER DIAGNOSTIC GLENOHUMERAL ARTHROSCOPY W/ ROTATOR CUFF REPAIR, SUBACROMIAL DECOMPRESSION W/ ACROMIPLASTY, BICEPS TENOTOMY, LABRAL DEBRIDEMENT;  Surgeon: Luke Mclain MD;  Location: [facility];  Service: Orthopedics    REPLACEMENT TOTAL KNEE Left 04/01/2013    TONSILLECTOMY      child surgery    VASCULAR SURGERY Right     POPLITEAL BYPASS DUE TO ANEURYSM       Current Outpatient Medications:     apixaban (Eliquis) 5 mg, Take 1 tablet (5 mg total) by mouth 2 (two) times a day (Patient taking differently: Take 5 mg by mouth in the morning), Disp: 180 tablet, Rfl: 3    metoprolol tartrate (LOPRESSOR) 50 mg tablet, Take 1 tablet (50 mg total) by mouth every 12 (twelve) hours, Disp: 180 tablet, Rfl: 2    Probiotic Product (PROBIOTIC-10 PO), Take 1 capsule by mouth daily, Disp: , Rfl:     rosuvastatin (CRESTOR) 20 MG tablet, Take 1 tablet (20 mg total) by mouth daily, Disp: 90 tablet, Rfl: 0    Specialty Vitamins Products (Advanced Collagen) TABS, Take by mouth, Disp: , Rfl:     torsemide (DEMADEX) 20 mg tablet, Take 1 tablet (20 mg total) by mouth daily, Disp: 90 tablet, Rfl: 3    VITAMIN D PO, Take by mouth, Disp: , Rfl:     aspirin  "(ECOTRIN LOW STRENGTH) 81 mg EC tablet, Take 1 tablet (81 mg total) by mouth daily, Disp: , Rfl: 0    Vitamin D, Cholecalciferol, 1000 units CAPS, Take 2,000 Units by mouth daily (Patient not taking: Reported on 9/9/2024), Disp: , Rfl:   Allergies   Allergen Reactions    Oxycodone GI Intolerance     nausea  nausea       Labs:     Chemistry        Component Value Date/Time    K 4.1 07/09/2024 0837    K 4.2 05/03/2023 0915     07/09/2024 0837     05/03/2023 0915    CO2 26 07/09/2024 0837    CO2 21 07/12/2023 1342    CO2 28 05/03/2023 0915    BUN 14 07/09/2024 0837    BUN 16 05/03/2023 0915    BUN 17 11/23/2021 0705    CREATININE 0.97 07/09/2024 0837    CREATININE 0.88 05/03/2023 0915        Component Value Date/Time    CALCIUM 9.8 07/09/2024 0837    CALCIUM 9.5 05/03/2023 0915    ALKPHOS 66 07/09/2024 0837    ALKPHOS 57 05/03/2023 0915    AST 22 07/09/2024 0837    AST 21 05/03/2023 0915    AST 15 09/18/2021 0945    ALT 12 07/09/2024 0837    ALT 15 (L) 05/03/2023 0915    ALT 18 09/18/2021 0945            No results found for: \"CHOL\"  Lab Results   Component Value Date    HDL 44 07/09/2024    HDL 48 05/08/2023     Lab Results   Component Value Date    LDLCALC 58 07/09/2024    LDLCALC 110 (H) 05/08/2023     Lab Results   Component Value Date    TRIG 78 07/09/2024    TRIG 93 05/08/2023     No results found for: \"CHOLHDL\"    Imaging: No results found.    ECG:    Atrial fibrillation nonspecific ST and T changes      ROS    Vitals:    10/30/24 0755   BP: 132/82   Pulse: 81   SpO2: 97%     Vitals:    10/30/24 0755   Weight: 118 kg (259 lb 4.8 oz)     Height: 6' 5\" (195.6 cm)   Body mass index is 30.75 kg/m².    Physical Exam:  Vital signs reviewed  General:  Alert and cooperative, appears stated age, no acute distress  HEENT:  PERRLA, EOMI, no scleral icterus, no conjunctival pallor  Neck:  No lymphadenopathy, no thyromegaly, no carotid bruits, no elevated JVP  Heart:  Regular rate and rhythm, normal S1/S2, no " S3/S4, no murmur, rubs or gallops.  PMI nondisplaced  Lungs:  Clear to auscultation bilaterally, no wheezes rales or rhonchi  Abdomen:  Soft, non-tender, positive bowel sounds, no rebound or guarding,   no organomegaly   Extremities:  Normal range of motion.  No clubbing, cyanosis or edema   Vascular:  2+ pedal pulses  Skin:  No rashes or lesions on exposed skin  Neurologic:  Cranial nerves II-XII grossly intact without focal deficits  Psych:  Normal mood and affect

## 2024-11-01 ENCOUNTER — TELEPHONE (OUTPATIENT)
Age: 79
End: 2024-11-01

## 2024-11-01 ENCOUNTER — APPOINTMENT (OUTPATIENT)
Dept: LAB | Facility: MEDICAL CENTER | Age: 79
End: 2024-11-01
Payer: MEDICARE

## 2024-11-01 DIAGNOSIS — I25.10 CORONARY ARTERY DISEASE INVOLVING NATIVE CORONARY ARTERY OF NATIVE HEART WITHOUT ANGINA PECTORIS: ICD-10-CM

## 2024-11-01 DIAGNOSIS — I48.91 ATRIAL FIBRILLATION WITH RVR (HCC): ICD-10-CM

## 2024-11-01 LAB
ANION GAP SERPL CALCULATED.3IONS-SCNC: 9 MMOL/L (ref 4–13)
BUN SERPL-MCNC: 19 MG/DL (ref 5–25)
CALCIUM SERPL-MCNC: 9.9 MG/DL (ref 8.4–10.2)
CHLORIDE SERPL-SCNC: 104 MMOL/L (ref 96–108)
CO2 SERPL-SCNC: 31 MMOL/L (ref 21–32)
CREAT SERPL-MCNC: 1.02 MG/DL (ref 0.6–1.3)
GFR SERPL CREATININE-BSD FRML MDRD: 69 ML/MIN/1.73SQ M
GLUCOSE P FAST SERPL-MCNC: 82 MG/DL (ref 65–99)
POTASSIUM SERPL-SCNC: 3.6 MMOL/L (ref 3.5–5.3)
SODIUM SERPL-SCNC: 144 MMOL/L (ref 135–147)

## 2024-11-01 PROCEDURE — 80048 BASIC METABOLIC PNL TOTAL CA: CPT

## 2024-11-01 PROCEDURE — 36415 COLL VENOUS BLD VENIPUNCTURE: CPT

## 2024-11-01 NOTE — TELEPHONE ENCOUNTER
Patient said he was asked to call today to give you an update on how he is feeling since his OV two days ago and with change in meds.    He had the blood work drawn today.  Result still pending.    His shortness of breath is the same since the visit. No change in his breathing with the extra torsemide.  He is not weighing himself but said he had a huge increase in urine output.  No change in LE edema.     Did not check BP.  He has a pulse ox meter at home and today meter showed O2 sat at 97% and a HR of 80.    I advised him you would watch for his BMP result and further advise at that time.

## 2024-11-20 ENCOUNTER — PREP FOR PROCEDURE (OUTPATIENT)
Dept: CARDIOLOGY CLINIC | Facility: CLINIC | Age: 79
End: 2024-11-20

## 2024-11-20 ENCOUNTER — OFFICE VISIT (OUTPATIENT)
Dept: CARDIOLOGY CLINIC | Facility: CLINIC | Age: 79
End: 2024-11-20
Payer: MEDICARE

## 2024-11-20 ENCOUNTER — RESULTS FOLLOW-UP (OUTPATIENT)
Dept: CARDIOLOGY CLINIC | Facility: CLINIC | Age: 79
End: 2024-11-20

## 2024-11-20 ENCOUNTER — IN-CLINIC DEVICE VISIT (OUTPATIENT)
Dept: CARDIOLOGY CLINIC | Facility: CLINIC | Age: 79
End: 2024-11-20
Payer: MEDICARE

## 2024-11-20 ENCOUNTER — TELEPHONE (OUTPATIENT)
Dept: CARDIOLOGY CLINIC | Facility: CLINIC | Age: 79
End: 2024-11-20

## 2024-11-20 VITALS
HEIGHT: 77 IN | BODY MASS INDEX: 31.08 KG/M2 | SYSTOLIC BLOOD PRESSURE: 118 MMHG | DIASTOLIC BLOOD PRESSURE: 62 MMHG | WEIGHT: 263.2 LBS

## 2024-11-20 DIAGNOSIS — I27.20 PULMONARY HYPERTENSION (HCC): ICD-10-CM

## 2024-11-20 DIAGNOSIS — Z95.0 CARDIAC PACEMAKER IN SITU: Primary | ICD-10-CM

## 2024-11-20 DIAGNOSIS — I48.91 ATRIAL FIBRILLATION WITH RVR (HCC): Primary | ICD-10-CM

## 2024-11-20 DIAGNOSIS — I25.10 CORONARY ARTERY DISEASE INVOLVING NATIVE CORONARY ARTERY OF NATIVE HEART WITHOUT ANGINA PECTORIS: ICD-10-CM

## 2024-11-20 DIAGNOSIS — I48.0 PAROXYSMAL ATRIAL FIBRILLATION (HCC): ICD-10-CM

## 2024-11-20 DIAGNOSIS — I10 BENIGN ESSENTIAL HYPERTENSION: ICD-10-CM

## 2024-11-20 PROCEDURE — 99215 OFFICE O/P EST HI 40 MIN: CPT | Performed by: INTERNAL MEDICINE

## 2024-11-20 PROCEDURE — 93280 PM DEVICE PROGR EVAL DUAL: CPT | Performed by: INTERNAL MEDICINE

## 2024-11-20 PROCEDURE — 93000 ELECTROCARDIOGRAM COMPLETE: CPT | Performed by: INTERNAL MEDICINE

## 2024-11-20 RX ORDER — METOPROLOL SUCCINATE 25 MG/1
25 TABLET, EXTENDED RELEASE ORAL DAILY
Qty: 30 TABLET | Refills: 3 | Status: SHIPPED | OUTPATIENT
Start: 2024-11-20

## 2024-11-20 RX ORDER — DILTIAZEM HYDROCHLORIDE 120 MG/1
120 CAPSULE, EXTENDED RELEASE ORAL DAILY
Qty: 30 CAPSULE | Refills: 3 | Status: SHIPPED | OUTPATIENT
Start: 2024-11-20

## 2024-11-20 NOTE — PROGRESS NOTES
HEART AND VASCULAR  CARDIAC ELECTROPHYSIOLOGY   HEART RHYTHM CENTER  [facility]    Outpatient f/u  Today's Date: 11/20/24        Patient name: [place] Jr.  YOB: 1945  Sex: male         Chief Complaint: Referred back from Dr Brown for afib      ASSESSMENT:  Problem List Items Addressed This Visit          Cardiovascular and Mediastinum    Atrial fibrillation with RVR (HCC) - Primary    Relevant Medications    metoprolol succinate (TOPROL-XL) 25 mg 24 hr tablet    diltiazem (DILACOR XR) 120 MG 24 hr capsule    Other Relevant Orders    POCT ECG     80 yo male  1) Persistent afib, 2 prior afib ablations last March 2022 with Cryo PVI and PWI plus dofetilide but did not stay in NSR. No longer on dofetilide  2) Post Dual chamber pacemaker with LB lead with RBBB/narrow QRS pacing morphology placed for CHB post MVR/AVR/Appendage ligation July 2023.  No longer in heart block and has  40% and sometimes afib is bordeline RVR, ableit currently rate controlled on metop 50mg bid  3) SOB on exertion fairly significant can't do normal activies , even grocery shopping difficult felt this way before and after his open heart  4) On Eliquis for bilateral DVTs since 2016    Echo images reviewed, moderate LAE, normal EF. Mildly enlarged RV. Bio AVR and MV repair.     PLAN:    Options of AV Node ablation vs his 3rd attempt at Afib ablation discussed. I estimate 50% chance he can maintain NSR with another ablation . He feels so bad worth it to him to try this.  Over his lifetime afib even with successful ablation likely to come back so reasonable to try AVJ ablation at same time so we don't have to maintain rate control which he thinks makes him feel back.  Would admit for dofetilide after ablation to increase efficacy. Since last on it 2 years ago need to check QT intervals again etc. Risk of proarrhythmia discussed.  Until ablation will increase base pacing rate to 80bpm and try Toprol XL  25mg plus Diltiazem 120mg for rate control and see if  >90% to simulate AVN ablation so have better idea if he would feel good with this.     Recommend Atrial fibrillation and/or flutter ablation.  Additional arrhythmias may also be targeted. Transesophageal echocardiogram maybe used to rule out thrombus. Risks and benefits discussed with patient and family. Explained risk of rare but serious complications like heart perforation, stroke, heart attack, kidney injury. Energy source for ablation my vary including radiofrequency, pulsed field (PFA), or cryoablation. PFA ablation does not have known esophageal injury risk so general safer from this life threatening complication. Risks of phrenic nerve (diaphragm) and lung injury also is less with this energy source.    Explained ablation for atrial fibrillation is treatment not a cure, about 20% of patient will opt for second ablation. We went over usually recovery and expectations of going through procedure.         Orders Placed This Encounter   Procedures    POCT ECG     Medications Discontinued During This Encounter   Medication Reason    metoprolol tartrate (LOPRESSOR) 50 mg tablet          .............................................................................................    HPI/Subjective:    80 yo  male  with h/o 2 afib ablations, pacemaker AVR, MVR and here for follow up , Dr Brown sent since he remains quite SOB and perhaps afib contributing.  He gets SOB with just walking on flat ground and is disabling.       Please note HPI is listed by problem with with update following it, it is copied again in the assessment above and reflects medical decision making as well.         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   Scheduled Meds:  Current Outpatient Medications   Medication Sig Dispense Refill    apixaban (Eliquis) 5 mg Take 1 tablet (5 mg total) by mouth 2 (two) times a day 180 tablet 3    diltiazem (DILACOR XR) 120 MG 24 hr capsule Take 1 capsule (120 mg total) by mouth daily 30 capsule 3    metoprolol succinate (TOPROL-XL) 25 mg 24 hr tablet Take 1 tablet (25 mg total) by mouth daily 30 tablet 3    Probiotic Product (PROBIOTIC-10 PO) Take 1 capsule by mouth daily      rosuvastatin (CRESTOR) 20 MG tablet Take 1 tablet (20 mg total) by mouth daily 90 tablet 0    Specialty Vitamins Products (Advanced Collagen) TABS Take by mouth      torsemide (DEMADEX) 20 mg tablet Take 1 tablet (20 mg total) by mouth daily 90 tablet 3    VITAMIN D PO Take by mouth      Vitamin D, Cholecalciferol, 1000 units CAPS Take 2,000 Units by mouth daily      aspirin (ECOTRIN LOW STRENGTH) 81 mg EC tablet Take 1 tablet (81 mg total) by mouth daily  0     No current facility-administered medications for this visit.     PRN Meds:.        Family History   Problem Relation Age of Onset    Pancreatic cancer Mother     Stroke Father     Heart attack Paternal Grandmother     Anesthesia problems Neg Hx        Social History     Socioeconomic History    Marital status: /Civil Union     Spouse name: Not on file    Number of children: Not on file    Years of education: Not on file    Highest education level: Not on file   Occupational History    Not on file   Tobacco Use    Smoking status: Never    Smokeless tobacco: Never    Tobacco comments:     Never a smoker or use of any tobacco products per pt    Vaping Use    Vaping status: Never Used   Substance and Sexual Activity     "Alcohol use: Not Currently     Comment: Denies any alcohol use per pt    Drug use: No     Comment: Denies any drug use per pt    Sexual activity: Not Currently     Comment: Not active at this time per pt   Other Topics Concern    Not on file   Social History Narrative    Not on file     Social Drivers of Health     Financial Resource Strain: Not on file   Food Insecurity: No Food Insecurity (7/13/2023)    Hunger Vital Sign     Worried About Running Out of Food in the Last Year: Never true     Ran Out of Food in the Last Year: Never true   Transportation Needs: No Transportation Needs (7/13/2023)    PRAPARE - Transportation     Lack of Transportation (Medical): No     Lack of Transportation (Non-Medical): No   Physical Activity: Not on file   Stress: Not on file   Social Connections: Not on file   Intimate Partner Violence: Not on file   Housing Stability: Low Risk  (7/13/2023)    Housing Stability Vital Sign     Unable to Pay for Housing in the Last Year: No     Number of Places Lived in the Last Year: 1     Unstable Housing in the Last Year: No         OBJECTIVE:    /62 (BP Location: Left arm, Patient Position: Sitting, Cuff Size: Standard)   Ht 6' 5\" (1.956 m)   Wt 119 kg (263 lb 3.2 oz)   BMI 31.21 kg/m²   Vitals:    11/20/24 0804   Weight: 119 kg (263 lb 3.2 oz)     GEN: No acute distress, Alert and oriented, well appearing  HEENT:External ears normal, oral pharynx clear, mucous membranes moist  EYES: Pupils equal, sclera anicteric, midline, normal conjuctiva  NECK: No JVD, supple, no obvious masses or thryomegaly or goiter  CARDIOVASCULAR:  irreg irreg No murmur, rub, gallops S1,S2  LUNGS: Clear To auscultation bilaterally, normal effort, no rales, rhonchi, crackles   ABDOMEN:  nondistended,  without obvious organomegaly or ascites  EXTREMITIES/VASCULAR:  No edema. warm an well perfused.  PSYCH: Normal Affect,  linear speech pattern without evidence of psychosis.   NEURO: Grossly intact, moving all " "extremiteis equal, face symmetric, alert and responsive, no obvious focal defecits   GAIT:  Ambulates normally without difficulty  HEME: No bleeding, bruising, petechia, purpura   SKIN: No significant rashes on visibile skin, warm, no diaphoresis or pallor.     Lab Results:       LABS:      Chemistry        Component Value Date/Time    K 3.6 11/01/2024 0818    K 4.2 05/03/2023 0915     11/01/2024 0818     05/03/2023 0915    CO2 31 11/01/2024 0818    CO2 21 07/12/2023 1342    CO2 28 05/03/2023 0915    BUN 19 11/01/2024 0818    BUN 16 05/03/2023 0915    BUN 17 11/23/2021 0705    CREATININE 1.02 11/01/2024 0818    CREATININE 0.88 05/03/2023 0915        Component Value Date/Time    CALCIUM 9.9 11/01/2024 0818    CALCIUM 9.5 05/03/2023 0915    ALKPHOS 66 07/09/2024 0837    ALKPHOS 57 05/03/2023 0915    AST 22 07/09/2024 0837    AST 21 05/03/2023 0915    AST 15 09/18/2021 0945    ALT 12 07/09/2024 0837    ALT 15 (L) 05/03/2023 0915    ALT 18 09/18/2021 0945            No results found for: \"CHOL\"  Lab Results   Component Value Date    HDL 44 07/09/2024    HDL 48 05/08/2023     Lab Results   Component Value Date    LDLCALC 58 07/09/2024    LDLCALC 110 (H) 05/08/2023     Lab Results   Component Value Date    TRIG 78 07/09/2024    TRIG 93 05/08/2023     No results found for: \"CHOLHDL\"    IMAGING: Cardiac EP device report  Result Date: 11/20/2024  Narrative: MDT D-PM/ACTIVE SYSTEM IS MRI CONDITIONAL DEVICE INTERROGATED IN THE [place] OFFICE W/DR. GUTIERREZ OV: BATTERY VOLTAGE ADEQUATE-13 YRS. AP 0%  40%. ALL AVAILABLE LEAD PARAMETERS WITHIN NORMAL LIMITS. 100% AF SINCE 3/2024. 114 FAST A/V NOTED; AVAIL EGRAMS PRESENT AS RVR. PT ON ELIQUIS & METO TART. EF 65% (ECHO 2024). PT C/O SOB & FATIGUE. LOWER EXTREMITY EDEMA NOTED. NO PROGRAMMING CHANGES MADE TO DEVICE PARAMETERS. NORMAL DEVICE FUNCTION. NC     Cardiac EP device report  Result Date: 11/20/2024  Narrative: MDT D-PM/ACTIVE SYSTEM IS MRI CONDITIONAL DEVICE " INTERROGATED IN THE [place] OFFICE W/DR. GUTIERREZ OV: BATTERY VOLTAGE ADEQUATE-13 YRS. AP 0%  40%. ALL AVAILABLE LEAD PARAMETERS WITHIN NORMAL LIMITS. 100% AF SINCE 3/2024. 114 FAST A/V NOTED; AVAIL EGRAMS PRESENT AS RVR. PT ON ELIQUIS & METO TART. EF 65% (ECHO ). PT C/O SOB & FATIGUE. LOWER EXTREMITY EDEMA NOTED. NO PROGRAMMING CHANGES MADE TO DEVICE PARAMETERS. NORMAL DEVICE FUNCTION. NCAMENDMENT-LOWER RATE INCREASED TO 80 BPM. NC        Cardiac testing:   Results for orders placed during the hospital encounter of 21    Echo complete with contrast if indicated    Narrative  [address] [address] [phone]    Transthoracic Echocardiogram  2D, M-mode, Doppler, and Color Doppler    Study date:  2021    Patient: GERONIMO ARITA  MR number: [id number]  Account number: [id number]  : 1945  Age: 76 years  Gender: Male  Status: Inpatient  Location: Bedside  Height: 78 in  Weight: 242 lb  BP: 122/ 84 mmHg    Indications: Atrial fibrillation.    Diagnoses: I48.0 - Atrial fibrillation    Sonographer:  Florencia Meyers RDCS  Primary Physician:  Darinel Saucedo DO  Referring Physician:  Khalif Gibson MD  Group:  [facility]  Cardiology Fellow:  Michael Marie MD  Interpreting Physician:  Evan Ponce MD    SUMMARY    LEFT VENTRICLE:  Size was normal.  Systolic function was normal. Ejection fraction was estimated to be 60 %.  There were no regional wall motion abnormalities.  Wall thickness was mildly increased.    LEFT ATRIUM:  The atrium was mildly to moderately dilated.    MITRAL VALVE:  There was moderate regurgitation.    AORTIC VALVE:  There was mild to moderate regurgitation.    TRICUSPID VALVE:  There was mild regurgitation.  Pulmonary artery systolic pressure was within the normal range.    HISTORY: PRIOR HISTORY: Atrial fibrillation. DVT. Aneurysm of ascending aorta.    PROCEDURE: The procedure was  performed at the bedside. This was a routine study. The transthoracic approach was used. The study included complete 2D imaging, M-mode, complete spectral Doppler, and color Doppler. The heart rate was 86 bpm,  at the start of the study. Images were obtained from the parasternal, apical, subcostal, and suprasternal notch acoustic windows. Image quality was adequate.    LEFT VENTRICLE: Size was normal. Systolic function was normal. Ejection fraction was estimated to be 60 %. There were no regional wall motion abnormalities. Wall thickness was mildly increased. The changes were consistent with concentric  remodeling (increased wall thickness with normal wall mass). DOPPLER: Transmitral flow pattern: atrial fibrillation.    RIGHT VENTRICLE: The size was normal. Systolic function was normal.    LEFT ATRIUM: The atrium was mildly to moderately dilated.    RIGHT ATRIUM: The atrium was mildly dilated.    MITRAL VALVE: Valve structure was normal. There was normal leaflet separation. DOPPLER: The transmitral velocity was within the normal range. There was no evidence for stenosis. There was moderate regurgitation.    AORTIC VALVE: The valve was trileaflet. Leaflets exhibited mild to moderate calcification, normal cuspal separation, and sclerosis. DOPPLER: Transaortic velocity was within the normal range. There was no evidence for stenosis. There was  mild to moderate regurgitation.    TRICUSPID VALVE: The valve structure was normal. There was normal leaflet separation. DOPPLER: The transtricuspid velocity was within the normal range. There was no evidence for stenosis. There was mild regurgitation. Pulmonary artery  systolic pressure was within the normal range. Estimated peak PA pressure was 27 mmHg.    PULMONIC VALVE: DOPPLER: The transpulmonic velocity was within the normal range. There was mild regurgitation.    PERICARDIUM: There was no pericardial effusion.    AORTA: The root exhibited normal size for BSA at 38  mm.    SYSTEMIC VEINS: IVC: The inferior vena cava was normal in size and course. Respirophasic changes were normal.    SYSTEM MEASUREMENT TABLES    2D  %FS: 28.01 %  Ao Diam: 3.58 cm  EDV(Teich): 122.74 ml  EF(Teich): 53.93 %  ESV(Teich): 56.54 ml  IVSd: 1.25 cm  LA Area: 31.62 cm2  LA Diam: 4.26 cm  LVEDV MOD A4C: 156.26 ml  LVEF MOD A4C: 60.45 %  LVESV MOD A4C: 61.8 ml  LVIDd: 5.08 cm  LVIDs: 3.66 cm  LVLd A4C: 8.59 cm  LVLs A4C: 7.52 cm  LVPWd: 1.33 cm  RA Area: 31.07 cm2  RVIDd: 4.15 cm  SV MOD A4C: 94.47 ml  SV(Teich): 66.2 ml    CW  AR Dec King: 3.14 m/s2  AR Dec Time: 1574.47 ms  AR PHT: 456.6 ms  AR Vmax: 4.92 m/s  AR maxP.95 mmHg  AV Env.Ti: 251.19 ms  AV VTI: 20.76 cm  AV Vmax: 1.24 m/s  AV Vmean: 0.83 m/s  AV maxP.2 mmHg  AV meanPG: 3.14 mmHg  TR Vmax: 2.38 m/s  TR maxP.7 mmHg    MM  TAPSE: 2.34 cm    PW  LVOT Env.Ti: 251.19 ms  LVOT VTI: 14.87 cm  LVOT Vmax: 0.85 m/s  LVOT Vmean: 0.59 m/s  LVOT maxP.9 mmHg  LVOT meanP.62 mmHg  MV A Luis A: 0.65 m/s  MV Dec King: 4.08 m/s2  MV DecT: 171.95 ms  MV E Luis A: 0.7 m/s  MV E/A Ratio: 1.08    Intersocietal Commission Accredited Echocardiography Laboratory    Prepared and electronically signed by    Evan Ponce MD  Signed 2021 14:53:21    Results for orders placed during the hospital encounter of 21    RANDALL    Narrative  [address] [address] [phone]    Transesophageal Echocardiogram  2D, Doppler, and Color Doppler    Study date:  2021    Patient: GERONIMO ARITA  MR number: [id number]  Account number: [id number]  : 1945  Age: 76 years  Gender: Male  Status: Outpatient  Location: Echo lab  Height: 78 in  Weight: 242 lb  BP: 90/ 58 mmHg    Indications: Atrial Fibrillation    Diagnoses: I48.0 - Atrial fibrillation    Sonographer:  Song Guillen RDCS  Interpreting Physician:  Anthony Borges MD  Primary Physician:  Dionne Huston,  "MD  Referring Physician:  Arturo Hanye PA-C  Group:  [facility]  Cardiology Fellow:  Michael Marie MD  RN:  Dacia Dawson RN    SUMMARY    LEFT VENTRICLE:  Size was normal.  Systolic function was normal. Ejection fraction was estimated to be 55 %.  There were no regional wall motion abnormalities.    RIGHT VENTRICLE:  The ventricle was mildly dilated.  Systolic function was mildly reduced.    LEFT ATRIUM:  The atrium was dilated.    LEFT ATRIAL APPENDAGE:  The appendage was mildly dilated.  The function was moderately reduced (moderately reduced emptying velocity).  No thrombus was identified.  There was mild spontaneous echo contrast (\"smoke\") in the appendage.    ATRIAL SEPTUM:  No defect or patent foramen ovale was identified.  Doppler evaluation was performed. There was no right-to-left shunt, in the baseline state.    MITRAL VALVE:  There was mild to moderate regurgitation.    AORTIC VALVE:  There was moderate regurgitation.  No flow reversal was detected in the descending thoracic aorta.    TRICUSPID VALVE:  There was mild to moderate regurgitation.  Pulmonary artery systolic pressure was within the normal range.  Estimated peak PA pressure was 25 mmHg.    PULMONIC VALVE:  There was moderate regurgitation.    AORTA:  The root exhibited dilatation at 41 mm.  There was dilatation of the ascending aorta at 44 mm.    HISTORY: PRIOR HISTORY: MR, HTN, Sepsis, DVT    PROCEDURE: The procedure was performed in the echo lab. This was a routine study. The risks and alternatives of the procedure were explained to the patient and informed consent was obtained. The transesophageal approach was used. The study  included complete 2D imaging, complete spectral Doppler, and color Doppler. The heart rate was 92 bpm, at the start of the study. An adult omniplane probe was inserted by the attending cardiologist. Intubated with ease. One intubation  attempt(s). There was blood detected on the probe. Image " "quality was adequate. There were no complications during the procedure. MEDICATIONS: Anesthesia administered by anesthesia team.    LEFT VENTRICLE: Size was normal. Systolic function was normal. Ejection fraction was estimated to be 55 %. There were no regional wall motion abnormalities. Wall thickness was normal. DOPPLER: The study was not technically sufficient to  allow evaluation of LV diastolic function.    RIGHT VENTRICLE: The ventricle was mildly dilated. Systolic function was mildly reduced.    LEFT ATRIUM: The atrium was dilated. No thrombus was identified. APPENDAGE: The appendage was mildly dilated. No thrombus was identified. There was mild spontaneous echo contrast (\"smoke\") in the appendage. DOPPLER: The function was  moderately reduced (moderately reduced emptying velocity).    ATRIAL SEPTUM: No defect or patent foramen ovale was identified. Doppler evaluation was performed. There was no right-to-left shunt, in the baseline state.    RIGHT ATRIUM: Size was normal. No thrombus was identified.    MITRAL VALVE: Valve structure was normal. There was normal leaflet separation. There was no echocardiographic evidence of vegetation. DOPPLER: There was mild to moderate regurgitation.    AORTIC VALVE: The valve was trileaflet. Leaflets exhibited normal thickness and normal cuspal separation. There was no echocardiographic evidence of vegetation. DOPPLER: There was moderate regurgitation. No flow reversal was detected in  the descending thoracic aorta.    TRICUSPID VALVE: The valve structure was normal. There was normal leaflet separation. There was no echocardiographic evidence of vegetation. DOPPLER: There was mild to moderate regurgitation. Pulmonary artery systolic pressure was within  the normal range. Estimated peak PA pressure was 25 mmHg.    PULMONIC VALVE: Leaflets exhibited normal thickness, no calcification, and normal cuspal separation. There was no echocardiographic evidence of vegetation. DOPPLER: " There was moderate regurgitation.    PERICARDIUM: There was no pericardial effusion.    AORTA: The root exhibited dilatation at 41 mm. There was dilatation of the ascending aorta at 44 mm. There was no atheroma. There was no evidence for dissection. There was no evidence for aneurysm.    Intersocietal Commission Accredited Echocardiography Laboratory    Prepared and electronically signed by    Anthony Borges MD  Signed 26-Feb-2021 12:16:25    No results found for this or any previous visit.    No results found for this or any previous visit.          I reviewed and interpreted the following LABS/EKG/TELE/IMAGING and below is summary of my interpretation (if data available):        Current EKG and Rhythm Strip: afib 64bpm    Past EKG with LB pacing narrow QRS RBBB      Pacemaker interogation strips reviewed and interpreted    Echo images intepreted see above.

## 2024-11-20 NOTE — TELEPHONE ENCOUNTER
Patient scheduled for RANDALL/AFIB Ablation /PFA/MED ADMISSION  on 1/28/25 at [facility] with Dr. NOE.      Patient aware of all general instructions.    Medication holds:     ELIQUIS: Continue taking as prescribed, per Dr. NOE  you DONT need to stop this medication. IT'S IMPORTANT YOU TAKE YOUR DOSES INCLUDING THE MORNING OF YOUR PROCEDURE.     TORSEMIDE: DO NOT take this medication the morning of the procedure.     Labs to be done BETWEEN 12/28/24 & 1/21/25   CMP / CBC (FASTING 8 HOURS)      Thank you,  Roshni Carney

## 2024-11-20 NOTE — LETTER
2024               CARDIAC ABLATION INSTRUCTIONS     [place] Jr.   : 1945  MRN: [id number]  [address] [place] [id number]    Procedure: RANDALL/AFIB ABLATION/PFA/MED ADMISSION      Procedure Date: 25     Location: [facility]  Address: [address] [place] [address], [place] [address]        Labs to be done BETWEEN 24 & 2125 : CMP /  CBC (FASTING 8 HOURS)    BLOOD THINNER INSTRUCTIONS (Coumadin / Warfarin / Pradaxa / Eliquis / Xarelto):     ELIQUIS: Continue taking as prescribed, per Dr. NOE  you DONT need to stop this medication. IT'S IMPORTANT YOU TAKE YOUR DOSES INCLUDING THE MORNING OF YOUR PROCEDURE.       Medication Hold:     TORSEMIDE: DO NOT take this medication the morning of the procedure.     Arrival Time: The Hospital will contact you the day prior to your procedure, usually between 4PM - 6PM to instruct you on the time and place to report. If you do not hear from a [facility]  by 6PM the evening prior to your procedure, please contact the campus you are scheduled at.     DO NOT drink or eat ANYTHING after midnight the night prior to your procedure. You may have a minimal amount of water with your AM medications.     Arrange for a responsible adult to drive you to and from the hospital.    You should continue to take your morning medications with a sip of water UNLESS ADVISED OTHERWISE.       DO NOT stop taking Plavix or Aspirin unless advised otherwise.     If you are currently taking Fish Oil, Krill oil and/or Vitamin E please DO NOT TAKE FOR A WEEK PRIOR TO PROCEDURE.     If you are diabetic, DO NOT take any of your diabetic pills the morning of your procedure. Oral diabetic medications may include Glucophage, Prandin, Glyburide, Micronase, Avandia, Glucovance, Precose, Glynase, and Starlix.     Bring a list of daily medications, vitamins, minerals, herbals and nutritional supplements you take. Please include dosages and  the times you take them each day.     If you are packing an overnight bag, pack minimal clothing, you will be given hospital sleepwear.   DO NOT bring money, valuables or jewelry. The wedding band is ok.     If you use CPAP machine, bring it to the hospital.      Bring your Photo ID and Insurance cards with you.     Please notify us if you have been admitted to the hospital within 30 days.      FAILURE TO FOLLOW ANY OF THESE INSTRUCTIONS COULD RESULT IN THE CANCELLATION OF YOUR PROCEDURE      Please call [phone] if you do not hear from the  by 6:00PM the night before your procedure.    All patients enter through ENTRANCE B. [facility] is available free of charge or park on Parking Deck B.        Thank you,   Roshni Carney  Surgery Coordinator  [facility]   [address] [place] [id number]  Teams: [phone]

## 2024-11-20 NOTE — PROGRESS NOTES
Results for orders placed or performed in visit on 11/20/24   Cardiac EP device report    Narrative    MDT D-PM/ACTIVE SYSTEM IS MRI CONDITIONAL  DEVICE INTERROGATED IN THE [place] OFFICE W/DR. GUTIERREZ OV: BATTERY VOLTAGE ADEQUATE-13 YRS. AP 0%  40%. ALL AVAILABLE LEAD PARAMETERS WITHIN NORMAL LIMITS. 100% AF SINCE 3/2024. 114 FAST A/V NOTED; AVAIL EGRAMS PRESENT AS RVR. PT ON ELIQUIS & METO TART. EF 65% (ECHO 2024). PT C/O SOB & FATIGUE. LOWER EXTREMITY EDEMA NOTED. NO PROGRAMMING CHANGES MADE TO DEVICE PARAMETERS. NORMAL DEVICE FUNCTION. NC

## 2024-11-21 NOTE — TELEPHONE ENCOUNTER
pt is scheduled for 1/28/25      Called [facility] for med pricing. Spoke to STEVE SCHROEDER     ID #: [id number]    Per insurance this is only an estimate.      Dofetilide 500 mcg (0.5mg), #180 -    NO AUTH REQUIRED  Copay:  $ 36.36 (retail pharmacy)   $ 107.42 (mail order pharmacy)     Sotalol 120 mg, # -   NO AUTH REQUIRED   Copay:   $ 15.46 (retail pharmacy)   $ 45.00 (mail order pharmacy)    Call reference #: [id number]     11/21/2024      Thanks,  Roshni Carney

## 2025-01-07 ENCOUNTER — APPOINTMENT (OUTPATIENT)
Dept: LAB | Facility: MEDICAL CENTER | Age: 80
End: 2025-01-07
Payer: MEDICARE

## 2025-01-07 DIAGNOSIS — I25.10 CORONARY ARTERY DISEASE INVOLVING NATIVE CORONARY ARTERY OF NATIVE HEART WITHOUT ANGINA PECTORIS: ICD-10-CM

## 2025-01-07 DIAGNOSIS — I48.91 ATRIAL FIBRILLATION WITH RVR (HCC): ICD-10-CM

## 2025-01-07 DIAGNOSIS — I10 BENIGN ESSENTIAL HYPERTENSION: ICD-10-CM

## 2025-01-07 DIAGNOSIS — I27.20 PULMONARY HYPERTENSION (HCC): ICD-10-CM

## 2025-01-07 LAB
ALBUMIN SERPL BCG-MCNC: 4.4 G/DL (ref 3.5–5)
ALP SERPL-CCNC: 77 U/L (ref 34–104)
ALT SERPL W P-5'-P-CCNC: 14 U/L (ref 7–52)
ANION GAP SERPL CALCULATED.3IONS-SCNC: 8 MMOL/L (ref 4–13)
AST SERPL W P-5'-P-CCNC: 21 U/L (ref 13–39)
BASOPHILS # BLD AUTO: 0.09 THOUSANDS/ΜL (ref 0–0.1)
BASOPHILS NFR BLD AUTO: 2 % (ref 0–1)
BILIRUB SERPL-MCNC: 1.54 MG/DL (ref 0.2–1)
BUN SERPL-MCNC: 16 MG/DL (ref 5–25)
CALCIUM SERPL-MCNC: 9.7 MG/DL (ref 8.4–10.2)
CHLORIDE SERPL-SCNC: 105 MMOL/L (ref 96–108)
CO2 SERPL-SCNC: 31 MMOL/L (ref 21–32)
CREAT SERPL-MCNC: 0.76 MG/DL (ref 0.6–1.3)
EOSINOPHIL # BLD AUTO: 0.26 THOUSAND/ΜL (ref 0–0.61)
EOSINOPHIL NFR BLD AUTO: 4 % (ref 0–6)
ERYTHROCYTE [DISTWIDTH] IN BLOOD BY AUTOMATED COUNT: 13.3 % (ref 11.6–15.1)
GFR SERPL CREATININE-BSD FRML MDRD: 86 ML/MIN/1.73SQ M
GLUCOSE P FAST SERPL-MCNC: 94 MG/DL (ref 65–99)
HCT VFR BLD AUTO: 55.5 % (ref 36.5–49.3)
HGB BLD-MCNC: 18.4 G/DL (ref 12–17)
IMM GRANULOCYTES # BLD AUTO: 0.01 THOUSAND/UL (ref 0–0.2)
IMM GRANULOCYTES NFR BLD AUTO: 0 % (ref 0–2)
LYMPHOCYTES # BLD AUTO: 2.04 THOUSANDS/ΜL (ref 0.6–4.47)
LYMPHOCYTES NFR BLD AUTO: 34 % (ref 14–44)
MCH RBC QN AUTO: 32.6 PG (ref 26.8–34.3)
MCHC RBC AUTO-ENTMCNC: 33.2 G/DL (ref 31.4–37.4)
MCV RBC AUTO: 98 FL (ref 82–98)
MONOCYTES # BLD AUTO: 0.57 THOUSAND/ΜL (ref 0.17–1.22)
MONOCYTES NFR BLD AUTO: 9 % (ref 4–12)
NEUTROPHILS # BLD AUTO: 3.08 THOUSANDS/ΜL (ref 1.85–7.62)
NEUTS SEG NFR BLD AUTO: 51 % (ref 43–75)
NRBC BLD AUTO-RTO: 0 /100 WBCS
PLATELET # BLD AUTO: 169 THOUSANDS/UL (ref 149–390)
PMV BLD AUTO: 9.8 FL (ref 8.9–12.7)
POTASSIUM SERPL-SCNC: 4.2 MMOL/L (ref 3.5–5.3)
PROT SERPL-MCNC: 7.1 G/DL (ref 6.4–8.4)
RBC # BLD AUTO: 5.65 MILLION/UL (ref 3.88–5.62)
SODIUM SERPL-SCNC: 144 MMOL/L (ref 135–147)
WBC # BLD AUTO: 6.05 THOUSAND/UL (ref 4.31–10.16)

## 2025-01-07 PROCEDURE — 36415 COLL VENOUS BLD VENIPUNCTURE: CPT

## 2025-01-07 PROCEDURE — 80053 COMPREHEN METABOLIC PANEL: CPT

## 2025-01-07 PROCEDURE — 85025 COMPLETE CBC W/AUTO DIFF WBC: CPT

## 2025-01-28 ENCOUNTER — ANESTHESIA EVENT (OUTPATIENT)
Dept: NON INVASIVE DIAGNOSTICS | Facility: HOSPITAL | Age: 80
DRG: 273 | End: 2025-01-28
Payer: MEDICARE

## 2025-01-28 ENCOUNTER — HOSPITAL ENCOUNTER (OUTPATIENT)
Dept: NON INVASIVE DIAGNOSTICS | Facility: HOSPITAL | Age: 80
Discharge: HOME | DRG: 273 | End: 2025-01-28
Attending: INTERNAL MEDICINE
Payer: MEDICARE

## 2025-01-28 ENCOUNTER — HOSPITAL ENCOUNTER (INPATIENT)
Facility: HOSPITAL | Age: 80
LOS: 3 days | Discharge: HOME | DRG: 273 | End: 2025-01-31
Attending: INTERNAL MEDICINE | Admitting: INTERNAL MEDICINE
Payer: MEDICARE

## 2025-01-28 VITALS
HEIGHT: 77 IN | WEIGHT: 260 LBS | HEART RATE: 65 BPM | SYSTOLIC BLOOD PRESSURE: 113 MMHG | BODY MASS INDEX: 30.7 KG/M2 | DIASTOLIC BLOOD PRESSURE: 87 MMHG

## 2025-01-28 DIAGNOSIS — I10 BENIGN ESSENTIAL HYPERTENSION: ICD-10-CM

## 2025-01-28 DIAGNOSIS — I48.91 ATRIAL FIBRILLATION WITH RVR (HCC): ICD-10-CM

## 2025-01-28 DIAGNOSIS — I27.20 PULMONARY HYPERTENSION (HCC): ICD-10-CM

## 2025-01-28 DIAGNOSIS — Z86.79 S/P ABLATION OF ATRIAL FIBRILLATION: Primary | ICD-10-CM

## 2025-01-28 DIAGNOSIS — I25.10 CORONARY ARTERY DISEASE INVOLVING NATIVE CORONARY ARTERY OF NATIVE HEART WITHOUT ANGINA PECTORIS: ICD-10-CM

## 2025-01-28 DIAGNOSIS — I48.0 PAROXYSMAL ATRIAL FIBRILLATION (HCC): ICD-10-CM

## 2025-01-28 DIAGNOSIS — D75.1 ERYTHROCYTOSIS: ICD-10-CM

## 2025-01-28 DIAGNOSIS — Z98.890 S/P ABLATION OF ATRIAL FIBRILLATION: Primary | ICD-10-CM

## 2025-01-28 PROBLEM — R06.00 DYSPNEA: Status: ACTIVE | Noted: 2025-01-28

## 2025-01-28 LAB
ANION GAP SERPL CALCULATED.3IONS-SCNC: 8 MMOL/L (ref 4–13)
ATRIAL RATE: 80 BPM
ATRIAL RATE: 83 BPM
ATRIAL RATE: 85 BPM
BUN SERPL-MCNC: 21 MG/DL (ref 5–25)
CALCIUM SERPL-MCNC: 9.7 MG/DL (ref 8.4–10.2)
CHLORIDE SERPL-SCNC: 107 MMOL/L (ref 96–108)
CO2 SERPL-SCNC: 27 MMOL/L (ref 21–32)
CREAT SERPL-MCNC: 0.96 MG/DL (ref 0.6–1.3)
ERYTHROCYTE [DISTWIDTH] IN BLOOD BY AUTOMATED COUNT: 13.2 % (ref 11.6–15.1)
GFR SERPL CREATININE-BSD FRML MDRD: 73 ML/MIN/1.73SQ M
GLUCOSE P FAST SERPL-MCNC: 101 MG/DL (ref 65–99)
GLUCOSE SERPL-MCNC: 101 MG/DL (ref 65–140)
HCT VFR BLD AUTO: 52 % (ref 36.5–49.3)
HGB BLD-MCNC: 18.1 G/DL (ref 12–17)
INR PPP: 1.34 (ref 0.85–1.19)
KCT BLD-ACNC: 312 SEC (ref 89–137)
KCT BLD-ACNC: 312 SEC (ref 89–137)
KCT BLD-ACNC: 338 SEC (ref 89–137)
KCT BLD-ACNC: 344 SEC (ref 89–137)
MCH RBC QN AUTO: 32.4 PG (ref 26.8–34.3)
MCHC RBC AUTO-ENTMCNC: 34.8 G/DL (ref 31.4–37.4)
MCV RBC AUTO: 93 FL (ref 82–98)
P AXIS: 33 DEGREES
P AXIS: 49 DEGREES
PLATELET # BLD AUTO: 168 THOUSANDS/UL (ref 149–390)
PMV BLD AUTO: 9.7 FL (ref 8.9–12.7)
POTASSIUM SERPL-SCNC: 3.8 MMOL/L (ref 3.5–5.3)
PR INTERVAL: 204 MS
PR INTERVAL: 212 MS
PROTHROMBIN TIME: 16.8 SECONDS (ref 12.3–15)
QRS AXIS: 12 DEGREES
QRS AXIS: 20 DEGREES
QRS AXIS: 63 DEGREES
QRSD INTERVAL: 132 MS
QRSD INTERVAL: 132 MS
QRSD INTERVAL: 166 MS
QT INTERVAL: 418 MS
QT INTERVAL: 430 MS
QT INTERVAL: 432 MS
QTC INTERVAL: 492 MS
QTC INTERVAL: 498 MS
QTC INTERVAL: 500 MS
RBC # BLD AUTO: 5.58 MILLION/UL (ref 3.88–5.62)
SL CV LV EF: 55
SODIUM SERPL-SCNC: 142 MMOL/L (ref 135–147)
SPECIMEN SOURCE: ABNORMAL
T WAVE AXIS: -49 DEGREES
T WAVE AXIS: 200 DEGREES
T WAVE AXIS: 226 DEGREES
VENTRICULAR RATE: 80 BPM
VENTRICULAR RATE: 81 BPM
VENTRICULAR RATE: 83 BPM
WBC # BLD AUTO: 6.28 THOUSAND/UL (ref 4.31–10.16)

## 2025-01-28 PROCEDURE — 93656 COMPRE EP EVAL ABLTJ ATR FIB: CPT | Performed by: INTERNAL MEDICINE

## 2025-01-28 PROCEDURE — 93655 ICAR CATH ABLTJ DSCRT ARRHYT: CPT | Performed by: INTERNAL MEDICINE

## 2025-01-28 PROCEDURE — C1760 CLOSURE DEV, VASC: HCPCS | Performed by: INTERNAL MEDICINE

## 2025-01-28 PROCEDURE — 85027 COMPLETE CBC AUTOMATED: CPT | Performed by: PHYSICIAN ASSISTANT

## 2025-01-28 PROCEDURE — 02583ZF DESTRUCTION OF CONDUCTION MECHANISM USING IRREVERSIBLE ELECTROPORATION, PERCUTANEOUS APPROACH: ICD-10-PCS | Performed by: INTERNAL MEDICINE

## 2025-01-28 PROCEDURE — 93657 TX L/R ATRIAL FIB ADDL: CPT | Performed by: INTERNAL MEDICINE

## 2025-01-28 PROCEDURE — C1733 CATH, EP, OTHR THAN COOL-TIP: HCPCS | Performed by: INTERNAL MEDICINE

## 2025-01-28 PROCEDURE — C1732 CATH, EP, DIAG/ABL, 3D/VECT: HCPCS | Performed by: INTERNAL MEDICINE

## 2025-01-28 PROCEDURE — 92960 CARDIOVERSION ELECTRIC EXT: CPT | Performed by: INTERNAL MEDICINE

## 2025-01-28 PROCEDURE — C1759 CATH, INTRA ECHOCARDIOGRAPHY: HCPCS | Performed by: INTERNAL MEDICINE

## 2025-01-28 PROCEDURE — 93312 ECHO TRANSESOPHAGEAL: CPT

## 2025-01-28 PROCEDURE — C1894 INTRO/SHEATH, NON-LASER: HCPCS | Performed by: INTERNAL MEDICINE

## 2025-01-28 PROCEDURE — C1769 GUIDE WIRE: HCPCS | Performed by: INTERNAL MEDICINE

## 2025-01-28 PROCEDURE — C1730 CATH, EP, 19 OR FEW ELECT: HCPCS | Performed by: INTERNAL MEDICINE

## 2025-01-28 PROCEDURE — 02K83ZZ MAP CONDUCTION MECHANISM, PERCUTANEOUS APPROACH: ICD-10-PCS | Performed by: INTERNAL MEDICINE

## 2025-01-28 PROCEDURE — 5A2204Z RESTORATION OF CARDIAC RHYTHM, SINGLE: ICD-10-PCS | Performed by: INTERNAL MEDICINE

## 2025-01-28 PROCEDURE — C1766 INTRO/SHEATH,STRBLE,NON-PEEL: HCPCS | Performed by: INTERNAL MEDICINE

## 2025-01-28 PROCEDURE — 93650 ICAR CATH ABLTJ AV NODE FUNC: CPT | Performed by: INTERNAL MEDICINE

## 2025-01-28 PROCEDURE — NC001 PR NO CHARGE: Performed by: PHYSICIAN ASSISTANT

## 2025-01-28 PROCEDURE — 93005 ELECTROCARDIOGRAM TRACING: CPT

## 2025-01-28 PROCEDURE — 76937 US GUIDE VASCULAR ACCESS: CPT | Performed by: INTERNAL MEDICINE

## 2025-01-28 PROCEDURE — 85610 PROTHROMBIN TIME: CPT | Performed by: PHYSICIAN ASSISTANT

## 2025-01-28 PROCEDURE — 80048 BASIC METABOLIC PNL TOTAL CA: CPT | Performed by: PHYSICIAN ASSISTANT

## 2025-01-28 PROCEDURE — 93312 ECHO TRANSESOPHAGEAL: CPT | Performed by: INTERNAL MEDICINE

## 2025-01-28 PROCEDURE — 93320 DOPPLER ECHO COMPLETE: CPT | Performed by: INTERNAL MEDICINE

## 2025-01-28 PROCEDURE — 93325 DOPPLER ECHO COLOR FLOW MAPG: CPT | Performed by: INTERNAL MEDICINE

## 2025-01-28 PROCEDURE — 93010 ELECTROCARDIOGRAM REPORT: CPT | Performed by: INTERNAL MEDICINE

## 2025-01-28 PROCEDURE — 85347 COAGULATION TIME ACTIVATED: CPT

## 2025-01-28 PROCEDURE — 02573ZK DESTRUCTION OF LEFT ATRIAL APPENDAGE, PERCUTANEOUS APPROACH: ICD-10-PCS | Performed by: INTERNAL MEDICINE

## 2025-01-28 DEVICE — DVC VASC CLSR VASCADE MVP XL 10-12FR: Type: IMPLANTABLE DEVICE | Site: GROIN | Status: FUNCTIONAL

## 2025-01-28 DEVICE — DVC VASC CLSR VASCADE MVP 6-12FR: Type: IMPLANTABLE DEVICE | Site: GROIN | Status: FUNCTIONAL

## 2025-01-28 RX ORDER — DOFETILIDE 0.5 MG/1
500 CAPSULE ORAL EVERY 12 HOURS SCHEDULED
Status: DISCONTINUED | OUTPATIENT
Start: 2025-01-28 | End: 2025-01-29

## 2025-01-28 RX ORDER — HEPARIN SODIUM 1000 [USP'U]/ML
INJECTION, SOLUTION INTRAVENOUS; SUBCUTANEOUS CODE/TRAUMA/SEDATION MEDICATION
Status: DISCONTINUED | OUTPATIENT
Start: 2025-01-28 | End: 2025-01-28 | Stop reason: HOSPADM

## 2025-01-28 RX ORDER — LIDOCAINE HYDROCHLORIDE 10 MG/ML
INJECTION, SOLUTION EPIDURAL; INFILTRATION; INTRACAUDAL; PERINEURAL AS NEEDED
Status: DISCONTINUED | OUTPATIENT
Start: 2025-01-28 | End: 2025-01-28

## 2025-01-28 RX ORDER — SODIUM CHLORIDE 9 MG/ML
20 INJECTION, SOLUTION INTRAVENOUS CONTINUOUS
Status: DISCONTINUED | OUTPATIENT
Start: 2025-01-28 | End: 2025-01-29

## 2025-01-28 RX ORDER — MIDAZOLAM HYDROCHLORIDE 2 MG/2ML
INJECTION, SOLUTION INTRAMUSCULAR; INTRAVENOUS AS NEEDED
Status: DISCONTINUED | OUTPATIENT
Start: 2025-01-28 | End: 2025-01-28

## 2025-01-28 RX ORDER — TORSEMIDE 20 MG/1
20 TABLET ORAL DAILY
Status: DISCONTINUED | OUTPATIENT
Start: 2025-01-28 | End: 2025-01-29

## 2025-01-28 RX ORDER — PROTAMINE SULFATE 10 MG/ML
INJECTION, SOLUTION INTRAVENOUS AS NEEDED
Status: DISCONTINUED | OUTPATIENT
Start: 2025-01-28 | End: 2025-01-28

## 2025-01-28 RX ORDER — MEPERIDINE HYDROCHLORIDE 25 MG/ML
12.5 INJECTION INTRAMUSCULAR; INTRAVENOUS; SUBCUTANEOUS
Status: DISCONTINUED | OUTPATIENT
Start: 2025-01-28 | End: 2025-01-28 | Stop reason: HOSPADM

## 2025-01-28 RX ORDER — CEFAZOLIN SODIUM 2 G/50ML
SOLUTION INTRAVENOUS AS NEEDED
Status: DISCONTINUED | OUTPATIENT
Start: 2025-01-28 | End: 2025-01-28

## 2025-01-28 RX ORDER — PANTOPRAZOLE SODIUM 40 MG/10ML
40 INJECTION, POWDER, LYOPHILIZED, FOR SOLUTION INTRAVENOUS ONCE
Status: DISCONTINUED | OUTPATIENT
Start: 2025-01-28 | End: 2025-01-28 | Stop reason: HOSPADM

## 2025-01-28 RX ORDER — ATORVASTATIN CALCIUM 40 MG/1
40 TABLET, FILM COATED ORAL
Status: DISCONTINUED | OUTPATIENT
Start: 2025-01-28 | End: 2025-01-31 | Stop reason: HOSPADM

## 2025-01-28 RX ORDER — PROPOFOL 10 MG/ML
INJECTION, EMULSION INTRAVENOUS AS NEEDED
Status: DISCONTINUED | OUTPATIENT
Start: 2025-01-28 | End: 2025-01-28

## 2025-01-28 RX ORDER — ACETAMINOPHEN 325 MG/1
650 TABLET ORAL EVERY 4 HOURS PRN
Status: DISCONTINUED | OUTPATIENT
Start: 2025-01-28 | End: 2025-01-31 | Stop reason: HOSPADM

## 2025-01-28 RX ORDER — FENTANYL CITRATE/PF 50 MCG/ML
25 SYRINGE (ML) INJECTION
Status: DISCONTINUED | OUTPATIENT
Start: 2025-01-28 | End: 2025-01-28 | Stop reason: HOSPADM

## 2025-01-28 RX ORDER — HEPARIN SODIUM 10000 [USP'U]/100ML
INJECTION, SOLUTION INTRAVENOUS
Status: DISCONTINUED | OUTPATIENT
Start: 2025-01-28 | End: 2025-01-28 | Stop reason: HOSPADM

## 2025-01-28 RX ORDER — ONDANSETRON 2 MG/ML
4 INJECTION INTRAMUSCULAR; INTRAVENOUS ONCE AS NEEDED
Status: DISCONTINUED | OUTPATIENT
Start: 2025-01-28 | End: 2025-01-28 | Stop reason: HOSPADM

## 2025-01-28 RX ORDER — FENTANYL CITRATE 50 UG/ML
INJECTION, SOLUTION INTRAMUSCULAR; INTRAVENOUS AS NEEDED
Status: DISCONTINUED | OUTPATIENT
Start: 2025-01-28 | End: 2025-01-28

## 2025-01-28 RX ORDER — ALBUTEROL SULFATE 0.83 MG/ML
2.5 SOLUTION RESPIRATORY (INHALATION) ONCE AS NEEDED
Status: DISCONTINUED | OUTPATIENT
Start: 2025-01-28 | End: 2025-01-28 | Stop reason: HOSPADM

## 2025-01-28 RX ORDER — ONDANSETRON 2 MG/ML
INJECTION INTRAMUSCULAR; INTRAVENOUS AS NEEDED
Status: DISCONTINUED | OUTPATIENT
Start: 2025-01-28 | End: 2025-01-28

## 2025-01-28 RX ORDER — METOPROLOL SUCCINATE 25 MG/1
25 TABLET, EXTENDED RELEASE ORAL DAILY
Status: DISCONTINUED | OUTPATIENT
Start: 2025-01-29 | End: 2025-01-28

## 2025-01-28 RX ORDER — DEXAMETHASONE SODIUM PHOSPHATE 4 MG/ML
INJECTION, SOLUTION INTRA-ARTICULAR; INTRALESIONAL; INTRAMUSCULAR; INTRAVENOUS; SOFT TISSUE AS NEEDED
Status: DISCONTINUED | OUTPATIENT
Start: 2025-01-28 | End: 2025-01-28

## 2025-01-28 RX ORDER — HYDROMORPHONE HCL/PF 1 MG/ML
0.5 SYRINGE (ML) INJECTION
Status: DISCONTINUED | OUTPATIENT
Start: 2025-01-28 | End: 2025-01-28 | Stop reason: HOSPADM

## 2025-01-28 RX ORDER — SODIUM CHLORIDE 9 MG/ML
20 INJECTION, SOLUTION INTRAVENOUS ONCE
Status: COMPLETED | OUTPATIENT
Start: 2025-01-28 | End: 2025-01-28

## 2025-01-28 RX ORDER — GLYCOPYRROLATE 0.2 MG/ML
INJECTION INTRAMUSCULAR; INTRAVENOUS AS NEEDED
Status: DISCONTINUED | OUTPATIENT
Start: 2025-01-28 | End: 2025-01-28

## 2025-01-28 RX ORDER — PROPOFOL 10 MG/ML
INJECTION, EMULSION INTRAVENOUS CONTINUOUS PRN
Status: DISCONTINUED | OUTPATIENT
Start: 2025-01-28 | End: 2025-01-28

## 2025-01-28 RX ORDER — PROMETHAZINE HYDROCHLORIDE 25 MG/ML
12.5 INJECTION, SOLUTION INTRAMUSCULAR; INTRAVENOUS ONCE AS NEEDED
Status: DISCONTINUED | OUTPATIENT
Start: 2025-01-28 | End: 2025-01-28 | Stop reason: HOSPADM

## 2025-01-28 RX ORDER — ROCURONIUM BROMIDE 10 MG/ML
INJECTION, SOLUTION INTRAVENOUS AS NEEDED
Status: DISCONTINUED | OUTPATIENT
Start: 2025-01-28 | End: 2025-01-28

## 2025-01-28 RX ADMIN — PROTAMINE SULFATE 50 MG: 10 INJECTION, SOLUTION INTRAVENOUS at 13:38

## 2025-01-28 RX ADMIN — SODIUM CHLORIDE: 0.9 INJECTION, SOLUTION INTRAVENOUS at 13:30

## 2025-01-28 RX ADMIN — MIDAZOLAM 2 MG: 1 INJECTION INTRAMUSCULAR; INTRAVENOUS at 10:56

## 2025-01-28 RX ADMIN — SUGAMMADEX 100 MG: 100 INJECTION, SOLUTION INTRAVENOUS at 13:44

## 2025-01-28 RX ADMIN — CEFAZOLIN SODIUM 2000 MG: 2 SOLUTION INTRAVENOUS at 10:59

## 2025-01-28 RX ADMIN — PROPOFOL 60 MCG/KG/MIN: 10 INJECTION, EMULSION INTRAVENOUS at 11:15

## 2025-01-28 RX ADMIN — FENTANYL CITRATE 25 MCG: 50 INJECTION INTRAMUSCULAR; INTRAVENOUS at 15:06

## 2025-01-28 RX ADMIN — ATORVASTATIN CALCIUM 40 MG: 40 TABLET, FILM COATED ORAL at 17:22

## 2025-01-28 RX ADMIN — ONDANSETRON 4 MG: 2 INJECTION INTRAMUSCULAR; INTRAVENOUS at 11:34

## 2025-01-28 RX ADMIN — SUGAMMADEX 100 MG: 100 INJECTION, SOLUTION INTRAVENOUS at 13:42

## 2025-01-28 RX ADMIN — APIXABAN 5 MG: 5 TABLET, FILM COATED ORAL at 18:25

## 2025-01-28 RX ADMIN — LIDOCAINE HYDROCHLORIDE 50 MG: 10 INJECTION, SOLUTION EPIDURAL; INFILTRATION; INTRACAUDAL at 11:05

## 2025-01-28 RX ADMIN — SODIUM CHLORIDE 20 ML/HR: 0.9 INJECTION, SOLUTION INTRAVENOUS at 09:41

## 2025-01-28 RX ADMIN — PROPOFOL 150 MG: 10 INJECTION, EMULSION INTRAVENOUS at 11:05

## 2025-01-28 RX ADMIN — FENTANYL CITRATE 25 MCG: 50 INJECTION INTRAMUSCULAR; INTRAVENOUS at 14:58

## 2025-01-28 RX ADMIN — FENTANYL CITRATE 100 MCG: 50 INJECTION INTRAMUSCULAR; INTRAVENOUS at 11:05

## 2025-01-28 RX ADMIN — ROCURONIUM 10 MG: 50 INJECTION, SOLUTION INTRAVENOUS at 12:53

## 2025-01-28 RX ADMIN — DEXAMETHASONE SODIUM PHOSPHATE 8 MG: 4 INJECTION INTRA-ARTICULAR; INTRALESIONAL; INTRAMUSCULAR; INTRAVENOUS; SOFT TISSUE at 11:32

## 2025-01-28 RX ADMIN — GLYCOPYRROLATE 0.2 MG: 0.2 INJECTION, SOLUTION INTRAMUSCULAR; INTRAVENOUS at 12:34

## 2025-01-28 RX ADMIN — ROCURONIUM 70 MG: 50 INJECTION, SOLUTION INTRAVENOUS at 11:05

## 2025-01-28 RX ADMIN — SUGAMMADEX 100 MG: 100 INJECTION, SOLUTION INTRAVENOUS at 13:41

## 2025-01-28 RX ADMIN — ACETAMINOPHEN 650 MG: 325 TABLET, FILM COATED ORAL at 17:29

## 2025-01-28 RX ADMIN — SODIUM CHLORIDE: 0.9 INJECTION, SOLUTION INTRAVENOUS at 11:10

## 2025-01-28 RX ADMIN — PHENYLEPHRINE HYDROCHLORIDE 20 MCG/MIN: 10 INJECTION INTRAVENOUS at 11:12

## 2025-01-28 RX ADMIN — ROCURONIUM 20 MG: 50 INJECTION, SOLUTION INTRAVENOUS at 12:30

## 2025-01-28 RX ADMIN — SUGAMMADEX 50 MG: 100 INJECTION, SOLUTION INTRAVENOUS at 13:56

## 2025-01-28 RX ADMIN — DOFETILIDE 500 MCG: 0.5 CAPSULE ORAL at 21:22

## 2025-01-28 NOTE — DISCHARGE SUMMARY
Discharge Summary - [place] Jr. 80 y.o. male MRN: [id number]    Unit/Bed#: [facility] 431-01 Encounter: [id number]      Admission Date: 1/28/2025   Discharge Date: 1/31/2025    Discharge Diagnosis:   Assessment & Plan  Atrial fibrillation with RVR (HCC)  s/p redo Afib ablation with PFA (PVI, PWI, atrial septum and appendage remnant) + AV node ablation + Tikosyn initiation  (Prior Afib ablation 2018 PVI/CTI and March 2022 cryo PVI/PWI)  Maintaining sinus rhythm (AV paced rhythm)  Failed Tikosyn initiation - prolonged Qtc of 540 ms despite lowering dose to 125 mcg every 12 hours --> D/C Tikosyn  Continue Eliquis 5 mg bid  Off all AV monica agents after AVN ablation (metoprolol and diltiazem D/C'd)  Groin sites stable, no hematoma  F/U with EP in 4-6 weeks  s/p Medtronic dual chamber PPM with left bundle pacing lead 7/19/2023  RBBB/narrow QRS pacing morphology placed for CHB post MVR/AVR/Appendage ligation July 2023.  Now status post AV node ablation --- Toprol XL and diltiazem discontinued  Dyspnea  Diuresed with Bumex IV  Resume home torsemide 20 mg once daily  Continue potassium supplementation  Hypertension  BP well controlled  Now off diltiazem and metoprolol  Continue to monitor  History of DVT (deep vein thrombosis)  On Eliquis for bilateral DVTs since 2016  Erythrocytosis  Hemoglobin 18.1 on admission, uptrending for the past 1.5 years. Was 13.7 in July 2023  Improved to 16.1 on discharge  Recommend F/U with PCP and consider heme/onc evaluation if remains elevated  Discussed with patient and he is aware, agrees to follow up  Mitral valve insufficiency    Mixed hyperlipidemia    Pulmonary hypertension (HCC)    Coronary artery disease involving native coronary artery    S/P AVR    S/P left atrial appendage ligation    S/P MVR (mitral valve repair)    S/P AV monica ablation 1/28/2025    S/P redo AFib ablation 1/28/2025        Procedures Performed:     Successful Afib ablation and AV node ablation by   Lucio on 1/28/2025  1. Atrial fibrillation ablation using Pulsed Field ablation  2. Atrial Septal ablation and atrial appendage remnant ablation to treat Complex atypical atrial flutter with termination to NSR  4. Left atrial posterior wall isolation to treat atrial fibrillation   5. AV monica ablation  6. 3-D map with NAVX   7. Intracardiac Echocardiography ICE.  8. Left atrial pacing and recording.   9. DC Cardioversion (in afib baseline)  10. Dual chamber pacemaker reprogramming before and after procedure.     Orders Placed This Encounter   Procedures    Cardiac ep lab eps/ablations     Consultants: none    HPI: [place] Jr. is a 80 y.o. male with a history of persistent Afib s/p 2 prior ablations by Dr. Valdes (PVI, CTI ablation 1/24/2018 and PVI, LA posterior wall 3/25/2022), ascending aortic aneurysm (4.5 cm), HTN, HLD, bilateral popliteal aneurysm s/p repair, history of DVT, history of right lower extremity osteomyelitis, bilateral DVT on Eliquis since 2016, severe MR, moderate AI and single vessel disease in OM1 s/p AVR with 29 mm Escalante Inspiris bioprosthetic valve, mitral valve repair with 36 mm Escalante physio 2 ring angioplasty, and JENNY ligation on 7/12/2023. Post operatively he developed heart block with junctional escape and underwent Medtronic dual chamber PPM on 7/19/2023 by Dr. Palacio.     He has recurrent A-fib.  He continues to experience shortness of breath.  Treatment options were discussed including AVJ versus redo A-fib ablation with initiation of Tikosyn.  He elected to proceed with redo A-fib ablation, AV node ablation and initiation of Tikosyn.    Hospital Course: [place] Jr. presented in A-fib with ventricular pacing.  He underwent successful Afib ablation (PVI, atrial septal ablation and atrial appendage remnant for atypical atrial flutter, posterior wall) and AV node ablation on 1/28/2025 by Dr. Valdes.  He was admitted to the hospital for monitoring  overnight and initiation of Tikosyn.    He was continued on Eliquis 5 mg twice daily.  Creatinine clearance at 75 mL/min.  He is a paced and QTc was less than 500 ms.  He was initiated on Tikosyn 500 mcg every 12 hours which was the dose that he was previously on.  EKG was checked 2 hours after each heart failure dose of Tikosyn.  AV monica agents including diltiazem and metoprolol were discontinued.    After the first dose of Tikosyn 500 mcg, QTc prolonged.  It was reduced 250 mcg every 12 hours.  After 2 doses of 250 mcg, QTc prolonged and it was reduced to 125 mcg every 12 hours.  After the fifth dose of Tikosyn, Qtc was prolonged at 540 ms (V paced with QRS of 150 ms). Tikosyn was discontinued.     He was mildly volume overloaded and was diuresed with Bumex IV.  Potassium was replaced.  His dyspnea improved. He will resume home torsemide 20 mg daily on discharge.    He was noted to have erythrocytosis with a hemoglobin of 18.4. This appears to have up-trended over the past year with a previous hemoglobin of 13. By the time of discharge his hemoglobin improed to 16.1. He will follow up with his PCP as an outpatient and if hemoglobin remains elevated, he will follow up with heme/onc.     Groin sites were stable after the procedure with bleeding bruits or hematoma.  He remained in sinus rhythm on telemetry and was AV paced.  He is feeling well and ambulating the halls without difficulty.  He denies chest pain, shortness of breath, lightness, dizziness, palpitation without any, PND.    Discharge instructions reviewed with the patient in detail.  He will not lift more than than 10 pounds for 1 week.  He will follow-up with EP in 4 to 6 weeks.  He was stable for discharge home on 931 2025.    Medication Instructions:  Continue Eliquis 5 mg bid  Metoprolol discontinued  Diltiazem discontinued  Failed Tikosyn load - Loaded with 5 doses starting at 500 mcg and reduced to 125 mcg but Qtc prolonged to 540 ms and Tikosyn was  "discontinued.   Continue torsemide 20 mg daily on discharge    Physical exam:  /84   Pulse 82   Temp 98.2 °F (36.8 °C)   Resp 18   Ht 6' 5\" (1.956 m)   Wt 118 kg (260 lb)   SpO2 100%   BMI 30.83 kg/m²   GEN: NAD, alert and oriented x 3, well appearing  SKIN: dry without significant lesions or rashes  HEENT: NCAT  NECK: No JVD appreciated  CARDIOVASCULAR: RRR, normal S1, S2 without murmurs, rubs, or gallops appreciated  LUNGS: Clear to auscultation bilaterally without wheezes, rhonchi, or rales  ABDOMEN: Soft, nontender, nondistended. Groin sites stable bilaterally, no bleeding, bruits or hematoma.   EXTREMITIES/VASCULAR: perfused without clubbing, cyanosis, or LE edema b/l  PSYCH: Normal mood and affect  NEURO: CN ll-Xll grossly intact    Discharge Medications:  See after visit summary for reconciled discharge medications provided to patient and family.      Medications Prior to Admission:     apixaban (Eliquis) 5 mg    diltiazem (DILACOR XR) 120 MG 24 hr capsule    metoprolol succinate (TOPROL-XL) 25 mg 24 hr tablet    Probiotic Product (PROBIOTIC-10 PO)    rosuvastatin (CRESTOR) 20 MG tablet    torsemide (DEMADEX) 20 mg tablet    VITAMIN D PO    Vitamin D, Cholecalciferol, 1000 units CAPS    Specialty Vitamins Products (Advanced Collagen) TABS      Pertininet Labs/diagnostics:  CBC with diff:   Results from last 7 days   Lab Units 01/31/25  0547 01/29/25  0554 01/28/25  0937   WBC Thousand/uL 8.06 9.10 6.28   HEMOGLOBIN g/dL 16.1 15.7 18.1*   HEMATOCRIT % 47.4 45.5 52.0*   MCV fL 96 94 93   PLATELETS Thousands/uL 143* 152 168   RBC Million/uL 4.94 4.82 5.58   MCH pg 32.6 32.6 32.4   MCHC g/dL 34.0 34.5 34.8   RDW % 13.4 13.3 13.2   MPV fL 9.9 10.0 9.7   NRBC AUTO /100 WBCs 0  --   --        BMP:  Results from last 7 days   Lab Units 01/31/25  0547 01/30/25  1839 01/30/25  0518 01/29/25  0554 01/28/25  0937   POTASSIUM mmol/L 4.2 4.0 3.6 4.4 3.8   CHLORIDE mmol/L 104 103 102 106 107   CO2 mmol/L 32 32 " 26 26 27   BUN mg/dL 18 19 21 24 21   CREATININE mg/dL 0.82 0.76 0.75 0.79 0.96   CALCIUM mg/dL 9.6 10.0 9.5 9.3 9.7       Magnesium:   Results from last 7 days   Lab Units 01/31/25  0547 01/30/25  0518 01/29/25  0554   MAGNESIUM mg/dL 2.0 2.0 1.9       Coags:   Results from last 7 days   Lab Units 01/29/25  0554 01/28/25  0937   INR  1.27* 1.34*       Complications: none    Condition at Discharge: good     Discharge instructions/Information to patient and family:   See after visit summary for information provided to patient and family.      Provisions for Follow-Up Care:  See after visit summary for information related to follow-up care and any pertinent home health orders.      Disposition: Home    Planned Readmission: No    Discharge Statement   I spent 45 minutes minutes discharging the patient. This time was spent on the day of discharge. I had direct contact with the patient on the day of discharge. Additional documentation is required if more than 30 minutes were spent on discharge. Evaluating the incision, discussing discharge instructions and restrictions, arranging follow up appointments, discussing medications.

## 2025-01-28 NOTE — ANESTHESIA POSTPROCEDURE EVALUATION
Post-Op Assessment Note    Last Filed PACU Vitals:  Vitals Value Taken Time   Temp 97.9 °F (36.6 °C) 01/28/25 1424   Pulse 80 01/28/25 1445   /69 01/28/25 1445   Resp 14 01/28/25 1445   SpO2 99 % 01/28/25 1445   Vitals shown include unfiled device data.

## 2025-01-28 NOTE — ASSESSMENT & PLAN NOTE
Hemoglobin 18.1, uptrending for the past 1.5 years. Was 13.7 in July 2023  Recommend outpatient heme/onc evaluation if hasn't already been done

## 2025-01-28 NOTE — ASSESSMENT & PLAN NOTE
2 prior afib ablations last March 2022 with Cryo PVI and PWI plus dofetilide but did not stay in NSR. No longer on dofetilide   Discussed treatment options for Afib including AVJ versus redo Afib ablation. Elected to proceed with Afib ablation and Tikosyn initiation   At last visit, device programmed to 80 bpm and Toprol XL 25 mg daily plus dilt 120 mg for rate control to see if  >90% to simulate AVN ablation to see if feels good with this

## 2025-01-28 NOTE — H&P
H&P Exam - Electrophysiology - Cardiology   [place] Jr. 80 y.o. male MRN: [id number]  Unit/Bed#: [facility] CATH [facility] ROOM Encounter: [id number]    Assessment & Plan   Assessment & Plan  Atrial fibrillation with RVR (HCC)  2 prior afib ablations last March 2022 with Cryo PVI and PWI plus dofetilide but did not stay in NSR. No longer on dofetilide   Discussed treatment options for Afib including AVJ versus redo Afib ablation. Elected to proceed with Afib ablation and Tikosyn initiation   At last visit, device programmed to 80 bpm and Toprol XL 25 mg daily plus dilt 120 mg for rate control to see if  >90% to simulate AVN ablation to see if feels good with this  s/p Medtronic dual chamber PPM with left bundle pacing lead 7/19/2023  RBBB/narrow QRS pacing morphology placed for CHB post MVR/AVR/Appendage ligation July 2023.  No longer in heart block and has  40% and sometimes afib is bordeline RVR, ableit currently rate controlled on metop 50mg bid  Dyspnea  SOB on exertion fairly significant can't do normal activies , even grocery shopping difficult felt this way before and after his open heart   Hypertension    History of DVT (deep vein thrombosis)  On Eliquis for bilateral DVTs since 2016  Erythrocytosis  Hemoglobin 18.1, uptrending for the past 1.5 years. Was 13.7 in July 2023  Recommend outpatient heme/onc evaluation if hasn't already been done  Mitral valve insufficiency    Mixed hyperlipidemia    Pulmonary hypertension (HCC)    Coronary artery disease involving native coronary artery    S/P AVR    S/P left atrial appendage ligation    S/P MVR (mitral valve repair)      Plan:  -- NPO for redo Afib ablation today, then admit for Tikosyn initiation  -- presents in Afib, V paced rhythm  -- CrCl 75 mL/min  -- check Qtc post procedure, if <500 ms start Tikosyn 500 mcg q 12 hours  -- Tikosyn copay $36.36  -- continue Eliquis post procedure  -- start Tikosyn 500 mcg every 12 hours if Qtc <500 ms  -- check EKG 2  hours after each dose  -- keep Mg >2.0  -- keep K+ >4.0  -- monitor for 5 doses  -- continue other home medications, may D/C diltiazem    History of Present Illness   HPI:  [place] Jr. is a 80 y.o. male with a history of persistent Afib s/p 2 prior ablations by Dr. Valdes (PVI, CTI ablation 1/24/2018 and PVI, LA posterior wall 3/25/2022), ascending aortic aneurysm (4.5 cm), HTN, HLD, bilateral popliteal aneurysm s/p repair, history of DVT, history of right lower extremity osteomyelitis, bilateral DVT on Eliquis since 2016, severe MR, moderate AI and single vessel disease in OM1 s/p AVR with 29 mm Escalante Inspiris bioprosthetic valve, mitral valve repair with 36 mm [place]ante physio 2 ring angioplasty, and JENNY ligation on 7/12/2023. Post operatively he developed heart block with junctional escape and underwent Medtronic dual chamber PPM on 7/19/2023 by Dr. Palacio.     He underwent 2 prior A-fib ablations by Dr. Valdes.  He underwent Afib ablation on 1/24/2018 including PVI and CTI ablation. On March 25, 2022 he underwent PVI, LA posterior wall ablation with cryo.  He was maintained on dofetilide but did not stay in sinus rhythm.  He is no longer on dofetilide.    Since pacemaker implantation after open heart surgery, he is no longer in heart block.  He V paces approximately 40% of the time.  He has persistent A-fib and sometimes has A-fib with RVR.  He is on metoprolol 50 mg twice daily for rate control.    He was seen by Dr. Valdes and continues to experience shortness of breath and dyspnea on exertion.  He has difficulty with activities such as grocery shopping due to shortness of breath.  He has been on Eliquis for bilateral DVTs since 2016.    He was evaluated by Dr. Valdes in November 2024.  Treatment options were discussed including AV node ablation versus redo A-fib ablation.  He elected to proceed with Afib ablation. He will be admitted for dofetilide initiation after the ablation.  He was on  this 2 years ago and therefore QTc will be monitored for 5 doses.  At the office visit, his lower pacing rate was increased to 80 bpm and he was placed on Toprol-XL 25 mg daily plus diltiazem 120 mg for rate control.  If he paces more than 90% this will simulate AV node ablation and we would determine how he feels with us.    Today, he is feeling well.  He denies chest pain, shortness of breath, lightness, dizziness, orthopnea, or PND.  He is n.p.o. for the procedure today.    EKG: Afib with V pacing HR 80 bpm    Echo 9/25/2024: LVEF 65%, severely dilated RV, LA mildly dilated, RA severely dilated, bio AVR well-seated and functionally normal, MV repair with annular ring, moderate TR, moderately dilated ascending aorta.       Review of Systems   Constitutional: Negative.    HENT: Negative.     Eyes: Negative.    Respiratory: Negative.     Cardiovascular: Negative.    Gastrointestinal: Negative.    Endocrine: Negative.    Genitourinary: Negative.    Musculoskeletal: Negative.    Skin: Negative.    Neurological: Negative.    Hematological: Negative.    Psychiatric/Behavioral: Negative.       ROS as noted above, otherwise 12 point review of systems was performed and is negative.     Historical Information   Past Medical History:   Diagnosis Date    Cardiac disease     GERD (gastroesophageal reflux disease)     occasional - per pt takes TUMS if needed    Hyperlipidemia     Hypertension     Irregular heart beat     Kidney stone     PAF (paroxysmal atrial fibrillation) (HCC)     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    s/p Medtronic dual chamber PPM with left bundle pacing lead 7/19/2023 07/20/2023     Past Surgical History:   Procedure Laterality Date    CARDIAC CATHETERIZATION N/A 05/08/2023    Procedure: Cardiac Coronary Angiogram;  Surgeon: Anirudh Mo DO;  Location: [facility];  Service: Cardiology    CARDIAC ELECTROPHYSIOLOGY PROCEDURE N/A 03/25/2022    Procedure: Cardiac eps/afib ablation;   Surgeon: Javon Valdes MD;  Location: [facility];  Service: Cardiology    CARDIAC ELECTROPHYSIOLOGY PROCEDURE N/A 07/19/2023    Procedure: Cardiac pacer implant;  Surgeon: Steven Palacio MD;  Location: [facility];  Service: Cardiology    CATARACT EXTRACTION Bilateral     ESOPHAGOGASTRODUODENOSCOPY N/A 08/16/2016    Procedure: ESOPHAGOGASTRODUODENOSCOPY (EGD);  Surgeon: Lucio Barnes MD;  Location: [facility] GI LAB;  Service:     HERNIA REPAIR Left     groin    HERNIA REPAIR Right 1995    OTHER SURGICAL HISTORY      PA REPLACEMENT MITRAL VALVE W/CARDIOPULMONARY BYP N/A 07/12/2023    Procedure: MITRAL VALVE REPAIR WITH 36MM PHYSIO II ANNULOPLASTY RING;  Surgeon: MODESTO Álvarez MD;  Location: [facility];  Service: Cardiac Surgery    PA RPLCMT AORTIC VALVE OPN W/STENTLESS TISSUE VALVE N/A 07/12/2023    Procedure: REPLACEMENT VALVE AORTIC (AVR) WITH 29MM INSPIRIS TISSUE RESILIA VALVE. LEFT ATRIAL APPENDAGE LIGATION WITH 45 MM ATRICLIP;  Surgeon: MODESTO Álvarez MD;  Location: [facility];  Service: Cardiac Surgery    PA SURGICAL ARTHROSCOPY ANDRES W/CORACOACRM LIGM RLS Left 4/5/2024    Procedure: SHOULDER DIAGNOSTIC GLENOHUMERAL ARTHROSCOPY W/ ROTATOR CUFF REPAIR, SUBACROMIAL DECOMPRESSION W/ ACROMIPLASTY, BICEPS TENOTOMY, LABRAL DEBRIDEMENT;  Surgeon: Luke Mclain MD;  Location[facility];  Service: Orthopedics    PA SURGICAL ARTHROSCOPY SHOULDER W/ROTATOR CUFF RPR Left 4/5/2024    Procedure: SHOULDER DIAGNOSTIC GLENOHUMERAL ARTHROSCOPY W/ ROTATOR CUFF REPAIR, SUBACROMIAL DECOMPRESSION W/ ACROMIPLASTY, BICEPS TENOTOMY, LABRAL DEBRIDEMENT;  Surgeon: Luke Mclain MD;  [facility];  Service: Orthopedics    REPLACEMENT TOTAL KNEE Left 04/01/2013    TONSILLECTOMY      child surgery    VASCULAR SURGERY Right     POPLITEAL BYPASS DUE TO ANEURYSM     Family History:   Family History   Problem Relation Age of Onset    Pancreatic cancer Mother     Stroke Father     Heart attack Paternal  "Grandmother     Anesthesia problems Neg Hx        Social History   Social History     Substance and Sexual Activity   Alcohol Use Not Currently    Comment: Denies any alcohol use per pt     Social History     Substance and Sexual Activity   Drug Use No    Comment: Denies any drug use per pt     Social History     Tobacco Use   Smoking Status Never   Smokeless Tobacco Never   Tobacco Comments    Never a smoker or use of any tobacco products per pt        Meds/Allergies     Home Medications:   Medications Prior to Admission:     apixaban (Eliquis) 5 mg    diltiazem (DILACOR XR) 120 MG 24 hr capsule    metoprolol succinate (TOPROL-XL) 25 mg 24 hr tablet    Probiotic Product (PROBIOTIC-10 PO)    rosuvastatin (CRESTOR) 20 MG tablet    torsemide (DEMADEX) 20 mg tablet    VITAMIN D PO    Vitamin D, Cholecalciferol, 1000 units CAPS    Specialty Vitamins Products (Advanced Collagen) TABS    Allergies   Allergen Reactions    Oxycodone GI Intolerance     nausea  nausea       Objective   Vitals: Blood pressure 119/81, pulse 84, temperature 98 °F (36.7 °C), resp. rate 16, height 6' 5\" (1.956 m), weight 118 kg (260 lb), SpO2 97%.  Orthostatic Blood Pressures      Flowsheet Row Most Recent Value   Blood Pressure 119/81 filed at 01/28/2025 0942   Patient Position - Orthostatic VS Lying filed at 01/28/2025 0942            No intake or output data in the 24 hours ending 01/28/25 1145    Invasive Devices       Peripheral Intravenous Line  Duration             Peripheral IV 01/28/25 Distal;Left;Ventral (anterior) Forearm <1 day    Peripheral IV 01/28/25 Right;Ventral (anterior) Forearm <1 day              Airway  Duration             ETT  Cuffed;Oral 8 mm <1 day                    Physical Exam   GEN: NAD, alert and oriented, well appearing  SKIN: dry without significant lesions or rashes  HEENT: NCAT  NECK: No JVD or carotid bruits appreciated  CARDIOVASCULAR: RRR, normal S1, S2 without murmurs, rubs, or gallops appreciated  LUNGS: " Clear to auscultation bilaterally without wheezes, rhonchi, or rales  ABDOMEN: Soft, nontender, nondistended  EXTREMITIES/VASCULAR: perfused without clubbing, cyanosis, or edema b/l  PSYCH: Normal mood and affect  NEURO: CN ll-Xll grossly intact    Lab Results:   Results from last 7 days   Lab Units 25  0937   WBC Thousand/uL 6.28   HEMOGLOBIN g/dL 18.1*   HEMATOCRIT % 52.0*   PLATELETS Thousands/uL 168     Results from last 7 days   Lab Units 25  0937   POTASSIUM mmol/L 3.8   CHLORIDE mmol/L 107   CO2 mmol/L 27   BUN mg/dL 21   CREATININE mg/dL 0.96   CALCIUM mg/dL 9.7     Results from last 7 days   Lab Units 25  0937   INR  1.34*           Imaging:   Results for orders placed during the hospital encounter of 21    Echo complete with contrast if indicated    [place] [address]  [phone]    Transthoracic Echocardiogram  2D, M-mode, Doppler, and Color Doppler    Study date:  2021    Patient: GERONIMO ARITA  MR number: [id number]  Account number: [id number]  : 1945  Age: 76 years  Gender: Male  Status: Inpatient  Location: Bedside  Height: 78 in  Weight: 242 lb  BP: 122/ 84 mmHg    Indications: Atrial fibrillation.    Diagnoses: I48.0 - Atrial fibrillation    Sonographer:  Florencia Meyers RDCS  Primary Physician:  Darinel Saucedo DO  Referring Physician:  Khalif Gibson MD  Group:  [facility]  Cardiology Fellow:  Michael Marie MD  Interpreting Physician:  Evan Ponce MD    SUMMARY    LEFT VENTRICLE:  Size was normal.  Systolic function was normal. Ejection fraction was estimated to be 60 %.  There were no regional wall motion abnormalities.  Wall thickness was mildly increased.    LEFT ATRIUM:  The atrium was mildly to moderately dilated.    MITRAL VALVE:  There was moderate regurgitation.    AORTIC VALVE:  There was mild to moderate regurgitation.    TRICUSPID  VALVE:  There was mild regurgitation.  Pulmonary artery systolic pressure was within the normal range.    HISTORY: PRIOR HISTORY: Atrial fibrillation. DVT. Aneurysm of ascending aorta.    PROCEDURE: The procedure was performed at the bedside. This was a routine study. The transthoracic approach was used. The study included complete 2D imaging, M-mode, complete spectral Doppler, and color Doppler. The heart rate was 86 bpm,  at the start of the study. Images were obtained from the parasternal, apical, subcostal, and suprasternal notch acoustic windows. Image quality was adequate.    LEFT VENTRICLE: Size was normal. Systolic function was normal. Ejection fraction was estimated to be 60 %. There were no regional wall motion abnormalities. Wall thickness was mildly increased. The changes were consistent with concentric  remodeling (increased wall thickness with normal wall mass). DOPPLER: Transmitral flow pattern: atrial fibrillation.    RIGHT VENTRICLE: The size was normal. Systolic function was normal.    LEFT ATRIUM: The atrium was mildly to moderately dilated.    RIGHT ATRIUM: The atrium was mildly dilated.    MITRAL VALVE: Valve structure was normal. There was normal leaflet separation. DOPPLER: The transmitral velocity was within the normal range. There was no evidence for stenosis. There was moderate regurgitation.    AORTIC VALVE: The valve was trileaflet. Leaflets exhibited mild to moderate calcification, normal cuspal separation, and sclerosis. DOPPLER: Transaortic velocity was within the normal range. There was no evidence for stenosis. There was  mild to moderate regurgitation.    TRICUSPID VALVE: The valve structure was normal. There was normal leaflet separation. DOPPLER: The transtricuspid velocity was within the normal range. There was no evidence for stenosis. There was mild regurgitation. Pulmonary artery  systolic pressure was within the normal range. Estimated peak PA pressure was 27  mmHg.    PULMONIC VALVE: DOPPLER: The transpulmonic velocity was within the normal range. There was mild regurgitation.    PERICARDIUM: There was no pericardial effusion.    AORTA: The root exhibited normal size for BSA at 38 mm.    SYSTEMIC VEINS: IVC: The inferior vena cava was normal in size and course. Respirophasic changes were normal.    SYSTEM MEASUREMENT TABLES    2D  %FS: 28.01 %  Ao Diam: 3.58 cm  EDV(Teich): 122.74 ml  EF(Teich): 53.93 %  ESV(Teich): 56.54 ml  IVSd: 1.25 cm  LA Area: 31.62 cm2  LA Diam: 4.26 cm  LVEDV MOD A4C: 156.26 ml  LVEF MOD A4C: 60.45 %  LVESV MOD A4C: 61.8 ml  LVIDd: 5.08 cm  LVIDs: 3.66 cm  LVLd A4C: 8.59 cm  LVLs A4C: 7.52 cm  LVPWd: 1.33 cm  RA Area: 31.07 cm2  RVIDd: 4.15 cm  SV MOD A4C: 94.47 ml  SV(Teich): 66.2 ml    CW  AR Dec [place]: 3.14 m/s2  AR Dec Time: 1574.47 ms  AR PHT: 456.6 ms  AR Vmax: 4.92 m/s  AR maxP.95 mmHg  AV Env.Ti: 251.19 ms  AV VTI: 20.76 cm  AV Vmax: 1.24 m/s  AV Vmean: 0.83 m/s  AV maxP.2 mmHg  AV meanPG: 3.14 mmHg  TR Vmax: 2.38 m/s  TR maxP.7 mmHg    MM  TAPSE: 2.34 cm    PW  LVOT Env.Ti: 251.19 ms  LVOT VTI: 14.87 cm  LVOT Vmax: 0.85 m/s  LVOT Vmean: 0.59 m/s  LVOT maxP.9 mmHg  LVOT meanP.62 mmHg  MV A Luis A: 0.65 m/s  MV Dec [place]: 4.08 m/s2  MV DecT: 171.95 ms  MV E Luis A: 0.7 m/s  MV E/A Ratio: 1.08    [employer] Accredited Echocardiography Laboratory    Prepared and electronically signed by    Evan Ponce MD  Signed 2021 14:53:21      Code Status: Level 1 - Full Code

## 2025-01-28 NOTE — ASSESSMENT & PLAN NOTE
RBBB/narrow QRS pacing morphology placed for CHB post MVR/AVR/Appendage ligation July 2023.  No longer in heart block and has  40% and sometimes afib is bordeline RVR, ableit currently rate controlled on metop 50mg bid

## 2025-01-28 NOTE — DISCHARGE INSTR - AVS FIRST PAGE
PLEASE NOTE THE FOLLOWING MEDICATION RECOMMENDATIONS:  Take Eliquis 5 mg every 12 hours  Stop metoprolol  Stop diltiazem        RESTRICTIONS:  No heavy lifting or strenuous activity for one week.    No soaking in a bath tub/hot tub/swimming pool for one week or until groin heals. You may shower - please let soap and water run over the groins, no scrubbing, and pat the area dry. Please place band-aid on groins daily for up to five days, but you may remove sooner if no issues are noted.     If you notice ongoing bleeding, swelling, or firm lumps in groin near ablation incision, please contact Dr. Valdes' office - [phone].

## 2025-01-28 NOTE — ANESTHESIA PREPROCEDURE EVALUATION
Procedure:  Cardiac eps/afib ablation PFA (Chest)    Relevant Problems   CARDIO   (+) Aneurysm of ascending aorta (HCC)   (+) Aortic insufficiency   (+) Atrial fibrillation with RVR (HCC)   (+) Benign essential hypertension   (+) Bilateral popliteal artery aneurysm (HCC)   (+) Coronary artery disease involving native coronary artery   (+) Hypertension   (+) Mitral valve insufficiency   (+) Mixed hyperlipidemia   (+) PAD (peripheral artery disease) (HCC)   (+) Paroxysmal atrial fibrillation (HCC)   (+) Permanent atrial fibrillation (HCC)   (+) 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HCC)   (+) Pulmonary hypertension (HCC)   (+) s/p Medtronic dual chamber PPM with left bundle pacing lead 7/19/2023      Other   (+) 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         Additional Monitors:     Airway Plan: ETT.           Plan Factors-Exercise tolerance (METS): >4 METS.    Chart reviewed. EKG reviewed. Imaging results reviewed. Existing labs reviewed. Patient summary reviewed.                  Induction- intravenous.    Postoperative Plan- Plan for postoperative opioid use. Planned trial extubation    Perioperative Resuscitation Plan - Level 1 - Full Code.       Informed Consent- Anesthetic plan and risks discussed with patient.  I personally reviewed this patient with the CRNA. Discussed and agreed on the Anesthesia Plan with the CRNA..      NPO Status:  No vitals data found for the desired time range.

## 2025-01-28 NOTE — ASSESSMENT & PLAN NOTE
SOB on exertion fairly significant can't do normal activies , even grocery shopping difficult felt this way before and after his open heart

## 2025-01-29 LAB
ANION GAP SERPL CALCULATED.3IONS-SCNC: 8 MMOL/L (ref 4–13)
ATRIAL RATE: 80 BPM
ATRIAL RATE: 87 BPM
BUN SERPL-MCNC: 24 MG/DL (ref 5–25)
CALCIUM SERPL-MCNC: 9.3 MG/DL (ref 8.4–10.2)
CHLORIDE SERPL-SCNC: 106 MMOL/L (ref 96–108)
CO2 SERPL-SCNC: 26 MMOL/L (ref 21–32)
CREAT SERPL-MCNC: 0.79 MG/DL (ref 0.6–1.3)
ERYTHROCYTE [DISTWIDTH] IN BLOOD BY AUTOMATED COUNT: 13.3 % (ref 11.6–15.1)
GFR SERPL CREATININE-BSD FRML MDRD: 84 ML/MIN/1.73SQ M
GLUCOSE SERPL-MCNC: 150 MG/DL (ref 65–140)
HCT VFR BLD AUTO: 45.5 % (ref 36.5–49.3)
HGB BLD-MCNC: 15.7 G/DL (ref 12–17)
INR PPP: 1.27 (ref 0.85–1.19)
MAGNESIUM SERPL-MCNC: 1.9 MG/DL (ref 1.9–2.7)
MCH RBC QN AUTO: 32.6 PG (ref 26.8–34.3)
MCHC RBC AUTO-ENTMCNC: 34.5 G/DL (ref 31.4–37.4)
MCV RBC AUTO: 94 FL (ref 82–98)
P AXIS: 15 DEGREES
P AXIS: 59 DEGREES
PLATELET # BLD AUTO: 152 THOUSANDS/UL (ref 149–390)
PMV BLD AUTO: 10 FL (ref 8.9–12.7)
POTASSIUM SERPL-SCNC: 4.4 MMOL/L (ref 3.5–5.3)
PR INTERVAL: 180 MS
PR INTERVAL: 202 MS
PROTHROMBIN TIME: 16.1 SECONDS (ref 12.3–15)
QRS AXIS: 12 DEGREES
QRS AXIS: 143 DEGREES
QRSD INTERVAL: 124 MS
QRSD INTERVAL: 170 MS
QT INTERVAL: 440 MS
QT INTERVAL: 444 MS
QTC INTERVAL: 508 MS
QTC INTERVAL: 535 MS
RBC # BLD AUTO: 4.82 MILLION/UL (ref 3.88–5.62)
SODIUM SERPL-SCNC: 140 MMOL/L (ref 135–147)
T WAVE AXIS: 180 DEGREES
T WAVE AXIS: 211 DEGREES
VENTRICULAR RATE: 80 BPM
VENTRICULAR RATE: 87 BPM
WBC # BLD AUTO: 9.1 THOUSAND/UL (ref 4.31–10.16)

## 2025-01-29 PROCEDURE — 93005 ELECTROCARDIOGRAM TRACING: CPT

## 2025-01-29 PROCEDURE — 93010 ELECTROCARDIOGRAM REPORT: CPT | Performed by: INTERNAL MEDICINE

## 2025-01-29 PROCEDURE — 85610 PROTHROMBIN TIME: CPT | Performed by: PHYSICIAN ASSISTANT

## 2025-01-29 PROCEDURE — 83735 ASSAY OF MAGNESIUM: CPT | Performed by: PHYSICIAN ASSISTANT

## 2025-01-29 PROCEDURE — 99232 SBSQ HOSP IP/OBS MODERATE 35: CPT | Performed by: INTERNAL MEDICINE

## 2025-01-29 PROCEDURE — 85027 COMPLETE CBC AUTOMATED: CPT | Performed by: PHYSICIAN ASSISTANT

## 2025-01-29 PROCEDURE — 80048 BASIC METABOLIC PNL TOTAL CA: CPT | Performed by: PHYSICIAN ASSISTANT

## 2025-01-29 RX ORDER — DOFETILIDE 0.25 MG/1
250 CAPSULE ORAL EVERY 12 HOURS SCHEDULED
Status: DISCONTINUED | OUTPATIENT
Start: 2025-01-29 | End: 2025-01-30

## 2025-01-29 RX ORDER — BUMETANIDE 0.25 MG/ML
3 INJECTION, SOLUTION INTRAMUSCULAR; INTRAVENOUS ONCE
Status: COMPLETED | OUTPATIENT
Start: 2025-01-29 | End: 2025-01-29

## 2025-01-29 RX ADMIN — ATORVASTATIN CALCIUM 40 MG: 40 TABLET, FILM COATED ORAL at 16:50

## 2025-01-29 RX ADMIN — DOFETILIDE 250 MCG: 0.25 CAPSULE ORAL at 08:54

## 2025-01-29 RX ADMIN — APIXABAN 5 MG: 5 TABLET, FILM COATED ORAL at 06:01

## 2025-01-29 RX ADMIN — DOFETILIDE 250 MCG: 0.25 CAPSULE ORAL at 21:58

## 2025-01-29 RX ADMIN — BUMETANIDE 3 MG: 0.25 INJECTION INTRAMUSCULAR; INTRAVENOUS at 08:54

## 2025-01-29 RX ADMIN — APIXABAN 5 MG: 5 TABLET, FILM COATED ORAL at 18:04

## 2025-01-29 NOTE — PROGRESS NOTES
Progress Note - Electrophysiology-Cardiology (EP)   [place] Jr. 80 y.o. male MRN: [id number]  Unit/Bed#: [facility] 431-01 Encounter: [id number]      Assessment:  Assessment & Plan  Atrial fibrillation with RVR (HCC)  Persistent atrial fibrillation, now status post pulsed field ablation with posterior wall isolation and septal + atrial appendage remnant ablation to treat complex atypical atrial flutter, and AV omnica ablation  1/28/2025 2008 - initially diagnosed   8/2016 - symptomatic recurrence requiring RANDALL/CV, started on dofetilide 500 mcg that admission --- self-discontinued medications 4/2017 when maintaining sinus rhythm   8/2017 - recurrence, status post RANDALL/CV and restarted on Tikosyn   1/2018 - cryoablation with pulmonary vein isolation and typical flutter line, Tikosyn again discontinued in the post ablation setting   2/2021 - recurrent symptomatic AFib 2/2021, status post successful cardioversion --- patient preferred to be off AADs at that time   3/2022 - repeat cryoablation with posterior wall isolation and re-isolation of LSPV and LIPV to make wider antral lesion set   11/2022 - recurrent afib status post another successful cardioversion, with subsequent reversion noted at least 5/2023 7/2023 - recurrent afib in the setting of open heart surgery (bioprosthetic AVR, MV repair, and left atrial appendage ligation)  A-fib with slow ventricular response/junctional escape noted requiring pacing via epicardial wires  Cardioversion attempted but with early return of A-fib x 2   Ultimately had dual-chamber pacemaker implanted that admission due to ongoing symptomatic bradycardia/A-fib with junctional escape  11/2024 - seen in EP follow, had been in persistent afib since device placement with significant dyspnea on exertion   Discussed treatment options for Afib including AVJ versus redo Afib ablation. Elected to proceed with third Afib ablation and Tikosyn initiation   At last visit, device programmed  to 80 bpm and Toprol XL 25 mg daily plus dilt 120 mg for rate control to see if  >90% to simulate AVN ablation to see if feels good with this  Receive dofetilide 500 mcg x 1 dose in the postablation setting, with subsequent QTc prolongation  Dose lowered to 250 mcg, continue to monitor QTc  Continue to monitor electrolytes closely in the setting of postoperative diuresis  Metoprolol and diltiazem discontinued following AV node ablation  s/p Medtronic dual chamber PPM with left bundle pacing lead 7/19/2023  RBBB/narrow QRS pacing morphology placed for CHB post MVR/AVR/Appendage ligation July 2023.  Now status post AV node ablation --- Toprol XL and diltiazem discontinued  Dyspnea  SOB on exertion fairly significant can't do normal activies, even grocery shopping difficult felt this way before and after his open heart   Being diuresed in the post ablation setting  On torsemide 20 mg daily at home, given IV Bumex 3 mg x 1 today  Hypertension  Blood pressure stable versus low normal, patient asymptomatic --- will continue to trend  History of DVT (deep vein thrombosis)  On Eliquis for bilateral DVTs since 2016  Erythrocytosis  Hemoglobin 18.1 on admission, uptrending for the past 1.5 years. Was 13.7 in July 2023  Recommend outpatient heme/onc evaluation if hasn't already been done  Mitral valve insufficiency    Mixed hyperlipidemia    Pulmonary hypertension (HCC)    Coronary artery disease involving native coronary artery    S/P AVR    S/P left atrial appendage ligation    S/P MVR (mitral valve repair)    S/P AV omnica ablation 1/28/2025    S/P redo AFib ablation 1/28/2025        Plan:  He feels relatively well from a cardiac standpoint in the postablation setting.  Bilateral groin access sites were evaluated, no sutures present, no evidence of significant tenderness, bleeding, or hematoma.    He is maintaining sinus rhythm.  EKG from last night following first dose of dofetilide shows prolonged QTc, thus his dose was  "reduced to 250 mcg this morning.  Will review EKG with attending to determine further dosing.    Per Dr. Valdes, his home torsemide was held and IV diuretics were recommended.  As he typically takes torsemide 20 mg daily, he was given a one-time dose of Bumex 3 mg earlier today.  Will assess response.    His main complaint right now is hiccups, which have been ongoing since earlier today.  He reports that it seemed to coincide with when he received his a.m. dose of Tikosyn and the dose of Bumex.  Will need to review with attending, as this is not a common finding. Will likely just improve with time.     His symptoms do appear to actually be hiccups, but question whether it could be a form of diaphragmatic stimulation as his pacemaker was programmed unipolar this morning.  This seems unlikely, but we will continue to investigate.    Continue to monitor telemetry, vital signs, and labs while admitted.      Subjective/Objective   Chief Complaint: hiccups    Subjective: He denies issues overnight, but reports that since this morning he has noted intermittent hiccups which he cannot explain.  He otherwise denies chest pain or pressure, worsening shortness of breath, palpitations, dizziness, or lightheadedness.      Objective:     Vitals: /74 (BP Location: Right arm)   Pulse 86   Temp 98.1 °F (36.7 °C) (Oral)   Resp 15   Ht 6' 5\" (1.956 m)   Wt 118 kg (260 lb)   SpO2 95%   BMI 30.83 kg/m²   Vitals:    01/28/25 0942   Weight: 118 kg (260 lb)     Orthostatic Blood Pressures      Flowsheet Row Most Recent Value   Blood Pressure 110/74 filed at 01/29/2025 1108   Patient Position - Orthostatic VS Sitting filed at 01/29/2025 1108              Intake/Output Summary (Last 24 hours) at 1/29/2025 1351  Last data filed at 1/29/2025 1300  Gross per 24 hour   Intake 1840 ml   Output 600 ml   Net 1240 ml       Invasive Devices       Peripheral Intravenous Line  Duration             Peripheral IV 01/28/25 " Distal;Left;Ventral (anterior) Forearm 1 day    Peripheral IV 01/28/25 Right;Ventral (anterior) Forearm 1 day                                Scheduled Meds:  Current Facility-Administered Medications   Medication Dose Route Frequency Provider Last Rate    acetaminophen  650 mg Oral Q4H PRN Brooke Rios PA-C      apixaban  5 mg Oral BID Brooke Rios PA-C      atorvastatin  40 mg Oral Daily With Dinner Brooke Rios PA-C      dofetilide  250 mcg Oral Q12H [facility] Magali Denis PA-C       Continuous Infusions:   PRN Meds:.  acetaminophen    Review of Systems   Constitutional: Negative for fever and malaise/fatigue.   Cardiovascular:  Negative for chest pain, dyspnea on exertion, irregular heartbeat, leg swelling, near-syncope, orthopnea, palpitations, paroxysmal nocturnal dyspnea and syncope.   All other systems reviewed and are negative.        Physical Exam:   GEN: NAD, alert and oriented x 3, well appearing  SKIN: dry without significant lesions or rashes  HEENT: NCAT, PERRL, EOMs intact  NECK: No JVD appreciated  CARDIOVASCULAR: RRR, normal S1, S2 without murmurs, rubs, or gallops appreciated  LUNGS: Clear to auscultation bilaterally without wheezes, rhonchi, or rales  ABDOMEN: Soft, nontender, nondistended  EXTREMITIES/VASCULAR: perfused without clubbing, cyanosis; trace LE edema b/l  PSYCH: Normal mood and affect  NEURO: CN ll-Xll grossly intact                Lab Results: I have personally reviewed pertinent lab results.    Results from last 7 days   Lab Units 01/29/25  0554 01/28/25  0937   WBC Thousand/uL 9.10 6.28   HEMOGLOBIN g/dL 15.7 18.1*   HEMATOCRIT % 45.5 52.0*   PLATELETS Thousands/uL 152 168     Results from last 7 days   Lab Units 01/29/25  0554 01/28/25  0937   POTASSIUM mmol/L 4.4 3.8   CHLORIDE mmol/L 106 107   CO2 mmol/L 26 27   BUN mg/dL 24 21   CREATININE mg/dL 0.79 0.96   CALCIUM mg/dL 9.3 9.7     Results from last 7 days   Lab Units 01/29/25  0554 01/28/25  0937   INR   1.27* 1.34*     Results from last 7 days   Lab Units 25  0554   MAGNESIUM mg/dL 1.9         Imaging: Results Review Statement: No pertinent imaging studies reviewed.    ECHO: Results for orders placed during the hospital encounter of 21    Echo complete with contrast if indicated    Narrative  [address] [address]  [phone]    Transthoracic Echocardiogram  2D, M-mode, Doppler, and Color Doppler    Study date:  2021    Patient: GERONIMO ARITA  MR number: [id number]  Account number: [id number]  : 1945  Age: 76 years  Gender: Male  Status: Inpatient  Location: Bedside  Height: 78 in  Weight: 242 lb  BP: 122/ 84 mmHg    Indications: Atrial fibrillation.    Diagnoses: I48.0 - Atrial fibrillation    Sonographer:  Florencia Meyers RDCS  Primary Physician:  Darinel Saucedo DO  Referring Physician:  Khalif Gibson MD  Group:  [facility]  Cardiology Fellow:  Michael Marie MD  Interpreting Physician:  Evan Ponce MD    SUMMARY    LEFT VENTRICLE:  Size was normal.  Systolic function was normal. Ejection fraction was estimated to be 60 %.  There were no regional wall motion abnormalities.  Wall thickness was mildly increased.    LEFT ATRIUM:  The atrium was mildly to moderately dilated.    MITRAL VALVE:  There was moderate regurgitation.    AORTIC VALVE:  There was mild to moderate regurgitation.    TRICUSPID VALVE:  There was mild regurgitation.  Pulmonary artery systolic pressure was within the normal range.    HISTORY: PRIOR HISTORY: Atrial fibrillation. DVT. Aneurysm of ascending aorta.    PROCEDURE: The procedure was performed at the bedside. This was a routine study. The transthoracic approach was used. The study included complete 2D imaging, M-mode, complete spectral Doppler, and color Doppler. The heart rate was 86 bpm,  at the start of the study. Images were obtained from the parasternal,  apical, subcostal, and suprasternal notch acoustic windows. Image quality was adequate.    LEFT VENTRICLE: Size was normal. Systolic function was normal. Ejection fraction was estimated to be 60 %. There were no regional wall motion abnormalities. Wall thickness was mildly increased. The changes were consistent with concentric  remodeling (increased wall thickness with normal wall mass). DOPPLER: Transmitral flow pattern: atrial fibrillation.    RIGHT VENTRICLE: The size was normal. Systolic function was normal.    LEFT ATRIUM: The atrium was mildly to moderately dilated.    RIGHT ATRIUM: The atrium was mildly dilated.    MITRAL VALVE: Valve structure was normal. There was normal leaflet separation. DOPPLER: The transmitral velocity was within the normal range. There was no evidence for stenosis. There was moderate regurgitation.    AORTIC VALVE: The valve was trileaflet. Leaflets exhibited mild to moderate calcification, normal cuspal separation, and sclerosis. DOPPLER: Transaortic velocity was within the normal range. There was no evidence for stenosis. There was  mild to moderate regurgitation.    TRICUSPID VALVE: The valve structure was normal. There was normal leaflet separation. DOPPLER: The transtricuspid velocity was within the normal range. There was no evidence for stenosis. There was mild regurgitation. Pulmonary artery  systolic pressure was within the normal range. Estimated peak PA pressure was 27 mmHg.    PULMONIC VALVE: DOPPLER: The transpulmonic velocity was within the normal range. There was mild regurgitation.    PERICARDIUM: There was no pericardial effusion.    AORTA: The root exhibited normal size for BSA at 38 mm.    SYSTEMIC VEINS: IVC: The inferior vena cava was normal in size and course. Respirophasic changes were normal.    SYSTEM MEASUREMENT TABLES    2D  %FS: 28.01 %  Ao Diam: 3.58 cm  EDV(Teich): 122.74 ml  EF(Teich): 53.93 %  ESV(Teich): 56.54 ml  IVSd: 1.25 cm  LA Area: 31.62 cm2  LA  Diam: 4.26 cm  LVEDV MOD A4C: 156.26 ml  LVEF MOD A4C: 60.45 %  LVESV MOD A4C: 61.8 ml  LVIDd: 5.08 cm  LVIDs: 3.66 cm  LVLd A4C: 8.59 cm  LVLs A4C: 7.52 cm  LVPWd: 1.33 cm  RA Area: 31.07 cm2  RVIDd: 4.15 cm  SV MOD A4C: 94.47 ml  SV(Teich): 66.2 ml    CW  AR Dec [place]: 3.14 m/s2  AR Dec Time: 1574.47 ms  AR PHT: 456.6 ms  AR Vmax: 4.92 m/s  AR maxP.95 mmHg  AV Env.Ti: 251.19 ms  AV VTI: 20.76 cm  AV Vmax: 1.24 m/s  AV Vmean: 0.83 m/s  AV maxP.2 mmHg  AV meanPG: 3.14 mmHg  TR Vmax: 2.38 m/s  TR maxP.7 mmHg    MM  TAPSE: 2.34 cm    PW  LVOT Env.Ti: 251.19 ms  LVOT VTI: 14.87 cm  LVOT Vmax: 0.85 m/s  LVOT Vmean: 0.59 m/s  LVOT maxP.9 mmHg  LVOT meanP.62 mmHg  MV A Luis A: 0.65 m/s  MV Dec [place]: 4.08 m/s2  MV DecT: 171.95 ms  MV E Luis A: 0.7 m/s  MV E/A Ratio: 1.08    [facility]    Prepared and electronically signed by    Evan Ponce MD  Signed 2021 14:53:21      Results for orders placed during the hospital encounter of 24    Echo complete w/ contrast if indicated    Interpretation Summary    Left Ventricle: Left ventricular cavity size is normal. Wall thickness is mildly increased. The left ventricular ejection fraction is 65%. Systolic function is normal. Wall motion is normal.    Right Ventricle: Right ventricular cavity size is severely dilated. Systolic function is mildly reduced. Abnormal tricuspid annular plane systolic excursion (TAPSE) < 1.7 cm.    Left Atrium: The atrium is mildly dilated.    Right Atrium: The atrium is severely dilated.    Aortic Valve: There is a bioprosthetic valve. The prosthetic valve appears well-seated and appears to be functioning normally. Prosthetic valve leaflet motion is normal. The aortic valve peak velocity is 1.54 m/s. The aortic valve mean gradient is 5 mmHg. The dimensionless velocity index is 0.56. The aortic valve area is 2.58 cm2. The stroke volume index is 28.70 ml/m2.    Mitral  Valve: The valve has been repaired with an annular ring. There is no evidence of regurgitation. There is no evidence of stenosis.    Tricuspid Valve: There is moderate regurgitation.    Pulmonic Valve: There is mild regurgitation.    Aorta: The ascending aorta is moderately dilated.        EKG/TELEMETRY:         VTE Pharmacologic Prophylaxis: VTE covered by:  apixaban, Oral, 5 mg at 01/29/25 0601

## 2025-01-29 NOTE — ASSESSMENT & PLAN NOTE
RBBB/narrow QRS pacing morphology placed for CHB post MVR/AVR/Appendage ligation July 2023.  Now status post AV node ablation --- Toprol XL and diltiazem discontinued

## 2025-01-29 NOTE — ASSESSMENT & PLAN NOTE
Department of Anesthesiology  Preprocedure Note       Name:  Octaviano Can   Age:  76 y.o.  :  1955                                          MRN:  [id number]         Date:  2020      Surgeon: Paty Archuleta):  Matt Bryant MD    Procedure: Procedure(s):  RIGHT FEMUR IM NAIL AMAYA INSERTION    Medications prior to admission:   Prior to Admission medications    Medication Sig Start Date End Date Taking?  Authorizing Provider   losartan (COZAAR) 50 MG tablet Take 50 mg by mouth 2 times daily   Yes Historical Provider, MD   metFORMIN (GLUCOPHAGE) 500 MG tablet Take 500 mg by mouth 2 times daily (with meals)   Yes Historical Provider, MD   GLIPIZIDE PO Take 10 mg by mouth daily    Yes Historical Provider, MD       Current medications:    Current Facility-Administered Medications   Medication Dose Route Frequency Provider Last Rate Last Dose    sodium chloride flush 0.9 % injection 10 mL  10 mL Intravenous 2 times per day Milo Varela MD   10 mL at 20    sodium chloride flush 0.9 % injection 10 mL  10 mL Intravenous PRN Milo Varela MD        polyethylene glycol (GLYCOLAX) packet 17 g  17 g Oral Daily PRN Milo Varela MD        promethazine (PHENERGAN) tablet 12.5 mg  12.5 mg Oral Q6H PRN Milo Varela MD        Or    ondansetron [facility]) injection 4 mg  4 mg Intravenous Q6H PRN Milo Varela MD        enoxaparin (LOVENOX) injection 40 mg  40 mg Subcutaneous Daily Milo Varela MD   40 mg at 20    potassium chloride (KLOR-CON M) extended release tablet 40 mEq  40 mEq Oral PRN Milo Varela MD        Or   Man Cornelius potassium bicarb-citric acid (EFFER-K) effervescent tablet 40 mEq  40 mEq Oral PRN Milo Varela MD        Or   Man Cornelius potassium chloride 10 mEq/100 mL IVPB (Peripheral Line)  10 mEq Intravenous PRN Milo Varela MD        magnesium sulfate 2 g in 50 mL IVPB premix  2 g Intravenous PRN Milo Varela MD        famotidine (PEPCID) tablet 20 mg  20 mg Oral BID Milo Varela MD   20 mg at Persistent atrial fibrillation, now status post pulsed field ablation with posterior wall isolation and septal + atrial appendage remnant ablation to treat complex atypical atrial flutter, and AV monica ablation  1/28/2025 2008 - initially diagnosed   8/2016 - symptomatic recurrence requiring RANDALL/CV, started on dofetilide 500 mcg that admission --- self-discontinued medications 4/2017 when maintaining sinus rhythm   8/2017 - recurrence, status post RANDALL/CV and restarted on Tikosyn   1/2018 - cryoablation with pulmonary vein isolation and typical flutter line, Tikosyn again discontinued in the post ablation setting   2/2021 - recurrent symptomatic AFib 2/2021, status post successful cardioversion --- patient preferred to be off AADs at that time   3/2022 - repeat cryoablation with posterior wall isolation and re-isolation of LSPV and LIPV to make wider antral lesion set   11/2022 - recurrent afib status post another successful cardioversion, with subsequent reversion noted at least 5/2023 7/2023 - recurrent afib in the setting of open heart surgery (bioprosthetic AVR, MV repair, and left atrial appendage ligation)  A-fib with slow ventricular response/junctional escape noted requiring pacing via epicardial wires  Cardioversion attempted but with early return of A-fib x 2   Ultimately had dual-chamber pacemaker implanted that admission due to ongoing symptomatic bradycardia/A-fib with junctional escape  11/2024 - seen in EP follow, had been in persistent afib since device placement with significant dyspnea on exertion   Discussed treatment options for Afib including AVJ versus redo Afib ablation. Elected to proceed with third Afib ablation and Tikosyn initiation   At last visit, device programmed to 80 bpm and Toprol XL 25 mg daily plus dilt 120 mg for rate control to see if  >90% to simulate AVN ablation to see if feels good with this  Receive dofetilide 500 mcg x 1 dose in the postablation setting, with  subsequent QTc prolongation  Dose lowered to 250 mcg, continue to monitor QTc  Continue to monitor electrolytes closely in the setting of postoperative diuresis  Metoprolol and diltiazem discontinued following AV node ablation   (1.727 m)                                                BP Readings from Last 3 Encounters:   09/23/20 129/61   09/22/20 (!) 132/57       NPO Status:                                                                                 BMI:   Wt Readings from Last 3 Encounters:   09/22/20 208 lb 15.9 oz (94.8 kg)   09/22/20 209 lb (94.8 kg)     Body mass index is 31.79 kg/m².     CBC:   Lab Results   Component Value Date    WBC 8.6 09/23/2020    RBC 3.72 09/23/2020    HGB 10.6 09/23/2020    HCT 34.7 09/23/2020    MCV 93.3 09/23/2020    RDW 13.1 09/23/2020     09/23/2020       CMP:   Lab Results   Component Value Date     09/22/2020    K 3.9 09/22/2020    CL 98 09/22/2020    CO2 23 09/22/2020    BUN 17 09/22/2020    CREATININE 0.7 09/22/2020    GFRAA >60 09/22/2020    LABGLOM >60 09/22/2020    GLUCOSE 306 09/22/2020    CALCIUM 9.1 09/22/2020       POC Tests:   Recent Labs     09/22/20  2135   POCGLU 281*       Coags:   Lab Results   Component Value Date    PROTIME 10.7 09/22/2020    INR 0.94 09/22/2020       HCG (If Applicable): No results found for: PREGTESTUR, PREGSERUM, HCG, HCGQUANT     ABGs: No results found for: PHART, PO2ART, DOB7NAV, BGS9CDD, BEART, T0KLRCAW     Type & Screen (If Applicable):  No results found for: LABABO, LABRH    Drug/Infectious Status (If Applicable):  No results found for: HIV, HEPCAB    COVID-19 Screening (If Applicable): No results found for: COVID19      Anesthesia Evaluation  Patient summary reviewed and Nursing notes reviewed  Airway:         Dental:          Pulmonary:Negative Pulmonary ROS                              Cardiovascular:    (+) hypertension:,       NYHA Classification: I                 Beta Blocker:  Not on Beta Blocker      ROS comment: Normal sinus rhythm   Nonspecific ST abnormality   ? old IWMI   Abnormal ECG   No previous ECGs available   Reconfirmed by [facility] Quentin ZAVALA ([id number]) on 9/22/2020 1:07:27 PM      Neuro/Psych:   Negative Neuro/Psych ROS GI/Hepatic/Renal: Neg GI/Hepatic/Renal ROS            Endo/Other:    (+) Diabetes, : arthritis:., .                 Abdominal:           Vascular: negative vascular ROS. Anesthesia Plan      general     ASA 2     (COVID-19 pending  CBC normal  Glucose 281, rest of BMP normal)  Induction: intravenous. Anesthetic plan and risks discussed with patient. Plan discussed with CRNA.                 Catarina Felder MD   9/23/2020

## 2025-01-29 NOTE — ANESTHESIA POSTPROCEDURE EVALUATION
Post-Op Assessment Note            No anethesia notable event occurred.            Last Filed PACU Vitals:  Vitals Value Taken Time   Temp 97.3 °F (36.3 °C) 01/28/25 1551   Pulse 80 01/28/25 1555   /78 01/28/25 1550   Resp 10 01/28/25 1541   SpO2 94 % 01/28/25 1555   Vitals shown include unfiled device data.    Modified Darin:     Vitals Value Taken Time   Activity 2 01/28/25 1530   Respiration 2 01/28/25 1530   Circulation 2 01/28/25 1530   Consciousness 2 01/28/25 1530   Oxygen Saturation 2 01/28/25 1530     Modified Darin Score: 10

## 2025-01-29 NOTE — ASSESSMENT & PLAN NOTE
Hemoglobin 18.1 on admission, uptrending for the past 1.5 years. Was 13.7 in July 2023  Recommend outpatient heme/onc evaluation if hasn't already been done

## 2025-01-29 NOTE — PLAN OF CARE
Problem: PAIN - ADULT  Goal: Verbalizes/displays adequate comfort level or baseline comfort level  Description: Interventions:  - Encourage patient to monitor pain and request assistance  - Assess pain using appropriate pain scale  - Administer analgesics based on type and severity of pain and evaluate response  - Implement non-pharmacological measures as appropriate and evaluate response  - Consider cultural and social influences on pain and pain management  - Notify physician/advanced practitioner if interventions unsuccessful or patient reports new pain  Outcome: Progressing     Problem: INFECTION - ADULT  Goal: Absence or prevention of progression during hospitalization  Description: INTERVENTIONS:  - Assess and monitor for signs and symptoms of infection  - Monitor lab/diagnostic results  - Monitor all insertion sites, i.e. indwelling lines, tubes, and drains  - Monitor endotracheal if appropriate and nasal secretions for changes in amount and color  - [place] appropriate cooling/warming therapies per order  - Administer medications as ordered  - Instruct and encourage patient and family to use good hand hygiene technique  - Identify and instruct in appropriate isolation precautions for identified infection/condition  Outcome: Progressing  Goal: Absence of fever/infection during neutropenic period  Description: INTERVENTIONS:  - Monitor WBC    Outcome: Progressing     Problem: SAFETY ADULT  Goal: Patient will remain free of falls  Description: INTERVENTIONS:  - Educate patient/family on patient safety including physical limitations  - Instruct patient to call for assistance with activity   - Consult OT/PT to assist with strengthening/mobility   - Keep Call bell within reach  - Keep bed low and locked with side rails adjusted as appropriate  - Keep care items and personal belongings within reach  - Initiate and maintain comfort rounds  - Make Fall Risk Sign visible to staff  - Apply yellow socks and bracelet  for high fall risk patients  - Consider moving patient to room near nurses station  Outcome: Progressing  Goal: Maintain or return to baseline ADL function  Description: INTERVENTIONS:  -  Assess patient's ability to carry out ADLs; assess patient's baseline for ADL function and identify physical deficits which impact ability to perform ADLs (bathing, care of mouth/teeth, toileting, grooming, dressing, etc.)  - Assess/evaluate cause of self-care deficits   - Assess range of motion  - Assess patient's mobility; develop plan if impaired  - Assess patient's need for assistive devices and provide as appropriate  - Encourage maximum independence but intervene and supervise when necessary  - Involve family in performance of ADLs  - Assess for home care needs following discharge   - Consider OT consult to assist with ADL evaluation and planning for discharge  - Provide patient education as appropriate  Outcome: Progressing  Goal: Maintains/Returns to pre admission functional level  Description: INTERVENTIONS:  - Perform AM-PAC 6 Click Basic Mobility/ Daily Activity assessment daily.  - Set and communicate daily mobility goal to care team and patient/family/caregiver.   - Collaborate with rehabilitation services on mobility goals if consulted  - Out of bed for toileting  - Record patient progress and toleration of activity level   Outcome: Progressing     Problem: DISCHARGE PLANNING  Goal: Discharge to home or other facility with appropriate resources  Description: INTERVENTIONS:  - Identify barriers to discharge w/patient and caregiver  - Arrange for needed discharge resources and transportation as appropriate  - Identify discharge learning needs (meds, wound care, etc.)  - Arrange for interpretive services to assist at discharge as needed  - Refer to Case Management Department for coordinating discharge planning if the patient needs post-hospital services based on physician/advanced practitioner order or complex needs  related to functional status, cognitive ability, or social support system  Outcome: Progressing     Problem: Knowledge Deficit  Goal: Patient/family/caregiver demonstrates understanding of disease process, treatment plan, medications, and discharge instructions  Description: Complete learning assessment and assess knowledge base.  Interventions:  - Provide teaching at level of understanding  - Provide teaching via preferred learning methods  Outcome: Progressing

## 2025-01-29 NOTE — ASSESSMENT & PLAN NOTE
SOB on exertion fairly significant can't do normal activies, even grocery shopping difficult felt this way before and after his open heart   Being diuresed in the post ablation setting  On torsemide 20 mg daily at home, given IV Bumex 3 mg x 1 today

## 2025-01-30 LAB
ANION GAP SERPL CALCULATED.3IONS-SCNC: 8 MMOL/L (ref 4–13)
ANION GAP SERPL CALCULATED.3IONS-SCNC: 9 MMOL/L (ref 4–13)
ATRIAL RATE: 82 BPM
ATRIAL RATE: 84 BPM
BUN SERPL-MCNC: 19 MG/DL (ref 5–25)
BUN SERPL-MCNC: 21 MG/DL (ref 5–25)
CALCIUM SERPL-MCNC: 10 MG/DL (ref 8.4–10.2)
CALCIUM SERPL-MCNC: 9.5 MG/DL (ref 8.4–10.2)
CHLORIDE SERPL-SCNC: 102 MMOL/L (ref 96–108)
CHLORIDE SERPL-SCNC: 103 MMOL/L (ref 96–108)
CO2 SERPL-SCNC: 26 MMOL/L (ref 21–32)
CO2 SERPL-SCNC: 32 MMOL/L (ref 21–32)
CREAT SERPL-MCNC: 0.75 MG/DL (ref 0.6–1.3)
CREAT SERPL-MCNC: 0.76 MG/DL (ref 0.6–1.3)
GFR SERPL CREATININE-BSD FRML MDRD: 85 ML/MIN/1.73SQ M
GFR SERPL CREATININE-BSD FRML MDRD: 86 ML/MIN/1.73SQ M
GLUCOSE SERPL-MCNC: 123 MG/DL (ref 65–140)
GLUCOSE SERPL-MCNC: 91 MG/DL (ref 65–140)
MAGNESIUM SERPL-MCNC: 2 MG/DL (ref 1.9–2.7)
P AXIS: 24 DEGREES
P AXIS: 27 DEGREES
POTASSIUM SERPL-SCNC: 3.6 MMOL/L (ref 3.5–5.3)
POTASSIUM SERPL-SCNC: 4 MMOL/L (ref 3.5–5.3)
PR INTERVAL: 168 MS
PR INTERVAL: 172 MS
QRS AXIS: 142 DEGREES
QRS AXIS: 142 DEGREES
QRSD INTERVAL: 170 MS
QRSD INTERVAL: 172 MS
QT INTERVAL: 436 MS
QT INTERVAL: 462 MS
QTC INTERVAL: 510 MS
QTC INTERVAL: 547 MS
SODIUM SERPL-SCNC: 137 MMOL/L (ref 135–147)
SODIUM SERPL-SCNC: 143 MMOL/L (ref 135–147)
T WAVE AXIS: -87 DEGREES
T WAVE AXIS: 208 DEGREES
VENTRICULAR RATE: 82 BPM
VENTRICULAR RATE: 84 BPM

## 2025-01-30 PROCEDURE — 93005 ELECTROCARDIOGRAM TRACING: CPT

## 2025-01-30 PROCEDURE — 83735 ASSAY OF MAGNESIUM: CPT | Performed by: PHYSICIAN ASSISTANT

## 2025-01-30 PROCEDURE — 80048 BASIC METABOLIC PNL TOTAL CA: CPT | Performed by: PHYSICIAN ASSISTANT

## 2025-01-30 PROCEDURE — 99232 SBSQ HOSP IP/OBS MODERATE 35: CPT | Performed by: PHYSICIAN ASSISTANT

## 2025-01-30 PROCEDURE — 93010 ELECTROCARDIOGRAM REPORT: CPT | Performed by: INTERNAL MEDICINE

## 2025-01-30 RX ORDER — POTASSIUM CHLORIDE 1500 MG/1
40 TABLET, EXTENDED RELEASE ORAL ONCE
Status: COMPLETED | OUTPATIENT
Start: 2025-01-30 | End: 2025-01-30

## 2025-01-30 RX ORDER — BUMETANIDE 0.25 MG/ML
2 INJECTION, SOLUTION INTRAMUSCULAR; INTRAVENOUS ONCE
Status: COMPLETED | OUTPATIENT
Start: 2025-01-30 | End: 2025-01-30

## 2025-01-30 RX ORDER — DOFETILIDE 0.12 MG/1
125 CAPSULE ORAL EVERY 12 HOURS SCHEDULED
Status: DISCONTINUED | OUTPATIENT
Start: 2025-01-30 | End: 2025-01-31

## 2025-01-30 RX ADMIN — DOFETILIDE 125 MCG: 0.12 CAPSULE ORAL at 21:06

## 2025-01-30 RX ADMIN — ATORVASTATIN CALCIUM 40 MG: 40 TABLET, FILM COATED ORAL at 15:30

## 2025-01-30 RX ADMIN — POTASSIUM CHLORIDE 40 MEQ: 1500 TABLET, EXTENDED RELEASE ORAL at 10:56

## 2025-01-30 RX ADMIN — APIXABAN 5 MG: 5 TABLET, FILM COATED ORAL at 06:50

## 2025-01-30 RX ADMIN — POTASSIUM CHLORIDE 40 MEQ: 1500 TABLET, EXTENDED RELEASE ORAL at 15:28

## 2025-01-30 RX ADMIN — APIXABAN 5 MG: 5 TABLET, FILM COATED ORAL at 18:38

## 2025-01-30 RX ADMIN — BUMETANIDE 2 MG: 0.25 INJECTION INTRAMUSCULAR; INTRAVENOUS at 12:17

## 2025-01-30 RX ADMIN — DOFETILIDE 125 MCG: 0.12 CAPSULE ORAL at 09:11

## 2025-01-30 NOTE — PROGRESS NOTES
Progress Note - Electrophysiology - Cardiology  [place] H[place] Jr. 80 y.o. male MRN: [id number]  Unit/Bed#: [facility] 431-01 Encounter: [id number]      Assessment & Plan  Atrial fibrillation with RVR (HCC)  s/p redo Afib ablation with PFA (PVI, PWI, atrial septum and appendage remnant) + AV node ablation + Tikosyn initiation  (Prior Afib ablation 2018 PVI/CTI and March 2022 cryo PVI/PWI)  Maintaining sinus rhythm  Qtc prolonged after 3rd dose of 250 mcg last night  Decrease Tikosyn to 125 mcg q 12 hours this morning (repeat Qtc <500 ms)  Continue Eliquis 5 mg bid  Off all AV monica agents after AVN ablation  Groin sites stable, no hematoma  Replace K+, keep >4.0, keep Mg >2.0  Possible D/C home tomorrow  F/U for EKG in 1 week  F/U with EP in 4-6 weeks  s/p Medtronic dual chamber PPM with left bundle pacing lead 7/19/2023  RBBB/narrow QRS pacing morphology placed for CHB post MVR/AVR/Appendage ligation July 2023.  Now status post AV node ablation --- Toprol XL and diltiazem discontinued  Dyspnea  Received Bumex 3 mg IV yesterday  Give Bumex 2 mg IV x 1 today  Home torsemide 20 mg on hold  Replace K+, keep >4.0  Hypertension  BP well controlled  Now off diltiazem and metoprolol  Continue to monitor  History of DVT (deep vein thrombosis)  On Eliquis for bilateral DVTs since 2016  Erythrocytosis  Hemoglobin 18.1 on admission, uptrending for the past 1.5 years. Was 13.7 in July 2023  Recommend outpatient heme/onc evaluation if hasn't already been done  Mitral valve insufficiency    Mixed hyperlipidemia    Pulmonary hypertension (HCC)    Coronary artery disease involving native coronary artery    S/P AVR    S/P left atrial appendage ligation    S/P MVR (mitral valve repair)    S/P AV monica ablation 1/28/2025    S/P redo AFib ablation 1/28/2025      Subjective/Objective   Subjective: [place] H[place] Jr. Is an 80 year old male with a history of persistent Afib s/p 2 prior ablations by Dr. Valdes (PVI, CTI ablation  "1/24/2018 and PVI, LA posterior wall 3/25/2022), ascending aortic aneurysm (4.5 cm), HTN, HLD, bilateral popliteal aneurysm s/p repair, history of right lower extremity osteomyelitis, bilateral DVT on Eliquis since 2016, severe MR, moderate AI and single vessel disease in OM1 s/p AVR with 29 mm [place] Inspiris bioprosthetic valve, mitral valve repair with 36 mm [place] physio 2 ring angioplasty, and JENNY ligation on 7/12/2023. Post operatively he developed heart block with junctional escape and underwent Medtronic dual chamber PPM on 7/19/2023 by Dr. Palacio.     He had recurrent Afib and Afib ablation + AV node ablation + Tikosyn initiation were recommended. The procedures will help to maintain AV synchrony and eliminate rate control medications.     He underwent successful ablation on 1/28/2025 by Dr. Valdes. The procedure consisted of PVI, posterior wall, and AV node ablation. He was admitted for initiation of Tikosyn. He was initiated on 500 mcg every 12 hours. Qtc prolonged and dose was reduced to 250 mcg on 1/29/2025. Last night, Qtc was again prolonged.     He is maintaining sinus rhythm. Today, he is feeling well. Groin sites are stable with no bleeding, bruits or hematoma. He denies chest pain or SOB. He is voiding without difficulty.     TELE: AV paced rhythm, brief NSVT 4 beats x 1    EKG: AV paced rhythm Qtc ~500 ms      Objective:  Vitals: /84   Pulse 82   Temp 98.1 °F (36.7 °C)   Resp 18   Ht 6' 5\" (1.956 m)   Wt 118 kg (260 lb)   SpO2 96%   BMI 30.83 kg/m²     Vitals:    01/28/25 0942   Weight: 118 kg (260 lb)     Orthostatic Blood Pressures      Flowsheet Row Most Recent Value   Blood Pressure 124/84 filed at 01/30/2025 1127   Patient Position - Orthostatic VS Sitting filed at 01/30/2025 0736              Intake/Output Summary (Last 24 hours) at 1/30/2025 1136  Last data filed at 1/30/2025 0801  Gross per 24 hour   Intake 1560 ml   Output 800 ml   Net 760 ml       Invasive Devices  "      Peripheral Intravenous Line  Duration             Peripheral IV 01/28/25 Distal;Left;Ventral (anterior) Forearm 2 days    Peripheral IV 01/28/25 Right;Ventral (anterior) Forearm 2 days                    Scheduled Meds:  Current Facility-Administered Medications   Medication Dose Route Frequency Provider Last Rate    acetaminophen  650 mg Oral Q4H PRN Brooke Franzjerilyn Rios PA-C      apixaban  5 mg Oral BID Brooke Franzjerilyn Rios PA-C      atorvastatin  40 mg Oral Daily With Dinner Brookenatasha Franzjerilyn Rios PA-C      bumetanide  2 mg Intravenous Once Brooke Mendozajerilyn Rios PA-C      dofetilide  125 mcg Oral Q12H [facility] Brookenatasha Franzjerilyn Rios PA-C      potassium chloride  40 mEq Oral Once Brooke Mendoza BRYAN Rios       Continuous Infusions:   PRN Meds:.  acetaminophen    Review of Systems:  Review of Systems   Constitutional: Negative.   HENT: Negative.     Eyes: Negative.    Cardiovascular: Negative.    Respiratory: Negative.     Endocrine: Negative.    Skin: Negative.    Musculoskeletal: Negative.    Gastrointestinal: Negative.    Genitourinary: Negative.    Neurological: Negative.    Psychiatric/Behavioral: Negative.       ROS as noted above, otherwise 12 point review of systems was performed and is negative.     Physical Exam:  GEN: NAD, alert and oriented x 3, well appearing  SKIN: warm, dry without significant lesions or rashes  HEENT: NCAT  NECK: supple, no JVD appreciated  CARDIOVASCULAR: RRR, normal S1, S2 without murmurs, rubs, or gallops   LUNGS: CTA bilaterally without wheezes, rhonchi, or rales  ABDOMEN: Soft, nontender, nondistended. Groins soft bilaterally, no bleeding, bruits or hematoma  EXTREMITIES/VASCULAR: perfused without clubbing, cyanosis, or LE edema b/l  PSYCH: Normal mood and affect  NEURO: CN ll-Xll grossly intact      Lab Results:   Results from last 7 days   Lab Units 01/29/25  0554 01/28/25  0937   WBC Thousand/uL 9.10 6.28   HEMOGLOBIN g/dL 15.7 18.1*   HEMATOCRIT % 45.5 52.0*   PLATELETS Thousands/uL  152 168     Results from last 7 days   Lab Units 25  0518 25  0554 25  0937   POTASSIUM mmol/L 3.6 4.4 3.8   CHLORIDE mmol/L 102 106 107   CO2 mmol/L 26 26 27   BUN mg/dL 21 24 21   CREATININE mg/dL 0.75 0.79 0.96   CALCIUM mg/dL 9.5 9.3 9.7     Results from last 7 days   Lab Units 25  0554 25  0937   INR  1.27* 1.34*     Results from last 7 days   Lab Units 25  0518 25  0554   MAGNESIUM mg/dL 2.0 1.9       Imaging:   Results for orders placed during the hospital encounter of 21    Echo complete with contrast if indicated    Narrative  [address] [address]  [phone]    Transthoracic Echocardiogram  2D, M-mode, Doppler, and Color Doppler    Study date:  2021    Patient: GERONIMO ARITA  MR number: [id number]  Account number: [id number]  : 1945  Age: 76 years  Gender: Male  Status: Inpatient  Location: Bedside  Height: 78 in  Weight: 242 lb  BP: 122/ 84 mmHg    Indications: Atrial fibrillation.    Diagnoses: I48.0 - Atrial fibrillation    Sonographer:  Florencia Meyers RDCS  Primary Physician:  Darinel Saucedo DO  Referring Physician:  Khalif Gibson MD  Group:  [facility]  Cardiology Fellow:  Michael Marie MD  Interpreting Physician:  Evan Ponce MD    SUMMARY    LEFT VENTRICLE:  Size was normal.  Systolic function was normal. Ejection fraction was estimated to be 60 %.  There were no regional wall motion abnormalities.  Wall thickness was mildly increased.    LEFT ATRIUM:  The atrium was mildly to moderately dilated.    MITRAL VALVE:  There was moderate regurgitation.    AORTIC VALVE:  There was mild to moderate regurgitation.    TRICUSPID VALVE:  There was mild regurgitation.  Pulmonary artery systolic pressure was within the normal range.    HISTORY: PRIOR HISTORY: Atrial fibrillation. DVT. Aneurysm of ascending aorta.    PROCEDURE: The procedure was  performed at the bedside. This was a routine study. The transthoracic approach was used. The study included complete 2D imaging, M-mode, complete spectral Doppler, and color Doppler. The heart rate was 86 bpm,  at the start of the study. Images were obtained from the parasternal, apical, subcostal, and suprasternal notch acoustic windows. Image quality was adequate.    LEFT VENTRICLE: Size was normal. Systolic function was normal. Ejection fraction was estimated to be 60 %. There were no regional wall motion abnormalities. Wall thickness was mildly increased. The changes were consistent with concentric  remodeling (increased wall thickness with normal wall mass). DOPPLER: Transmitral flow pattern: atrial fibrillation.    RIGHT VENTRICLE: The size was normal. Systolic function was normal.    LEFT ATRIUM: The atrium was mildly to moderately dilated.    RIGHT ATRIUM: The atrium was mildly dilated.    MITRAL VALVE: Valve structure was normal. There was normal leaflet separation. DOPPLER: The transmitral velocity was within the normal range. There was no evidence for stenosis. There was moderate regurgitation.    AORTIC VALVE: The valve was trileaflet. Leaflets exhibited mild to moderate calcification, normal cuspal separation, and sclerosis. DOPPLER: Transaortic velocity was within the normal range. There was no evidence for stenosis. There was  mild to moderate regurgitation.    TRICUSPID VALVE: The valve structure was normal. There was normal leaflet separation. DOPPLER: The transtricuspid velocity was within the normal range. There was no evidence for stenosis. There was mild regurgitation. Pulmonary artery  systolic pressure was within the normal range. Estimated peak PA pressure was 27 mmHg.    PULMONIC VALVE: DOPPLER: The transpulmonic velocity was within the normal range. There was mild regurgitation.    PERICARDIUM: There was no pericardial effusion.    AORTA: The root exhibited normal size for BSA at 38  mm.    SYSTEMIC VEINS: IVC: The inferior vena cava was normal in size and course. Respirophasic changes were normal.    SYSTEM MEASUREMENT TABLES    2D  %FS: 28.01 %  Ao Diam: 3.58 cm  EDV(Teich): 122.74 ml  EF(Teich): 53.93 %  ESV(Teich): 56.54 ml  IVSd: 1.25 cm  LA Area: 31.62 cm2  LA Diam: 4.26 cm  LVEDV MOD A4C: 156.26 ml  LVEF MOD A4C: 60.45 %  LVESV MOD A4C: 61.8 ml  LVIDd: 5.08 cm  LVIDs: 3.66 cm  LVLd A4C: 8.59 cm  LVLs A4C: 7.52 cm  LVPWd: 1.33 cm  RA Area: 31.07 cm2  RVIDd: 4.15 cm  SV MOD A4C: 94.47 ml  SV(Teich): 66.2 ml    CW  AR Dec [place]: 3.14 m/s2  AR Dec Time: 1574.47 ms  AR PHT: 456.6 ms  AR Vmax: 4.92 m/s  AR maxP.95 mmHg  AV Env.Ti: 251.19 ms  AV VTI: 20.76 cm  AV Vmax: 1.24 m/s  AV Vmean: 0.83 m/s  AV maxP.2 mmHg  AV meanPG: 3.14 mmHg  TR Vmax: 2.38 m/s  TR maxP.7 mmHg    MM  TAPSE: 2.34 cm    PW  LVOT Env.Ti: 251.19 ms  LVOT VTI: 14.87 cm  LVOT Vmax: 0.85 m/s  LVOT Vmean: 0.59 m/s  LVOT maxP.9 mmHg  LVOT meanP.62 mmHg  MV A Luis A: 0.65 m/s  MV Dec [place]: 4.08 m/s2  MV DecT: 171.95 ms  MV E Luis A: 0.7 m/s  MV E/A Ratio: 1.08    [employer] Accredited Echocardiography Laboratory    Prepared and electronically signed by    Evan Ponce MD  Signed 2021 14:53:21

## 2025-01-30 NOTE — ASSESSMENT & PLAN NOTE
s/p redo Afib ablation with PFA (PVI, PWI, atrial septum and appendage remnant) + AV node ablation + Tikosyn initiation  (Prior Afib ablation 2018 PVI/CTI and March 2022 cryo PVI/PWI)  Maintaining sinus rhythm  Qtc prolonged after 3rd dose of 250 mcg last night  Decrease Tikosyn to 125 mcg q 12 hours this morning (repeat Qtc <500 ms)  Continue Eliquis 5 mg bid  Off all AV monica agents after AVN ablation  Groin sites stable, no hematoma  Replace K+, keep >4.0, keep Mg >2.0  Possible D/C home tomorrow  F/U for EKG in 1 week  F/U with EP in 4-6 weeks

## 2025-01-30 NOTE — CASE MANAGEMENT
Case Management Discharge Planning Note    Patient name [place] H[place] Jr.  Location [facility] 431-01 MRN [id number]  : 1945 Date 2025       Current Admission Date: 2025  Current Admission Diagnosis:Atrial fibrillation with RVR ([facility])   Patient Active Problem List    Diagnosis Date Noted Date Diagnosed    Dyspnea 2025     S/P AV monica ablation 2025     S/P redo AFib ablation 2025     Other injury of muscle(s) and tendon(s) of the rotator cuff of left shoulder, initial encounter 2024     s/p Medtronic dual chamber PPM with left bundle pacing lead 2023     S/P AVR 2023     S/P MVR (mitral valve repair) 2023     S/P left atrial appendage ligation 2023     Hyperchloremia 2023     Coronary artery disease involving native coronary artery 2023     Aortic insufficiency 2023     Permanent atrial fibrillation (HCC) 2023     Chronic osteomyelitis of right tibia with draining sinus ([facility]) 2023     PAD (peripheral artery disease) ([facility]) 2021    Pulmonary hypertension ([facility]) 2021     Erythrocytosis 2021     Streptococcal bacteremia 2020     Callus of foot 2020     Hypoxia 08/15/2020     Aneurysm of ascending aorta ([facility]) 2019     Mixed hyperlipidemia 2018     Mitral valve insufficiency 10/31/2017     Paroxysmal atrial fibrillation ([facility]) 2016     History of DVT (deep vein thrombosis) 08/15/2016     Hypertension      Atrial fibrillation with RVR ([facility]) 2016     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[facility]) 2016     Bilateral popliteal artery aneurysm ([facility]) 2016     Benign essential hypertension 2009       LOS (days): 2  Geometric Mean LOS (GMLOS) (days): 1.2  Days to GMLOS:-1     OBJECTIVE:  Risk of Unplanned Readmission Score: 9.96         Current admission status: Inpatient   Preferred Pharmacy:   [id number] - [place]ARY[place] Acuna -  [address]  [place] [address]  Phone: [phone] Fax: [phone]    [facility] [place] [place] - [address]  [facility],  [address]  [facility],  [address] [facility] [place] [address]  Phone: [phone] Fax: [phone]    Primary Care Provider: Amrik Bonner DO    Primary Insurance: MEDICARE  Secondary Insurance: [facility]    DISCHARGE DETAILS:    Per chart review, patient admitted for afib ablation and Tikosyn initiation. No CM needs identified at this time. CM team will continue to follow for discharge planning needs.

## 2025-01-30 NOTE — PLAN OF CARE
Problem: PAIN - ADULT  Goal: Verbalizes/displays adequate comfort level or baseline comfort level  Description: Interventions:  - Encourage patient to monitor pain and request assistance  - Assess pain using appropriate pain scale  - Administer analgesics based on type and severity of pain and evaluate response  - Implement non-pharmacological measures as appropriate and evaluate response  - Consider cultural and social influences on pain and pain management  - Notify physician/advanced practitioner if interventions unsuccessful or patient reports new pain  Outcome: Progressing     Problem: INFECTION - ADULT  Goal: Absence or prevention of progression during hospitalization  Description: INTERVENTIONS:  - Assess and monitor for signs and symptoms of infection  - Monitor lab/diagnostic results  - Monitor all insertion sites, i.e. indwelling lines, tubes, and drains  - Monitor endotracheal if appropriate and nasal secretions for changes in amount and color  - [place] appropriate cooling/warming therapies per order  - Administer medications as ordered  - Instruct and encourage patient and family to use good hand hygiene technique  - Identify and instruct in appropriate isolation precautions for identified infection/condition  Outcome: Progressing     Problem: INFECTION - ADULT  Goal: Absence of fever/infection during neutropenic period  Description: INTERVENTIONS:  - Monitor WBC    Outcome: Progressing

## 2025-01-30 NOTE — ASSESSMENT & PLAN NOTE
Received Bumex 3 mg IV yesterday  Give Bumex 2 mg IV x 1 today  Home torsemide 20 mg on hold  Replace K+, keep >4.0

## 2025-01-30 NOTE — PLAN OF CARE
Problem: PAIN - ADULT  Goal: Verbalizes/displays adequate comfort level or baseline comfort level  Description: Interventions:  - Encourage patient to monitor pain and request assistance  - Assess pain using appropriate pain scale  - Administer analgesics based on type and severity of pain and evaluate response  - Implement non-pharmacological measures as appropriate and evaluate response  - Consider cultural and social influences on pain and pain management  - Notify physician/advanced practitioner if interventions unsuccessful or patient reports new pain  Outcome: Progressing     Problem: INFECTION - ADULT  Goal: Absence or prevention of progression during hospitalization  Description: INTERVENTIONS:  - Assess and monitor for signs and symptoms of infection  - Monitor lab/diagnostic results  - Monitor all insertion sites, i.e. indwelling lines, tubes, and drains  - Monitor endotracheal if appropriate and nasal secretions for changes in amount and color  - [place] appropriate cooling/warming therapies per order  - Administer medications as ordered  - Instruct and encourage patient and family to use good hand hygiene technique  - Identify and instruct in appropriate isolation precautions for identified infection/condition  Outcome: Progressing  Goal: Absence of fever/infection during neutropenic period  Description: INTERVENTIONS:  - Monitor WBC    Outcome: Progressing     Problem: SAFETY ADULT  Goal: Patient will remain free of falls  Description: INTERVENTIONS:  - Educate patient/family on patient safety including physical limitations  - Instruct patient to call for assistance with activity   - Consult OT/PT to assist with strengthening/mobility   - Keep Call bell within reach  - Keep bed low and locked with side rails adjusted as appropriate  - Keep care items and personal belongings within reach  - Initiate and maintain comfort rounds  - Make Fall Risk Sign visible to staff  - Apply yellow socks and bracelet  for high fall risk patients  - Consider moving patient to room near nurses station  Outcome: Progressing  Goal: Maintain or return to baseline ADL function  Description: INTERVENTIONS:  -  Assess patient's ability to carry out ADLs; assess patient's baseline for ADL function and identify physical deficits which impact ability to perform ADLs (bathing, care of mouth/teeth, toileting, grooming, dressing, etc.)  - Assess/evaluate cause of self-care deficits   - Assess range of motion  - Assess patient's mobility; develop plan if impaired  - Assess patient's need for assistive devices and provide as appropriate  - Encourage maximum independence but intervene and supervise when necessary  - Involve family in performance of ADLs  - Assess for home care needs following discharge   - Consider OT consult to assist with ADL evaluation and planning for discharge  - Provide patient education as appropriate  Outcome: Progressing  Goal: Maintains/Returns to pre admission functional level  Description: INTERVENTIONS:  - Perform AM-PAC 6 Click Basic Mobility/ Daily Activity assessment daily.  - Set and communicate daily mobility goal to care team and patient/family/caregiver.   - Collaborate with rehabilitation services on mobility goals if consulted  - Out of bed for toileting  - Record patient progress and toleration of activity level   Outcome: Progressing     Problem: DISCHARGE PLANNING  Goal: Discharge to home or other facility with appropriate resources  Description: INTERVENTIONS:  - Identify barriers to discharge w/patient and caregiver  - Arrange for needed discharge resources and transportation as appropriate  - Identify discharge learning needs (meds, wound care, etc.)  - Arrange for interpretive services to assist at discharge as needed  - Refer to Case Management Department for coordinating discharge planning if the patient needs post-hospital services based on physician/advanced practitioner order or complex needs  related to functional status, cognitive ability, or social support system  Outcome: Progressing     Problem: Knowledge Deficit  Goal: Patient/family/caregiver demonstrates understanding of disease process, treatment plan, medications, and discharge instructions  Description: Complete learning assessment and assess knowledge base.  Interventions:  - Provide teaching at level of understanding  - Provide teaching via preferred learning methods  Outcome: Progressing

## 2025-01-31 VITALS
WEIGHT: 260.58 LBS | RESPIRATION RATE: 18 BRPM | SYSTOLIC BLOOD PRESSURE: 123 MMHG | HEART RATE: 82 BPM | TEMPERATURE: 98.2 F | DIASTOLIC BLOOD PRESSURE: 84 MMHG | HEIGHT: 78 IN | BODY MASS INDEX: 30.15 KG/M2 | OXYGEN SATURATION: 100 %

## 2025-01-31 LAB
ANION GAP SERPL CALCULATED.3IONS-SCNC: 6 MMOL/L (ref 4–13)
ATRIAL RATE: 82 BPM
BASOPHILS # BLD AUTO: 0.06 THOUSANDS/ΜL (ref 0–0.1)
BASOPHILS NFR BLD AUTO: 1 % (ref 0–1)
BUN SERPL-MCNC: 18 MG/DL (ref 5–25)
CALCIUM SERPL-MCNC: 9.6 MG/DL (ref 8.4–10.2)
CHLORIDE SERPL-SCNC: 104 MMOL/L (ref 96–108)
CO2 SERPL-SCNC: 32 MMOL/L (ref 21–32)
CREAT SERPL-MCNC: 0.82 MG/DL (ref 0.6–1.3)
EOSINOPHIL # BLD AUTO: 0.52 THOUSAND/ΜL (ref 0–0.61)
EOSINOPHIL NFR BLD AUTO: 7 % (ref 0–6)
ERYTHROCYTE [DISTWIDTH] IN BLOOD BY AUTOMATED COUNT: 13.4 % (ref 11.6–15.1)
GFR SERPL CREATININE-BSD FRML MDRD: 82 ML/MIN/1.73SQ M
GLUCOSE SERPL-MCNC: 91 MG/DL (ref 65–140)
HCT VFR BLD AUTO: 47.4 % (ref 36.5–49.3)
HGB BLD-MCNC: 16.1 G/DL (ref 12–17)
IMM GRANULOCYTES # BLD AUTO: 0.02 THOUSAND/UL (ref 0–0.2)
IMM GRANULOCYTES NFR BLD AUTO: 0 % (ref 0–2)
LYMPHOCYTES # BLD AUTO: 2.02 THOUSANDS/ΜL (ref 0.6–4.47)
LYMPHOCYTES NFR BLD AUTO: 25 % (ref 14–44)
MAGNESIUM SERPL-MCNC: 2 MG/DL (ref 1.9–2.7)
MCH RBC QN AUTO: 32.6 PG (ref 26.8–34.3)
MCHC RBC AUTO-ENTMCNC: 34 G/DL (ref 31.4–37.4)
MCV RBC AUTO: 96 FL (ref 82–98)
MONOCYTES # BLD AUTO: 0.79 THOUSAND/ΜL (ref 0.17–1.22)
MONOCYTES NFR BLD AUTO: 10 % (ref 4–12)
NEUTROPHILS # BLD AUTO: 4.65 THOUSANDS/ΜL (ref 1.85–7.62)
NEUTS SEG NFR BLD AUTO: 57 % (ref 43–75)
NRBC BLD AUTO-RTO: 0 /100 WBCS
P AXIS: 31 DEGREES
PLATELET # BLD AUTO: 143 THOUSANDS/UL (ref 149–390)
PMV BLD AUTO: 9.9 FL (ref 8.9–12.7)
POTASSIUM SERPL-SCNC: 4.2 MMOL/L (ref 3.5–5.3)
PR INTERVAL: 172 MS
QRS AXIS: 143 DEGREES
QRSD INTERVAL: 158 MS
QT INTERVAL: 452 MS
QTC INTERVAL: 528 MS
RBC # BLD AUTO: 4.94 MILLION/UL (ref 3.88–5.62)
SODIUM SERPL-SCNC: 142 MMOL/L (ref 135–147)
T WAVE AXIS: 16 DEGREES
VENTRICULAR RATE: 82 BPM
WBC # BLD AUTO: 8.06 THOUSAND/UL (ref 4.31–10.16)

## 2025-01-31 PROCEDURE — 99239 HOSP IP/OBS DSCHRG MGMT >30: CPT | Performed by: PHYSICIAN ASSISTANT

## 2025-01-31 PROCEDURE — 80048 BASIC METABOLIC PNL TOTAL CA: CPT | Performed by: PHYSICIAN ASSISTANT

## 2025-01-31 PROCEDURE — 83735 ASSAY OF MAGNESIUM: CPT | Performed by: PHYSICIAN ASSISTANT

## 2025-01-31 PROCEDURE — 93010 ELECTROCARDIOGRAM REPORT: CPT | Performed by: INTERNAL MEDICINE

## 2025-01-31 PROCEDURE — 85025 COMPLETE CBC W/AUTO DIFF WBC: CPT | Performed by: PHYSICIAN ASSISTANT

## 2025-01-31 RX ADMIN — APIXABAN 5 MG: 5 TABLET, FILM COATED ORAL at 06:06

## 2025-01-31 NOTE — ASSESSMENT & PLAN NOTE
BP well controlled  Now off diltiazem and metoprolol  Continue to monitor   Home Suture Removal Text: Patient was provided instructions on removing sutures and will remove their sutures at home.  If they have any questions or difficulties they will call the office.

## 2025-01-31 NOTE — ASSESSMENT & PLAN NOTE
s/p redo Afib ablation with PFA (PVI, PWI, atrial septum and appendage remnant) + AV node ablation + Tikosyn initiation  (Prior Afib ablation 2018 PVI/CTI and March 2022 cryo PVI/PWI)  Maintaining sinus rhythm (AV paced rhythm)  Failed Tikosyn initiation - prolonged Qtc of 540 ms despite lowering dose to 125 mcg every 12 hours --> D/C Tikosyn  Continue Eliquis 5 mg bid  Off all AV monica agents after AVN ablation (metoprolol and diltiazem D/C'd)  Groin sites stable, no hematoma  F/U with EP in 4-6 weeks

## 2025-01-31 NOTE — PLAN OF CARE
Problem: PAIN - ADULT  Goal: Verbalizes/displays adequate comfort level or baseline comfort level  Description: Interventions:  - Encourage patient to monitor pain and request assistance  - Assess pain using appropriate pain scale  - Administer analgesics based on type and severity of pain and evaluate response  - Implement non-pharmacological measures as appropriate and evaluate response  - Consider cultural and social influences on pain and pain management  - Notify physician/advanced practitioner if interventions unsuccessful or patient reports new pain  Outcome: Progressing     Problem: INFECTION - ADULT  Goal: Absence or prevention of progression during hospitalization  Description: INTERVENTIONS:  - Assess and monitor for signs and symptoms of infection  - Monitor lab/diagnostic results  - Monitor all insertion sites, i.e. indwelling lines, tubes, and drains  - Monitor endotracheal if appropriate and nasal secretions for changes in amount and color  - [place] appropriate cooling/warming therapies per order  - Administer medications as ordered  - Instruct and encourage patient and family to use good hand hygiene technique  - Identify and instruct in appropriate isolation precautions for identified infection/condition  Outcome: Progressing  Goal: Absence of fever/infection during neutropenic period  Description: INTERVENTIONS:  - Monitor WBC    Outcome: Progressing     Problem: SAFETY ADULT  Goal: Patient will remain free of falls  Description: INTERVENTIONS:  - Educate patient/family on patient safety including physical limitations  - Instruct patient to call for assistance with activity   - Consult OT/PT to assist with strengthening/mobility   - Keep Call bell within reach  - Keep bed low and locked with side rails adjusted as appropriate  - Keep care items and personal belongings within reach  - Initiate and maintain comfort rounds  - Make Fall Risk Sign visible to staff  - Apply yellow socks and bracelet  for high fall risk patients  - Consider moving patient to room near nurses station  Outcome: Progressing  Goal: Maintain or return to baseline ADL function  Description: INTERVENTIONS:  -  Assess patient's ability to carry out ADLs; assess patient's baseline for ADL function and identify physical deficits which impact ability to perform ADLs (bathing, care of mouth/teeth, toileting, grooming, dressing, etc.)  - Assess/evaluate cause of self-care deficits   - Assess range of motion  - Assess patient's mobility; develop plan if impaired  - Assess patient's need for assistive devices and provide as appropriate  - Encourage maximum independence but intervene and supervise when necessary  - Involve family in performance of ADLs  - Assess for home care needs following discharge   - Consider OT consult to assist with ADL evaluation and planning for discharge  - Provide patient education as appropriate  Outcome: Progressing  Goal: Maintains/Returns to pre admission functional level  Description: INTERVENTIONS:  - Perform AM-PAC 6 Click Basic Mobility/ Daily Activity assessment daily.  - Set and communicate daily mobility goal to care team and patient/family/caregiver.   - Collaborate with rehabilitation services on mobility goals if consulted  - Out of bed for toileting  - Record patient progress and toleration of activity level   Outcome: Progressing     Problem: DISCHARGE PLANNING  Goal: Discharge to home or other facility with appropriate resources  Description: INTERVENTIONS:  - Identify barriers to discharge w/patient and caregiver  - Arrange for needed discharge resources and transportation as appropriate  - Identify discharge learning needs (meds, wound care, etc.)  - Arrange for interpretive services to assist at discharge as needed  - Refer to Case Management Department for coordinating discharge planning if the patient needs post-hospital services based on physician/advanced practitioner order or complex needs  related to functional status, cognitive ability, or social support system  Outcome: Progressing     Problem: Knowledge Deficit  Goal: Patient/family/caregiver demonstrates understanding of disease process, treatment plan, medications, and discharge instructions  Description: Complete learning assessment and assess knowledge base.  Interventions:  - Provide teaching at level of understanding  - Provide teaching via preferred learning methods  Outcome: Progressing

## 2025-01-31 NOTE — CASE MANAGEMENT
Case Management Discharge Planning Note    Patient name North Hogue Jr.  Location [facility]PHP 431/PPHP 431-01 MRN [id number]  : 1945 Date 2025       Current Admission Date: 2025  Current Admission Diagnosis:Atrial fibrillation with RVR ([facility])   Patient Active Problem List    Diagnosis Date Noted Date Diagnosed    Dyspnea 2025     S/P AV monica ablation 2025     S/P redo AFib ablation 2025     Other injury of muscle(s) and tendon(s) of the rotator cuff of left shoulder, initial encounter 2024     s/p Medtronic dual chamber PPM with left bundle pacing lead 2023     S/P AVR 2023     S/P MVR (mitral valve repair) 2023     S/P left atrial appendage ligation 2023     Hyperchloremia 2023     Coronary artery disease involving native coronary artery 2023     Aortic insufficiency 2023     Permanent atrial fibrillation (HCC) 2023     Chronic osteomyelitis of right tibia with draining sinus ([facility]) 2023     PAD (peripheral artery disease) ([facility]) 2021    Pulmonary hypertension ([facility]) 2021     Erythrocytosis 2021     Streptococcal bacteremia 2020     Callus of foot 2020     Hypoxia 08/15/2020     Aneurysm of ascending aorta ([facility]) 2019     Mixed hyperlipidemia 2018     Mitral valve insufficiency 10/31/2017     Paroxysmal atrial fibrillation ([facility]) 2016     History of DVT (deep vein thrombosis) 08/15/2016     Hypertension      Atrial fibrillation with RVR ([facility]) 2016     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[facility]) 2016     Bilateral popliteal artery aneurysm ([facility]) 2016     Benign essential hypertension 2009       LOS (days): 3  Geometric Mean LOS (GMLOS) (days): 1.2  Days to GMLOS:-1.7     OBJECTIVE:  Risk of Unplanned Readmission Score: 10.06         Current admission status: Inpatient   Preferred Pharmacy:   [id number] - [place] -  [address]  [place] [address]  Phone: [phone] Fax: [phone]    [facility] [place] [place] [address]  [facility],  [address] [place] [address],  First Floor [facility] [place] [address]  Phone: [phone] Fax: [phone]    Primary Care Provider: Amrik Bonner DO    Primary Insurance: MEDICARE  Secondary Insurance: [facility]    DISCHARGE DETAILS:       IMM Given (Date):: 01/31/25  IMM Given to:: Patient     IMM reviewed with patient, patient agrees with discharge determination.

## 2025-01-31 NOTE — ASSESSMENT & PLAN NOTE
Hemoglobin 18.1 on admission, uptrending for the past 1.5 years. Was 13.7 in July 2023  Improved to 16.1 on discharge  Recommend F/U with PCP and consider heme/onc evaluation if remains elevated  Discussed with patient and he is aware, agrees to follow up

## 2025-01-31 NOTE — ASSESSMENT & PLAN NOTE
Diuresed with Bumex IV  Resume home torsemide 20 mg once daily  Continue potassium supplementation

## 2025-02-25 ENCOUNTER — RESULTS FOLLOW-UP (OUTPATIENT)
Dept: CARDIOLOGY CLINIC | Facility: CLINIC | Age: 80
End: 2025-02-25

## 2025-02-25 ENCOUNTER — REMOTE DEVICE CLINIC VISIT (OUTPATIENT)
Dept: CARDIOLOGY CLINIC | Facility: CLINIC | Age: 80
End: 2025-02-25
Payer: MEDICARE

## 2025-02-25 DIAGNOSIS — Z95.0 CARDIAC PACEMAKER IN SITU: Primary | ICD-10-CM

## 2025-02-25 PROCEDURE — 93294 REM INTERROG EVL PM/LDLS PM: CPT | Performed by: INTERNAL MEDICINE

## 2025-02-25 PROCEDURE — 93296 REM INTERROG EVL PM/IDS: CPT | Performed by: INTERNAL MEDICINE

## 2025-02-25 NOTE — PROGRESS NOTES
Results for orders placed or performed in visit on 02/25/25   Cardiac EP device report    Narrative    MDT D-PM/ACTIVE SYSTEM IS MRI CONDITIONAL  CARELINK TRANSMISSION: BATTERY VOLTAGE ADEQUATE (11.9 YRS). AP-62%, >99% (DEPENDENT/ S/P AF & AVN ABLATIONS ON 1/28/25). SMALL RWAVES-1.5MV. ALL OTHER AVAILABLE LEAD PARAMETERS WITHIN NORMAL LIMITS. 1 AFLUTTER EPISODE ON 2/19/25 LASTING 1.5 MINS. AF BURDEN SINCE 1/29/25<0.1%. PT ON ELIQUIS. NORMAL DEVICE FUNCTION. GV

## 2025-03-07 NOTE — PROGRESS NOTES
Electrophysiology Follow Up  [facility]  [facility] [address], [place] [address]    Name: North Hogue Jr.  : 1945  MRN: [id number]    Assessment & Plan  Permanent atrial fibrillation (HCC)  Prior afib/flutter ablation (PVI, CTI)  + repeat afib cryoablation (PVI, PW)   Intolerant of rate control agents per documentation  25 - underwent EPS w/ PFA afib/flutter ablation (PVI, PW,complex atypical flutter on atrial septum and atrial appendage remnant) followed by AVJ ablation during same procedure given noted extensive scar burden during operation w/ intolerance of rate control in the past on 25 by Dr. Valdes  Current medication regimen:  AC: Eliquis 5mg BID ([id number] 6 (age, HTN, CAD, DVT hx))  Rate: None  AAD: None  s/p Medtronic dual chamber PPM with left bundle pacing lead 2023  Normal device function per latest interrogation   S/P MVR (mitral valve repair)  History noted  Continue general cardiology follow-up  S/P left atrial appendage ligation  History noted  History of DVT (deep vein thrombosis)  Maintained on Eliquis  Hypertension, unspecified type  BP in office today 118/84  Continue PCP follow-up  Coronary artery disease involving native coronary artery of native heart without angina pectoris  Maintained on statin and Eliquis  Continue general cardiology follow-up       Discussion/Plan:    Patient recently underwent EPS w/ PFA afib/flutter ablation (PVI, PW,complex atypical flutter on atrial septum and atrial appendage remnant) followed by AVJ ablation during same procedure given noted extensive scar burden during operation w/ intolerance of rate control in the past on 25 by Dr. Valdes    Since this procedure they have been maintained on Eliquis 5mg BID only    Is 2025 device interrogation showed 1 episode of atrial flutter lasting 1.5 minutes on 25 with a total burden <0.1%.    I interrogated his  device in office today showing no repeat episodes since     Otherwise since his ablation He reports he has been feeling well and currently denies acute cardiac complaint    He affirms the groin access sites have healed well    He denies any significant adverse bleeding events whilst on Eliquis    EKG in office today showing V-paced rhythm w/ ventricular rate 87 bpm    No acute medication changes made today    To continue scheduled device monitoring moving forwards    Patient has been instructed to follow up in our EP office in 6 months or as needed. He will call our office with any questions or concerns in the meantime.    Rhythm History:   Atrial fibrillation:      Atrial flutter:      SVT:      VT/VF/PVC:     Device history:   Pacemaker:     Defibrillator:     BIV PPM:     BIV ICD:     ILR:    Interim History/HPI:   Interim history: [place] H[place] Jr. is a 80 y.o. male with a PMH of permanent A-fib s/p AVJ ablation.    He presents today for routine outpatient follow up given a recent afib/flutter + AVJ ablation on 1/28/25. Since this procedure He reports he has been feeling well and currently denies acute cardiac complaint. He affirms the groin access sites have healed well.  He denies any significant adverse been events whilst on Eliquis.    EKG: V-paced rhythm w/ ventricular rate 87 bpm    Review of Systems   Constitutional:  Negative for activity change, appetite change, chills, fatigue and fever.   HENT:  Negative for nosebleeds.    Respiratory:  Negative for chest tightness and shortness of breath.    Cardiovascular:  Negative for chest pain, palpitations and leg swelling.   Neurological:  Negative for dizziness, syncope, weakness and light-headedness.         OBJECTIVE:   Vitals:   There were no vitals taken for this visit.  There is no height or weight on file to calculate BMI.        Physical Exam:   Physical Exam  Constitutional:       General: He is not in acute distress.     Appearance: Normal  appearance. He is not toxic-appearing.   HENT:      Head: Normocephalic and atraumatic.   Eyes:      General:         Right eye: No discharge.         Left eye: No discharge.   Cardiovascular:      Rate and Rhythm: Normal rate and regular rhythm.      Pulses: Normal pulses.   Pulmonary:      Effort: Pulmonary effort is normal.      Breath sounds: Normal breath sounds.   Musculoskeletal:      Right lower leg: No edema.      Left lower leg: No edema.   Skin:     General: Skin is warm and dry.      Capillary Refill: Capillary refill takes less than 2 seconds.   Neurological:      Mental Status: He is alert.          Medications:      Current Outpatient Medications:   •  apixaban (Eliquis) 5 mg, Take 1 tablet (5 mg total) by mouth 2 (two) times a day, Disp: 180 tablet, Rfl: 3  •  Probiotic Product (PROBIOTIC-10 PO), Take 1 capsule by mouth daily, Disp: , Rfl:   •  rosuvastatin (CRESTOR) 20 MG tablet, Take 1 tablet (20 mg total) by mouth daily, Disp: 90 tablet, Rfl: 0  •  Specialty Vitamins Products (Advanced Collagen) TABS, Take by mouth, Disp: , Rfl:   •  torsemide (DEMADEX) 20 mg tablet, Take 1 tablet (20 mg total) by mouth daily, Disp: 90 tablet, Rfl: 3  •  VITAMIN D PO, Take by mouth, Disp: , Rfl:   •  Vitamin D, Cholecalciferol, 1000 units CAPS, Take 2,000 Units by mouth daily, Disp: , Rfl:        Historical Information   Past Medical History:   Diagnosis Date   • Cardiac disease    • GERD (gastroesophageal reflux disease)     occasional - per pt takes TUMS if needed   • Hyperlipidemia    • Hypertension    • Irregular heart beat    • Kidney stone    • PAF (paroxysmal atrial fibrillation) (HCC)    • 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   • S/P AV monica ablation 1/28/2025 01/28/2025   • s/p Medtronic dual chamber PPM with left bundle pacing lead 7/19/2023 07/20/2023   • S/P redo AFib ablation 1/28/2025 01/28/2025       Past Surgical History:   Procedure Laterality Date   • CARDIAC CATHETERIZATION N/A  05/08/2023    Procedure: Cardiac Coronary Angiogram;  Surgeon: Anirudh Mo DO;  [facility];  Service: Cardiology   • CARDIAC ELECTROPHYSIOLOGY PROCEDURE N/A 03/25/2022    Procedure: Cardiac eps/afib ablation;  Surgeon: Javon Valdes MD;  [facility];  Service: Cardiology   • CARDIAC ELECTROPHYSIOLOGY PROCEDURE N/A 07/19/2023    Procedure: Cardiac pacer implant;  Surgeon: Steven Palacio MD;  [facility];  Service: Cardiology   • CARDIAC ELECTROPHYSIOLOGY PROCEDURE N/A 1/28/2025    Procedure: Cardiac eps/afib ablation PFA;  Surgeon: Javon Valdes MD;  [facility];  Service: Cardiology   • CATARACT EXTRACTION Bilateral    • ESOPHAGOGASTRODUODENOSCOPY N/A 08/16/2016    Procedure: ESOPHAGOGASTRODUODENOSCOPY (EGD);  Surgeon: Lucio Barnes MD;  Location: [facility];  Service:    • HERNIA REPAIR Left     groin   • HERNIA REPAIR Right 1995   • OTHER SURGICAL HISTORY     • GA REPLACEMENT MITRAL VALVE W/CARDIOPULMONARY BYP N/A 07/12/2023    Procedure: MITRAL VALVE REPAIR WITH 36MM PHYSIO II ANNULOPLASTY RING;  Surgeon: MODESTO Álvarez MD;  Location: [facility];  Service: Cardiac Surgery   • GA RPLCMT AORTIC VALVE OPN W/STENTLESS TISSUE VALVE N/A 07/12/2023    Procedure: REPLACEMENT VALVE AORTIC (AVR) WITH 29MM INSPIRIS TISSUE RESILIA VALVE. LEFT ATRIAL APPENDAGE LIGATION WITH 45 MM ATRICLIP;  Surgeon: MODESTO Álvarez MD;  Location: [facility];  Service: Cardiac Surgery   • GA SURGICAL ARTHROSCOPY ANDRES W/CORACOACRM LIGM RLS Left 4/5/2024    Procedure: SHOULDER DIAGNOSTIC GLENOHUMERAL ARTHROSCOPY W/ ROTATOR CUFF REPAIR, SUBACROMIAL DECOMPRESSION W/ ACROMIPLASTY, BICEPS TENOTOMY, LABRAL DEBRIDEMENT;  Surgeon: Luke Mclain MD;  Location: [facility];  Service: Orthopedics   • GA SURGICAL ARTHROSCOPY SHOULDER W/ROTATOR CUFF RPR Left 4/5/2024    Procedure: SHOULDER DIAGNOSTIC GLENOHUMERAL ARTHROSCOPY W/ ROTATOR CUFF REPAIR,  SUBACROMIAL DECOMPRESSION W/ ACROMIPLASTY, BICEPS TENOTOMY, LABRAL DEBRIDEMENT;  Surgeon: Luke Mclain MD;  Location[facility];  Service: Orthopedics   • REPLACEMENT TOTAL KNEE Left 04/01/2013   • TONSILLECTOMY      child surgery   • VASCULAR SURGERY Right     POPLITEAL BYPASS DUE TO ANEURYSM       Social History     Substance and Sexual Activity   Alcohol Use Not Currently    Comment: Denies any alcohol use per pt     Social History     Substance and Sexual Activity   Drug Use No    Comment: Denies any drug use per pt     Social History     Tobacco Use   Smoking Status Never   Smokeless Tobacco Never   Tobacco Comments    Never a smoker or use of any tobacco products per pt        Family History   Problem Relation Age of Onset   • Pancreatic cancer Mother    • Stroke Father    • Heart attack Paternal Grandmother    • Anesthesia problems Neg Hx          Labs & Results:  Below is the patient's most recent value for Albumin, ALT, AST, BUN, Calcium, Chloride, Cholesterol, CO2, Creatinine, GFR, Glucose, HDL, Hematocrit, Hemoglobin, Hemoglobin A1C, LDL, Magnesium, Phosphorus, Platelets, Potassium, PSA, Sodium, Triglycerides, and WBC.   Lab Results   Component Value Date    ALT 14 01/07/2025    AST 21 01/07/2025    BUN 18 01/31/2025    CALCIUM 9.6 01/31/2025     01/31/2025    CO2 32 01/31/2025    CREATININE 0.82 01/31/2025    HDL 44 07/09/2024    HCT 47.4 01/31/2025    HGB 16.1 01/31/2025    HGBA1C 5.7 (H) 07/09/2024    MG 2.0 01/31/2025    PHOS 3.1 03/25/2021     (L) 01/31/2025    K 4.2 01/31/2025    TRIG 78 07/09/2024    WBC 8.06 01/31/2025     Note: for a comprehensive list of the patient's lab results, access the Results Review activity.

## 2025-03-07 NOTE — ASSESSMENT & PLAN NOTE
Prior afib/flutter ablation (PVI, CTI) 2018 + repeat afib cryoablation (PVI, PW) 2022  Intolerant of rate control agents per documentation  1/28/25 - underwent EPS w/ PFA afib/flutter ablation (PVI, PW,complex atypical flutter on atrial septum and atrial appendage remnant) followed by AVJ ablation during same procedure given noted extensive scar burden during operation w/ intolerance of rate control in the past on 1/28/25 by Dr. Valdes  Current medication regimen:  AC: Eliquis 5mg BID ([id number] 6 (age, HTN, CAD, DVT hx))  Rate: None  AAD: None

## 2025-03-10 ENCOUNTER — OFFICE VISIT (OUTPATIENT)
Dept: HEMATOLOGY ONCOLOGY | Facility: CLINIC | Age: 80
End: 2025-03-10
Payer: MEDICARE

## 2025-03-10 VITALS
TEMPERATURE: 97.3 F | HEART RATE: 88 BPM | RESPIRATION RATE: 18 BRPM | BODY MASS INDEX: 30.82 KG/M2 | DIASTOLIC BLOOD PRESSURE: 72 MMHG | OXYGEN SATURATION: 97 % | SYSTOLIC BLOOD PRESSURE: 128 MMHG | HEIGHT: 77 IN | WEIGHT: 261 LBS

## 2025-03-10 DIAGNOSIS — D75.1 ERYTHROCYTOSIS: ICD-10-CM

## 2025-03-10 PROCEDURE — 99203 OFFICE O/P NEW LOW 30 MIN: CPT | Performed by: PHYSICIAN ASSISTANT

## 2025-03-10 NOTE — ASSESSMENT & PLAN NOTE
Labs available for review from [facility] from July 2024, 1/7/2025 and 1/28/2025 were consistent with mild erythrocytosis.    Of note the 1/28 assessment was at admission for his ablation for A-fib.  Patient was admitted for a few days and received fluid hydration.  This improved his hematocrit to normal, as flowsheet shows below.    Likely the elevated hemoglobin and hematocrit was secondary to poor inadequate hydration.  Patient is encouraged to hydrate with water better. He admits he does not drink a lot of water.    Continue follow-up and assessment with CBCs with primary care.    Orders:    Ambulatory Referral to Hematology / Oncology

## 2025-03-10 NOTE — PROGRESS NOTES
Name: North Hogue Jr.      : 1945      MRN: [id number]  Encounter Provider: Aimee Leiva PA-C  Encounter Date: 3/10/2025   Encounter department: [facility] [place]  :  Assessment & Plan  Erythrocytosis  Labs available for review from [facility] system from 2024, 2025 and 2025 were consistent with mild erythrocytosis.    Of note the  assessment was at admission for his ablation for A-fib.  Patient was admitted for a few days and received fluid hydration.  This improved his hematocrit to normal, as flowsheet shows below.    Likely the elevated hemoglobin and hematocrit was secondary to poor inadequate hydration.  Patient is encouraged to hydrate with water better. He admits he does not drink a lot of water.    Continue follow-up and assessment with CBCs with primary care.    Orders:    Ambulatory Referral to Hematology / Oncology      No follow-ups on file.    History of Present Illness   No chief complaint on file.    Pertinent Medical History     03/10/25:      80-year-old male presents for consultation visit regarding erythrocytosis.  Patient has history of persistent A-fib with now 3 ablations, most recent in 2025.  He follows with Dr. Valdes.    Past medical history significant for ascending aortic aneurysm, hypertension, hyperlipidemia, bilateral popliteal aneurysm status postrepair, history of DVT, history of right lower extremity osteomyelitis, bilateral DVT on Eliquis since , severe mitral regurgitation, moderate aortic insufficiency and a single-vessel disease, status post valve repairs.  He has a Medtronic dual-chamber pacemaker since .    Review of Systems   Constitutional:  Negative for activity change, appetite change, chills, fatigue, fever and unexpected weight change.   Respiratory:  Negative for cough and shortness of breath.    Cardiovascular:  Negative for chest pain, palpitations and leg swelling.    Gastrointestinal:  Negative for abdominal pain, constipation, diarrhea, nausea and vomiting.   Genitourinary:  Negative for difficulty urinating, dysuria and hematuria.   Musculoskeletal:  Negative for arthralgias.   Skin: Negative.    Neurological:  Negative for dizziness, weakness, light-headedness, numbness and headaches.   Hematological: Negative.    Psychiatric/Behavioral: Negative.             Objective   There were no vitals taken for this visit.    Physical Exam  Vitals reviewed.   Constitutional:       General: He is not in acute distress.     Appearance: He is well-developed. He is not ill-appearing.   HENT:      Head: Normocephalic and atraumatic.   Eyes:      General: No scleral icterus.     Conjunctiva/sclera: Conjunctivae normal.   Cardiovascular:      Rate and Rhythm: Normal rate and regular rhythm.      Heart sounds: Normal heart sounds. No murmur heard.  Pulmonary:      Effort: Pulmonary effort is normal. No respiratory distress.      Breath sounds: Normal breath sounds.   Abdominal:      Palpations: Abdomen is soft.      Tenderness: There is no abdominal tenderness.   Musculoskeletal:         General: No tenderness. Normal range of motion.      Cervical back: Normal range of motion and neck supple.      Right lower leg: No edema.      Left lower leg: No edema.   Lymphadenopathy:      Cervical: No cervical adenopathy.   Skin:     General: Skin is warm and dry.   Neurological:      Mental Status: He is alert and oriented to person, place, and time.      Cranial Nerves: No cranial nerve deficit.   Psychiatric:         Mood and Affect: Mood normal.         Behavior: Behavior normal.         Labs: I have reviewed the following labs:

## 2025-03-12 ENCOUNTER — OFFICE VISIT (OUTPATIENT)
Dept: CARDIOLOGY CLINIC | Facility: CLINIC | Age: 80
End: 2025-03-12
Payer: MEDICARE

## 2025-03-12 VITALS
WEIGHT: 262.5 LBS | DIASTOLIC BLOOD PRESSURE: 84 MMHG | HEIGHT: 78 IN | HEART RATE: 87 BPM | BODY MASS INDEX: 30.37 KG/M2 | SYSTOLIC BLOOD PRESSURE: 118 MMHG

## 2025-03-12 DIAGNOSIS — Z98.890 S/P LEFT ATRIAL APPENDAGE LIGATION: ICD-10-CM

## 2025-03-12 DIAGNOSIS — I48.21 PERMANENT ATRIAL FIBRILLATION (HCC): Primary | ICD-10-CM

## 2025-03-12 DIAGNOSIS — I25.10 CORONARY ARTERY DISEASE INVOLVING NATIVE CORONARY ARTERY OF NATIVE HEART WITHOUT ANGINA PECTORIS: ICD-10-CM

## 2025-03-12 DIAGNOSIS — Z95.0 PACEMAKER: ICD-10-CM

## 2025-03-12 DIAGNOSIS — Z86.718 HISTORY OF DVT (DEEP VEIN THROMBOSIS): ICD-10-CM

## 2025-03-12 DIAGNOSIS — I10 HYPERTENSION, UNSPECIFIED TYPE: ICD-10-CM

## 2025-03-12 DIAGNOSIS — Z98.890 S/P MVR (MITRAL VALVE REPAIR): ICD-10-CM

## 2025-03-12 PROCEDURE — 99214 OFFICE O/P EST MOD 30 MIN: CPT

## 2025-03-12 PROCEDURE — 93000 ELECTROCARDIOGRAM COMPLETE: CPT

## 2025-03-18 DIAGNOSIS — I48.21 PERMANENT ATRIAL FIBRILLATION (HCC): ICD-10-CM

## 2025-03-18 DIAGNOSIS — R06.02 SHORTNESS OF BREATH: ICD-10-CM

## 2025-03-19 RX ORDER — APIXABAN 5 MG/1
5 TABLET, FILM COATED ORAL 2 TIMES DAILY
Qty: 180 TABLET | Refills: 1 | Status: SHIPPED | OUTPATIENT
Start: 2025-03-19

## 2025-04-11 ENCOUNTER — TELEPHONE (OUTPATIENT)
Dept: CARDIOLOGY CLINIC | Facility: CLINIC | Age: 80
End: 2025-04-11

## 2025-04-11 NOTE — TELEPHONE ENCOUNTER
LVM for pt to call office regarding 4/14 appt with SS for discussion of Pulse Field Ablation- pt had ablation in 1/2025 and was in for recent HFU Post Ablation

## 2025-05-27 ENCOUNTER — REMOTE DEVICE CLINIC VISIT (OUTPATIENT)
Dept: CARDIOLOGY CLINIC | Facility: CLINIC | Age: 80
End: 2025-05-27
Payer: MEDICARE

## 2025-05-27 ENCOUNTER — RESULTS FOLLOW-UP (OUTPATIENT)
Dept: CARDIOLOGY CLINIC | Facility: CLINIC | Age: 80
End: 2025-05-27

## 2025-05-27 DIAGNOSIS — Z95.0 CARDIAC PACEMAKER IN SITU: Primary | ICD-10-CM

## 2025-05-27 PROCEDURE — 93296 REM INTERROG EVL PM/IDS: CPT | Performed by: INTERNAL MEDICINE

## 2025-05-27 PROCEDURE — 93294 REM INTERROG EVL PM/LDLS PM: CPT | Performed by: INTERNAL MEDICINE

## 2025-05-27 NOTE — PROGRESS NOTES
Results for orders placed or performed in visit on 05/27/25   Cardiac EP device report    Narrative    MDT D-PM/ACTIVE SYSTEM IS MRI CONDITIONAL  CARELINK TRANSMISSION: BATTERY VOLTAGE ADEQUATE (10.6 YRS). AP 73%   99.4% (DEPENDENT - S/P AF & AVN ABL. 1/28/25). SMALL RWAVES @ 1.5MV (HX OF SAME). ALL OTHER AVAILABLE LEAD PARAMETERS WITHIN NORMAL LIMITS. 1 VT-NS EPISODE, 5 BEATS @  BPM. 29 AT/AF EPISODES W/AFLUTTER ON AVAILABLE EGM'S - MAX EPISODE DURATION 43 MIN, 35 SECS. AT/AF BURDEN 0.2%. PVC BURDEN 1.4/HR. EF 55% (PRE ABL RANDALL - 1/28/2025).  PT ON ELIQUIS. NORMAL DEVICE FUNCTION.    ES

## 2025-07-01 ENCOUNTER — OFFICE VISIT (OUTPATIENT)
Dept: CARDIOLOGY CLINIC | Facility: CLINIC | Age: 80
End: 2025-07-01
Payer: MEDICARE

## 2025-07-01 VITALS
HEIGHT: 78 IN | WEIGHT: 260 LBS | HEART RATE: 87 BPM | OXYGEN SATURATION: 96 % | BODY MASS INDEX: 30.08 KG/M2 | DIASTOLIC BLOOD PRESSURE: 88 MMHG | SYSTOLIC BLOOD PRESSURE: 130 MMHG

## 2025-07-01 DIAGNOSIS — I10 BENIGN ESSENTIAL HYPERTENSION: ICD-10-CM

## 2025-07-01 DIAGNOSIS — Z86.79 S/P ABLATION OF ATRIAL FIBRILLATION: ICD-10-CM

## 2025-07-01 DIAGNOSIS — Z98.890 S/P ABLATION OF ATRIAL FIBRILLATION: ICD-10-CM

## 2025-07-01 DIAGNOSIS — I73.9 PAD (PERIPHERAL ARTERY DISEASE) (HCC): ICD-10-CM

## 2025-07-01 DIAGNOSIS — I71.21 ANEURYSM OF ASCENDING AORTA WITHOUT RUPTURE (HCC): ICD-10-CM

## 2025-07-01 DIAGNOSIS — Z98.890 S/P MVR (MITRAL VALVE REPAIR): ICD-10-CM

## 2025-07-01 DIAGNOSIS — I65.23 CAROTID STENOSIS, ASYMPTOMATIC, BILATERAL: ICD-10-CM

## 2025-07-01 DIAGNOSIS — Z95.2 S/P AVR: ICD-10-CM

## 2025-07-01 DIAGNOSIS — I34.0 NONRHEUMATIC MITRAL VALVE REGURGITATION: ICD-10-CM

## 2025-07-01 DIAGNOSIS — Z98.890 S/P LEFT ATRIAL APPENDAGE LIGATION: ICD-10-CM

## 2025-07-01 DIAGNOSIS — I25.10 CORONARY ARTERY DISEASE INVOLVING NATIVE CORONARY ARTERY OF NATIVE HEART WITHOUT ANGINA PECTORIS: ICD-10-CM

## 2025-07-01 DIAGNOSIS — I48.0 PAROXYSMAL ATRIAL FIBRILLATION (HCC): Primary | ICD-10-CM

## 2025-07-01 DIAGNOSIS — I48.91 ATRIAL FIBRILLATION WITH RVR (HCC): ICD-10-CM

## 2025-07-01 DIAGNOSIS — Z98.890 S/P AV NODAL ABLATION: ICD-10-CM

## 2025-07-01 DIAGNOSIS — I72.4 BILATERAL POPLITEAL ARTERY ANEURYSM (HCC): ICD-10-CM

## 2025-07-01 DIAGNOSIS — I10 HYPERTENSION, UNSPECIFIED TYPE: ICD-10-CM

## 2025-07-01 DIAGNOSIS — E78.2 MIXED HYPERLIPIDEMIA: ICD-10-CM

## 2025-07-01 LAB
ATRIAL RATE: 87 BPM
P AXIS: 15 DEGREES
PR INTERVAL: 178 MS
QRS AXIS: 138 DEGREES
QRSD INTERVAL: 148 MS
QT INTERVAL: 422 MS
QTC INTERVAL: 507 MS
T WAVE AXIS: 130 DEGREES
VENTRICULAR RATE: 87 BPM

## 2025-07-01 PROCEDURE — 99214 OFFICE O/P EST MOD 30 MIN: CPT | Performed by: INTERNAL MEDICINE

## 2025-07-01 PROCEDURE — 93000 ELECTROCARDIOGRAM COMPLETE: CPT | Performed by: INTERNAL MEDICINE

## 2025-07-01 RX ORDER — METOPROLOL TARTRATE 50 MG
1 TABLET ORAL 2 TIMES DAILY
COMMUNITY
Start: 2025-04-25

## 2025-07-01 NOTE — PROGRESS NOTES
Cardiology Follow Up    North Hogue Jr.  1945 [id number]  [facility]ART [facility] [place]  [address]  [place] [address]    1. Paroxysmal atrial fibrillation (HCC)  POCT ECG      2. Aneurysm of ascending aorta without rupture (HCC)  Echo complete w/ contrast if indicated      3. Atrial fibrillation with RVR (HCC)  Echo complete w/ contrast if indicated      4. Bilateral popliteal artery aneurysm (HCC)  Echo complete w/ contrast if indicated      5. Benign essential hypertension  Echo complete w/ contrast if indicated      6. Coronary artery disease involving native coronary artery of native heart without angina pectoris  Echo complete w/ contrast if indicated      7. PAD (peripheral artery disease) ([facility])        8. Hypertension, unspecified type        9. Nonrheumatic mitral valve regurgitation        10. S/P AV monica ablation 1/28/2025        11. S/P AVR        12. S/P left atrial appendage ligation        13. S/P MVR (mitral valve repair)        14. S/P redo AFib ablation 1/28/2025        15. Mixed hyperlipidemia        16. Carotid stenosis, asymptomatic, bilateral  VAS carotid complete study          Discussion/Summary:   Overall functional capacity has been improving since A-fib ablation in January.  He is able to walk and play golf which has been a significant improvement.  Still has severe PAD and issues with lower extremity swelling.  Continue torsemide as prescribed.  Check echocardiogram to evaluate right heart pressures and severity of TR.  He does not like to take many medications he is going to go back on the metoprolol once a day.  He is only taking his Eliquis once a day he is aware of stroke risk.  He does not want to take any other medications.  He has been off his statin for quite some time.      Interval History:   79-year-old gentleman with a history of paroxysmal atrial fibrillation, hypertension,  dyslipidemia, bilateral popliteal artery aneurysm status post repair.  History of acute DVT.  He has been doing quite well since her last office appointment.  There was some atypical chest pain a couple of months ago nuclear stress test showed no ischemia.     Functional capacity has been stable and improved from a few years ago.  He has had extensive cardiac surgery as well as vascular procedures.  Underwent A-fib ablation in January he is now spending the majority of his time in sinus rhythm A-fib burden less than 0.2%.  Denies any chest pain, shortness of breath has been improved.  Lower extremity edema has been stable weights have been stable.  Denies any significant active claudication symptoms.    Problem List       PAF (paroxysmal atrial fibrillation) ([facility])    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HCC) (Chronic)    Hypertension    Acute DVT (deep venous thrombosis) ([facility])    A-fib ([facility])    Benign essential hypertension    Overview Signed 6/29/2018  8:02 AM by Anirudh Brown DO     Overview:   dx 2002         Mitral valve insufficiency    Bilateral popliteal artery aneurysm (HCC)    Paroxysmal atrial fibrillation (HCC)    Mixed hyperlipidemia          Past Medical History:   Diagnosis Date    Cardiac disease     GERD (gastroesophageal reflux disease)     occasional - per pt takes TUMS if needed    Hyperlipidemia     Hypertension     Irregular heart beat     Kidney stone     PAF (paroxysmal atrial fibrillation) (HCC)     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    S/P AV monica ablation 1/28/2025 01/28/2025    s/p Medtronic dual chamber PPM with left bundle pacing lead 7/19/2023 07/20/2023    S/P redo AFib ablation 1/28/2025 01/28/2025     Social History     Socioeconomic History    Marital status: /Civil Union     Spouse name: Not on file    Number of children: Not on file    Years of education: Not on file    Highest education level: Not on file   Occupational History    Not on file   Tobacco Use    Smoking  status: Never    Smokeless tobacco: Never    Tobacco comments:     Never a smoker or use of any tobacco products per pt    Vaping Use    Vaping status: Never Used   Substance and Sexual Activity    Alcohol use: Not Currently     Comment: Denies any alcohol use per pt    Drug use: No     Comment: Denies any drug use per pt    Sexual activity: Not Currently     Comment: Not active at this time per pt   Other Topics Concern    Not on file   Social History Narrative    Not on file     Social Drivers of Health     Financial Resource Strain: Not on file   Food Insecurity: No Food Insecurity (1/29/2025)    Nursing - Inadequate Food Risk Classification     Worried About Running Out of Food in the Last Year: Not on file     Ran Out of Food in the Last Year: Not on file     Ran Out of Food in the Last Year: Never true   Transportation Needs: No Transportation Needs (1/29/2025)    Nursing - Transportation Risk Classification     Lack of Transportation: Not on file     Lack of Transportation: No   Physical Activity: Not on file   Stress: Not on file   Social Connections: Not on file   Intimate Partner Violence: Unknown (1/29/2025)    Nursing IPS     Feels Physically and Emotionally Safe: Not on file     Physically Hurt by Someone: Not on file     Humiliated or Emotionally Abused by Someone: Not on file     Physically Hurt by Someone: No     Hurt or Threatened by Someone: No   Housing Stability: Unknown (1/29/2025)    Nursing: Inadequate Housing Risk Classification     Has Housing: Not on file     Worried About Losing Housing: Not on file     Unable to Get Utilities: Not on file     Unable to Pay for Housing in the Last Year: No     Has Housing: No      Family History   Problem Relation Name Age of Onset    Pancreatic cancer Mother      Stroke Father      Heart attack Paternal Grandmother      Anesthesia problems Neg Hx       Past Surgical History:   Procedure Laterality Date    CARDIAC CATHETERIZATION N/A 05/08/2023     Procedure: Cardiac Coronary Angiogram;  Surgeon: Anirudh Mo DO;  Location: [facility];  Service: Cardiology    CARDIAC ELECTROPHYSIOLOGY PROCEDURE N/A 03/25/2022    Procedure: Cardiac eps/afib ablation;  Surgeon: Javon Valdes MD;  Location: [facility];  Service: Cardiology    CARDIAC ELECTROPHYSIOLOGY PROCEDURE N/A 07/19/2023    Procedure: Cardiac pacer implant;  Surgeon: Steven Palacio MD;  Location: [facility];  Service: Cardiology    CARDIAC ELECTROPHYSIOLOGY PROCEDURE N/A 1/28/2025    Procedure: Cardiac eps/afib ablation PFA;  Surgeon: Javon Valdes MD;  Location: [facility];  Service: Cardiology    CATARACT EXTRACTION Bilateral     ESOPHAGOGASTRODUODENOSCOPY N/A 08/16/2016    Procedure: ESOPHAGOGASTRODUODENOSCOPY (EGD);  Surgeon: Lucio Barnes MD;  Location: [facility];  Service:     HERNIA REPAIR Left     groin    HERNIA REPAIR Right 1995    OTHER SURGICAL HISTORY      TX REPLACEMENT MITRAL VALVE W/CARDIOPULMONARY BYP N/A 07/12/2023    Procedure: MITRAL VALVE REPAIR WITH 36MM PHYSIO II ANNULOPLASTY RING;  Surgeon: MODESTO Álvarez MD;  Location: [facility];  Service: Cardiac Surgery    TX RPLCMT AORTIC VALVE OPN W/STENTLESS TISSUE VALVE N/A 07/12/2023    Procedure: REPLACEMENT VALVE AORTIC (AVR) WITH 29MM INSPIRIS TISSUE RESILIA VALVE. LEFT ATRIAL APPENDAGE LIGATION WITH 45 MM ATRICLIP;  Surgeon: MODESTO Álvarez MD;  Location: [facility];  Service: Cardiac Surgery    TX SURGICAL ARTHROSCOPY ANDRES W/CORACOACRM LIGM RLS Left 4/5/2024    Procedure: SHOULDER DIAGNOSTIC GLENOHUMERAL ARTHROSCOPY W/ ROTATOR CUFF REPAIR, SUBACROMIAL DECOMPRESSION W/ ACROMIPLASTY, BICEPS TENOTOMY, LABRAL DEBRIDEMENT;  Surgeon: Luke Mclain MD;  Location: [facility];  Service: Orthopedics    TX SURGICAL ARTHROSCOPY SHOULDER W/ROTATOR CUFF RPR Left 4/5/2024    Procedure: SHOULDER DIAGNOSTIC GLENOHUMERAL ARTHROSCOPY W/ ROTATOR CUFF REPAIR, SUBACROMIAL DECOMPRESSION W/  ACROMIPLASTY, BICEPS TENOTOMY, LABRAL DEBRIDEMENT;  Surgeon: Luke Mclain MD;  Location: [facility];  Service: Orthopedics    REPLACEMENT TOTAL KNEE Left 04/01/2013    TONSILLECTOMY      child surgery    VASCULAR SURGERY Right     POPLITEAL BYPASS DUE TO ANEURYSM       Current Outpatient Medications:     apixaban (Eliquis) 5 mg, TAKE ONE TABLET BY MOUTH TWICE A DAY, Disp: 180 tablet, Rfl: 1    Probiotic Product (PROBIOTIC-10 PO), Take 1 capsule by mouth in the morning., Disp: , Rfl:     Specialty Vitamins Products (Advanced Collagen) TABS, Take by mouth, Disp: , Rfl:     torsemide (DEMADEX) 20 mg tablet, Take 1 tablet (20 mg total) by mouth daily (Patient taking differently: Take 20 mg by mouth as needed), Disp: 90 tablet, Rfl: 3    Vitamin D, Cholecalciferol, 1000 units CAPS, Take 2,000 Units by mouth in the morning., Disp: , Rfl:     metoprolol tartrate (LOPRESSOR) 50 mg tablet, Take 1 tablet by mouth in the morning and 1 tablet before bedtime. (Patient not taking: Reported on 7/1/2025), Disp: , Rfl:     rosuvastatin (CRESTOR) 20 MG tablet, Take 1 tablet (20 mg total) by mouth daily (Patient not taking: Reported on 3/10/2025), Disp: 90 tablet, Rfl: 0    VITAMIN D PO, Take by mouth (Patient not taking: Reported on 7/1/2025), Disp: , Rfl:   Allergies   Allergen Reactions    Oxycodone GI Intolerance     nausea  nausea       Labs:     Chemistry        Component Value Date/Time    K 4.2 01/31/2025 0547    K 4.2 05/03/2023 0915     01/31/2025 0547     05/03/2023 0915    CO2 32 01/31/2025 0547    CO2 21 07/12/2023 1342    CO2 28 05/03/2023 0915    BUN 18 01/31/2025 0547    BUN 16 05/03/2023 0915    BUN 17 11/23/2021 0705    CREATININE 0.82 01/31/2025 0547    CREATININE 0.88 05/03/2023 0915        Component Value Date/Time    CALCIUM 9.6 01/31/2025 0547    CALCIUM 9.5 05/03/2023 0915    ALKPHOS 77 01/07/2025 0932    ALKPHOS 57 05/03/2023 0915    AST 21 01/07/2025 0932    AST 21 05/03/2023 0915    AST 15  "09/18/2021 0945    ALT 14 01/07/2025 0932    ALT 15 (L) 05/03/2023 0915    ALT 18 09/18/2021 0945            No results found for: \"CHOL\"  Lab Results   Component Value Date    HDL 44 07/09/2024    HDL 48 05/08/2023     Lab Results   Component Value Date    LDLCALC 58 07/09/2024    LDLCALC 110 (H) 05/08/2023     Lab Results   Component Value Date    TRIG 78 07/09/2024    TRIG 93 05/08/2023     No results found for: \"CHOLHDL\"    Imaging: No results found.    ECG:    Atrial fibrillation nonspecific ST and T changes      ROS    Vitals:    07/01/25 0839   BP: 130/88   Pulse: 87   SpO2: 96%     Vitals:    07/01/25 0839   Weight: 118 kg (260 lb)     Height: 6' 6\" (198.1 cm)   Body mass index is 30.05 kg/m².    Physical Exam:  Vital signs reviewed  General:  Alert and cooperative, appears stated age, no acute distress  HEENT:  PERRLA, EOMI, no scleral icterus, no conjunctival pallor  Neck:  No lymphadenopathy, no thyromegaly, no carotid bruits, no elevated JVP  Heart:  Regular rate and rhythm, normal S1/S2, no S3/S4, no murmur, rubs or gallops.  PMI nondisplaced  Lungs:  Clear to auscultation bilaterally, no wheezes rales or rhonchi  Abdomen:  Soft, non-tender, positive bowel sounds, no rebound or guarding,   no organomegaly   Extremities:  Normal range of motion.  No clubbing, cyanosis or edema   Vascular:  2+ pedal pulses  Skin:  No rashes or lesions on exposed skin  Neurologic:  Cranial nerves II-XII grossly intact without focal deficits  Psych:  Normal mood and affect              "

## 2025-07-17 ENCOUNTER — HOSPITAL ENCOUNTER (OUTPATIENT)
Dept: NON INVASIVE DIAGNOSTICS | Facility: HOSPITAL | Age: 80
Discharge: HOME | End: 2025-07-17
Attending: INTERNAL MEDICINE
Payer: MEDICARE

## 2025-07-17 VITALS
BODY MASS INDEX: 30.1 KG/M2 | HEIGHT: 78 IN | WEIGHT: 260.14 LBS | HEART RATE: 87 BPM | DIASTOLIC BLOOD PRESSURE: 88 MMHG | SYSTOLIC BLOOD PRESSURE: 130 MMHG

## 2025-07-17 DIAGNOSIS — I10 BENIGN ESSENTIAL HYPERTENSION: ICD-10-CM

## 2025-07-17 DIAGNOSIS — I65.23 CAROTID STENOSIS, ASYMPTOMATIC, BILATERAL: ICD-10-CM

## 2025-07-17 DIAGNOSIS — I48.91 ATRIAL FIBRILLATION WITH RVR (HCC): ICD-10-CM

## 2025-07-17 DIAGNOSIS — I25.10 CORONARY ARTERY DISEASE INVOLVING NATIVE CORONARY ARTERY OF NATIVE HEART WITHOUT ANGINA PECTORIS: ICD-10-CM

## 2025-07-17 DIAGNOSIS — I71.21 ANEURYSM OF ASCENDING AORTA WITHOUT RUPTURE (HCC): ICD-10-CM

## 2025-07-17 DIAGNOSIS — I72.4 BILATERAL POPLITEAL ARTERY ANEURYSM (HCC): ICD-10-CM

## 2025-07-17 PROCEDURE — 93306 TTE W/DOPPLER COMPLETE: CPT

## 2025-07-17 PROCEDURE — 93880 EXTRACRANIAL BILAT STUDY: CPT

## 2025-07-17 PROCEDURE — 93880 EXTRACRANIAL BILAT STUDY: CPT | Performed by: SURGERY

## 2025-07-17 PROCEDURE — 93306 TTE W/DOPPLER COMPLETE: CPT | Performed by: INTERNAL MEDICINE

## 2025-07-18 LAB
AORTIC ROOT: 4 CM
AORTIC VALVE MEAN VELOCITY: 8.9 M/S
ASCENDING AORTA: 4.6 CM
AV AREA BY CONTINUOUS VTI: 2.9 CM2
AV AREA PEAK VELOCITY: 2.8 CM2
AV LVOT MEAN GRADIENT: 1 MMHG
AV LVOT PEAK GRADIENT: 2 MMHG
AV MEAN PRESS GRAD SYS DOP V1V2: 4 MMHG
AV ORIFICE AREA US: 2.88 CM2
AV PEAK GRADIENT: 6 MMHG
AV VELOCITY RATIO: 0.64
AV VMAX SYS DOP: 1.25 M/S
BSA FOR ECHO PROCEDURE: 2.52 M2
DOP CALC AO VTI: 21.3 CM
DOP CALC LVOT AREA: 4.52 CM2
DOP CALC LVOT CARDIAC INDEX: 1.93 L/MIN/M2
DOP CALC LVOT CARDIAC OUTPUT: 4.86 L/MIN
DOP CALC LVOT DIAMETER: 2.4 CM
DOP CALC LVOT PEAK VEL VTI: 13.57 CM
DOP CALC LVOT PEAK VEL: 0.78 M/S
DOP CALC LVOT STROKE INDEX: 24.2 ML/M2
DOP CALC LVOT STROKE VOLUME: 61.36
FRACTIONAL SHORTENING: 30 (ref 28–44)
INTERVENTRICULAR SEPTUM IN DIASTOLE (PARASTERNAL SHORT AXIS VIEW): 1.4 CM
INTERVENTRICULAR SEPTUM: 1.4 CM (ref 0.6–1.1)
LAAS-AP2: 23.2 CM2
LAAS-AP4: 26.6 CM2
LEFT ATRIUM SIZE: 4.6 CM
LEFT ATRIUM VOLUME (MOD BIPLANE): 84 ML
LEFT INTERNAL DIMENSION IN SYSTOLE: 3.1 CM (ref 2.1–4)
LEFT VENTRICULAR INTERNAL DIMENSION IN DIASTOLE: 4.4 CM (ref 3.5–6)
LEFT VENTRICULAR POSTERIOR WALL IN END DIASTOLE: 1.3 CM
LEFT VENTRICULAR STROKE VOLUME: 48 ML
LV EF US.2D.A4C+ESTIMATED: 44 %
LVSV (TEICH): 48 ML
MV E'TISSUE VEL-LAT: 13 CM/S
MV E'TISSUE VEL-SEP: 7 CM/S
RA PRESSURE ESTIMATED: 3 MMHG
RIGHT ATRIAL 2D VOLUME: 106 ML
RIGHT ATRIUM AREA SYSTOLE A4C: 30.9 CM2
RV PSP: 24 MMHG
SINOTUBULAR JUNCTION: 4.2 CM
SL CV LEFT ATRIUM LENGTH A2C: 5.9 CM
SL CV LV EF: 38
SL CV PED ECHO LEFT VENTRICLE DIASTOLIC VOLUME (MOD BIPLANE) 2D: 86 ML
SL CV PED ECHO LEFT VENTRICLE SYSTOLIC VOLUME (MOD BIPLANE) 2D: 38 ML
STJ: 4.2 CM
TR MAX PG: 21 MMHG
TR PEAK VELOCITY: 2.3 M/S
TRICUSPID ANNULAR PLANE SYSTOLIC EXCURSION: 1.5 CM
TRICUSPID VALVE PEAK REGURGITATION VELOCITY: 2.28 M/S

## 2025-07-21 DIAGNOSIS — I10 HYPERTENSION, UNSPECIFIED TYPE: Primary | ICD-10-CM

## 2025-07-21 LAB
AORTIC ROOT: 4 CM
AORTIC VALVE MEAN VELOCITY: 8.9 M/S
ASCENDING AORTA: 4.6 CM
AV AREA BY CONTINUOUS VTI: 2.9 CM2
AV AREA PEAK VELOCITY: 2.8 CM2
AV LVOT MEAN GRADIENT: 1 MMHG
AV LVOT PEAK GRADIENT: 2 MMHG
AV MEAN PRESS GRAD SYS DOP V1V2: 4 MMHG
AV ORIFICE AREA US: 2.88 CM2
AV PEAK GRADIENT: 6 MMHG
AV VELOCITY RATIO: 0.64
AV VMAX SYS DOP: 1.25 M/S
BSA FOR ECHO PROCEDURE: 2.52 M2
DOP CALC AO VTI: 21.3 CM
DOP CALC LVOT AREA: 4.52 CM2
DOP CALC LVOT CARDIAC INDEX: 1.93 L/MIN/M2
DOP CALC LVOT CARDIAC OUTPUT: 4.86 L/MIN
DOP CALC LVOT DIAMETER: 2.4 CM
DOP CALC LVOT PEAK VEL VTI: 13.57 CM
DOP CALC LVOT PEAK VEL: 0.78 M/S
DOP CALC LVOT STROKE INDEX: 24.2 ML/M2
DOP CALC LVOT STROKE VOLUME: 61.36
FRACTIONAL SHORTENING: 30 (ref 28–44)
INTERVENTRICULAR SEPTUM IN DIASTOLE (PARASTERNAL SHORT AXIS VIEW): 1.4 CM
INTERVENTRICULAR SEPTUM: 1.4 CM (ref 0.6–1.1)
LAAS-AP2: 23.2 CM2
LAAS-AP4: 26.6 CM2
LEFT ATRIUM SIZE: 4.6 CM
LEFT ATRIUM VOLUME (MOD BIPLANE): 84 ML
LEFT INTERNAL DIMENSION IN SYSTOLE: 3.1 CM (ref 2.1–4)
LEFT VENTRICULAR INTERNAL DIMENSION IN DIASTOLE: 4.4 CM (ref 3.5–6)
LEFT VENTRICULAR POSTERIOR WALL IN END DIASTOLE: 1.3 CM
LEFT VENTRICULAR STROKE VOLUME: 48 ML
LV EF US.2D.A4C+ESTIMATED: 44 %
LVSV (TEICH): 48 ML
MV E'TISSUE VEL-LAT: 13 CM/S
MV E'TISSUE VEL-SEP: 7 CM/S
RA PRESSURE ESTIMATED: 3 MMHG
RIGHT ATRIAL 2D VOLUME: 106 ML
RIGHT ATRIUM AREA SYSTOLE A4C: 30.9 CM2
RV PSP: 24 MMHG
SINOTUBULAR JUNCTION: 4.2 CM
SL CV LEFT ATRIUM LENGTH A2C: 5.9 CM
SL CV LV EF: 55
SL CV PED ECHO LEFT VENTRICLE DIASTOLIC VOLUME (MOD BIPLANE) 2D: 86 ML
SL CV PED ECHO LEFT VENTRICLE SYSTOLIC VOLUME (MOD BIPLANE) 2D: 38 ML
STJ: 4.2 CM
TR MAX PG: 21 MMHG
TR PEAK VELOCITY: 2.3 M/S
TRICUSPID ANNULAR PLANE SYSTOLIC EXCURSION: 1.5 CM
TRICUSPID VALVE PEAK REGURGITATION VELOCITY: 2.28 M/S

## 2025-07-24 RX ORDER — METOPROLOL TARTRATE 50 MG
50 TABLET ORAL 2 TIMES DAILY
Qty: 180 TABLET | Refills: 2 | Status: SHIPPED | OUTPATIENT
Start: 2025-07-24

## 2025-08-18 ENCOUNTER — EVALUATION (OUTPATIENT)
Dept: PHYSICAL THERAPY | Facility: CLINIC | Age: 80
End: 2025-08-18
Payer: MEDICARE

## 2025-08-18 DIAGNOSIS — I87.2 CHRONIC VENOUS INSUFFICIENCY OF LOWER EXTREMITY: ICD-10-CM

## 2025-08-18 DIAGNOSIS — I89.0 LYMPHEDEMA: Primary | ICD-10-CM

## 2025-08-18 PROCEDURE — 97163 PT EVAL HIGH COMPLEX 45 MIN: CPT | Performed by: PHYSICAL THERAPIST

## (undated) DEVICE — PINNACLE INTRODUCER SHEATH: Brand: PINNACLE

## (undated) DEVICE — 40601 PROLONGED POSITIONING SYSTEM: Brand: 40601 PROLONGED POSITIONING SYSTEM

## (undated) DEVICE — BLANKET HYPOTHERMIA ADULT GAYMAR

## (undated) DEVICE — SMARTGOWN SURGICAL GOWN, 3XL, LONG: Brand: CONVERTORS

## (undated) DEVICE — INTENDED FOR TISSUE SEPARATION, AND OTHER PROCEDURES THAT REQUIRE A SHARP SURGICAL BLADE TO PUNCTURE OR CUT.: Brand: BARD-PARKER ® CARBON RIB-BACK BLADES

## (undated) DEVICE — PENCIL ELECTROSURG E-Z CLEAN -0035H

## (undated) DEVICE — GAUZE SPONGES,16 PLY: Brand: CURITY

## (undated) DEVICE — TUBING ARTHROSCOPIC WAVE  MAIN PUMP

## (undated) DEVICE — CABLE CRYOCATH ELECTRICAL UMBILICAL

## (undated) DEVICE — COBAN 6 IN STERILE

## (undated) DEVICE — STERNAL WIRE

## (undated) DEVICE — GLOVE INDICATOR PI UNDERGLOVE SZ 8 BLUE

## (undated) DEVICE — THREADED CLEAR CANNULA WITH OBTURATOR 5.5MM X 75MM

## (undated) DEVICE — EVERGRIP INSERT SET 86MM: Brand: FOGARTY EVERGRIP

## (undated) DEVICE — CATH ABLATION FARAWAVE PULSED FIELD 31MM

## (undated) DEVICE — CATH EP ACHIEVE ADV 20 MM MAPPING LOOP

## (undated) DEVICE — OASIS DRAIN, SINGLE, INLINE & ATS COMPATIBLE: Brand: OASIS

## (undated) DEVICE — PLUMEPEN PRO 10FT

## (undated) DEVICE — GLOVE SRG BIOGEL ECLIPSE 8

## (undated) DEVICE — REF PATCH ENSITE PRECISION

## (undated) DEVICE — INTRO SHEATH PEEL AWAY 7FR

## (undated) DEVICE — SYRINGE 50ML LL

## (undated) DEVICE — BURR  OVAL 4MM 13CM 8 FLUTE COOLCUT

## (undated) DEVICE — ROSEN CURVED WIRE GUIDE: Brand: ROSEN

## (undated) DEVICE — OCCLUSIVE GAUZE STRIP,3% BISMUTH TRIBROMOPHENATE IN PETROLATUM BLEND: Brand: XEROFORM

## (undated) DEVICE — SILVER-COATED ANTIBACTERIAL BARRIER DRESSING: Brand: ACTICOAT SURGIC 10X25CM 5PK US

## (undated) DEVICE — SUT ETHILON 3-0 PS-1 18 IN 1663G

## (undated) DEVICE — DRESSING ALLEVYN LIFE HEEL 25 X 25.2CM

## (undated) DEVICE — RED RUBBER URETHRAL CATHETER: Brand: DOVER

## (undated) DEVICE — GLIDESHEATH BASIC HYDROPHILIC COATED INTRODUCER SHEATH: Brand: GLIDESHEATH

## (undated) DEVICE — SUT PROLENE 7-0 BV-1/BV-1 24 IN 8304H

## (undated) DEVICE — DGW .035 FC J3MM 150CM TEF: Brand: EMERALD

## (undated) DEVICE — CATH ABLATION TACTICATH SE BI-DIR FJ CRV

## (undated) DEVICE — TUBING INSUFFLATION SET ISO CONNECTOR

## (undated) DEVICE — TR BAND RADIAL ARTERY COMPRESSION DEVICE: Brand: TR BAND

## (undated) DEVICE — BETHLEHEM UNIVERSAL  ARTHRO PK: Brand: CARDINAL HEALTH

## (undated) DEVICE — SUT ETHIBOND 2-0 SH/SH 36 IN X523H

## (undated) DEVICE — DRAPE SHEET THREE QUARTER

## (undated) DEVICE — LIGHT HANDLE COVER SLEEVE DISP BLUE STELLAR

## (undated) DEVICE — Device: Brand: PROTRACK PIGTAIL WIRE

## (undated) DEVICE — GLOVE INDICATOR PI UNDERGLOVE SZ 8.5 BLUE

## (undated) DEVICE — TUBING SET COOL POINT

## (undated) DEVICE — RECIP.STERNUM SAW BLADE 34/7.5/0.7MM: Brand: AESCULAP

## (undated) DEVICE — HEMOCLIP CARTRIDGE LRG

## (undated) DEVICE — SUTURE GRASPER 60 DEGREES

## (undated) DEVICE — SUT ETHIBOND 2-0 V-5/V-5 30 IN PXX52

## (undated) DEVICE — Device: Brand: RETRACT-O-TAPE 18G X 30.5CM BLUNT NEEDLE

## (undated) DEVICE — CATH EP ADVISOR HD GRID MAPPING 8F

## (undated) DEVICE — PROBE ABLATION  APOLLO RF 90 DEG MULTI PORT

## (undated) DEVICE — SUT PROLENE 3-0 SH 36 IN 8522H

## (undated) DEVICE — 32 FR STRAIGHT – SOFT PVC CATHETER: Brand: PVC THORACIC CATHETERS

## (undated) DEVICE — INTENDED FOR TISSUE SEPARATION, AND OTHER PROCEDURES THAT REQUIRE A SHARP SURGICAL BLADE TO PUNCTURE OR CUT.: Brand: BARD-PARKER SAFETY BLADES SIZE 15, STERILE

## (undated) DEVICE — PACK CABG PBDS

## (undated) DEVICE — 3000CC GUARDIAN II: Brand: GUARDIAN

## (undated) DEVICE — CATHETER PLUG WITH CAP: Brand: DOVER

## (undated) DEVICE — 4-PORT MANIFOLD: Brand: NEPTUNE 2

## (undated) DEVICE — FILTER SMOKE EVAC VIROSAFE

## (undated) DEVICE — VASOVIEW HEMOPRO 2: Brand: VASOVIEW HEMOPRO 2

## (undated) DEVICE — U-DRAPE: Brand: CONVERTORS

## (undated) DEVICE — INTRO SHEATH 8FR 24CM ARROW-FLEX

## (undated) DEVICE — THERMOFLECT BLANKET, L, 25EA                               TS THERMOFLECT BLANKET, 48" X 84", SILVER, 5/BG, 5 BG/CS NW: Brand: THERMOFLECT

## (undated) DEVICE — BIO-COMPOSITE CRKSCRW 5.5X14.7MM
Type: IMPLANTABLE DEVICE | Site: SHOULDER | Status: NON-FUNCTIONAL
Brand: ARTHREX®

## (undated) DEVICE — SUT PDS PLUS 1 CTB 36 IN PDPB359T

## (undated) DEVICE — SUT MONOCRYL 4-0 PS-2 18 IN Y496G

## (undated) DEVICE — ADHESIVE SKIN HIGH VISCOSITY EXOFIN 1ML

## (undated) DEVICE — CATH ABLATION SAFIRE TX 8MM LRG CURL

## (undated) DEVICE — PLEDGET CARDIO PTFE 9.5 X 4.8 SOFT LF (6EA/PK)

## (undated) DEVICE — SUT PROLENE 5-0 C-1/C-1 36 IN 8321H

## (undated) DEVICE — SUT VICRYL PLUS 1 CTB-1 36 IN VCPB947H

## (undated) DEVICE — BLADE BEAVER MINI SZ 69

## (undated) DEVICE — PREP SURGICAL PURPREP 26ML

## (undated) DEVICE — BLADE SHAVER DISSECTOR 4MM 13CM COOLCUT

## (undated) DEVICE — PACK VALVE PBDS

## (undated) DEVICE — ANTIBACTERIAL UNDYED BRAIDED (POLYGLACTIN 910), SYNTHETIC ABSORBABLE SUTURE: Brand: COATED VICRYL

## (undated) DEVICE — DGW .035 FC J3MM 260CM TEF: Brand: EMERALD

## (undated) DEVICE — BONE WAX WHITE: Brand: BONE WAX WHITE

## (undated) DEVICE — DRAPE TAPE

## (undated) DEVICE — ABDOMINAL PAD: Brand: DERMACEA

## (undated) DEVICE — PUMP TUBING FUSION PACK

## (undated) DEVICE — SHEATH STEERABLE FARADRIVE

## (undated) DEVICE — ARTHROSCOPY FLOOR MAT

## (undated) DEVICE — 32 FR RIGHT ANGLE – SOFT PVC CATHETER: Brand: PVC THORACIC CATHETERS

## (undated) DEVICE — CATH ABLATION CRYOCATH ARCTIC FRONT ADV PRO 28MM

## (undated) DEVICE — SLITTER ADJUSTABLE

## (undated) DEVICE — AORTIC PUNCH 5.2 MM DISP

## (undated) DEVICE — SUT MONOCRYL PLUS 3-0 PS-2 27 IN MCP427H

## (undated) DEVICE — CATH ABLATION FLEXCATH ADVANCE OD12 FR STEERABLE

## (undated) DEVICE — SCD SEQUENTIAL COMPRESSION COMFORT SLEEVE MEDIUM KNEE LENGTH: Brand: KENDALL SCD

## (undated) DEVICE — GLOVE SRG BIOGEL 8

## (undated) DEVICE — CATH EP 7FR DI-DIR 10-POLE DEFL CS 2-8-2 FJ

## (undated) DEVICE — CATH ULTRASOUND ACUNAV ICE 8FR 90CM GE VIVID-I

## (undated) DEVICE — CATH GUIDING FIXED SHAPE 43CM

## (undated) DEVICE — REF PATCH ENSITE X

## (undated) DEVICE — SUT SILK 2-0 SH CR/8 18 IN C012D

## (undated) DEVICE — RADIFOCUS OPTITORQUE ANGIOGRAPHIC CATHETER: Brand: OPTITORQUE

## (undated) DEVICE — AIRLIFE™ TRI-FLO™ SUCTION CATHETER: Brand: AIRLIFE™

## (undated) DEVICE — SUT SILK 2-0 18 IN A185H

## (undated) DEVICE — TRAY FOLEY 16FR SURESTEP TEMP SENS URIMETER STAT LOK

## (undated) DEVICE — PVC URETHRAL CATHETER: Brand: DOVER

## (undated) DEVICE — NEEDLE TRANSSEPTAL BRK-1 71CM

## (undated) DEVICE — MICROPUNCTURE SET 4FR 10CM .018 NITINOL TRANSITIONLESS TIP

## (undated) DEVICE — SUT ETHIBOND 2-0 SH-1/SH-1 30 IN X763H

## (undated) DEVICE — Device: Brand: ASAHI SILVERWAY

## (undated) DEVICE — ELECTRODE BLADE E-Z CLEAN 4IN -0014A

## (undated) DEVICE — 3M™ TEGADERM™ CHG DRESSING 25/CARTON 4 CARTONS/CASE 1659: Brand: TEGADERM™

## (undated) DEVICE — SUT SILK 0 CT-1 30 IN 424H

## (undated) DEVICE — PACK CUSTOM PERFUSION PLEG PK

## (undated) DEVICE — GUIDE SHEATH SLO 8.5 FR

## (undated) DEVICE — SHOULDER SUSPENSION KIT 6 PER BOX

## (undated) DEVICE — DRAPE EQUIPMENT RF WAND

## (undated) DEVICE — SUT PROLENE 7-0 BV175-8/BV175-8 24 IN EPM8747

## (undated) DEVICE — ALCON OPHTHALMIC KNIFE 15 °: Brand: ALCON

## (undated) DEVICE — 1 X VERSACROSS CONNECT TRANSSEPTAL DILATOR;  1 X VERSACROSS RF WIRE (INCLUDING 1 X CONNECTOR CABLE (SINGLE USE)): Brand: VERSACROSS CONNECT ACCESS SOLUTION FOR FARADRIVE

## (undated) DEVICE — ACE WRAP 6 IN UNSTERILE

## (undated) DEVICE — PACK CUSTOM PERFUSION ANH

## (undated) DEVICE — SILVER-COATED ANTIBACTERIAL BARRIER DRESSING: Brand: ACTICOAT SURGIC 10X12CM 5PK US

## (undated) DEVICE — CABLE CATH CONNECTION FARASTAR

## (undated) DEVICE — CATH COAXIAL UMBILICAL

## (undated) DEVICE — SUT SILK 2 60 IN SA8H

## (undated) DEVICE — CABLE CATHETER ACHIEVE MAPPING

## (undated) DEVICE — TUBING SUCTION 5MM X 12 FT

## (undated) DEVICE — SUT PROLENE 4-0 BB 36 IN 8581H